# Patient Record
Sex: MALE | Race: WHITE | NOT HISPANIC OR LATINO | Employment: FULL TIME | URBAN - METROPOLITAN AREA
[De-identification: names, ages, dates, MRNs, and addresses within clinical notes are randomized per-mention and may not be internally consistent; named-entity substitution may affect disease eponyms.]

---

## 2017-01-09 ENCOUNTER — PATIENT MESSAGE (OUTPATIENT)
Dept: INTERNAL MEDICINE | Facility: CLINIC | Age: 62
End: 2017-01-09

## 2017-01-17 ENCOUNTER — OFFICE VISIT (OUTPATIENT)
Dept: INTERNAL MEDICINE | Facility: CLINIC | Age: 62
End: 2017-01-17
Payer: COMMERCIAL

## 2017-01-17 VITALS
HEART RATE: 89 BPM | WEIGHT: 230.38 LBS | BODY MASS INDEX: 29.57 KG/M2 | DIASTOLIC BLOOD PRESSURE: 66 MMHG | OXYGEN SATURATION: 98 % | HEIGHT: 74 IN | SYSTOLIC BLOOD PRESSURE: 124 MMHG

## 2017-01-17 DIAGNOSIS — E78.5 HYPERLIPIDEMIA, UNSPECIFIED HYPERLIPIDEMIA TYPE: ICD-10-CM

## 2017-01-17 DIAGNOSIS — Z79.4 TYPE 2 DIABETES MELLITUS WITHOUT COMPLICATION, WITH LONG-TERM CURRENT USE OF INSULIN: ICD-10-CM

## 2017-01-17 DIAGNOSIS — E11.9 TYPE 2 DIABETES MELLITUS WITHOUT COMPLICATION, WITH LONG-TERM CURRENT USE OF INSULIN: ICD-10-CM

## 2017-01-17 DIAGNOSIS — I10 ESSENTIAL HYPERTENSION: Primary | ICD-10-CM

## 2017-01-17 PROCEDURE — 3078F DIAST BP <80 MM HG: CPT | Mod: S$GLB,,, | Performed by: INTERNAL MEDICINE

## 2017-01-17 PROCEDURE — 99214 OFFICE O/P EST MOD 30 MIN: CPT | Mod: S$GLB,,, | Performed by: INTERNAL MEDICINE

## 2017-01-17 PROCEDURE — 4010F ACE/ARB THERAPY RXD/TAKEN: CPT | Mod: S$GLB,,, | Performed by: INTERNAL MEDICINE

## 2017-01-17 PROCEDURE — 3074F SYST BP LT 130 MM HG: CPT | Mod: S$GLB,,, | Performed by: INTERNAL MEDICINE

## 2017-01-17 PROCEDURE — 99999 PR PBB SHADOW E&M-EST. PATIENT-LVL III: CPT | Mod: PBBFAC,,, | Performed by: INTERNAL MEDICINE

## 2017-01-17 PROCEDURE — 3046F HEMOGLOBIN A1C LEVEL >9.0%: CPT | Mod: S$GLB,,, | Performed by: INTERNAL MEDICINE

## 2017-01-17 PROCEDURE — 1159F MED LIST DOCD IN RCRD: CPT | Mod: S$GLB,,, | Performed by: INTERNAL MEDICINE

## 2017-01-17 RX ORDER — PEN NEEDLE, DIABETIC 30 GX3/16"
NEEDLE, DISPOSABLE MISCELLANEOUS
Qty: 500 EACH | Refills: 5 | Status: SHIPPED | OUTPATIENT
Start: 2017-01-17 | End: 2018-07-12

## 2017-01-17 RX ORDER — INSULIN GLARGINE 100 [IU]/ML
INJECTION, SOLUTION SUBCUTANEOUS
Qty: 30 ML | Refills: 3 | Status: SHIPPED | OUTPATIENT
Start: 2017-01-17 | End: 2017-05-22 | Stop reason: SDUPTHER

## 2017-01-17 RX ORDER — PEN NEEDLE, DIABETIC 30 GX3/16"
NEEDLE, DISPOSABLE MISCELLANEOUS
Qty: 500 EACH | Refills: 4 | Status: CANCELLED | OUTPATIENT
Start: 2017-01-17

## 2017-01-17 RX ORDER — PEN NEEDLE, DIABETIC 30 GX3/16"
NEEDLE, DISPOSABLE MISCELLANEOUS
Qty: 100 EACH | Refills: 5 | Status: SHIPPED | OUTPATIENT
Start: 2017-01-17 | End: 2017-01-17 | Stop reason: SDUPTHER

## 2017-01-17 NOTE — MR AVS SNAPSHOT
"    [facility]  [address]  [facility] [address]  Phone: [phone]  Fax: [phone]                  Hammad Douglas III   2017 2:00 PM   Office Visit    Description:  Male : 1955   Provider:  Dae Acevedo Jr., MD   Department:  [facility]           Reason for Visit     Annual Exam           Diagnoses this Visit        Comments    Essential hypertension    -  Primary     Type 2 diabetes mellitus without complication, with long-term current use of insulin                To Do List           Future Appointments        Provider Department Dept Phone    2017 7:00 AM LAB, SAME DAY [facility] [phone]      Goals (5 Years of Data)     None       These Medications        Disp Refills Start End    pen needle, diabetic 31 gauge x 5/16" Ndle 500 each 5 2017     Use as needed    Pharmacy: [facility] - [place] [address] [address]  #: [phone]         [facility]     [facility] Care [facility] -  Assistance  Registered nurses in the [facility] provide clinical advisement, health education, appointment booking, and other advisory services.  Call for this free service at [phone].             Medications           Message regarding Medications     Verify the changes and/or additions to your medication regime listed below are the same as discussed with your clinician today.  If any of these changes or additions are incorrect, please notify your healthcare provider.        START taking these NEW medications        Refills    pen needle, diabetic 31 gauge x 5/16" Ndle 5    Sig: Use as needed    Class: Normal      STOP taking these medications     amoxicillin (AMOXIL) 500 MG capsule Take 500 mg by mouth 3 (three) times daily.    cholecalciferol, vitamin D3, 1,000 unit capsule Take 1 capsule (1,000 Units total) by mouth once daily.    pen needle, diabetic (BD ULTRA-FINE " "BETSY PEN NEEDLES) 32 gauge x 5/32" Ndle Test 5 times daily           Verify that the below list of medications is an accurate representation of the medications you are currently taking.  If none reported, the list may be blank. If incorrect, please contact your healthcare provider. Carry this list with you in case of emergency.           Current Medications     atorvastatin (LIPITOR) 40 MG tablet Take 1 tablet (40 mg total) by mouth once daily.    blood sugar diagnostic (ONETOUCH ULTRA TEST) Strp 1 strip by Other route 5 (five) times daily.    blood-glucose meter (ACCU-CHEK WILLIAMS PLUS METER) Misc 1 each by Misc.(Non-Drug; Combo Route) route as directed.    fluoxetine (PROZAC) 20 MG capsule Take 1 capsule (20 mg total) by mouth once daily.    insulin lispro (HUMALOG KWIKPEN) 100 unit/mL InPn pen 6-10 units subcutaneous prior to meals    LANTUS SOLOSTAR 100 unit/mL (3 mL) InPn pen INJECT INJECT 36 UNITS UNITS DAILY AT 2:00PM    losartan (COZAAR) 100 MG tablet Take 1 tablet (100 mg total) by mouth once daily.    glucagon, human recombinant, (GLUCAGON EMERGENCY) 1 mg Kit Inject 1 mL (1 mg total) into the muscle as needed.    pen needle, diabetic 31 gauge x 5/16" Ndle Use as needed           Clinical Reference Information           Vital Signs - Last Recorded  Most recent update: 1/17/2017  2:17 PM by Beulah Brown MA    BP Pulse Ht Wt SpO2 BMI    124/66 (BP Location: Right arm, Patient Position: Sitting, BP Method: Manual) 89 6' 2" (1.88 m) 104.5 kg (230 lb 6.1 oz) 98% 29.58 kg/m2      Blood Pressure          Most Recent Value    BP  124/66      Allergies as of 1/17/2017     Altace [Ramipril]      Immunizations Administered on Date of Encounter - 1/17/2017     None      Orders Placed During Today's Visit     Future Labs/Procedures Expected by Expires    CBC auto differential  1/17/2017 1/17/2018    Comprehensive metabolic panel  1/17/2017 1/17/2018    Hemoglobin A1c  1/17/2017 1/17/2018    Lipid panel  1/17/2017 " 1/17/2018    PSA, Screening  1/17/2017 3/18/2018      Smoking Cessation     If you would like to quit smoking:   You may be eligible for free services if you are a [place] resident and started smoking cigarettes before September 1, 1988.  Call the Smoking Cessation Trust (SCT) toll free at [phone] or [phone].   Call 9-184-QUIT-NOW if you do not meet the above criteria.

## 2017-01-18 NOTE — PROGRESS NOTES
Subjective:       Patient ID: Hammad Dogulas III is a 61 y.o. male.    Chief Complaint: Annual Exam    HPIthe patient is a 61-year-old male comes in for general checkup.  He is reporting glucoses at home around 130.  He is not noticing any chest pain or shortness of breath.  He is quite active at work and does a fair amount of walking.  Review of Systems   Constitutional: Negative for appetite change, fatigue and unexpected weight change.   HENT: Negative for congestion and sore throat.    Eyes: Negative for visual disturbance.   Respiratory: Negative for cough, chest tightness, shortness of breath and wheezing.    Cardiovascular: Negative for chest pain and palpitations.   Gastrointestinal: Negative for abdominal distention, abdominal pain, blood in stool, constipation, diarrhea and nausea.   Genitourinary: Negative for difficulty urinating, dysuria, frequency and urgency.   Musculoskeletal: Negative for arthralgias, myalgias and neck stiffness.   Skin: Negative for rash.   Neurological: Negative for dizziness, weakness and headaches.   Psychiatric/Behavioral: Negative for behavioral problems, hallucinations and self-injury.       Objective:      Physical Exam   Constitutional: He is oriented to person, place, and time. He appears well-developed. No distress.   HENT:   Head: Normocephalic.   Mouth/Throat: No oropharyngeal exudate.   Eyes: Conjunctivae and EOM are normal. Pupils are equal, round, and reactive to light. Right eye exhibits no discharge. Left eye exhibits no discharge. No scleral icterus.   Fundi benign bilaterally.   Neck: Normal range of motion. No JVD present. No tracheal deviation present. No thyromegaly present.   Cardiovascular: Normal rate, regular rhythm and normal heart sounds.  Exam reveals no gallop and no friction rub.    No murmur heard.  Pulmonary/Chest: Effort normal and breath sounds normal. No respiratory distress. He has no wheezes. He has no rales. He exhibits no tenderness.    Abdominal: Soft. Bowel sounds are normal. He exhibits no distension and no mass. There is no tenderness. There is no rebound and no guarding.   Musculoskeletal: Normal range of motion. He exhibits no edema or tenderness.   Lymphadenopathy:     He has no cervical adenopathy.   Neurological: He is alert and oriented to person, place, and time. He has normal reflexes. He displays normal reflexes. No cranial nerve deficit. He exhibits normal muscle tone. Coordination normal.   Skin: Skin is warm and dry. No rash noted. He is not diaphoretic. No erythema. No pallor.   Psychiatric: He has a normal mood and affect. His behavior is normal. Thought content normal.       Assessment:       1. Essential hypertension    2. Type 2 diabetes mellitus without complication, with long-term current use of insulin    3. Hyperlipidemia, unspecified hyperlipidemia type        Plan:       1.  CBC, CMP, hemoglobin A1c, fasting SA  2.  Increase exercise and decrease weight  3.  Return to clinic in 6 months  4.  Refill medications

## 2017-01-20 ENCOUNTER — LAB VISIT (OUTPATIENT)
Dept: LAB | Facility: HOSPITAL | Age: 62
End: 2017-01-20
Attending: INTERNAL MEDICINE
Payer: COMMERCIAL

## 2017-01-20 DIAGNOSIS — Z79.4 TYPE 2 DIABETES MELLITUS WITHOUT COMPLICATION, WITH LONG-TERM CURRENT USE OF INSULIN: ICD-10-CM

## 2017-01-20 DIAGNOSIS — E11.9 TYPE 2 DIABETES MELLITUS WITHOUT COMPLICATION, WITH LONG-TERM CURRENT USE OF INSULIN: ICD-10-CM

## 2017-01-20 DIAGNOSIS — I10 ESSENTIAL HYPERTENSION: ICD-10-CM

## 2017-01-20 LAB
ALBUMIN SERPL BCP-MCNC: 3.5 G/DL
ALP SERPL-CCNC: 97 U/L
ALT SERPL W/O P-5'-P-CCNC: 25 U/L
ANION GAP SERPL CALC-SCNC: 7 MMOL/L
AST SERPL-CCNC: 22 U/L
BASOPHILS # BLD AUTO: 0.03 K/UL
BASOPHILS NFR BLD: 0.5 %
BILIRUB SERPL-MCNC: 0.5 MG/DL
BUN SERPL-MCNC: 20 MG/DL
CALCIUM SERPL-MCNC: 9.4 MG/DL
CHLORIDE SERPL-SCNC: 105 MMOL/L
CHOLEST/HDLC SERPL: 4 {RATIO}
CO2 SERPL-SCNC: 28 MMOL/L
COMPLEXED PSA SERPL-MCNC: 0.37 NG/ML
CREAT SERPL-MCNC: 1.3 MG/DL
DIFFERENTIAL METHOD: NORMAL
EOSINOPHIL # BLD AUTO: 0.3 K/UL
EOSINOPHIL NFR BLD: 5.3 %
ERYTHROCYTE [DISTWIDTH] IN BLOOD BY AUTOMATED COUNT: 13.9 %
EST. GFR  (AFRICAN AMERICAN): >60 ML/MIN/1.73 M^2
EST. GFR  (NON AFRICAN AMERICAN): 59.7 ML/MIN/1.73 M^2
ESTIMATED AVG GLUCOSE: 223 MG/DL
GLUCOSE SERPL-MCNC: 132 MG/DL
HBA1C MFR BLD HPLC: 9.4 %
HCT VFR BLD AUTO: 43.5 %
HDL/CHOLESTEROL RATIO: 24.7 %
HDLC SERPL-MCNC: 170 MG/DL
HDLC SERPL-MCNC: 42 MG/DL
HGB BLD-MCNC: 14.3 G/DL
LDLC SERPL CALC-MCNC: 110.2 MG/DL
LYMPHOCYTES # BLD AUTO: 1.4 K/UL
LYMPHOCYTES NFR BLD: 23.2 %
MCH RBC QN AUTO: 29.2 PG
MCHC RBC AUTO-ENTMCNC: 32.9 %
MCV RBC AUTO: 89 FL
MONOCYTES # BLD AUTO: 0.6 K/UL
MONOCYTES NFR BLD: 9.7 %
NEUTROPHILS # BLD AUTO: 3.8 K/UL
NEUTROPHILS NFR BLD: 61 %
NONHDLC SERPL-MCNC: 128 MG/DL
PLATELET # BLD AUTO: 335 K/UL
PMV BLD AUTO: 10.7 FL
POTASSIUM SERPL-SCNC: 4.8 MMOL/L
PROT SERPL-MCNC: 6.9 G/DL
RBC # BLD AUTO: 4.9 M/UL
SODIUM SERPL-SCNC: 140 MMOL/L
TRIGL SERPL-MCNC: 89 MG/DL
WBC # BLD AUTO: 6.17 K/UL

## 2017-01-20 PROCEDURE — 85025 COMPLETE CBC W/AUTO DIFF WBC: CPT

## 2017-01-20 PROCEDURE — 83036 HEMOGLOBIN GLYCOSYLATED A1C: CPT

## 2017-01-20 PROCEDURE — 80053 COMPREHEN METABOLIC PANEL: CPT

## 2017-01-20 PROCEDURE — 80061 LIPID PANEL: CPT

## 2017-01-20 PROCEDURE — 84153 ASSAY OF PSA TOTAL: CPT

## 2017-01-20 PROCEDURE — 36415 COLL VENOUS BLD VENIPUNCTURE: CPT

## 2017-01-24 ENCOUNTER — PATIENT MESSAGE (OUTPATIENT)
Dept: INTERNAL MEDICINE | Facility: CLINIC | Age: 62
End: 2017-01-24

## 2017-01-24 DIAGNOSIS — E11.9 TYPE 2 DIABETES MELLITUS WITHOUT COMPLICATION, WITH LONG-TERM CURRENT USE OF INSULIN: Primary | ICD-10-CM

## 2017-01-24 DIAGNOSIS — Z79.4 TYPE 2 DIABETES MELLITUS WITHOUT COMPLICATION, WITH LONG-TERM CURRENT USE OF INSULIN: Primary | ICD-10-CM

## 2017-01-27 NOTE — TELEPHONE ENCOUNTER
Spoke with pt---pt states that he received the [facility] message. Verbalized understanding. Informed pt that dr suazo has placed the consult orders And pt stated that he will call on Monday to set up the appt with endocrinology

## 2017-02-22 ENCOUNTER — PATIENT MESSAGE (OUTPATIENT)
Dept: INTERNAL MEDICINE | Facility: CLINIC | Age: 62
End: 2017-02-22

## 2017-03-09 ENCOUNTER — PATIENT MESSAGE (OUTPATIENT)
Dept: INTERNAL MEDICINE | Facility: CLINIC | Age: 62
End: 2017-03-09

## 2017-05-22 ENCOUNTER — OFFICE VISIT (OUTPATIENT)
Dept: ENDOCRINOLOGY | Facility: CLINIC | Age: 62
End: 2017-05-22
Payer: COMMERCIAL

## 2017-05-22 ENCOUNTER — LAB VISIT (OUTPATIENT)
Dept: LAB | Facility: HOSPITAL | Age: 62
End: 2017-05-22
Attending: INTERNAL MEDICINE
Payer: COMMERCIAL

## 2017-05-22 VITALS
HEIGHT: 74 IN | BODY MASS INDEX: 29.09 KG/M2 | WEIGHT: 226.63 LBS | DIASTOLIC BLOOD PRESSURE: 68 MMHG | HEART RATE: 81 BPM | SYSTOLIC BLOOD PRESSURE: 120 MMHG

## 2017-05-22 DIAGNOSIS — E16.2 HYPOGLYCEMIA: ICD-10-CM

## 2017-05-22 DIAGNOSIS — E10.8 TYPE 1 DIABETES MELLITUS WITH COMPLICATION: ICD-10-CM

## 2017-05-22 DIAGNOSIS — E10.8 TYPE 1 DIABETES MELLITUS WITH COMPLICATION: Primary | ICD-10-CM

## 2017-05-22 DIAGNOSIS — E10.319 DIABETIC RETINOPATHY ASSOCIATED WITH TYPE 1 DIABETES MELLITUS, MACULAR EDEMA PRESENCE UNSPECIFIED, UNSPECIFIED LATERALITY, UNSPECIFIED RETINOPATHY SEVERITY: ICD-10-CM

## 2017-05-22 DIAGNOSIS — E10.42 DIABETIC POLYNEUROPATHY ASSOCIATED WITH TYPE 1 DIABETES MELLITUS: ICD-10-CM

## 2017-05-22 DIAGNOSIS — E78.5 HYPERLIPIDEMIA, UNSPECIFIED HYPERLIPIDEMIA TYPE: ICD-10-CM

## 2017-05-22 DIAGNOSIS — I10 ESSENTIAL HYPERTENSION: ICD-10-CM

## 2017-05-22 LAB
ALBUMIN SERPL BCP-MCNC: 3.5 G/DL
ALP SERPL-CCNC: 87 U/L
ALT SERPL W/O P-5'-P-CCNC: 27 U/L
ANION GAP SERPL CALC-SCNC: 5 MMOL/L
AST SERPL-CCNC: 26 U/L
BILIRUB SERPL-MCNC: 0.6 MG/DL
BUN SERPL-MCNC: 20 MG/DL
CALCIUM SERPL-MCNC: 9.3 MG/DL
CHLORIDE SERPL-SCNC: 103 MMOL/L
CO2 SERPL-SCNC: 29 MMOL/L
CREAT SERPL-MCNC: 1.3 MG/DL
EST. GFR  (AFRICAN AMERICAN): >60 ML/MIN/1.73 M^2
EST. GFR  (NON AFRICAN AMERICAN): 59.7 ML/MIN/1.73 M^2
ESTIMATED AVG GLUCOSE: 212 MG/DL
GLUCOSE SERPL-MCNC: 258 MG/DL
HBA1C MFR BLD HPLC: 9 %
POTASSIUM SERPL-SCNC: 4.6 MMOL/L
PROT SERPL-MCNC: 6.8 G/DL
SODIUM SERPL-SCNC: 137 MMOL/L

## 2017-05-22 PROCEDURE — 3074F SYST BP LT 130 MM HG: CPT | Mod: S$GLB,,, | Performed by: INTERNAL MEDICINE

## 2017-05-22 PROCEDURE — 1160F RVW MEDS BY RX/DR IN RCRD: CPT | Mod: S$GLB,,, | Performed by: INTERNAL MEDICINE

## 2017-05-22 PROCEDURE — 80053 COMPREHEN METABOLIC PANEL: CPT

## 2017-05-22 PROCEDURE — 3060F POS MICROALBUMINURIA REV: CPT | Mod: 8P,S$GLB,, | Performed by: INTERNAL MEDICINE

## 2017-05-22 PROCEDURE — 99214 OFFICE O/P EST MOD 30 MIN: CPT | Mod: S$GLB,,, | Performed by: INTERNAL MEDICINE

## 2017-05-22 PROCEDURE — 3078F DIAST BP <80 MM HG: CPT | Mod: S$GLB,,, | Performed by: INTERNAL MEDICINE

## 2017-05-22 PROCEDURE — 83036 HEMOGLOBIN GLYCOSYLATED A1C: CPT

## 2017-05-22 PROCEDURE — 99999 PR PBB SHADOW E&M-EST. PATIENT-LVL III: CPT | Mod: PBBFAC,,, | Performed by: INTERNAL MEDICINE

## 2017-05-22 PROCEDURE — 3046F HEMOGLOBIN A1C LEVEL >9.0%: CPT | Mod: S$GLB,,, | Performed by: INTERNAL MEDICINE

## 2017-05-22 PROCEDURE — 36415 COLL VENOUS BLD VENIPUNCTURE: CPT | Mod: PO

## 2017-05-22 RX ORDER — INSULIN GLARGINE 100 [IU]/ML
32 INJECTION, SOLUTION SUBCUTANEOUS NIGHTLY
Qty: 45 ML | Refills: 3 | Status: SHIPPED | OUTPATIENT
Start: 2017-05-22 | End: 2017-08-24 | Stop reason: SDUPTHER

## 2017-05-22 RX ORDER — INSULIN GLARGINE 100 [IU]/ML
32 INJECTION, SOLUTION SUBCUTANEOUS NIGHTLY
Qty: 30 ML | Refills: 3 | Status: SHIPPED | OUTPATIENT
Start: 2017-05-22 | End: 2017-05-22 | Stop reason: SDUPTHER

## 2017-05-22 NOTE — LETTER
May 22, 2017      Dae Acevedo Jr., MD  [address]  [facility] [address]           [facility]  [address] [facility] [address] [place] [address]  Phone: [phone]  Fax: [phone]          Patient: Hammad Douglas III   MR Number: [id number]   YOB: 1955   Date of Visit: 5/22/2017       Dear Dr. Dae Acevedo Jr.:    Thank you for referring Hammad Douglas to me for evaluation. Attached you will find relevant portions of my assessment and plan of care.    If you have questions, please do not hesitate to call me. I look forward to following Hammad Douglas along with you.    Sincerely,    Luigi Casey, DO [facility]  CC:  No Recipients    If you would like to receive this communication electronically, please contact [email or web address] or [phone] to request more information on [facility] access.    For providers and/or their staff who would like to refer a patient to [facility], please contact us through our one-stop-shop provider referral line, [facility] , at [phone].    If you feel you have received this communication in error or would no longer like to receive these types of communications, please e-mail [email or web address]

## 2017-05-22 NOTE — PROGRESS NOTES
CHIEF COMPLAINT: DM 1  61 year old being seen as a new patient. Saw [facility] 3 years ago. Diagnosed at the age of 19. Lantus 36 and Humalog 6-10. + DM retinopathy, nephropathy.and Neuropathy. States that adjusting humalog based on experience. States that avg BG of 200. Has questions about insulin pump. Checking BG 5-6 x day. He does get some hypoglycemia at night. He does get symptoms. No paresthesias. Tolerating statin      PAST MEDICAL HISTORY/PAST SURGICAL HISTORY:  Reviewed in [facility]    SOCIAL HISTORY: No T/A. [facility]     FAMILY HISTORY:  No DM. + Hypothyroidism    MEDICATIONS/ALLERGIES: The patient's MedCard has been updated and reviewed.      ROS:   Constitutional: No recent significant weight change  Eyes: No recent visual changes  ENT: No dysphagia  Cardiovascular: Denies current anginal symptoms  Respiratory: Denies current respiratory difficulty  Gastrointestinal: Denies recent bowel disturbances  GenitoUrinary - No dysuria. No polyuria  Skin: No new skin rash  Neurologic: No focal neurologic complaints  Remainder ROS negative        PE:    GENERAL: Well developed, well nourished.  PSYCH:  appropriate mood and affect  EYES:  PERRL, EOM intact.  ENT: Nares patent, oropharynx clear, mucosa pink,   NECK: Supple, trachea midline, No palpable thyroid nodules  CHEST: Resp even and unlabored, CTA bilateral.  CARDIAC: RRR, S1, S2 heard, no murmurs, rubs, S3, or S4  ABDOMEN: Soft, non-tender, non-distended;  No organomegaly  VASCULAR:  DP pulses +2/4 bilaterally, no edema  NEURO: Gait steady, CN II-VII grossly intact  SKIN: No areas of breakdown, no acanthosis nigracans.  FEET: decreased monofilament. Only has sensation on dorsal aspect. No cuts or abrasions. 2 + pedal pulse.     LABS   Results for ASHLEY PADILLA III (MRN [id number]) as of 5/22/2017 07:42   Ref. Range 1/20/2017 07:05   Sodium Latest Ref Range: 136 - 145 mmol/L 140   Potassium Latest Ref Range: 3.5 - 5.1 mmol/L 4.8   Chloride  Latest Ref Range: 95 - 110 mmol/L 105   CO2 Latest Ref Range: 23 - 29 mmol/L 28   Anion Gap Latest Ref Range: 8 - 16 mmol/L 7 (L)   BUN, Bld Latest Ref Range: 6 - 20 mg/dL 20   Creatinine Latest Ref Range: 0.5 - 1.4 mg/dL 1.3   eGFR if non African American Latest Ref Range: >60 mL/min/1.73 m^2 59.7 (A)   eGFR if African American Latest Ref Range: >60 mL/min/1.73 m^2 >60.0   Glucose Latest Ref Range: 70 - 110 mg/dL 132 (H)   Calcium Latest Ref Range: 8.7 - 10.5 mg/dL 9.4   Alkaline Phosphatase Latest Ref Range: 55 - 135 U/L 97   Total Protein Latest Ref Range: 6.0 - 8.4 g/dL 6.9   Albumin Latest Ref Range: 3.5 - 5.2 g/dL 3.5   Total Bilirubin Latest Ref Range: 0.1 - 1.0 mg/dL 0.5   AST Latest Ref Range: 10 - 40 U/L 22   ALT Latest Ref Range: 10 - 44 U/L 25   Triglycerides Latest Ref Range: 30 - 150 mg/dL 89   Cholesterol Latest Ref Range: 120 - 199 mg/dL 170   HDL Latest Ref Range: 40 - 75 mg/dL 42   LDL Cholesterol Latest Ref Range: 63.0 - 159.0 mg/dL 110.2   Total Cholesterol/HDL Ratio Latest Ref Range: 2.0 - 5.0  4.0   Hemoglobin A1C Latest Ref Range: 4.5 - 6.2 % 9.4 (H)   Estimated Avg Glucose Latest Ref Range: 68 - 131 mg/dL 223 (H)       ASSESSMENT/PLAN:  1. DM 1- Uncontrolled with nephropathy, neuropathy, retinopathy. Seeing ophtho on [facility]. On ARB. He had questions about insulin pump. Discussed Medtronic 670 G. Discussed that would need to do carb counting. Discussed pumps and sensors. Will send him to DM education to learn CHO. Decrease Lantus to 32 due to hypoglycemia. Discussed keeping a log and sending in as soon as possible. Check labs today. Discussed foot care.     2. HTN- on ARB. Controlled.     3. Hyperlipidemia- starting statin.       FOLLOWUP  DM Education- CHO  Today- CMP, A1c  F/U 3 months with NP with CMP, A1c, urine micro, FLP

## 2017-05-24 RX ORDER — ATORVASTATIN CALCIUM 40 MG/1
40 TABLET, FILM COATED ORAL DAILY
Qty: 90 TABLET | Refills: 0 | Status: SHIPPED | OUTPATIENT
Start: 2017-05-24 | End: 2017-08-24

## 2017-05-24 RX ORDER — LOSARTAN POTASSIUM 100 MG/1
100 TABLET ORAL DAILY
Qty: 90 TABLET | Refills: 0 | Status: SHIPPED | OUTPATIENT
Start: 2017-05-24 | End: 2017-06-06 | Stop reason: SDUPTHER

## 2017-06-07 RX ORDER — ATORVASTATIN CALCIUM 40 MG/1
TABLET, FILM COATED ORAL
Qty: 90 TABLET | Refills: 3 | Status: SHIPPED | OUTPATIENT
Start: 2017-06-07 | End: 2018-05-11 | Stop reason: SDUPTHER

## 2017-06-08 RX ORDER — LOSARTAN POTASSIUM 100 MG/1
TABLET ORAL
Qty: 90 TABLET | Refills: 0 | Status: SHIPPED | OUTPATIENT
Start: 2017-06-08 | End: 2017-06-29

## 2017-06-29 RX ORDER — LOSARTAN POTASSIUM 100 MG/1
TABLET ORAL
Qty: 90 TABLET | Refills: 0 | Status: SHIPPED | OUTPATIENT
Start: 2017-06-29 | End: 2017-11-21

## 2017-07-05 RX ORDER — FLUOXETINE HYDROCHLORIDE 20 MG/1
CAPSULE ORAL
Qty: 90 CAPSULE | Refills: 3 | Status: SHIPPED | OUTPATIENT
Start: 2017-07-05 | End: 2018-08-16 | Stop reason: SDUPTHER

## 2017-07-05 RX ORDER — INSULIN LISPRO 100 [IU]/ML
INJECTION, SOLUTION INTRAVENOUS; SUBCUTANEOUS
Qty: 30 ML | Refills: 0 | Status: SHIPPED | OUTPATIENT
Start: 2017-07-05 | End: 2017-08-24 | Stop reason: SDUPTHER

## 2017-08-17 ENCOUNTER — LAB VISIT (OUTPATIENT)
Dept: LAB | Facility: HOSPITAL | Age: 62
End: 2017-08-17
Attending: INTERNAL MEDICINE
Payer: COMMERCIAL

## 2017-08-17 DIAGNOSIS — E10.8 TYPE 1 DIABETES MELLITUS WITH COMPLICATION: ICD-10-CM

## 2017-08-17 LAB
ALBUMIN SERPL BCP-MCNC: 3.5 G/DL
ALP SERPL-CCNC: 86 U/L
ALT SERPL W/O P-5'-P-CCNC: 24 U/L
ANION GAP SERPL CALC-SCNC: 8 MMOL/L
AST SERPL-CCNC: 24 U/L
BILIRUB SERPL-MCNC: 0.5 MG/DL
BUN SERPL-MCNC: 28 MG/DL
C PEPTIDE SERPL-MCNC: <0.08 NG/ML
CALCIUM SERPL-MCNC: 9.8 MG/DL
CHLORIDE SERPL-SCNC: 104 MMOL/L
CHOLEST/HDLC SERPL: 3.9 {RATIO}
CO2 SERPL-SCNC: 27 MMOL/L
CREAT SERPL-MCNC: 1.2 MG/DL
EST. GFR  (AFRICAN AMERICAN): >60 ML/MIN/1.73 M^2
EST. GFR  (NON AFRICAN AMERICAN): >60 ML/MIN/1.73 M^2
ESTIMATED AVG GLUCOSE: 189 MG/DL
GLUCOSE SERPL-MCNC: 108 MG/DL
HBA1C MFR BLD HPLC: 8.2 %
HDL/CHOLESTEROL RATIO: 25.6 %
HDLC SERPL-MCNC: 172 MG/DL
HDLC SERPL-MCNC: 44 MG/DL
LDLC SERPL CALC-MCNC: 117 MG/DL
NONHDLC SERPL-MCNC: 128 MG/DL
POTASSIUM SERPL-SCNC: 5.3 MMOL/L
PROT SERPL-MCNC: 7.1 G/DL
SODIUM SERPL-SCNC: 139 MMOL/L
TRIGL SERPL-MCNC: 55 MG/DL

## 2017-08-17 PROCEDURE — 80061 LIPID PANEL: CPT

## 2017-08-17 PROCEDURE — 36415 COLL VENOUS BLD VENIPUNCTURE: CPT

## 2017-08-17 PROCEDURE — 80053 COMPREHEN METABOLIC PANEL: CPT

## 2017-08-17 PROCEDURE — 83036 HEMOGLOBIN GLYCOSYLATED A1C: CPT

## 2017-08-17 PROCEDURE — 84681 ASSAY OF C-PEPTIDE: CPT

## 2017-08-24 ENCOUNTER — OFFICE VISIT (OUTPATIENT)
Dept: ENDOCRINOLOGY | Facility: CLINIC | Age: 62
End: 2017-08-24
Payer: COMMERCIAL

## 2017-08-24 ENCOUNTER — PATIENT MESSAGE (OUTPATIENT)
Dept: ENDOCRINOLOGY | Facility: CLINIC | Age: 62
End: 2017-08-24

## 2017-08-24 ENCOUNTER — LAB VISIT (OUTPATIENT)
Dept: LAB | Facility: HOSPITAL | Age: 62
End: 2017-08-24
Attending: NURSE PRACTITIONER
Payer: COMMERCIAL

## 2017-08-24 VITALS
SYSTOLIC BLOOD PRESSURE: 128 MMHG | HEART RATE: 80 BPM | DIASTOLIC BLOOD PRESSURE: 78 MMHG | RESPIRATION RATE: 18 BRPM | HEIGHT: 73 IN | WEIGHT: 230.38 LBS | BODY MASS INDEX: 30.53 KG/M2

## 2017-08-24 DIAGNOSIS — I10 ESSENTIAL HYPERTENSION: ICD-10-CM

## 2017-08-24 DIAGNOSIS — E78.5 HYPERLIPIDEMIA, UNSPECIFIED HYPERLIPIDEMIA TYPE: ICD-10-CM

## 2017-08-24 DIAGNOSIS — E10.21 TYPE 1 DIABETES MELLITUS WITH DIABETIC NEPHROPATHY: ICD-10-CM

## 2017-08-24 DIAGNOSIS — E10.43 TYPE 1 DIABETES MELLITUS WITH DIABETIC AUTONOMIC NEUROPATHY: Primary | ICD-10-CM

## 2017-08-24 DIAGNOSIS — E10.3599 PROLIFERATIVE DIABETIC RETINOPATHY WITHOUT MACULAR EDEMA ASSOCIATED WITH TYPE 1 DIABETES MELLITUS, UNSPECIFIED LATERALITY: ICD-10-CM

## 2017-08-24 LAB
CREAT UR-MCNC: 97 MG/DL
MICROALBUMIN UR DL<=1MG/L-MCNC: 208 UG/ML
MICROALBUMIN/CREATININE RATIO: 214.4 UG/MG

## 2017-08-24 PROCEDURE — 99215 OFFICE O/P EST HI 40 MIN: CPT | Mod: S$GLB,,, | Performed by: NURSE PRACTITIONER

## 2017-08-24 PROCEDURE — 3008F BODY MASS INDEX DOCD: CPT | Mod: S$GLB,,, | Performed by: NURSE PRACTITIONER

## 2017-08-24 PROCEDURE — 99999 PR PBB SHADOW E&M-EST. PATIENT-LVL III: CPT | Mod: PBBFAC,,, | Performed by: NURSE PRACTITIONER

## 2017-08-24 PROCEDURE — 3045F PR MOST RECENT HEMOGLOBIN A1C LEVEL 7.0-9.0%: CPT | Mod: S$GLB,,, | Performed by: NURSE PRACTITIONER

## 2017-08-24 PROCEDURE — 3078F DIAST BP <80 MM HG: CPT | Mod: S$GLB,,, | Performed by: NURSE PRACTITIONER

## 2017-08-24 PROCEDURE — 3074F SYST BP LT 130 MM HG: CPT | Mod: S$GLB,,, | Performed by: NURSE PRACTITIONER

## 2017-08-24 PROCEDURE — 82570 ASSAY OF URINE CREATININE: CPT

## 2017-08-24 PROCEDURE — 4010F ACE/ARB THERAPY RXD/TAKEN: CPT | Mod: S$GLB,,, | Performed by: NURSE PRACTITIONER

## 2017-08-24 RX ORDER — INSULIN GLARGINE 100 [IU]/ML
30 INJECTION, SOLUTION SUBCUTANEOUS NIGHTLY
Qty: 45 ML | Refills: 3
Start: 2017-08-24 | End: 2017-11-30 | Stop reason: SDUPTHER

## 2017-08-24 RX ORDER — INSULIN LISPRO 100 [IU]/ML
INJECTION, SOLUTION INTRAVENOUS; SUBCUTANEOUS
Qty: 30 ML | Refills: 0
Start: 2017-08-24 | End: 2017-11-30 | Stop reason: SDUPTHER

## 2017-08-24 NOTE — Clinical Note
Negin De Leon. This is my pt from [place], who works in [place]. He's had T1DM since age 19 and is actively counting carbs. I want him to start using I:C ratio (1:8) to dose his Humalog for better coverage, so I'm sending him to you to educate on this. Please see my note for details. He'd be a great Medtronic 670G candidate, too.    Thank you! Gin

## 2017-08-24 NOTE — PROGRESS NOTES
CC: Mr. Hammad Douglas III arrives today for management of Type 1  DM and review of chronic medical conditions, as listed in the visit diagnosis section of this encounter.     HPI: Mr. Hammad Douglas III was diagnosed with Type 1  DM at age 19 after a viral illness - had polyuria, polydipsia at this time.  Initial treatment consisted of insulin. Denies FH of DM. Reports past hospitalizations due to DKA > 30 years ago. Not recently.   Previously seen by KENNEDI Mckeon NP in the [facility] [place].     Last seen by me in 9/2014. Most recently seen by Dr. Casey in 5/2017.    At last visit, he was referred to diabetes education for CHO counting. However, this appt was cancelled in June. Lantus dose was also decreased, due to hypoglycemia.     BG readings are checked 4x/day.            Hypoglycemia: Yes. Usually occurs in AM but less frequently since last insulin dose adjustment. He believes he may have taken his Humalog twice recently and BG dropped to 23.  Hypoglycemic Symptoms: none. Sometimes headache  Hypoglycemia Treatment: Coke or juice. Has glucagon.     Missing Insulin/PO medication doses: No  Timing prandial insulin 5-15 minutes before meals: yes    Exercise: No    Dietary Habits: Eats 3 meals/day. Occasional snacking on low sugar yogurt after dinner. Avoids sugary beverages..     Making an effort to count CHO. Has never used I:C ratio. He reports most meals average ~40 grams.     Last DM education appointment: 2014 ([facility]).    He works at [employer] in [place].     He is hesitant about an insulin pump but also is not very sure about how these work.     CURRENT DIABETIC MEDS: Lantus 32 units QHS; Humalog 6-10 units AC, based on meal size --uses 6 unit dose more often.   Vial or pen: pen  Glucometer type: One Touch Ultra      Last Eye Exam: 7/2016, proliferative DR. He is due for follow up.  Last Podiatry Exam: 2/2016    REVIEW OF SYSTEMS  Constitutional: no c/o fatigue, weakness, or  "weight loss.   Eyes: denies visual disturbances.  Cardiac: no palpitations or chest pain.  Respiratory: no cough or dyspnea.  GI: no c/o abdominal pain or nausea  Skin: no lesions or rashes.  Neuro: no numbness, tingling, or parasthesias.  Endocrine: denies polyphagia, polydipsia, polyuria      Personally reviewed Past Medical, Surgical, Social History.    Vital Signs  /78   Pulse 80   Resp 18   Ht 6' 1" (1.854 m)   Wt 104.5 kg (230 lb 6.1 oz)   BMI 30.40 kg/m²     Personally reviewed the below labs:    Hemoglobin A1C   Date Value Ref Range Status   08/17/2017 8.2 (H) 4.0 - 5.6 % Final     Comment:     According to ADA guidelines, hemoglobin A1c <7.0% represents  optimal control in non-pregnant diabetic patients. Different  metrics may apply to specific patient populations.   Standards of Medical Care in Diabetes-2016.  For the purpose of screening for the presence of diabetes:  <5.7%     Consistent with the absence of diabetes  5.7-6.4%  Consistent with increasing risk for diabetes   (prediabetes)  >or=6.5%  Consistent with diabetes  Currently, no consensus exists for use of hemoglobin A1c  for diagnosis of diabetes for children.  This Hemoglobin A1c assay has significant interference with fetal   hemoglobin   (HbF). The results are invalid for patients with abnormal amounts of   HbF,   including those with known Hereditary Persistence   of Fetal Hemoglobin. Heterozygous hemoglobin variants (HbAS, HbAC,   HbAD, HbAE, HbA2) do not significantly interfere with this assay;   however, presence of multiple variants in a sample may impact the %   interference.     05/22/2017 9.0 (H) 4.5 - 6.2 % Final     Comment:     According to ADA guidelines, hemoglobin A1C <7.0% represents  optimal control in non-pregnant diabetic patients.  Different  metrics may apply to specific populations.   Standards of Medical Care in Diabetes - 2016.  For the purpose of screening for the presence of diabetes:  <5.7%     Consistent " with the absence of diabetes  5.7-6.4%  Consistent with increasing risk for diabetes   (prediabetes)  >or=6.5%  Consistent with diabetes  Currently no consensus exists for use of hemoglobin A1C  for diagnosis of diabetes for children.     01/20/2017 9.4 (H) 4.5 - 6.2 % Final     Comment:     According to ADA guidelines, hemoglobin A1C <7.0% represents  optimal control in non-pregnant diabetic patients.  Different  metrics may apply to specific populations.   Standards of Medical Care in Diabetes - 2016.  For the purpose of screening for the presence of diabetes:  <5.7%     Consistent with the absence of diabetes  5.7-6.4%  Consistent with increasing risk for diabetes   (prediabetes)  >or=6.5%  Consistent with diabetes  Currently no consensus exists for use of hemoglobin A1C  for diagnosis of diabetes for children.         Chemistry        Component Value Date/Time     08/17/2017 0719    K 5.3 (H) 08/17/2017 0719     08/17/2017 0719    CO2 27 08/17/2017 0719    BUN 28 (H) 08/17/2017 0719    CREATININE 1.2 08/17/2017 0719     08/17/2017 0719        Component Value Date/Time    CALCIUM 9.8 08/17/2017 0719    ALKPHOS 86 08/17/2017 0719    AST 24 08/17/2017 0719    ALT 24 08/17/2017 0719    BILITOT 0.5 08/17/2017 0719    ESTGFRAFRICA >60.0 08/17/2017 0719    EGFRNONAA >60.0 08/17/2017 0719          Lab Results   Component Value Date    CHOL 172 08/17/2017    CHOL 170 01/20/2017    CHOL 162 12/15/2015     Lab Results   Component Value Date    HDL 44 08/17/2017    HDL 42 01/20/2017    HDL 42 12/15/2015     Lab Results   Component Value Date    LDLCALC 117.0 08/17/2017    LDLCALC 110.2 01/20/2017    LDLCALC 111.4 12/15/2015     Lab Results   Component Value Date    TRIG 55 08/17/2017    TRIG 89 01/20/2017    TRIG 43 12/15/2015     Lab Results   Component Value Date    CHOLHDL 25.6 08/17/2017    CHOLHDL 24.7 01/20/2017    CHOLHDL 25.9 12/15/2015       Lab Results   Component Value Date    MICALBCREAT 164.9  (H) 09/17/2014     Lab Results   Component Value Date    TSH 1.844 03/14/2014       CrCl cannot be calculated (Patient's most recent sCr result is older than the maximum 7 days allowed.).    Vit D, 25-Hydroxy   Date Value Ref Range Status   03/13/2015 23 (L) 30 - 96 ng/mL Final     Comment:     Vitamin D deficiency.........<10 ng/mL                              Vitamin D insufficiency......10-29 ng/mL       Vitamin D sufficiency........> or equal to 30 ng/mL  Vitamin D toxicity............>100 ng/mL           PHYSICAL EXAMINATION  Constitutional: Appears well, no distress  Neck: Supple, trachea midline; no thyromegaly or nodules.   Respiratory: CTA, even and unlabored.  Cardiovascular: RRR, no murmurs, no carotid bruits. DP pulses  2+ bilaterally; no edema.    Lymph: no cervical or supraclavicular lymphadenopathy  Skin: warm and dry; no lipohypertrophy, or acanthosis nigracans observed.  Neuro: DTR 2+ BUE/2+BLE.  Feet: appropriate footwear.    A1c target < 7.5%, due to hypoglycemia unawareness        Assessment/Plan  1. Type 1 diabetes mellitus with diabetic autonomic neuropathy  -- A1c has decreased but remains above goal. + hypoglycemia unawareness. Need to be very conservative with insulin dose increases  -- decrease Lantus to 30 units, due to occasional fasting hypoglycemia  -- change Humalog 6-8 units AC. Correction scale, target 180, ISF 50. Discussed how to use appropriately.   -- check BG 4x/day  -- schedule eye exam    -- discussed benefits of Dexcom personal CGMS. brochure provided. He will let us know if he would like us to fax his information over.   -- Diabetes education for instruction on use of I:C ratio. Would recommend starting with I:C ratio of 1:8  -- discussed benefits of Medtronic 670G and that he would need to be able to carb count in order to use auto mode. Great candidate.     -- Discussed diagnosis of DM, A1c goals, progression of disease, long term complications and tx options.  Advised  patient to check BG before activities, such as driving or exercise.  -- Reviewed hypoglycemia management: treat with 1/2 glass of juice, 1/2 can regular coke, or 4 glucose tablets. Monitor and repeat treatment every 15 minutes until BG is >70 Then have a snack, which includes a complex carbohydrate and protein.   2. Type 1 diabetes mellitus with diabetic nephropathy  -- on losartan  -- Microalbumin/creatinine urine ratio with RTC   3. Proliferative diabetic retinopathy without macular edema associated with type 1 diabetes mellitus, unspecified laterality  -- keep follow ups   4. Essential hypertension  -- controlled, continue current meds   5. Hyperlipidemia, unspecified hyperlipidemia type  -- continue atorvastatin and continue to monitor  Lab Results   Component Value Date    LDLCALC 117.0 2017      6. Hypoglycemia due to Type 1 DM -- has glucagon  -- recommended medic alert tag  -- advised to be aware of when he takes insulin so he doesn't accidentally double dose.      Total Time and Counselin minutes, >50% time spent counseling as noted above in #1 A/P.       FOLLOW UP  Return in about 3 months (around 2017).   Patient instructed to bring BG logs to each follow up   Patient encouraged to call for any BG/medication issues, concerns, or questions.    Orders Placed This Encounter   Procedures    Hemoglobin A1c    Basic metabolic panel    Vitamin D    Microalbumin/creatinine urine ratio

## 2017-08-24 NOTE — PATIENT INSTRUCTIONS
[email or web address] -- medic alert tag      Hypoglycemia (Low Blood Sugar)     Fast-acting sugar includes a cup of nonfat milk.     Too little sugar (glucose) in your blood is called hypoglycemia or low blood sugar. Low blood sugar usually means anything lower than 70 mg/dL. Talk with your healthcare provider about your target range and what level is too low for you. Diabetes itself doesnt cause low blood sugar. But some of the treatments for diabetes, such as pills or insulin, may raise your risk for it. Low blood sugar may cause you to pass out or have a seizure. So always treat low blood sugar right away, but don't overeat.  Special note: Always carry a source of fast-acting sugar and a snack in case of hypoglycemia.   What you may notice  If you have low blood sugar, you may have one or more of these symptoms:  · Shakiness or dizziness  · Cold, clammy skin or sweating  · Feelings of hunger  · Headache  · Nervousness  · A hard, fast heartbeat  · Weakness  · Confusion or irritability  · Blurred vision  · Having nightmares or waking up confused or sweating  · Numbness or tingling in the lips or tongue  What you should do  Here are tips to follow if you have hypoglycemia:   · First check your blood sugar. If it is too low (out of your target range), eat or drink 15 to 20 grams of fast-acting sugar. This may be 3 to 4 glucose tablets, 4 ounces (half a cup) of fruit juice or regular (nondiet) soda, 8 ounces (1 cup) of fat-free milk, or 1 tablespoon of honey. Dont take more than this, or your blood sugar may go too high.  · Wait 15 minutes. Then recheck your blood sugar if you can.  · If your blood sugar is still too low, repeat the steps above and check your blood sugar again. If your blood sugar still has not returned to your target range, contact your healthcare provider or seek emergency care.  · Once your blood sugar returns to target range, eat a snack or meal.  Preventing low blood sugar  Things you can do include  the following:   · If your condition needs a strict treatment plan, eat your meals and snacks at the same times each day. Dont skip meals!  · If your treatment plan lets you change when you eat and what you eat, learn how to change the time and dose of your rapid-acting insulin to match this.   · Ask your healthcare provider if it is safe for you to drink alcohol. Never drink on an empty stomach.  · Take your medicine at the prescribed times.  · Always carry a source of fast-acting sugar and a snack when youre away from home.  Other things to do  Additional tips include the following:  · Carry a medical ID card, a compact USB drive, or wear a medical alert bracelet or necklace. It should say that you have diabetes. It should also say what to do if you pass out or have a seizure.  · Make sure your family, friends, and coworkers know the signs of low blood sugar. Tell them what to do if your blood sugar falls very low and you cant treat yourself.  · Keep a glucagon emergency kit handy. Be sure your family, friends, and coworkers know how and when to use it. Check it regularly and replace the glucagon before it expires.  · Talk with your health care team about other things you can do to prevent low blood sugar.     If you have unexplained hypoglycemia or hypoglycemia several times, call your healthcare provider.   Date Last Reviewed: 5/1/2016  © 0022-2704 [employer]. [address] [place] [address], [place] [address]. All rights reserved. This information is not intended as a substitute for professional medical care. Always follow your healthcare professional's instructions.

## 2017-09-18 ENCOUNTER — CLINICAL SUPPORT (OUTPATIENT)
Dept: DIABETES | Facility: CLINIC | Age: 62
End: 2017-09-18
Payer: COMMERCIAL

## 2017-09-18 DIAGNOSIS — E10.8 TYPE 1 DIABETES MELLITUS WITH COMPLICATION: ICD-10-CM

## 2017-09-18 PROCEDURE — G0108 DIAB MANAGE TRN  PER INDIV: HCPCS | Mod: S$GLB,,, | Performed by: DIETITIAN, REGISTERED

## 2017-09-21 RX ORDER — INSULIN LISPRO 100 [IU]/ML
INJECTION, SOLUTION INTRAVENOUS; SUBCUTANEOUS
Qty: 30 ML | Refills: 0 | Status: SHIPPED | OUTPATIENT
Start: 2017-09-21 | End: 2017-11-30 | Stop reason: SDUPTHER

## 2017-09-22 NOTE — PROGRESS NOTES
Diabetes Education  Author: Negin Rodas RD  Date: 9/22/2017    Diabetes Education Visit  Diabetes Education Record Assessment/Progress: Initial (IC ratio teaching)    Diabetes Type  Diabetes Type : Type I (since age 19)    Diabetes History  Diabetes Diagnosis: >10 years    Nutrition  Meal Planning:  (eats 3 meals per day (40-50 gm carb); occasional snack before bed)    Monitoring   Self Monitoring :  (SMBG 3-4 times daily)  Blood Glucose Logs: No    Exercise   Exercise Type:  (none)    Current Diabetes Treatment   Current Treatment: Insulin (Lantus 30 units daily; Humalog 6-8 units AC)    Social History  Preferred Learning Method: Face to Face  Primary Support: Self  Educational Level: College Graduate  Occupation:  ( at [employer])    Barriers to Change  Barriers to Change: None  Learning Challenges : None    Readiness to Learn   Readiness to Learn : Acceptance    Cultural Influences  Cultural Influences: No      Diabetes Education Assessment/Progress  Acute Complications (preventing, detecting, and treating acute complications): Discussion, Individual Session, Written Materials Provided, Instructed (Reviewed hypo symptoms and appropriate treatment. Pt is VERY hypo unaware; can be <50 mg/dL and still not feel symptoms. Perfect dexcom candidate.)    Nutrition (Incorporating nutritional management into one's lifestyle): Discussion, Individual Session, Written Materials Provided, Instructed (Pt very knowledgeable of carb vs non-carb foods. Averages about 40-50 gm carbs per meal. Pt not totally confident in his ability to count carbs accurately. Serving sizes of carb food items reviewed, as well as label reading, and resources for determining carb amounts like [employer] book or [employer] sj. Multiple examples practiced at visit and pt able to determine carb amounts appropriately.)    Medications (states correct name, dose, onset, peak, duration, side effects & timing of meds): Discussion, Individual  Session, Instructed, Written Materials Provided (Reviewed timing and MOA of both medications. Reviewed IC ratio of 1:8 and how to use appropriately. Discussed ISF and target BGs. Discussed insulin pumps, though pt does not seem interested at this time. Likely a vanity issue for him.)    Monitoring (monitoring blood glucose/other parameters & using results): Discussion, Individual Session, Written Materials Provided, Instructed (Educated on Dexcom and daily usage. Recommended this would be a very useful tool for him, especially with such severe hypo unawareness. Pt would like to pursue Dexcom. Face sheet and ins info faxed to [employer]. )    Goal Setting and Problem Solving (verbalizes behavior change strategies & sets realistic goals): Discussion        Goals  Healthy Eating: Set (Will count carbs appropriately)  Medications: Set (Will use IC ratio of 1:8)  Other: Set (Investigate dexcom)         Diabetes Care Plan/Intervention  Education Plan/Intervention: Individual Follow-Up DSMT (3 week f/u scheduled)      Diabetes Meal Plan  Restrictions: Restricted Carbohydrate  Carbohydrate Per Meal: 45-60g  Carbohydrate Per Snack :  (0g)      Education Units of Time   Time Spent: 60 min      Health Maintenance Topics with due status: Not Due       Topic Last Completion Date    Colonoscopy 05/23/2008    Foot Exam 05/22/2017    Lipid Panel 08/17/2017    Hemoglobin A1c 08/17/2017     Health Maintenance Due   Topic Date Due    Hepatitis C Screening  1955    TETANUS VACCINE  05/24/1975    Pneumococcal PPSV23 (Medium Risk) (1) 05/24/1975    Zoster Vaccine  05/24/2017    Eye Exam  07/11/2017    Influenza Vaccine  08/01/2017

## 2017-10-02 ENCOUNTER — OFFICE VISIT (OUTPATIENT)
Dept: OPHTHALMOLOGY | Facility: CLINIC | Age: 62
End: 2017-10-02
Payer: COMMERCIAL

## 2017-10-02 DIAGNOSIS — H25.13 NUCLEAR SCLEROSIS OF BOTH EYES: ICD-10-CM

## 2017-10-02 PROBLEM — H25.10 NS (NUCLEAR SCLEROSIS): Status: ACTIVE | Noted: 2017-10-02

## 2017-10-02 PROCEDURE — 92014 COMPRE OPH EXAM EST PT 1/>: CPT | Mod: S$GLB,,, | Performed by: OPHTHALMOLOGY

## 2017-10-02 PROCEDURE — 92225 PR SPECIAL EYE EXAM, INITIAL: CPT | Mod: LT,S$GLB,, | Performed by: OPHTHALMOLOGY

## 2017-10-02 PROCEDURE — 99999 PR PBB SHADOW E&M-EST. PATIENT-LVL III: CPT | Mod: PBBFAC,,, | Performed by: OPHTHALMOLOGY

## 2017-10-02 RX ORDER — LOSARTAN POTASSIUM 100 MG/1
TABLET ORAL
Qty: 90 TABLET | Refills: 0 | Status: SHIPPED | OUTPATIENT
Start: 2017-10-02 | End: 2017-11-21

## 2017-10-02 NOTE — PROGRESS NOTES
HPI     Diabetic Eye Exam    Additional comments: yearly      Works at [employer] -           Comments   DLS: 7/11/16 Dr. Mcclendon  Pt states here for yearly DM exam. Denies any F/F.        OCT - some thinning - No ME OU      A/P    1. PDR OU S/P PRP (12 yrs. Ago in [place]; laser OD 2 yrs ago at    ; h/x laser OD (1982 study at [facility] - [facility])    2. H/X Vit. Hem. OD      3. NS OU    BS/BP/chol control      1 year OCT

## 2017-10-03 ENCOUNTER — CLINICAL SUPPORT (OUTPATIENT)
Dept: DIABETES | Facility: CLINIC | Age: 62
End: 2017-10-03
Payer: COMMERCIAL

## 2017-10-03 DIAGNOSIS — E10.43 TYPE 1 DIABETES MELLITUS WITH DIABETIC AUTONOMIC NEUROPATHY: ICD-10-CM

## 2017-10-03 PROCEDURE — G0108 DIAB MANAGE TRN  PER INDIV: HCPCS | Mod: S$GLB,,, | Performed by: DIETITIAN, REGISTERED

## 2017-10-03 NOTE — Clinical Note
Gin, I saw Mr Misael for f/u after starting him on IC ratio of 1:8.   His BG's are varying <50 mg/dL between meals with no hypos. He is generally running a little on the high side; currently no values <103 mg/dL.   He expressed his own opinion in wanting to increase Lantus to 32 units nightly. He was previously taking 34 with hypos, currently taking 30. I told him I would convey this to you, but that would likely be appropriate.   Otherwise, I think 1:8 is perfect for now! We are waiting for him to receive dexcom supplies, then will schedule for start for that.   Negin

## 2017-10-06 NOTE — PROGRESS NOTES
Diabetes Education  Author: Negin Rodas RD  Date: 10/6/2017    Diabetes Education Visit  Diabetes Education Record Assessment/Progress: Comprehensive/Group (origianlly scheduled for dexcom start, but pt has yet to receive supplies; will reschedule for 3 weeks from today)    Diabetes Type  Diabetes Type : Type I (since age 19)    Monitoring   Self Monitoring :  (SMBG 3-4 times daily; fastin-230 mg/dL; lunch: 106-216; dinner: 106-210; bedtime: 133-230)  Blood Glucose Logs: Yes    Current Diabetes Treatment   Current Treatment: Insulin (lantus 30 units daily; humalog via IC ratio of 1:8 + correction 150-200 +1)    Social History  Preferred Learning Method: Face to Face  Primary Support: Self  Occupation:  ( at [employer])    Barriers to Change  Barriers to Change: None  Learning Challenges : None      Diabetes Education Assessment/Progress  Acute Complications (preventing, detecting, and treating acute complications): Discussion, Individual Session, Written Materials Provided, Instructed (Reviewed hypo symptoms and appropriate treatment. Pt is VERY hypo unaware. Currently in process of ordering Dexcom)    Medications (states correct name, dose, onset, peak, duration, side effects & timing of meds): Discussion, Individual Session, Instructed, Written Materials Provided (Reviewed timing and MOA of both medications. Reviewed IC ratio of 1:8 and how to use. Discussed ISF and target BGs. BG's vary <50 mg/dL from meal to meal. Pt reports he wants to go up to 32 units daily of Lantus (was taking 34 and having hypos, now taking 30 units with some higher numbers). I agree; will refer to MAGDA Medina for confirmation. )    Monitoring (monitoring blood glucose/other parameters & using results): Discussion, Individual Session, Written Materials Provided, Instructed (Will f/u with dexcom start)        Goals  Healthy Eating: In Progress (Will count carbs appropriately)  Medications: In Progress (Will use IC ratio of  1:8)  Other: In Progress (Investigate dexcom)         Diabetes Care Plan/Intervention  Education Plan/Intervention: Individual Follow-Up DSMT (3 week f/u scheduled)      Diabetes Meal Plan  Restrictions: Restricted Carbohydrate  Carbohydrate Per Meal: 45-60g  Carbohydrate Per Snack :  (0g)      Education Units of Time   Time Spent: 60 min      Health Maintenance Topics with due status: Not Due       Topic Last Completion Date    Colonoscopy 05/23/2008    Foot Exam 05/22/2017    Lipid Panel 08/17/2017    Hemoglobin A1c 08/17/2017    Eye Exam 10/02/2017     Health Maintenance Due   Topic Date Due    Hepatitis C Screening  1955    TETANUS VACCINE  05/24/1975    Pneumococcal PPSV23 (Medium Risk) (1) 05/24/1975    Zoster Vaccine  05/24/2017    Influenza Vaccine  08/01/2017

## 2017-10-16 ENCOUNTER — PATIENT MESSAGE (OUTPATIENT)
Dept: ENDOCRINOLOGY | Facility: CLINIC | Age: 62
End: 2017-10-16

## 2017-10-16 NOTE — TELEPHONE ENCOUNTER
Called pt after hearing that he had severe hypoglycemic reaction. The event happened at 8:00 PM and dinner was 2 hours prior, at 6:00. He initially treated with grape juice but it was not increasing glucose as much as it should. Pt's wife administered glucagon but he thinks it was  bc it wasn't effective. EMS was called and treated him with glucose syrup. He can't recall whether he mistakenly double dosed his Novolog at dinner or not. He has done this once before. Aside from this episode, he denies frequent hypoglycemia and carb counting is going well. Continue same doses but I urged him to write amt of units administered on his log so he can keep track of whether or not he has taken his insulin. Always check BG before driving. Glucagon refilled.

## 2017-10-18 DIAGNOSIS — E10.649 TYPE 1 DIABETES MELLITUS WITH HYPOGLYCEMIA AND WITHOUT COMA: Primary | ICD-10-CM

## 2017-10-23 ENCOUNTER — CLINICAL SUPPORT (OUTPATIENT)
Dept: DIABETES | Facility: CLINIC | Age: 62
End: 2017-10-23
Payer: COMMERCIAL

## 2017-10-23 DIAGNOSIS — E10.649 TYPE 1 DIABETES MELLITUS WITH HYPOGLYCEMIA AND WITHOUT COMA: ICD-10-CM

## 2017-10-23 PROCEDURE — G0108 DIAB MANAGE TRN  PER INDIV: HCPCS | Mod: S$GLB,,, | Performed by: DIETITIAN, REGISTERED

## 2017-10-25 NOTE — PROGRESS NOTES
"Diabetes Education  Author: Negin Rodas RD  Date: 10/25/2017    Diabetes Education Visit  Diabetes Education Record Assessment/Progress: Comprehensive/Group (Dexcom start)    Diabetes Type  Diabetes Type : Type I (since age 19)    Diabetes History  Diabetes Diagnosis: >10 years    Current Diabetes Treatment   Current Treatment: Insulin (Humalog using I:C ratio of 1:8 with meals + correction; Lantus - taking 31 units nightly)    Social History  Preferred Learning Method: Face to Face  Primary Support: Self  Educational Level: College Graduate  Occupation:  ( at [employer])    Barriers to Change  Barriers to Change: None  Learning Challenges : None    Readiness to Learn   Readiness to Learn : Acceptance    Cultural Influences  Cultural Influences: No    Diabetes Education Assessment/Progress  Medications (states correct name, dose, onset, peak, duration, side effects & timing of meds): Discussion, Individual Session, Instructed, Comprehends Key Points (Reviewed timing and MOA of lantus and humalog. Had recent severe hypo episode where EMS was called; had taken glucagon at home, but thinks it was /didn't work. Pt thinks he might have taken double his insulin dosage for meal; he often forgets if h)    Monitoring (monitoring blood glucose/other parameters & using results): Discussion, Individual Session, Written Materials Provided, Instructed, Demonstration, Return Demonstration, Demonstrates Understanding/Competency (verbalizes/demonstrates), Comprehends Key Points, Video (Trained on dexcom usage. )    Patient arrived with his  fully charged . Education provided using  "Quick Start Guide" per Dexcom protocol.  ·  Overview:  5min glucose reading updates, trending arrows, BG graph screens, battery life indicator, Blue Tooth Symbol.  ·  Menus: trend Graph, start sensor, enter BG, events, Alerts, Settings, Shutdown, Stop Sensor.   ·  Screens and prompts:    * Double blood drop " (for start up BG 2 hrs after starting sensor)    * Shaded out blood drop (one more start up BG)    * Single Blood drop (calibration update, due every 12 hrs)     * Low battery Notification     · The  transmitter ID programmed in .  ·  Settings:    * Low alert: 90    * High alert: 250    * Rise rate: OFF    * Fall Rate: 2 mg/dL  · Reviewed sensor site selection. Site selected and prepped using aseptic technique Inserted to abdomen. Transmitter placed in pod and secured.  · Practiced sensor pod/transmitter removal from site, and removal of transmitter from sensor pod.  · Patient able to demonstrate without difficulty.  Encouraged to review manual prior to starting another sensor.   · Review   problem solving aspects of sensor transmission/ variables that can disrupt RF transmission.  range  20 feet, but the first 3 hrs keep within 3 feet of transmitter.  · Advised that acetaminophen use will disrupt normal Dexcom G5 sensor results.  · Pt instructed on lag time of interstitial fluid from CBG and was advised to tx hypoglycemia and dose insulin based on SMBG values.  · Dexcom technical support contact number given and examples of when to contact them discussed. Pt will download software at home for Dexcom download.       Acute Complications (preventing, detecting, and treating acute complications): Discussion, Individual Session (Reviewed hypo symptoms and appropriate treatment. Pt is VERY hypo unaware. )              Diabetes Care Plan/Intervention  Education Plan/Intervention: Individual Follow-Up DSMT      Diabetes Meal Plan  Restrictions: Restricted Carbohydrate  Carbohydrate Per Meal: 45-60g  Carbohydrate Per Snack :  (0g)      Education Units of Time   Time Spent: 90 min      Health Maintenance Topics with due status: Not Due       Topic Last Completion Date    Colonoscopy 05/23/2008    Foot Exam 05/22/2017    Lipid Panel 08/17/2017    Hemoglobin A1c 08/17/2017    Eye Exam 10/02/2017      Health Maintenance Due   Topic Date Due    Hepatitis C Screening  1955    TETANUS VACCINE  05/24/1975    Pneumococcal PPSV23 (Medium Risk) (1) 05/24/1975    Zoster Vaccine  05/24/2017    Influenza Vaccine  08/01/2017

## 2017-10-31 DIAGNOSIS — M79.642 LEFT HAND PAIN: Primary | ICD-10-CM

## 2017-11-02 ENCOUNTER — OFFICE VISIT (OUTPATIENT)
Dept: ORTHOPEDICS | Facility: CLINIC | Age: 62
End: 2017-11-02
Payer: COMMERCIAL

## 2017-11-02 ENCOUNTER — HOSPITAL ENCOUNTER (OUTPATIENT)
Dept: RADIOLOGY | Facility: HOSPITAL | Age: 62
Discharge: HOME | End: 2017-11-02
Attending: ORTHOPAEDIC SURGERY
Payer: COMMERCIAL

## 2017-11-02 VITALS — HEIGHT: 73 IN | WEIGHT: 230 LBS | BODY MASS INDEX: 30.48 KG/M2

## 2017-11-02 DIAGNOSIS — M79.642 LEFT HAND PAIN: ICD-10-CM

## 2017-11-02 DIAGNOSIS — M65.332 TRIGGER FINGER, LEFT MIDDLE FINGER: Primary | ICD-10-CM

## 2017-11-02 PROCEDURE — 20550 NJX 1 TENDON SHEATH/LIGAMENT: CPT | Mod: F7,S$GLB,, | Performed by: ORTHOPAEDIC SURGERY

## 2017-11-02 PROCEDURE — 99214 OFFICE O/P EST MOD 30 MIN: CPT | Mod: 25,S$GLB,, | Performed by: ORTHOPAEDIC SURGERY

## 2017-11-02 PROCEDURE — 99999 PR PBB SHADOW E&M-EST. PATIENT-LVL II: CPT | Mod: PBBFAC,,, | Performed by: ORTHOPAEDIC SURGERY

## 2017-11-02 PROCEDURE — 73130 X-RAY EXAM OF HAND: CPT | Mod: 26,50,, | Performed by: RADIOLOGY

## 2017-11-02 PROCEDURE — 73130 X-RAY EXAM OF HAND: CPT | Mod: 50,TC,PO

## 2017-11-02 RX ORDER — TRIAMCINOLONE ACETONIDE 40 MG/ML
40 INJECTION, SUSPENSION INTRA-ARTICULAR; INTRAMUSCULAR
Status: DISCONTINUED | OUTPATIENT
Start: 2017-11-02 | End: 2017-11-02 | Stop reason: HOSPADM

## 2017-11-02 RX ADMIN — TRIAMCINOLONE ACETONIDE 40 MG: 40 INJECTION, SUSPENSION INTRA-ARTICULAR; INTRAMUSCULAR at 02:11

## 2017-11-02 NOTE — PROCEDURES
Tendon Sheath  Date/Time: 11/2/2017 2:29 PM  Performed by: RICKY KRUEGER  Authorized by: RICKY KRUEGER     Consent Done?:  Yes (Verbal)  Timeout: prior to procedure the correct patient, procedure, and site was verified    Indications:  Pain  Site marked: the procedure site was marked    Timeout: prior to procedure the correct patient, procedure, and site was verified    Location:  Long finger  Long finger joint: Flexor tendon sheath over the A1 pulley.  Prep: patient was prepped and draped in usual sterile fashion    Ultrasonic guidance for needle placement?: No    Needle size:  25 G  Approach:  Volar  Medications:  40 mg triamcinolone acetonide 40 mg/mL  Patient tolerance:  Patient tolerated the procedure well with no immediate complications

## 2017-11-02 NOTE — PROGRESS NOTES
DATE: 11/2/2017  PATIENT: Hammad Douglas III  REFERRING MD:   CHIEF COMPLAINT:   Chief Complaint   Patient presents with    Left Hand - Pain    Right Hand - Pain       HISTORY:  Hammad Douglas III is a 62 y.o. right hand dominant male  who presents for initial evaluation of a new problem regarding his left long finger.  He notes catching and locking which occurred over the last month or so.  He's had previous trigger finger of the small finger and underwent trigger finger release by Dr. finger in the past.  He feels that this is similar.  He presents today for evaluation.  Pain is reported at 4/10    PAST MEDICAL/SURGICAL HISTORY:  Past Medical History:   Diagnosis Date    Cataract     Chronic kidney disease     Diabetes mellitus type I     Diagnosed at age 19    Diabetic retinopathy     See's Dr. Ledesma    Hyperlipidemia     Hypertension      Past Surgical History:   Procedure Laterality Date    COLONOSCOPY      2011 wnl       Current Medications:   Current Outpatient Prescriptions:     atorvastatin (LIPITOR) 40 MG tablet, TAKE 1 TABLET BY MOUTH DAILY, Disp: 90 tablet, Rfl: 3    blood sugar diagnostic (ONETOUCH ULTRA TEST) Strp, 1 strip by Other route 5 (five) times daily., Disp: 500 strip, Rfl: 4    fluoxetine (PROZAC) 20 MG capsule, TAKE 1 CAPSULE BY MOUTH DAILY, Disp: 90 capsule, Rfl: 3    glucagon (human recombinant) inj 1mg/mL kit, Inject 1 mL (1 mg total) into the muscle as needed., Disp: 1 kit, Rfl: 6    HUMALOG KWIKPEN 100 unit/mL InPn pen, INJECT 6-10 UNITS SUBCUTANEOUSLY PRIOR TO MEALS, Disp: 30 mL, Rfl: 0    insulin glargine (LANTUS SOLOSTAR) 100 unit/mL (3 mL) InPn pen, Inject 30 Units into the skin every evening. Supply insulin pen needles, Disp: 45 mL, Rfl: 3    insulin lispro (HUMALOG KWIKPEN) 100 unit/mL InPn pen, Inject 6-8 units before meals. Plus correction scale., Disp: 30 mL, Rfl: 0    losartan (COZAAR) 100 MG tablet, TAKE 1 TABLET BY MOUTH DAILY, Disp: 90 tablet, Rfl: 0     "losartan (COZAAR) 100 MG tablet, TAKE 1 TABLET BY MOUTH DAILY, Disp: 90 tablet, Rfl: 0    losartan (COZAAR) 100 MG tablet, TAKE 1 TABLET BY MOUTH DAILY, Disp: 90 tablet, Rfl: 0    pen needle, diabetic 31 gauge x 5/16" Ndle, Use as needed, Disp: 500 each, Rfl: 5    Current Facility-Administered Medications:     glucagon (human recombinant) injection 1 mg, 1 mg, Intramuscular, Once, Tania Mckeon NP    Family History: Noncontributory  Social History:   Social History     Social History    Marital status:      Spouse name: N/A    Number of children: N/A    Years of education: N/A     Occupational History    Not on file.     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    Alcohol use 1.2 oz/week     2 Cans of beer per week    Drug use: No    Sexual activity: Yes     Partners: Female     Other Topics Concern    Not on file     Social History Narrative    No narrative on file       ROS:  Constitution: Negative for chills, fever, and sweats. Negative for unexplained weight loss.  HENT: Negative for headaches and blurry vision.   Cardiovascular: Negative for chest pain, irregular heartbeat, leg swelling and palpitations.   Respiratory: Negative for cough and shortness of breath.   Gastrointestinal: Negative for abdominal pain, heartburn, nausea and vomiting.   Genitourinary: Negative for bladder incontinence and dysuria.   Musculoskeletal: Negative for systemic arthritis, joint swelling, muscle weakness and myalgias.   Neurological: Negative for numbness.   Psychiatric/Behavioral: Negative for depression.   Endocrine: Negative for polyuria.   Hematologic/Lymphatic: Negative for bleeding disorders.  Skin: Negative for poor wound healing.       PHYSICAL EXAM:  Ht 6' 1" (1.854 m)   Wt 104.3 kg (230 lb)   BMI 30.34 kg/m² "   Healthy-appearing male in no acute distress.  Examination left hand reveals no ecchymosis, swelling or effusion.  There is a healed incision about the small finger flexor tendon sheath over the A1 pulley.  There is a palpable mass in tenderness palpation over the long finger A1 pulley.  There is catching and locking with flexion of the long finger.  Skin is intact.  Sensation is intact.      IMAGING:   X-rays of the left hand are performed and reviewed.  No acute fractures, dislocations, bony or soft tissue abnormalities noted.     ASSESSMENT:   Left long finger trigger finger    PLAN:  The nature of the diagnosis, using models and diagrams when appropriate, was explained to the patient in detail.  I have recommended cortisone injection to the flexor tendon sheath over the A1 pulley (performed today, see procedure note).  He'll monitor his symptoms over the next few weeks.  Should he continue to remain symptomatic, return for consideration of trigger finger release.      This note was dictated using voice recognition software. Please excuse any grammatical or typographical errors.  Answers for HPI/ROS submitted by the patient on 11/1/2017   Hand injury  activity change: No  unexpected weight change: No  neck pain: No  hearing loss: No  rhinorrhea: No  trouble swallowing: No  eye discharge: No  visual disturbance: No  chest tightness: No  wheezing: No  chest pain: No  palpitations: No  blood in stool: No  constipation: Yes  vomiting: No  diarrhea: No  polydipsia: No  polyuria: No  difficulty urinating: No  urgency: No  hematuria: No  joint swelling: No  arthralgias: Yes  headaches: No  weakness: No  confusion: No  dysphoric mood: No  appetite change : No  sleep disturbance: No  IMMUNOCOMPROMISED: No  nervous/ anxious: No  rash: No  eye redness: No  eye pain: No  ear pain: No  tinnitus: No  sinus pressure : No  nosebleeds: No  enviro allergies: No  food allergies: No  cough: No  shortness of breath: No  sweating:  No  frequency: No  nausea: No  dizziness: No  numbness: No  seizures: No  myalgia: Yes  back pain: No  Pain Chronicity: recurrent  History of trauma: No  Onset: 1 to 4 weeks ago  Frequency: daily  Progression since onset: unchanged  injury location: at work  pain- numeric: 6/10  pain location: left fingers  pain quality: aching  Radiating Pain: Yes  If your pain is radiating, to what part of the body?: left hand  Aggravating factors: extension  fever: No  inability to bear weight: No  itching: No  joint locking: Yes  limited range of motion: Yes  stiffness: Yes  tingling: Yes  Treatments tried: injection treatment  physical therapy: not tried  Improvement on treatment: no relief

## 2017-11-21 RX ORDER — LOSARTAN POTASSIUM 100 MG/1
TABLET ORAL
Qty: 90 TABLET | Refills: 0 | Status: SHIPPED | OUTPATIENT
Start: 2017-11-21 | End: 2018-03-05

## 2017-11-28 ENCOUNTER — PATIENT MESSAGE (OUTPATIENT)
Dept: DIABETES | Facility: CLINIC | Age: 62
End: 2017-11-28

## 2017-11-29 ENCOUNTER — PATIENT MESSAGE (OUTPATIENT)
Dept: ENDOCRINOLOGY | Facility: CLINIC | Age: 62
End: 2017-11-29

## 2017-11-29 ENCOUNTER — LAB VISIT (OUTPATIENT)
Dept: LAB | Facility: HOSPITAL | Age: 62
End: 2017-11-29
Attending: NURSE PRACTITIONER
Payer: COMMERCIAL

## 2017-11-29 DIAGNOSIS — E10.43 TYPE 1 DIABETES MELLITUS WITH DIABETIC AUTONOMIC NEUROPATHY: ICD-10-CM

## 2017-11-29 LAB
25(OH)D3+25(OH)D2 SERPL-MCNC: 20 NG/ML
ANION GAP SERPL CALC-SCNC: 8 MMOL/L
BUN SERPL-MCNC: 24 MG/DL
CALCIUM SERPL-MCNC: 9.3 MG/DL
CHLORIDE SERPL-SCNC: 106 MMOL/L
CO2 SERPL-SCNC: 27 MMOL/L
CREAT SERPL-MCNC: 1.5 MG/DL
EST. GFR  (AFRICAN AMERICAN): 58 ML/MIN/1.73 M^2
EST. GFR  (NON AFRICAN AMERICAN): 50 ML/MIN/1.73 M^2
ESTIMATED AVG GLUCOSE: 171 MG/DL
GLUCOSE SERPL-MCNC: 184 MG/DL
HBA1C MFR BLD HPLC: 7.6 %
POTASSIUM SERPL-SCNC: 5.7 MMOL/L
SODIUM SERPL-SCNC: 141 MMOL/L

## 2017-11-29 PROCEDURE — 80048 BASIC METABOLIC PNL TOTAL CA: CPT

## 2017-11-29 PROCEDURE — 83036 HEMOGLOBIN GLYCOSYLATED A1C: CPT

## 2017-11-29 PROCEDURE — 82306 VITAMIN D 25 HYDROXY: CPT

## 2017-11-29 PROCEDURE — 36415 COLL VENOUS BLD VENIPUNCTURE: CPT

## 2017-11-30 ENCOUNTER — OFFICE VISIT (OUTPATIENT)
Dept: ENDOCRINOLOGY | Facility: CLINIC | Age: 62
End: 2017-11-30
Payer: COMMERCIAL

## 2017-11-30 VITALS
SYSTOLIC BLOOD PRESSURE: 130 MMHG | DIASTOLIC BLOOD PRESSURE: 70 MMHG | WEIGHT: 223.56 LBS | BODY MASS INDEX: 29.63 KG/M2 | HEART RATE: 92 BPM | HEIGHT: 73 IN

## 2017-11-30 DIAGNOSIS — E10.3599 PROLIFERATIVE DIABETIC RETINOPATHY WITHOUT MACULAR EDEMA ASSOCIATED WITH TYPE 1 DIABETES MELLITUS, UNSPECIFIED LATERALITY: ICD-10-CM

## 2017-11-30 DIAGNOSIS — E55.9 HYPOVITAMINOSIS D: ICD-10-CM

## 2017-11-30 DIAGNOSIS — E10.21 TYPE 1 DIABETES MELLITUS WITH DIABETIC NEPHROPATHY: ICD-10-CM

## 2017-11-30 DIAGNOSIS — E10.43 TYPE 1 DIABETES MELLITUS WITH DIABETIC AUTONOMIC NEUROPATHY: Primary | ICD-10-CM

## 2017-11-30 DIAGNOSIS — E16.2 HYPOGLYCEMIA: ICD-10-CM

## 2017-11-30 DIAGNOSIS — I10 ESSENTIAL HYPERTENSION: ICD-10-CM

## 2017-11-30 DIAGNOSIS — E87.5 HYPERKALEMIA: ICD-10-CM

## 2017-11-30 DIAGNOSIS — E78.5 HYPERLIPIDEMIA, UNSPECIFIED HYPERLIPIDEMIA TYPE: ICD-10-CM

## 2017-11-30 PROCEDURE — 99999 PR PBB SHADOW E&M-EST. PATIENT-LVL III: CPT | Mod: PBBFAC,,, | Performed by: NURSE PRACTITIONER

## 2017-11-30 PROCEDURE — 99214 OFFICE O/P EST MOD 30 MIN: CPT | Mod: S$GLB,,, | Performed by: NURSE PRACTITIONER

## 2017-11-30 RX ORDER — INSULIN GLARGINE 100 [IU]/ML
28 INJECTION, SOLUTION SUBCUTANEOUS NIGHTLY
Qty: 45 ML | Refills: 3
Start: 2017-11-30 | End: 2018-08-16 | Stop reason: SDUPTHER

## 2017-11-30 RX ORDER — INSULIN LISPRO 100 [IU]/ML
INJECTION, SOLUTION INTRAVENOUS; SUBCUTANEOUS
Qty: 30 ML | Refills: 0
Start: 2017-11-30 | End: 2018-02-16 | Stop reason: SDUPTHER

## 2017-11-30 NOTE — PATIENT INSTRUCTIONS
Vitamin D3 supplement - take 2,000 units daily         Hypoglycemia (Low Blood Sugar)     Fast-acting sugar includes a cup of nonfat milk.     Too little sugar (glucose) in your blood is called hypoglycemia or low blood sugar. Low blood sugar usually means anything lower than 70 mg/dL. Talk with your healthcare provider about your target range and what level is too low for you. Diabetes itself doesnt cause low blood sugar. But some of the treatments for diabetes, such as pills or insulin, may raise your risk for it. Low blood sugar may cause you to pass out or have a seizure. So always treat low blood sugar right away, but don't overeat.  Special note: Always carry a source of fast-acting sugar and a snack in case of hypoglycemia.   What you may notice  If you have low blood sugar, you may have one or more of these symptoms:  · Shakiness or dizziness  · Cold, clammy skin or sweating  · Feelings of hunger  · Headache  · Nervousness  · A hard, fast heartbeat  · Weakness  · Confusion or irritability  · Blurred vision  · Having nightmares or waking up confused or sweating  · Numbness or tingling in the lips or tongue  What you should do  Here are tips to follow if you have hypoglycemia:   · First check your blood sugar. If it is too low (out of your target range), eat or drink 15 to 20 grams of fast-acting sugar. This may be 3 to 4 glucose tablets, 4 ounces (half a cup) of fruit juice or regular (nondiet) soda, 8 ounces (1 cup) of fat-free milk, or 1 tablespoon of honey. Dont take more than this, or your blood sugar may go too high.  · Wait 15 minutes. Then recheck your blood sugar if you can.  · If your blood sugar is still too low, repeat the steps above and check your blood sugar again. If your blood sugar still has not returned to your target range, contact your healthcare provider or seek emergency care.  · Once your blood sugar returns to target range, eat a snack or meal.  Preventing low blood sugar  Things  you can do include the following:   · If your condition needs a strict treatment plan, eat your meals and snacks at the same times each day. Dont skip meals!  · If your treatment plan lets you change when you eat and what you eat, learn how to change the time and dose of your rapid-acting insulin to match this.   · Ask your healthcare provider if it is safe for you to drink alcohol. Never drink on an empty stomach.  · Take your medicine at the prescribed times.  · Always carry a source of fast-acting sugar and a snack when youre away from home.  Other things to do  Additional tips include the following:  · Carry a medical ID card, a compact USB drive, or wear a medical alert bracelet or necklace. It should say that you have diabetes. It should also say what to do if you pass out or have a seizure.  · Make sure your family, friends, and coworkers know the signs of low blood sugar. Tell them what to do if your blood sugar falls very low and you cant treat yourself.  · Keep a glucagon emergency kit handy. Be sure your family, friends, and coworkers know how and when to use it. Check it regularly and replace the glucagon before it expires.  · Talk with your health care team about other things you can do to prevent low blood sugar.     If you have unexplained hypoglycemia or hypoglycemia several times, call your healthcare provider.   Date Last Reviewed: 5/1/2016  © 6382-2852 [employer]. [address] [place] [address], [place] [address]. All rights reserved. This information is not intended as a substitute for professional medical care. Always follow your healthcare professional's instructions.

## 2017-11-30 NOTE — PROGRESS NOTES
CC: Mr. Hammad Douglas III arrives today for management of Type 1  DM and review of chronic medical conditions, as listed in the visit diagnosis section of this encounter.     HPI: Mr. Hammad Douglas III was diagnosed with Type 1  DM at age 19 after a viral illness - had polyuria, polydipsia at this time.  Initial treatment consisted of insulin. Denies FH of DM. Reports past hospitalizations due to DKA > 30 years ago. Not recently.   Previously seen by KENNEDI Mckeon NP in the [facility] program on the [facility].     Last seen by me in 8/17. Lantus was decreased, due to hypoglycemia, and he started using a carb ratio of 1:8. Also, started using Dexcom two weeks ago. He is running low on supplies and is inquiring about how to get more.     BG readings are checked 4x/day.     Hypoglycemia: Yes around ~60 early in the morning bt 12- 5 am.    Hypoglycemic Symptoms: headache   Hypoglycemia Treatment: Juice. Also has glucagon.     Missing Insulin/PO medication doses: No  Timing prandial insulin 5-15 minutes before meals: immediately before eating his meal.    Exercise: No formal exercise. He walks his dogs and mows the grass.    Dietary Habits: BF-coffee and biscuit; LH-chilli, baked potato, hamburger, pizza; DN-Roast beef, soup, salad. May snack if his sugar is low. He drinks 3 zero calorie energy drinks daily. Avoids sugary beverages.    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     He is still hesitant about an insulin pump.     CURRENT DIABETIC MEDS:  Lantus 31 units QHS, Humalog CHO ratio 1:8, correction target 180, ISF 50  Vial or pen: pen  Glucometer type: One Touch Ultra    Last Eye Exam: 10/2017, proliferative DR.   Last Podiatry Exam: 2/2016    REVIEW OF SYSTEMS  Constitutional: + wt loss, no c/o fatigue or weakness.   Eyes: denies visual disturbances.  Cardiac: no palpitations or chest pain.  Respiratory: no cough or dyspnea.  GI:  no  "c/o abdominal pain or nausea. + constipation  Skin: no rashes. + lesions that result from picking at his skin   Neuro: no numbness, tingling, or parasthesias.  Endocrine: denies polyphagia, polydipsia, polyuria    Personally reviewed Past Medical, Surgical, Social History.    Vital Signs  /70   Pulse 92   Ht 6' 1" (1.854 m)   Wt 101.4 kg (223 lb 8.7 oz)   BMI 29.49 kg/m²     Personally reviewed the below labs:    Hemoglobin A1C   Date Value Ref Range Status   11/29/2017 7.6 (H) 4.0 - 5.6 % Final     Comment:     According to ADA guidelines, hemoglobin A1c <7.0% represents  optimal control in non-pregnant diabetic patients. Different  metrics may apply to specific patient populations.   Standards of Medical Care in Diabetes-2016.  For the purpose of screening for the presence of diabetes:  <5.7%     Consistent with the absence of diabetes  5.7-6.4%  Consistent with increasing risk for diabetes   (prediabetes)  >or=6.5%  Consistent with diabetes  Currently, no consensus exists for use of hemoglobin A1c  for diagnosis of diabetes for children.  This Hemoglobin A1c assay has significant interference with fetal   hemoglobin   (HbF). The results are invalid for patients with abnormal amounts of   HbF,   including those with known Hereditary Persistence   of Fetal Hemoglobin. Heterozygous hemoglobin variants (HbAS, HbAC,   HbAD, HbAE, HbA2) do not significantly interfere with this assay;   however, presence of multiple variants in a sample may impact the %   interference.     08/17/2017 8.2 (H) 4.0 - 5.6 % Final     Comment:     According to ADA guidelines, hemoglobin A1c <7.0% represents  optimal control in non-pregnant diabetic patients. Different  metrics may apply to specific patient populations.   Standards of Medical Care in Diabetes-2016.  For the purpose of screening for the presence of diabetes:  <5.7%     Consistent with the absence of diabetes  5.7-6.4%  Consistent with increasing risk for diabetes "   (prediabetes)  >or=6.5%  Consistent with diabetes  Currently, no consensus exists for use of hemoglobin A1c  for diagnosis of diabetes for children.  This Hemoglobin A1c assay has significant interference with fetal   hemoglobin   (HbF). The results are invalid for patients with abnormal amounts of   HbF,   including those with known Hereditary Persistence   of Fetal Hemoglobin. Heterozygous hemoglobin variants (HbAS, HbAC,   HbAD, HbAE, HbA2) do not significantly interfere with this assay;   however, presence of multiple variants in a sample may impact the %   interference.     05/22/2017 9.0 (H) 4.5 - 6.2 % Final     Comment:     According to ADA guidelines, hemoglobin A1C <7.0% represents  optimal control in non-pregnant diabetic patients.  Different  metrics may apply to specific populations.   Standards of Medical Care in Diabetes - 2016.  For the purpose of screening for the presence of diabetes:  <5.7%     Consistent with the absence of diabetes  5.7-6.4%  Consistent with increasing risk for diabetes   (prediabetes)  >or=6.5%  Consistent with diabetes  Currently no consensus exists for use of hemoglobin A1C  for diagnosis of diabetes for children.         Chemistry        Component Value Date/Time     11/29/2017 0710    K 5.7 (H) 11/29/2017 0710     11/29/2017 0710    CO2 27 11/29/2017 0710    BUN 24 (H) 11/29/2017 0710    CREATININE 1.5 (H) 11/29/2017 0710     (H) 11/29/2017 0710        Component Value Date/Time    CALCIUM 9.3 11/29/2017 0710    ALKPHOS 86 08/17/2017 0719    AST 24 08/17/2017 0719    ALT 24 08/17/2017 0719    BILITOT 0.5 08/17/2017 0719    ESTGFRAFRICA 58 (A) 11/29/2017 0710    EGFRNONAA 50 (A) 11/29/2017 0710          Lab Results   Component Value Date    CHOL 172 08/17/2017    CHOL 170 01/20/2017    CHOL 162 12/15/2015     Lab Results   Component Value Date    HDL 44 08/17/2017    HDL 42 01/20/2017    HDL 42 12/15/2015     Lab Results   Component Value Date    LDLCALC  117.0 08/17/2017    LDLCALC 110.2 01/20/2017    LDLCALC 111.4 12/15/2015     Lab Results   Component Value Date    TRIG 55 08/17/2017    TRIG 89 01/20/2017    TRIG 43 12/15/2015     Lab Results   Component Value Date    CHOLHDL 25.6 08/17/2017    CHOLHDL 24.7 01/20/2017    CHOLHDL 25.9 12/15/2015       Lab Results   Component Value Date    MICALBCREAT 214.4 (H) 08/24/2017     Lab Results   Component Value Date    TSH 1.844 03/14/2014       CrCl cannot be calculated (Unknown ideal weight.).    Vit D, 25-Hydroxy   Date Value Ref Range Status   11/29/2017 20 (L) 30 - 96 ng/mL Final     Comment:     Vitamin D deficiency.........<10 ng/mL                              Vitamin D insufficiency......10-29 ng/mL       Vitamin D sufficiency........> or equal to 30 ng/mL  Vitamin D toxicity............>100 ng/mL       PHYSICAL EXAMINATION  Constitutional: Appears well, no distress, AAO x 3.   Neck: Supple, trachea midline; no thyromegaly or nodules.   Respiratory: CTAB. Normal effort.   Cardiovascular: RRR, no murmurs, no carotid bruits. DP pulses  2+ bilaterally; no edema.    Lymph: no cervical or supraclavicular lymphadenopathy  Skin: warm and dry; no lipohypertrophy, or acanthosis nigracans observed. + multiple sores on forearms and calves. No swelling, erythema, drainage, or odor.   Neuro: DTR 2+ BUE/2+BLE.  Feet: appropriate footwear.      A1c target < 7.5%, due to hypoglycemia unawareness      Assessment/Plan  1. Type 1 diabetes mellitus with diabetic autonomic neuropathy  -- A1c has decreased but remains slightly above individualized goal. + hypoglycemia unawareness. Suspect hypoglycemia is resulting from basal insulin dose, since it occurs > 7 hours after last Humalog dose.   -- decrease Lantus to 28 units, due to occasional fasting hypoglycemia  -- Continue Humalog AC CHO 1:8. Correction scale, target 180, ISF 50.   -- check BG 4x/day and notify me if hypoglycemia persists after Lantus dose reduction.     -- Discussed  diagnosis of DM, A1c goals, progression of disease, long term complications and tx options.  Advised patient to check BG before activities, such as driving or exercise.  -- Reviewed hypoglycemia management: treat with 1/2 glass of juice, 1/2 can regular coke, or 4 glucose tablets. Monitor and repeat treatment every 15 minutes until BG is >70 Then have a snack, which includes a complex carbohydrate and protein.   2. Type 1 diabetes mellitus with diabetic nephropathy  -- continue losartan   3. Proliferative diabetic retinopathy without macular edema associated with type 1 diabetes mellitus, unspecified laterality  -- UTD on eye exams   4. Essential hypertension  -- stable, continue current meds   5. Hyperlipidemia, unspecified hyperlipidemia type  -- continue atorvastatin and continue to monitor  Lab Results   Component Value Date    LDLCALC 117.0 08/17/2017      6. Hypoglycemia due to Type 1 DM -- has glucagon  -- recommended medic alert tag     7. Hypovitaminosis D -- start OTC Vitamin D 2000 IU daily   8.  Hyperkalemia -- Asymptomatic. Not using NSAIDS. Has been on losartan for some time.   -- will recheck potassium   -- advised to decrease intake of energy drinks and increase water intake     FOLLOW UP   Return in about 3 months (around 2/28/2018).   Patient instructed to bring BG logs to each follow up   Patient encouraged to call for any BG/medication issues, concerns, or questions.    Orders Placed This Encounter   Procedures    Basic metabolic panel    Hemoglobin A1c    TSH    Potassium     Seen in conjunction with FARZANEH Delgado PA-C

## 2017-12-06 ENCOUNTER — PATIENT MESSAGE (OUTPATIENT)
Dept: ENDOCRINOLOGY | Facility: CLINIC | Age: 62
End: 2017-12-06

## 2017-12-08 ENCOUNTER — LAB VISIT (OUTPATIENT)
Dept: LAB | Facility: HOSPITAL | Age: 62
End: 2017-12-08
Attending: INTERNAL MEDICINE
Payer: COMMERCIAL

## 2017-12-08 DIAGNOSIS — E87.5 HYPERKALEMIA: ICD-10-CM

## 2017-12-08 LAB — POTASSIUM SERPL-SCNC: 4.4 MMOL/L

## 2017-12-08 PROCEDURE — 84132 ASSAY OF SERUM POTASSIUM: CPT

## 2017-12-08 PROCEDURE — 36415 COLL VENOUS BLD VENIPUNCTURE: CPT

## 2017-12-11 ENCOUNTER — PATIENT MESSAGE (OUTPATIENT)
Dept: ENDOCRINOLOGY | Facility: CLINIC | Age: 62
End: 2017-12-11

## 2018-02-16 ENCOUNTER — PATIENT MESSAGE (OUTPATIENT)
Dept: PODIATRY | Facility: CLINIC | Age: 63
End: 2018-02-16

## 2018-02-16 ENCOUNTER — PATIENT MESSAGE (OUTPATIENT)
Dept: ENDOCRINOLOGY | Facility: CLINIC | Age: 63
End: 2018-02-16

## 2018-02-16 RX ORDER — INSULIN LISPRO 100 [IU]/ML
INJECTION, SOLUTION INTRAVENOUS; SUBCUTANEOUS
Qty: 3 BOX | Refills: 3 | Status: SHIPPED | OUTPATIENT
Start: 2018-02-16 | End: 2018-05-11 | Stop reason: SDUPTHER

## 2018-02-16 NOTE — TELEPHONE ENCOUNTER
----- Message from Earl Gray sent at 2/16/2018  7:34 AM CST -----  Contact: pt   Pt would like a call back regarding dropped his last insulin pen Pt is not completley out insist on message being sent      Pt can be reached at [phone]

## 2018-02-26 ENCOUNTER — OFFICE VISIT (OUTPATIENT)
Dept: PODIATRY | Facility: CLINIC | Age: 63
End: 2018-02-26
Payer: COMMERCIAL

## 2018-02-26 VITALS
SYSTOLIC BLOOD PRESSURE: 116 MMHG | WEIGHT: 225 LBS | BODY MASS INDEX: 28.88 KG/M2 | DIASTOLIC BLOOD PRESSURE: 75 MMHG | HEIGHT: 74 IN | HEART RATE: 80 BPM | RESPIRATION RATE: 18 BRPM

## 2018-02-26 DIAGNOSIS — L97.512 SKIN ULCER OF RIGHT FOOT WITH FAT LAYER EXPOSED: ICD-10-CM

## 2018-02-26 DIAGNOSIS — E10.43 TYPE 1 DIABETES MELLITUS WITH DIABETIC AUTONOMIC NEUROPATHY: Primary | ICD-10-CM

## 2018-02-26 PROCEDURE — 99213 OFFICE O/P EST LOW 20 MIN: CPT | Mod: 25,S$GLB,, | Performed by: PODIATRIST

## 2018-02-26 PROCEDURE — 3008F BODY MASS INDEX DOCD: CPT | Mod: S$GLB,,, | Performed by: PODIATRIST

## 2018-02-26 PROCEDURE — 11042 DBRDMT SUBQ TIS 1ST 20SQCM/<: CPT | Mod: S$GLB,,, | Performed by: PODIATRIST

## 2018-02-26 PROCEDURE — 99999 PR PBB SHADOW E&M-EST. PATIENT-LVL III: CPT | Mod: PBBFAC,,, | Performed by: PODIATRIST

## 2018-02-26 NOTE — PROGRESS NOTES
Subjective:      Patient ID: Hammad Douglas III is a 62 y.o. male.    Chief Complaint: PCP (DELON Vaughn,KARINE F/U  3/04/18); Diabetic Foot Exam; Foot Problem (callouses with blood blister ); and Nail Care    Hammad is a 62 y.o. male who presents to the clinic upon referral from Dr. Sofia devine. provider found  for evaluation and treatment of diabetic feet. Hammad has a past medical history of Cataract; Chronic kidney disease; Diabetes mellitus type I; Diabetic retinopathy; Hyperlipidemia; and Hypertension. Patient relates no major problem with feet. Only complaints today consist of foot callus r .    PCP: Dae Acevedo Jr, MD    Date Last Seen by PCP:   Chief Complaint   Patient presents with    PCP     DELON Vaughn,KARINE F/U  3/04/18    Diabetic Foot Exam    Foot Problem     callouses with blood blister     Nail Care         Current shoe gear: Casual shoes    Hemoglobin A1C   Date Value Ref Range Status   11/29/2017 7.6 (H) 4.0 - 5.6 % Final     Comment:     According to ADA guidelines, hemoglobin A1c <7.0% represents  optimal control in non-pregnant diabetic patients. Different  metrics may apply to specific patient populations.   Standards of Medical Care in Diabetes-2016.  For the purpose of screening for the presence of diabetes:  <5.7%     Consistent with the absence of diabetes  5.7-6.4%  Consistent with increasing risk for diabetes   (prediabetes)  >or=6.5%  Consistent with diabetes  Currently, no consensus exists for use of hemoglobin A1c  for diagnosis of diabetes for children.  This Hemoglobin A1c assay has significant interference with fetal   hemoglobin   (HbF). The results are invalid for patients with abnormal amounts of   HbF,   including those with known Hereditary Persistence   of Fetal Hemoglobin. Heterozygous hemoglobin variants (HbAS, HbAC,   HbAD, HbAE, HbA2) do not significantly interfere with this assay;   however, presence of multiple variants in a sample may  impact the %   interference.     08/17/2017 8.2 (H) 4.0 - 5.6 % Final     Comment:     According to ADA guidelines, hemoglobin A1c <7.0% represents  optimal control in non-pregnant diabetic patients. Different  metrics may apply to specific patient populations.   Standards of Medical Care in Diabetes-2016.  For the purpose of screening for the presence of diabetes:  <5.7%     Consistent with the absence of diabetes  5.7-6.4%  Consistent with increasing risk for diabetes   (prediabetes)  >or=6.5%  Consistent with diabetes  Currently, no consensus exists for use of hemoglobin A1c  for diagnosis of diabetes for children.  This Hemoglobin A1c assay has significant interference with fetal   hemoglobin   (HbF). The results are invalid for patients with abnormal amounts of   HbF,   including those with known Hereditary Persistence   of Fetal Hemoglobin. Heterozygous hemoglobin variants (HbAS, HbAC,   HbAD, HbAE, HbA2) do not significantly interfere with this assay;   however, presence of multiple variants in a sample may impact the %   interference.     05/22/2017 9.0 (H) 4.5 - 6.2 % Final     Comment:     According to ADA guidelines, hemoglobin A1C <7.0% represents  optimal control in non-pregnant diabetic patients.  Different  metrics may apply to specific populations.   Standards of Medical Care in Diabetes - 2016.  For the purpose of screening for the presence of diabetes:  <5.7%     Consistent with the absence of diabetes  5.7-6.4%  Consistent with increasing risk for diabetes   (prediabetes)  >or=6.5%  Consistent with diabetes  Currently no consensus exists for use of hemoglobin A1C  for diagnosis of diabetes for children.             Review of Systems   Constitution: Negative for chills, decreased appetite and fever.   Cardiovascular: Negative for leg swelling.   Skin: Positive for dry skin.   Musculoskeletal: Negative for arthritis, joint pain, joint swelling and myalgias.   Gastrointestinal: Negative for nausea  and vomiting.   Neurological: Positive for paresthesias. Negative for loss of balance and numbness.           Objective:      Physical Exam   Constitutional: He is oriented to person, place, and time. He appears well-developed and well-nourished.   Cardiovascular: Intact distal pulses.    Pulses:       Dorsalis pedis pulses are 2+ on the right side, and 2+ on the left side.        Posterior tibial pulses are 2+ on the right side, and 2+ on the left side.   dorsalis pedis and posterior tibial pulses are palpable bilaterally. Capillary refill time is within normal limits. + pedal hair growth          Musculoskeletal: Normal range of motion. He exhibits no edema or tenderness.   Adequate joint range of motion without pain, limitation, nor crepitation Bilateral feet and ankle joints. Muscle strength is 5/5 in all groups bilaterally.      Rigid r 1st MTPJ   Feet:   Right Foot:   Protective Sensation: 5 sites tested. 4 sites sensed.   Left Foot:   Protective Sensation: 5 sites tested. 4 sites sensed.   Neurological: He is alert and oriented to person, place, and time. He has normal strength. No sensory deficit.   [place] 5.07 monofilament is intact bilateral feet.      Skin: Skin is warm, dry and intact. No lesion and no rash noted. No erythema.   Ulcer location: plantar R 1st MTPJ   Pre debridement Measurements: 0x0x0 cm   Post debridement Measurements : 0.5x0.4x0.3 cm   Signs of infection: none  Drainage: none  Periwound: hyperkeratotic intact   Base: granular      Psychiatric: He has a normal mood and affect. His behavior is normal.   Vitals reviewed.            Assessment:       Encounter Diagnoses   Name Primary?    Type 1 diabetes mellitus with diabetic autonomic neuropathy Yes    Skin ulcer of right foot with fat layer exposed          Plan:       Hammad was seen today for pcp, diabetic foot exam, foot problem and nail care.    Diagnoses and all orders for this visit:    Type 1 diabetes mellitus with  diabetic autonomic neuropathy    Skin ulcer of right foot with fat layer exposed      I counseled the patient on his conditions, their implications and medical management.    Clinically non infected ulcer r     Debridement:Nonviable tissues were debrided beyond the level of  sub Q utilizing a  sterile No. 15 scalpel and forceps. Minimal bleeding controlled with direct pressure  The patient tolerated this well.     Dressings:sawyer   Offloading:leonor Lizama MA assisted w/ application of football dressing under my direct supervision. Darco shoe applied to offload the area. Advised patient that this should be worn on the affected foot at all times when ambulating     Follow-up:Patient is to return to the clinic in one week for follow-up but should call [facility] immediately if any signs of infection, such as fever, chills, sweats, increased redness or pain.    Short-term goals include maintaining good offloading and minimizing bioburden, promoting granulation and epithelialization to healing.  Long-term goals include keeping the wound healed by good offloading and medical management under the direction of internist.        .

## 2018-02-28 ENCOUNTER — PATIENT MESSAGE (OUTPATIENT)
Dept: PODIATRY | Facility: CLINIC | Age: 63
End: 2018-02-28

## 2018-03-02 ENCOUNTER — PATIENT MESSAGE (OUTPATIENT)
Dept: ENDOCRINOLOGY | Facility: CLINIC | Age: 63
End: 2018-03-02

## 2018-03-02 ENCOUNTER — LAB VISIT (OUTPATIENT)
Dept: LAB | Facility: HOSPITAL | Age: 63
End: 2018-03-02
Attending: INTERNAL MEDICINE
Payer: COMMERCIAL

## 2018-03-02 DIAGNOSIS — E10.43 TYPE 1 DIABETES MELLITUS WITH DIABETIC AUTONOMIC NEUROPATHY: ICD-10-CM

## 2018-03-02 LAB
ANION GAP SERPL CALC-SCNC: 7 MMOL/L
BUN SERPL-MCNC: 21 MG/DL
CALCIUM SERPL-MCNC: 9.5 MG/DL
CHLORIDE SERPL-SCNC: 106 MMOL/L
CO2 SERPL-SCNC: 28 MMOL/L
CREAT SERPL-MCNC: 1.1 MG/DL
EST. GFR  (AFRICAN AMERICAN): >60 ML/MIN/1.73 M^2
EST. GFR  (NON AFRICAN AMERICAN): >60 ML/MIN/1.73 M^2
ESTIMATED AVG GLUCOSE: 177 MG/DL
GLUCOSE SERPL-MCNC: 112 MG/DL
HBA1C MFR BLD HPLC: 7.8 %
POTASSIUM SERPL-SCNC: 4.7 MMOL/L
SODIUM SERPL-SCNC: 141 MMOL/L
TSH SERPL DL<=0.005 MIU/L-ACNC: 2.03 UIU/ML

## 2018-03-02 PROCEDURE — 80048 BASIC METABOLIC PNL TOTAL CA: CPT

## 2018-03-02 PROCEDURE — 84443 ASSAY THYROID STIM HORMONE: CPT

## 2018-03-02 PROCEDURE — 36415 COLL VENOUS BLD VENIPUNCTURE: CPT

## 2018-03-02 PROCEDURE — 83036 HEMOGLOBIN GLYCOSYLATED A1C: CPT

## 2018-03-05 ENCOUNTER — OFFICE VISIT (OUTPATIENT)
Dept: PODIATRY | Facility: CLINIC | Age: 63
End: 2018-03-05
Payer: COMMERCIAL

## 2018-03-05 VITALS
SYSTOLIC BLOOD PRESSURE: 139 MMHG | HEART RATE: 77 BPM | DIASTOLIC BLOOD PRESSURE: 80 MMHG | RESPIRATION RATE: 18 BRPM | WEIGHT: 225 LBS | HEIGHT: 74 IN | BODY MASS INDEX: 28.88 KG/M2

## 2018-03-05 DIAGNOSIS — L97.511 SKIN ULCER OF RIGHT FOOT, LIMITED TO BREAKDOWN OF SKIN: ICD-10-CM

## 2018-03-05 DIAGNOSIS — E10.43 TYPE 1 DIABETES MELLITUS WITH DIABETIC AUTONOMIC NEUROPATHY: Primary | ICD-10-CM

## 2018-03-05 PROCEDURE — 99499 UNLISTED E&M SERVICE: CPT | Mod: S$GLB,,, | Performed by: PODIATRIST

## 2018-03-05 PROCEDURE — 97597 DBRDMT OPN WND 1ST 20 CM/<: CPT | Mod: S$GLB,,, | Performed by: PODIATRIST

## 2018-03-05 PROCEDURE — 99999 PR PBB SHADOW E&M-EST. PATIENT-LVL III: CPT | Mod: PBBFAC,,, | Performed by: PODIATRIST

## 2018-03-05 RX ORDER — LOSARTAN POTASSIUM 100 MG/1
TABLET ORAL
Qty: 90 TABLET | Refills: 0 | Status: SHIPPED | OUTPATIENT
Start: 2018-03-05 | End: 2018-05-11

## 2018-03-05 NOTE — PROGRESS NOTES
Subjective:      Patient ID: Hammad Douglas III is a 62 y.o. male.    Chief Complaint: Follow-up (wound check ); Foot Ulcer (Rt foot ); and Dressing Change (football )    Hammad is a 62 y.o. male who presents to the clinic for evaluation and treatment of high risk feet. Hammad has a past medical history of Cataract; Chronic kidney disease; Diabetes mellitus type I; Diabetic retinopathy; Hyperlipidemia; and Hypertension. The patient's chief complaint is diabetic foot ulcer, R foot . Patient has been in football dressing, ambulating in Darco shoe x 1 week with out issues   . This patient has documented high risk feet requiring routine maintenance secondary to diabetes mellitis and those secondary complications of diabetes, as mentioned..    PCP: Dae Acevedo Jr, MD    Date Last Seen by PCP:   Chief Complaint   Patient presents with    Follow-up     wound check     Foot Ulcer     Rt foot     Dressing Change     football          Current shoe gear:  Affected Foot: Football and Darco shoe on the affected foot     Unaffected Foot: Casual shoes    Hemoglobin A1C   Date Value Ref Range Status   03/02/2018 7.8 (H) 4.0 - 5.6 % Final     Comment:     According to ADA guidelines, hemoglobin A1c <7.0% represents  optimal control in non-pregnant diabetic patients. Different  metrics may apply to specific patient populations.   Standards of Medical Care in Diabetes-2016.  For the purpose of screening for the presence of diabetes:  <5.7%     Consistent with the absence of diabetes  5.7-6.4%  Consistent with increasing risk for diabetes   (prediabetes)  >or=6.5%  Consistent with diabetes  Currently, no consensus exists for use of hemoglobin A1c  for diagnosis of diabetes for children.  This Hemoglobin A1c assay has significant interference with fetal   hemoglobin   (HbF). The results are invalid for patients with abnormal amounts of   HbF,   including those with known Hereditary Persistence   of Fetal Hemoglobin. Heterozygous  hemoglobin variants (HbAS, HbAC,   HbAD, HbAE, HbA2) do not significantly interfere with this assay;   however, presence of multiple variants in a sample may impact the %   interference.     11/29/2017 7.6 (H) 4.0 - 5.6 % Final     Comment:     According to ADA guidelines, hemoglobin A1c <7.0% represents  optimal control in non-pregnant diabetic patients. Different  metrics may apply to specific patient populations.   Standards of Medical Care in Diabetes-2016.  For the purpose of screening for the presence of diabetes:  <5.7%     Consistent with the absence of diabetes  5.7-6.4%  Consistent with increasing risk for diabetes   (prediabetes)  >or=6.5%  Consistent with diabetes  Currently, no consensus exists for use of hemoglobin A1c  for diagnosis of diabetes for children.  This Hemoglobin A1c assay has significant interference with fetal   hemoglobin   (HbF). The results are invalid for patients with abnormal amounts of   HbF,   including those with known Hereditary Persistence   of Fetal Hemoglobin. Heterozygous hemoglobin variants (HbAS, HbAC,   HbAD, HbAE, HbA2) do not significantly interfere with this assay;   however, presence of multiple variants in a sample may impact the %   interference.     08/17/2017 8.2 (H) 4.0 - 5.6 % Final     Comment:     According to ADA guidelines, hemoglobin A1c <7.0% represents  optimal control in non-pregnant diabetic patients. Different  metrics may apply to specific patient populations.   Standards of Medical Care in Diabetes-2016.  For the purpose of screening for the presence of diabetes:  <5.7%     Consistent with the absence of diabetes  5.7-6.4%  Consistent with increasing risk for diabetes   (prediabetes)  >or=6.5%  Consistent with diabetes  Currently, no consensus exists for use of hemoglobin A1c  for diagnosis of diabetes for children.  This Hemoglobin A1c assay has significant interference with fetal   hemoglobin   (HbF). The results are invalid for patients with  abnormal amounts of   HbF,   including those with known Hereditary Persistence   of Fetal Hemoglobin. Heterozygous hemoglobin variants (HbAS, HbAC,   HbAD, HbAE, HbA2) do not significantly interfere with this assay;   however, presence of multiple variants in a sample may impact the %   interference.         Review of Systems   Constitution: Negative for chills, decreased appetite and fever.   Cardiovascular: Negative for claudication and leg swelling.   Skin: Positive for dry skin and nail changes.   Musculoskeletal: Negative for arthritis, joint pain, joint swelling and myalgias.   Gastrointestinal: Negative for nausea and vomiting.   Neurological: Positive for numbness and paresthesias. Negative for loss of balance.           Objective:      Physical Exam   Constitutional: He is oriented to person, place, and time. He appears well-developed and well-nourished.   Cardiovascular:   Pulses:       Dorsalis pedis pulses are 2+ on the right side, and 2+ on the left side.        Posterior tibial pulses are 2+ on the right side, and 2+ on the left side.    Toes are cool to touch. Feet are warm proximally.There is decreased digital hair. Skin is atrophic, slightly hyperpigmented, and mildly edematous       Musculoskeletal: Normal range of motion. He exhibits no edema or tenderness.   Adequate joint range of motion without pain, limitation, nor crepitation Bilateral feet and ankle joints. Muscle strength is 5/5 in all groups bilaterally.         Neurological: He is alert and oriented to person, place, and time.   [place]ayson 5.07 monofilamant testing is diminished David feet. Sharp/dull sensation diminished Bilaterally. Light touch absent Bilaterally.       Skin: Skin is warm and dry. No ecchymosis and no lesion noted. No erythema.   Ulcer location: R plantar  1st MPJ    Post debridement Measurements : 0.1x0.1x0.1 cm   Signs of infection: none  Drainage: none  Periwound: hyperkeratotic   Base: granular      Psychiatric:  He has a normal mood and affect. His behavior is normal.             Assessment:       Encounter Diagnoses   Name Primary?    Type 1 diabetes mellitus with diabetic autonomic neuropathy Yes    Skin ulcer of right foot, limited to breakdown of skin          Plan:       Hammad was seen today for follow-up, foot ulcer and dressing change.    Diagnoses and all orders for this visit:    Type 1 diabetes mellitus with diabetic autonomic neuropathy    Skin ulcer of right foot, limited to breakdown of skin      I counseled the patient on his conditions, their implications and medical management.    Debridement:Nonviable tissues were debrided to the level of  sub Q utilizing a  sterile No. 15 scalpel and forceps. Minimal bleeding controlled with direct pressure  The patient tolerated this well.   Dressings:foam   Offloading:leonor Saenz MA assisted w/ application of football dressing under my direct supervision. Darco shoe applied to offload the area. Advised patient that this should be worn on the affected foot at all times when ambulating       Follow-up:Patient is to return to the clinic in one week for follow-up but should call [facility] immediately if any signs of infection, such as fever, chills, sweats, increased redness or pain.    Short-term goals include maintaining good offloading and minimizing bioburden, promoting granulation and epithelialization to healing.  Long-term goals include keeping the wound healed by good offloading and medical management under the direction of internist.        .

## 2018-03-14 ENCOUNTER — OFFICE VISIT (OUTPATIENT)
Dept: PODIATRY | Facility: CLINIC | Age: 63
End: 2018-03-14
Payer: COMMERCIAL

## 2018-03-14 VITALS
HEART RATE: 82 BPM | BODY MASS INDEX: 29.26 KG/M2 | HEIGHT: 74 IN | SYSTOLIC BLOOD PRESSURE: 130 MMHG | DIASTOLIC BLOOD PRESSURE: 79 MMHG | RESPIRATION RATE: 18 BRPM | WEIGHT: 228 LBS

## 2018-03-14 DIAGNOSIS — Z87.2 HEALED FOOT ULCER: ICD-10-CM

## 2018-03-14 DIAGNOSIS — E10.43 TYPE 1 DIABETES MELLITUS WITH DIABETIC AUTONOMIC NEUROPATHY: Primary | ICD-10-CM

## 2018-03-14 PROCEDURE — 99213 OFFICE O/P EST LOW 20 MIN: CPT | Mod: S$GLB,,, | Performed by: PODIATRIST

## 2018-03-14 PROCEDURE — 99999 PR PBB SHADOW E&M-EST. PATIENT-LVL III: CPT | Mod: PBBFAC,,, | Performed by: PODIATRIST

## 2018-03-15 ENCOUNTER — PATIENT MESSAGE (OUTPATIENT)
Dept: ENDOCRINOLOGY | Facility: CLINIC | Age: 63
End: 2018-03-15

## 2018-03-15 NOTE — PROGRESS NOTES
Subjective:      Patient ID: Hammad Douglas III is a 62 y.o. male.    Chief Complaint: Follow-up (Rt foot healed wound )    Hammad is a 62 y.o. male who presents to the clinic for evaluation and treatment of high risk feet. Hammad has a past medical history of Cataract; Chronic kidney disease; Diabetes mellitus type I; Diabetic retinopathy; Hyperlipidemia; and Hypertension. The patient's chief complaint is diabetic foot ulcer, R foot . Patient has been in football dressing, ambulating in Darco shoe x 1 week with out issues   . This patient has documented high risk feet requiring routine maintenance secondary to diabetes mellitis and those secondary complications of diabetes, as mentioned..    PCP: Dae Acevedo Jr, MD    Date Last Seen by PCP:   Chief Complaint   Patient presents with    Follow-up     Rt foot healed wound          Current shoe gear:  Affected Foot: Football and Darco shoe on the affected foot     Unaffected Foot: Casual shoes    Hemoglobin A1C   Date Value Ref Range Status   03/02/2018 7.8 (H) 4.0 - 5.6 % Final     Comment:     According to ADA guidelines, hemoglobin A1c <7.0% represents  optimal control in non-pregnant diabetic patients. Different  metrics may apply to specific patient populations.   Standards of Medical Care in Diabetes-2016.  For the purpose of screening for the presence of diabetes:  <5.7%     Consistent with the absence of diabetes  5.7-6.4%  Consistent with increasing risk for diabetes   (prediabetes)  >or=6.5%  Consistent with diabetes  Currently, no consensus exists for use of hemoglobin A1c  for diagnosis of diabetes for children.  This Hemoglobin A1c assay has significant interference with fetal   hemoglobin   (HbF). The results are invalid for patients with abnormal amounts of   HbF,   including those with known Hereditary Persistence   of Fetal Hemoglobin. Heterozygous hemoglobin variants (HbAS, HbAC,   HbAD, HbAE, HbA2) do not significantly interfere with this  assay;   however, presence of multiple variants in a sample may impact the %   interference.     11/29/2017 7.6 (H) 4.0 - 5.6 % Final     Comment:     According to ADA guidelines, hemoglobin A1c <7.0% represents  optimal control in non-pregnant diabetic patients. Different  metrics may apply to specific patient populations.   Standards of Medical Care in Diabetes-2016.  For the purpose of screening for the presence of diabetes:  <5.7%     Consistent with the absence of diabetes  5.7-6.4%  Consistent with increasing risk for diabetes   (prediabetes)  >or=6.5%  Consistent with diabetes  Currently, no consensus exists for use of hemoglobin A1c  for diagnosis of diabetes for children.  This Hemoglobin A1c assay has significant interference with fetal   hemoglobin   (HbF). The results are invalid for patients with abnormal amounts of   HbF,   including those with known Hereditary Persistence   of Fetal Hemoglobin. Heterozygous hemoglobin variants (HbAS, HbAC,   HbAD, HbAE, HbA2) do not significantly interfere with this assay;   however, presence of multiple variants in a sample may impact the %   interference.     08/17/2017 8.2 (H) 4.0 - 5.6 % Final     Comment:     According to ADA guidelines, hemoglobin A1c <7.0% represents  optimal control in non-pregnant diabetic patients. Different  metrics may apply to specific patient populations.   Standards of Medical Care in Diabetes-2016.  For the purpose of screening for the presence of diabetes:  <5.7%     Consistent with the absence of diabetes  5.7-6.4%  Consistent with increasing risk for diabetes   (prediabetes)  >or=6.5%  Consistent with diabetes  Currently, no consensus exists for use of hemoglobin A1c  for diagnosis of diabetes for children.  This Hemoglobin A1c assay has significant interference with fetal   hemoglobin   (HbF). The results are invalid for patients with abnormal amounts of   HbF,   including those with known Hereditary Persistence   of Fetal  Hemoglobin. Heterozygous hemoglobin variants (HbAS, HbAC,   HbAD, HbAE, HbA2) do not significantly interfere with this assay;   however, presence of multiple variants in a sample may impact the %   interference.         Review of Systems   Constitution: Negative for chills, decreased appetite and fever.   Cardiovascular: Negative for claudication and leg swelling.   Skin: Positive for dry skin. Negative for flushing and itching.   Musculoskeletal: Negative for arthritis, joint pain, joint swelling and myalgias.   Gastrointestinal: Negative for nausea and vomiting.   Neurological: Positive for numbness and paresthesias. Negative for loss of balance.           Objective:      Physical Exam   Constitutional: He is oriented to person, place, and time. He appears well-developed and well-nourished.   Cardiovascular:   Pulses:       Dorsalis pedis pulses are 2+ on the right side, and 2+ on the left side.        Posterior tibial pulses are 2+ on the right side, and 2+ on the left side.    Toes are cool to touch. Feet are warm proximally.There is decreased digital hair. Skin is atrophic, slightly hyperpigmented, and mildly edematous       Musculoskeletal: Normal range of motion. He exhibits no edema or tenderness.   Adequate joint range of motion without pain, limitation, nor crepitation Bilateral feet and ankle joints. Muscle strength is 5/5 in all groups bilaterally.         Neurological: He is alert and oriented to person, place, and time.   [place]ayson 5.07 monofilamant testing is diminished David feet. Sharp/dull sensation diminished Bilaterally. Light touch absent Bilaterally.       Skin: Skin is warm and dry. No ecchymosis and no lesion noted. No erythema.   Ulcer location: R plantar  1st MPJ    Measurements : 0x0x0 cm   Signs of infection: none  Drainage: none  Periwound:intact  granular      Psychiatric: He has a normal mood and affect. His behavior is normal.   Vitals reviewed.            Assessment:        Encounter Diagnoses   Name Primary?    Type 1 diabetes mellitus with diabetic autonomic neuropathy Yes    Healed foot ulcer          Plan:       Hammad was seen today for follow-up.    Diagnoses and all orders for this visit:    Type 1 diabetes mellitus with diabetic autonomic neuropathy    Healed foot ulcer      I counseled the patient on his conditions, their implications and medical management.    Wound has healed well with no signs of continued skin breakdown noted   Ok to transition into normal shoe gear at this time. I advised patient to check feet daily for signs of drainage or lesion re-opening   Discussed use of daily foot moisturizer to feet, avoiding the webspace's  Limit showers/bathing to roughly 15 minutes during the 1st few weeks after wound has healed to prevent skin breakdown           .

## 2018-04-04 ENCOUNTER — OFFICE VISIT (OUTPATIENT)
Dept: PODIATRY | Facility: CLINIC | Age: 63
End: 2018-04-04
Payer: COMMERCIAL

## 2018-04-04 VITALS
WEIGHT: 227 LBS | SYSTOLIC BLOOD PRESSURE: 111 MMHG | BODY MASS INDEX: 29.13 KG/M2 | DIASTOLIC BLOOD PRESSURE: 65 MMHG | HEIGHT: 74 IN | HEART RATE: 80 BPM

## 2018-04-04 DIAGNOSIS — Z87.2 HEALED FOOT ULCER: ICD-10-CM

## 2018-04-04 DIAGNOSIS — E10.43 TYPE 1 DIABETES MELLITUS WITH DIABETIC AUTONOMIC NEUROPATHY: Primary | ICD-10-CM

## 2018-04-04 PROCEDURE — 99213 OFFICE O/P EST LOW 20 MIN: CPT | Mod: S$GLB,,, | Performed by: PODIATRIST

## 2018-04-04 PROCEDURE — 99999 PR PBB SHADOW E&M-EST. PATIENT-LVL III: CPT | Mod: PBBFAC,,, | Performed by: PODIATRIST

## 2018-04-04 NOTE — PROGRESS NOTES
Subjective:      Patient ID: Hammad Douglas III is a 62 y.o. male.    Chief Complaint: Diabetes Mellitus (dr suazo 01/17/2018); Diabetic Foot Exam; and Follow-up (ulcer)    Hammad is a 62 y.o. male who presents to the clinic for evaluation and treatment of high risk feet. Hammad has a past medical history of Cataract; Chronic kidney disease; Diabetes mellitus type I; Diabetic retinopathy; Hyperlipidemia; and Hypertension. The patient's chief complaint is diabetic foot ulcer, R foot . Patient has been in football dressing, ambulating in Darco shoe x 1 week with out issues   . This patient has documented high risk feet requiring routine maintenance secondary to diabetes mellitis and those secondary complications of diabetes, as mentioned..      4/3/18: pt presents for 3 week f/u healed ulcer. Has been in normal shoes w/o  Issues   PCP: Dae Suazo Jr, MD    Date Last Seen by PCP:   Chief Complaint   Patient presents with    Diabetes Mellitus     dr suazo 01/17/2018    Diabetic Foot Exam    Follow-up     ulcer         Current shoe gear:  Affected Foot: Football and Darco shoe on the affected foot     Unaffected Foot: Casual shoes    Hemoglobin A1C   Date Value Ref Range Status   03/02/2018 7.8 (H) 4.0 - 5.6 % Final     Comment:     According to ADA guidelines, hemoglobin A1c <7.0% represents  optimal control in non-pregnant diabetic patients. Different  metrics may apply to specific patient populations.   Standards of Medical Care in Diabetes-2016.  For the purpose of screening for the presence of diabetes:  <5.7%     Consistent with the absence of diabetes  5.7-6.4%  Consistent with increasing risk for diabetes   (prediabetes)  >or=6.5%  Consistent with diabetes  Currently, no consensus exists for use of hemoglobin A1c  for diagnosis of diabetes for children.  This Hemoglobin A1c assay has significant interference with fetal   hemoglobin   (HbF). The results are invalid for patients with abnormal  amounts of   HbF,   including those with known Hereditary Persistence   of Fetal Hemoglobin. Heterozygous hemoglobin variants (HbAS, HbAC,   HbAD, HbAE, HbA2) do not significantly interfere with this assay;   however, presence of multiple variants in a sample may impact the %   interference.     11/29/2017 7.6 (H) 4.0 - 5.6 % Final     Comment:     According to ADA guidelines, hemoglobin A1c <7.0% represents  optimal control in non-pregnant diabetic patients. Different  metrics may apply to specific patient populations.   Standards of Medical Care in Diabetes-2016.  For the purpose of screening for the presence of diabetes:  <5.7%     Consistent with the absence of diabetes  5.7-6.4%  Consistent with increasing risk for diabetes   (prediabetes)  >or=6.5%  Consistent with diabetes  Currently, no consensus exists for use of hemoglobin A1c  for diagnosis of diabetes for children.  This Hemoglobin A1c assay has significant interference with fetal   hemoglobin   (HbF). The results are invalid for patients with abnormal amounts of   HbF,   including those with known Hereditary Persistence   of Fetal Hemoglobin. Heterozygous hemoglobin variants (HbAS, HbAC,   HbAD, HbAE, HbA2) do not significantly interfere with this assay;   however, presence of multiple variants in a sample may impact the %   interference.     08/17/2017 8.2 (H) 4.0 - 5.6 % Final     Comment:     According to ADA guidelines, hemoglobin A1c <7.0% represents  optimal control in non-pregnant diabetic patients. Different  metrics may apply to specific patient populations.   Standards of Medical Care in Diabetes-2016.  For the purpose of screening for the presence of diabetes:  <5.7%     Consistent with the absence of diabetes  5.7-6.4%  Consistent with increasing risk for diabetes   (prediabetes)  >or=6.5%  Consistent with diabetes  Currently, no consensus exists for use of hemoglobin A1c  for diagnosis of diabetes for children.  This Hemoglobin A1c assay  has significant interference with fetal   hemoglobin   (HbF). The results are invalid for patients with abnormal amounts of   HbF,   including those with known Hereditary Persistence   of Fetal Hemoglobin. Heterozygous hemoglobin variants (HbAS, HbAC,   HbAD, HbAE, HbA2) do not significantly interfere with this assay;   however, presence of multiple variants in a sample may impact the %   interference.         Review of Systems   Constitution: Negative for chills, decreased appetite and fever.   Cardiovascular: Negative for claudication and leg swelling.   Skin: Positive for dry skin. Negative for color change, flushing and itching.   Musculoskeletal: Negative for arthritis, joint pain, joint swelling and myalgias.   Gastrointestinal: Negative for nausea and vomiting.   Neurological: Positive for numbness and paresthesias. Negative for loss of balance.           Objective:      Physical Exam   Constitutional: He is oriented to person, place, and time. He appears well-developed and well-nourished.   Cardiovascular:   Pulses:       Dorsalis pedis pulses are 2+ on the right side, and 2+ on the left side.        Posterior tibial pulses are 2+ on the right side, and 2+ on the left side.    Toes are cool to touch. Feet are warm proximally.There is decreased digital hair. Skin is atrophic, slightly hyperpigmented, and mildly edematous       Musculoskeletal: Normal range of motion. He exhibits no edema or tenderness.   Adequate joint range of motion without pain, limitation, nor crepitation Bilateral feet and ankle joints. Muscle strength is 5/5 in all groups bilaterally.         Neurological: He is alert and oriented to person, place, and time.   [place] 5.07 monofilamant testing is diminished David feet. Sharp/dull sensation diminished Bilaterally. Light touch absent Bilaterally.       Skin: Skin is warm and dry. No ecchymosis and no lesion noted. No erythema.   Hyperkeratotic tissue noted to  R plantar  1st MPJ        Psychiatric: He has a normal mood and affect. His behavior is normal.   Vitals reviewed.            Assessment:       Encounter Diagnoses   Name Primary?    Type 1 diabetes mellitus with diabetic autonomic neuropathy Yes    Healed foot ulcer          Plan:       Hammad was seen today for diabetes mellitus, diabetic foot exam and follow-up.    Diagnoses and all orders for this visit:    Type 1 diabetes mellitus with diabetic autonomic neuropathy    Healed foot ulcer      I counseled the patient on his conditions, their implications and medical management.    Wound remains well healed   No signs of new skin break down   Tensile strength still compromised , but is improving   Continue current shoe gear  Continue moisturizer to feet daily         .

## 2018-05-10 ENCOUNTER — OFFICE VISIT (OUTPATIENT)
Dept: ENDOCRINOLOGY | Facility: CLINIC | Age: 63
End: 2018-05-10
Payer: COMMERCIAL

## 2018-05-10 VITALS
BODY MASS INDEX: 29.55 KG/M2 | HEIGHT: 74 IN | SYSTOLIC BLOOD PRESSURE: 118 MMHG | WEIGHT: 230.25 LBS | RESPIRATION RATE: 20 BRPM | HEART RATE: 83 BPM | DIASTOLIC BLOOD PRESSURE: 62 MMHG

## 2018-05-10 DIAGNOSIS — E10.43 TYPE 1 DIABETES MELLITUS WITH DIABETIC AUTONOMIC NEUROPATHY: Primary | ICD-10-CM

## 2018-05-10 DIAGNOSIS — I10 ESSENTIAL HYPERTENSION: ICD-10-CM

## 2018-05-10 DIAGNOSIS — E78.5 HYPERLIPIDEMIA, UNSPECIFIED HYPERLIPIDEMIA TYPE: ICD-10-CM

## 2018-05-10 DIAGNOSIS — E55.9 VITAMIN D INSUFFICIENCY: ICD-10-CM

## 2018-05-10 DIAGNOSIS — E10.3599 PROLIFERATIVE DIABETIC RETINOPATHY WITHOUT MACULAR EDEMA ASSOCIATED WITH TYPE 1 DIABETES MELLITUS, UNSPECIFIED LATERALITY: ICD-10-CM

## 2018-05-10 DIAGNOSIS — E10.21 TYPE 1 DIABETES MELLITUS WITH DIABETIC NEPHROPATHY: ICD-10-CM

## 2018-05-10 PROCEDURE — 95251 CONT GLUC MNTR ANALYSIS I&R: CPT | Mod: S$GLB,,, | Performed by: NURSE PRACTITIONER

## 2018-05-10 PROCEDURE — 99214 OFFICE O/P EST MOD 30 MIN: CPT | Mod: S$GLB,,, | Performed by: NURSE PRACTITIONER

## 2018-05-10 PROCEDURE — 99999 PR PBB SHADOW E&M-EST. PATIENT-LVL IV: CPT | Mod: PBBFAC,,, | Performed by: NURSE PRACTITIONER

## 2018-05-10 NOTE — PROGRESS NOTES
CC: Mr. Hammad Douglas III arrives today for management of Type 1  DM and review of chronic medical conditions, as listed in the visit diagnosis section of this encounter.     HPI: Mr. Hammad Douglas III was diagnosed with Type 1  DM at age 19 after a viral illness - had polyuria, polydipsia at this time.  Initial treatment consisted of insulin. Denies FH of DM. Reports past hospitalizations due to DKA > 30 years ago. Not recently.   Previously seen by KENNEDI Mckeon NP in the Diabetes [facility]ment program on the [place].   Began using Dexcom in 2017.    Last seen by me in November. Has a tendency of hypoglycemia. + hypo unawareness. Lantus dose was decreased at last visit.    Has noticed when stressed, his glucose increases. Currently is caring for ill father.     BG readings are checked 4x/day.     Hypoglycemia: Rare. Recalls 2 episodes since last visit.     Hypoglycemic Symptoms: headache   Hypoglycemia Treatment: Juice. Has glucagon.     Missing Insulin/PO medication doses: No  Timing prandial insulin 5-15 minutes before meals: sometimes    Exercise: No but would like to start    Dietary Habits: Eats 3 meals/day. Breakfast is smallest meal (during the week). May snack on almonds but has bowl of cereal with milk after dinner.  Avoids sugary beverages.     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       CURRENT DIABETIC MEDS:  Lantus 28 units QHS, Humalog CHO ratio 1:8, correction target 180, ISF 50  Vial or pen: pen  Glucometer type: One Touch Ultra    Last Eye Exam: 10/2017, + proliferative DR.   Last Podiatry Exam: 4/2016    REVIEW OF SYSTEMS  Constitutional: no c/o weakness, weight loss. + weight gain. + fatigue that is not severe.   Eyes: denies visual disturbances.  Cardiac: no palpitations or chest pain.  Respiratory: no cough or dyspnea.  GI:  no c/o abdominal pain or nausea. + constipation  Skin: no rashes, lesions  Neuro: no numbness,  "tingling, or parasthesias.  Endocrine: denies polyphagia, polydipsia, polyuria    Personally reviewed Past Medical, Surgical, Social History.    Vital Signs  /62   Pulse 83   Resp 20   Ht 6' 2" (1.88 m)   Wt 104.5 kg (230 lb 4.3 oz)   BMI 29.56 kg/m²     Personally reviewed the below labs:    Hemoglobin A1C   Date Value Ref Range Status   03/02/2018 7.8 (H) 4.0 - 5.6 % Final     Comment:     According to ADA guidelines, hemoglobin A1c <7.0% represents  optimal control in non-pregnant diabetic patients. Different  metrics may apply to specific patient populations.   Standards of Medical Care in Diabetes-2016.  For the purpose of screening for the presence of diabetes:  <5.7%     Consistent with the absence of diabetes  5.7-6.4%  Consistent with increasing risk for diabetes   (prediabetes)  >or=6.5%  Consistent with diabetes  Currently, no consensus exists for use of hemoglobin A1c  for diagnosis of diabetes for children.  This Hemoglobin A1c assay has significant interference with fetal   hemoglobin   (HbF). The results are invalid for patients with abnormal amounts of   HbF,   including those with known Hereditary Persistence   of Fetal Hemoglobin. Heterozygous hemoglobin variants (HbAS, HbAC,   HbAD, HbAE, HbA2) do not significantly interfere with this assay;   however, presence of multiple variants in a sample may impact the %   interference.     11/29/2017 7.6 (H) 4.0 - 5.6 % Final     Comment:     According to ADA guidelines, hemoglobin A1c <7.0% represents  optimal control in non-pregnant diabetic patients. Different  metrics may apply to specific patient populations.   Standards of Medical Care in Diabetes-2016.  For the purpose of screening for the presence of diabetes:  <5.7%     Consistent with the absence of diabetes  5.7-6.4%  Consistent with increasing risk for diabetes   (prediabetes)  >or=6.5%  Consistent with diabetes  Currently, no consensus exists for use of hemoglobin A1c  for diagnosis " of diabetes for children.  This Hemoglobin A1c assay has significant interference with fetal   hemoglobin   (HbF). The results are invalid for patients with abnormal amounts of   HbF,   including those with known Hereditary Persistence   of Fetal Hemoglobin. Heterozygous hemoglobin variants (HbAS, HbAC,   HbAD, HbAE, HbA2) do not significantly interfere with this assay;   however, presence of multiple variants in a sample may impact the %   interference.     08/17/2017 8.2 (H) 4.0 - 5.6 % Final     Comment:     According to ADA guidelines, hemoglobin A1c <7.0% represents  optimal control in non-pregnant diabetic patients. Different  metrics may apply to specific patient populations.   Standards of Medical Care in Diabetes-2016.  For the purpose of screening for the presence of diabetes:  <5.7%     Consistent with the absence of diabetes  5.7-6.4%  Consistent with increasing risk for diabetes   (prediabetes)  >or=6.5%  Consistent with diabetes  Currently, no consensus exists for use of hemoglobin A1c  for diagnosis of diabetes for children.  This Hemoglobin A1c assay has significant interference with fetal   hemoglobin   (HbF). The results are invalid for patients with abnormal amounts of   HbF,   including those with known Hereditary Persistence   of Fetal Hemoglobin. Heterozygous hemoglobin variants (HbAS, HbAC,   HbAD, HbAE, HbA2) do not significantly interfere with this assay;   however, presence of multiple variants in a sample may impact the %   interference.         Chemistry        Component Value Date/Time     03/02/2018 0818    K 4.7 03/02/2018 0818     03/02/2018 0818    CO2 28 03/02/2018 0818    BUN 21 (H) 03/02/2018 0818    CREATININE 1.1 03/02/2018 0818     (H) 03/02/2018 0818        Component Value Date/Time    CALCIUM 9.5 03/02/2018 0818    ALKPHOS 86 08/17/2017 0719    AST 24 08/17/2017 0719    ALT 24 08/17/2017 0719    BILITOT 0.5 08/17/2017 0719    ESTGFRAFRICA >60 03/02/2018  0818    EGFRNONAA >60 03/02/2018 0818          Lab Results   Component Value Date    CHOL 172 08/17/2017    CHOL 170 01/20/2017    CHOL 162 12/15/2015     Lab Results   Component Value Date    HDL 44 08/17/2017    HDL 42 01/20/2017    HDL 42 12/15/2015     Lab Results   Component Value Date    LDLCALC 117.0 08/17/2017    LDLCALC 110.2 01/20/2017    LDLCALC 111.4 12/15/2015     Lab Results   Component Value Date    TRIG 55 08/17/2017    TRIG 89 01/20/2017    TRIG 43 12/15/2015     Lab Results   Component Value Date    CHOLHDL 25.6 08/17/2017    CHOLHDL 24.7 01/20/2017    CHOLHDL 25.9 12/15/2015       Lab Results   Component Value Date    MICALBCREAT 214.4 (H) 08/24/2017     Lab Results   Component Value Date    TSH 2.034 03/02/2018       CrCl cannot be calculated (Patient's most recent lab result is older than the maximum 7 days allowed.).    Vit D, 25-Hydroxy   Date Value Ref Range Status   11/29/2017 20 (L) 30 - 96 ng/mL Final     Comment:     Vitamin D deficiency.........<10 ng/mL                              Vitamin D insufficiency......10-29 ng/mL       Vitamin D sufficiency........> or equal to 30 ng/mL  Vitamin D toxicity............>100 ng/mL       PHYSICAL EXAMINATION  Constitutional: Appears well, no distress  Neck: Supple, trachea midline; no thyromegaly or nodules.   Respiratory: CTA. Normal effort.   Cardiovascular: RRR, no murmurs, no carotid bruits. DP pulses  2+ bilaterally; no edema.    Lymph: no cervical or supraclavicular lymphadenopathy  Skin: warm and dry; no lipohypertrophy, or acanthosis nigracans observed. + multiple dried sores on forearms and calves. No swelling, erythema, drainage, or odor.   Neuro: DTR 2+ BUE/2+BLE.  Feet: appropriate footwear.      Dexcom download: Glucoses are variable. Most hyperglycemia occurring in evening and may carry overnight. Two mild episodes of hypoglycemia at 1:00 AM and 6:00 AM.      A1c target < 7.5%, due to hypoglycemia unawareness      Assessment/Plan  1.  Type 1 diabetes mellitus with diabetic autonomic neuropathy  -- A1c remains above individualized goal. Glucoses are variable. I suspect hyperglycemia is often related to dietary indiscretion. Rare hypoglycemia.  + hypoglycemia unawareness.   -- advised him to stop snacking after dinner. Keep snacks < 15g CHO  -- continue Lantus 28 units QHS  -- Continue Humalog I:C ratio 1:8. Correction scale, target 180, ISF 50.   -- check BG 4x/day   -- not currently interested in insulin pump.     -- Discussed diagnosis of DM, A1c goals, progression of disease, long term complications and tx options.  Advised patient to check BG before activities, such as driving or exercise.  -- Reviewed hypoglycemia management: treat with 1/2 glass of juice, 1/2 can regular coke, or 4 glucose tablets. Monitor and repeat treatment every 15 minutes until BG is >70 Then have a snack, which includes a complex carbohydrate and protein.   2. Type 1 diabetes mellitus with diabetic nephropathy  -- continue losartan   3. Proliferative diabetic retinopathy without macular edema associated with type 1 diabetes mellitus, unspecified laterality  -- keep follow ups   4. Essential hypertension  -- stable  -- continue current meds   5. Hyperlipidemia, unspecified hyperlipidemia type  -- stable  -- continue atorvastatin and continue to monitor  Lab Results   Component Value Date    LDLCALC 117.0 08/17/2017      6. Vitamin D insufficiency  -- Vitamin D with RTC  -- currently only taking multivitamin     FOLLOW UP   Follow-up in about 3 months (around 8/10/2018).   Patient instructed to bring BG logs to each follow up   Patient encouraged to call for any BG/medication issues, concerns, or questions.    Orders Placed This Encounter   Procedures    Hemoglobin A1c    Vitamin D    Lipid panel    Microalbumin/creatinine urine ratio

## 2018-05-11 RX ORDER — LOSARTAN POTASSIUM 100 MG/1
TABLET ORAL
Qty: 90 TABLET | Refills: 0 | Status: SHIPPED | OUTPATIENT
Start: 2018-05-11 | End: 2018-08-16 | Stop reason: SDUPTHER

## 2018-05-11 RX ORDER — ATORVASTATIN CALCIUM 40 MG/1
40 TABLET, FILM COATED ORAL DAILY
Qty: 90 TABLET | Refills: 0 | Status: SHIPPED | OUTPATIENT
Start: 2018-05-11 | End: 2018-07-11 | Stop reason: SDUPTHER

## 2018-05-11 RX ORDER — INSULIN LISPRO 100 [IU]/ML
INJECTION, SOLUTION INTRAVENOUS; SUBCUTANEOUS
Qty: 3 BOX | Refills: 3 | Status: SHIPPED | OUTPATIENT
Start: 2018-05-11 | End: 2019-03-12 | Stop reason: SDUPTHER

## 2018-05-11 NOTE — TELEPHONE ENCOUNTER
Hi, this is a previous patient of Dr. Dae Acevedo Jr, MD and the patient needs to schedule an appt with another pcp to establish care since Dr Dae Acevedo Jr, MD has retired.  I have sent in the med for refill, but the patient needs an appt for further refills.  Thank you, Lalo Hunt

## 2018-05-11 NOTE — LETTER
May 11, 2018    Hammad Douglas III  [address] [facility] [address]             [facility]  [address]  [facility] [address]  Phone: [phone]  Fax: [phone] Dear Hammad Douglas III,    A request for your refill medication has been received and forwarded to the doctor for approval.  The refill was approved, however any future refills will require an office visit to get establish with a new primary care physician.  Please call our office at [phone] to schedule an appointment.    See attach list of doctors that is accepting new patients or transfer patients.    If you have any questions or concerns, please don't hesitate to call.    Sincerely,        Beulah Brown MA

## 2018-05-15 ENCOUNTER — PATIENT MESSAGE (OUTPATIENT)
Dept: ENDOCRINOLOGY | Facility: CLINIC | Age: 63
End: 2018-05-15

## 2018-06-21 ENCOUNTER — PATIENT MESSAGE (OUTPATIENT)
Dept: ENDOCRINOLOGY | Facility: CLINIC | Age: 63
End: 2018-06-21

## 2018-06-26 ENCOUNTER — PATIENT MESSAGE (OUTPATIENT)
Dept: ENDOCRINOLOGY | Facility: CLINIC | Age: 63
End: 2018-06-26

## 2018-07-09 ENCOUNTER — PATIENT MESSAGE (OUTPATIENT)
Dept: ENDOCRINOLOGY | Facility: CLINIC | Age: 63
End: 2018-07-09

## 2018-07-11 RX ORDER — ATORVASTATIN CALCIUM 40 MG/1
TABLET, FILM COATED ORAL
Qty: 90 TABLET | Refills: 2 | Status: SHIPPED | OUTPATIENT
Start: 2018-07-11 | End: 2018-08-09

## 2018-07-12 ENCOUNTER — PATIENT MESSAGE (OUTPATIENT)
Dept: ENDOCRINOLOGY | Facility: CLINIC | Age: 63
End: 2018-07-12

## 2018-07-12 RX ORDER — PEN NEEDLE, DIABETIC 29 G X1/2"
1 NEEDLE, DISPOSABLE MISCELLANEOUS 4 TIMES DAILY
Qty: 400 EACH | Refills: 3 | Status: SHIPPED | OUTPATIENT
Start: 2018-07-12 | End: 2019-12-03 | Stop reason: SDUPTHER

## 2018-07-16 ENCOUNTER — PATIENT MESSAGE (OUTPATIENT)
Dept: OPHTHALMOLOGY | Facility: CLINIC | Age: 63
End: 2018-07-16

## 2018-08-06 ENCOUNTER — LAB VISIT (OUTPATIENT)
Dept: LAB | Facility: HOSPITAL | Age: 63
End: 2018-08-06
Attending: NURSE PRACTITIONER
Payer: COMMERCIAL

## 2018-08-06 ENCOUNTER — PATIENT MESSAGE (OUTPATIENT)
Dept: ENDOCRINOLOGY | Facility: CLINIC | Age: 63
End: 2018-08-06

## 2018-08-06 DIAGNOSIS — E78.5 HYPERLIPIDEMIA, UNSPECIFIED HYPERLIPIDEMIA TYPE: ICD-10-CM

## 2018-08-06 DIAGNOSIS — E55.9 VITAMIN D INSUFFICIENCY: ICD-10-CM

## 2018-08-06 DIAGNOSIS — E10.43 TYPE 1 DIABETES MELLITUS WITH DIABETIC AUTONOMIC NEUROPATHY: ICD-10-CM

## 2018-08-06 LAB
25(OH)D3+25(OH)D2 SERPL-MCNC: 35 NG/ML
CHOLEST SERPL-MCNC: 172 MG/DL
CHOLEST/HDLC SERPL: 3.6 {RATIO}
ESTIMATED AVG GLUCOSE: 166 MG/DL
HBA1C MFR BLD HPLC: 7.4 %
HDLC SERPL-MCNC: 48 MG/DL
HDLC SERPL: 27.9 %
LDLC SERPL CALC-MCNC: 110.6 MG/DL
NONHDLC SERPL-MCNC: 124 MG/DL
TRIGL SERPL-MCNC: 67 MG/DL

## 2018-08-06 PROCEDURE — 80061 LIPID PANEL: CPT

## 2018-08-06 PROCEDURE — 36415 COLL VENOUS BLD VENIPUNCTURE: CPT | Mod: PO

## 2018-08-06 PROCEDURE — 83036 HEMOGLOBIN GLYCOSYLATED A1C: CPT

## 2018-08-06 PROCEDURE — 82306 VITAMIN D 25 HYDROXY: CPT

## 2018-08-09 RX ORDER — ATORVASTATIN CALCIUM 40 MG/1
TABLET, FILM COATED ORAL
Qty: 90 TABLET | Refills: 0 | Status: SHIPPED | OUTPATIENT
Start: 2018-08-09 | End: 2018-08-16 | Stop reason: SDUPTHER

## 2018-08-16 ENCOUNTER — OFFICE VISIT (OUTPATIENT)
Dept: ENDOCRINOLOGY | Facility: CLINIC | Age: 63
End: 2018-08-16
Payer: COMMERCIAL

## 2018-08-16 VITALS
HEIGHT: 74 IN | BODY MASS INDEX: 28.66 KG/M2 | WEIGHT: 223.31 LBS | SYSTOLIC BLOOD PRESSURE: 116 MMHG | DIASTOLIC BLOOD PRESSURE: 74 MMHG | HEART RATE: 76 BPM | RESPIRATION RATE: 18 BRPM

## 2018-08-16 DIAGNOSIS — I10 ESSENTIAL HYPERTENSION: ICD-10-CM

## 2018-08-16 DIAGNOSIS — E78.5 HYPERLIPIDEMIA, UNSPECIFIED HYPERLIPIDEMIA TYPE: ICD-10-CM

## 2018-08-16 DIAGNOSIS — E10.3599 PROLIFERATIVE DIABETIC RETINOPATHY WITHOUT MACULAR EDEMA ASSOCIATED WITH TYPE 1 DIABETES MELLITUS, UNSPECIFIED LATERALITY: ICD-10-CM

## 2018-08-16 DIAGNOSIS — E10.649 HYPOGLYCEMIA UNAWARENESS IN TYPE 1 DIABETES MELLITUS: ICD-10-CM

## 2018-08-16 DIAGNOSIS — E10.43 TYPE 1 DIABETES MELLITUS WITH DIABETIC AUTONOMIC NEUROPATHY: Primary | ICD-10-CM

## 2018-08-16 DIAGNOSIS — E10.21 TYPE 1 DIABETES MELLITUS WITH DIABETIC NEPHROPATHY: ICD-10-CM

## 2018-08-16 PROCEDURE — 99999 PR PBB SHADOW E&M-EST. PATIENT-LVL IV: CPT | Mod: PBBFAC,,, | Performed by: NURSE PRACTITIONER

## 2018-08-16 PROCEDURE — 99214 OFFICE O/P EST MOD 30 MIN: CPT | Mod: S$GLB,,, | Performed by: NURSE PRACTITIONER

## 2018-08-16 PROCEDURE — 95251 CONT GLUC MNTR ANALYSIS I&R: CPT | Mod: S$GLB,,, | Performed by: NURSE PRACTITIONER

## 2018-08-16 RX ORDER — INSULIN GLARGINE 100 [IU]/ML
25 INJECTION, SOLUTION SUBCUTANEOUS NIGHTLY
Qty: 30 ML | Refills: 3 | Status: SHIPPED | OUTPATIENT
Start: 2018-08-16 | End: 2018-12-20

## 2018-08-16 RX ORDER — ATORVASTATIN CALCIUM 40 MG/1
40 TABLET, FILM COATED ORAL DAILY
Qty: 90 TABLET | Refills: 3 | Status: SHIPPED | OUTPATIENT
Start: 2018-08-16 | End: 2019-08-26 | Stop reason: SDUPTHER

## 2018-08-16 RX ORDER — FLUOXETINE HYDROCHLORIDE 20 MG/1
20 CAPSULE ORAL DAILY
Qty: 90 CAPSULE | Refills: 3 | Status: SHIPPED | OUTPATIENT
Start: 2018-08-16 | End: 2019-08-13 | Stop reason: SDUPTHER

## 2018-08-16 RX ORDER — LOSARTAN POTASSIUM 100 MG/1
100 TABLET ORAL DAILY
Qty: 90 TABLET | Refills: 3 | Status: SHIPPED | OUTPATIENT
Start: 2018-08-16 | End: 2019-09-05 | Stop reason: SDUPTHER

## 2018-08-16 RX ORDER — INSULIN GLARGINE 100 [IU]/ML
28 INJECTION, SOLUTION SUBCUTANEOUS NIGHTLY
Qty: 30 ML | Refills: 3 | Status: SHIPPED | OUTPATIENT
Start: 2018-08-16 | End: 2018-08-16

## 2018-08-16 NOTE — PATIENT INSTRUCTIONS
Decrease Lantus to 25 units.      Hypoglycemia (Low Blood Sugar)     Fast-acting sugar includes a cup of nonfat milk.     Too little sugar (glucose) in your blood is called hypoglycemia or low blood sugar. Low blood sugar usually means anything lower than 70 mg/dL. Talk with your healthcare provider about your target range and what level is too low for you. Diabetes itself doesnt cause low blood sugar. But some of the treatments for diabetes, such as pills or insulin, may raise your risk for it. Low blood sugar may cause you to pass out or have a seizure. So always treat low blood sugar right away, but don't overeat.  Special note: Always carry a source of fast-acting sugar and a snack in case of hypoglycemia.   What you may notice  If you have low blood sugar, you may have one or more of these symptoms:  · Shakiness or dizziness  · Cold, clammy skin or sweating  · Feelings of hunger  · Headache  · Nervousness  · A hard, fast heartbeat  · Weakness  · Confusion or irritability  · Blurred vision  · Having nightmares or waking up confused or sweating  · Numbness or tingling in the lips or tongue  What you should do  Here are tips to follow if you have hypoglycemia:   · First check your blood sugar. If it is too low (out of your target range), eat or drink 15 to 20 grams of fast-acting sugar. This may be 3 to 4 glucose tablets, 4 ounces (half a cup) of fruit juice or regular (nondiet) soda, 8 ounces (1 cup) of fat-free milk, or 1 tablespoon of honey. Dont take more than this, or your blood sugar may go too high.  · Wait 15 minutes. Then recheck your blood sugar if you can.  · If your blood sugar is still too low, repeat the steps above and check your blood sugar again. If your blood sugar still has not returned to your target range, contact your healthcare provider or seek emergency care.  · Once your blood sugar returns to target range, eat a snack or meal.  Preventing low blood sugar  Things you can do include the  following:   · If your condition needs a strict treatment plan, eat your meals and snacks at the same times each day. Dont skip meals!  · If your treatment plan lets you change when you eat and what you eat, learn how to change the time and dose of your rapid-acting insulin to match this.   · Ask your healthcare provider if it is safe for you to drink alcohol. Never drink on an empty stomach.  · Take your medicine at the prescribed times.  · Always carry a source of fast-acting sugar and a snack when youre away from home.  Other things to do  Additional tips include the following:  · Carry a medical ID card, a compact USB drive, or wear a medical alert bracelet or necklace. It should say that you have diabetes. It should also say what to do if you pass out or have a seizure.  · Make sure your family, friends, and coworkers know the signs of low blood sugar. Tell them what to do if your blood sugar falls very low and you cant treat yourself.  · Keep a glucagon emergency kit handy. Be sure your family, friends, and coworkers know how and when to use it. Check it regularly and replace the glucagon before it expires.  · Talk with your health care team about other things you can do to prevent low blood sugar.     If you have unexplained hypoglycemia or hypoglycemia several times, call your healthcare provider.   Date Last Reviewed: 5/1/2016  © 0321-2667 [employer]. [address] [place] [address], [place] [address]. All rights reserved. This information is not intended as a substitute for professional medical care. Always follow your healthcare professional's instructions.

## 2018-08-16 NOTE — PROGRESS NOTES
"CC: Mr. Hammad Douglas III arrives today for management of Type 1  DM and review of chronic medical conditions, as listed in the visit diagnosis section of this encounter.     HPI: Mr. Hammad Douglas III was diagnosed with Type 1  DM at age 19 after a viral illness - had polyuria, polydipsia at this time.  Initial treatment consisted of insulin. Denies FH of DM. Reports past hospitalizations due to DKA > 30 years ago. Not recently.   Previously seen by KENNEDI Mckeon NP in the Diabetes Empowerment program on the [place].   Began using Dexcom in 2017.  Has a tendency of hypoglycemia. + hypo unawareness.     BG readings are checked 4x/day.     Hypoglycemia: Occasionally  Hypoglycemic Symptoms: "feels slow"  Hypoglycemia Treatment: Juice. Has glucagon.     Missing Insulin/PO medication doses: No  Timing prandial insulin 5-15 minutes before meals: sometimes gives 20 minutes after eating.     Exercise: walks 2-3 miles/day at work.     Dietary Habits: Eats 2-3 meals/day. Usually skips breakfast on work days because he wakes up at 4:00 AM.  May snack on PB crackers or veggies with ranch.  Avoids sugary beverages.     Last DM education appointment: October 2017        CURRENT DIABETIC MEDS:  Lantus 28 units QHS, Humalog CHO ratio 1:8, correction target 180, ISF 50 (average dose 8 units)  Vial or pen: pen  Glucometer type: One Touch Ultra    Last Eye Exam: 10/2017, + proliferative DRAndrew Plans to schedule in September.   Last Podiatry Exam: 4/2016    REVIEW OF SYSTEMS  Constitutional: no c/o weakness, fatigue. + intentional weight loss.   Eyes: denies visual disturbances.  Cardiac: no palpitations or chest pain.  Respiratory: no cough or dyspnea.  GI:  no c/o abdominal pain or nausea.   Skin: no rashes, lesions  Neuro: no numbness, tingling, or parasthesias.  Endocrine: denies polyphagia, polydipsia, polyuria    Personally reviewed Past Medical, Surgical, Social History.    Vital Signs  /74   Pulse 76   Resp 18   Ht 6' " "2" (1.88 m)   Wt 101.3 kg (223 lb 4.8 oz)   BMI 28.67 kg/m²     Personally reviewed the below labs:    Hemoglobin A1C   Date Value Ref Range Status   08/06/2018 7.4 (H) 4.0 - 5.6 % Final     Comment:     ADA Screening Guidelines:  5.7-6.4%  Consistent with prediabetes  >or=6.5%  Consistent with diabetes  High levels of fetal hemoglobin interfere with the HbA1C  assay. Heterozygous hemoglobin variants (HbS, HgC, etc)do  not significantly interfere with this assay.   However, presence of multiple variants may affect accuracy.     03/02/2018 7.8 (H) 4.0 - 5.6 % Final     Comment:     According to ADA guidelines, hemoglobin A1c <7.0% represents  optimal control in non-pregnant diabetic patients. Different  metrics may apply to specific patient populations.   Standards of Medical Care in Diabetes-2016.  For the purpose of screening for the presence of diabetes:  <5.7%     Consistent with the absence of diabetes  5.7-6.4%  Consistent with increasing risk for diabetes   (prediabetes)  >or=6.5%  Consistent with diabetes  Currently, no consensus exists for use of hemoglobin A1c  for diagnosis of diabetes for children.  This Hemoglobin A1c assay has significant interference with fetal   hemoglobin   (HbF). The results are invalid for patients with abnormal amounts of   HbF,   including those with known Hereditary Persistence   of Fetal Hemoglobin. Heterozygous hemoglobin variants (HbAS, HbAC,   HbAD, HbAE, HbA2) do not significantly interfere with this assay;   however, presence of multiple variants in a sample may impact the %   interference.     11/29/2017 7.6 (H) 4.0 - 5.6 % Final     Comment:     According to ADA guidelines, hemoglobin A1c <7.0% represents  optimal control in non-pregnant diabetic patients. Different  metrics may apply to specific patient populations.   Standards of Medical Care in Diabetes-2016.  For the purpose of screening for the presence of diabetes:  <5.7%     Consistent with the absence of " diabetes  5.7-6.4%  Consistent with increasing risk for diabetes   (prediabetes)  >or=6.5%  Consistent with diabetes  Currently, no consensus exists for use of hemoglobin A1c  for diagnosis of diabetes for children.  This Hemoglobin A1c assay has significant interference with fetal   hemoglobin   (HbF). The results are invalid for patients with abnormal amounts of   HbF,   including those with known Hereditary Persistence   of Fetal Hemoglobin. Heterozygous hemoglobin variants (HbAS, HbAC,   HbAD, HbAE, HbA2) do not significantly interfere with this assay;   however, presence of multiple variants in a sample may impact the %   interference.         Chemistry        Component Value Date/Time     03/02/2018 0818    K 4.7 03/02/2018 0818     03/02/2018 0818    CO2 28 03/02/2018 0818    BUN 21 (H) 03/02/2018 0818    CREATININE 1.1 03/02/2018 0818     (H) 03/02/2018 0818        Component Value Date/Time    CALCIUM 9.5 03/02/2018 0818    ALKPHOS 86 08/17/2017 0719    AST 24 08/17/2017 0719    ALT 24 08/17/2017 0719    BILITOT 0.5 08/17/2017 0719    ESTGFRAFRICA >60 03/02/2018 0818    EGFRNONAA >60 03/02/2018 0818          Lab Results   Component Value Date    CHOL 172 08/06/2018    CHOL 172 08/17/2017    CHOL 170 01/20/2017     Lab Results   Component Value Date    HDL 48 08/06/2018    HDL 44 08/17/2017    HDL 42 01/20/2017     Lab Results   Component Value Date    LDLCALC 110.6 08/06/2018    LDLCALC 117.0 08/17/2017    LDLCALC 110.2 01/20/2017     Lab Results   Component Value Date    TRIG 67 08/06/2018    TRIG 55 08/17/2017    TRIG 89 01/20/2017     Lab Results   Component Value Date    CHOLHDL 27.9 08/06/2018    CHOLHDL 25.6 08/17/2017    CHOLHDL 24.7 01/20/2017       Lab Results   Component Value Date    MICALBCREAT 126.7 (H) 08/06/2018     Lab Results   Component Value Date    TSH 2.034 03/02/2018       CrCl cannot be calculated (Patient's most recent lab result is older than the maximum 7 days  allowed.).    Vit D, 25-Hydroxy   Date Value Ref Range Status   08/06/2018 35 30 - 96 ng/mL Final     Comment:     Vitamin D deficiency.........<10 ng/mL                              Vitamin D insufficiency......10-29 ng/mL       Vitamin D sufficiency........> or equal to 30 ng/mL  Vitamin D toxicity............>100 ng/mL       PHYSICAL EXAMINATION  Constitutional: Appears well, no distress  Neck: Supple, trachea midline; no thyromegaly or nodules.   Respiratory: CTA, even and unlabored.   Cardiovascular: RRR, no murmurs, no carotid bruits. DP pulses  2+ bilaterally; no edema.    Lymph: no cervical or supraclavicular lymphadenopathy  Skin: warm and dry; no lipohypertrophy, or acanthosis nigracans observed.   Neuro: DTR 2+ BUE/2+BLE.  Feet: appropriate footwear.      Dexcom download: See media file for details. Many glucoses are within range. Having hypoglycemia overnight and in afternoon.  Prandial excursions after certain meals.   Average glucose: 149 mg/dL  Above goal: 35%  Within goal: 55%  Below goal: 10%      A1c target < 7.5%, due to hypoglycemia unawareness      Assessment/Plan  1. Type 1 diabetes mellitus with diabetic autonomic neuropathy  -- Improving but now with hypoglycemia overnight and in afternoon.  + hypoglycemia unawareness.   -- decrease Lantus to 25 units QHS  -- Continue Humalog I:C ratio 1:8. Correction scale, target 180, ISF 50.   -- check BG 4x/day. Explained that he woo check only 2x/day for Dexcom calibrations.   -- not interested in insulin pump.     -- Discussed diagnosis of DM, A1c goals, progression of disease, long term complications and tx options.    -- Reviewed hypoglycemia management: treat with 1/2 glass of juice, 1/2 can regular coke, or 4 glucose tablets. Monitor and repeat treatment every 15 minutes until BG is >70 Then have a snack, which includes a complex carbohydrate and protein.  Advised patient to check BG before activities, such as driving or exercise.   2. Type 1  diabetes mellitus with diabetic nephropathy  -- continue losartan   3. Proliferative diabetic retinopathy without macular edema associated with type 1 diabetes mellitus, unspecified laterality  -- keep follow ups   4. Essential hypertension  -- stable  -- continue current meds   5. Hyperlipidemia, unspecified hyperlipidemia type  -- stable  -- continue atorvastatin and continue to monitor  Lab Results   Component Value Date    LDLCALC 110.6 08/06/2018      6. Hypoglycemia unawareness in Type 1 DM -- continue use of Dexcom  -- has glucagon  -- check BG before driving.      FOLLOW UP   Follow-up in about 4 months (around 12/16/2018).   Patient instructed to bring BG logs to each follow up   Patient encouraged to call for any BG/medication issues, concerns, or questions.    Orders Placed This Encounter   Procedures    Hemoglobin A1c    Comprehensive metabolic panel

## 2018-09-11 ENCOUNTER — PATIENT MESSAGE (OUTPATIENT)
Dept: ENDOCRINOLOGY | Facility: CLINIC | Age: 63
End: 2018-09-11

## 2018-09-17 ENCOUNTER — OFFICE VISIT (OUTPATIENT)
Dept: OPHTHALMOLOGY | Facility: CLINIC | Age: 63
End: 2018-09-17
Payer: COMMERCIAL

## 2018-09-17 DIAGNOSIS — H25.13 NUCLEAR SCLEROSIS OF BOTH EYES: ICD-10-CM

## 2018-09-17 PROCEDURE — 99999 PR PBB SHADOW E&M-EST. PATIENT-LVL III: CPT | Mod: PBBFAC,,, | Performed by: OPHTHALMOLOGY

## 2018-09-17 PROCEDURE — 92226 PR SPECIAL EYE EXAM, SUBSEQUENT: CPT | Mod: LT,S$GLB,, | Performed by: OPHTHALMOLOGY

## 2018-09-17 PROCEDURE — 92134 CPTRZ OPH DX IMG PST SGM RTA: CPT | Mod: S$GLB,,, | Performed by: OPHTHALMOLOGY

## 2018-09-17 PROCEDURE — 92014 COMPRE OPH EXAM EST PT 1/>: CPT | Mod: S$GLB,,, | Performed by: OPHTHALMOLOGY

## 2018-09-17 NOTE — PROGRESS NOTES
HPI     Eye Problem      Additional comments: yearly check              Comments     DLS 10/2/17- No changes x last visit. DM under good control    FAL=179 this am  A1C=7.4 (8/6/18)            OCT - some thinning - No ME OU      A/P    1. PDR OU S/P PRP (12 yrs. Ago in [place]; laser OD 2 yrs ago at    ; h/x laser OD (1982 study at hospitals - [facility])  T1 uncontrolled    2. H/X Vit. Hem. OD      3. NS OU    BS/BP/chol control      1 year OCT

## 2018-09-19 ENCOUNTER — PATIENT MESSAGE (OUTPATIENT)
Dept: ENDOCRINOLOGY | Facility: CLINIC | Age: 63
End: 2018-09-19

## 2018-10-02 ENCOUNTER — TELEPHONE (OUTPATIENT)
Dept: ENDOCRINOLOGY | Facility: CLINIC | Age: 63
End: 2018-10-02

## 2018-10-02 ENCOUNTER — PATIENT MESSAGE (OUTPATIENT)
Dept: ENDOCRINOLOGY | Facility: CLINIC | Age: 63
End: 2018-10-02

## 2018-10-10 ENCOUNTER — PATIENT MESSAGE (OUTPATIENT)
Dept: ENDOCRINOLOGY | Facility: CLINIC | Age: 63
End: 2018-10-10

## 2018-12-13 ENCOUNTER — LAB VISIT (OUTPATIENT)
Dept: LAB | Facility: HOSPITAL | Age: 63
End: 2018-12-13
Attending: NURSE PRACTITIONER
Payer: COMMERCIAL

## 2018-12-13 ENCOUNTER — PATIENT MESSAGE (OUTPATIENT)
Dept: ENDOCRINOLOGY | Facility: CLINIC | Age: 63
End: 2018-12-13

## 2018-12-13 DIAGNOSIS — E10.43 TYPE 1 DIABETES MELLITUS WITH DIABETIC AUTONOMIC NEUROPATHY: ICD-10-CM

## 2018-12-13 LAB
ALBUMIN SERPL BCP-MCNC: 3.5 G/DL
ALP SERPL-CCNC: 84 U/L
ALT SERPL W/O P-5'-P-CCNC: 25 U/L
ANION GAP SERPL CALC-SCNC: 6 MMOL/L
AST SERPL-CCNC: 21 U/L
BILIRUB SERPL-MCNC: 0.5 MG/DL
BUN SERPL-MCNC: 19 MG/DL
CALCIUM SERPL-MCNC: 9.9 MG/DL
CHLORIDE SERPL-SCNC: 105 MMOL/L
CO2 SERPL-SCNC: 29 MMOL/L
CREAT SERPL-MCNC: 1.2 MG/DL
EST. GFR  (AFRICAN AMERICAN): >60 ML/MIN/1.73 M^2
EST. GFR  (NON AFRICAN AMERICAN): >60 ML/MIN/1.73 M^2
ESTIMATED AVG GLUCOSE: 157 MG/DL
GLUCOSE SERPL-MCNC: 133 MG/DL
HBA1C MFR BLD HPLC: 7.1 %
POTASSIUM SERPL-SCNC: 5.2 MMOL/L
PROT SERPL-MCNC: 6.9 G/DL
SODIUM SERPL-SCNC: 140 MMOL/L

## 2018-12-13 PROCEDURE — 36415 COLL VENOUS BLD VENIPUNCTURE: CPT | Mod: PO

## 2018-12-13 PROCEDURE — 83036 HEMOGLOBIN GLYCOSYLATED A1C: CPT

## 2018-12-13 PROCEDURE — 80053 COMPREHEN METABOLIC PANEL: CPT

## 2018-12-18 ENCOUNTER — PATIENT MESSAGE (OUTPATIENT)
Dept: PODIATRY | Facility: CLINIC | Age: 63
End: 2018-12-18

## 2018-12-20 ENCOUNTER — OFFICE VISIT (OUTPATIENT)
Dept: ENDOCRINOLOGY | Facility: CLINIC | Age: 63
End: 2018-12-20
Payer: COMMERCIAL

## 2018-12-20 ENCOUNTER — OFFICE VISIT (OUTPATIENT)
Dept: PODIATRY | Facility: CLINIC | Age: 63
End: 2018-12-20
Payer: COMMERCIAL

## 2018-12-20 VITALS
DIASTOLIC BLOOD PRESSURE: 68 MMHG | SYSTOLIC BLOOD PRESSURE: 118 MMHG | HEART RATE: 90 BPM | WEIGHT: 223.81 LBS | HEIGHT: 74 IN | BODY MASS INDEX: 28.72 KG/M2

## 2018-12-20 VITALS — HEIGHT: 74 IN | RESPIRATION RATE: 20 BRPM | BODY MASS INDEX: 28.62 KG/M2 | WEIGHT: 223 LBS

## 2018-12-20 DIAGNOSIS — E10.3599 PROLIFERATIVE DIABETIC RETINOPATHY WITHOUT MACULAR EDEMA ASSOCIATED WITH TYPE 1 DIABETES MELLITUS, UNSPECIFIED LATERALITY: ICD-10-CM

## 2018-12-20 DIAGNOSIS — L97.511 DIABETIC ULCER OF TOE OF RIGHT FOOT ASSOCIATED WITH TYPE 1 DIABETES MELLITUS, LIMITED TO BREAKDOWN OF SKIN: Primary | ICD-10-CM

## 2018-12-20 DIAGNOSIS — E10.21 TYPE 1 DIABETES MELLITUS WITH DIABETIC NEPHROPATHY: ICD-10-CM

## 2018-12-20 DIAGNOSIS — E10.621 DIABETIC ULCER OF TOE OF RIGHT FOOT ASSOCIATED WITH TYPE 1 DIABETES MELLITUS, LIMITED TO BREAKDOWN OF SKIN: Primary | ICD-10-CM

## 2018-12-20 DIAGNOSIS — E10.43 TYPE 1 DIABETES MELLITUS WITH DIABETIC AUTONOMIC NEUROPATHY: Primary | ICD-10-CM

## 2018-12-20 DIAGNOSIS — E78.5 HYPERLIPIDEMIA, UNSPECIFIED HYPERLIPIDEMIA TYPE: ICD-10-CM

## 2018-12-20 DIAGNOSIS — I10 ESSENTIAL HYPERTENSION: ICD-10-CM

## 2018-12-20 DIAGNOSIS — E10.649 HYPOGLYCEMIA UNAWARENESS IN TYPE 1 DIABETES MELLITUS: ICD-10-CM

## 2018-12-20 PROCEDURE — 99213 OFFICE O/P EST LOW 20 MIN: CPT | Mod: 25,S$GLB,, | Performed by: PODIATRIST

## 2018-12-20 PROCEDURE — 97597 DBRDMT OPN WND 1ST 20 CM/<: CPT | Mod: S$GLB,,, | Performed by: PODIATRIST

## 2018-12-20 PROCEDURE — 99999 PR PBB SHADOW E&M-EST. PATIENT-LVL IV: CPT | Mod: PBBFAC,,, | Performed by: PODIATRIST

## 2018-12-20 PROCEDURE — 99999 PR PBB SHADOW E&M-EST. PATIENT-LVL IV: CPT | Mod: PBBFAC,,, | Performed by: NURSE PRACTITIONER

## 2018-12-20 PROCEDURE — 87070 CULTURE OTHR SPECIMN AEROBIC: CPT

## 2018-12-20 PROCEDURE — 99214 OFFICE O/P EST MOD 30 MIN: CPT | Mod: S$GLB,,, | Performed by: NURSE PRACTITIONER

## 2018-12-20 PROCEDURE — 87075 CULTR BACTERIA EXCEPT BLOOD: CPT

## 2018-12-20 RX ORDER — INSULIN GLARGINE 100 [IU]/ML
23 INJECTION, SOLUTION SUBCUTANEOUS NIGHTLY
Qty: 30 ML | Refills: 3
Start: 2018-12-20 | End: 2019-03-22

## 2018-12-20 NOTE — PROGRESS NOTES
"CC: Mr. Hammad Douglas III arrives today for management of Type 1  DM and review of chronic medical conditions, as listed in the visit diagnosis section of this encounter.     HPI: Mr. Hammad Douglas III was diagnosed with Type 1  DM at age 19 after a viral illness - had polyuria, polydipsia at this time.  Initial treatment consisted of insulin. Denies FH of DM. Reports past hospitalizations due to DKA > 30 years ago. Not recently.   Previously seen by KENNEDI Mckeon NP in the [facility] program on the [place].   Began using Dexcom G5 in 2017.  Has a tendency of hypoglycemia. + hypo unawareness.     BG readings are checked 2x/day for Dexcom calibrations.     Hypoglycemia: may occur upon waking - glucose in 70s. Also occurred recently after walking 7 miles at work.  Hypoglycemic Symptoms: "feels slow"  Hypoglycemia Treatment: Juice or glucose tabs. Has glucagon.     Missing Insulin/PO medication doses: No  Timing prandial insulin 5-15 minutes before meals: yes    Exercise: Walks a few miles daily at work. Works at [employer] in [place].     Dietary Habits: Eats 3 meals/day. May have occasional 8 oz of juice. Rare snacking. Avoids sugary beverages.     Last DM education appointment: October 2017    Has podiatry appt today for new blister on R foot.     He reports that the past couple of weeks has been stressful - sister's breast cancer dx and his wife's breast biopsy, which was negative. He did notice glucoses were running a little higher at this time.         CURRENT DIABETIC MEDS:  Lantus 26 units QHS, Humalog CHO ratio 1:8, correction target 180, ISF 50 (average dose 8 units)  Vial or pen: pen  Glucometer type: One Touch Ultra    Last Eye Exam: 9/2018, + proliferative DR.   Last Podiatry Exam: 4/2018    REVIEW OF SYSTEMS  Constitutional: no c/o weakness, fatigue, weight loss.   Eyes: denies visual disturbances.  Cardiac: no palpitations or chest pain.  Respiratory: no cough or dyspnea.  GI:  no c/o " "abdominal pain or nausea.   Skin: no lesions. + blister under great toe of R foot  Neuro: no numbness, tingling, or parasthesias.  Endocrine: denies polyphagia, polydipsia, polyuria    Personally reviewed Past Medical, Surgical, Social History.    Vital Signs  /68   Pulse 90   Ht 6' 2" (1.88 m)   Wt 101.5 kg (223 lb 12.8 oz)   BMI 28.73 kg/m²     Personally reviewed the below labs:    Hemoglobin A1C   Date Value Ref Range Status   12/13/2018 7.1 (H) 4.0 - 5.6 % Final     Comment:     ADA Screening Guidelines:  5.7-6.4%  Consistent with prediabetes  >or=6.5%  Consistent with diabetes  High levels of fetal hemoglobin interfere with the HbA1C  assay. Heterozygous hemoglobin variants (HbS, HgC, etc)do  not significantly interfere with this assay.   However, presence of multiple variants may affect accuracy.     08/06/2018 7.4 (H) 4.0 - 5.6 % Final     Comment:     ADA Screening Guidelines:  5.7-6.4%  Consistent with prediabetes  >or=6.5%  Consistent with diabetes  High levels of fetal hemoglobin interfere with the HbA1C  assay. Heterozygous hemoglobin variants (HbS, HgC, etc)do  not significantly interfere with this assay.   However, presence of multiple variants may affect accuracy.     03/02/2018 7.8 (H) 4.0 - 5.6 % Final     Comment:     According to ADA guidelines, hemoglobin A1c <7.0% represents  optimal control in non-pregnant diabetic patients. Different  metrics may apply to specific patient populations.   Standards of Medical Care in Diabetes-2016.  For the purpose of screening for the presence of diabetes:  <5.7%     Consistent with the absence of diabetes  5.7-6.4%  Consistent with increasing risk for diabetes   (prediabetes)  >or=6.5%  Consistent with diabetes  Currently, no consensus exists for use of hemoglobin A1c  for diagnosis of diabetes for children.  This Hemoglobin A1c assay has significant interference with fetal   hemoglobin   (HbF). The results are invalid for patients with abnormal " amounts of   HbF,   including those with known Hereditary Persistence   of Fetal Hemoglobin. Heterozygous hemoglobin variants (HbAS, HbAC,   HbAD, HbAE, HbA2) do not significantly interfere with this assay;   however, presence of multiple variants in a sample may impact the %   interference.         Chemistry        Component Value Date/Time     12/13/2018 0837    K 5.2 (H) 12/13/2018 0837     12/13/2018 0837    CO2 29 12/13/2018 0837    BUN 19 12/13/2018 0837    CREATININE 1.2 12/13/2018 0837     (H) 12/13/2018 0837        Component Value Date/Time    CALCIUM 9.9 12/13/2018 0837    ALKPHOS 84 12/13/2018 0837    AST 21 12/13/2018 0837    ALT 25 12/13/2018 0837    BILITOT 0.5 12/13/2018 0837    ESTGFRAFRICA >60.0 12/13/2018 0837    EGFRNONAA >60.0 12/13/2018 0837          Lab Results   Component Value Date    CHOL 172 08/06/2018    CHOL 172 08/17/2017    CHOL 170 01/20/2017     Lab Results   Component Value Date    HDL 48 08/06/2018    HDL 44 08/17/2017    HDL 42 01/20/2017     Lab Results   Component Value Date    LDLCALC 110.6 08/06/2018    LDLCALC 117.0 08/17/2017    LDLCALC 110.2 01/20/2017     Lab Results   Component Value Date    TRIG 67 08/06/2018    TRIG 55 08/17/2017    TRIG 89 01/20/2017     Lab Results   Component Value Date    CHOLHDL 27.9 08/06/2018    CHOLHDL 25.6 08/17/2017    CHOLHDL 24.7 01/20/2017       Lab Results   Component Value Date    MICALBCREAT 126.7 (H) 08/06/2018     Lab Results   Component Value Date    TSH 2.034 03/02/2018       Estimated Creatinine Clearance: 82.2 mL/min (based on SCr of 1.2 mg/dL).    Vit D, 25-Hydroxy   Date Value Ref Range Status   08/06/2018 35 30 - 96 ng/mL Final     Comment:     Vitamin D deficiency.........<10 ng/mL                              Vitamin D insufficiency......10-29 ng/mL       Vitamin D sufficiency........> or equal to 30 ng/mL  Vitamin D toxicity............>100 ng/mL       PHYSICAL EXAMINATION  Constitutional: Appears well, no  distress  Neck: Supple, trachea midline; no thyromegaly or nodules.   Respiratory: CTA, even and unlabored.   Cardiovascular: RRR, no murmurs, no carotid bruits. DP pulses  2+ bilaterally; no edema.    Lymph: no cervical or supraclavicular lymphadenopathy  Skin: warm and dry; no lipohypertrophy, or acanthosis nigracans observed.   Neuro: DTR 2+ BUE/2+BLE.  Feet: appropriate footwear.      Dexcom download: See media file for details. Most recent week contained less highs than the previous week. Sporadic hypoglycemia - overnight and during the day.  Some prandial excursions are prolonged.   Average glucose: 173 mg/dL  Above goal: 51%  Within goal: 42%  Below goal: 7%      A1c target < 7.5%, due to hypoglycemia unawareness      Assessment/Plan  1. Type 1 diabetes mellitus with diabetic autonomic neuropathy  -- A1c stable but continues with occasional hypoglycemia, which appears to be related to basal insulin dose.   + hypoglycemia unawareness.   -- decrease Lantus to 23 units QHS  -- Continue Humalog I:C ratio 1:8. Correction scale, target 180, ISF 50. May give Humalog immediately after meal if eating high fat meal.   -- check BG 2x/day for Dexcom calibrations.   -- discussed option of Dexcom G6. He will consider and check eligibility status with Dexcom.   -- not interested in insulin pump.     -- Discussed diagnosis of DM, A1c goals, progression of disease, long term complications and tx options.    -- Reviewed hypoglycemia management: treat with 1/2 glass of juice, 1/2 can regular coke, or 4 glucose tablets. Monitor and repeat treatment every 15 minutes until BG is >70 Then have a snack, which includes a complex carbohydrate and protein.  Advised patient to check BG before activities, such as driving or exercise.   2. Type 1 diabetes mellitus with diabetic nephropathy  -- continue losartan   3. Proliferative diabetic retinopathy without macular edema associated with type 1 diabetes mellitus, unspecified laterality   -- stable  -- keep follow ups   4. Essential hypertension  -- stable  -- continue current meds   5. Hyperlipidemia, unspecified hyperlipidemia type  -- stable  -- continue atorvastatin  Lab Results   Component Value Date    LDLCALC 110.6 08/06/2018      6. Hypoglycemia unawareness in Type 1 DM -- continue use of Dexcom. Change low alert to 75 mg/dL.   -- has glucagon  -- check BG before driving and during periods of increased physical activity.       FOLLOW UP   Follow-up in about 3 months (around 3/20/2019).   Patient instructed to bring BG logs to each follow up   Patient encouraged to call for any BG/medication issues, concerns, or questions.    Orders Placed This Encounter   Procedures    Hemoglobin A1c    Basic metabolic panel

## 2018-12-21 NOTE — PROCEDURES
"Wound Debridement  Date/Time: 12/20/2018 9:42 PM  Performed by: Caleb Duffy DPM  Authorized by: Caleb Duffy DPM     Time out: Immediately prior to procedure a "time out" was called to verify the correct patient, procedure, equipment, support staff and site/side marked as required.    Consent Done?:  Yes (Written)    Preparation: Patient was prepped and draped in usual sterile fashion    Local anesthesia used?: No      Wound Details:    Location:  Right foot    Location:  Right 1st Toe    Type of Debridement:  Excisional       Length (cm):  0.4       Area (sq cm):  0.32       Width (cm):  0.8       Percent Debrided (%):  100       Depth (cm):  0.1       Total Area Debrided (sq cm):  0.32    Depth of debridement:  Epidermis/Dermis    Tissue debrided:  Dermis    Devitalized tissue debrided:  Fibrin    Instruments:  Curette    Bleeding:  Minimal  Hemostasis Achieved: Yes    Method Used:  Pressure  Patient tolerance:  Patient tolerated the procedure well with no immediate complications      "

## 2018-12-21 NOTE — PROGRESS NOTES
Subjective:      Patient ID: Hammad Douglas III is a 63 y.o. male.    Chief Complaint: Wound Care (right great toe (blister under great toe))  Patient presents to clinic with the chief complaint of previous blister development under the Rt. Great toe.  States the blister ultimately ruptured with an ensuing wound.  He has been applying antibiotic ointment and a bandage to the site daily.  Denies being overtly painful, however, attributes this to neuropathy.  Denies noticing drainage from the toe.  Denies having N/V/F/C/D.  Denies any additional pedal complaints.        Past Medical History:   Diagnosis Date    Cataract     Chronic kidney disease     Diabetes mellitus type I     Diagnosed at age 19    Diabetic retinopathy     See's Dr. Ledesma    Hyperlipidemia     Hypertension        Past Surgical History:   Procedure Laterality Date    COLONOSCOPY      2011 wnl       Family History   Problem Relation Age of Onset    Cataracts Mother        Social History     Socioeconomic History    Marital status:      Spouse name: None    Number of children: None    Years of education: None    Highest education level: None   Social Needs    Financial resource strain: None    Food insecurity - worry: None    Food insecurity - inability: None    Transportation needs - medical: None    Transportation needs - non-medical: None   Occupational History    None   Tobacco Use    Smoking status: Current Some Day Smoker     Types: Cigars    Smokeless tobacco: Never Used    Tobacco comment: 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   Substance and Sexual Activity    Alcohol use: Yes     Alcohol/week: 1.2 oz     Types: 2 Cans of beer per week    Drug use: No    Sexual activity: Yes     Partners: Female   Other Topics Concern    None   Social History Narrative    None       Current Outpatient Medications   Medication Sig Dispense Refill    atorvastatin (LIPITOR) 40 MG  "tablet Take 1 tablet (40 mg total) by mouth once daily. 90 tablet 3    blood sugar diagnostic (ONETOUCH ULTRA TEST) Strp 1 strip by Other route 4 (four) times daily. 400 strip 3    FLUoxetine (PROZAC) 20 MG capsule Take 1 capsule (20 mg total) by mouth once daily. 90 capsule 3    insulin (LANTUS SOLOSTAR U-100 INSULIN) glargine 100 units/mL (3mL) SubQ pen Inject 23 Units into the skin every evening. Supply insulin pen needles 30 mL 3    insulin lispro (HUMALOG KWIKPEN INSULIN) 100 unit/mL InPn pen INJECT USING INSULIN:CARB RATIO OF 1:8 SUBCUTANEOUSLY PRIOR TO MEALS. MAX TDD 40 UNITS 3 Box 3    losartan (COZAAR) 100 MG tablet Take 1 tablet (100 mg total) by mouth once daily. 90 tablet 3    pen needle, diabetic (PEN NEEDLE) 31 gauge x 1/4" Ndle 1 each by Misc.(Non-Drug; Combo Route) route 4 (four) times daily. 400 each 3    glucagon (human recombinant) inj 1mg/mL kit Inject 1 mL (1 mg total) into the muscle as needed. 1 kit 6     Current Facility-Administered Medications   Medication Dose Route Frequency Provider Last Rate Last Dose    glucagon (human recombinant) injection 1 mg  1 mg Intramuscular Once Tania Mckeon NP           Review of patient's allergies indicates:   Allergen Reactions    Altace [ramipril]      Cough         Review of Systems   Constitution: Negative for chills and fever.   Cardiovascular: Negative for claudication and leg swelling.   Skin: Positive for poor wound healing. Negative for color change and dry skin.   Musculoskeletal: Negative for joint pain, joint swelling, muscle cramps and muscle weakness.   Neurological: Positive for numbness.   Psychiatric/Behavioral: Negative for altered mental status.           Objective:      Physical Exam   Constitutional: He appears well-developed and well-nourished. No distress.   Cardiovascular:   Pulses:       Dorsalis pedis pulses are 2+ on the right side, and 2+ on the left side.        Posterior tibial pulses are 2+ on the right " side, and 2+ on the left side.   CFT is < 3 seconds bilateral.  Pedal hair growth is present bilateral.  Varicosities noted bilateral.  No lower extremity edema noted bilateral.  Toes are warm to touch bilateral.     Musculoskeletal: He exhibits no edema or tenderness.   Muscle strength 5/5 in all muscle groups bilateral.  No tenderness nor crepitation with ROM of foot/ankle joints bilateral.  No tenderness with palpation of bilateral foot and ankle.     Neurological: He has normal strength. A sensory deficit is present.   Protective sensation per [place]-Jayson monofilament is decreased bilateral.  Light touch is intact bilateral.   Skin: Skin is warm and dry. Capillary refill takes less than 2 seconds. Lesion noted. No abrasion, no burn, no ecchymosis, no laceration and no petechiae noted. He is not diaphoretic.   Location: Open wound noted to the Rt. Plantar hallux.  Base: Down to dermis and comprised of fibrin.    Drainage: None  Skylar wound: Devoid of erythema, edema, fluctuance, purulence, and malodor.   Pre-debridement measurement: 0.4 x 0.8 x 0.1cm  Post-debridement measurement: 0.6 x 1 x 0.1cm.             Assessment:       Encounter Diagnosis   Name Primary?    Diabetic ulcer of toe of right foot associated with type 1 diabetes mellitus, limited to breakdown of skin Yes         Plan:       Hammad was seen today for wound care.    Diagnoses and all orders for this visit:    Diabetic ulcer of toe of right foot associated with type 1 diabetes mellitus, limited to breakdown of skin  -     Aerobic culture  -     Culture, Anaerobic      I counseled the patient on his conditions, their implications and medical management.    Discussed with patient the associated risks and benefits associated with a selective excisional debridement of the Rt. Great toe.  Consent was read, signed, and witnessed.  See attached procedure note.      Wound cultures were obtained.    The wound base was covered with sawyer.  The site  was then offloaded with cassandra foam, and a football dressing was applied.    Advised to keep the dressing CDI x 1 week.    Advised to rest and elevate the affected extremity.    Instructed to minimize weight bearing to facilitate wound healing.    Advised to ambulate only in the postoperative shoe/boot.    Discussed optimal hydration and protein consumption and how they facilitate wound healing.      RTC in 1 week for a wound check (nurse visit).    Caleb Duffy DPM

## 2018-12-24 LAB — BACTERIA SPEC AEROBE CULT: NORMAL

## 2018-12-26 ENCOUNTER — TELEPHONE (OUTPATIENT)
Dept: PODIATRY | Facility: CLINIC | Age: 63
End: 2018-12-26

## 2018-12-26 LAB — BACTERIA SPEC ANAEROBE CULT: NORMAL

## 2018-12-26 NOTE — TELEPHONE ENCOUNTER
Called and spoke with patient, after speaking with Dr. Duffy. Let patient know his cultures came back normal/negative. He voiced all understanding and stated he would see me tomorrow for his nurse visit. Patient voiced all understanding and had no other questions.

## 2018-12-27 ENCOUNTER — OFFICE VISIT (OUTPATIENT)
Dept: PODIATRY | Facility: CLINIC | Age: 63
End: 2018-12-27
Payer: COMMERCIAL

## 2018-12-27 VITALS — BODY MASS INDEX: 28.62 KG/M2 | RESPIRATION RATE: 20 BRPM | HEIGHT: 74 IN | WEIGHT: 223 LBS

## 2018-12-27 DIAGNOSIS — E10.621 DIABETIC ULCER OF TOE OF RIGHT FOOT ASSOCIATED WITH TYPE 1 DIABETES MELLITUS, LIMITED TO BREAKDOWN OF SKIN: Primary | ICD-10-CM

## 2018-12-27 DIAGNOSIS — L97.511 DIABETIC ULCER OF TOE OF RIGHT FOOT ASSOCIATED WITH TYPE 1 DIABETES MELLITUS, LIMITED TO BREAKDOWN OF SKIN: Primary | ICD-10-CM

## 2018-12-27 PROCEDURE — 99212 PR OFFICE/OUTPT VISIT, EST, LEVL II, 10-19 MIN: ICD-10-PCS | Mod: S$GLB,,, | Performed by: PODIATRIST

## 2018-12-27 PROCEDURE — 99999 PR PBB SHADOW E&M-EST. PATIENT-LVL III: ICD-10-PCS | Mod: PBBFAC,,,

## 2018-12-27 PROCEDURE — 99999 PR PBB SHADOW E&M-EST. PATIENT-LVL III: CPT | Mod: PBBFAC,,,

## 2018-12-27 PROCEDURE — 99212 OFFICE O/P EST SF 10 MIN: CPT | Mod: S$GLB,,, | Performed by: PODIATRIST

## 2018-12-31 ENCOUNTER — PATIENT MESSAGE (OUTPATIENT)
Dept: PODIATRY | Facility: CLINIC | Age: 63
End: 2018-12-31

## 2018-12-31 NOTE — TELEPHONE ENCOUNTER
Spoke with patient let him know that Dr. Duffy was ok with him taking the bandage of Thursday of this week and send us a picture of what it looks like on [facility]. He voiced all understanding and stated he would send us a picture.

## 2019-01-03 ENCOUNTER — PATIENT MESSAGE (OUTPATIENT)
Dept: PODIATRY | Facility: CLINIC | Age: 64
End: 2019-01-03

## 2019-01-07 ENCOUNTER — PATIENT MESSAGE (OUTPATIENT)
Dept: ENDOCRINOLOGY | Facility: CLINIC | Age: 64
End: 2019-01-07

## 2019-02-21 ENCOUNTER — PATIENT MESSAGE (OUTPATIENT)
Dept: PODIATRY | Facility: CLINIC | Age: 64
End: 2019-02-21

## 2019-02-21 ENCOUNTER — PATIENT MESSAGE (OUTPATIENT)
Dept: ENDOCRINOLOGY | Facility: CLINIC | Age: 64
End: 2019-02-21

## 2019-02-25 ENCOUNTER — OFFICE VISIT (OUTPATIENT)
Dept: PODIATRY | Facility: CLINIC | Age: 64
End: 2019-02-25
Payer: COMMERCIAL

## 2019-02-25 VITALS — HEIGHT: 74 IN | RESPIRATION RATE: 20 BRPM | WEIGHT: 223 LBS | BODY MASS INDEX: 28.62 KG/M2

## 2019-02-25 DIAGNOSIS — S91.209A TOENAIL AVULSION, INITIAL ENCOUNTER: Primary | ICD-10-CM

## 2019-02-25 DIAGNOSIS — E10.43 TYPE 1 DIABETES MELLITUS WITH DIABETIC AUTONOMIC NEUROPATHY: ICD-10-CM

## 2019-02-25 DIAGNOSIS — L97.521 DIABETIC ULCER OF TOE OF LEFT FOOT ASSOCIATED WITH TYPE 1 DIABETES MELLITUS, LIMITED TO BREAKDOWN OF SKIN: ICD-10-CM

## 2019-02-25 DIAGNOSIS — E10.621 DIABETIC ULCER OF TOE OF LEFT FOOT ASSOCIATED WITH TYPE 1 DIABETES MELLITUS, LIMITED TO BREAKDOWN OF SKIN: ICD-10-CM

## 2019-02-25 PROCEDURE — 99213 OFFICE O/P EST LOW 20 MIN: CPT | Mod: S$GLB,,, | Performed by: PODIATRIST

## 2019-02-25 PROCEDURE — 99999 PR PBB SHADOW E&M-EST. PATIENT-LVL IV: CPT | Mod: PBBFAC,,, | Performed by: PODIATRIST

## 2019-02-25 PROCEDURE — 87076 CULTURE ANAEROBE IDENT EACH: CPT

## 2019-02-25 PROCEDURE — 87070 CULTURE OTHR SPECIMN AEROBIC: CPT

## 2019-02-25 PROCEDURE — 99213 PR OFFICE/OUTPT VISIT, EST, LEVL III, 20-29 MIN: ICD-10-PCS | Mod: S$GLB,,, | Performed by: PODIATRIST

## 2019-02-25 PROCEDURE — 99999 PR PBB SHADOW E&M-EST. PATIENT-LVL IV: ICD-10-PCS | Mod: PBBFAC,,, | Performed by: PODIATRIST

## 2019-02-25 PROCEDURE — 87075 CULTR BACTERIA EXCEPT BLOOD: CPT

## 2019-02-26 NOTE — PROGRESS NOTES
Subjective:      Patient ID: Hammad Douglas III is a 63 y.o. male.    Chief Complaint: Nail Problem (2nd toe left foot)  Patient presents to clinic with the chief complaint of a new wound of the Lt. Foot.  Relates stubbing the Lt. 2nd toe, late last week, with subsequent avulsion of the nail plate and injury to the affected skin.  Being that he is diabetic, this prompted him to be seen in efforts to prevent infection.  He has been applying antibiotic ointment and a toe specific bandage to the site daily.  Denies experiencing pain from the site, likely due to neuropathy.  Relates noticing a small amount of drainage from the toe.  Denies having N/V/F/C/D.  Denies any additional pedal complaints.        Past Medical History:   Diagnosis Date    Cataract     Chronic kidney disease     Diabetes mellitus type I     Diagnosed at age 19    Diabetic retinopathy     See's Dr. Ledesma    Hyperlipidemia     Hypertension        Past Surgical History:   Procedure Laterality Date    COLONOSCOPY      2011 wnl       Family History   Problem Relation Age of Onset    Cataracts Mother        Social History     Socioeconomic History    Marital status:      Spouse name: None    Number of children: None    Years of education: None    Highest education level: None   Social Needs    Financial resource strain: None    Food insecurity - worry: None    Food insecurity - inability: None    Transportation needs - medical: None    Transportation needs - non-medical: None   Occupational History    None   Tobacco Use    Smoking status: Current Some Day Smoker     Types: Cigars    Smokeless tobacco: Never Used    Tobacco comment: 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   Substance and Sexual Activity    Alcohol use: Yes     Alcohol/week: 1.2 oz     Types: 2 Cans of beer per week    Drug use: No    Sexual activity: Yes     Partners: Female   Other Topics Concern    None  "  Social History Narrative    None       Current Outpatient Medications   Medication Sig Dispense Refill    atorvastatin (LIPITOR) 40 MG tablet Take 1 tablet (40 mg total) by mouth once daily. 90 tablet 3    blood sugar diagnostic (ONETOUCH ULTRA TEST) Strp 1 strip by Other route 4 (four) times daily. 400 strip 3    FLUoxetine (PROZAC) 20 MG capsule Take 1 capsule (20 mg total) by mouth once daily. 90 capsule 3    insulin (LANTUS SOLOSTAR U-100 INSULIN) glargine 100 units/mL (3mL) SubQ pen Inject 23 Units into the skin every evening. Supply insulin pen needles 30 mL 3    insulin lispro (HUMALOG KWIKPEN INSULIN) 100 unit/mL InPn pen INJECT USING INSULIN:CARB RATIO OF 1:8 SUBCUTANEOUSLY PRIOR TO MEALS. MAX TDD 40 UNITS 3 Box 3    losartan (COZAAR) 100 MG tablet Take 1 tablet (100 mg total) by mouth once daily. 90 tablet 3    pen needle, diabetic (PEN NEEDLE) 31 gauge x 1/4" Ndle 1 each by Misc.(Non-Drug; Combo Route) route 4 (four) times daily. 400 each 3    glucagon (human recombinant) inj 1mg/mL kit Inject 1 mL (1 mg total) into the muscle as needed. 1 kit 6     Current Facility-Administered Medications   Medication Dose Route Frequency Provider Last Rate Last Dose    glucagon (human recombinant) injection 1 mg  1 mg Intramuscular Once Tania Mckeon NP           Review of patient's allergies indicates:   Allergen Reactions    Altace [ramipril]      Cough         Review of Systems   Constitution: Negative for chills and fever.   Cardiovascular: Negative for claudication and leg swelling.   Skin: Positive for color change and poor wound healing. Negative for dry skin.   Musculoskeletal: Negative for joint pain, joint swelling, muscle cramps and muscle weakness.   Neurological: Positive for numbness.   Psychiatric/Behavioral: Negative for altered mental status.           Objective:      Physical Exam   Constitutional: He appears well-developed and well-nourished. No distress.   Cardiovascular: "   Pulses:       Dorsalis pedis pulses are 2+ on the right side, and 2+ on the left side.        Posterior tibial pulses are 2+ on the right side, and 2+ on the left side.   CFT is < 3 seconds bilateral.  Pedal hair growth is present bilateral.  Varicosities noted bilateral.  No lower extremity edema noted bilateral.  Toes are warm to touch bilateral.     Musculoskeletal: He exhibits no edema or tenderness.   Muscle strength 5/5 in all muscle groups bilateral.  No tenderness nor crepitation with ROM of foot/ankle joints bilateral.  No tenderness with palpation of bilateral foot and ankle.     Neurological: He has normal strength. A sensory deficit is present.   Protective sensation per [place]-Jayson monofilament is decreased bilateral.  Light touch is intact bilateral.   Skin: Skin is warm and dry. Capillary refill takes less than 2 seconds. Lesion noted. No abrasion, no burn, no ecchymosis, no laceration and no petechiae noted. He is not diaphoretic.   Location: Open wound noted from the distal tip to the dorsal DIP joint of the Lt. 2nd toe   Base: Down to dermis and comprised of mostly granulation with a small area of eschar at the tip.    Drainage: None  Skylar wound: Devoid of erythema, edema, fluctuance, purulence, and malodor.   Measurement: 2.7 x 1.2 x 0.1cm    Traumatic avulsion of the Lt. 2nd toenail.               Assessment:       Encounter Diagnoses   Name Primary?    Type 1 diabetes mellitus with diabetic autonomic neuropathy     Toenail avulsion, initial encounter Yes    Diabetic ulcer of toe of left foot associated with type 1 diabetes mellitus, limited to breakdown of skin          Plan:       Hammad was seen today for nail problem.    Diagnoses and all orders for this visit:    Toenail avulsion, initial encounter  -     Aerobic culture  -     Culture, Anaerobic    Type 1 diabetes mellitus with diabetic autonomic neuropathy    Diabetic ulcer of toe of left foot associated with type 1 diabetes  mellitus, limited to breakdown of skin  -     Aerobic culture  -     Culture, Anaerobic      I counseled the patient on his conditions, their implications and medical management.    No wound debridement performed, as the site is mostly granular.    Wound cultures were obtained.    The wound base was covered with iodosorb ointment and a nonadherent bandage.  The site was then offloaded with a football dressing.    Advised to keep the dressing CDI x 1 week.    Advised to rest and elevate the affected extremity.    Instructed to minimize weight bearing to facilitate wound healing.    Advised to ambulate only in the postoperative shoe/boot.    Discussed optimal hydration and protein consumption and how they facilitate wound healing.      RTC in 1 week for a wound check.    Caleb Duffy DPM

## 2019-03-01 LAB — BACTERIA SPEC AEROBE CULT: NO GROWTH

## 2019-03-04 ENCOUNTER — OFFICE VISIT (OUTPATIENT)
Dept: PODIATRY | Facility: CLINIC | Age: 64
End: 2019-03-04
Payer: COMMERCIAL

## 2019-03-04 VITALS — WEIGHT: 223.13 LBS | BODY MASS INDEX: 28.64 KG/M2 | HEIGHT: 74 IN

## 2019-03-04 DIAGNOSIS — S91.209D TOENAIL AVULSION, SUBSEQUENT ENCOUNTER: Primary | ICD-10-CM

## 2019-03-04 DIAGNOSIS — L97.521 DIABETIC ULCER OF TOE OF LEFT FOOT ASSOCIATED WITH TYPE 1 DIABETES MELLITUS, LIMITED TO BREAKDOWN OF SKIN: ICD-10-CM

## 2019-03-04 DIAGNOSIS — E10.621 DIABETIC ULCER OF TOE OF LEFT FOOT ASSOCIATED WITH TYPE 1 DIABETES MELLITUS, LIMITED TO BREAKDOWN OF SKIN: ICD-10-CM

## 2019-03-04 LAB — BACTERIA SPEC ANAEROBE CULT: NORMAL

## 2019-03-04 PROCEDURE — 99499 UNLISTED E&M SERVICE: CPT | Mod: S$GLB,,, | Performed by: PODIATRIST

## 2019-03-04 PROCEDURE — 99999 PR PBB SHADOW E&M-EST. PATIENT-LVL II: ICD-10-PCS | Mod: PBBFAC,,, | Performed by: PODIATRIST

## 2019-03-04 PROCEDURE — 97597 DBRDMT OPN WND 1ST 20 CM/<: CPT | Mod: S$GLB,,, | Performed by: PODIATRIST

## 2019-03-04 PROCEDURE — 99999 PR PBB SHADOW E&M-EST. PATIENT-LVL II: CPT | Mod: PBBFAC,,, | Performed by: PODIATRIST

## 2019-03-04 PROCEDURE — 99499 NO LOS: ICD-10-PCS | Mod: S$GLB,,, | Performed by: PODIATRIST

## 2019-03-04 PROCEDURE — 97597 WOUND DEBRIDEMENT: ICD-10-PCS | Mod: S$GLB,,, | Performed by: PODIATRIST

## 2019-03-04 NOTE — PROGRESS NOTES
Subjective:      Patient ID: Hammad Douglas III is a 63 y.o. male.    Chief Complaint: Wound Care (left 2nd toe)  Patient presents to clinic for a 1 week wound check of the Lt. Foot.  Denies pain to the affected digit with today's exam.  Has kept the previous football dressing clean, dry, and intact x 1 week.   Relates ambulation to tolerance while wearing the postoperative shoe.   Denies having N/V/F/C/D.  Denies any additional pedal complaints.        Past Medical History:   Diagnosis Date    Cataract     Chronic kidney disease     Diabetes mellitus type I     Diagnosed at age 19    Diabetic retinopathy     See's Dr. Ledesma    Hyperlipidemia     Hypertension        Past Surgical History:   Procedure Laterality Date    COLONOSCOPY      2011 wnl       Family History   Problem Relation Age of Onset    Cataracts Mother        Social History     Socioeconomic History    Marital status:      Spouse name: None    Number of children: None    Years of education: None    Highest education level: None   Social Needs    Financial resource strain: None    Food insecurity - worry: None    Food insecurity - inability: None    Transportation needs - medical: None    Transportation needs - non-medical: None   Occupational History    None   Tobacco Use    Smoking status: Current Some Day Smoker     Types: Cigars    Smokeless tobacco: Never Used    Tobacco comment: 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   Substance and Sexual Activity    Alcohol use: Yes     Alcohol/week: 1.2 oz     Types: 2 Cans of beer per week    Drug use: No    Sexual activity: Yes     Partners: Female   Other Topics Concern    None   Social History Narrative    None       Current Outpatient Medications   Medication Sig Dispense Refill    atorvastatin (LIPITOR) 40 MG tablet Take 1 tablet (40 mg total) by mouth once daily. 90 tablet 3    blood sugar diagnostic (ONETOUCH ULTRA TEST)  "Strp 1 strip by Other route 4 (four) times daily. 400 strip 3    FLUoxetine (PROZAC) 20 MG capsule Take 1 capsule (20 mg total) by mouth once daily. 90 capsule 3    glucagon (human recombinant) inj 1mg/mL kit Inject 1 mL (1 mg total) into the muscle as needed. 1 kit 6    insulin (LANTUS SOLOSTAR U-100 INSULIN) glargine 100 units/mL (3mL) SubQ pen Inject 23 Units into the skin every evening. Supply insulin pen needles 30 mL 3    insulin lispro (HUMALOG KWIKPEN INSULIN) 100 unit/mL InPn pen INJECT USING INSULIN:CARB RATIO OF 1:8 SUBCUTANEOUSLY PRIOR TO MEALS. MAX TDD 40 UNITS 3 Box 3    losartan (COZAAR) 100 MG tablet Take 1 tablet (100 mg total) by mouth once daily. 90 tablet 3    pen needle, diabetic (PEN NEEDLE) 31 gauge x 1/4" Ndle 1 each by Misc.(Non-Drug; Combo Route) route 4 (four) times daily. 400 each 3     Current Facility-Administered Medications   Medication Dose Route Frequency Provider Last Rate Last Dose    glucagon (human recombinant) injection 1 mg  1 mg Intramuscular Once Tania Mckeon NP           Review of patient's allergies indicates:   Allergen Reactions    Altace [ramipril]      Cough         Review of Systems   Constitution: Negative for chills and fever.   Cardiovascular: Negative for claudication and leg swelling.   Skin: Negative for color change, dry skin and poor wound healing.   Musculoskeletal: Negative for joint pain, joint swelling, muscle cramps and muscle weakness.   Neurological: Positive for numbness.   Psychiatric/Behavioral: Negative for altered mental status.           Objective:      Physical Exam   Constitutional: He appears well-developed and well-nourished. No distress.   Cardiovascular:   Pulses:       Dorsalis pedis pulses are 2+ on the right side, and 2+ on the left side.        Posterior tibial pulses are 2+ on the right side, and 2+ on the left side.   CFT is < 3 seconds bilateral.  Pedal hair growth is present bilateral.  Varicosities noted bilateral. "  No lower extremity edema noted bilateral.  Toes are warm to touch bilateral.     Musculoskeletal: He exhibits no edema or tenderness.   Muscle strength 5/5 in all muscle groups bilateral.  No tenderness nor crepitation with ROM of foot/ankle joints bilateral.  No tenderness with palpation of bilateral foot and ankle.     Neurological: He has normal strength. A sensory deficit is present.   Protective sensation per [place] monofilament is decreased bilateral.  Light touch is intact bilateral.   Skin: Skin is warm and dry. Capillary refill takes less than 2 seconds. Lesion noted. No abrasion, no burn, no ecchymosis, no laceration and no petechiae noted. He is not diaphoretic.   Previous wound of the Lt. 2nd toe has been divided by a skin bridge forming two separate wounds.    Location: Open wound noted to the distal tip of the Lt. 2nd toe   Base: Down to dermis and comprised of fibrin.  Skylar wound: Devoid of erythema, edema, fluctuance, purulence, and malodor.   Pre-debridement measurement: 0.5 x 0.7 x 0.1cm  Post-debridement measurement: 0.7 x 0.9 x 0.1cm    Location: Open wound noted slightly proximal to the Lt. 2nd DIP joint.    Base: Down to dermis and comprised of granulation  Drainage: None  Skylar wound: Devoid of erythema, edema, fluctuance, purulence, and malodor.   Measurement: 0.4 x 0.7 x 0.1cm.    Traumatic avulsion of the Lt. 2nd toenail.               Assessment:       Encounter Diagnoses   Name Primary?    Toenail avulsion, subsequent encounter Yes    Diabetic ulcer of toe of left foot associated with type 1 diabetes mellitus, limited to breakdown of skin          Plan:       Hammad was seen today for wound care.    Diagnoses and all orders for this visit:    Toenail avulsion, subsequent encounter    Diabetic ulcer of toe of left foot associated with type 1 diabetes mellitus, limited to breakdown of skin      I counseled the patient on his conditions, their implications and medical  management.    With the patient's verbal consent, a selective excisional debridement of the LT. 2nd toe was performed.  See attached procedure note.    The wound base was covered with sawyer and a nonadherent bandage.  The site was then offloaded with a football dressing.    Advised to keep the dressing CDI x 1 week.  May remove the dressing on his own in 1 week.  Patient to then transition back into casual shoe gear.    Advised to rest and elevate the affected extremity.    Instructed to minimize weight bearing to facilitate wound healing.    Advised to ambulate only in the postoperative shoe/boot.    RTC prn or within one week if the site has failed to fully epithelialize.  Patient to send pictures of his progress, through [employer], in approximately one week.    Caleb Duffy DPM

## 2019-03-04 NOTE — PROCEDURES
"Wound Debridement  Date/Time: 3/4/2019 10:49 AM  Performed by: Caleb Duffy DPM  Authorized by: Caleb Duffy DPM     Time out: Immediately prior to procedure a "time out" was called to verify the correct patient, procedure, equipment, support staff and site/side marked as required.    Consent Done?:  Yes (Verbal)    Preparation: Patient was prepped and draped in usual sterile fashion    Local anesthesia used?: No      Wound Details:    Location:  Left foot    Location:  Left 2nd Toe    Type of Debridement:  Excisional       Length (cm):  0.5       Area (sq cm):  0.35       Width (cm):  0.7       Percent Debrided (%):  100       Depth (cm):  0.1       Total Area Debrided (sq cm):  0.35    Depth of debridement:  Epidermis/Dermis    Tissue debrided:  Dermis    Devitalized tissue debrided:  Fibrin    Instruments:  Blade    Bleeding:  Minimal  Hemostasis Achieved: Yes    Method Used:  Pressure  Patient tolerance:  Patient tolerated the procedure well with no immediate complications      "

## 2019-03-06 ENCOUNTER — PATIENT MESSAGE (OUTPATIENT)
Dept: ENDOCRINOLOGY | Facility: CLINIC | Age: 64
End: 2019-03-06

## 2019-03-11 ENCOUNTER — PATIENT MESSAGE (OUTPATIENT)
Dept: PODIATRY | Facility: CLINIC | Age: 64
End: 2019-03-11

## 2019-03-12 RX ORDER — INSULIN LISPRO 100 [IU]/ML
INJECTION, SOLUTION INTRAVENOUS; SUBCUTANEOUS
Qty: 3 BOX | Refills: 3 | Status: SHIPPED | OUTPATIENT
Start: 2019-03-12 | End: 2019-06-28

## 2019-03-13 ENCOUNTER — PATIENT MESSAGE (OUTPATIENT)
Dept: ENDOCRINOLOGY | Facility: CLINIC | Age: 64
End: 2019-03-13

## 2019-03-14 ENCOUNTER — LAB VISIT (OUTPATIENT)
Dept: LAB | Facility: HOSPITAL | Age: 64
End: 2019-03-14
Payer: COMMERCIAL

## 2019-03-14 DIAGNOSIS — E10.43 TYPE 1 DIABETES MELLITUS WITH DIABETIC AUTONOMIC NEUROPATHY: ICD-10-CM

## 2019-03-14 LAB
ANION GAP SERPL CALC-SCNC: 6 MMOL/L
BUN SERPL-MCNC: 25 MG/DL
CALCIUM SERPL-MCNC: 9.5 MG/DL
CHLORIDE SERPL-SCNC: 106 MMOL/L
CO2 SERPL-SCNC: 28 MMOL/L
CREAT SERPL-MCNC: 1.2 MG/DL
EST. GFR  (AFRICAN AMERICAN): >60 ML/MIN/1.73 M^2
EST. GFR  (NON AFRICAN AMERICAN): >60 ML/MIN/1.73 M^2
ESTIMATED AVG GLUCOSE: 177 MG/DL
GLUCOSE SERPL-MCNC: 136 MG/DL
HBA1C MFR BLD HPLC: 7.8 %
POTASSIUM SERPL-SCNC: 4.4 MMOL/L
SODIUM SERPL-SCNC: 140 MMOL/L

## 2019-03-14 PROCEDURE — 83036 HEMOGLOBIN GLYCOSYLATED A1C: CPT

## 2019-03-14 PROCEDURE — 36415 COLL VENOUS BLD VENIPUNCTURE: CPT | Mod: PO

## 2019-03-14 PROCEDURE — 80048 BASIC METABOLIC PNL TOTAL CA: CPT

## 2019-03-22 ENCOUNTER — OFFICE VISIT (OUTPATIENT)
Dept: ENDOCRINOLOGY | Facility: CLINIC | Age: 64
End: 2019-03-22
Payer: COMMERCIAL

## 2019-03-22 VITALS
SYSTOLIC BLOOD PRESSURE: 118 MMHG | WEIGHT: 223.13 LBS | HEART RATE: 80 BPM | DIASTOLIC BLOOD PRESSURE: 64 MMHG | BODY MASS INDEX: 28.64 KG/M2 | HEIGHT: 74 IN

## 2019-03-22 DIAGNOSIS — E10.649 HYPOGLYCEMIA UNAWARENESS IN TYPE 1 DIABETES MELLITUS: ICD-10-CM

## 2019-03-22 DIAGNOSIS — E78.5 HYPERLIPIDEMIA, UNSPECIFIED HYPERLIPIDEMIA TYPE: ICD-10-CM

## 2019-03-22 DIAGNOSIS — E10.21 TYPE 1 DIABETES MELLITUS WITH DIABETIC NEPHROPATHY: ICD-10-CM

## 2019-03-22 DIAGNOSIS — E10.43 TYPE 1 DIABETES MELLITUS WITH DIABETIC AUTONOMIC NEUROPATHY: Primary | ICD-10-CM

## 2019-03-22 DIAGNOSIS — I10 ESSENTIAL HYPERTENSION: ICD-10-CM

## 2019-03-22 PROCEDURE — 99999 PR PBB SHADOW E&M-EST. PATIENT-LVL IV: CPT | Mod: PBBFAC,,, | Performed by: NURSE PRACTITIONER

## 2019-03-22 PROCEDURE — 99214 PR OFFICE/OUTPT VISIT, EST, LEVL IV, 30-39 MIN: ICD-10-PCS | Mod: S$GLB,,, | Performed by: NURSE PRACTITIONER

## 2019-03-22 PROCEDURE — 99999 PR PBB SHADOW E&M-EST. PATIENT-LVL IV: ICD-10-PCS | Mod: PBBFAC,,, | Performed by: NURSE PRACTITIONER

## 2019-03-22 PROCEDURE — 99214 OFFICE O/P EST MOD 30 MIN: CPT | Mod: S$GLB,,, | Performed by: NURSE PRACTITIONER

## 2019-03-22 RX ORDER — INSULIN GLARGINE 300 [IU]/ML
23 INJECTION, SOLUTION SUBCUTANEOUS NIGHTLY
Qty: 6 ML | Refills: 11 | Status: SHIPPED | OUTPATIENT
Start: 2019-03-22 | End: 2019-06-28

## 2019-03-22 NOTE — PROGRESS NOTES
"CC: Mr. Hammad Douglas III arrives today for management of Type 1  DM and review of chronic medical conditions, as listed in the visit diagnosis section of this encounter.     HPI: Mr. Hammad Douglas III was diagnosed with Type 1  DM at age 19 after a viral illness - had polyuria, polydipsia at this time.  Initial treatment consisted of insulin. Denies FH of DM. Reports past hospitalizations due to DKA > 30 years ago. Not recently.   Previously seen by KENNEDI Mckeon NP in the [facility] program on the [place].   Began using Dexcom G5 in .  Has a tendency of hypoglycemia. + hypo unawareness.     Patient was last seen by me in December. Lantus dose was decreased, due to hypoglycemia.      He reports that his last shipment of Dexcom supplies ran short. He was only sent 1 transmitter when Dexcom had told him they sent 2. He has been off of Dexcom since February. He has been in communication with [employer] and they have shipped him his supplies. He is expecting these to be delivered in the next couple of days.     BG readings are checked 3x/day (currently w fingersticks while off of Dexcom). No logs or meter to clinic. Reports the following:  Fastin-180  Lunch: 150-200  Dinner: 140    Hypoglycemia: Rare   Hypoglycemic Symptoms: "feels slow"  Hypoglycemia Treatment: Juice or glucose tabs. Has glucagon.     Missing Insulin/PO medication doses: No  Timing prandial insulin 5-15 minutes before meals: yes    Exercise: Walks a few miles daily at work. Works at [employer] in [place].     Dietary Habits: Eats 3 meals/day. Rare snacking. Avoids sugary beverages.     Last DM education appointment: 2017    He was ill with the flu for 2 weeks and received 2 steroid shots during this time.     He has also been caring for his father, who was hospitalized for heart disease.       CURRENT DIABETIC MEDS:  Lantus 23 units QHS, Humalog CHO ratio 1:8, correction target 180, ISF 50 (average dose 8-10 units)  Vial or " "pen: pen  Glucometer type: One Touch Ultra    Last Eye Exam: 9/2018, + proliferative DR. + R cataract  Last Podiatry Exam: 3/2019    REVIEW OF SYSTEMS  Constitutional: no c/o weakness, fatigue, weight loss.   Eyes: reports visual disturbances that he attributes to cataract   Cardiac: no palpitations or chest pain.  Respiratory: no cough or dyspnea.   GI:  no c/o abdominal pain or nausea.   Skin: no rashes. Recent toenail avulsion. He stopped wearing boot 2 weeks ago.   Neuro: no numbness, tingling, or parasthesias.  Endocrine: denies polyphagia, polydipsia, polyuria    Personally reviewed Past Medical, Surgical, Social History.    Vital Signs  /64   Pulse 80   Ht 6' 2" (1.88 m)   Wt 101.2 kg (223 lb 1.7 oz)   BMI 28.65 kg/m²     Personally reviewed the below labs:    Hemoglobin A1C   Date Value Ref Range Status   03/14/2019 7.8 (H) 4.0 - 5.6 % Final     Comment:     ADA Screening Guidelines:  5.7-6.4%  Consistent with prediabetes  >or=6.5%  Consistent with diabetes  High levels of fetal hemoglobin interfere with the HbA1C  assay. Heterozygous hemoglobin variants (HbS, HgC, etc)do  not significantly interfere with this assay.   However, presence of multiple variants may affect accuracy.     12/13/2018 7.1 (H) 4.0 - 5.6 % Final     Comment:     ADA Screening Guidelines:  5.7-6.4%  Consistent with prediabetes  >or=6.5%  Consistent with diabetes  High levels of fetal hemoglobin interfere with the HbA1C  assay. Heterozygous hemoglobin variants (HbS, HgC, etc)do  not significantly interfere with this assay.   However, presence of multiple variants may affect accuracy.     08/06/2018 7.4 (H) 4.0 - 5.6 % Final     Comment:     ADA Screening Guidelines:  5.7-6.4%  Consistent with prediabetes  >or=6.5%  Consistent with diabetes  High levels of fetal hemoglobin interfere with the HbA1C  assay. Heterozygous hemoglobin variants (HbS, HgC, etc)do  not significantly interfere with this assay.   However, presence of " multiple variants may affect accuracy.         Chemistry        Component Value Date/Time     03/14/2019 0744    K 4.4 03/14/2019 0744     03/14/2019 0744    CO2 28 03/14/2019 0744    BUN 25 (H) 03/14/2019 0744    CREATININE 1.2 03/14/2019 0744     (H) 03/14/2019 0744        Component Value Date/Time    CALCIUM 9.5 03/14/2019 0744    ALKPHOS 84 12/13/2018 0837    AST 21 12/13/2018 0837    ALT 25 12/13/2018 0837    BILITOT 0.5 12/13/2018 0837    ESTGFRAFRICA >60.0 03/14/2019 0744    EGFRNONAA >60.0 03/14/2019 0744          Lab Results   Component Value Date    CHOL 172 08/06/2018    CHOL 172 08/17/2017    CHOL 170 01/20/2017     Lab Results   Component Value Date    HDL 48 08/06/2018    HDL 44 08/17/2017    HDL 42 01/20/2017     Lab Results   Component Value Date    LDLCALC 110.6 08/06/2018    LDLCALC 117.0 08/17/2017    LDLCALC 110.2 01/20/2017     Lab Results   Component Value Date    TRIG 67 08/06/2018    TRIG 55 08/17/2017    TRIG 89 01/20/2017     Lab Results   Component Value Date    CHOLHDL 27.9 08/06/2018    CHOLHDL 25.6 08/17/2017    CHOLHDL 24.7 01/20/2017       Lab Results   Component Value Date    MICALBCREAT 126.7 (H) 08/06/2018     Lab Results   Component Value Date    TSH 2.034 03/02/2018       CrCl cannot be calculated (Patient's most recent lab result is older than the maximum 7 days allowed.).    Vit D, 25-Hydroxy   Date Value Ref Range Status   08/06/2018 35 30 - 96 ng/mL Final     Comment:     Vitamin D deficiency.........<10 ng/mL                              Vitamin D insufficiency......10-29 ng/mL       Vitamin D sufficiency........> or equal to 30 ng/mL  Vitamin D toxicity............>100 ng/mL       PHYSICAL EXAMINATION  Constitutional: Appears well, no distress  Neck: Supple, trachea midline; no thyromegaly or nodules.   Respiratory: CTA, even and unlabored.   Cardiovascular: RRR, no murmurs, no carotid bruits. DP pulses  2+ bilaterally; no edema.    Lymph: no cervical or  supraclavicular lymphadenopathy  Skin: warm and dry; no lipohypertrophy, or acanthosis nigracans observed.   Neuro: DTR 2+ BUE/2+BLE.  Feet: appropriate footwear.      Dexcom download: Not currently in use.   Average glucose: n/a  Above goal: n/a  Within goal: n/a  Below goal: n/a      A1c target < 7.5%, due to hypoglycemia unawareness      Assessment/Plan  1. Type 1 diabetes mellitus with diabetic autonomic neuropathy  -- Uncontrolled with elevated A1c. Recent fasting glucose 136 mg/dL on labs. + hypoglycemia unawareness.   -- I am unable to make dose adjustments without glucoses to review.   -- change Lantus to Toujeo Max 23 units QHS.  -- Continue Humalog I:C ratio 1:8. Change correction scale to target 150, ISF 50.   -- resume Dexcom and check BG 2x/day for Dexcom calibrations. 4x/day when relying on fingersticks.  -- notify me after 2 weeks of Dexcom use so we can pull report for review.   -- not interested in insulin pump.     -- Discussed diagnosis of DM, A1c goals, progression of disease, long term complications and tx options.    -- Reviewed hypoglycemia management: treat with 1/2 glass of juice, 1/2 can regular coke, or 4 glucose tablets. Monitor and repeat treatment every 15 minutes until BG is >70 Then have a snack, which includes a complex carbohydrate and protein.  Advised patient to check BG before activities, such as driving or exercise.   2. Type 1 diabetes mellitus with diabetic nephropathy  -- continue losartan   3. Proliferative diabetic retinopathy without macular edema associated with type 1 diabetes mellitus, unspecified laterality  -- stable  -- keep follow ups   4. Essential hypertension  -- stable   -- continue current meds   5. Hyperlipidemia, unspecified hyperlipidemia type  -- stable  -- continue atorvastatin  -- [facility]  Lab Results   Component Value Date    LDLCALC 110.6 08/06/2018      6. Hypoglycemia unawareness in Type 1 DM -- resume use of Dexcom.   -- has  glucagon     FOLLOW UP   Follow-up in about 3 months (around 6/22/2019).   Patient instructed to bring BG logs to each follow up   Patient encouraged to call for any BG/medication issues, concerns, or questions.     Orders Placed This Encounter   Procedures    Comprehensive metabolic panel    Hemoglobin A1c    Microalbumin/creatinine urine ratio    TSH    Lipid panel

## 2019-04-22 ENCOUNTER — PATIENT MESSAGE (OUTPATIENT)
Dept: PODIATRY | Facility: CLINIC | Age: 64
End: 2019-04-22

## 2019-04-23 ENCOUNTER — PATIENT MESSAGE (OUTPATIENT)
Dept: PODIATRY | Facility: CLINIC | Age: 64
End: 2019-04-23

## 2019-04-23 DIAGNOSIS — E10.621 DIABETIC ULCER OF TOE OF LEFT FOOT ASSOCIATED WITH TYPE 1 DIABETES MELLITUS, LIMITED TO BREAKDOWN OF SKIN: Primary | ICD-10-CM

## 2019-04-23 DIAGNOSIS — L97.521 DIABETIC ULCER OF TOE OF LEFT FOOT ASSOCIATED WITH TYPE 1 DIABETES MELLITUS, LIMITED TO BREAKDOWN OF SKIN: Primary | ICD-10-CM

## 2019-04-23 RX ORDER — MUPIROCIN 20 MG/G
OINTMENT TOPICAL 3 TIMES DAILY
Qty: 1 TUBE | Refills: 1 | Status: SHIPPED | OUTPATIENT
Start: 2019-04-23 | End: 2019-08-23

## 2019-06-24 ENCOUNTER — PATIENT MESSAGE (OUTPATIENT)
Dept: ENDOCRINOLOGY | Facility: CLINIC | Age: 64
End: 2019-06-24

## 2019-06-26 ENCOUNTER — LAB VISIT (OUTPATIENT)
Dept: LAB | Facility: HOSPITAL | Age: 64
End: 2019-06-26
Attending: NURSE PRACTITIONER
Payer: COMMERCIAL

## 2019-06-26 DIAGNOSIS — E10.43 TYPE 1 DIABETES MELLITUS WITH DIABETIC AUTONOMIC NEUROPATHY: ICD-10-CM

## 2019-06-26 LAB
ALBUMIN SERPL BCP-MCNC: 3.7 G/DL (ref 3.5–5.2)
ALP SERPL-CCNC: 85 U/L (ref 55–135)
ALT SERPL W/O P-5'-P-CCNC: 24 U/L (ref 10–44)
ANION GAP SERPL CALC-SCNC: 8 MMOL/L (ref 8–16)
AST SERPL-CCNC: 22 U/L (ref 10–40)
BILIRUB SERPL-MCNC: 0.5 MG/DL (ref 0.1–1)
BUN SERPL-MCNC: 24 MG/DL (ref 8–23)
CALCIUM SERPL-MCNC: 9.9 MG/DL (ref 8.7–10.5)
CHLORIDE SERPL-SCNC: 103 MMOL/L (ref 95–110)
CHOLEST SERPL-MCNC: 163 MG/DL (ref 120–199)
CHOLEST/HDLC SERPL: 3.2 {RATIO} (ref 2–5)
CO2 SERPL-SCNC: 28 MMOL/L (ref 23–29)
CREAT SERPL-MCNC: 1.4 MG/DL (ref 0.5–1.4)
EST. GFR  (AFRICAN AMERICAN): >60 ML/MIN/1.73 M^2
EST. GFR  (NON AFRICAN AMERICAN): 53.5 ML/MIN/1.73 M^2
ESTIMATED AVG GLUCOSE: 189 MG/DL (ref 68–131)
GLUCOSE SERPL-MCNC: 105 MG/DL (ref 70–110)
HBA1C MFR BLD HPLC: 8.2 % (ref 4–5.6)
HDLC SERPL-MCNC: 51 MG/DL (ref 40–75)
HDLC SERPL: 31.3 % (ref 20–50)
LDLC SERPL CALC-MCNC: 99.6 MG/DL (ref 63–159)
NONHDLC SERPL-MCNC: 112 MG/DL
POTASSIUM SERPL-SCNC: 4.7 MMOL/L (ref 3.5–5.1)
PROT SERPL-MCNC: 7 G/DL (ref 6–8.4)
SODIUM SERPL-SCNC: 139 MMOL/L (ref 136–145)
TRIGL SERPL-MCNC: 62 MG/DL (ref 30–150)
TSH SERPL DL<=0.005 MIU/L-ACNC: 2.07 UIU/ML (ref 0.4–4)

## 2019-06-26 PROCEDURE — 84443 ASSAY THYROID STIM HORMONE: CPT

## 2019-06-26 PROCEDURE — 36415 COLL VENOUS BLD VENIPUNCTURE: CPT | Mod: PN

## 2019-06-26 PROCEDURE — 83036 HEMOGLOBIN GLYCOSYLATED A1C: CPT

## 2019-06-26 PROCEDURE — 80053 COMPREHEN METABOLIC PANEL: CPT

## 2019-06-26 PROCEDURE — 80061 LIPID PANEL: CPT

## 2019-06-27 ENCOUNTER — PATIENT MESSAGE (OUTPATIENT)
Dept: ENDOCRINOLOGY | Facility: CLINIC | Age: 64
End: 2019-06-27

## 2019-06-28 ENCOUNTER — OFFICE VISIT (OUTPATIENT)
Dept: ENDOCRINOLOGY | Facility: CLINIC | Age: 64
End: 2019-06-28
Payer: COMMERCIAL

## 2019-06-28 VITALS
WEIGHT: 225.88 LBS | HEART RATE: 77 BPM | BODY MASS INDEX: 28.99 KG/M2 | HEIGHT: 74 IN | DIASTOLIC BLOOD PRESSURE: 60 MMHG | SYSTOLIC BLOOD PRESSURE: 108 MMHG

## 2019-06-28 DIAGNOSIS — E78.5 HYPERLIPIDEMIA, UNSPECIFIED HYPERLIPIDEMIA TYPE: ICD-10-CM

## 2019-06-28 DIAGNOSIS — E10.649 HYPOGLYCEMIA UNAWARENESS IN TYPE 1 DIABETES MELLITUS: ICD-10-CM

## 2019-06-28 DIAGNOSIS — E10.21 TYPE 1 DIABETES MELLITUS WITH DIABETIC NEPHROPATHY: ICD-10-CM

## 2019-06-28 DIAGNOSIS — E10.43 TYPE 1 DIABETES MELLITUS WITH DIABETIC AUTONOMIC NEUROPATHY: Primary | ICD-10-CM

## 2019-06-28 DIAGNOSIS — I10 ESSENTIAL HYPERTENSION: ICD-10-CM

## 2019-06-28 PROCEDURE — 99999 PR PBB SHADOW E&M-EST. PATIENT-LVL IV: ICD-10-PCS | Mod: PBBFAC,,, | Performed by: NURSE PRACTITIONER

## 2019-06-28 PROCEDURE — 95251 CONT GLUC MNTR ANALYSIS I&R: CPT | Mod: S$GLB,,, | Performed by: NURSE PRACTITIONER

## 2019-06-28 PROCEDURE — 99214 OFFICE O/P EST MOD 30 MIN: CPT | Mod: S$GLB,,, | Performed by: NURSE PRACTITIONER

## 2019-06-28 PROCEDURE — 99214 PR OFFICE/OUTPT VISIT, EST, LEVL IV, 30-39 MIN: ICD-10-PCS | Mod: S$GLB,,, | Performed by: NURSE PRACTITIONER

## 2019-06-28 PROCEDURE — 99999 PR PBB SHADOW E&M-EST. PATIENT-LVL IV: CPT | Mod: PBBFAC,,, | Performed by: NURSE PRACTITIONER

## 2019-06-28 PROCEDURE — 95251 PR GLUCOSE MONITOR, 72 HOUR, PHYS INTERP: ICD-10-PCS | Mod: S$GLB,,, | Performed by: NURSE PRACTITIONER

## 2019-06-28 RX ORDER — INSULIN LISPRO 100 [IU]/ML
INJECTION, SOLUTION INTRAVENOUS; SUBCUTANEOUS
Qty: 3 BOX | Refills: 3
Start: 2019-06-28 | End: 2019-10-25

## 2019-06-28 RX ORDER — INSULIN GLARGINE 300 [IU]/ML
22 INJECTION, SOLUTION SUBCUTANEOUS NIGHTLY
Qty: 6 ML | Refills: 11
Start: 2019-06-28 | End: 2019-10-25

## 2019-06-28 NOTE — PROGRESS NOTES
"CC: Mr. Hammad Douglas III arrives today for management of Type 1  DM and review of chronic medical conditions, as listed in the visit diagnosis section of this encounter.     HPI: Mr. Hammad Douglas III was diagnosed with Type 1  DM at age 19 after a viral illness - had polyuria, polydipsia at this time.  Initial treatment consisted of insulin. Denies FH of DM. Reports past hospitalizations due to DKA > 30 years ago. Not recently.   Has diagnosis of hypoglycemia unawareness with past severe hypoglycemic episodes requiring 3rd party assistance. Began using Dexcom G5 in 2017.      Patient was last seen by me in March. Lantus was changed to Toujeo Max at this time.    He reports that he is more tired since starting Toujeo Max. However, he is preparing to run for [employer] and is staying up later than usual at night. Ironically enough, his daughter is getting  the night before the election.     BG readings are checked 1x/day (has Dexcom).    Hypoglycemia: Occasionally    Hypoglycemic Symptoms: "feels slow"  Hypoglycemia Treatment: Juice or glucose tabs. Has glucagon.     Missing Insulin/PO medication doses: No  Timing prandial insulin 5-15 minutes before meals: yes    Exercise: active as  at [employer] in [place].     Dietary Habits: Eats 2-3 meals/day. Sometimes skips breakfast on weekdays. Rare snacking. Avoids sugary beverages.     Last DM education appointment: October 2017      CURRENT DIABETIC MEDS:  Toujeo Max 24 units QHS, Humalog CHO ratio 1:8, correction target 180, ISF 50 (average dose 8-10 units)  Vial or pen: pen  Glucometer type: One Touch Ultra    Previous insulins:  Lantus    Last Eye Exam: 9/2018, + proliferative DR. + R cataract. Due back in September  Last Podiatry Exam: 3/2019    REVIEW OF SYSTEMS  Constitutional: no c/o weakness, weight loss. + fatigue  Eyes: reports visual disturbances that he attributes to cataract   Cardiac: no palpitations or chest " "pain.  Respiratory: no cough or dyspnea.   GI:  no c/o abdominal pain or nausea.   Skin: no rashes, lesions  Neuro: no numbness, tingling, or parasthesias.  Endocrine: denies polyphagia, polydipsia, polyuria    Personally reviewed Past Medical, Surgical, Social History.    Vital Signs  /60   Pulse 77   Ht 6' 2" (1.88 m)   Wt 102.4 kg (225 lb 13.8 oz)   BMI 29.00 kg/m²     Personally reviewed the below labs:    Hemoglobin A1C   Date Value Ref Range Status   06/26/2019 8.2 (H) 4.0 - 5.6 % Final     Comment:     ADA Screening Guidelines:  5.7-6.4%  Consistent with prediabetes  >or=6.5%  Consistent with diabetes  High levels of fetal hemoglobin interfere with the HbA1C  assay. Heterozygous hemoglobin variants (HbS, HgC, etc)do  not significantly interfere with this assay.   However, presence of multiple variants may affect accuracy.     03/14/2019 7.8 (H) 4.0 - 5.6 % Final     Comment:     ADA Screening Guidelines:  5.7-6.4%  Consistent with prediabetes  >or=6.5%  Consistent with diabetes  High levels of fetal hemoglobin interfere with the HbA1C  assay. Heterozygous hemoglobin variants (HbS, HgC, etc)do  not significantly interfere with this assay.   However, presence of multiple variants may affect accuracy.     12/13/2018 7.1 (H) 4.0 - 5.6 % Final     Comment:     ADA Screening Guidelines:  5.7-6.4%  Consistent with prediabetes  >or=6.5%  Consistent with diabetes  High levels of fetal hemoglobin interfere with the HbA1C  assay. Heterozygous hemoglobin variants (HbS, HgC, etc)do  not significantly interfere with this assay.   However, presence of multiple variants may affect accuracy.         Chemistry        Component Value Date/Time     06/26/2019 0732    K 4.7 06/26/2019 0732     06/26/2019 0732    CO2 28 06/26/2019 0732    BUN 24 (H) 06/26/2019 0732    CREATININE 1.4 06/26/2019 0732     06/26/2019 0732        Component Value Date/Time    CALCIUM 9.9 06/26/2019 0732    ALKPHOS 85 " 06/26/2019 0732    AST 22 06/26/2019 0732    ALT 24 06/26/2019 0732    BILITOT 0.5 06/26/2019 0732    ESTGFRAFRICA >60.0 06/26/2019 0732    EGFRNONAA 53.5 (A) 06/26/2019 0732          Lab Results   Component Value Date    CHOL 163 06/26/2019    CHOL 172 08/06/2018    CHOL 172 08/17/2017     Lab Results   Component Value Date    HDL 51 06/26/2019    HDL 48 08/06/2018    HDL 44 08/17/2017     Lab Results   Component Value Date    LDLCALC 99.6 06/26/2019    LDLCALC 110.6 08/06/2018    LDLCALC 117.0 08/17/2017     Lab Results   Component Value Date    TRIG 62 06/26/2019    TRIG 67 08/06/2018    TRIG 55 08/17/2017     Lab Results   Component Value Date    CHOLHDL 31.3 06/26/2019    CHOLHDL 27.9 08/06/2018    CHOLHDL 25.6 08/17/2017       Lab Results   Component Value Date    MICALBCREAT 170.2 (H) 06/26/2019     Lab Results   Component Value Date    TSH 2.070 06/26/2019       CrCl cannot be calculated (Unknown ideal weight.).    Vit D, 25-Hydroxy   Date Value Ref Range Status   08/06/2018 35 30 - 96 ng/mL Final     Comment:     Vitamin D deficiency.........<10 ng/mL                              Vitamin D insufficiency......10-29 ng/mL       Vitamin D sufficiency........> or equal to 30 ng/mL  Vitamin D toxicity............>100 ng/mL       PHYSICAL EXAMINATION  Constitutional: Appears well, no distress  Neck: Supple, trachea midline; no thyromegaly or nodules.   Respiratory: CTA, even and unlabored.   Cardiovascular: RRR, no murmurs, no carotid bruits. DP pulses  2+ bilaterally; no edema.    Lymph: no cervical or supraclavicular lymphadenopathy  Skin: warm and dry; no lipohypertrophy, or acanthosis nigracans observed.   Neuro: DTR 1+ BUE/2+BLE.  Feet: appropriate footwear.      Dexcom download: See media file for details. Hypoglycemia occurring overnight and occasionally in between meals. Prandial excursions noted. Fasting glucoses during this week are mostly within normal range (however, this does include hypoglycemia),  whereas, these were elevated the week prior.   Average glucose: 172 mg/dL  Above goal: 50%  Within goal: 45%  Below goal: 5%      A1c target < 7.5%, due to hypoglycemia unawareness      Assessment/Plan  1. Type 1 diabetes mellitus with diabetic autonomic neuropathy  -- Uncontrolled with hypoglycemia and prandial excursions.  -- Advised pt to calibrate Dexcom twice daily when glucoses are stable.  -- decrease Toujeo Max to 22 units QHS. If hypo continues, decrease to 20 units.  -- change I:C ratio to 1:7. Continue correction scale target 150, ISF 50.   -- not interested in insulin pump.     -- Discussed diagnosis of DM, A1c goals, progression of disease, long term complications and tx options.    -- Reviewed hypoglycemia management: treat with 1/2 glass of juice, 1/2 can regular coke, or 4 glucose tablets. Monitor and repeat treatment every 15 minutes until BG is >70 Then have a snack, which includes a complex carbohydrate and protein.  Advised patient to check BG before activities, such as driving or exercise.   2. Type 1 diabetes mellitus with diabetic nephropathy  -- uncontrolled. Optimize DM control  -- continue losartan   3. Proliferative diabetic retinopathy without macular edema associated with type 1 diabetes mellitus, unspecified laterality  -- stable  -- keep follow ups   4. Essential hypertension  -- stable   -- continue current meds   5. Hyperlipidemia, unspecified hyperlipidemia type  -- stable  -- controlled  -- continue atorvastatin   6. Hypoglycemia unawareness in Type 1 DM -- continue use of Dexcom.   -- has glucagon     FOLLOW UP   Follow up in about 3 months (around 9/28/2019).   Patient instructed to bring BG logs to each follow up   Patient encouraged to call for any BG/medication issues, concerns, or questions.     Orders Placed This Encounter   Procedures    Hemoglobin A1c    Basic metabolic panel

## 2019-08-13 RX ORDER — FLUOXETINE HYDROCHLORIDE 20 MG/1
CAPSULE ORAL
Qty: 90 CAPSULE | Refills: 3 | Status: SHIPPED | OUTPATIENT
Start: 2019-08-13 | End: 2019-09-05 | Stop reason: SDUPTHER

## 2019-08-22 ENCOUNTER — PATIENT MESSAGE (OUTPATIENT)
Dept: PODIATRY | Facility: CLINIC | Age: 64
End: 2019-08-22

## 2019-08-23 ENCOUNTER — TELEPHONE (OUTPATIENT)
Dept: PODIATRY | Facility: CLINIC | Age: 64
End: 2019-08-23

## 2019-08-23 PROBLEM — T14.8XXA BLISTER: Status: ACTIVE | Noted: 2019-08-23

## 2019-08-23 NOTE — TELEPHONE ENCOUNTER
----- Message from Liz Baldwin RT sent at 8/23/2019  2:19 PM CDT -----  Contact: Pt    Pt  would like a call back as soon as possible to know if he can be worked in for his injured great toe, Rt. Thanks.

## 2019-08-23 NOTE — TELEPHONE ENCOUNTER
----- Message from Kalani Baeza sent at 8/23/2019 12:13 PM CDT -----  Contact: self  Type:  Same Day Appointment Request    Caller is requesting a same day appointment.  Caller declined first available appointment listed below.      Name of Caller:  Hammad Douglas   When is the first available appointment?  09/28  Symptoms:  Skin under big toe is cut /flapping   Best Call Back Number:  [phone]  Additional Information:

## 2019-08-24 ENCOUNTER — PATIENT MESSAGE (OUTPATIENT)
Dept: PODIATRY | Facility: CLINIC | Age: 64
End: 2019-08-24

## 2019-08-26 DIAGNOSIS — E78.5 HYPERLIPIDEMIA, UNSPECIFIED HYPERLIPIDEMIA TYPE: ICD-10-CM

## 2019-08-26 RX ORDER — ATORVASTATIN CALCIUM 40 MG/1
TABLET, FILM COATED ORAL
Qty: 90 TABLET | Refills: 4 | Status: SHIPPED | OUTPATIENT
Start: 2019-08-26 | End: 2020-09-16

## 2019-08-27 ENCOUNTER — OFFICE VISIT (OUTPATIENT)
Dept: PODIATRY | Facility: CLINIC | Age: 64
End: 2019-08-27
Payer: COMMERCIAL

## 2019-08-27 VITALS
HEIGHT: 74 IN | DIASTOLIC BLOOD PRESSURE: 98 MMHG | SYSTOLIC BLOOD PRESSURE: 124 MMHG | WEIGHT: 225.75 LBS | BODY MASS INDEX: 28.97 KG/M2 | HEART RATE: 83 BPM

## 2019-08-27 DIAGNOSIS — E10.621 DIABETIC ULCER OF TOE OF RIGHT FOOT ASSOCIATED WITH TYPE 1 DIABETES MELLITUS, LIMITED TO BREAKDOWN OF SKIN: Primary | ICD-10-CM

## 2019-08-27 DIAGNOSIS — L97.511 DIABETIC ULCER OF TOE OF RIGHT FOOT ASSOCIATED WITH TYPE 1 DIABETES MELLITUS, LIMITED TO BREAKDOWN OF SKIN: Primary | ICD-10-CM

## 2019-08-27 DIAGNOSIS — E10.43 TYPE 1 DIABETES MELLITUS WITH DIABETIC AUTONOMIC NEUROPATHY: ICD-10-CM

## 2019-08-27 PROCEDURE — 97597 WOUND DEBRIDEMENT: ICD-10-PCS | Mod: S$GLB,,, | Performed by: PODIATRIST

## 2019-08-27 PROCEDURE — 99213 PR OFFICE/OUTPT VISIT, EST, LEVL III, 20-29 MIN: ICD-10-PCS | Mod: 25,S$GLB,, | Performed by: PODIATRIST

## 2019-08-27 PROCEDURE — 99213 OFFICE O/P EST LOW 20 MIN: CPT | Mod: 25,S$GLB,, | Performed by: PODIATRIST

## 2019-08-27 PROCEDURE — 87075 CULTR BACTERIA EXCEPT BLOOD: CPT

## 2019-08-27 PROCEDURE — 99999 PR PBB SHADOW E&M-EST. PATIENT-LVL III: CPT | Mod: PBBFAC,,, | Performed by: PODIATRIST

## 2019-08-27 PROCEDURE — 97597 DBRDMT OPN WND 1ST 20 CM/<: CPT | Mod: S$GLB,,, | Performed by: PODIATRIST

## 2019-08-27 PROCEDURE — 99999 PR PBB SHADOW E&M-EST. PATIENT-LVL III: ICD-10-PCS | Mod: PBBFAC,,, | Performed by: PODIATRIST

## 2019-08-27 PROCEDURE — 87070 CULTURE OTHR SPECIMN AEROBIC: CPT

## 2019-08-28 ENCOUNTER — HOSPITAL ENCOUNTER (OUTPATIENT)
Dept: RADIOLOGY | Facility: HOSPITAL | Age: 64
Discharge: HOME | End: 2019-08-28
Attending: PODIATRIST
Payer: COMMERCIAL

## 2019-08-28 DIAGNOSIS — E10.621 DIABETIC ULCER OF TOE OF RIGHT FOOT ASSOCIATED WITH TYPE 1 DIABETES MELLITUS, LIMITED TO BREAKDOWN OF SKIN: ICD-10-CM

## 2019-08-28 DIAGNOSIS — L97.511 DIABETIC ULCER OF TOE OF RIGHT FOOT ASSOCIATED WITH TYPE 1 DIABETES MELLITUS, LIMITED TO BREAKDOWN OF SKIN: ICD-10-CM

## 2019-08-28 PROCEDURE — 93922 UPR/L XTREMITY ART 2 LEVELS: CPT | Mod: TC,PO

## 2019-08-28 PROCEDURE — 93922 UPR/L XTREMITY ART 2 LEVELS: CPT | Mod: 26,,, | Performed by: RADIOLOGY

## 2019-08-28 PROCEDURE — 93922 US ARTERIAL LOWER EXTREMITY BILAT WITH ABI (XPD): ICD-10-PCS | Mod: 26,,, | Performed by: RADIOLOGY

## 2019-08-28 PROCEDURE — 93925 LOWER EXTREMITY STUDY: CPT | Mod: 26,,, | Performed by: RADIOLOGY

## 2019-08-28 PROCEDURE — 93925 US ARTERIAL LOWER EXTREMITY BILAT WITH ABI (XPD): ICD-10-PCS | Mod: 26,,, | Performed by: RADIOLOGY

## 2019-08-28 NOTE — PROCEDURES
"Wound Debridement  Date/Time: 8/27/2019 5:12 AM  Performed by: Caleb Duffy DPM  Authorized by: Caleb Duffy DPM     Time out: Immediately prior to procedure a "time out" was called to verify the correct patient, procedure, equipment, support staff and site/side marked as required.    Consent Done?:  Yes (Written)    Preparation: Patient was prepped and draped in usual sterile fashion    Local anesthesia used?: No      Wound Details:    Location:  Right foot    Location:  Right 1st Toe    Type of Debridement:  Excisional       Length (cm):  3       Area (sq cm):  10.5       Width (cm):  3.5       Percent Debrided (%):  100       Depth (cm):  0.1       Total Area Debrided (sq cm):  10.5    Depth of debridement:  Epidermis/Dermis    Tissue debrided:  Dermis    Devitalized tissue debrided:  Necrotic/Eschar    Instruments:  Blade    Bleeding:  Minimal  Hemostasis Achieved: Yes    Method Used:  Pressure  Patient tolerance:  Patient tolerated the procedure well with no immediate complications      "

## 2019-08-28 NOTE — PROGRESS NOTES
Subjective:      Patient ID: Hammad Douglas III is a 64 y.o. male.    Chief Complaint: Diabetes Mellitus and Wound Care (rt great toe)  Patient presents to clinic out of concern regarding a new wound of the Rt. Great toe.  Relates noticing said wound late last week after stepping out of the shower.  States it appeared as though a blister with rupturing of the skin he suspects while bathing.  Denies experiencing trauma nor feeling pain from the digit, however, he attributes this to his neuropathy.  Denies noticing drainage from the site nor localized sign of infection.  Notes being concerned and presented to the ED on Friday where he was placed on Mupirocin ointment TID.  States he has been faithful in applying the medication and a bandage as instructed.  Notes continued use of casual shoe gear since the wound developed.  Denies having N/V/F/C/D.  Denies any additional pedal complaints.  Past Medical History:   Diagnosis Date    Cataract     Chronic kidney disease     Diabetes mellitus type I     Diagnosed at age 19    Diabetic retinopathy     See's Dr. Ledesma    Hyperlipidemia     Hypertension        Past Surgical History:   Procedure Laterality Date    COLONOSCOPY      2011 wnl       Family History   Problem Relation Age of Onset    Cataracts Mother        Social History     Socioeconomic History    Marital status:      Spouse name: Not on file    Number of children: Not on file    Years of education: Not on file    Highest education level: Not on file   Occupational History    Not on file   Social Needs    Financial resource strain: Not on file    Food insecurity:     Worry: Not on file     Inability: Not on file    Transportation needs:     Medical: Not on file     Non-medical: Not on file   Tobacco Use    Smoking status: Current Some Day Smoker     Types: Cigars    Smokeless tobacco: Never Used    Tobacco comment: 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   Substance and Sexual Activity    Alcohol use: Yes     Alcohol/week: 1.2 oz     Types: 2 Cans of beer per week    Drug use: No    Sexual activity: Yes     Partners: Female   Lifestyle    Physical activity:     Days per week: Not on file     Minutes per session: Not on file    Stress: Not on file   Relationships    Social connections:     Talks on phone: Not on file     Gets together: Not on file     Attends Denominational service: Not on file     Active member of club or organization: Not on file     Attends meetings of clubs or organizations: Not on file     Relationship status: Not on file   Other Topics Concern    Not on file   Social History Narrative    Not on file       Current Outpatient Medications   Medication Sig Dispense Refill    atorvastatin (LIPITOR) 40 MG tablet TAKE 1 TABLET(40 MG) BY MOUTH EVERY DAY 90 tablet 4    blood sugar diagnostic (ONETOUCH ULTRA TEST) Strp 1 strip by Other route 4 (four) times daily. 400 strip 3    FLUoxetine 20 MG capsule TAKE 1 CAPSULE(20 MG) BY MOUTH EVERY DAY 90 capsule 3    glucagon (human recombinant) inj 1mg/mL kit Inject 1 mL (1 mg total) into the muscle as needed. 1 kit 6    insulin glargine U-300 conc (TOUJEO MAX U-300 SOLOSTAR) 300 unit/mL (3 mL) InPn Inject 22 Units into the skin every evening. 6 mL 11    insulin lispro (HUMALOG KWIKPEN INSULIN) 100 unit/mL pen INJECT USING INSULIN:CARB RATIO OF 1:7 SUBCUTANEOUSLY PRIOR TO MEALS. MAX TDD 40 UNITS 3 Box 3    losartan (COZAAR) 100 MG tablet Take 1 tablet (100 mg total) by mouth once daily. 90 tablet 3    mupirocin (BACTROBAN) 2 % ointment Apply topically 3 (three) times daily. Lt great toe 2 g 0    pen needle, diabetic (PEN NEEDLE) 31 gauge x 1/4" Ndle 1 each by Misc.(Non-Drug; Combo Route) route 4 (four) times daily. 400 each 3     Current Facility-Administered Medications   Medication Dose Route Frequency Provider Last Rate Last Dose    glucagon (human recombinant) injection 1 mg  1 mg " Intramuscular Once Tania Mckeon NP           Review of patient's allergies indicates:   Allergen Reactions    Altace [ramipril]      Cough         Review of Systems   Constitution: Negative for chills and fever.   Cardiovascular: Negative for claudication and leg swelling.   Skin: Positive for poor wound healing. Negative for color change and dry skin.   Musculoskeletal: Negative for joint pain, joint swelling, muscle cramps and muscle weakness.   Neurological: Positive for numbness.   Psychiatric/Behavioral: Negative for altered mental status.           Objective:      Physical Exam   Constitutional: He appears well-developed and well-nourished. No distress.   Cardiovascular:   Pulses:       Dorsalis pedis pulses are 2+ on the right side, and 2+ on the left side.        Posterior tibial pulses are 1+ on the right side, and 1+ on the left side.   CFT is < 3 seconds bilateral.  Pedal hair growth is present bilateral.  Varicosities noted bilateral.  No lower extremity edema noted bilateral.  Toes are warm to touch bilateral.     Musculoskeletal: He exhibits no edema or tenderness.   Muscle strength 5/5 in all muscle groups bilateral.  No tenderness nor crepitation with ROM of foot/ankle joints bilateral.  No tenderness with palpation of bilateral foot and ankle.     Neurological: He has normal strength. A sensory deficit is present.   Protective sensation per [place]-Jayson monofilament is decreased bilateral.  Light touch is intact bilateral.   Skin: Skin is warm and dry. Capillary refill takes less than 2 seconds. Lesion noted. No abrasion, no burn, no ecchymosis, no laceration and no petechiae noted. He is not diaphoretic.   Location: Open wound noted to the Rt. Medial hallux.  Base: Down to dermis and comprised of eschar.  Drainage: None  Skylar wound: Devoid of erythema, edema, fluctuance, purulence, and malodor.   Pre-debridement measurement: 3 x 3.5 x 0.1cm  Post-debridement measurement: 3.2 x 3.7 x  0.1cm               Assessment:       Encounter Diagnoses   Name Primary?    Diabetic ulcer of toe of right foot associated with type 1 diabetes mellitus, limited to breakdown of skin Yes    Type 1 diabetes mellitus with diabetic autonomic neuropathy          Plan:       Hammad was seen today for diabetes mellitus and wound care.    Diagnoses and all orders for this visit:    Diabetic ulcer of toe of right foot associated with type 1 diabetes mellitus, limited to breakdown of skin  -     Aerobic culture  -     Culture, Anaerobic  -     Cancel: US Lower Extremity Arteries Bilateral; Future  -     US Lower Extrem Arteries Bilat with RYANN (xpd); Future    Type 1 diabetes mellitus with diabetic autonomic neuropathy      I counseled the patient on his conditions, their implications and medical management.    Discussed with patient the associated risks and benefits associated with a selective excisional debridement of the Rt. Hallux.  Consent was read, signed, and witnessed.  See attached procedure note.      Wound cultures were obtained with today's exam.    Orders written for an arterial ultrasound, as the wound has an ischemic appearance.    The wound base was covered with iodosorb ointment.  The site was then offloaded with cassandra foam toe sulcus roll, and a football dressing was applied.    Advised to keep the dressing CDI x 1 week.    Advised to rest and elevate the affected extremity.    Instructed to minimize weight bearing to facilitate wound healing.    Advised to ambulate only in the postoperative shoe/boot.    Discussed optimal hydration and protein consumption and how they facilitate wound healing.      RTC in 1 week.    Caleb Duffy DPM

## 2019-08-29 LAB — BACTERIA SPEC AEROBE CULT: NORMAL

## 2019-09-03 ENCOUNTER — OFFICE VISIT (OUTPATIENT)
Dept: PODIATRY | Facility: CLINIC | Age: 64
End: 2019-09-03
Payer: COMMERCIAL

## 2019-09-03 VITALS
HEIGHT: 74 IN | HEART RATE: 80 BPM | BODY MASS INDEX: 28.88 KG/M2 | SYSTOLIC BLOOD PRESSURE: 124 MMHG | WEIGHT: 225 LBS | DIASTOLIC BLOOD PRESSURE: 69 MMHG

## 2019-09-03 DIAGNOSIS — I70.235 ATHEROSCLEROSIS OF NATIVE ARTERY OF RIGHT LOWER EXTREMITY WITH ULCERATION OF OTHER PART OF FOOT: ICD-10-CM

## 2019-09-03 DIAGNOSIS — E10.621 DIABETIC ULCER OF TOE OF RIGHT FOOT ASSOCIATED WITH TYPE 1 DIABETES MELLITUS, LIMITED TO BREAKDOWN OF SKIN: Primary | ICD-10-CM

## 2019-09-03 DIAGNOSIS — L97.511 DIABETIC ULCER OF TOE OF RIGHT FOOT ASSOCIATED WITH TYPE 1 DIABETES MELLITUS, LIMITED TO BREAKDOWN OF SKIN: Primary | ICD-10-CM

## 2019-09-03 DIAGNOSIS — E10.43 TYPE 1 DIABETES MELLITUS WITH DIABETIC AUTONOMIC NEUROPATHY: ICD-10-CM

## 2019-09-03 LAB — BACTERIA SPEC ANAEROBE CULT: NORMAL

## 2019-09-03 PROCEDURE — 97597 DBRDMT OPN WND 1ST 20 CM/<: CPT | Mod: S$GLB,,, | Performed by: PODIATRIST

## 2019-09-03 PROCEDURE — 99499 UNLISTED E&M SERVICE: CPT | Mod: S$GLB,,, | Performed by: PODIATRIST

## 2019-09-03 PROCEDURE — 97597 WOUND DEBRIDEMENT: ICD-10-PCS | Mod: S$GLB,,, | Performed by: PODIATRIST

## 2019-09-03 PROCEDURE — 99999 PR PBB SHADOW E&M-EST. PATIENT-LVL III: ICD-10-PCS | Mod: PBBFAC,,, | Performed by: PODIATRIST

## 2019-09-03 PROCEDURE — 99499 NO LOS: ICD-10-PCS | Mod: S$GLB,,, | Performed by: PODIATRIST

## 2019-09-03 PROCEDURE — 99999 PR PBB SHADOW E&M-EST. PATIENT-LVL III: CPT | Mod: PBBFAC,,, | Performed by: PODIATRIST

## 2019-09-03 NOTE — PROGRESS NOTES
Subjective:      Patient ID: Hammad Douglas III is a 64 y.o. male.    Chief Complaint: Wound Check (8.2 6/2019 )  Patient presents to clinic for a 1 week wound check of the Rt. Hallux.  Denies pain from the digit with today's exam.  Has kept the prior football dressing clean, dry, and intact x 1 week.  Relates ambulation to tolerance while in a postoperative shoe.  Denies having N/V/F/C/D.  Denies any additional pedal complaints.    Past Medical History:   Diagnosis Date    Cataract     Chronic kidney disease     Diabetes mellitus type I     Diagnosed at age 19    Diabetic retinopathy     See's Dr. Ledesma    Hyperlipidemia     Hypertension        Past Surgical History:   Procedure Laterality Date    COLONOSCOPY      2011 wnl       Family History   Problem Relation Age of Onset    Cataracts Mother        Social History     Socioeconomic History    Marital status:      Spouse name: Not on file    Number of children: Not on file    Years of education: Not on file    Highest education level: Not on file   Occupational History    Not on file   Social Needs    Financial resource strain: Not on file    Food insecurity:     Worry: Not on file     Inability: Not on file    Transportation needs:     Medical: Not on file     Non-medical: Not on file   Tobacco Use    Smoking status: Current Some Day Smoker     Types: Cigars    Smokeless tobacco: Never Used    Tobacco comment: 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   Substance and Sexual Activity    Alcohol use: Yes     Alcohol/week: 1.2 oz     Types: 2 Cans of beer per week    Drug use: No    Sexual activity: Yes     Partners: Female   Lifestyle    Physical activity:     Days per week: Not on file     Minutes per session: Not on file    Stress: Not on file   Relationships    Social connections:     Talks on phone: Not on file     Gets together: Not on file     Attends Anabaptism service: Not on file  "    Active member of club or organization: Not on file     Attends meetings of clubs or organizations: Not on file     Relationship status: Not on file   Other Topics Concern    Not on file   Social History Narrative    Not on file       Current Outpatient Medications   Medication Sig Dispense Refill    atorvastatin (LIPITOR) 40 MG tablet TAKE 1 TABLET(40 MG) BY MOUTH EVERY DAY 90 tablet 4    blood sugar diagnostic (ONETOUCH ULTRA TEST) Strp 1 strip by Other route 4 (four) times daily. 400 strip 3    FLUoxetine 20 MG capsule TAKE 1 CAPSULE(20 MG) BY MOUTH EVERY DAY 90 capsule 3    insulin glargine U-300 conc (TOUJEO MAX U-300 SOLOSTAR) 300 unit/mL (3 mL) InPn Inject 22 Units into the skin every evening. 6 mL 11    insulin lispro (HUMALOG KWIKPEN INSULIN) 100 unit/mL pen INJECT USING INSULIN:CARB RATIO OF 1:7 SUBCUTANEOUSLY PRIOR TO MEALS. MAX TDD 40 UNITS 3 Box 3    losartan (COZAAR) 100 MG tablet Take 1 tablet (100 mg total) by mouth once daily. 90 tablet 3    mupirocin (BACTROBAN) 2 % ointment Apply topically 3 (three) times daily. Lt great toe 2 g 0    pen needle, diabetic (PEN NEEDLE) 31 gauge x 1/4" Ndle 1 each by Misc.(Non-Drug; Combo Route) route 4 (four) times daily. 400 each 3    glucagon (human recombinant) inj 1mg/mL kit Inject 1 mL (1 mg total) into the muscle as needed. 1 kit 6     Current Facility-Administered Medications   Medication Dose Route Frequency Provider Last Rate Last Dose    glucagon (human recombinant) injection 1 mg  1 mg Intramuscular Once Tania Mckeon NP           Review of patient's allergies indicates:   Allergen Reactions    Altace [ramipril]      Cough         Review of Systems   Constitution: Negative for chills and fever.   Cardiovascular: Negative for claudication and leg swelling.   Skin: Positive for poor wound healing. Negative for color change and dry skin.   Musculoskeletal: Negative for joint pain, joint swelling, muscle cramps and muscle weakness. "   Neurological: Positive for numbness.   Psychiatric/Behavioral: Negative for altered mental status.           Objective:      Physical Exam   Constitutional: He appears well-developed and well-nourished. No distress.   Cardiovascular:   Pulses:       Dorsalis pedis pulses are 2+ on the right side, and 2+ on the left side.        Posterior tibial pulses are 1+ on the right side, and 1+ on the left side.   CFT is < 3 seconds bilateral.  Pedal hair growth is present bilateral.  Varicosities noted bilateral.  No lower extremity edema noted bilateral.  Toes are warm to touch bilateral.     Musculoskeletal: He exhibits no edema or tenderness.   Muscle strength 5/5 in all muscle groups bilateral.  No tenderness nor crepitation with ROM of foot/ankle joints bilateral.  No tenderness with palpation of bilateral foot and ankle.     Neurological: He has normal strength. A sensory deficit is present.   Protective sensation per [place]-Jayson monofilament is decreased bilateral.  Light touch is intact bilateral.   Skin: Skin is warm and dry. Capillary refill takes less than 2 seconds. Lesion noted. No abrasion, no burn, no ecchymosis, no laceration and no petechiae noted. He is not diaphoretic.   Location: Open wound noted to the Rt. Medial hallux.  Base: Down to dermis and comprised of eschar.  Drainage: None  Skylar wound: Devoid of erythema, edema, fluctuance, purulence, and malodor.   Pre-debridement measurement: 2.7 x 2.4 x 0.1cm  Post-debridement measurement: 2.9 x 2.6 x 0.1cm               Assessment:       Encounter Diagnoses   Name Primary?    Diabetic ulcer of toe of right foot associated with type 1 diabetes mellitus, limited to breakdown of skin Yes    Type 1 diabetes mellitus with diabetic autonomic neuropathy     Atherosclerosis of native artery of right lower extremity with ulceration of other part of foot          Plan:       Hammad was seen today for wound check.    Diagnoses and all orders for this  visit:    Diabetic ulcer of toe of right foot associated with type 1 diabetes mellitus, limited to breakdown of skin  -     Wound Debridement    Type 1 diabetes mellitus with diabetic autonomic neuropathy    Atherosclerosis of native artery of right lower extremity with ulceration of other part of foot      I counseled the patient on his conditions, their implications and medical management.    Performed a selective excisional debridement of the Rt. Hallux.  See attached procedure note.      Referral written for Vascular Access for further assessment of atherosclerotic disease of the lower extremities.    The wound base was covered with iodosorb ointment.  The site was then offloaded with cassandra foam toe sulcus roll, and a football dressing was applied.    Advised to keep the dressing CDI x 1 week.    Advised to rest and elevate the affected extremity.    Instructed to minimize weight bearing to facilitate wound healing.    Advised to ambulate only in the postoperative shoe/boot.    RTC in 1 week.    Caleb Duffy DPM

## 2019-09-03 NOTE — PROCEDURES
"Wound Debridement  Date/Time: 9/3/2019 7:26 AM  Performed by: Caleb Duffy DPM  Authorized by: Caleb Duffy DPM     Time out: Immediately prior to procedure a "time out" was called to verify the correct patient, procedure, equipment, support staff and site/side marked as required.    Consent Done?:  Yes (Verbal)    Preparation: Patient was prepped and draped in usual sterile fashion    Local anesthesia used?: No      Wound Details:    Location:  Right foot    Location:  Right 1st Toe    Type of Debridement:  Excisional       Length (cm):  2.7       Area (sq cm):  6.48       Width (cm):  2.4       Percent Debrided (%):  100       Depth (cm):  0.1       Total Area Debrided (sq cm):  6.48    Depth of debridement:  Epidermis/Dermis    Tissue debrided:  Dermis    Devitalized tissue debrided:  Fibrin and Necrotic/Eschar    Instruments:  Blade    Bleeding:  Minimal  Hemostasis Achieved: Yes    Method Used:  Pressure  Patient tolerance:  Patient tolerated the procedure well with no immediate complications      "

## 2019-09-05 DIAGNOSIS — E10.21 TYPE 1 DIABETES MELLITUS WITH DIABETIC NEPHROPATHY: ICD-10-CM

## 2019-09-08 ENCOUNTER — PATIENT MESSAGE (OUTPATIENT)
Dept: OPHTHALMOLOGY | Facility: CLINIC | Age: 64
End: 2019-09-08

## 2019-09-09 RX ORDER — FLUOXETINE HYDROCHLORIDE 20 MG/1
CAPSULE ORAL
Qty: 90 CAPSULE | Refills: 3 | Status: SHIPPED | OUTPATIENT
Start: 2019-09-09 | End: 2020-09-29 | Stop reason: SDUPTHER

## 2019-09-09 RX ORDER — LOSARTAN POTASSIUM 100 MG/1
TABLET ORAL
Qty: 90 TABLET | Refills: 3 | Status: SHIPPED | OUTPATIENT
Start: 2019-09-09 | End: 2020-08-17

## 2019-09-10 ENCOUNTER — OFFICE VISIT (OUTPATIENT)
Dept: PODIATRY | Facility: CLINIC | Age: 64
End: 2019-09-10
Payer: COMMERCIAL

## 2019-09-10 VITALS
BODY MASS INDEX: 28.18 KG/M2 | WEIGHT: 219.56 LBS | SYSTOLIC BLOOD PRESSURE: 116 MMHG | HEART RATE: 79 BPM | HEIGHT: 74 IN | DIASTOLIC BLOOD PRESSURE: 71 MMHG

## 2019-09-10 DIAGNOSIS — L97.511 DIABETIC ULCER OF TOE OF RIGHT FOOT ASSOCIATED WITH TYPE 1 DIABETES MELLITUS, LIMITED TO BREAKDOWN OF SKIN: Primary | ICD-10-CM

## 2019-09-10 DIAGNOSIS — E10.621 DIABETIC ULCER OF TOE OF RIGHT FOOT ASSOCIATED WITH TYPE 1 DIABETES MELLITUS, LIMITED TO BREAKDOWN OF SKIN: Primary | ICD-10-CM

## 2019-09-10 DIAGNOSIS — E10.43 TYPE 1 DIABETES MELLITUS WITH DIABETIC AUTONOMIC NEUROPATHY: ICD-10-CM

## 2019-09-10 PROCEDURE — 99499 UNLISTED E&M SERVICE: CPT | Mod: S$GLB,,, | Performed by: PODIATRIST

## 2019-09-10 PROCEDURE — 97597 DBRDMT OPN WND 1ST 20 CM/<: CPT | Mod: S$GLB,,, | Performed by: PODIATRIST

## 2019-09-10 PROCEDURE — 97597 WOUND DEBRIDEMENT: ICD-10-PCS | Mod: S$GLB,,, | Performed by: PODIATRIST

## 2019-09-10 PROCEDURE — 99999 PR PBB SHADOW E&M-EST. PATIENT-LVL III: CPT | Mod: PBBFAC,,, | Performed by: PODIATRIST

## 2019-09-10 PROCEDURE — 99999 PR PBB SHADOW E&M-EST. PATIENT-LVL III: ICD-10-PCS | Mod: PBBFAC,,, | Performed by: PODIATRIST

## 2019-09-10 PROCEDURE — 99499 NO LOS: ICD-10-PCS | Mod: S$GLB,,, | Performed by: PODIATRIST

## 2019-09-10 NOTE — PROGRESS NOTES
Subjective:      Patient ID: Hammad Douglas III is a 64 y.o. male.    Chief Complaint: No chief complaint on file.  Patient presents to clinic for a 1 week wound check of the Rt. Hallux.  Denies pain from the digit with today's exam.  Has kept the prior football dressing clean, dry, and intact x 1 week.  Relates ambulation to tolerance while in a postoperative shoe.  Denies having N/V/F/C/D.  Denies any additional pedal complaints.    Past Medical History:   Diagnosis Date    Cataract     Chronic kidney disease     Diabetes mellitus type I     Diagnosed at age 19    Diabetic retinopathy     See's Dr. Ledesma    Hyperlipidemia     Hypertension        Past Surgical History:   Procedure Laterality Date    COLONOSCOPY      2011 wnl       Family History   Problem Relation Age of Onset    Cataracts Mother        Social History     Socioeconomic History    Marital status:      Spouse name: Not on file    Number of children: Not on file    Years of education: Not on file    Highest education level: Not on file   Occupational History    Not on file   Social Needs    Financial resource strain: Not on file    Food insecurity:     Worry: Not on file     Inability: Not on file    Transportation needs:     Medical: Not on file     Non-medical: Not on file   Tobacco Use    Smoking status: Current Some Day Smoker     Types: Cigars    Smokeless tobacco: Never Used    Tobacco comment: 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   Substance and Sexual Activity    Alcohol use: Yes     Alcohol/week: 1.2 oz     Types: 2 Cans of beer per week    Drug use: No    Sexual activity: Yes     Partners: Female   Lifestyle    Physical activity:     Days per week: Not on file     Minutes per session: Not on file    Stress: Not on file   Relationships    Social connections:     Talks on phone: Not on file     Gets together: Not on file     Attends [employer] service: Not on  "file     Active member of club or organization: Not on file     Attends meetings of clubs or organizations: Not on file     Relationship status: Not on file   Other Topics Concern    Not on file   Social History Narrative    Not on file       Current Outpatient Medications   Medication Sig Dispense Refill    atorvastatin (LIPITOR) 40 MG tablet TAKE 1 TABLET(40 MG) BY MOUTH EVERY DAY 90 tablet 4    blood sugar diagnostic (ONETOUCH ULTRA TEST) Strp 1 strip by Other route 4 (four) times daily. 400 strip 3    FLUoxetine 20 MG capsule TAKE 1 CAPSULE(20 MG) BY MOUTH EVERY DAY 90 capsule 3    glucagon (human recombinant) inj 1mg/mL kit Inject 1 mL (1 mg total) into the muscle as needed. 1 kit 6    insulin glargine U-300 conc (TOUJEO MAX U-300 SOLOSTAR) 300 unit/mL (3 mL) InPn Inject 22 Units into the skin every evening. 6 mL 11    insulin lispro (HUMALOG KWIKPEN INSULIN) 100 unit/mL pen INJECT USING INSULIN:CARB RATIO OF 1:7 SUBCUTANEOUSLY PRIOR TO MEALS. MAX TDD 40 UNITS 3 Box 3    losartan (COZAAR) 100 MG tablet TAKE 1 TABLET(100 MG) BY MOUTH EVERY DAY 90 tablet 3    mupirocin (BACTROBAN) 2 % ointment Apply topically 3 (three) times daily. Lt great toe 2 g 0    pen needle, diabetic (PEN NEEDLE) 31 gauge x 1/4" Ndle 1 each by Misc.(Non-Drug; Combo Route) route 4 (four) times daily. 400 each 3     Current Facility-Administered Medications   Medication Dose Route Frequency Provider Last Rate Last Dose    glucagon (human recombinant) injection 1 mg  1 mg Intramuscular Once Tania Mckeon NP           Review of patient's allergies indicates:   Allergen Reactions    Altace [ramipril]      Cough         Review of Systems   Constitution: Negative for chills and fever.   Cardiovascular: Negative for claudication and leg swelling.   Skin: Positive for poor wound healing. Negative for color change and dry skin.   Musculoskeletal: Negative for joint pain, joint swelling, muscle cramps and muscle weakness. "   Neurological: Positive for numbness.   Psychiatric/Behavioral: Negative for altered mental status.           Objective:      Physical Exam   Constitutional: He appears well-developed and well-nourished. No distress.   Cardiovascular:   Pulses:       Dorsalis pedis pulses are 2+ on the right side, and 2+ on the left side.        Posterior tibial pulses are 1+ on the right side, and 1+ on the left side.   CFT is < 3 seconds bilateral.  Pedal hair growth is present bilateral.  Varicosities noted bilateral.  No lower extremity edema noted bilateral.  Toes are warm to touch bilateral.     Musculoskeletal: He exhibits no edema or tenderness.   Muscle strength 5/5 in all muscle groups bilateral.  No tenderness nor crepitation with ROM of foot/ankle joints bilateral.  No tenderness with palpation of bilateral foot and ankle.     Neurological: He has normal strength. A sensory deficit is present.   Protective sensation per [place]-Jayson monofilament is decreased bilateral.  Light touch is intact bilateral.   Skin: Skin is warm and dry. Capillary refill takes less than 2 seconds. Lesion noted. No abrasion, no burn, no ecchymosis, no laceration and no petechiae noted. He is not diaphoretic.   Location: Open wound noted to the Rt. Medial hallux.  Base: Down to dermis and comprised of fibrin.  Drainage: None  Skylar wound: Devoid of erythema, edema, fluctuance, purulence, and malodor.   Pre-debridement measurement: 2.1 x 1.8 x 0.1cm  Post-debridement measurement: 2.3 x 2 x 0.1cm               Assessment:       Encounter Diagnoses   Name Primary?    Diabetic ulcer of toe of right foot associated with type 1 diabetes mellitus, limited to breakdown of skin Yes    Type 1 diabetes mellitus with diabetic autonomic neuropathy          Plan:       Diagnoses and all orders for this visit:    Diabetic ulcer of toe of right foot associated with type 1 diabetes mellitus, limited to breakdown of skin    Type 1 diabetes mellitus with  diabetic autonomic neuropathy      I counseled the patient on his conditions, their implications and medical management.    Performed a selective excisional debridement of the Rt. Hallux.  See attached procedure note.      The wound base was covered with silver alginate.  The site was then offloaded with cassandra foam toe sulcus roll, and a football dressing was applied.    Advised to keep the dressing CDI x 1 week.    Advised to rest and elevate the affected extremity.    Instructed to minimize weight bearing to facilitate wound healing.    Advised to ambulate only in the postoperative shoe/boot.    RTC in 1 week.    Caleb Duffy DPM

## 2019-09-11 NOTE — PROCEDURES
"Wound Debridement  Date/Time: 9/10/2019 9:50 PM  Performed by: Caleb Duffy DPM  Authorized by: Caleb Duffy DPM     Time out: Immediately prior to procedure a "time out" was called to verify the correct patient, procedure, equipment, support staff and site/side marked as required.    Consent Done?:  Yes (Verbal)    Preparation: Patient was prepped and draped in usual sterile fashion    Local anesthesia used?: No      Wound Details:    Location:  Right foot    Location:  Right 1st Toe    Type of Debridement:  Excisional       Length (cm):  2.1       Area (sq cm):  3.78       Width (cm):  1.8       Percent Debrided (%):  100       Depth (cm):  1       Total Area Debrided (sq cm):  3.78    Depth of debridement:  Epidermis/Dermis    Tissue debrided:  Dermis    Devitalized tissue debrided:  Fibrin    Instruments:  Blade    Bleeding:  Minimal  Hemostasis Achieved: Yes    Method Used:  Pressure  Patient tolerance:  Patient tolerated the procedure well with no immediate complications      "

## 2019-09-17 ENCOUNTER — OFFICE VISIT (OUTPATIENT)
Dept: PODIATRY | Facility: CLINIC | Age: 64
End: 2019-09-17
Payer: COMMERCIAL

## 2019-09-17 VITALS — BODY MASS INDEX: 28.18 KG/M2 | HEIGHT: 74 IN | WEIGHT: 219.56 LBS

## 2019-09-17 DIAGNOSIS — E10.43 TYPE 1 DIABETES MELLITUS WITH DIABETIC AUTONOMIC NEUROPATHY: ICD-10-CM

## 2019-09-17 DIAGNOSIS — L97.511 DIABETIC ULCER OF TOE OF RIGHT FOOT ASSOCIATED WITH TYPE 1 DIABETES MELLITUS, LIMITED TO BREAKDOWN OF SKIN: Primary | ICD-10-CM

## 2019-09-17 DIAGNOSIS — E10.621 DIABETIC ULCER OF TOE OF RIGHT FOOT ASSOCIATED WITH TYPE 1 DIABETES MELLITUS, LIMITED TO BREAKDOWN OF SKIN: Primary | ICD-10-CM

## 2019-09-17 PROCEDURE — 99499 NO LOS: ICD-10-PCS | Mod: S$GLB,,, | Performed by: PODIATRIST

## 2019-09-17 PROCEDURE — 99999 PR PBB SHADOW E&M-EST. PATIENT-LVL II: CPT | Mod: PBBFAC,,, | Performed by: PODIATRIST

## 2019-09-17 PROCEDURE — 97597 DBRDMT OPN WND 1ST 20 CM/<: CPT | Mod: S$GLB,,, | Performed by: PODIATRIST

## 2019-09-17 PROCEDURE — 99999 PR PBB SHADOW E&M-EST. PATIENT-LVL II: ICD-10-PCS | Mod: PBBFAC,,, | Performed by: PODIATRIST

## 2019-09-17 PROCEDURE — 99499 UNLISTED E&M SERVICE: CPT | Mod: S$GLB,,, | Performed by: PODIATRIST

## 2019-09-17 PROCEDURE — 97597 WOUND DEBRIDEMENT: ICD-10-PCS | Mod: S$GLB,,, | Performed by: PODIATRIST

## 2019-09-17 NOTE — PROCEDURES
"Wound Debridement  Date/Time: 9/17/2019 7:33 AM  Performed by: Caleb Duffy DPM  Authorized by: Caleb Duffy DPM     Time out: Immediately prior to procedure a "time out" was called to verify the correct patient, procedure, equipment, support staff and site/side marked as required.    Consent Done?:  Yes (Verbal)    Preparation: Patient was prepped and draped in usual sterile fashion    Local anesthesia used?: No      Wound Details:    Location:  Right foot    Location:  Right 1st Toe    Type of Debridement:  Excisional       Length (cm):  1.9       Area (sq cm):  2.66       Width (cm):  1.4       Percent Debrided (%):  100       Depth (cm):  0.1       Total Area Debrided (sq cm):  2.66    Depth of debridement:  Epidermis/Dermis    Tissue debrided:  Dermis    Devitalized tissue debrided:  Fibrin    Instruments:  Nippers and Curette    Bleeding:  Minimal  Hemostasis Achieved: Yes    Method Used:  Pressure  Patient tolerance:  Patient tolerated the procedure well with no immediate complications      "

## 2019-09-17 NOTE — PROGRESS NOTES
Subjective:      Patient ID: Hammad Douglas III is a 64 y.o. male.    Chief Complaint: Wound Care  Patient presents to clinic for a 1 week wound check of the Rt. Hallux.  Denies pain from the digit with today's exam.  Has kept the prior football dressing clean, dry, and intact x 1 week.  Relates ambulation to tolerance while in a postoperative shoe.  Denies having N/V/F/C/D.  Denies any additional pedal complaints.    Past Medical History:   Diagnosis Date    Cataract     Chronic kidney disease     Diabetes mellitus type I     Diagnosed at age 19    Diabetic retinopathy     See's Dr. Ledesma    Hyperlipidemia     Hypertension        Past Surgical History:   Procedure Laterality Date    COLONOSCOPY      2011 wnl       Family History   Problem Relation Age of Onset    Cataracts Mother        Social History     Socioeconomic History    Marital status:      Spouse name: Not on file    Number of children: Not on file    Years of education: Not on file    Highest education level: Not on file   Occupational History    Not on file   Social Needs    Financial resource strain: Not on file    Food insecurity:     Worry: Not on file     Inability: Not on file    Transportation needs:     Medical: Not on file     Non-medical: Not on file   Tobacco Use    Smoking status: Current Some Day Smoker     Types: Cigars    Smokeless tobacco: Never Used    Tobacco comment: 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   Substance and Sexual Activity    Alcohol use: Yes     Alcohol/week: 1.2 oz     Types: 2 Cans of beer per week    Drug use: No    Sexual activity: Yes     Partners: Female   Lifestyle    Physical activity:     Days per week: Not on file     Minutes per session: Not on file    Stress: Not on file   Relationships    Social connections:     Talks on phone: Not on file     Gets together: Not on file     Attends Orthodoxy service: Not on file     Active  "member of club or organization: Not on file     Attends meetings of clubs or organizations: Not on file     Relationship status: Not on file   Other Topics Concern    Not on file   Social History Narrative    Not on file       Current Outpatient Medications   Medication Sig Dispense Refill    atorvastatin (LIPITOR) 40 MG tablet TAKE 1 TABLET(40 MG) BY MOUTH EVERY DAY 90 tablet 4    blood sugar diagnostic (ONETOUCH ULTRA TEST) Strp 1 strip by Other route 4 (four) times daily. 400 strip 3    FLUoxetine 20 MG capsule TAKE 1 CAPSULE(20 MG) BY MOUTH EVERY DAY 90 capsule 3    glucagon (human recombinant) inj 1mg/mL kit Inject 1 mL (1 mg total) into the muscle as needed. 1 kit 6    insulin glargine U-300 conc (TOUJEO MAX U-300 SOLOSTAR) 300 unit/mL (3 mL) InPn Inject 22 Units into the skin every evening. 6 mL 11    insulin lispro (HUMALOG KWIKPEN INSULIN) 100 unit/mL pen INJECT USING INSULIN:CARB RATIO OF 1:7 SUBCUTANEOUSLY PRIOR TO MEALS. MAX TDD 40 UNITS 3 Box 3    losartan (COZAAR) 100 MG tablet TAKE 1 TABLET(100 MG) BY MOUTH EVERY DAY 90 tablet 3    mupirocin (BACTROBAN) 2 % ointment Apply topically 3 (three) times daily. Lt great toe 2 g 0    pen needle, diabetic (PEN NEEDLE) 31 gauge x 1/4" Ndle 1 each by Misc.(Non-Drug; Combo Route) route 4 (four) times daily. 400 each 3     Current Facility-Administered Medications   Medication Dose Route Frequency Provider Last Rate Last Dose    glucagon (human recombinant) injection 1 mg  1 mg Intramuscular Once Tania Mckeon NP           Review of patient's allergies indicates:   Allergen Reactions    Altace [ramipril]      Cough         Review of Systems   Constitution: Negative for chills and fever.   Cardiovascular: Negative for claudication and leg swelling.   Skin: Positive for poor wound healing. Negative for color change and dry skin.   Musculoskeletal: Negative for joint pain, joint swelling, muscle cramps and muscle weakness.   Neurological: " Positive for numbness.   Psychiatric/Behavioral: Negative for altered mental status.           Objective:      Physical Exam   Constitutional: He appears well-developed and well-nourished. No distress.   Cardiovascular:   Pulses:       Dorsalis pedis pulses are 2+ on the right side, and 2+ on the left side.        Posterior tibial pulses are 1+ on the right side, and 1+ on the left side.   CFT is < 3 seconds bilateral.  Pedal hair growth is present bilateral.  Varicosities noted bilateral.  No lower extremity edema noted bilateral.  Toes are warm to touch bilateral.     Musculoskeletal: He exhibits no edema or tenderness.   Muscle strength 5/5 in all muscle groups bilateral.  No tenderness nor crepitation with ROM of foot/ankle joints bilateral.  No tenderness with palpation of bilateral foot and ankle.     Neurological: He has normal strength. A sensory deficit is present.   Protective sensation per Ketchum-Jayson monofilament is decreased bilateral.  Light touch is intact bilateral.   Skin: Skin is warm and dry. Capillary refill takes less than 2 seconds. Lesion noted. No abrasion, no burn, no ecchymosis, no laceration and no petechiae noted. He is not diaphoretic.   Location: Open wound noted to the Rt. Medial hallux.  Base: Down to dermis and comprised of fibrin.  Drainage: None  Skylar wound: Devoid of erythema, edema, fluctuance, purulence, and malodor.   Pre-debridement measurement: 1.9 x 1.4 x 0.1cm  Post-debridement measurement: 2.1 x 1.6 x 0.1cm               Assessment:       Encounter Diagnoses   Name Primary?    Diabetic ulcer of toe of right foot associated with type 1 diabetes mellitus, limited to breakdown of skin Yes    Type 1 diabetes mellitus with diabetic autonomic neuropathy          Plan:       Hammad was seen today for wound care.    Diagnoses and all orders for this visit:    Diabetic ulcer of toe of right foot associated with type 1 diabetes mellitus, limited to breakdown of skin    Type 1  diabetes mellitus with diabetic autonomic neuropathy      I counseled the patient on his conditions, their implications and medical management.    Performed a selective excisional debridement of the Rt. Hallux.  See attached procedure note.      The wound base was covered with silver alginate.  The site was then offloaded with cassandra foam toe sulcus roll, and a football dressing was applied.    Advised to keep the dressing CDI x 1 week.    Advised to rest and elevate the affected extremity.    Instructed to minimize weight bearing to facilitate wound healing.    Advised to ambulate only in the postoperative shoe/boot.    RTC in 1 week.    Caleb Duffy DPM

## 2019-09-24 ENCOUNTER — LAB VISIT (OUTPATIENT)
Dept: LAB | Facility: HOSPITAL | Age: 64
End: 2019-09-24
Attending: NURSE PRACTITIONER
Payer: COMMERCIAL

## 2019-09-24 ENCOUNTER — OFFICE VISIT (OUTPATIENT)
Dept: PODIATRY | Facility: CLINIC | Age: 64
End: 2019-09-24
Payer: COMMERCIAL

## 2019-09-24 ENCOUNTER — PATIENT MESSAGE (OUTPATIENT)
Dept: ENDOCRINOLOGY | Facility: CLINIC | Age: 64
End: 2019-09-24

## 2019-09-24 DIAGNOSIS — E10.43 TYPE 1 DIABETES MELLITUS WITH DIABETIC AUTONOMIC NEUROPATHY: ICD-10-CM

## 2019-09-24 DIAGNOSIS — L97.511 DIABETIC ULCER OF TOE OF RIGHT FOOT ASSOCIATED WITH TYPE 1 DIABETES MELLITUS, LIMITED TO BREAKDOWN OF SKIN: Primary | ICD-10-CM

## 2019-09-24 DIAGNOSIS — E10.621 DIABETIC ULCER OF TOE OF RIGHT FOOT ASSOCIATED WITH TYPE 1 DIABETES MELLITUS, LIMITED TO BREAKDOWN OF SKIN: Primary | ICD-10-CM

## 2019-09-24 LAB
ANION GAP SERPL CALC-SCNC: 8 MMOL/L (ref 8–16)
BUN SERPL-MCNC: 22 MG/DL (ref 8–23)
CALCIUM SERPL-MCNC: 9.3 MG/DL (ref 8.7–10.5)
CHLORIDE SERPL-SCNC: 104 MMOL/L (ref 95–110)
CO2 SERPL-SCNC: 28 MMOL/L (ref 23–29)
CREAT SERPL-MCNC: 1.3 MG/DL (ref 0.5–1.4)
EST. GFR  (AFRICAN AMERICAN): >60 ML/MIN/1.73 M^2
EST. GFR  (NON AFRICAN AMERICAN): 58.5 ML/MIN/1.73 M^2
ESTIMATED AVG GLUCOSE: 203 MG/DL (ref 68–131)
GLUCOSE SERPL-MCNC: 134 MG/DL (ref 70–110)
HBA1C MFR BLD HPLC: 8.7 % (ref 4–5.6)
POTASSIUM SERPL-SCNC: 4.5 MMOL/L (ref 3.5–5.1)
SODIUM SERPL-SCNC: 140 MMOL/L (ref 136–145)

## 2019-09-24 PROCEDURE — 99499 UNLISTED E&M SERVICE: CPT | Mod: S$GLB,,, | Performed by: PODIATRIST

## 2019-09-24 PROCEDURE — 80048 BASIC METABOLIC PNL TOTAL CA: CPT

## 2019-09-24 PROCEDURE — 97597 DBRDMT OPN WND 1ST 20 CM/<: CPT | Mod: S$GLB,,, | Performed by: PODIATRIST

## 2019-09-24 PROCEDURE — 97597 WOUND DEBRIDEMENT: ICD-10-PCS | Mod: S$GLB,,, | Performed by: PODIATRIST

## 2019-09-24 PROCEDURE — 83036 HEMOGLOBIN GLYCOSYLATED A1C: CPT

## 2019-09-24 PROCEDURE — 99499 NO LOS: ICD-10-PCS | Mod: S$GLB,,, | Performed by: PODIATRIST

## 2019-09-24 PROCEDURE — 36415 COLL VENOUS BLD VENIPUNCTURE: CPT | Mod: PO

## 2019-09-24 NOTE — PROGRESS NOTES
Subjective:      Patient ID: Hammad Douglas III is a 64 y.o. male.    Chief Complaint: No chief complaint on file.  Patient presents to clinic for a 1 week wound check of the Rt. Hallux.  Denies pain from the digit with today's exam.  Has kept the prior football dressing clean, dry, and intact x 1 week.  Relates ambulation to tolerance while in a postoperative shoe.  Denies having N/V/F/C/D.  Denies any additional pedal complaints.    Past Medical History:   Diagnosis Date    Cataract     Chronic kidney disease     Diabetes mellitus type I     Diagnosed at age 19    Diabetic retinopathy     See's Dr. Ledesma    Hyperlipidemia     Hypertension        Past Surgical History:   Procedure Laterality Date    COLONOSCOPY      2011 wnl       Family History   Problem Relation Age of Onset    Cataracts Mother        Social History     Socioeconomic History    Marital status:      Spouse name: Not on file    Number of children: Not on file    Years of education: Not on file    Highest education level: Not on file   Occupational History    Not on file   Social Needs    Financial resource strain: Not on file    Food insecurity:     Worry: Not on file     Inability: Not on file    Transportation needs:     Medical: Not on file     Non-medical: Not on file   Tobacco Use    Smoking status: Current Some Day Smoker     Types: Cigars    Smokeless tobacco: Never Used    Tobacco comment: 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   Substance and Sexual Activity    Alcohol use: Yes     Alcohol/week: 2.0 standard drinks     Types: 2 Cans of beer per week    Drug use: No    Sexual activity: Yes     Partners: Female   Lifestyle    Physical activity:     Days per week: Not on file     Minutes per session: Not on file    Stress: Not on file   Relationships    Social connections:     Talks on phone: Not on file     Gets together: Not on file     Attends Denominational  "service: Not on file     Active member of club or organization: Not on file     Attends meetings of clubs or organizations: Not on file     Relationship status: Not on file   Other Topics Concern    Not on file   Social History Narrative    Not on file       Current Outpatient Medications   Medication Sig Dispense Refill    atorvastatin (LIPITOR) 40 MG tablet TAKE 1 TABLET(40 MG) BY MOUTH EVERY DAY 90 tablet 4    blood sugar diagnostic (ONETOUCH ULTRA TEST) Strp 1 strip by Other route 4 (four) times daily. 400 strip 3    FLUoxetine 20 MG capsule TAKE 1 CAPSULE(20 MG) BY MOUTH EVERY DAY 90 capsule 3    glucagon (human recombinant) inj 1mg/mL kit Inject 1 mL (1 mg total) into the muscle as needed. 1 kit 6    insulin glargine U-300 conc (TOUJEO MAX U-300 SOLOSTAR) 300 unit/mL (3 mL) InPn Inject 22 Units into the skin every evening. 6 mL 11    insulin lispro (HUMALOG KWIKPEN INSULIN) 100 unit/mL pen INJECT USING INSULIN:CARB RATIO OF 1:7 SUBCUTANEOUSLY PRIOR TO MEALS. MAX TDD 40 UNITS 3 Box 3    losartan (COZAAR) 100 MG tablet TAKE 1 TABLET(100 MG) BY MOUTH EVERY DAY 90 tablet 3    mupirocin (BACTROBAN) 2 % ointment Apply topically 3 (three) times daily. Lt great toe 2 g 0    pen needle, diabetic (PEN NEEDLE) 31 gauge x 1/4" Ndle 1 each by Misc.(Non-Drug; Combo Route) route 4 (four) times daily. 400 each 3     Current Facility-Administered Medications   Medication Dose Route Frequency Provider Last Rate Last Dose    glucagon (human recombinant) injection 1 mg  1 mg Intramuscular Once Tania Mckeon NP           Review of patient's allergies indicates:   Allergen Reactions    Altace [ramipril]      Cough         Review of Systems   Constitution: Negative for chills and fever.   Cardiovascular: Negative for claudication and leg swelling.   Skin: Positive for poor wound healing. Negative for color change and dry skin.   Musculoskeletal: Negative for joint pain, joint swelling, muscle cramps and muscle " weakness.   Neurological: Positive for numbness.   Psychiatric/Behavioral: Negative for altered mental status.           Objective:      Physical Exam   Constitutional: He appears well-developed and well-nourished. No distress.   Cardiovascular:   Pulses:       Dorsalis pedis pulses are 2+ on the right side, and 2+ on the left side.        Posterior tibial pulses are 1+ on the right side, and 1+ on the left side.   CFT is < 3 seconds bilateral.  Pedal hair growth is present bilateral.  Varicosities noted bilateral.  No lower extremity edema noted bilateral.  Toes are warm to touch bilateral.     Musculoskeletal: He exhibits no edema or tenderness.   Muscle strength 5/5 in all muscle groups bilateral.  No tenderness nor crepitation with ROM of foot/ankle joints bilateral.  No tenderness with palpation of bilateral foot and ankle.     Neurological: He has normal strength. A sensory deficit is present.   Protective sensation per Hyrum-Jayson monofilament is decreased bilateral.  Light touch is intact bilateral.   Skin: Skin is warm and dry. Capillary refill takes less than 2 seconds. Lesion noted. No abrasion, no burn, no ecchymosis, no laceration and no petechiae noted. He is not diaphoretic.   Location: Open wound noted to the Rt. Medial hallux.  Base: Down to dermis and comprised of fibrin.  New ingrowing of epithelium irregularly dispersed around the wound edges.  Drainage: None  Skylar wound: Devoid of erythema, edema, fluctuance, purulence, and malodor.   Pre-debridement measurement: 1.7 x 1.2 x 0.1cm  Post-debridement measurement: 1.9 x 1.4 x 0.1cm               Assessment:       Encounter Diagnoses   Name Primary?    Type 1 diabetes mellitus with diabetic autonomic neuropathy     Diabetic ulcer of toe of right foot associated with type 1 diabetes mellitus, limited to breakdown of skin Yes         Plan:       Diagnoses and all orders for this visit:    Diabetic ulcer of toe of right foot associated with type  1 diabetes mellitus, limited to breakdown of skin    Type 1 diabetes mellitus with diabetic autonomic neuropathy      I counseled the patient on his conditions, their implications and medical management.    Significant contracture noted to the distal most portion of the wound.    Performed a selective excisional debridement of the Rt. Hallux.  See attached procedure note.      The wound base was covered with sawyer.  The site was then offloaded with cassandra foam toe sulcus roll, and a football dressing was applied.    Advised to keep the dressing CDI x 1 week.    Advised to rest and elevate the affected extremity.    Instructed to minimize weight bearing to facilitate wound healing.    Advised to ambulate only in the postoperative shoe/boot.    RTC in 1 week.    Caleb Duffy DPM

## 2019-09-24 NOTE — PROCEDURES
"Wound Debridement  Date/Time: 9/24/2019 7:00 AM  Performed by: Caleb Duffy DPM  Authorized by: Caleb Duffy DPM     Time out: Immediately prior to procedure a "time out" was called to verify the correct patient, procedure, equipment, support staff and site/side marked as required.    Consent Done?:  Yes (Verbal)    Preparation: Patient was prepped and draped in usual sterile fashion    Local anesthesia used?: No      Wound Details:    Location:  Right foot    Location:  Right 1st Toe    Type of Debridement:  Excisional       Length (cm):  1.7       Area (sq cm):  2.04       Width (cm):  1.2       Percent Debrided (%):  100       Depth (cm):  0.1       Total Area Debrided (sq cm):  2.04    Depth of debridement:  Epidermis/Dermis    Tissue debrided:  Dermis    Devitalized tissue debrided:  Fibrin    Instruments:  Forceps and Scissors    Bleeding:  Minimal  Hemostasis Achieved: Yes    Method Used:  Pressure  Patient tolerance:  Patient tolerated the procedure well with no immediate complications      "

## 2019-09-25 ENCOUNTER — TELEPHONE (OUTPATIENT)
Dept: ENDOCRINOLOGY | Facility: CLINIC | Age: 64
End: 2019-09-25

## 2019-09-25 NOTE — TELEPHONE ENCOUNTER
----- Message from Hazel Rivera sent at 9/25/2019 10:22 AM CDT -----  Contact: Kandice with [employer]  Type: Needs Medical Advice    Who Called:  Kandice with [employer]  Symptoms (please be specific):    How long has patient had these symptoms:    Pharmacy name and phone #:    Best Call Back Number: [phone]  Additional Information: Kandice is following up on the fax sent for patient's G5 glucose monitoring supplies. Please call Kandice to confirm fax was received. Thanks!

## 2019-09-25 NOTE — TELEPHONE ENCOUNTER
Spoke with [employer] and they are resending paperwork to [place] location as none was received prior

## 2019-09-27 ENCOUNTER — TELEPHONE (OUTPATIENT)
Dept: ENDOCRINOLOGY | Facility: CLINIC | Age: 64
End: 2019-09-27

## 2019-09-27 NOTE — TELEPHONE ENCOUNTER
Faxed [employer] paperwork to [employer] for pt's supplies  Pt pushed back appt until 10/25/19 in order to received supplies for visit download

## 2019-09-30 ENCOUNTER — OFFICE VISIT (OUTPATIENT)
Dept: OPHTHALMOLOGY | Facility: CLINIC | Age: 64
End: 2019-09-30
Payer: COMMERCIAL

## 2019-09-30 PROCEDURE — 92014 PR EYE EXAM, EST PATIENT,COMPREHESV: ICD-10-PCS | Mod: S$GLB,,, | Performed by: OPHTHALMOLOGY

## 2019-09-30 PROCEDURE — 99999 PR PBB SHADOW E&M-EST. PATIENT-LVL III: CPT | Mod: PBBFAC,,, | Performed by: OPHTHALMOLOGY

## 2019-09-30 PROCEDURE — 92226 PR SPECIAL EYE EXAM, SUBSEQUENT: CPT | Mod: LT,S$GLB,, | Performed by: OPHTHALMOLOGY

## 2019-09-30 PROCEDURE — 99999 PR PBB SHADOW E&M-EST. PATIENT-LVL III: ICD-10-PCS | Mod: PBBFAC,,, | Performed by: OPHTHALMOLOGY

## 2019-09-30 PROCEDURE — 92134 POSTERIOR SEGMENT OCT RETINA (OCULAR COHERENCE TOMOGRAPHY)-BOTH EYES: ICD-10-PCS | Mod: S$GLB,,, | Performed by: OPHTHALMOLOGY

## 2019-09-30 PROCEDURE — 92134 CPTRZ OPH DX IMG PST SGM RTA: CPT | Mod: S$GLB,,, | Performed by: OPHTHALMOLOGY

## 2019-09-30 PROCEDURE — 92226 PR SPECIAL EYE EXAM, SUBSEQUENT: ICD-10-PCS | Mod: RT,S$GLB,, | Performed by: OPHTHALMOLOGY

## 2019-09-30 PROCEDURE — 92014 COMPRE OPH EXAM EST PT 1/>: CPT | Mod: S$GLB,,, | Performed by: OPHTHALMOLOGY

## 2019-09-30 NOTE — PROGRESS NOTES
HPI     DLS 9/17/18- No changes x last visit. DM under good control. Thinks cataract may be getting worse in OD     AWF=047 this am  A1C=7.4 (8/6/18)        OCT - some thinning - No ME OU      A/P    1. PDR OU S/P PRP (12 yrs. Ago in [place]; laser OD 2 yrs ago at    ; h/x laser OD (1982 study at [facility])  T1 uncontrolled    2. H/X Vit. Hem. OD      3. NS OU    BS/BP/chol control      1 year OCT

## 2019-10-01 ENCOUNTER — OFFICE VISIT (OUTPATIENT)
Dept: PODIATRY | Facility: CLINIC | Age: 64
End: 2019-10-01
Payer: COMMERCIAL

## 2019-10-01 VITALS — WEIGHT: 219.56 LBS | HEIGHT: 74 IN | BODY MASS INDEX: 28.18 KG/M2

## 2019-10-01 DIAGNOSIS — L97.511 DIABETIC ULCER OF TOE OF RIGHT FOOT ASSOCIATED WITH TYPE 1 DIABETES MELLITUS, LIMITED TO BREAKDOWN OF SKIN: Primary | ICD-10-CM

## 2019-10-01 DIAGNOSIS — E10.43 TYPE 1 DIABETES MELLITUS WITH DIABETIC AUTONOMIC NEUROPATHY: ICD-10-CM

## 2019-10-01 DIAGNOSIS — E10.621 DIABETIC ULCER OF TOE OF RIGHT FOOT ASSOCIATED WITH TYPE 1 DIABETES MELLITUS, LIMITED TO BREAKDOWN OF SKIN: Primary | ICD-10-CM

## 2019-10-01 PROCEDURE — 97597 DBRDMT OPN WND 1ST 20 CM/<: CPT | Mod: S$GLB,,, | Performed by: PODIATRIST

## 2019-10-01 PROCEDURE — 99999 PR PBB SHADOW E&M-EST. PATIENT-LVL II: ICD-10-PCS | Mod: PBBFAC,,, | Performed by: PODIATRIST

## 2019-10-01 PROCEDURE — 97597 WOUND DEBRIDEMENT: ICD-10-PCS | Mod: S$GLB,,, | Performed by: PODIATRIST

## 2019-10-01 PROCEDURE — 99499 UNLISTED E&M SERVICE: CPT | Mod: S$GLB,,, | Performed by: PODIATRIST

## 2019-10-01 PROCEDURE — 99499 NO LOS: ICD-10-PCS | Mod: S$GLB,,, | Performed by: PODIATRIST

## 2019-10-01 PROCEDURE — 99999 PR PBB SHADOW E&M-EST. PATIENT-LVL II: CPT | Mod: PBBFAC,,, | Performed by: PODIATRIST

## 2019-10-01 NOTE — PROGRESS NOTES
Subjective:      Patient ID: Hammad Douglas III is a 64 y.o. male.    Chief Complaint: Wound Care  Patient presents to clinic for a 1 week wound check of the Rt. Hallux.  Denies pain from the digit with today's exam.  Has kept the prior football dressing clean, dry, and intact x 1 week.  Relates ambulation to tolerance while in a postoperative shoe.  Denies having N/V/F/C/D.  Denies any additional pedal complaints.    Past Medical History:   Diagnosis Date    Cataract     Chronic kidney disease     Diabetes mellitus type I     Diagnosed at age 19    Diabetic retinopathy     See's Dr. Leedsma    Hyperlipidemia     Hypertension        Past Surgical History:   Procedure Laterality Date    COLONOSCOPY      2011 wnl       Family History   Problem Relation Age of Onset    Cataracts Mother        Social History     Socioeconomic History    Marital status:      Spouse name: Not on file    Number of children: Not on file    Years of education: Not on file    Highest education level: Not on file   Occupational History    Not on file   Social Needs    Financial resource strain: Not on file    Food insecurity:     Worry: Not on file     Inability: Not on file    Transportation needs:     Medical: Not on file     Non-medical: Not on file   Tobacco Use    Smoking status: Current Some Day Smoker     Types: Cigars    Smokeless tobacco: Never Used    Tobacco comment: 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   Substance and Sexual Activity    Alcohol use: Yes     Alcohol/week: 2.0 standard drinks     Types: 2 Cans of beer per week    Drug use: No    Sexual activity: Yes     Partners: Female   Lifestyle    Physical activity:     Days per week: Not on file     Minutes per session: Not on file    Stress: Not on file   Relationships    Social connections:     Talks on phone: Not on file     Gets together: Not on file     Attends Religion service: Not on file     " Active member of club or organization: Not on file     Attends meetings of clubs or organizations: Not on file     Relationship status: Not on file   Other Topics Concern    Not on file   Social History Narrative    Not on file       Current Outpatient Medications   Medication Sig Dispense Refill    atorvastatin (LIPITOR) 40 MG tablet TAKE 1 TABLET(40 MG) BY MOUTH EVERY DAY 90 tablet 4    blood sugar diagnostic (ONETOUCH ULTRA TEST) Strp 1 strip by Other route 4 (four) times daily. 400 strip 3    FLUoxetine 20 MG capsule TAKE 1 CAPSULE(20 MG) BY MOUTH EVERY DAY 90 capsule 3    glucagon (human recombinant) inj 1mg/mL kit Inject 1 mL (1 mg total) into the muscle as needed. 1 kit 6    insulin glargine U-300 conc (TOUJEO MAX U-300 SOLOSTAR) 300 unit/mL (3 mL) InPn Inject 22 Units into the skin every evening. 6 mL 11    insulin lispro (HUMALOG KWIKPEN INSULIN) 100 unit/mL pen INJECT USING INSULIN:CARB RATIO OF 1:7 SUBCUTANEOUSLY PRIOR TO MEALS. MAX TDD 40 UNITS 3 Box 3    losartan (COZAAR) 100 MG tablet TAKE 1 TABLET(100 MG) BY MOUTH EVERY DAY 90 tablet 3    mupirocin (BACTROBAN) 2 % ointment Apply topically 3 (three) times daily. Lt great toe 2 g 0    pen needle, diabetic (PEN NEEDLE) 31 gauge x 1/4" Ndle 1 each by Misc.(Non-Drug; Combo Route) route 4 (four) times daily. 400 each 3     Current Facility-Administered Medications   Medication Dose Route Frequency Provider Last Rate Last Dose    glucagon (human recombinant) injection 1 mg  1 mg Intramuscular Once Tania Mckeon NP           Review of patient's allergies indicates:   Allergen Reactions    Altace [ramipril]      Cough         Review of Systems   Constitution: Negative for chills and fever.   Cardiovascular: Negative for claudication and leg swelling.   Skin: Negative for color change, dry skin and poor wound healing.   Musculoskeletal: Negative for joint pain, joint swelling, muscle cramps and muscle weakness.   Neurological: Positive " for numbness.   Psychiatric/Behavioral: Negative for altered mental status.           Objective:      Physical Exam   Constitutional: He appears well-developed and well-nourished. No distress.   Cardiovascular:   Pulses:       Dorsalis pedis pulses are 2+ on the right side, and 2+ on the left side.        Posterior tibial pulses are 1+ on the right side, and 1+ on the left side.   CFT is < 3 seconds bilateral.  Pedal hair growth is present bilateral.  Varicosities noted bilateral.  No lower extremity edema noted bilateral.  Toes are warm to touch bilateral.     Musculoskeletal: He exhibits no edema or tenderness.   Muscle strength 5/5 in all muscle groups bilateral.  No tenderness nor crepitation with ROM of foot/ankle joints bilateral.  No tenderness with palpation of bilateral foot and ankle.     Neurological: He has normal strength. A sensory deficit is present.   Protective sensation per [place]-Jayson monofilament is decreased bilateral.  Light touch is intact bilateral.   Skin: Skin is warm and dry. Capillary refill takes less than 2 seconds. Lesion noted. No abrasion, no burn, no ecchymosis, no laceration and no petechiae noted. He is not diaphoretic.   Location: Open wound noted to the Rt. Medial hallux.  Base: Down to dermis and comprised of fibrin.   Drainage: None  Skylar wound: Devoid of erythema, edema, fluctuance, purulence, and malodor.   Pre-debridement measurement: 1.5 x 1.1 x 0.1cm  Post-debridement measurement: 1.7 x 1.3 x 0.1cm               Assessment:       Encounter Diagnoses   Name Primary?    Diabetic ulcer of toe of right foot associated with type 1 diabetes mellitus, limited to breakdown of skin Yes    Type 1 diabetes mellitus with diabetic autonomic neuropathy          Plan:       Hammad was seen today for wound care.    Diagnoses and all orders for this visit:    Diabetic ulcer of toe of right foot associated with type 1 diabetes mellitus, limited to breakdown of skin    Type 1 diabetes  mellitus with diabetic autonomic neuropathy      I counseled the patient on his conditions, their implications and medical management.    Performed a selective excisional debridement of the Rt. Hallux.  See attached procedure note.      The wound base was covered with sawyer.  The site was then offloaded with cassandra foam toe sulcus roll, and a football dressing was applied.    Advised to keep the dressing CDI x 1 week.    Advised to rest and elevate the affected extremity.    Instructed to minimize weight bearing to facilitate wound healing.    Advised to ambulate only in the postoperative shoe/boot.    RTC in 1 week.    Caleb Duffy DPM

## 2019-10-01 NOTE — PROCEDURES
"Wound Debridement  Date/Time: 10/1/2019 7:20 AM  Performed by: Caleb Duffy DPM  Authorized by: Caleb Duffy DPM     Time out: Immediately prior to procedure a "time out" was called to verify the correct patient, procedure, equipment, support staff and site/side marked as required.    Consent Done?:  Yes (Verbal)    Preparation: Patient was prepped and draped in usual sterile fashion    Local anesthesia used?: No      Wound Details:    Location:  Right foot    Location:  Right 1st Toe    Type of Debridement:  Excisional       Length (cm):  1.5       Area (sq cm):  1.65       Width (cm):  1.1       Percent Debrided (%):  100       Depth (cm):  0.1       Total Area Debrided (sq cm):  1.65    Depth of debridement:  Epidermis/Dermis    Tissue debrided:  Dermis and Hypergranulation    Devitalized tissue debrided:  Fibrin    Instruments:  Nippers    Bleeding:  Minimal  Hemostasis Achieved: Yes    Method Used:  Pressure and Silver Nitrate  Patient tolerance:  Patient tolerated the procedure well with no immediate complications      "

## 2019-10-08 ENCOUNTER — OFFICE VISIT (OUTPATIENT)
Dept: PODIATRY | Facility: CLINIC | Age: 64
End: 2019-10-08
Payer: COMMERCIAL

## 2019-10-08 VITALS — BODY MASS INDEX: 28.18 KG/M2 | WEIGHT: 219.56 LBS | HEIGHT: 74 IN

## 2019-10-08 DIAGNOSIS — E10.43 TYPE 1 DIABETES MELLITUS WITH DIABETIC AUTONOMIC NEUROPATHY: ICD-10-CM

## 2019-10-08 DIAGNOSIS — L97.511 DIABETIC ULCER OF TOE OF RIGHT FOOT ASSOCIATED WITH TYPE 1 DIABETES MELLITUS, LIMITED TO BREAKDOWN OF SKIN: Primary | ICD-10-CM

## 2019-10-08 DIAGNOSIS — E10.621 DIABETIC ULCER OF TOE OF RIGHT FOOT ASSOCIATED WITH TYPE 1 DIABETES MELLITUS, LIMITED TO BREAKDOWN OF SKIN: Primary | ICD-10-CM

## 2019-10-08 PROCEDURE — 97597 WOUND DEBRIDEMENT: ICD-10-PCS | Mod: S$GLB,,, | Performed by: PODIATRIST

## 2019-10-08 PROCEDURE — 99499 UNLISTED E&M SERVICE: CPT | Mod: S$GLB,,, | Performed by: PODIATRIST

## 2019-10-08 PROCEDURE — 99499 NO LOS: ICD-10-PCS | Mod: S$GLB,,, | Performed by: PODIATRIST

## 2019-10-08 PROCEDURE — 99999 PR PBB SHADOW E&M-EST. PATIENT-LVL II: ICD-10-PCS | Mod: PBBFAC,,, | Performed by: PODIATRIST

## 2019-10-08 PROCEDURE — 97597 DBRDMT OPN WND 1ST 20 CM/<: CPT | Mod: S$GLB,,, | Performed by: PODIATRIST

## 2019-10-08 PROCEDURE — 99999 PR PBB SHADOW E&M-EST. PATIENT-LVL II: CPT | Mod: PBBFAC,,, | Performed by: PODIATRIST

## 2019-10-08 NOTE — PROGRESS NOTES
Subjective:      Patient ID: Hammad Douglas III is a 64 y.o. male.    Chief Complaint: Wound Care  Patient presents to clinic for a 1 week wound check of the Rt. Hallux.  Denies pain from the digit with today's exam.  Has kept the prior football dressing clean, dry, and intact x 1 week.  Relates ambulation to tolerance while in a postoperative shoe.  Denies having N/V/F/C/D.  Denies any additional pedal complaints.    Past Medical History:   Diagnosis Date    Cataract     Chronic kidney disease     Diabetes mellitus type I     Diagnosed at age 19    Diabetic retinopathy     See's Dr. Ledesma    Hyperlipidemia     Hypertension        Past Surgical History:   Procedure Laterality Date    COLONOSCOPY      2011 wnl       Family History   Problem Relation Age of Onset    Cataracts Mother        Social History     Socioeconomic History    Marital status:      Spouse name: Not on file    Number of children: Not on file    Years of education: Not on file    Highest education level: Not on file   Occupational History    Not on file   Social Needs    Financial resource strain: Not on file    Food insecurity:     Worry: Not on file     Inability: Not on file    Transportation needs:     Medical: Not on file     Non-medical: Not on file   Tobacco Use    Smoking status: Current Some Day Smoker     Types: Cigars    Smokeless tobacco: Never Used    Tobacco comment: 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   Substance and Sexual Activity    Alcohol use: Yes     Alcohol/week: 2.0 standard drinks     Types: 2 Cans of beer per week    Drug use: No    Sexual activity: Yes     Partners: Female   Lifestyle    Physical activity:     Days per week: Not on file     Minutes per session: Not on file    Stress: Not on file   Relationships    Social connections:     Talks on phone: Not on file     Gets together: Not on file     Attends Mos[employer] service: Not on file     " Active member of club or organization: Not on file     Attends meetings of clubs or organizations: Not on file     Relationship status: Not on file   Other Topics Concern    Not on file   Social History Narrative    Not on file       Current Outpatient Medications   Medication Sig Dispense Refill    atorvastatin (LIPITOR) 40 MG tablet TAKE 1 TABLET(40 MG) BY MOUTH EVERY DAY 90 tablet 4    blood sugar diagnostic (ONETOUCH ULTRA TEST) Strp 1 strip by Other route 4 (four) times daily. 400 strip 3    FLUoxetine 20 MG capsule TAKE 1 CAPSULE(20 MG) BY MOUTH EVERY DAY 90 capsule 3    glucagon (human recombinant) inj 1mg/mL kit Inject 1 mL (1 mg total) into the muscle as needed. 1 kit 6    insulin glargine U-300 conc (TOUJEO MAX U-300 SOLOSTAR) 300 unit/mL (3 mL) InPn Inject 22 Units into the skin every evening. 6 mL 11    insulin lispro (HUMALOG KWIKPEN INSULIN) 100 unit/mL pen INJECT USING INSULIN:CARB RATIO OF 1:7 SUBCUTANEOUSLY PRIOR TO MEALS. MAX TDD 40 UNITS 3 Box 3    losartan (COZAAR) 100 MG tablet TAKE 1 TABLET(100 MG) BY MOUTH EVERY DAY 90 tablet 3    mupirocin (BACTROBAN) 2 % ointment Apply topically 3 (three) times daily. Lt great toe 2 g 0    pen needle, diabetic (PEN NEEDLE) 31 gauge x 1/4" Ndle 1 each by Misc.(Non-Drug; Combo Route) route 4 (four) times daily. 400 each 3     Current Facility-Administered Medications   Medication Dose Route Frequency Provider Last Rate Last Dose    glucagon (human recombinant) injection 1 mg  1 mg Intramuscular Once Tania Mckeon NP           Review of patient's allergies indicates:   Allergen Reactions    Altace [ramipril]      Cough         Review of Systems   Constitution: Negative for chills and fever.   Cardiovascular: Negative for claudication and leg swelling.   Skin: Negative for color change, dry skin and poor wound healing.   Musculoskeletal: Negative for joint pain, joint swelling, muscle cramps and muscle weakness.   Neurological: Positive " for numbness.   Psychiatric/Behavioral: Negative for altered mental status.           Objective:      Physical Exam   Constitutional: He appears well-developed and well-nourished. No distress.   Cardiovascular:   Pulses:       Dorsalis pedis pulses are 2+ on the right side, and 2+ on the left side.        Posterior tibial pulses are 1+ on the right side, and 1+ on the left side.   CFT is < 3 seconds bilateral.  Pedal hair growth is present bilateral.  Varicosities noted bilateral.  No lower extremity edema noted bilateral.  Toes are warm to touch bilateral.     Musculoskeletal: He exhibits no edema or tenderness.   Muscle strength 5/5 in all muscle groups bilateral.  No tenderness nor crepitation with ROM of foot/ankle joints bilateral.  No tenderness with palpation of bilateral foot and ankle.     Neurological: He has normal strength. A sensory deficit is present.   Protective sensation per Reynolds-Jayson monofilament is decreased bilateral.  Light touch is intact bilateral.   Skin: Skin is warm and dry. Capillary refill takes less than 2 seconds. Lesion noted. No abrasion, no burn, no ecchymosis, no laceration and no petechiae noted. He is not diaphoretic.   Location: Open wound noted to the Rt. Medial hallux.  Base: Down to dermis and comprised of fibrin.   Drainage: None  Skylar wound: Devoid of erythema, edema, fluctuance, purulence, and malodor.   Pre-debridement measurement: 1.1 x 0.9 x 0.1cm  Post-debridement measurement: 1.3 x 1.1 x 0.1cm               Assessment:       Encounter Diagnoses   Name Primary?    Diabetic ulcer of toe of right foot associated with type 1 diabetes mellitus, limited to breakdown of skin Yes    Type 1 diabetes mellitus with diabetic autonomic neuropathy          Plan:       Hammad was seen today for wound care.    Diagnoses and all orders for this visit:    Diabetic ulcer of toe of right foot associated with type 1 diabetes mellitus, limited to breakdown of skin    Type 1 diabetes  mellitus with diabetic autonomic neuropathy      I counseled the patient on his conditions, their implications and medical management.    Performed a selective excisional debridement of the Rt. Hallux.  See attached procedure note.      The wound base was covered with sawyer.  The site was then offloaded with cassandra foam toe sulcus roll, and a football dressing was applied.    Advised to keep the dressing CDI until Friday.  Patient is to remove his dressing Friday morning and to cover with a foam aperture pad, neosporin to the wound bed, and cover with a fabric or waterproof bandage, as he will be attending a wedding and unable to don a football wrap.    Instructed to minimize weight bearing to facilitate wound healing.    Advised to ambulate only in the postoperative shoe/boot, however, may utilize a casual shoe for Friday.    RTC in 1 week.    Caleb Duffy DPM

## 2019-10-08 NOTE — PROCEDURES
"Wound Debridement  Date/Time: 10/8/2019 7:00 AM  Performed by: Caleb Duffy DPM  Authorized by: Caleb Duffy DPM     Time out: Immediately prior to procedure a "time out" was called to verify the correct patient, procedure, equipment, support staff and site/side marked as required.    Consent Done?:  Yes (Verbal)    Preparation: Patient was prepped and draped in usual sterile fashion    Local anesthesia used?: No      Wound Details:    Location:  Right foot    Location:  Right 1st Toe    Type of Debridement:  Excisional       Length (cm):  1.1       Area (sq cm):  0.99       Width (cm):  0.9       Percent Debrided (%):  100       Depth (cm):  0.1       Total Area Debrided (sq cm):  0.99    Depth of debridement:  Epidermis/Dermis    Tissue debrided:  Dermis    Devitalized tissue debrided:  Fibrin    Instruments:  Curette    Bleeding:  Minimal  Hemostasis Achieved: Yes    Method Used:  Pressure  Patient tolerance:  Patient tolerated the procedure well with no immediate complications      "

## 2019-10-15 ENCOUNTER — OFFICE VISIT (OUTPATIENT)
Dept: PODIATRY | Facility: CLINIC | Age: 64
End: 2019-10-15
Payer: COMMERCIAL

## 2019-10-15 VITALS
DIASTOLIC BLOOD PRESSURE: 68 MMHG | SYSTOLIC BLOOD PRESSURE: 126 MMHG | BODY MASS INDEX: 28.18 KG/M2 | HEART RATE: 82 BPM | WEIGHT: 219.56 LBS | HEIGHT: 74 IN

## 2019-10-15 DIAGNOSIS — E10.621 DIABETIC ULCER OF TOE OF RIGHT FOOT ASSOCIATED WITH TYPE 1 DIABETES MELLITUS, LIMITED TO BREAKDOWN OF SKIN: Primary | ICD-10-CM

## 2019-10-15 DIAGNOSIS — L97.511 DIABETIC ULCER OF TOE OF RIGHT FOOT ASSOCIATED WITH TYPE 1 DIABETES MELLITUS, LIMITED TO BREAKDOWN OF SKIN: Primary | ICD-10-CM

## 2019-10-15 DIAGNOSIS — E10.43 TYPE 1 DIABETES MELLITUS WITH DIABETIC AUTONOMIC NEUROPATHY: ICD-10-CM

## 2019-10-15 PROCEDURE — 99999 PR PBB SHADOW E&M-EST. PATIENT-LVL III: CPT | Mod: PBBFAC,,, | Performed by: PODIATRIST

## 2019-10-15 PROCEDURE — 99499 NO LOS: ICD-10-PCS | Mod: S$GLB,,, | Performed by: PODIATRIST

## 2019-10-15 PROCEDURE — 97597 DBRDMT OPN WND 1ST 20 CM/<: CPT | Mod: S$GLB,,, | Performed by: PODIATRIST

## 2019-10-15 PROCEDURE — 99999 PR PBB SHADOW E&M-EST. PATIENT-LVL III: ICD-10-PCS | Mod: PBBFAC,,, | Performed by: PODIATRIST

## 2019-10-15 PROCEDURE — 99499 UNLISTED E&M SERVICE: CPT | Mod: S$GLB,,, | Performed by: PODIATRIST

## 2019-10-15 PROCEDURE — 97597 WOUND DEBRIDEMENT: ICD-10-PCS | Mod: S$GLB,,, | Performed by: PODIATRIST

## 2019-10-15 NOTE — PROCEDURES
"Wound Debridement  Date/Time: 10/15/2019 7:00 AM  Performed by: Caleb Duffy DPM  Authorized by: Caleb Duffy DPM     Time out: Immediately prior to procedure a "time out" was called to verify the correct patient, procedure, equipment, support staff and site/side marked as required.    Consent Done?:  Yes (Verbal)    Preparation: Patient was prepped and draped in usual sterile fashion    Local anesthesia used?: No      Wound Details:    Location:  Right foot    Location:  Right 1st Toe    Type of Debridement:  Excisional       Length (cm):  1       Area (sq cm):  0.5       Width (cm):  0.5       Percent Debrided (%):  100       Depth (cm):  0.1       Total Area Debrided (sq cm):  0.5    Depth of debridement:  Epidermis/Dermis    Tissue debrided:  Dermis    Devitalized tissue debrided:  Fibrin    Instruments:  Curette    Bleeding:  Minimal  Hemostasis Achieved: Yes    Method Used:  Pressure  Patient tolerance:  Patient tolerated the procedure well with no immediate complications      "

## 2019-10-15 NOTE — PROGRESS NOTES
Subjective:      Patient ID: Hammad Douglas III is a 64 y.o. male.    Chief Complaint: No chief complaint on file.  Patient presents to clinic for a 1 week wound check of the Rt. Hallux.  Denies pain from the digit with today's exam.  Has been offloading the site of ulceration as instructed.   Relates ambulation to tolerance while in casual shoe gear without incident.  Denies having N/V/F/C/D.  Denies any additional pedal complaints.    Past Medical History:   Diagnosis Date    Cataract     Chronic kidney disease     Diabetes mellitus type I     Diagnosed at age 19    Diabetic retinopathy     See's Dr. Ledesma    Hyperlipidemia     Hypertension        Past Surgical History:   Procedure Laterality Date    COLONOSCOPY      2011 wnl       Family History   Problem Relation Age of Onset    Cataracts Mother        Social History     Socioeconomic History    Marital status:      Spouse name: Not on file    Number of children: Not on file    Years of education: Not on file    Highest education level: Not on file   Occupational History    Not on file   Social Needs    Financial resource strain: Not on file    Food insecurity:     Worry: Not on file     Inability: Not on file    Transportation needs:     Medical: Not on file     Non-medical: Not on file   Tobacco Use    Smoking status: Current Some Day Smoker     Types: Cigars    Smokeless tobacco: Never Used    Tobacco comment: 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   Substance and Sexual Activity    Alcohol use: Yes     Alcohol/week: 2.0 standard drinks     Types: 2 Cans of beer per week    Drug use: No    Sexual activity: Yes     Partners: Female   Lifestyle    Physical activity:     Days per week: Not on file     Minutes per session: Not on file    Stress: Not on file   Relationships    Social connections:     Talks on phone: Not on file     Gets together: Not on file     Attends [employer]  "service: Not on file     Active member of club or organization: Not on file     Attends meetings of clubs or organizations: Not on file     Relationship status: Not on file   Other Topics Concern    Not on file   Social History Narrative    Not on file       Current Outpatient Medications   Medication Sig Dispense Refill    atorvastatin (LIPITOR) 40 MG tablet TAKE 1 TABLET(40 MG) BY MOUTH EVERY DAY 90 tablet 4    blood sugar diagnostic (ONETOUCH ULTRA TEST) Strp 1 strip by Other route 4 (four) times daily. 400 strip 3    FLUoxetine 20 MG capsule TAKE 1 CAPSULE(20 MG) BY MOUTH EVERY DAY 90 capsule 3    glucagon (human recombinant) inj 1mg/mL kit Inject 1 mL (1 mg total) into the muscle as needed. 1 kit 6    insulin glargine U-300 conc (TOUJEO MAX U-300 SOLOSTAR) 300 unit/mL (3 mL) InPn Inject 22 Units into the skin every evening. 6 mL 11    insulin lispro (HUMALOG KWIKPEN INSULIN) 100 unit/mL pen INJECT USING INSULIN:CARB RATIO OF 1:7 SUBCUTANEOUSLY PRIOR TO MEALS. MAX TDD 40 UNITS 3 Box 3    losartan (COZAAR) 100 MG tablet TAKE 1 TABLET(100 MG) BY MOUTH EVERY DAY 90 tablet 3    mupirocin (BACTROBAN) 2 % ointment Apply topically 3 (three) times daily. Lt great toe 2 g 0    pen needle, diabetic (PEN NEEDLE) 31 gauge x 1/4" Ndle 1 each by Misc.(Non-Drug; Combo Route) route 4 (four) times daily. 400 each 3     Current Facility-Administered Medications   Medication Dose Route Frequency Provider Last Rate Last Dose    glucagon (human recombinant) injection 1 mg  1 mg Intramuscular Once Tania Mckeon NP           Review of patient's allergies indicates:   Allergen Reactions    Altace [ramipril]      Cough         Review of Systems   Constitution: Negative for chills and fever.   Cardiovascular: Negative for claudication and leg swelling.   Skin: Negative for color change, dry skin and poor wound healing.   Musculoskeletal: Negative for joint pain, joint swelling, muscle cramps and muscle weakness. "   Neurological: Positive for numbness.   Psychiatric/Behavioral: Negative for altered mental status.           Objective:      Physical Exam   Constitutional: He appears well-developed and well-nourished. No distress.   Cardiovascular:   Pulses:       Dorsalis pedis pulses are 2+ on the right side, and 2+ on the left side.        Posterior tibial pulses are 1+ on the right side, and 1+ on the left side.   CFT is < 3 seconds bilateral.  Pedal hair growth is present bilateral.  Varicosities noted bilateral.  No lower extremity edema noted bilateral.  Toes are warm to touch bilateral.     Musculoskeletal: He exhibits no edema or tenderness.   Muscle strength 5/5 in all muscle groups bilateral.  No tenderness nor crepitation with ROM of foot/ankle joints bilateral.  No tenderness with palpation of bilateral foot and ankle.     Neurological: He has normal strength. A sensory deficit is present.   Protective sensation per [place]-Jayson monofilament is decreased bilateral.  Light touch is intact bilateral.   Skin: Skin is warm and dry. Capillary refill takes less than 2 seconds. Lesion noted. No abrasion, no burn, no ecchymosis, no laceration and no petechiae noted. He is not diaphoretic.   Location: Open wound noted to the Rt. Medial hallux.  Base: Down to dermis and comprised of fibrin.   Drainage: None  Skylar wound: Devoid of erythema, edema, fluctuance, purulence, and malodor.   Pre-debridement measurement: 1 x 0.5 x 0.1cm  Post-debridement measurement: 1.2 x 0.7 x 0.1cm               Assessment:       Encounter Diagnoses   Name Primary?    Diabetic ulcer of toe of right foot associated with type 1 diabetes mellitus, limited to breakdown of skin Yes    Type 1 diabetes mellitus with diabetic autonomic neuropathy          Plan:       Diagnoses and all orders for this visit:    Diabetic ulcer of toe of right foot associated with type 1 diabetes mellitus, limited to breakdown of skin    Type 1 diabetes mellitus with  diabetic autonomic neuropathy      I counseled the patient on his conditions, their implications and medical management.    Performed a selective excisional debridement of the Rt. Hallux.  See attached procedure note.      The wound base was covered with sawyer.  The site was then offloaded with a foam aperture pad, covered with a bandage, and secured with coban.    Patient to change this dressing every three days, as instructed.  Given residual sawyer for said dressing changes.     Instructed to minimize weight bearing to facilitate wound healing.     May ambulate in his casual shoe to tolerance, as the site appears to be well offloaded with the foam pad.     RTC in 2 weeks for follow up.    Caleb Duffy DPM

## 2019-10-24 ENCOUNTER — PATIENT MESSAGE (OUTPATIENT)
Dept: ENDOCRINOLOGY | Facility: CLINIC | Age: 64
End: 2019-10-24

## 2019-10-25 ENCOUNTER — OFFICE VISIT (OUTPATIENT)
Dept: ENDOCRINOLOGY | Facility: CLINIC | Age: 64
End: 2019-10-25
Payer: COMMERCIAL

## 2019-10-25 VITALS
WEIGHT: 220.69 LBS | HEART RATE: 80 BPM | SYSTOLIC BLOOD PRESSURE: 132 MMHG | DIASTOLIC BLOOD PRESSURE: 76 MMHG | HEIGHT: 74 IN | RESPIRATION RATE: 18 BRPM | BODY MASS INDEX: 28.32 KG/M2

## 2019-10-25 DIAGNOSIS — E10.43 TYPE 1 DIABETES MELLITUS WITH DIABETIC AUTONOMIC NEUROPATHY: Primary | ICD-10-CM

## 2019-10-25 DIAGNOSIS — E78.5 HYPERLIPIDEMIA, UNSPECIFIED HYPERLIPIDEMIA TYPE: ICD-10-CM

## 2019-10-25 DIAGNOSIS — E10.21 TYPE 1 DIABETES MELLITUS WITH DIABETIC NEPHROPATHY: ICD-10-CM

## 2019-10-25 DIAGNOSIS — I10 ESSENTIAL HYPERTENSION: ICD-10-CM

## 2019-10-25 DIAGNOSIS — E10.649 HYPOGLYCEMIA UNAWARENESS IN TYPE 1 DIABETES MELLITUS: ICD-10-CM

## 2019-10-25 PROCEDURE — 95251 PR GLUCOSE MONITOR, 72 HOUR, PHYS INTERP: ICD-10-PCS | Mod: S$GLB,,, | Performed by: NURSE PRACTITIONER

## 2019-10-25 PROCEDURE — 99214 PR OFFICE/OUTPT VISIT, EST, LEVL IV, 30-39 MIN: ICD-10-PCS | Mod: S$GLB,,, | Performed by: NURSE PRACTITIONER

## 2019-10-25 PROCEDURE — 99999 PR PBB SHADOW E&M-EST. PATIENT-LVL IV: CPT | Mod: PBBFAC,,, | Performed by: NURSE PRACTITIONER

## 2019-10-25 PROCEDURE — 95251 CONT GLUC MNTR ANALYSIS I&R: CPT | Mod: S$GLB,,, | Performed by: NURSE PRACTITIONER

## 2019-10-25 PROCEDURE — 99999 PR PBB SHADOW E&M-EST. PATIENT-LVL IV: ICD-10-PCS | Mod: PBBFAC,,, | Performed by: NURSE PRACTITIONER

## 2019-10-25 PROCEDURE — 99214 OFFICE O/P EST MOD 30 MIN: CPT | Mod: S$GLB,,, | Performed by: NURSE PRACTITIONER

## 2019-10-25 RX ORDER — INSULIN LISPRO 100 [IU]/ML
INJECTION, SOLUTION INTRAVENOUS; SUBCUTANEOUS
Qty: 3 BOX | Refills: 3
Start: 2019-10-25 | End: 2020-07-08 | Stop reason: SDUPTHER

## 2019-10-25 RX ORDER — INSULIN GLARGINE 300 [IU]/ML
24 INJECTION, SOLUTION SUBCUTANEOUS NIGHTLY
Start: 2019-10-25 | End: 2020-04-06

## 2019-10-25 NOTE — PATIENT INSTRUCTIONS
Carb counting smartphone apps:  My Fitness Pal  Calorie Viral     - Give 1 unit of Humalog for every 6 grams of carbs. Add correction scale to this dose.   - For snacks, only cover the carb content. Don't add correction to this dose!

## 2019-10-29 ENCOUNTER — OFFICE VISIT (OUTPATIENT)
Dept: PODIATRY | Facility: CLINIC | Age: 64
End: 2019-10-29
Payer: COMMERCIAL

## 2019-10-29 VITALS
DIASTOLIC BLOOD PRESSURE: 70 MMHG | WEIGHT: 220.69 LBS | HEIGHT: 74 IN | SYSTOLIC BLOOD PRESSURE: 125 MMHG | BODY MASS INDEX: 28.32 KG/M2 | HEART RATE: 78 BPM

## 2019-10-29 DIAGNOSIS — E10.43 TYPE 1 DIABETES MELLITUS WITH DIABETIC AUTONOMIC NEUROPATHY: ICD-10-CM

## 2019-10-29 DIAGNOSIS — R23.8: ICD-10-CM

## 2019-10-29 DIAGNOSIS — L97.511 DIABETIC ULCER OF TOE OF RIGHT FOOT ASSOCIATED WITH TYPE 1 DIABETES MELLITUS, LIMITED TO BREAKDOWN OF SKIN: Primary | ICD-10-CM

## 2019-10-29 DIAGNOSIS — E10.621 DIABETIC ULCER OF TOE OF RIGHT FOOT ASSOCIATED WITH TYPE 1 DIABETES MELLITUS, LIMITED TO BREAKDOWN OF SKIN: Primary | ICD-10-CM

## 2019-10-29 PROCEDURE — 99499 UNLISTED E&M SERVICE: CPT | Mod: S$GLB,,, | Performed by: PODIATRIST

## 2019-10-29 PROCEDURE — 97597 WOUND DEBRIDEMENT: ICD-10-PCS | Mod: S$GLB,,, | Performed by: PODIATRIST

## 2019-10-29 PROCEDURE — 99999 PR PBB SHADOW E&M-EST. PATIENT-LVL III: CPT | Mod: PBBFAC,,, | Performed by: PODIATRIST

## 2019-10-29 PROCEDURE — 99999 PR PBB SHADOW E&M-EST. PATIENT-LVL III: ICD-10-PCS | Mod: PBBFAC,,, | Performed by: PODIATRIST

## 2019-10-29 PROCEDURE — 99499 NO LOS: ICD-10-PCS | Mod: S$GLB,,, | Performed by: PODIATRIST

## 2019-10-29 PROCEDURE — 97597 DBRDMT OPN WND 1ST 20 CM/<: CPT | Mod: S$GLB,,, | Performed by: PODIATRIST

## 2019-10-29 NOTE — PROGRESS NOTES
Subjective:      Patient ID: Hammad Douglas III is a 64 y.o. male.    Chief Complaint: Wound Care  Patient presents to clinic for a 2 week wound check of the Rt. Hallux.  Denies pain from the digit with today's exam.  Has been applying sawyer, a foam aperture, and securing with coban, daily, as instructed for the past two weeks.  Relates ambulation to tolerance while in a casual shoe.  Relates concern, as he has noticed changes in coloration to the dorian wound skin.  Denies noticing pus from the site.  Denies having N/V/F/C/D.  Denies any additional pedal complaints.    Past Medical History:   Diagnosis Date    Cataract     Chronic kidney disease     Diabetes mellitus type I     Diagnosed at age 19    Diabetic retinopathy     See's Dr. Ledesma    Hyperlipidemia     Hypertension        Past Surgical History:   Procedure Laterality Date    COLONOSCOPY      2011 wnl       Family History   Problem Relation Age of Onset    Cataracts Mother        Social History     Socioeconomic History    Marital status:      Spouse name: Not on file    Number of children: Not on file    Years of education: Not on file    Highest education level: Not on file   Occupational History    Not on file   Social Needs    Financial resource strain: Not on file    Food insecurity:     Worry: Not on file     Inability: Not on file    Transportation needs:     Medical: Not on file     Non-medical: Not on file   Tobacco Use    Smoking status: Current Some Day Smoker     Types: Cigars    Smokeless tobacco: Never Used    Tobacco comment: 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   Substance and Sexual Activity    Alcohol use: Yes     Alcohol/week: 2.0 standard drinks     Types: 2 Cans of beer per week    Drug use: No    Sexual activity: Yes     Partners: Female   Lifestyle    Physical activity:     Days per week: Not on file     Minutes per session: Not on file    Stress: Not on  "file   Relationships    Social connections:     Talks on phone: Not on file     Gets together: Not on file     Attends Taoism service: Not on file     Active member of club or organization: Not on file     Attends meetings of clubs or organizations: Not on file     Relationship status: Not on file   Other Topics Concern    Not on file   Social History Narrative    Not on file       Current Outpatient Medications   Medication Sig Dispense Refill    atorvastatin (LIPITOR) 40 MG tablet TAKE 1 TABLET(40 MG) BY MOUTH EVERY DAY 90 tablet 4    blood sugar diagnostic (ONETOUCH ULTRA TEST) Strp 1 strip by Other route 4 (four) times daily. 400 strip 3    FLUoxetine 20 MG capsule TAKE 1 CAPSULE(20 MG) BY MOUTH EVERY DAY 90 capsule 3    glucagon (human recombinant) inj 1mg/mL kit Inject 1 mL (1 mg total) into the muscle as needed. 1 kit 6    insulin glargine U-300 conc (TOUJEO MAX U-300 SOLOSTAR) 300 unit/mL (3 mL) InPn Inject 24 Units into the skin every evening.      insulin lispro (HUMALOG KWIKPEN INSULIN) 100 unit/mL pen INJECT USING INSULIN:CARB RATIO OF 1:6 SUBCUTANEOUSLY PRIOR TO MEALS. MAX TDD 40 UNITS 3 Box 3    losartan (COZAAR) 100 MG tablet TAKE 1 TABLET(100 MG) BY MOUTH EVERY DAY 90 tablet 3    mupirocin (BACTROBAN) 2 % ointment Apply topically 3 (three) times daily. Lt great toe 2 g 0    pen needle, diabetic (PEN NEEDLE) 31 gauge x 1/4" Ndle 1 each by Misc.(Non-Drug; Combo Route) route 4 (four) times daily. 400 each 3     Current Facility-Administered Medications   Medication Dose Route Frequency Provider Last Rate Last Dose    glucagon (human recombinant) injection 1 mg  1 mg Intramuscular Once Tania Mckeon NP           Review of patient's allergies indicates:   Allergen Reactions    Altace [ramipril]      Cough         Review of Systems   Constitution: Negative for chills and fever.   Cardiovascular: Negative for claudication and leg swelling.   Skin: Positive for poor wound healing. " Negative for color change and dry skin.   Musculoskeletal: Negative for joint pain, joint swelling, muscle cramps and muscle weakness.   Neurological: Positive for numbness.   Psychiatric/Behavioral: Negative for altered mental status.           Objective:      Physical Exam   Constitutional: He appears well-developed and well-nourished. No distress.   Cardiovascular:   Pulses:       Dorsalis pedis pulses are 2+ on the right side, and 2+ on the left side.        Posterior tibial pulses are 1+ on the right side, and 1+ on the left side.   CFT is < 3 seconds bilateral.  Pedal hair growth is present bilateral.  Varicosities noted bilateral.  No lower extremity edema noted bilateral.  Toes are warm to touch bilateral.     Musculoskeletal: He exhibits no edema or tenderness.   Muscle strength 5/5 in all muscle groups bilateral.  No tenderness nor crepitation with ROM of foot/ankle joints bilateral.  No tenderness with palpation of bilateral foot and ankle.     Neurological: He has normal strength. A sensory deficit is present.   Protective sensation per [place]ayson monofilament is decreased bilateral.  Light touch is intact bilateral.   Skin: Skin is warm and dry. Capillary refill takes less than 2 seconds. Lesion noted. No abrasion, no burn, no ecchymosis, no laceration and no petechiae noted. He is not diaphoretic.   Location: Open wound noted to the Rt. Medial hallux.  Base: Down to dermis and comprised of fibrin.   Drainage: None  Dorian wound: Devoid of erythema, edema, fluctuance, purulence, and malodor.  Blistering noted to dorian wound skin.   Pre-debridement measurement: 0.7 x 0.6 x 0.1cm  Post-debridement measurement: 1.5 x 1.5 x 0.1cm               Assessment:       Encounter Diagnoses   Name Primary?    Diabetic ulcer of toe of right foot associated with type 1 diabetes mellitus, limited to breakdown of skin Yes    Type 1 diabetes mellitus with diabetic autonomic neuropathy     Blister of periwound skin           Plan:       Hammad was seen today for wound care.    Diagnoses and all orders for this visit:    Diabetic ulcer of toe of right foot associated with type 1 diabetes mellitus, limited to breakdown of skin    Type 1 diabetes mellitus with diabetic autonomic neuropathy    Blister of periwound skin      I counseled the patient on his conditions, their implications and medical management.    Suspect injury to the dorian wound skin is secondary to shearing from either the shoe or coban he was using to secure the dressing.  Otherwise, no localized sign of infection noted.    Performed a selective excisional debridement of the Rt. Hallux.  See attached procedure note.      The wound base was covered with sawyer.  The site was then offloaded with cassandra foam toe sulcus roll, and a football dressing was applied.    Advised to keep the dressing CDI x 1 week.    Instructed to minimize weight bearing to facilitate wound healing.    Advised to ambulate only in the postoperative shoe/boot x 1 week.    RTC in 1 week for follow up.    Caleb Duffy DPM

## 2019-10-29 NOTE — PROCEDURES
"Wound Debridement  Date/Time: 10/29/2019 7:20 AM  Performed by: Caleb Duffy DPM  Authorized by: Caleb Duffy DPM     Time out: Immediately prior to procedure a "time out" was called to verify the correct patient, procedure, equipment, support staff and site/side marked as required.    Consent Done?:  Yes (Verbal)    Preparation: Patient was prepped and draped in usual sterile fashion    Local anesthesia used?: No      Wound Details:    Location:  Right foot    Location:  Right 1st Toe    Type of Debridement:  Excisional       Length (cm):  0.7       Area (sq cm):  0.42       Width (cm):  0.6       Percent Debrided (%):  100       Depth (cm):  0.1       Total Area Debrided (sq cm):  0.42    Depth of debridement:  Epidermis/Dermis    Tissue debrided:  Dermis and Epidermis    Devitalized tissue debrided:  Fibrin    Instruments:  Blade    Bleeding:  Minimal  Hemostasis Achieved: Yes    Method Used:  Pressure  Patient tolerance:  Patient tolerated the procedure well with no immediate complications      "

## 2019-11-05 ENCOUNTER — OFFICE VISIT (OUTPATIENT)
Dept: PODIATRY | Facility: CLINIC | Age: 64
End: 2019-11-05
Payer: COMMERCIAL

## 2019-11-05 VITALS — WEIGHT: 220.69 LBS | HEIGHT: 74 IN | BODY MASS INDEX: 28.32 KG/M2

## 2019-11-05 DIAGNOSIS — E10.621 DIABETIC ULCER OF TOE OF RIGHT FOOT ASSOCIATED WITH TYPE 1 DIABETES MELLITUS, LIMITED TO BREAKDOWN OF SKIN: Primary | ICD-10-CM

## 2019-11-05 DIAGNOSIS — L97.511 DIABETIC ULCER OF TOE OF RIGHT FOOT ASSOCIATED WITH TYPE 1 DIABETES MELLITUS, LIMITED TO BREAKDOWN OF SKIN: Primary | ICD-10-CM

## 2019-11-05 DIAGNOSIS — E10.43 TYPE 1 DIABETES MELLITUS WITH DIABETIC AUTONOMIC NEUROPATHY: ICD-10-CM

## 2019-11-05 PROCEDURE — 99999 PR PBB SHADOW E&M-EST. PATIENT-LVL II: CPT | Mod: PBBFAC,,, | Performed by: PODIATRIST

## 2019-11-05 PROCEDURE — 97597 DBRDMT OPN WND 1ST 20 CM/<: CPT | Mod: S$GLB,,, | Performed by: PODIATRIST

## 2019-11-05 PROCEDURE — 97597 WOUND DEBRIDEMENT: ICD-10-PCS | Mod: S$GLB,,, | Performed by: PODIATRIST

## 2019-11-05 PROCEDURE — 99499 NO LOS: ICD-10-PCS | Mod: S$GLB,,, | Performed by: PODIATRIST

## 2019-11-05 PROCEDURE — 99999 PR PBB SHADOW E&M-EST. PATIENT-LVL II: ICD-10-PCS | Mod: PBBFAC,,, | Performed by: PODIATRIST

## 2019-11-05 PROCEDURE — 99499 UNLISTED E&M SERVICE: CPT | Mod: S$GLB,,, | Performed by: PODIATRIST

## 2019-11-05 NOTE — PROGRESS NOTES
Subjective:      Patient ID: Hammad Douglas III is a 64 y.o. male.    Chief Complaint: Wound Care  Patient presents to clinic for a 1 week wound check of the Rt. Hallux.  Denies pain from the digit with today's exam.  Has kept the prior football dressing clean, dry, and intact x 1 week.   Relates ambulation to tolerance in the postoperative shoe.   Denies having N/V/F/C/D.  Denies any additional pedal complaints.    Past Medical History:   Diagnosis Date    Cataract     Chronic kidney disease     Diabetes mellitus type I     Diagnosed at age 19    Diabetic retinopathy     See's Dr. Ledesma    Hyperlipidemia     Hypertension        Past Surgical History:   Procedure Laterality Date    COLONOSCOPY      2011 wnl       Family History   Problem Relation Age of Onset    Cataracts Mother        Social History     Socioeconomic History    Marital status:      Spouse name: Not on file    Number of children: Not on file    Years of education: Not on file    Highest education level: Not on file   Occupational History    Not on file   Social Needs    Financial resource strain: Not on file    Food insecurity:     Worry: Not on file     Inability: Not on file    Transportation needs:     Medical: Not on file     Non-medical: Not on file   Tobacco Use    Smoking status: Current Some Day Smoker     Types: Cigars    Smokeless tobacco: Never Used    Tobacco comment: 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   Substance and Sexual Activity    Alcohol use: Yes     Alcohol/week: 2.0 standard drinks     Types: 2 Cans of beer per week    Drug use: No    Sexual activity: Yes     Partners: Female   Lifestyle    Physical activity:     Days per week: Not on file     Minutes per session: Not on file    Stress: Not on file   Relationships    Social connections:     Talks on phone: Not on file     Gets together: Not on file     Attends Pentecostal service: Not on file      "Active member of club or organization: Not on file     Attends meetings of clubs or organizations: Not on file     Relationship status: Not on file   Other Topics Concern    Not on file   Social History Narrative    Not on file       Current Outpatient Medications   Medication Sig Dispense Refill    atorvastatin (LIPITOR) 40 MG tablet TAKE 1 TABLET(40 MG) BY MOUTH EVERY DAY 90 tablet 4    blood sugar diagnostic (ONETOUCH ULTRA TEST) Strp 1 strip by Other route 4 (four) times daily. 400 strip 3    FLUoxetine 20 MG capsule TAKE 1 CAPSULE(20 MG) BY MOUTH EVERY DAY 90 capsule 3    glucagon (human recombinant) inj 1mg/mL kit Inject 1 mL (1 mg total) into the muscle as needed. 1 kit 6    insulin glargine U-300 conc (TOUJEO MAX U-300 SOLOSTAR) 300 unit/mL (3 mL) InPn Inject 24 Units into the skin every evening.      insulin lispro (HUMALOG KWIKPEN INSULIN) 100 unit/mL pen INJECT USING INSULIN:CARB RATIO OF 1:6 SUBCUTANEOUSLY PRIOR TO MEALS. MAX TDD 40 UNITS 3 Box 3    losartan (COZAAR) 100 MG tablet TAKE 1 TABLET(100 MG) BY MOUTH EVERY DAY 90 tablet 3    mupirocin (BACTROBAN) 2 % ointment Apply topically 3 (three) times daily. Lt great toe 2 g 0    pen needle, diabetic (PEN NEEDLE) 31 gauge x 1/4" Ndle 1 each by Misc.(Non-Drug; Combo Route) route 4 (four) times daily. 400 each 3     Current Facility-Administered Medications   Medication Dose Route Frequency Provider Last Rate Last Dose    glucagon (human recombinant) injection 1 mg  1 mg Intramuscular Once Tania Mckeon NP           Review of patient's allergies indicates:   Allergen Reactions    Altace [ramipril]      Cough         Review of Systems   Constitution: Negative for chills and fever.   Cardiovascular: Negative for claudication and leg swelling.   Skin: Positive for poor wound healing. Negative for color change and dry skin.   Musculoskeletal: Negative for joint pain, joint swelling, muscle cramps and muscle weakness.   Neurological: " Positive for numbness.   Psychiatric/Behavioral: Negative for altered mental status.           Objective:      Physical Exam   Constitutional: He appears well-developed and well-nourished. No distress.   Cardiovascular:   Pulses:       Dorsalis pedis pulses are 2+ on the right side, and 2+ on the left side.        Posterior tibial pulses are 1+ on the right side, and 1+ on the left side.   CFT is < 3 seconds bilateral.  Pedal hair growth is present bilateral.  Varicosities noted bilateral.  No lower extremity edema noted bilateral.  Toes are warm to touch bilateral.     Musculoskeletal: He exhibits no edema or tenderness.   Muscle strength 5/5 in all muscle groups bilateral.  No tenderness nor crepitation with ROM of foot/ankle joints bilateral.  No tenderness with palpation of bilateral foot and ankle.     Neurological: He has normal strength. A sensory deficit is present.   Protective sensation per [place]-Jayson monofilament is decreased bilateral.  Light touch is intact bilateral.   Skin: Skin is warm and dry. Capillary refill takes less than 2 seconds. Lesion noted. No abrasion, no burn, no ecchymosis, no laceration and no petechiae noted. He is not diaphoretic.   Location: Open wound noted to the Rt. Medial hallux.  Base: Down to dermis and comprised of fibrin.   Drainage: None  Skylar wound: Devoid of erythema, edema, fluctuance, purulence, and malodor.    Pre-debridement measurement: 0.7 x 0.7 x 0.1cm  Post-debridement measurement: 0.9 x 0.9 x 0.1cm               Assessment:       Encounter Diagnoses   Name Primary?    Diabetic ulcer of toe of right foot associated with type 1 diabetes mellitus, limited to breakdown of skin Yes    Type 1 diabetes mellitus with diabetic autonomic neuropathy          Plan:       Hammad was seen today for wound care.    Diagnoses and all orders for this visit:    Diabetic ulcer of toe of right foot associated with type 1 diabetes mellitus, limited to breakdown of skin  -      Wound Debridement    Type 1 diabetes mellitus with diabetic autonomic neuropathy      I counseled the patient on his conditions, their implications and medical management.    Performed a selective excisional debridement of the Rt. Hallux.  See attached procedure note.      The wound base was covered with sawyer.  The site was then offloaded with cassandra foam toe sulcus roll, and a football dressing was applied.    Advised to keep the dressing CDI x 1 week.    Instructed to minimize weight bearing to facilitate wound healing.    Advised to ambulate only in the postoperative shoe/boot x 1 week.    RTC in 1 week for follow up.    Caleb Duffy DPM

## 2019-11-05 NOTE — PROCEDURES
"Wound Debridement  Date/Time: 11/5/2019 7:00 AM  Performed by: Caleb Duffy DPM  Authorized by: Caleb Duffy DPM     Time out: Immediately prior to procedure a "time out" was called to verify the correct patient, procedure, equipment, support staff and site/side marked as required.    Consent Done?:  Yes (Verbal)    Preparation: Patient was prepped and draped in usual sterile fashion    Local anesthesia used?: No      Wound Details:    Location:  Right foot    Location:  Right 1st Toe    Type of Debridement:  Excisional       Length (cm):  0.7       Area (sq cm):  0.49       Width (cm):  0.7       Percent Debrided (%):  100       Depth (cm):  0.1       Total Area Debrided (sq cm):  0.49    Depth of debridement:  Epidermis/Dermis    Tissue debrided:  Dermis    Devitalized tissue debrided:  Fibrin    Instruments:  Curette    Bleeding:  Minimal  Hemostasis Achieved: Yes    Method Used:  Pressure  Patient tolerance:  Patient tolerated the procedure well with no immediate complications      "

## 2019-11-12 ENCOUNTER — OFFICE VISIT (OUTPATIENT)
Dept: PODIATRY | Facility: CLINIC | Age: 64
End: 2019-11-12
Payer: COMMERCIAL

## 2019-11-12 VITALS — WEIGHT: 220.69 LBS | BODY MASS INDEX: 28.32 KG/M2 | HEIGHT: 74 IN

## 2019-11-12 DIAGNOSIS — E10.621 DIABETIC ULCER OF TOE OF RIGHT FOOT ASSOCIATED WITH TYPE 1 DIABETES MELLITUS, LIMITED TO BREAKDOWN OF SKIN: Primary | ICD-10-CM

## 2019-11-12 DIAGNOSIS — E10.43 TYPE 1 DIABETES MELLITUS WITH DIABETIC AUTONOMIC NEUROPATHY: ICD-10-CM

## 2019-11-12 DIAGNOSIS — L97.511 DIABETIC ULCER OF TOE OF RIGHT FOOT ASSOCIATED WITH TYPE 1 DIABETES MELLITUS, LIMITED TO BREAKDOWN OF SKIN: Primary | ICD-10-CM

## 2019-11-12 PROCEDURE — 99999 PR PBB SHADOW E&M-EST. PATIENT-LVL II: CPT | Mod: PBBFAC,,, | Performed by: PODIATRIST

## 2019-11-12 PROCEDURE — 99499 UNLISTED E&M SERVICE: CPT | Mod: S$GLB,,, | Performed by: PODIATRIST

## 2019-11-12 PROCEDURE — 97597 WOUND DEBRIDEMENT: ICD-10-PCS | Mod: S$GLB,,, | Performed by: PODIATRIST

## 2019-11-12 PROCEDURE — 99499 NO LOS: ICD-10-PCS | Mod: S$GLB,,, | Performed by: PODIATRIST

## 2019-11-12 PROCEDURE — 99999 PR PBB SHADOW E&M-EST. PATIENT-LVL II: ICD-10-PCS | Mod: PBBFAC,,, | Performed by: PODIATRIST

## 2019-11-12 PROCEDURE — 97597 DBRDMT OPN WND 1ST 20 CM/<: CPT | Mod: S$GLB,,, | Performed by: PODIATRIST

## 2019-11-12 NOTE — PROCEDURES
"Wound Debridement  Date/Time: 11/12/2019 7:00 AM  Performed by: Caleb Duffy DPM  Authorized by: Caleb Duffy DPM     Time out: Immediately prior to procedure a "time out" was called to verify the correct patient, procedure, equipment, support staff and site/side marked as required.    Consent Done?:  Yes (Verbal)    Preparation: Patient was prepped and draped in usual sterile fashion    Local anesthesia used?: No      Wound Details:    Location:  Right foot    Location:  Right 1st Toe    Type of Debridement:  Excisional       Length (cm):  0.5       Area (sq cm):  0.2       Width (cm):  0.4       Percent Debrided (%):  100       Depth (cm):  0.1       Total Area Debrided (sq cm):  0.2    Depth of debridement:  Epidermis/Dermis    Tissue debrided:  Dermis    Devitalized tissue debrided:  Fibrin and Callus    Instruments:  Blade    Bleeding:  Minimal  Hemostasis Achieved: Yes    Method Used:  Pressure  Patient tolerance:  Patient tolerated the procedure well with no immediate complications      "

## 2019-11-12 NOTE — PROGRESS NOTES
Subjective:      Patient ID: Hammad Douglas III is a 64 y.o. male.    Chief Complaint: Wound Care  Patient presents to clinic for a 1 week wound check of the Rt. Hallux.  Denies pain from the digit with today's exam.  Has kept the prior football dressing clean, dry, and intact x 1 week.   Relates ambulation to tolerance in the postoperative shoe.   Denies having N/V/F/C/D.  Denies any additional pedal complaints.    Past Medical History:   Diagnosis Date    Cataract     Chronic kidney disease     Diabetes mellitus type I     Diagnosed at age 19    Diabetic retinopathy     See's Dr. Ledesma    Hyperlipidemia     Hypertension        Past Surgical History:   Procedure Laterality Date    COLONOSCOPY      2011 wnl       Family History   Problem Relation Age of Onset    Cataracts Mother        Social History     Socioeconomic History    Marital status:      Spouse name: Not on file    Number of children: Not on file    Years of education: Not on file    Highest education level: Not on file   Occupational History    Not on file   Social Needs    Financial resource strain: Not on file    Food insecurity:     Worry: Not on file     Inability: Not on file    Transportation needs:     Medical: Not on file     Non-medical: Not on file   Tobacco Use    Smoking status: Current Some Day Smoker     Types: Cigars    Smokeless tobacco: Never Used    Tobacco comment: 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   Substance and Sexual Activity    Alcohol use: Yes     Alcohol/week: 2.0 standard drinks     Types: 2 Cans of beer per week    Drug use: No    Sexual activity: Yes     Partners: Female   Lifestyle    Physical activity:     Days per week: Not on file     Minutes per session: Not on file    Stress: Not on file   Relationships    Social connections:     Talks on phone: Not on file     Gets together: Not on file     Attends Orthodoxy service: Not on file      "Active member of club or organization: Not on file     Attends meetings of clubs or organizations: Not on file     Relationship status: Not on file   Other Topics Concern    Not on file   Social History Narrative    Not on file       Current Outpatient Medications   Medication Sig Dispense Refill    atorvastatin (LIPITOR) 40 MG tablet TAKE 1 TABLET(40 MG) BY MOUTH EVERY DAY 90 tablet 4    blood sugar diagnostic (ONETOUCH ULTRA TEST) Strp 1 strip by Other route 4 (four) times daily. 400 strip 3    FLUoxetine 20 MG capsule TAKE 1 CAPSULE(20 MG) BY MOUTH EVERY DAY 90 capsule 3    glucagon (human recombinant) inj 1mg/mL kit Inject 1 mL (1 mg total) into the muscle as needed. 1 kit 6    insulin glargine U-300 conc (TOUJEO MAX U-300 SOLOSTAR) 300 unit/mL (3 mL) InPn Inject 24 Units into the skin every evening.      insulin lispro (HUMALOG KWIKPEN INSULIN) 100 unit/mL pen INJECT USING INSULIN:CARB RATIO OF 1:6 SUBCUTANEOUSLY PRIOR TO MEALS. MAX TDD 40 UNITS 3 Box 3    losartan (COZAAR) 100 MG tablet TAKE 1 TABLET(100 MG) BY MOUTH EVERY DAY 90 tablet 3    mupirocin (BACTROBAN) 2 % ointment Apply topically 3 (three) times daily. Lt great toe 2 g 0    pen needle, diabetic (PEN NEEDLE) 31 gauge x 1/4" Ndle 1 each by Misc.(Non-Drug; Combo Route) route 4 (four) times daily. 400 each 3     Current Facility-Administered Medications   Medication Dose Route Frequency Provider Last Rate Last Dose    glucagon (human recombinant) injection 1 mg  1 mg Intramuscular Once Tania Mckeon NP           Review of patient's allergies indicates:   Allergen Reactions    Altace [ramipril]      Cough         Review of Systems   Constitution: Negative for chills and fever.   Cardiovascular: Negative for claudication and leg swelling.   Skin: Negative for color change, dry skin and poor wound healing.   Musculoskeletal: Negative for joint pain, joint swelling, muscle cramps and muscle weakness.   Neurological: Positive for " numbness.   Psychiatric/Behavioral: Negative for altered mental status.           Objective:      Physical Exam   Constitutional: He appears well-developed and well-nourished. No distress.   Cardiovascular:   Pulses:       Dorsalis pedis pulses are 2+ on the right side, and 2+ on the left side.        Posterior tibial pulses are 1+ on the right side, and 1+ on the left side.   CFT is < 3 seconds bilateral.  Pedal hair growth is present bilateral.  Varicosities noted bilateral.  No lower extremity edema noted bilateral.  Toes are warm to touch bilateral.     Musculoskeletal: He exhibits no edema or tenderness.   Muscle strength 5/5 in all muscle groups bilateral.  No tenderness nor crepitation with ROM of foot/ankle joints bilateral.  No tenderness with palpation of bilateral foot and ankle.     Neurological: He has normal strength. A sensory deficit is present.   Protective sensation per [place]-Jayson monofilament is decreased bilateral.  Light touch is intact bilateral.   Skin: Skin is warm and dry. Capillary refill takes less than 2 seconds. Lesion noted. No abrasion, no burn, no ecchymosis, no laceration and no petechiae noted. He is not diaphoretic.   Location: Open wound noted to the Rt. Medial hallux.  Base: Down to dermis and comprised of fibrin.   Drainage: None  Skylar wound: Devoid of erythema, edema, fluctuance, purulence, and malodor.    Pre-debridement measurement: 0.5 x 0.4 x 0.1cm  Post-debridement measurement: 0.7 x 0.6 x 0.1cm               Assessment:       Encounter Diagnoses   Name Primary?    Diabetic ulcer of toe of right foot associated with type 1 diabetes mellitus, limited to breakdown of skin Yes    Type 1 diabetes mellitus with diabetic autonomic neuropathy          Plan:       Hammad was seen today for wound care.    Diagnoses and all orders for this visit:    Diabetic ulcer of toe of right foot associated with type 1 diabetes mellitus, limited to breakdown of skin    Type 1 diabetes  mellitus with diabetic autonomic neuropathy      I counseled the patient on his conditions, their implications and medical management.    Performed a selective excisional debridement of the Rt. Hallux.  See attached procedure note.      The wound base was covered with sawyer.  The site was then offloaded with cassandra foam toe sulcus roll, and a football dressing was applied.    Advised to keep the dressing CDI x 1 week.    Instructed to minimize weight bearing to facilitate wound healing.    Advised to ambulate only in the postoperative shoe/boot x 1 week.    RTC in 1 week for follow up.    Caleb Duffy DPM

## 2019-11-19 ENCOUNTER — OFFICE VISIT (OUTPATIENT)
Dept: PODIATRY | Facility: CLINIC | Age: 64
End: 2019-11-19
Payer: COMMERCIAL

## 2019-11-19 VITALS — WEIGHT: 220 LBS | BODY MASS INDEX: 28.23 KG/M2 | HEIGHT: 74 IN

## 2019-11-19 DIAGNOSIS — L97.511 DIABETIC ULCER OF TOE OF RIGHT FOOT ASSOCIATED WITH TYPE 1 DIABETES MELLITUS, LIMITED TO BREAKDOWN OF SKIN: Primary | ICD-10-CM

## 2019-11-19 DIAGNOSIS — E10.621 DIABETIC ULCER OF TOE OF RIGHT FOOT ASSOCIATED WITH TYPE 1 DIABETES MELLITUS, LIMITED TO BREAKDOWN OF SKIN: Primary | ICD-10-CM

## 2019-11-19 DIAGNOSIS — E10.43 TYPE 1 DIABETES MELLITUS WITH DIABETIC AUTONOMIC NEUROPATHY: ICD-10-CM

## 2019-11-19 PROCEDURE — 99999 PR PBB SHADOW E&M-EST. PATIENT-LVL III: CPT | Mod: PBBFAC,,, | Performed by: PODIATRIST

## 2019-11-19 PROCEDURE — 97597 DBRDMT OPN WND 1ST 20 CM/<: CPT | Mod: S$GLB,,, | Performed by: PODIATRIST

## 2019-11-19 PROCEDURE — 99999 PR PBB SHADOW E&M-EST. PATIENT-LVL III: ICD-10-PCS | Mod: PBBFAC,,, | Performed by: PODIATRIST

## 2019-11-19 PROCEDURE — 99499 NO LOS: ICD-10-PCS | Mod: S$GLB,,, | Performed by: PODIATRIST

## 2019-11-19 PROCEDURE — 97597 WOUND DEBRIDEMENT: ICD-10-PCS | Mod: S$GLB,,, | Performed by: PODIATRIST

## 2019-11-19 PROCEDURE — 99499 UNLISTED E&M SERVICE: CPT | Mod: S$GLB,,, | Performed by: PODIATRIST

## 2019-11-19 NOTE — PROCEDURES
"Wound Debridement  Date/Time: 11/19/2019 7:00 AM  Performed by: Caleb Duffy DPM  Authorized by: Caleb Duffy DPM     Time out: Immediately prior to procedure a "time out" was called to verify the correct patient, procedure, equipment, support staff and site/side marked as required.    Consent Done?:  Yes (Verbal)    Preparation: Patient was prepped and draped in usual sterile fashion    Local anesthesia used?: No      Wound Details:    Location:  Right foot    Location:  Right 1st Toe    Type of Debridement:  Excisional       Length (cm):  0.3       Area (sq cm):  0.06       Width (cm):  0.2       Percent Debrided (%):  100       Depth (cm):  0.1       Total Area Debrided (sq cm):  0.06    Depth of debridement:  Epidermis/Dermis    Tissue debrided:  Dermis    Devitalized tissue debrided:  Fibrin    Instruments:  Blade    Bleeding:  Minimal  Hemostasis Achieved: Yes    Method Used:  Pressure  Patient tolerance:  Patient tolerated the procedure well with no immediate complications      "

## 2019-11-19 NOTE — PROGRESS NOTES
Subjective:      Patient ID: Hammad Douglas III is a 64 y.o. male.    Chief Complaint: Wound Check (R big toe)  Patient presents to clinic for a 1 week wound check of the Rt. Hallux.  Denies pain from the digit with today's exam.  Has kept the prior football dressing clean, dry, and intact x 1 week.   Relates ambulation to tolerance in the postoperative shoe.   Denies having N/V/F/C/D.  Denies any additional pedal complaints.    Past Medical History:   Diagnosis Date    Cataract     Chronic kidney disease     Diabetes mellitus type I     Diagnosed at age 19    Diabetic retinopathy     See's Dr. Ledesma    Hyperlipidemia     Hypertension        Past Surgical History:   Procedure Laterality Date    COLONOSCOPY      2011 wnl       Family History   Problem Relation Age of Onset    Cataracts Mother        Social History     Socioeconomic History    Marital status:      Spouse name: Not on file    Number of children: Not on file    Years of education: Not on file    Highest education level: Not on file   Occupational History    Not on file   Social Needs    Financial resource strain: Not on file    Food insecurity:     Worry: Not on file     Inability: Not on file    Transportation needs:     Medical: Not on file     Non-medical: Not on file   Tobacco Use    Smoking status: Current Some Day Smoker     Types: Cigars    Smokeless tobacco: Never Used    Tobacco comment: 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   Substance and Sexual Activity    Alcohol use: Yes     Alcohol/week: 2.0 standard drinks     Types: 2 Cans of beer per week    Drug use: No    Sexual activity: Yes     Partners: Female   Lifestyle    Physical activity:     Days per week: Not on file     Minutes per session: Not on file    Stress: Not on file   Relationships    Social connections:     Talks on phone: Not on file     Gets together: Not on file     Attends Taoist service: Not  "on file     Active member of club or organization: Not on file     Attends meetings of clubs or organizations: Not on file     Relationship status: Not on file   Other Topics Concern    Not on file   Social History Narrative    Not on file       Current Outpatient Medications   Medication Sig Dispense Refill    atorvastatin (LIPITOR) 40 MG tablet TAKE 1 TABLET(40 MG) BY MOUTH EVERY DAY 90 tablet 4    blood sugar diagnostic (ONETOUCH ULTRA TEST) Strp 1 strip by Other route 4 (four) times daily. 400 strip 3    FLUoxetine 20 MG capsule TAKE 1 CAPSULE(20 MG) BY MOUTH EVERY DAY 90 capsule 3    insulin glargine U-300 conc (TOUJEO MAX U-300 SOLOSTAR) 300 unit/mL (3 mL) InPn Inject 24 Units into the skin every evening.      insulin lispro (HUMALOG KWIKPEN INSULIN) 100 unit/mL pen INJECT USING INSULIN:CARB RATIO OF 1:6 SUBCUTANEOUSLY PRIOR TO MEALS. MAX TDD 40 UNITS 3 Box 3    losartan (COZAAR) 100 MG tablet TAKE 1 TABLET(100 MG) BY MOUTH EVERY DAY 90 tablet 3    mupirocin (BACTROBAN) 2 % ointment Apply topically 3 (three) times daily. Lt great toe 2 g 0    pen needle, diabetic (PEN NEEDLE) 31 gauge x 1/4" Ndle 1 each by Misc.(Non-Drug; Combo Route) route 4 (four) times daily. 400 each 3    glucagon (human recombinant) inj 1mg/mL kit Inject 1 mL (1 mg total) into the muscle as needed. 1 kit 6     Current Facility-Administered Medications   Medication Dose Route Frequency Provider Last Rate Last Dose    glucagon (human recombinant) injection 1 mg  1 mg Intramuscular Once Tania Mckeon NP           Review of patient's allergies indicates:   Allergen Reactions    Altace [ramipril]      Cough         Review of Systems   Constitution: Negative for chills and fever.   Cardiovascular: Negative for claudication and leg swelling.   Skin: Negative for color change, dry skin and poor wound healing.   Musculoskeletal: Negative for joint pain, joint swelling, muscle cramps and muscle weakness.   Neurological: " Positive for numbness.   Psychiatric/Behavioral: Negative for altered mental status.           Objective:      Physical Exam   Constitutional: He appears well-developed and well-nourished. No distress.   Cardiovascular:   Pulses:       Dorsalis pedis pulses are 2+ on the right side, and 2+ on the left side.        Posterior tibial pulses are 1+ on the right side, and 1+ on the left side.   CFT is < 3 seconds bilateral.  Pedal hair growth is present bilateral.  Varicosities noted bilateral.  No lower extremity edema noted bilateral.  Toes are warm to touch bilateral.     Musculoskeletal: He exhibits no edema or tenderness.   Muscle strength 5/5 in all muscle groups bilateral.  No tenderness nor crepitation with ROM of foot/ankle joints bilateral.  No tenderness with palpation of bilateral foot and ankle.     Neurological: He has normal strength. A sensory deficit is present.   Protective sensation per [place]-Jayson monofilament is decreased bilateral.  Light touch is intact bilateral.   Skin: Skin is warm and dry. Capillary refill takes less than 2 seconds. Lesion noted. No abrasion, no burn, no ecchymosis, no laceration and no petechiae noted. He is not diaphoretic.   Location: Open wound noted to the Rt. Medial hallux.  Base: Down to dermis and comprised of fibrin.   Drainage: None  Skylar wound: Devoid of erythema, edema, fluctuance, purulence, and malodor.    Pre-debridement measurement: 0.3 x 0.2 x 0.1cm  Post-debridement measurement: 0.5 x 0.4 x 0.1cm               Assessment:       Encounter Diagnoses   Name Primary?    Diabetic ulcer of toe of right foot associated with type 1 diabetes mellitus, limited to breakdown of skin Yes    Type 1 diabetes mellitus with diabetic autonomic neuropathy          Plan:       Hammad was seen today for wound check.    Diagnoses and all orders for this visit:    Diabetic ulcer of toe of right foot associated with type 1 diabetes mellitus, limited to breakdown of skin    Type  1 diabetes mellitus with diabetic autonomic neuropathy      I counseled the patient on his conditions, their implications and medical management.    Performed a selective excisional debridement of the Rt. Hallux.  See attached procedure note.      The wound base was covered with sawyer.  The site was then offloaded with cassandra foam toe sulcus roll, and a football dressing was applied.    Advised to keep the dressing CDI x 1 week.    Instructed to minimize weight bearing to facilitate wound healing.    Advised to ambulate only in the postoperative shoe/boot x 1 week.    Anticipate wound closure in the next 1-2 weeks.    RTC in 1 week for follow up.    Caleb Duffy DPM

## 2019-11-26 ENCOUNTER — OFFICE VISIT (OUTPATIENT)
Dept: PODIATRY | Facility: CLINIC | Age: 64
End: 2019-11-26
Payer: COMMERCIAL

## 2019-11-26 VITALS — HEIGHT: 74 IN | BODY MASS INDEX: 28.23 KG/M2 | WEIGHT: 220 LBS

## 2019-11-26 DIAGNOSIS — L97.511 DIABETIC ULCER OF TOE OF RIGHT FOOT ASSOCIATED WITH TYPE 1 DIABETES MELLITUS, LIMITED TO BREAKDOWN OF SKIN: Primary | ICD-10-CM

## 2019-11-26 DIAGNOSIS — E10.621 DIABETIC ULCER OF TOE OF RIGHT FOOT ASSOCIATED WITH TYPE 1 DIABETES MELLITUS, LIMITED TO BREAKDOWN OF SKIN: Primary | ICD-10-CM

## 2019-11-26 DIAGNOSIS — E10.43 TYPE 1 DIABETES MELLITUS WITH DIABETIC AUTONOMIC NEUROPATHY: ICD-10-CM

## 2019-11-26 PROCEDURE — 99212 OFFICE O/P EST SF 10 MIN: CPT | Mod: S$GLB,,, | Performed by: PODIATRIST

## 2019-11-26 PROCEDURE — 99999 PR PBB SHADOW E&M-EST. PATIENT-LVL II: CPT | Mod: PBBFAC,,, | Performed by: PODIATRIST

## 2019-11-26 PROCEDURE — 99212 PR OFFICE/OUTPT VISIT, EST, LEVL II, 10-19 MIN: ICD-10-PCS | Mod: S$GLB,,, | Performed by: PODIATRIST

## 2019-11-26 PROCEDURE — 99999 PR PBB SHADOW E&M-EST. PATIENT-LVL II: ICD-10-PCS | Mod: PBBFAC,,, | Performed by: PODIATRIST

## 2019-11-26 NOTE — PROGRESS NOTES
Subjective:      Patient ID: Hammad Douglas III is a 64 y.o. male.    Chief Complaint: Wound Care  Patient presents to clinic for a 1 week wound check of the Rt. Hallux.  Denies pain from the digit with today's exam.  Has kept the prior football dressing clean, dry, and intact x 1 week.   Relates ambulation to tolerance in the postoperative shoe.   Denies having N/V/F/C/D.  Denies any additional pedal complaints.    Past Medical History:   Diagnosis Date    Cataract     Chronic kidney disease     Diabetes mellitus type I     Diagnosed at age 19    Diabetic retinopathy     See's Dr. Ledesma    Hyperlipidemia     Hypertension        Past Surgical History:   Procedure Laterality Date    COLONOSCOPY      2011 wnl       Family History   Problem Relation Age of Onset    Cataracts Mother        Social History     Socioeconomic History    Marital status:      Spouse name: Not on file    Number of children: Not on file    Years of education: Not on file    Highest education level: Not on file   Occupational History    Not on file   Social Needs    Financial resource strain: Not on file    Food insecurity:     Worry: Not on file     Inability: Not on file    Transportation needs:     Medical: Not on file     Non-medical: Not on file   Tobacco Use    Smoking status: Current Some Day Smoker     Types: Cigars    Smokeless tobacco: Never Used    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   Substance and Sexual Activity    Alcohol use: Yes     Alcohol/week: 2.0 standard drinks     Types: 2 Cans of beer per week    Drug use: No    Sexual activity: Yes     Partners: Female   Lifestyle    Physical activity:     Days per week: Not on file     Minutes per session: Not on file    Stress: Not on file   Relationships    Social connections:     Talks on phone: Not on file     Gets together: Not on file     Attends [employer] service: Not on file      "Active member of club or organization: Not on file     Attends meetings of clubs or organizations: Not on file     Relationship status: Not on file   Other Topics Concern    Not on file   Social History Narrative    Not on file       Current Outpatient Medications   Medication Sig Dispense Refill    atorvastatin (LIPITOR) 40 MG tablet TAKE 1 TABLET(40 MG) BY MOUTH EVERY DAY 90 tablet 4    blood sugar diagnostic (ONETOUCH ULTRA TEST) Strp 1 strip by Other route 4 (four) times daily. 400 strip 3    FLUoxetine 20 MG capsule TAKE 1 CAPSULE(20 MG) BY MOUTH EVERY DAY 90 capsule 3    glucagon (human recombinant) inj 1mg/mL kit Inject 1 mL (1 mg total) into the muscle as needed. 1 kit 6    insulin glargine U-300 conc (TOUJEO MAX U-300 SOLOSTAR) 300 unit/mL (3 mL) InPn Inject 24 Units into the skin every evening.      insulin lispro (HUMALOG KWIKPEN INSULIN) 100 unit/mL pen INJECT USING INSULIN:CARB RATIO OF 1:6 SUBCUTANEOUSLY PRIOR TO MEALS. MAX TDD 40 UNITS 3 Box 3    losartan (COZAAR) 100 MG tablet TAKE 1 TABLET(100 MG) BY MOUTH EVERY DAY 90 tablet 3    mupirocin (BACTROBAN) 2 % ointment Apply topically 3 (three) times daily. Lt great toe 2 g 0    pen needle, diabetic (PEN NEEDLE) 31 gauge x 1/4" Ndle 1 each by Misc.(Non-Drug; Combo Route) route 4 (four) times daily. 400 each 3     Current Facility-Administered Medications   Medication Dose Route Frequency Provider Last Rate Last Dose    glucagon (human recombinant) injection 1 mg  1 mg Intramuscular Once Tania Mckeon NP           Review of patient's allergies indicates:   Allergen Reactions    Altace [ramipril]      Cough         Review of Systems   Constitution: Negative for chills and fever.   Cardiovascular: Negative for claudication and leg swelling.   Skin: Negative for color change, dry skin and poor wound healing.   Musculoskeletal: Negative for joint pain, joint swelling, muscle cramps and muscle weakness.   Neurological: Positive for " numbness.   Psychiatric/Behavioral: Negative for altered mental status.           Objective:      Physical Exam   Constitutional: He appears well-developed and well-nourished. No distress.   Cardiovascular:   Pulses:       Dorsalis pedis pulses are 2+ on the right side, and 2+ on the left side.        Posterior tibial pulses are 1+ on the right side, and 1+ on the left side.   CFT is < 3 seconds bilateral.  Pedal hair growth is present bilateral.  Varicosities noted bilateral.  No lower extremity edema noted bilateral.  Toes are warm to touch bilateral.     Musculoskeletal: He exhibits no edema or tenderness.   Muscle strength 5/5 in all muscle groups bilateral.  No tenderness nor crepitation with ROM of foot/ankle joints bilateral.  No tenderness with palpation of bilateral foot and ankle.     Neurological: He has normal strength. A sensory deficit is present.   Protective sensation per [place]-Jayson monofilament is decreased bilateral.  Light touch is intact bilateral.   Skin: Skin is warm and dry. Capillary refill takes less than 2 seconds. Lesion noted. No abrasion, no burn, no ecchymosis, no laceration and no petechiae noted. He is not diaphoretic.   Location: Open wound noted to the Rt. Medial hallux.  Base: Down to dermis and comprised of granulation.  Drainage: None  Skylar wound: Devoid of erythema, edema, fluctuance, purulence, and malodor.    Measurement: 0.1 x 0.2 x 0.1cm                 Assessment:       Encounter Diagnoses   Name Primary?    Diabetic ulcer of toe of right foot associated with type 1 diabetes mellitus, limited to breakdown of skin Yes    Type 1 diabetes mellitus with diabetic autonomic neuropathy          Plan:       Hammad was seen today for wound care.    Diagnoses and all orders for this visit:    Diabetic ulcer of toe of right foot associated with type 1 diabetes mellitus, limited to breakdown of skin    Type 1 diabetes mellitus with diabetic autonomic neuropathy      I counseled  the patient on his conditions, their implications and medical management.    No wound debridement performed, as the site is fully granular with today's exam.    The wound base was covered with sawyer.  The site was then offloaded with cassandra foam toe sulcus roll, and a football dressing was applied.    Advised to keep the dressing CDI x 1 week.    Instructed to minimize weight bearing to facilitate wound healing.    Advised to ambulate only in the postoperative shoe/boot x 1 week.    Anticipate wound closure by the next visit.    RTC in 1 week for follow up.    Caleb Duffy DPM

## 2019-12-01 ENCOUNTER — PATIENT MESSAGE (OUTPATIENT)
Dept: PODIATRY | Facility: CLINIC | Age: 64
End: 2019-12-01

## 2019-12-02 ENCOUNTER — PATIENT MESSAGE (OUTPATIENT)
Dept: PODIATRY | Facility: CLINIC | Age: 64
End: 2019-12-02

## 2019-12-02 ENCOUNTER — PATIENT MESSAGE (OUTPATIENT)
Dept: ENDOCRINOLOGY | Facility: CLINIC | Age: 64
End: 2019-12-02

## 2019-12-03 RX ORDER — PEN NEEDLE, DIABETIC 29 G X1/2"
1 NEEDLE, DISPOSABLE MISCELLANEOUS 4 TIMES DAILY
Qty: 400 EACH | Refills: 3 | Status: SHIPPED | OUTPATIENT
Start: 2019-12-03 | End: 2020-11-19 | Stop reason: SDUPTHER

## 2019-12-10 ENCOUNTER — OFFICE VISIT (OUTPATIENT)
Dept: PODIATRY | Facility: CLINIC | Age: 64
End: 2019-12-10
Payer: COMMERCIAL

## 2019-12-10 VITALS — BODY MASS INDEX: 28.23 KG/M2 | WEIGHT: 220 LBS | HEIGHT: 74 IN

## 2019-12-10 DIAGNOSIS — E10.621 DIABETIC ULCER OF TOE OF RIGHT FOOT ASSOCIATED WITH TYPE 1 DIABETES MELLITUS, LIMITED TO BREAKDOWN OF SKIN: Primary | ICD-10-CM

## 2019-12-10 DIAGNOSIS — E10.43 TYPE 1 DIABETES MELLITUS WITH DIABETIC AUTONOMIC NEUROPATHY: ICD-10-CM

## 2019-12-10 DIAGNOSIS — L97.511 DIABETIC ULCER OF TOE OF RIGHT FOOT ASSOCIATED WITH TYPE 1 DIABETES MELLITUS, LIMITED TO BREAKDOWN OF SKIN: Primary | ICD-10-CM

## 2019-12-10 PROCEDURE — 99999 PR PBB SHADOW E&M-EST. PATIENT-LVL II: CPT | Mod: PBBFAC,,, | Performed by: PODIATRIST

## 2019-12-10 PROCEDURE — 99212 OFFICE O/P EST SF 10 MIN: CPT | Mod: S$GLB,,, | Performed by: PODIATRIST

## 2019-12-10 PROCEDURE — 99999 PR PBB SHADOW E&M-EST. PATIENT-LVL II: ICD-10-PCS | Mod: PBBFAC,,, | Performed by: PODIATRIST

## 2019-12-10 PROCEDURE — 99212 PR OFFICE/OUTPT VISIT, EST, LEVL II, 10-19 MIN: ICD-10-PCS | Mod: S$GLB,,, | Performed by: PODIATRIST

## 2019-12-10 NOTE — PROGRESS NOTES
Subjective:      Patient ID: Hammad Douglas III is a 64 y.o. male.    Chief Complaint: Wound Care  Patient presents to clinic for a 2 week wound check of the Rt. Hallux.  Denies pain from the digit with today's exam.  Has kept the prior football dressing clean, dry, and intact since our last exam.  Relates ambulation to tolerance in the postoperative shoe.   Denies having N/V/F/C/D.  Denies any additional pedal complaints.    Past Medical History:   Diagnosis Date    Cataract     Chronic kidney disease     Diabetes mellitus type I     Diagnosed at age 19    Diabetic retinopathy     See's Dr. Ledesma    Hyperlipidemia     Hypertension        Past Surgical History:   Procedure Laterality Date    COLONOSCOPY      2011 wnl       Family History   Problem Relation Age of Onset    Cataracts Mother        Social History     Socioeconomic History    Marital status:      Spouse name: Not on file    Number of children: Not on file    Years of education: Not on file    Highest education level: Not on file   Occupational History    Not on file   Social Needs    Financial resource strain: Not on file    Food insecurity:     Worry: Not on file     Inability: Not on file    Transportation needs:     Medical: Not on file     Non-medical: Not on file   Tobacco Use    Smoking status: Current Some Day Smoker     Types: Cigars    Smokeless tobacco: Never Used    Tobacco comment: 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   Substance and Sexual Activity    Alcohol use: Yes     Alcohol/week: 2.0 standard drinks     Types: 2 Cans of beer per week    Drug use: No    Sexual activity: Yes     Partners: Female   Lifestyle    Physical activity:     Days per week: Not on file     Minutes per session: Not on file    Stress: Not on file   Relationships    Social connections:     Talks on phone: Not on file     Gets together: Not on file     Attends Gnosticist service: Not on  "file     Active member of club or organization: Not on file     Attends meetings of clubs or organizations: Not on file     Relationship status: Not on file   Other Topics Concern    Not on file   Social History Narrative    Not on file       Current Outpatient Medications   Medication Sig Dispense Refill    atorvastatin (LIPITOR) 40 MG tablet TAKE 1 TABLET(40 MG) BY MOUTH EVERY DAY 90 tablet 4    blood sugar diagnostic (ONETOUCH ULTRA TEST) Strp 1 strip by Other route 4 (four) times daily. 400 strip 3    FLUoxetine 20 MG capsule TAKE 1 CAPSULE(20 MG) BY MOUTH EVERY DAY 90 capsule 3    insulin glargine U-300 conc (TOUJEO MAX U-300 SOLOSTAR) 300 unit/mL (3 mL) InPn Inject 24 Units into the skin every evening.      insulin lispro (HUMALOG KWIKPEN INSULIN) 100 unit/mL pen INJECT USING INSULIN:CARB RATIO OF 1:6 SUBCUTANEOUSLY PRIOR TO MEALS. MAX TDD 40 UNITS 3 Box 3    losartan (COZAAR) 100 MG tablet TAKE 1 TABLET(100 MG) BY MOUTH EVERY DAY 90 tablet 3    mupirocin (BACTROBAN) 2 % ointment Apply topically 3 (three) times daily. Lt great toe 2 g 0    pen needle, diabetic (PEN NEEDLE) 31 gauge x 1/4" Ndle 1 each by Misc.(Non-Drug; Combo Route) route 4 (four) times daily. 400 each 3    glucagon (human recombinant) inj 1mg/mL kit Inject 1 mL (1 mg total) into the muscle as needed. 1 kit 6     Current Facility-Administered Medications   Medication Dose Route Frequency Provider Last Rate Last Dose    glucagon (human recombinant) injection 1 mg  1 mg Intramuscular Once Tania Mckeon NP           Review of patient's allergies indicates:   Allergen Reactions    Altace [ramipril]      Cough         Review of Systems   Constitution: Negative for chills and fever.   Cardiovascular: Negative for claudication and leg swelling.   Skin: Negative for color change, dry skin and poor wound healing.   Musculoskeletal: Negative for joint pain, joint swelling, muscle cramps and muscle weakness.   Neurological: Positive " for numbness.   Psychiatric/Behavioral: Negative for altered mental status.           Objective:      Physical Exam   Constitutional: He appears well-developed and well-nourished. No distress.   Cardiovascular:   Pulses:       Dorsalis pedis pulses are 2+ on the right side, and 2+ on the left side.        Posterior tibial pulses are 1+ on the right side, and 1+ on the left side.   CFT is < 3 seconds bilateral.  Pedal hair growth is present bilateral.  Varicosities noted bilateral.  No lower extremity edema noted bilateral.  Toes are warm to touch bilateral.     Musculoskeletal: He exhibits no edema or tenderness.   Muscle strength 5/5 in all muscle groups bilateral.  No tenderness nor crepitation with ROM of foot/ankle joints bilateral.  No tenderness with palpation of bilateral foot and ankle.     Neurological: He has normal strength. A sensory deficit is present.   Protective sensation per [place]-Jayson monofilament is decreased bilateral.  Light touch is intact bilateral.   Skin: Skin is warm and dry. Capillary refill takes less than 2 seconds. Lesion noted. No abrasion, no burn, no ecchymosis, no laceration and no petechiae noted. He is not diaphoretic.   Location: Open wound noted to the Rt. Medial hallux.  Base: Down to dermis and comprised of granulation.  Drainage: None  Skylar wound: Devoid of erythema, edema, fluctuance, purulence, and malodor.    Measurement: 0.1 x 0.1 x 0.1cm                 Assessment:       Encounter Diagnoses   Name Primary?    Diabetic ulcer of toe of right foot associated with type 1 diabetes mellitus, limited to breakdown of skin Yes    Type 1 diabetes mellitus with diabetic autonomic neuropathy          Plan:       Hammad was seen today for wound care.    Diagnoses and all orders for this visit:    Diabetic ulcer of toe of right foot associated with type 1 diabetes mellitus, limited to breakdown of skin    Type 1 diabetes mellitus with diabetic autonomic neuropathy      I  counseled the patient on his conditions, their implications and medical management.    No wound debridement performed, as the site is fully granular with today's exam.    The wound base was covered with sawyer.  The site was covered with a mepilex border.    Advised to keep the dressing CDI x 1 week.    Instructed to minimize weight bearing to facilitate wound healing.    Advised to ambulate only in the postoperative shoe/boot x 1 week.    Wound closure will likely occur in 1 week.  May discontinue wound care instructions at that time.  May also transition into casual shoe gear as well.    RTC in 3 months for a routine diabetic foot exam.    Caleb Duffy DPM

## 2019-12-16 ENCOUNTER — PATIENT MESSAGE (OUTPATIENT)
Dept: PODIATRY | Facility: CLINIC | Age: 64
End: 2019-12-16

## 2020-01-17 ENCOUNTER — OFFICE VISIT (OUTPATIENT)
Dept: FAMILY MEDICINE | Facility: CLINIC | Age: 65
End: 2020-01-17
Payer: COMMERCIAL

## 2020-01-17 ENCOUNTER — PATIENT MESSAGE (OUTPATIENT)
Dept: ENDOCRINOLOGY | Facility: CLINIC | Age: 65
End: 2020-01-17

## 2020-01-17 VITALS
DIASTOLIC BLOOD PRESSURE: 64 MMHG | RESPIRATION RATE: 20 BRPM | HEART RATE: 76 BPM | OXYGEN SATURATION: 97 % | WEIGHT: 219 LBS | HEIGHT: 74 IN | TEMPERATURE: 98 F | SYSTOLIC BLOOD PRESSURE: 108 MMHG | BODY MASS INDEX: 28.11 KG/M2

## 2020-01-17 DIAGNOSIS — R05.9 COUGH: ICD-10-CM

## 2020-01-17 DIAGNOSIS — R10.13 ACUTE EPIGASTRIC PAIN: ICD-10-CM

## 2020-01-17 DIAGNOSIS — R09.89 CHEST CONGESTION: ICD-10-CM

## 2020-01-17 DIAGNOSIS — R68.89 FLU-LIKE SYMPTOMS: Primary | ICD-10-CM

## 2020-01-17 DIAGNOSIS — H92.03 EAR PAIN, BILATERAL: ICD-10-CM

## 2020-01-17 DIAGNOSIS — J02.9 SORE THROAT: ICD-10-CM

## 2020-01-17 PROCEDURE — 99213 PR OFFICE/OUTPT VISIT, EST, LEVL III, 20-29 MIN: ICD-10-PCS | Mod: S$GLB,,, | Performed by: NURSE PRACTITIONER

## 2020-01-17 PROCEDURE — 99213 OFFICE O/P EST LOW 20 MIN: CPT | Mod: S$GLB,,, | Performed by: NURSE PRACTITIONER

## 2020-01-17 RX ORDER — PROMETHAZINE HYDROCHLORIDE 6.25 MG/5ML
25 SYRUP ORAL NIGHTLY PRN
Qty: 240 ML | Refills: 0 | Status: SHIPPED | OUTPATIENT
Start: 2020-01-17 | End: 2020-06-18

## 2020-01-17 RX ORDER — AMOXICILLIN AND CLAVULANATE POTASSIUM 875; 125 MG/1; MG/1
1 TABLET, FILM COATED ORAL 2 TIMES DAILY
Qty: 14 TABLET | Refills: 0 | Status: SHIPPED | OUTPATIENT
Start: 2020-01-17 | End: 2020-01-24

## 2020-01-17 NOTE — PATIENT INSTRUCTIONS
Educated on supportive care and return precautions to the clinic.  Follow up for further evaluation if S/S worsen or fail to improve.  Start antibiotic treatment today after a light meal  Stay hydrated  Treat symptoms for comfort, fever reducer for fever episodes  Rest  Keep away from other people  Good hand hygiene is important to stop the spread of infection

## 2020-01-17 NOTE — PROGRESS NOTES
Subjective:       Patient ID: Hammad Douglas III is a 64 y.o. male.    Chief Complaint: Headache (x 3 days); Fever (100.2 ); and Cough    Mr. Douglas is new patient to me.  He is here today for sore throat, high fever, cough, fatigue, decreased appetite, weakness and nausea with diarrhea, this began on Tuesday of this week.  No one else is sick at home.  He is accompanied by his wife during the visit whom is providing some of the health history.  He has not a meal in a few days, he has stay hydrated. He has only taken tylenol cough and cold.     Vitals:    01/17/20 1540   BP: 108/64   Pulse: 76   Resp: 20   Temp: 98.3 °F (36.8 °C)     Review of Systems   Constitutional: Positive for activity change, appetite change, chills, fatigue and fever.   HENT: Positive for congestion, ear pain, postnasal drip, rhinorrhea, sinus pressure, sinus pain, sneezing and sore throat. Negative for trouble swallowing.    Eyes: Positive for pain and redness. Negative for visual disturbance.   Respiratory: Positive for cough and chest tightness. Negative for choking, shortness of breath and wheezing.    Cardiovascular: Negative for chest pain, palpitations and leg swelling.   Gastrointestinal: Positive for diarrhea and nausea. Negative for abdominal pain, blood in stool, constipation and vomiting.   Endocrine: Negative.    Genitourinary: Negative for decreased urine volume, difficulty urinating, dysuria, flank pain, hematuria and urgency.   Musculoskeletal: Negative for back pain, gait problem, myalgias and neck pain.   Skin: Negative for color change, pallor and rash.   Allergic/Immunologic: Negative.    Neurological: Positive for light-headedness and headaches. Negative for dizziness, speech difficulty, weakness and numbness.   Hematological: Does not bruise/bleed easily.   Psychiatric/Behavioral: Negative for self-injury and suicidal ideas. The patient is not nervous/anxious.        Past Medical History:   Diagnosis Date    Cataract      Chronic kidney disease     Diabetes mellitus type I     Diagnosed at age 19    Diabetic retinopathy     See's Dr. Ledesma    Hyperlipidemia     Hypertension        Objective:      Physical Exam   Constitutional: He is oriented to person, place, and time. Vital signs are normal. He appears well-developed and well-nourished. He appears ill.   HENT:   Head: Normocephalic and atraumatic.   Right Ear: There is tenderness. Tympanic membrane is erythematous.   Left Ear: There is tenderness. Tympanic membrane is erythematous.   Nose: Mucosal edema present.   Mouth/Throat: Mucous membranes are pale. Uvula swelling present. Posterior oropharyngeal edema and posterior oropharyngeal erythema present.   Eyes: Pupils are equal, round, and reactive to light. Conjunctivae and EOM are normal.   Neck: Normal range of motion.   Cardiovascular: Normal rate, regular rhythm, S1 normal, S2 normal, normal heart sounds, intact distal pulses and normal pulses.   Pulmonary/Chest: Effort normal and breath sounds normal. No tachypnea and no bradypnea. No respiratory distress. He has no decreased breath sounds. He has no wheezes.   Abdominal: Soft. Normal appearance and bowel sounds are normal. There is tenderness in the epigastric area.       Musculoskeletal: Normal range of motion.   Neurological: He is alert and oriented to person, place, and time.   Skin: Skin is warm and intact. Capillary refill takes less than 2 seconds. No rash noted. There is pallor.   Psychiatric: He has a normal mood and affect. His speech is normal and behavior is normal. Judgment and thought content normal. Cognition and memory are normal.   Nursing note and vitals reviewed.      Assessment:       1. Flu-like symptoms    2. Sore throat    3. Cough    4. Chest congestion    5. Ear pain, bilateral    6. Acute epigastric pain        Plan:       Flu-like symptoms  -     promethazine (PHENERGAN) 6.25 mg/5 mL syrup; Take 20 mLs (25 mg total) by mouth nightly as needed  for Nausea (cough at night).  Dispense: 240 mL; Refill: 0  -     amoxicillin-clavulanate 875-125mg (AUGMENTIN) 875-125 mg per tablet; Take 1 tablet by mouth 2 (two) times daily. for 7 days  Dispense: 14 tablet; Refill: 0      -           Follow up in about 3 days (around 1/20/2020), or if symptoms worsen or fail to improve.

## 2020-03-10 ENCOUNTER — OFFICE VISIT (OUTPATIENT)
Dept: PODIATRY | Facility: CLINIC | Age: 65
End: 2020-03-10
Payer: MEDICARE

## 2020-03-10 VITALS — WEIGHT: 218.94 LBS | BODY MASS INDEX: 28.1 KG/M2 | HEIGHT: 74 IN

## 2020-03-10 DIAGNOSIS — Z87.898 HISTORY OF ULCERATION: ICD-10-CM

## 2020-03-10 DIAGNOSIS — M20.42 HAMMER TOES OF BOTH FEET: ICD-10-CM

## 2020-03-10 DIAGNOSIS — E10.42 TYPE 1 DIABETES MELLITUS WITH POLYNEUROPATHY: Primary | ICD-10-CM

## 2020-03-10 DIAGNOSIS — M20.41 HAMMER TOES OF BOTH FEET: ICD-10-CM

## 2020-03-10 PROCEDURE — 99999 PR PBB SHADOW E&M-EST. PATIENT-LVL II: ICD-10-PCS | Mod: PBBFAC,,, | Performed by: PODIATRIST

## 2020-03-10 PROCEDURE — 3008F BODY MASS INDEX DOCD: CPT | Mod: CPTII,S$GLB,, | Performed by: PODIATRIST

## 2020-03-10 PROCEDURE — 3008F PR BODY MASS INDEX (BMI) DOCUMENTED: ICD-10-PCS | Mod: CPTII,S$GLB,, | Performed by: PODIATRIST

## 2020-03-10 PROCEDURE — 99213 PR OFFICE/OUTPT VISIT, EST, LEVL III, 20-29 MIN: ICD-10-PCS | Mod: S$GLB,,, | Performed by: PODIATRIST

## 2020-03-10 PROCEDURE — 3052F PR MOST RECENT HEMOGLOBIN A1C LEVEL 8.0 - < 9.0%: ICD-10-PCS | Mod: CPTII,S$GLB,, | Performed by: PODIATRIST

## 2020-03-10 PROCEDURE — 3052F HG A1C>EQUAL 8.0%<EQUAL 9.0%: CPT | Mod: CPTII,S$GLB,, | Performed by: PODIATRIST

## 2020-03-10 PROCEDURE — 99999 PR PBB SHADOW E&M-EST. PATIENT-LVL II: CPT | Mod: PBBFAC,,, | Performed by: PODIATRIST

## 2020-03-10 PROCEDURE — 99213 OFFICE O/P EST LOW 20 MIN: CPT | Mod: S$GLB,,, | Performed by: PODIATRIST

## 2020-03-10 NOTE — PROGRESS NOTES
Subjective:      Patient ID: Hammad Douglas III is a 64 y.o. male.    Chief Complaint: Diabetes Mellitus (8.7 9/19) and Diabetic Foot Exam    Hammad is a 64 y.o. male who presents to the clinic for evaluation and treatment of diabetic feet. Hammad has a past medical history of Cataract, Chronic kidney disease, Diabetes mellitus type I, Diabetic retinopathy, Hyperlipidemia, and Hypertension.  Patient has noted no further ulceration of the lower extremities since our last exam.  Denies overt neuropathy pain with today's exam.  Has been making an effort to apply moisturizer to the site of prior ulceration daily.  Has been wearing casual shoe gear without irritation to the toes.  Denies any additional pedal complaints.      PCP: Trupti Pérez NP  Date Last Seen by PCP: 1/20    Hemoglobin A1C   Date Value Ref Range Status   09/24/2019 8.7 (H) 4.0 - 5.6 % Final     Comment:     ADA Screening Guidelines:  5.7-6.4%  Consistent with prediabetes  >or=6.5%  Consistent with diabetes  High levels of fetal hemoglobin interfere with the HbA1C  assay. Heterozygous hemoglobin variants (HbS, HgC, etc)do  not significantly interfere with this assay.   However, presence of multiple variants may affect accuracy.     06/26/2019 8.2 (H) 4.0 - 5.6 % Final     Comment:     ADA Screening Guidelines:  5.7-6.4%  Consistent with prediabetes  >or=6.5%  Consistent with diabetes  High levels of fetal hemoglobin interfere with the HbA1C  assay. Heterozygous hemoglobin variants (HbS, HgC, etc)do  not significantly interfere with this assay.   However, presence of multiple variants may affect accuracy.     03/14/2019 7.8 (H) 4.0 - 5.6 % Final     Comment:     ADA Screening Guidelines:  5.7-6.4%  Consistent with prediabetes  >or=6.5%  Consistent with diabetes  High levels of fetal hemoglobin interfere with the HbA1C  assay. Heterozygous hemoglobin variants (HbS, HgC, etc)do  not significantly interfere with this assay.   However, presence of multiple  variants may affect accuracy.             Past Medical History:   Diagnosis Date    Cataract     Chronic kidney disease     Diabetes mellitus type I     Diagnosed at age 19    Diabetic retinopathy     See's Dr. Ledesma    Hyperlipidemia     Hypertension        Past Surgical History:   Procedure Laterality Date    COLONOSCOPY      2011 wnl       Family History   Problem Relation Age of Onset    Cataracts Mother        Social History     Socioeconomic History    Marital status:      Spouse name: Not on file    Number of children: Not on file    Years of education: Not on file    Highest education level: Not on file   Occupational History    Not on file   Social Needs    Financial resource strain: Not on file    Food insecurity:     Worry: Not on file     Inability: Not on file    Transportation needs:     Medical: Not on file     Non-medical: Not on file   Tobacco Use    Smoking status: Current Some Day Smoker     Types: Cigars    Smokeless tobacco: Never Used    Tobacco comment: 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   Substance and Sexual Activity    Alcohol use: Yes     Alcohol/week: 2.0 standard drinks     Types: 2 Cans of beer per week    Drug use: No    Sexual activity: Yes     Partners: Female   Lifestyle    Physical activity:     Days per week: Not on file     Minutes per session: Not on file    Stress: Not on file   Relationships    Social connections:     Talks on phone: Not on file     Gets together: Not on file     Attends Druze service: Not on file     Active member of club or organization: Not on file     Attends meetings of clubs or organizations: Not on file     Relationship status: Not on file   Other Topics Concern    Not on file   Social History Narrative    Not on file       Current Outpatient Medications   Medication Sig Dispense Refill    atorvastatin (LIPITOR) 40 MG tablet TAKE 1 TABLET(40 MG) BY MOUTH EVERY  "DAY 90 tablet 4    blood sugar diagnostic (ONETOUCH ULTRA TEST) Strp 1 strip by Other route 4 (four) times daily. 400 strip 3    FLUoxetine 20 MG capsule TAKE 1 CAPSULE(20 MG) BY MOUTH EVERY DAY 90 capsule 3    insulin glargine U-300 conc (TOUJEO MAX U-300 SOLOSTAR) 300 unit/mL (3 mL) InPn Inject 24 Units into the skin every evening.      insulin lispro (HUMALOG KWIKPEN INSULIN) 100 unit/mL pen INJECT USING INSULIN:CARB RATIO OF 1:6 SUBCUTANEOUSLY PRIOR TO MEALS. MAX TDD 40 UNITS 3 Box 3    losartan (COZAAR) 100 MG tablet TAKE 1 TABLET(100 MG) BY MOUTH EVERY DAY 90 tablet 3    mupirocin (BACTROBAN) 2 % ointment Apply topically 3 (three) times daily. Lt great toe 2 g 0    pen needle, diabetic (PEN NEEDLE) 31 gauge x 1/4" Ndle 1 each by Misc.(Non-Drug; Combo Route) route 4 (four) times daily. 400 each 3    promethazine (PHENERGAN) 6.25 mg/5 mL syrup Take 20 mLs (25 mg total) by mouth nightly as needed for Nausea (cough at night). 240 mL 0    glucagon (human recombinant) inj 1mg/mL kit Inject 1 mL (1 mg total) into the muscle as needed. 1 kit 6     Current Facility-Administered Medications   Medication Dose Route Frequency Provider Last Rate Last Dose    glucagon (human recombinant) injection 1 mg  1 mg Intramuscular Once Tania Mckeon NP           Review of patient's allergies indicates:   Allergen Reactions    Altace [ramipril]      Cough         Review of Systems   Constitution: Negative for chills and fever.   Cardiovascular: Negative for claudication and leg swelling.   Skin: Negative for color change, dry skin and poor wound healing.   Musculoskeletal: Negative for joint pain, joint swelling, muscle cramps and muscle weakness.   Neurological: Positive for numbness.   Psychiatric/Behavioral: Negative for altered mental status.           Objective:      Physical Exam   Constitutional: He appears well-developed and well-nourished. No distress.   Cardiovascular:   Pulses:       Dorsalis pedis " pulses are 2+ on the right side, and 2+ on the left side.        Posterior tibial pulses are 1+ on the right side, and 1+ on the left side.   CFT is < 3 seconds bilateral.  Pedal hair growth is present bilateral.  Varicosities noted bilateral.  No lower extremity edema noted bilateral.  Toes are warm to touch bilateral.     Musculoskeletal: He exhibits no edema or tenderness.   Muscle strength 5/5 in all muscle groups bilateral.  No tenderness nor crepitation with ROM of foot/ankle joints bilateral.  No tenderness with palpation of bilateral foot and ankle.  Semi-reducible contracture of toes 2-5 bilateral.    Neurological: He has normal strength. A sensory deficit is present.   Protective sensation per [place]-Jayson monofilament is decreased bilateral.  Light touch is intact bilateral.   Skin: Skin is warm, dry and intact. Capillary refill takes less than 2 seconds. No abrasion, no burn, no ecchymosis, no laceration, no lesion and no petechiae noted. He is not diaphoretic.   Pedal skin has normal turgor, temperature, and texture bilateral.  Toenails x 10 appear normotrophic.  Mature epithelium noted to the medial aspect of the Rt. Great toe. Examination of the skin reveals no evidence of significant maceration, rashes, open lesions, suspicious appearing nevi or other concerning lesions.                  Assessment:       Encounter Diagnoses   Name Primary?    Type 1 diabetes mellitus with polyneuropathy Yes    History of ulceration     Hammer toes of both feet          Plan:       Hammad was seen today for diabetes mellitus and diabetic foot exam.    Diagnoses and all orders for this visit:    Type 1 diabetes mellitus with polyneuropathy    History of ulceration    Hammer toes of both feet      I counseled the patient on his conditions, their implications and medical management.    Advised to continue wearing shoe gear that accommodates digital deformities.    Shoe inspection. Diabetic Foot Education. Patient  reminded of the importance of good nutrition and blood sugar control to help prevent podiatric complications of diabetes. Patient instructed on proper foot hygeine. We discussed wearing proper shoe gear, daily foot inspections, never walking without protective shoe gear, never putting sharp instruments to feet    Patient instructed to inspect his feet, wear protective shoe gear when ambulatory, moisturizer to maintain skin integrity and follow in this office in approximately 2-3 months, sooner p.r.n.    Follow up in about 3 months (around 6/10/2020).    Caleb Duffy DPM

## 2020-03-19 ENCOUNTER — PATIENT MESSAGE (OUTPATIENT)
Dept: ENDOCRINOLOGY | Facility: CLINIC | Age: 65
End: 2020-03-19

## 2020-03-31 ENCOUNTER — PATIENT MESSAGE (OUTPATIENT)
Dept: PODIATRY | Facility: CLINIC | Age: 65
End: 2020-03-31

## 2020-04-05 ENCOUNTER — PATIENT MESSAGE (OUTPATIENT)
Dept: ENDOCRINOLOGY | Facility: CLINIC | Age: 65
End: 2020-04-05

## 2020-04-05 DIAGNOSIS — E10.43 TYPE 1 DIABETES MELLITUS WITH DIABETIC AUTONOMIC NEUROPATHY: ICD-10-CM

## 2020-04-06 RX ORDER — INSULIN GLARGINE 300 [IU]/ML
24 INJECTION, SOLUTION SUBCUTANEOUS NIGHTLY
Qty: 4.5 ML | Refills: 6 | Status: SHIPPED | OUTPATIENT
Start: 2020-04-06 | End: 2020-10-12

## 2020-04-06 NOTE — TELEPHONE ENCOUNTER
----- Message from Robyn Ace sent at 4/6/2020  8:19 AM CDT -----  Type:  RX Refill Request    Who Called: self  Refill or New Rx:  Refill   RX Name and Strength:  insulin glargine U-300 conc (TOUJEO MAX U-300 SOLOSTAR) 300 unit/mL (3 mL) InPn  How is the patient currently taking it? (ex. 1XDay):    Is this a 30 day or 90 day RX:  30 day supply  Preferred Pharmacy with phone number:  [facility] [phone]  Local or Mail Order:  Local   Ordering Provider:  Gin Angelo Call Back Number:  [phone]  Additional Information:

## 2020-04-10 ENCOUNTER — PATIENT MESSAGE (OUTPATIENT)
Dept: PODIATRY | Facility: CLINIC | Age: 65
End: 2020-04-10

## 2020-04-23 ENCOUNTER — PATIENT MESSAGE (OUTPATIENT)
Dept: PODIATRY | Facility: CLINIC | Age: 65
End: 2020-04-23

## 2020-04-24 ENCOUNTER — PATIENT MESSAGE (OUTPATIENT)
Dept: PODIATRY | Facility: CLINIC | Age: 65
End: 2020-04-24

## 2020-04-28 ENCOUNTER — PATIENT MESSAGE (OUTPATIENT)
Dept: PODIATRY | Facility: CLINIC | Age: 65
End: 2020-04-28

## 2020-05-03 ENCOUNTER — PATIENT MESSAGE (OUTPATIENT)
Dept: PODIATRY | Facility: CLINIC | Age: 65
End: 2020-05-03

## 2020-05-07 ENCOUNTER — PATIENT MESSAGE (OUTPATIENT)
Dept: ENDOCRINOLOGY | Facility: CLINIC | Age: 65
End: 2020-05-07

## 2020-05-08 ENCOUNTER — PATIENT MESSAGE (OUTPATIENT)
Dept: PODIATRY | Facility: CLINIC | Age: 65
End: 2020-05-08

## 2020-05-11 ENCOUNTER — PATIENT MESSAGE (OUTPATIENT)
Dept: ENDOCRINOLOGY | Facility: CLINIC | Age: 65
End: 2020-05-11

## 2020-05-12 ENCOUNTER — PATIENT MESSAGE (OUTPATIENT)
Dept: ENDOCRINOLOGY | Facility: CLINIC | Age: 65
End: 2020-05-12

## 2020-05-12 DIAGNOSIS — Z86.19 HISTORY OF VIRAL INFECTION: Primary | ICD-10-CM

## 2020-05-12 NOTE — TELEPHONE ENCOUNTER
Since he doesn't have PCP, I have entered the antibody test as a courtesy. Please call pt to schedule.

## 2020-05-12 NOTE — TELEPHONE ENCOUNTER
Pt wanting antibody and covid screening  Advised to call Covid hotline (he did; see note)  Advised to contact urgent care if desired  Advised to establish care with a PCP    Any further advice?

## 2020-05-13 ENCOUNTER — PATIENT MESSAGE (OUTPATIENT)
Dept: ENDOCRINOLOGY | Facility: CLINIC | Age: 65
End: 2020-05-13

## 2020-05-14 ENCOUNTER — PATIENT MESSAGE (OUTPATIENT)
Dept: ENDOCRINOLOGY | Facility: CLINIC | Age: 65
End: 2020-05-14

## 2020-05-15 ENCOUNTER — PATIENT MESSAGE (OUTPATIENT)
Dept: PODIATRY | Facility: CLINIC | Age: 65
End: 2020-05-15

## 2020-05-15 ENCOUNTER — PATIENT MESSAGE (OUTPATIENT)
Dept: ENDOCRINOLOGY | Facility: CLINIC | Age: 65
End: 2020-05-15

## 2020-05-15 ENCOUNTER — TELEPHONE (OUTPATIENT)
Dept: PODIATRY | Facility: CLINIC | Age: 65
End: 2020-05-15

## 2020-05-15 NOTE — TELEPHONE ENCOUNTER
----- Message from Hazel Rivera sent at 5/15/2020 10:59 AM CDT -----  Contact: self  Type:  Patient Returning Call    Who Called:  self  Who Left Message for Patient:  Cecilia  Does the patient know what this is regarding?:  yes  Best Call Back Number:  [phone] (home)   Additional Information:  Patient states he missed the call. Please call patient. Thanks!

## 2020-05-15 NOTE — TELEPHONE ENCOUNTER
Pt wife stated she had taken the pic and wanted to know what the provider said. Msg sent through pt portal. Pt verbalized understanding.

## 2020-05-20 ENCOUNTER — LAB VISIT (OUTPATIENT)
Dept: LAB | Facility: HOSPITAL | Age: 65
End: 2020-05-20
Attending: NURSE PRACTITIONER
Payer: COMMERCIAL

## 2020-05-20 DIAGNOSIS — E10.43 TYPE 1 DIABETES MELLITUS WITH DIABETIC AUTONOMIC NEUROPATHY: ICD-10-CM

## 2020-05-20 LAB
ALBUMIN SERPL BCP-MCNC: 3.8 G/DL (ref 3.5–5.2)
ALP SERPL-CCNC: 95 U/L (ref 55–135)
ALT SERPL W/O P-5'-P-CCNC: 20 U/L (ref 10–44)
ANION GAP SERPL CALC-SCNC: 8 MMOL/L (ref 8–16)
AST SERPL-CCNC: 22 U/L (ref 10–40)
BILIRUB SERPL-MCNC: 0.5 MG/DL (ref 0.1–1)
BUN SERPL-MCNC: 31 MG/DL (ref 8–23)
CALCIUM SERPL-MCNC: 9.4 MG/DL (ref 8.7–10.5)
CHLORIDE SERPL-SCNC: 101 MMOL/L (ref 95–110)
CO2 SERPL-SCNC: 27 MMOL/L (ref 23–29)
CREAT SERPL-MCNC: 1.4 MG/DL (ref 0.5–1.4)
EST. GFR  (AFRICAN AMERICAN): >60 ML/MIN/1.73 M^2
EST. GFR  (NON AFRICAN AMERICAN): 53.5 ML/MIN/1.73 M^2
GLUCOSE SERPL-MCNC: 169 MG/DL (ref 70–110)
POTASSIUM SERPL-SCNC: 4.7 MMOL/L (ref 3.5–5.1)
PROT SERPL-MCNC: 7.3 G/DL (ref 6–8.4)
SODIUM SERPL-SCNC: 136 MMOL/L (ref 136–145)

## 2020-05-20 PROCEDURE — 80053 COMPREHEN METABOLIC PANEL: CPT

## 2020-05-20 PROCEDURE — 83036 HEMOGLOBIN GLYCOSYLATED A1C: CPT

## 2020-05-20 PROCEDURE — 36415 COLL VENOUS BLD VENIPUNCTURE: CPT | Mod: PN

## 2020-05-21 ENCOUNTER — PATIENT MESSAGE (OUTPATIENT)
Dept: ENDOCRINOLOGY | Facility: CLINIC | Age: 65
End: 2020-05-21

## 2020-05-21 LAB
ESTIMATED AVG GLUCOSE: 171 MG/DL (ref 68–131)
HBA1C MFR BLD HPLC: 7.6 % (ref 4–5.6)

## 2020-05-22 ENCOUNTER — OFFICE VISIT (OUTPATIENT)
Dept: PODIATRY | Facility: CLINIC | Age: 65
End: 2020-05-22
Payer: COMMERCIAL

## 2020-05-22 VITALS — WEIGHT: 218.94 LBS | HEIGHT: 74 IN | BODY MASS INDEX: 28.1 KG/M2

## 2020-05-22 DIAGNOSIS — M20.41 HAMMER TOES OF BOTH FEET: ICD-10-CM

## 2020-05-22 DIAGNOSIS — E10.42 TYPE 1 DIABETES MELLITUS WITH POLYNEUROPATHY: ICD-10-CM

## 2020-05-22 DIAGNOSIS — M20.42 HAMMER TOES OF BOTH FEET: ICD-10-CM

## 2020-05-22 DIAGNOSIS — L97.509 TYPE 1 DIABETES MELLITUS WITH DIABETIC TOE ULCER: Primary | ICD-10-CM

## 2020-05-22 DIAGNOSIS — E10.621 TYPE 1 DIABETES MELLITUS WITH DIABETIC TOE ULCER: Primary | ICD-10-CM

## 2020-05-22 PROCEDURE — 99213 PR OFFICE/OUTPT VISIT, EST, LEVL III, 20-29 MIN: ICD-10-PCS | Mod: 25,S$GLB,, | Performed by: PODIATRIST

## 2020-05-22 PROCEDURE — 99999 PR PBB SHADOW E&M-EST. PATIENT-LVL II: ICD-10-PCS | Mod: PBBFAC,,, | Performed by: PODIATRIST

## 2020-05-22 PROCEDURE — 87070 CULTURE OTHR SPECIMN AEROBIC: CPT

## 2020-05-22 PROCEDURE — 3051F HG A1C>EQUAL 7.0%<8.0%: CPT | Mod: CPTII,S$GLB,, | Performed by: PODIATRIST

## 2020-05-22 PROCEDURE — 99999 PR PBB SHADOW E&M-EST. PATIENT-LVL II: CPT | Mod: PBBFAC,,, | Performed by: PODIATRIST

## 2020-05-22 PROCEDURE — 97597 DBRDMT OPN WND 1ST 20 CM/<: CPT | Mod: S$GLB,,, | Performed by: PODIATRIST

## 2020-05-22 PROCEDURE — 87075 CULTR BACTERIA EXCEPT BLOOD: CPT

## 2020-05-22 PROCEDURE — 87186 SC STD MICRODIL/AGAR DIL: CPT

## 2020-05-22 PROCEDURE — 3008F PR BODY MASS INDEX (BMI) DOCUMENTED: ICD-10-PCS | Mod: CPTII,S$GLB,, | Performed by: PODIATRIST

## 2020-05-22 PROCEDURE — 97597 WOUND DEBRIDEMENT: ICD-10-PCS | Mod: S$GLB,,, | Performed by: PODIATRIST

## 2020-05-22 PROCEDURE — 3008F BODY MASS INDEX DOCD: CPT | Mod: CPTII,S$GLB,, | Performed by: PODIATRIST

## 2020-05-22 PROCEDURE — 3051F PR MOST RECENT HEMOGLOBIN A1C LEVEL 7.0 - < 8.0%: ICD-10-PCS | Mod: CPTII,S$GLB,, | Performed by: PODIATRIST

## 2020-05-22 PROCEDURE — 99213 OFFICE O/P EST LOW 20 MIN: CPT | Mod: 25,S$GLB,, | Performed by: PODIATRIST

## 2020-05-22 PROCEDURE — 87077 CULTURE AEROBIC IDENTIFY: CPT | Mod: 59

## 2020-05-22 NOTE — PROGRESS NOTES
Subjective:      Patient ID: Hammad Douglas III is a 64 y.o. male.    Chief Complaint: Diabetes Mellitus (7.6 5/20/20); Diabetic Foot Exam; and Wound Care (2nd rt toe)  Patient presents to clinic with the chief complaint of a new diabetic ulcer involving the distal tip of the Rt. 3rd toe.  States the wound has been present for the past several weeks.  Notes it developed initially as a blister.  He denies pain from the site, however, attributes this to his neuropathy.  Reports treating as we discussed by applying sawyer and a bandage to the site.  He has been offloading the area with a crest pad as well.  Denies noticing pus-like discharge from the wound.  Denies having N/V/F/C/D.  Denies any additional pedal complaints.    Past Medical History:   Diagnosis Date    Cataract     Chronic kidney disease     Diabetes mellitus type I     Diagnosed at age 19    Diabetic retinopathy     See's Dr. Ledesma    Hyperlipidemia     Hypertension        Past Surgical History:   Procedure Laterality Date    COLONOSCOPY      2011 wnl       Family History   Problem Relation Age of Onset    Cataracts Mother        Social History     Socioeconomic History    Marital status:      Spouse name: Not on file    Number of children: Not on file    Years of education: Not on file    Highest education level: Not on file   Occupational History    Not on file   Social Needs    Financial resource strain: Not on file    Food insecurity:     Worry: Not on file     Inability: Not on file    Transportation needs:     Medical: Not on file     Non-medical: Not on file   Tobacco Use    Smoking status: Current Some Day Smoker     Types: Cigars    Smokeless tobacco: Never Used    Tobacco comment: 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   Substance and Sexual Activity    Alcohol use: Yes     Alcohol/week: 2.0 standard drinks     Types: 2 Cans of beer per week    Drug use: No    Sexual  "activity: Yes     Partners: Female   Lifestyle    Physical activity:     Days per week: Not on file     Minutes per session: Not on file    Stress: Not on file   Relationships    Social connections:     Talks on phone: Not on file     Gets together: Not on file     Attends Anglican service: Not on file     Active member of club or organization: Not on file     Attends meetings of clubs or organizations: Not on file     Relationship status: Not on file   Other Topics Concern    Not on file   Social History Narrative    Not on file       Current Outpatient Medications   Medication Sig Dispense Refill    atorvastatin (LIPITOR) 40 MG tablet TAKE 1 TABLET(40 MG) BY MOUTH EVERY DAY 90 tablet 4    blood-glucose sensor (DEXCOM G5-G4 SENSOR) Jenni Use continuously for blood glucose monitoring. Change every 7 days. 12 each 2    blood-glucose transmitter (DEXCOM G5 TRANSMITTER) Jenni Use continuously for blood glucose monitoring. Change every 90 days. 1 Device 2    FLUoxetine 20 MG capsule TAKE 1 CAPSULE(20 MG) BY MOUTH EVERY DAY 90 capsule 3    insulin glargine U-300 conc (TOUJEO MAX U-300 SOLOSTAR) 300 unit/mL (3 mL) InPn Inject 24 Units into the skin every evening. 4.5 mL 6    insulin lispro (HUMALOG KWIKPEN INSULIN) 100 unit/mL pen INJECT USING INSULIN:CARB RATIO OF 1:6 SUBCUTANEOUSLY PRIOR TO MEALS. MAX TDD 40 UNITS 3 Box 3    losartan (COZAAR) 100 MG tablet TAKE 1 TABLET(100 MG) BY MOUTH EVERY DAY 90 tablet 3    pen needle, diabetic (PEN NEEDLE) 31 gauge x 1/4" Ndle 1 each by Misc.(Non-Drug; Combo Route) route 4 (four) times daily. 400 each 3    blood sugar diagnostic (ONETOUCH ULTRA TEST) Strp 1 strip by Other route 4 (four) times daily. (Patient not taking: Reported on 5/22/2020) 400 strip 3    glucagon (human recombinant) inj 1mg/mL kit Inject 1 mL (1 mg total) into the muscle as needed. 1 kit 6    mupirocin (BACTROBAN) 2 % ointment Apply topically 3 (three) times daily. Lt great toe (Patient not " taking: Reported on 5/22/2020) 2 g 0    promethazine (PHENERGAN) 6.25 mg/5 mL syrup Take 20 mLs (25 mg total) by mouth nightly as needed for Nausea (cough at night). (Patient not taking: Reported on 5/22/2020) 240 mL 0     Current Facility-Administered Medications   Medication Dose Route Frequency Provider Last Rate Last Dose    glucagon (human recombinant) injection 1 mg  1 mg Intramuscular Once Tania Mckeon NP           Review of patient's allergies indicates:   Allergen Reactions    Altace [ramipril]      Cough         Review of Systems   Constitution: Negative for chills and fever.   Cardiovascular: Negative for claudication and leg swelling.   Skin: Negative for color change, dry skin and poor wound healing.   Musculoskeletal: Negative for joint pain, joint swelling, muscle cramps and muscle weakness.   Neurological: Positive for numbness.   Psychiatric/Behavioral: Negative for altered mental status.           Objective:      Physical Exam   Constitutional: He appears well-developed and well-nourished. No distress.   Cardiovascular:   Pulses:       Dorsalis pedis pulses are 2+ on the right side, and 2+ on the left side.        Posterior tibial pulses are 1+ on the right side, and 1+ on the left side.   CFT is < 3 seconds bilateral.  Pedal hair growth is present bilateral.  Varicosities noted bilateral.  No lower extremity edema noted bilateral.  Toes are warm to touch bilateral.     Musculoskeletal: He exhibits no edema or tenderness.   Muscle strength 5/5 in all muscle groups bilateral.  No tenderness nor crepitation with ROM of foot/ankle joints bilateral.  No tenderness with palpation of bilateral foot and ankle.  Semi-reducible contracture of the lesser digits bilateral.     Neurological: He has normal strength. A sensory deficit is present.   Protective sensation per [place]-Jayson monofilament is decreased bilateral.  Light touch is intact bilateral.   Skin: Skin is warm and dry. Capillary  refill takes less than 2 seconds. Lesion noted. No abrasion, no burn, no ecchymosis, no laceration and no petechiae noted. He is not diaphoretic.   Location: Open wound noted to the distal tip of the Rt. 3rd toe.  Base: Down to dermis and comprised of fibrin.  Drainage: None  Skylar wound: Devoid of erythema, edema, fluctuance, purulence, and malodor.    Pre-debridement measurement: 1 x 0.7 x 0.1cm  Post-debridement measurement: 1.2 x 0.9 x 0.1cm               Assessment:       Encounter Diagnoses   Name Primary?    Type 1 diabetes mellitus with polyneuropathy     Type 1 diabetes mellitus with diabetic toe ulcer Yes    Hammer toes of both feet          Plan:       Hammad was seen today for diabetes mellitus, diabetic foot exam and wound care.    Diagnoses and all orders for this visit:    Type 1 diabetes mellitus with diabetic toe ulcer  -     Aerobic culture  -     Culture, Anaerobic    Type 1 diabetes mellitus with polyneuropathy    Hammer toes of both feet      I counseled the patient on his conditions, their implications and medical management.    Discussed with patient the associated risks and benefits associated with a selective excisional debridement of the Rt. foot/ankle.  Consent was read, signed, and witnessed.  See attached procedure note.      Wound cultures were obtained.    The wound base was covered with iodosorb ointment and a toe football wrap.  The site was then offloaded with a large crest pad.    Advised to keep the dressing CDI for 24 hours.  Patient to then resume application of sawyer and a bandage to the site every three days.    Patient may ambulate to tolerance in a stiff sole shoe as long as he wears the crest pad to offload the digit.    Anticipate closure of the wound in 2-3 weeks.  If it fails to heal in this timeframe or appears infected, he will return to clinic for continued wound care.    Patient to continue sending weekly pictures of the wound through his MyChart.    [facility]  catalina.    Caleb Duffy DPM

## 2020-05-24 NOTE — PROCEDURES
Wound Debridement  Date/Time: 5/22/2020 8:00 AM  Performed by: Caleb Duffy DPM  Authorized by: Caleb Duffy DPM     Consent Done?:  Yes (Written)    Preparation: Patient was prepped and draped in usual sterile fashion    Local anesthesia used?: No      Wound Details:    Location:  Right foot    Location:  Right 3rd Toe    Type of Debridement:  Excisional       Length (cm):  1       Area (sq cm):  0.7       Width (cm):  0.7       Percent Debrided (%):  100       Depth (cm):  0.1       Total Area Debrided (sq cm):  0.7    Depth of debridement:  Epidermis/Dermis    Tissue debrided:  Dermis    Devitalized tissue debrided:  Fibrin    Instruments:  Curette    Bleeding:  Minimal  Hemostasis Achieved: Yes    Method Used:  Pressure  Patient tolerance:  Patient tolerated the procedure well with no immediate complications

## 2020-05-25 DIAGNOSIS — E11.628 DIABETIC FOOT INFECTION: Primary | ICD-10-CM

## 2020-05-25 DIAGNOSIS — L08.9 DIABETIC FOOT INFECTION: Primary | ICD-10-CM

## 2020-05-25 RX ORDER — CIPROFLOXACIN 250 MG/1
250 TABLET, FILM COATED ORAL 2 TIMES DAILY
Qty: 14 TABLET | Refills: 0 | Status: ON HOLD | OUTPATIENT
Start: 2020-05-25 | End: 2020-07-02 | Stop reason: CLARIF

## 2020-05-26 ENCOUNTER — PATIENT MESSAGE (OUTPATIENT)
Dept: ENDOCRINOLOGY | Facility: CLINIC | Age: 65
End: 2020-05-26

## 2020-05-26 ENCOUNTER — PATIENT MESSAGE (OUTPATIENT)
Dept: PODIATRY | Facility: CLINIC | Age: 65
End: 2020-05-26

## 2020-05-26 ENCOUNTER — TELEPHONE (OUTPATIENT)
Dept: ENDOCRINOLOGY | Facility: CLINIC | Age: 65
End: 2020-05-26

## 2020-05-26 LAB
BACTERIA SPEC AEROBE CULT: ABNORMAL
BACTERIA SPEC AEROBE CULT: ABNORMAL
BACTERIA SPEC ANAEROBE CULT: NORMAL

## 2020-05-27 ENCOUNTER — OFFICE VISIT (OUTPATIENT)
Dept: ENDOCRINOLOGY | Facility: CLINIC | Age: 65
End: 2020-05-27
Payer: COMMERCIAL

## 2020-05-27 DIAGNOSIS — E10.21 TYPE 1 DIABETES MELLITUS WITH DIABETIC NEPHROPATHY: ICD-10-CM

## 2020-05-27 DIAGNOSIS — E10.649 HYPOGLYCEMIA UNAWARENESS IN TYPE 1 DIABETES MELLITUS: ICD-10-CM

## 2020-05-27 DIAGNOSIS — E10.43 TYPE 1 DIABETES MELLITUS WITH DIABETIC AUTONOMIC NEUROPATHY: Primary | ICD-10-CM

## 2020-05-27 DIAGNOSIS — I10 ESSENTIAL HYPERTENSION: ICD-10-CM

## 2020-05-27 DIAGNOSIS — R53.83 FATIGUE, UNSPECIFIED TYPE: ICD-10-CM

## 2020-05-27 DIAGNOSIS — E78.5 HYPERLIPIDEMIA, UNSPECIFIED HYPERLIPIDEMIA TYPE: ICD-10-CM

## 2020-05-27 PROCEDURE — 99213 OFFICE O/P EST LOW 20 MIN: CPT | Mod: 95,,, | Performed by: NURSE PRACTITIONER

## 2020-05-27 PROCEDURE — 99213 PR OFFICE/OUTPT VISIT, EST, LEVL III, 20-29 MIN: ICD-10-PCS | Mod: 95,,, | Performed by: NURSE PRACTITIONER

## 2020-05-27 PROCEDURE — 3051F HG A1C>EQUAL 7.0%<8.0%: CPT | Mod: CPTII,,, | Performed by: NURSE PRACTITIONER

## 2020-05-27 PROCEDURE — 3051F PR MOST RECENT HEMOGLOBIN A1C LEVEL 7.0 - < 8.0%: ICD-10-PCS | Mod: CPTII,,, | Performed by: NURSE PRACTITIONER

## 2020-05-27 NOTE — PROGRESS NOTES
"CC: Mr. Hammad Douglas III arrives today for management of Type 1  DM and review of chronic medical conditions, as listed in the visit diagnosis section of this encounter.     HPI: Mr. Hammad Douglas III was diagnosed with Type 1  DM at age 19 after a viral illness - had polyuria, polydipsia at this time.  Initial treatment consisted of insulin. Denies FH of DM. Reports past hospitalizations due to DKA > 30 years ago. Not recently.   Has diagnosis of hypoglycemia unawareness with past severe hypoglycemic episodes requiring 3rd party assistance. Began using Dexcom G5 in 2017.      Patient presents for video visit, due to appointment restrictions related to COVID-19.     The patient location is:  Patient Home   The chief complaint leading to consultation is: Type 1 DM  Visit type: Virtual visit with synchronous audio and video  Total time spent with patient: 15 min  Each patient to whom he or she provides medical services by telemedicine is:  (1) informed of the relationship between the physician and patient and the respective role of any other health care provider with respect to management of the patient; and (2) notified that he or she may decline to receive medical services by telemedicine and may withdraw from such care at any time.        Patient was last seen by me in October. I:C ratio was adjusted but he didn't make this change. He states that he forgot.    BG readings are checked 4x/day (has been off of Dexcom G5 since March). His Dexcom G5 reorder was never filled by his new insurance - [facility] mail order pharmacy.     Patient is having difficulty reading his readings to me off of his meter. He reports that most of his glucoses have been "good" lately.      Hypoglycemia: Rare - may occur if he overestimates his carbs. He denies recent nocturnal/early AM hypoglycemia.   Hypoglycemic Symptoms: "feels slow", often doesn't have symptoms.   Hypoglycemia Treatment: Juice or glucose tabs. Has glucagon.     Missing " Insulin/PO medication doses: No  Timing prandial insulin 5-15 minutes before meals: yes    Exercise: active around the house/yard    Dietary Habits: Eats 2-3 meals/day. Sometimes misses breakfast or it may be small (protein shake). Avoids sugary beverages.     Last DM education appointment: October 2017    He is seeing podiatry for toe ulcer.       CURRENT DIABETIC MEDS:  Toujeo Max 28 units QHS, Humalog CHO ratio 1:7, correction target 150, ISF 50 (average dose 10 units)  Vial or pen: pen  Glucometer type: Prodigy?    Previous insulins:  Lantus    Last Eye Exam: 9/30/2019, + proliferative DR. + R cataract.   Last Podiatry Exam: 5/22/2020    REVIEW OF SYSTEMS  Constitutional: no c/o weakness, weight loss. + fatigue.  Eyes: reports visual disturbances that he attributes to cataract   Cardiac: no palpitations or chest pain.  Respiratory: no cough or dyspnea.   GI:  no c/o abdominal pain or nausea.   Skin: no rashes. + ulcer to R third toe  Neuro: no numbness, tingling, or parasthesias.  Endocrine: denies polyphagia, polydipsia, polyuria    Personally reviewed Past Medical, Surgical, Social History.    Vital Signs  There were no vitals taken for this visit. - video visit    Personally reviewed the below labs:    Hemoglobin A1C   Date Value Ref Range Status   05/20/2020 7.6 (H) 4.0 - 5.6 % Final     Comment:     ADA Screening Guidelines:  5.7-6.4%  Consistent with prediabetes  >or=6.5%  Consistent with diabetes  High levels of fetal hemoglobin interfere with the HbA1C  assay. Heterozygous hemoglobin variants (HbS, HgC, etc)do  not significantly interfere with this assay.   However, presence of multiple variants may affect accuracy.     09/24/2019 8.7 (H) 4.0 - 5.6 % Final     Comment:     ADA Screening Guidelines:  5.7-6.4%  Consistent with prediabetes  >or=6.5%  Consistent with diabetes  High levels of fetal hemoglobin interfere with the HbA1C  assay. Heterozygous hemoglobin variants (HbS, HgC, etc)do  not  significantly interfere with this assay.   However, presence of multiple variants may affect accuracy.     06/26/2019 8.2 (H) 4.0 - 5.6 % Final     Comment:     ADA Screening Guidelines:  5.7-6.4%  Consistent with prediabetes  >or=6.5%  Consistent with diabetes  High levels of fetal hemoglobin interfere with the HbA1C  assay. Heterozygous hemoglobin variants (HbS, HgC, etc)do  not significantly interfere with this assay.   However, presence of multiple variants may affect accuracy.         Chemistry        Component Value Date/Time     05/20/2020 1408    K 4.7 05/20/2020 1408     05/20/2020 1408    CO2 27 05/20/2020 1408    BUN 31 (H) 05/20/2020 1408    CREATININE 1.4 05/20/2020 1408     (H) 05/20/2020 1408        Component Value Date/Time    CALCIUM 9.4 05/20/2020 1408    ALKPHOS 95 05/20/2020 1408    AST 22 05/20/2020 1408    ALT 20 05/20/2020 1408    BILITOT 0.5 05/20/2020 1408    ESTGFRAFRICA >60.0 05/20/2020 1408    EGFRNONAA 53.5 (A) 05/20/2020 1408          Lab Results   Component Value Date    CHOL 163 06/26/2019    CHOL 172 08/06/2018    CHOL 172 08/17/2017     Lab Results   Component Value Date    HDL 51 06/26/2019    HDL 48 08/06/2018    HDL 44 08/17/2017     Lab Results   Component Value Date    LDLCALC 99.6 06/26/2019    LDLCALC 110.6 08/06/2018    LDLCALC 117.0 08/17/2017     Lab Results   Component Value Date    TRIG 62 06/26/2019    TRIG 67 08/06/2018    TRIG 55 08/17/2017     Lab Results   Component Value Date    CHOLHDL 31.3 06/26/2019    CHOLHDL 27.9 08/06/2018    CHOLHDL 25.6 08/17/2017       Lab Results   Component Value Date    MICALBCREAT 170.2 (H) 06/26/2019     Lab Results   Component Value Date    TSH 2.070 06/26/2019       CrCl cannot be calculated (Unknown ideal weight.).    Vit D, 25-Hydroxy   Date Value Ref Range Status   08/06/2018 35 30 - 96 ng/mL Final     Comment:     Vitamin D deficiency.........<10 ng/mL                              Vitamin D  insufficiency......10-29 ng/mL       Vitamin D sufficiency........> or equal to 30 ng/mL  Vitamin D toxicity............>100 ng/mL         PHYSICAL EXAMINATION  Deferred - video visit      Dexcom download: Will not apply this download to today's visit since data is ~60 days old.    Average glucose:   Above goal:   Within goal:   Below goal:      A1c target < 7.5%, due to hypoglycemia unawareness      Assessment/Plan  1. Type 1 diabetes mellitus with diabetic autonomic neuropathy  -- Uncontrolled but improving. Glucose patterns are unclear. Needs to resume Dexcom.   -- since Dexcom G5 was prescribed several weeks ago and he still hasn't received, will discontinue this order and proceed with change to Dexcom G6.   -- continue current insulin doses for now.   -- not interested in insulin pump.   -- until he can resume Dexcom, continue BG monitoring 4x/day.     -- Discussed diagnosis of DM, A1c goals, progression of disease, long term complications and tx options.    -- Reviewed hypoglycemia management: treat with 1/2 glass of juice, 1/2 can regular coke, or 4 glucose tablets. Monitor and repeat treatment every 15 minutes until BG is >70 Then have a snack, which includes a complex carbohydrate and protein.  Advised patient to check BG before activities, such as driving or exercise.   2. Type 1 diabetes mellitus with diabetic nephropathy  -- uncontrolled.   -- Optimize DM control  -- continue losartan  -- order level with RTC   3. Proliferative diabetic retinopathy without macular edema associated with type 1 diabetes mellitus, unspecified laterality  -- stable  -- keep follow ups   4. Essential hypertension  -- continue current meds   5. Hyperlipidemia, unspecified hyperlipidemia type  -- stable  -- continue atorvastatin  -- lipid panel with RTC   6. Hypoglycemia unawareness in Type 1 DM -- resume use of Dexcom.   -- has glucagon   7. Fatigue  -- order Vitamin B12 with RTC         FOLLOW UP   Follow up in about 3 months  (around 8/27/2020).   Patient instructed to bring BG logs to each follow up   Patient encouraged to call for any BG/medication issues, concerns, or questions.     Orders Placed This Encounter   Procedures    Hemoglobin A1C    Lipid Panel    Comprehensive metabolic panel    Microalbumin/creatinine urine ratio

## 2020-05-28 ENCOUNTER — PATIENT MESSAGE (OUTPATIENT)
Dept: PODIATRY | Facility: CLINIC | Age: 65
End: 2020-05-28

## 2020-06-04 ENCOUNTER — PATIENT MESSAGE (OUTPATIENT)
Dept: ENDOCRINOLOGY | Facility: CLINIC | Age: 65
End: 2020-06-04

## 2020-06-04 ENCOUNTER — TELEPHONE (OUTPATIENT)
Dept: ENDOCRINOLOGY | Facility: CLINIC | Age: 65
End: 2020-06-04

## 2020-06-04 RX ORDER — GLUCAGON HYDROCHLORIDE 1 MG
1 KIT INJECTION
Qty: 1 EACH | Refills: 3 | Status: SHIPPED | OUTPATIENT
Start: 2020-06-04 | End: 2020-06-24 | Stop reason: SDUPTHER

## 2020-06-10 ENCOUNTER — OFFICE VISIT (OUTPATIENT)
Dept: PODIATRY | Facility: CLINIC | Age: 65
End: 2020-06-10
Payer: COMMERCIAL

## 2020-06-10 ENCOUNTER — PATIENT MESSAGE (OUTPATIENT)
Dept: ENDOCRINOLOGY | Facility: CLINIC | Age: 65
End: 2020-06-10

## 2020-06-10 VITALS — HEIGHT: 74 IN | WEIGHT: 218 LBS | BODY MASS INDEX: 27.98 KG/M2

## 2020-06-10 DIAGNOSIS — E10.621 DIABETIC ULCER OF TOE OF RIGHT FOOT ASSOCIATED WITH TYPE 1 DIABETES MELLITUS, LIMITED TO BREAKDOWN OF SKIN: Primary | ICD-10-CM

## 2020-06-10 DIAGNOSIS — M20.42 HAMMER TOES OF BOTH FEET: ICD-10-CM

## 2020-06-10 DIAGNOSIS — L97.511 DIABETIC ULCER OF TOE OF RIGHT FOOT ASSOCIATED WITH TYPE 1 DIABETES MELLITUS, LIMITED TO BREAKDOWN OF SKIN: Primary | ICD-10-CM

## 2020-06-10 DIAGNOSIS — M20.41 HAMMER TOES OF BOTH FEET: ICD-10-CM

## 2020-06-10 DIAGNOSIS — E10.42 TYPE 1 DIABETES MELLITUS WITH POLYNEUROPATHY: ICD-10-CM

## 2020-06-10 PROCEDURE — 97597 WOUND DEBRIDEMENT: ICD-10-PCS | Mod: S$GLB,,, | Performed by: PODIATRIST

## 2020-06-10 PROCEDURE — 99999 PR PBB SHADOW E&M-EST. PATIENT-LVL III: CPT | Mod: PBBFAC,,, | Performed by: PODIATRIST

## 2020-06-10 PROCEDURE — 99999 PR PBB SHADOW E&M-EST. PATIENT-LVL III: ICD-10-PCS | Mod: PBBFAC,,, | Performed by: PODIATRIST

## 2020-06-10 PROCEDURE — 97597 DBRDMT OPN WND 1ST 20 CM/<: CPT | Mod: S$GLB,,, | Performed by: PODIATRIST

## 2020-06-10 PROCEDURE — 99499 NO LOS: ICD-10-PCS | Mod: S$GLB,,, | Performed by: PODIATRIST

## 2020-06-10 PROCEDURE — 99499 UNLISTED E&M SERVICE: CPT | Mod: S$GLB,,, | Performed by: PODIATRIST

## 2020-06-10 NOTE — PROCEDURES
Wound Debridement  Date/Time: 6/10/2020 7:20 AM  Performed by: Caleb Duffy DPM  Authorized by: Caleb Duffy DPM     Consent Done?:  Yes (Verbal)    Preparation: Patient was prepped and draped in usual sterile fashion    Local anesthesia used?: No      Wound Details:    Location:  Right foot    Location:  Right 3rd Toe    Type of Debridement:  Excisional       Length (cm):  0.9       Area (sq cm):  0.45       Width (cm):  0.5       Percent Debrided (%):  100       Depth (cm):  0.1       Total Area Debrided (sq cm):  0.45    Depth of debridement:  Epidermis/Dermis    Tissue debrided:  Dermis    Devitalized tissue debrided:  Fibrin    Instruments:  Blade    Bleeding:  Minimal  Hemostasis Achieved: Yes    Method Used:  Pressure  Patient tolerance:  Patient tolerated the procedure well with no immediate complications

## 2020-06-10 NOTE — PROGRESS NOTES
Subjective:      Patient ID: Hammad Douglas III is a 65 y.o. male.    Chief Complaint: Wound Check  Patient presents to clinic for a follow up for a wound of the Rt. Distal 3rd toe.  He continues to deny pain from the wound with today's exam.  Relates keeping the site bandaged and has been applying sawyer every two to three days to the site.  Notes continued use of a crest pad in a supportive shoe.  Denies noticing localized signs of infection from the digit.   Denies having N/V/F/C/D.  Denies any additional pedal complaints.    Past Medical History:   Diagnosis Date    Cataract     Chronic kidney disease     Diabetes mellitus type I     Diagnosed at age 19    Diabetic retinopathy     See's Dr. Ledesma    Hyperlipidemia     Hypertension        Past Surgical History:   Procedure Laterality Date    COLONOSCOPY      2011 wnl       Family History   Problem Relation Age of Onset    Cataracts Mother        Social History     Socioeconomic History    Marital status:      Spouse name: Not on file    Number of children: Not on file    Years of education: Not on file    Highest education level: Not on file   Occupational History    Not on file   Social Needs    Financial resource strain: Not on file    Food insecurity:     Worry: Not on file     Inability: Not on file    Transportation needs:     Medical: Not on file     Non-medical: Not on file   Tobacco Use    Smoking status: Current Some Day Smoker     Types: Cigars    Smokeless tobacco: Never Used    Tobacco comment: 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   Substance and Sexual Activity    Alcohol use: Yes     Alcohol/week: 2.0 standard drinks     Types: 2 Cans of beer per week    Drug use: No    Sexual activity: Yes     Partners: Female   Lifestyle    Physical activity:     Days per week: Not on file     Minutes per session: Not on file    Stress: Not on file   Relationships    Social  "connections:     Talks on phone: Not on file     Gets together: Not on file     Attends Jewish service: Not on file     Active member of club or organization: Not on file     Attends meetings of clubs or organizations: Not on file     Relationship status: Not on file   Other Topics Concern    Not on file   Social History Narrative    Not on file       Current Outpatient Medications   Medication Sig Dispense Refill    atorvastatin (LIPITOR) 40 MG tablet TAKE 1 TABLET(40 MG) BY MOUTH EVERY DAY 90 tablet 4    blood sugar diagnostic (ONETOUCH ULTRA TEST) Strp 1 strip by Other route 4 (four) times daily. 400 strip 3    blood-glucose sensor (DEXCOM G6 SENSOR) Jenni Change sensor every 10 days. 9 Device 3    blood-glucose transmitter (DEXCOM G6 TRANSMITTER) Jenni For continuous blood glucose monitoring. Change device every 90 days. 1 Device 3    ciprofloxacin HCl (CIPRO) 250 MG tablet Take 1 tablet (250 mg total) by mouth 2 (two) times daily. 14 tablet 0    FLUoxetine 20 MG capsule TAKE 1 CAPSULE(20 MG) BY MOUTH EVERY DAY 90 capsule 3    GLUCAGEN HYPOKIT 1 mg SolR Inject 1 mg into the muscle as needed (for severe hypoglycemia). 1 each 3    insulin glargine U-300 conc (TOUJEO MAX U-300 SOLOSTAR) 300 unit/mL (3 mL) InPn Inject 24 Units into the skin every evening. 4.5 mL 6    insulin lispro (HUMALOG KWIKPEN INSULIN) 100 unit/mL pen INJECT USING INSULIN:CARB RATIO OF 1:6 SUBCUTANEOUSLY PRIOR TO MEALS. MAX TDD 40 UNITS 3 Box 3    losartan (COZAAR) 100 MG tablet TAKE 1 TABLET(100 MG) BY MOUTH EVERY DAY 90 tablet 3    mupirocin (BACTROBAN) 2 % ointment Apply topically 3 (three) times daily. Lt great toe 2 g 0    pen needle, diabetic (PEN NEEDLE) 31 gauge x 1/4" Ndle 1 each by Misc.(Non-Drug; Combo Route) route 4 (four) times daily. 400 each 3    promethazine (PHENERGAN) 6.25 mg/5 mL syrup Take 20 mLs (25 mg total) by mouth nightly as needed for Nausea (cough at night). 240 mL 0     Current Facility-Administered " Medications   Medication Dose Route Frequency Provider Last Rate Last Dose    glucagon (human recombinant) injection 1 mg  1 mg Intramuscular Once Tania Mckeon NP           Review of patient's allergies indicates:   Allergen Reactions    Altace [ramipril]      Cough         Review of Systems   Constitution: Negative for chills and fever.   Cardiovascular: Negative for claudication and leg swelling.   Skin: Negative for color change, dry skin and poor wound healing.   Musculoskeletal: Negative for joint pain, joint swelling, muscle cramps and muscle weakness.   Neurological: Positive for numbness.   Psychiatric/Behavioral: Negative for altered mental status.           Objective:      Physical Exam   Constitutional: He appears well-developed and well-nourished. No distress.   Cardiovascular:   Pulses:       Dorsalis pedis pulses are 2+ on the right side, and 2+ on the left side.        Posterior tibial pulses are 1+ on the right side, and 1+ on the left side.   CFT is < 3 seconds bilateral.  Pedal hair growth is present bilateral.  Varicosities noted bilateral.  No lower extremity edema noted bilateral.  Toes are warm to touch bilateral.     Musculoskeletal: He exhibits no edema or tenderness.   Muscle strength 5/5 in all muscle groups bilateral.  No tenderness nor crepitation with ROM of foot/ankle joints bilateral.  No tenderness with palpation of bilateral foot and ankle.  Semi-reducible contracture of the lesser digits bilateral.     Neurological: He has normal strength. A sensory deficit is present.   Protective sensation per [place] monofilament is decreased bilateral.  Light touch is intact bilateral.   Skin: Skin is warm and dry. Capillary refill takes less than 2 seconds. Lesion noted. No abrasion, no burn, no ecchymosis, no laceration and no petechiae noted. He is not diaphoretic.   Location: Open wound noted to the distal tip of the Rt. 3rd toe.  Base: Down to dermis and comprised of  fibrin.  Drainage: None  Skylar wound: Devoid of erythema, edema, fluctuance, purulence, and malodor.    Pre-debridement measurement: 0.9 x 0.5 x 0.1cm  Post-debridement measurement: 1.1 x 0.7 x 0.1cm               Assessment:       Encounter Diagnoses   Name Primary?    Diabetic ulcer of toe of right foot associated with type 1 diabetes mellitus, limited to breakdown of skin Yes    Type 1 diabetes mellitus with polyneuropathy     Hammer toes of both feet          Plan:       Hammad was seen today for wound check.    Diagnoses and all orders for this visit:    Diabetic ulcer of toe of right foot associated with type 1 diabetes mellitus, limited to breakdown of skin    Type 1 diabetes mellitus with polyneuropathy    Hammer toes of both feet      I counseled the patient on his conditions, their implications and medical management.    Wound continues to remain stable with today's exam.    With the patient's verbal consent, I performed a selective excisional debridement of the Rt. foot/ankle. See attached procedure note.      The wound base was covered with sawyer and a toe football wrap.  The site was then offloaded with a large crest pad.    Patient to then resume application of sawyer and a bandage to the site every three days.    Patient may ambulate to tolerance in a stiff sole shoe as long as he wears the crest pad to offload the digit.    Anticipate closure of the wound in 2-3 weeks.  If it fails to heal in this timeframe or appears infected, he will return to clinic for continued wound care.    Patient to continue sending weekly pictures of the wound through his MyChart.    RTC prn.    Caleb Duffy DPM

## 2020-06-15 ENCOUNTER — HOSPITAL ENCOUNTER (OUTPATIENT)
Dept: RADIOLOGY | Facility: HOSPITAL | Age: 65
Discharge: HOME | End: 2020-06-15
Attending: PODIATRIST
Payer: COMMERCIAL

## 2020-06-15 ENCOUNTER — PATIENT MESSAGE (OUTPATIENT)
Dept: PODIATRY | Facility: CLINIC | Age: 65
End: 2020-06-15

## 2020-06-15 ENCOUNTER — OFFICE VISIT (OUTPATIENT)
Dept: PODIATRY | Facility: CLINIC | Age: 65
End: 2020-06-15
Payer: COMMERCIAL

## 2020-06-15 VITALS — WEIGHT: 218.06 LBS | HEIGHT: 74 IN | BODY MASS INDEX: 27.98 KG/M2

## 2020-06-15 DIAGNOSIS — E10.42 TYPE 1 DIABETES MELLITUS WITH POLYNEUROPATHY: ICD-10-CM

## 2020-06-15 DIAGNOSIS — I96 GANGRENE OF TOE OF RIGHT FOOT: ICD-10-CM

## 2020-06-15 DIAGNOSIS — I96 GANGRENE OF TOE OF RIGHT FOOT: Primary | ICD-10-CM

## 2020-06-15 PROCEDURE — 99999 PR PBB SHADOW E&M-EST. PATIENT-LVL III: CPT | Mod: PBBFAC,,, | Performed by: PODIATRIST

## 2020-06-15 PROCEDURE — 99213 OFFICE O/P EST LOW 20 MIN: CPT | Mod: S$GLB,,, | Performed by: PODIATRIST

## 2020-06-15 PROCEDURE — 93925 US ARTERIAL LOWER EXTREMITY BILAT WITH ABI (XPD): ICD-10-PCS | Mod: 26,,, | Performed by: RADIOLOGY

## 2020-06-15 PROCEDURE — 93925 LOWER EXTREMITY STUDY: CPT | Mod: 26,,, | Performed by: RADIOLOGY

## 2020-06-15 PROCEDURE — 3051F HG A1C>EQUAL 7.0%<8.0%: CPT | Mod: CPTII,S$GLB,, | Performed by: PODIATRIST

## 2020-06-15 PROCEDURE — 3008F PR BODY MASS INDEX (BMI) DOCUMENTED: ICD-10-PCS | Mod: CPTII,S$GLB,, | Performed by: PODIATRIST

## 2020-06-15 PROCEDURE — 93922 UPR/L XTREMITY ART 2 LEVELS: CPT | Mod: 26,,, | Performed by: RADIOLOGY

## 2020-06-15 PROCEDURE — 93922 UPR/L XTREMITY ART 2 LEVELS: CPT | Mod: TC,PO

## 2020-06-15 PROCEDURE — 93922 US ARTERIAL LOWER EXTREMITY BILAT WITH ABI (XPD): ICD-10-PCS | Mod: 26,,, | Performed by: RADIOLOGY

## 2020-06-15 PROCEDURE — 99999 PR PBB SHADOW E&M-EST. PATIENT-LVL III: ICD-10-PCS | Mod: PBBFAC,,, | Performed by: PODIATRIST

## 2020-06-15 PROCEDURE — 3051F PR MOST RECENT HEMOGLOBIN A1C LEVEL 7.0 - < 8.0%: ICD-10-PCS | Mod: CPTII,S$GLB,, | Performed by: PODIATRIST

## 2020-06-15 PROCEDURE — 99213 PR OFFICE/OUTPT VISIT, EST, LEVL III, 20-29 MIN: ICD-10-PCS | Mod: S$GLB,,, | Performed by: PODIATRIST

## 2020-06-15 PROCEDURE — 3008F BODY MASS INDEX DOCD: CPT | Mod: CPTII,S$GLB,, | Performed by: PODIATRIST

## 2020-06-15 NOTE — PROGRESS NOTES
"Subjective:      Patient ID: Hammad Douglas III is a 65 y.o. male.    Chief Complaint: Wound Care  Patient presents to clinic for an emergent visit following injury to the Rt. 3rd toe.  He relates that while power washing on Saturday, his bandage loosened prompting application of a new wrap.  States that when he changed the bandage the following day, the previously stable wound site appeared darkly discolored in conjunction with changes in color to the proximal skin of the toe.  He also noticed the development of a new "sore" along the medial aspect of the Rt. Great toe.  States he has been keeping both sites covered with a bandage, albeit not as tightly applied.  He denies experiencing pain from the digits, however, attributes this to his neuropathy.  He has failed to notice signs of infection to the toes.   Denies having N/V/F/C/D.  Denies any additional pedal complaints.    Past Medical History:   Diagnosis Date    Cataract     Chronic kidney disease     Diabetes mellitus type I     Diagnosed at age 19    Diabetic retinopathy     See's Dr. Ledesma    Hyperlipidemia     Hypertension        Past Surgical History:   Procedure Laterality Date    COLONOSCOPY      2011 wnl       Family History   Problem Relation Age of Onset    Cataracts Mother        Social History     Socioeconomic History    Marital status:      Spouse name: Not on file    Number of children: Not on file    Years of education: Not on file    Highest education level: Not on file   Occupational History    Not on file   Social Needs    Financial resource strain: Not on file    Food insecurity     Worry: Not on file     Inability: Not on file    Transportation needs     Medical: Not on file     Non-medical: Not on file   Tobacco Use    Smoking status: Current Some Day Smoker     Types: Cigars    Smokeless tobacco: Never Used    Tobacco comment: 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 He is not getting much " "exercise.   Substance and Sexual Activity    Alcohol use: Yes     Alcohol/week: 2.0 standard drinks     Types: 2 Cans of beer per week    Drug use: No    Sexual activity: Yes     Partners: Female   Lifestyle    Physical activity     Days per week: Not on file     Minutes per session: Not on file    Stress: Not on file   Relationships    Social connections     Talks on phone: Not on file     Gets together: Not on file     Attends Mormonism service: Not on file     Active member of club or organization: Not on file     Attends meetings of clubs or organizations: Not on file     Relationship status: Not on file   Other Topics Concern    Not on file   Social History Narrative    Not on file       Current Outpatient Medications   Medication Sig Dispense Refill    atorvastatin (LIPITOR) 40 MG tablet TAKE 1 TABLET(40 MG) BY MOUTH EVERY DAY 90 tablet 4    blood-glucose sensor (DEXCOM G6 SENSOR) Jenni Change sensor every 10 days. 9 Device 3    blood-glucose transmitter (DEXCOM G6 TRANSMITTER) Jenni For continuous blood glucose monitoring. Change device every 90 days. 1 Device 3    ciprofloxacin HCl (CIPRO) 250 MG tablet Take 1 tablet (250 mg total) by mouth 2 (two) times daily. 14 tablet 0    FLUoxetine 20 MG capsule TAKE 1 CAPSULE(20 MG) BY MOUTH EVERY DAY 90 capsule 3    GLUCAGEN HYPOKIT 1 mg SolR Inject 1 mg into the muscle as needed (for severe hypoglycemia). 1 each 3    insulin glargine U-300 conc (TOUJEO MAX U-300 SOLOSTAR) 300 unit/mL (3 mL) InPn Inject 24 Units into the skin every evening. 4.5 mL 6    insulin lispro (HUMALOG KWIKPEN INSULIN) 100 unit/mL pen INJECT USING INSULIN:CARB RATIO OF 1:6 SUBCUTANEOUSLY PRIOR TO MEALS. MAX TDD 40 UNITS 3 Box 3    losartan (COZAAR) 100 MG tablet TAKE 1 TABLET(100 MG) BY MOUTH EVERY DAY 90 tablet 3    pen needle, diabetic (PEN NEEDLE) 31 gauge x 1/4" Ndle 1 each by Misc.(Non-Drug; Combo Route) route 4 (four) times daily. 400 each 3    blood sugar diagnostic " (ONETOUCH ULTRA TEST) Strp 1 strip by Other route 4 (four) times daily. (Patient not taking: Reported on 6/15/2020) 400 strip 3    mupirocin (BACTROBAN) 2 % ointment Apply topically 3 (three) times daily. Lt great toe (Patient not taking: Reported on 6/15/2020) 2 g 0    promethazine (PHENERGAN) 6.25 mg/5 mL syrup Take 20 mLs (25 mg total) by mouth nightly as needed for Nausea (cough at night). (Patient not taking: Reported on 6/15/2020) 240 mL 0     Current Facility-Administered Medications   Medication Dose Route Frequency Provider Last Rate Last Dose    glucagon (human recombinant) injection 1 mg  1 mg Intramuscular Once Tania Mckeon NP           Review of patient's allergies indicates:   Allergen Reactions    Altace [ramipril]      Cough         Review of Systems   Constitution: Negative for chills and fever.   Cardiovascular: Negative for claudication and leg swelling.   Skin: Negative for color change, dry skin and poor wound healing.   Musculoskeletal: Negative for joint pain, joint swelling, muscle cramps and muscle weakness.   Neurological: Positive for numbness.   Psychiatric/Behavioral: Negative for altered mental status.           Objective:      Physical Exam   Constitutional: He appears well-developed and well-nourished. No distress.   Cardiovascular:   Pulses:       Dorsalis pedis pulses are 2+ on the right side, and 2+ on the left side.        Posterior tibial pulses are 1+ on the right side, and 1+ on the left side.   CFT is < 3 seconds bilateral.  Pedal hair growth is present bilateral.  Varicosities noted bilateral.  No lower extremity edema noted bilateral.  Toes are warm to touch bilateral.     Musculoskeletal: He exhibits no edema or tenderness.   Muscle strength 5/5 in all muscle groups bilateral.  No tenderness nor crepitation with ROM of foot/ankle joints bilateral.  No tenderness with palpation of bilateral foot and ankle.  Semi-reducible contracture of the lesser digits  bilateral.     Neurological: He has normal strength. A sensory deficit is present.   Protective sensation per Mena-Jayson monofilament is decreased bilateral.  Light touch is intact bilateral.   Skin: Skin is warm and dry. Capillary refill takes less than 2 seconds. Lesion noted. No abrasion, no burn, no ecchymosis, no laceration and no petechiae noted. He is not diaphoretic.   Dry, stable gangrene noted distal to the Rt. 3rd DPI joint and encompassing the entire tip of the digit.  Mottled discoloration noted to the proximal skin of the digit secondary to pressure injury.  Area of gangrene measures: 3 x 3cm.  Blistered skin noted to the Rt. Medial hallux.  Wound base is down to dermis and comprised of granulation.  Skylar wound is devoid of erythema, edema, fluctuance, purulence, and malodor.  Measures 0.4 x 0.4 x 0.1cm.               Assessment:       Encounter Diagnoses   Name Primary?    Gangrene of toe of right foot Yes    Type 1 diabetes mellitus with polyneuropathy          Plan:       Hammad was seen today for wound care.    Diagnoses and all orders for this visit:    Gangrene of toe of right foot  -     Cancel: US Lower Extremity Arteries Bilateral; Future  -     US Lower Extrem Arteries Bilat with RYANN (xpd); Future    Type 1 diabetes mellitus with polyneuropathy      I counseled the patient on his conditions, their implications and medical management.    Unfortunately, the patient's bandaging attempt resulted in a tourniquet effect to the Rt. 3rd digit with subsequent ischemia followed by gangrene.    Orders written for an arterial ultrasound of bilateral LE.  No evidence of PVD noted.    Advised to paint the Rt. 3rd toe with betadine twice daily and to cover with 4x4 gauze and gently secure with paper tape.  Patient to continue applying sawyer and a mepilex border to the wound of the Rt. 3rd toe every 2-3 days.    Fitted and dispensed a postoperative shoe to minimize pressure to the affected  digit.    Advised to keep the foot free of moisture to ensure gangrenous changes remain dry and stable.    Advised to minimize weight bearing activity to prevent further soft tissue loss.    Instructed to monitor the toe daily for localized signs of infection or odor.    RTC in 1 week for a follow up.    Caleb Duffy DPM

## 2020-06-17 ENCOUNTER — PATIENT MESSAGE (OUTPATIENT)
Dept: PODIATRY | Facility: CLINIC | Age: 65
End: 2020-06-17

## 2020-06-17 ENCOUNTER — PATIENT OUTREACH (OUTPATIENT)
Dept: ADMINISTRATIVE | Facility: HOSPITAL | Age: 65
End: 2020-06-17

## 2020-06-17 NOTE — TELEPHONE ENCOUNTER
Called pt and gave instructions. Pt verbalized understanding        Preferred Name:   Hammad Douglas III  Male, 65 y.o., 1955  Phone:   [phone] (M)  PCP:   Primary Doctor No  Language:   English  Need Interp:   No  Allergies Last Reviewed:   06/15/20  Allergies:   Altace [Ramipril]  Health Maintenance:   Due  Primary Ins.:   [employer]  MRN:   [id number]  Pt Comm Pref:   Patient Portal, Mail  Last MyChart Login:   6/17/2020 8:37 AM, Mobile  Next Appt:   With Podiatry (Caleb Duffy DPM)  06/23/2020 at 7:40 AM  My Sticky Note:     Specialty Comments:     (very important)  You completed this message on 6/17/2020. The information that appears might not be up to date.   (important suggestion)  You completed this message for [facility] Staff.   [facility]   Hammad Douglas III   Sent: Wed June 17, 2020  8:40 AM   To: [facility] Staff   [facility]    You routed conversation to Caleb Duffy DPM 2 minutes ago (8:51 AM)      You  Hammad Douglas III 2 minutes ago (8:51 AM)        I will forward your pictures to Dr. Duffy. Thank you for submitting them        This Patient Portal message has not been read.      Hammad Douglas III, Joshua, DPM 13 minutes ago (8:40 AM)        Pics of toe. June 17  Dressing and beta dine twice a day  No smell              Attachments    Image 1   Image 2   Image 3   Responsible Party    Caleb Duffy DPM

## 2020-06-17 NOTE — PROGRESS NOTES
Chart review completed 06/17/2020.  Care Everywhere updates requested and reviewed.  Immunizations reconciled. Media reviewed. Lab rakan and FIZZA reviewed, portal message sent.        Health Maintenance Due   Topic Date Due    Hepatitis C Screening  1955    HIV Screening  05/24/1972    TETANUS VACCINE  05/24/1975    Aspirin/Antiplatelet Therapy  05/24/1975    Pneumococcal Vaccine (Medium Risk) (1 of 1 - PPSV23) 05/24/1976    Shingles Vaccine (1 of 2) 05/24/2007    Colorectal Cancer Screening  05/23/2018    Foot Exam  02/25/2020

## 2020-06-18 ENCOUNTER — OFFICE VISIT (OUTPATIENT)
Dept: FAMILY MEDICINE | Facility: CLINIC | Age: 65
End: 2020-06-18
Payer: COMMERCIAL

## 2020-06-18 VITALS
DIASTOLIC BLOOD PRESSURE: 70 MMHG | HEIGHT: 74 IN | SYSTOLIC BLOOD PRESSURE: 110 MMHG | WEIGHT: 213 LBS | BODY MASS INDEX: 27.34 KG/M2

## 2020-06-18 DIAGNOSIS — I10 ESSENTIAL HYPERTENSION: ICD-10-CM

## 2020-06-18 DIAGNOSIS — E10.21 TYPE 1 DIABETES MELLITUS WITH DIABETIC NEPHROPATHY: Primary | ICD-10-CM

## 2020-06-18 DIAGNOSIS — E78.5 HYPERLIPIDEMIA, UNSPECIFIED HYPERLIPIDEMIA TYPE: ICD-10-CM

## 2020-06-18 DIAGNOSIS — Z00.00 PREVENTATIVE HEALTH CARE: ICD-10-CM

## 2020-06-18 PROCEDURE — 3074F SYST BP LT 130 MM HG: CPT | Mod: CPTII,,, | Performed by: INTERNAL MEDICINE

## 2020-06-18 PROCEDURE — 99214 PR OFFICE/OUTPT VISIT, EST, LEVL IV, 30-39 MIN: ICD-10-PCS | Mod: 95,,, | Performed by: INTERNAL MEDICINE

## 2020-06-18 PROCEDURE — 99214 OFFICE O/P EST MOD 30 MIN: CPT | Mod: 95,,, | Performed by: INTERNAL MEDICINE

## 2020-06-18 PROCEDURE — 3051F HG A1C>EQUAL 7.0%<8.0%: CPT | Mod: CPTII,,, | Performed by: INTERNAL MEDICINE

## 2020-06-18 PROCEDURE — 3078F DIAST BP <80 MM HG: CPT | Mod: CPTII,,, | Performed by: INTERNAL MEDICINE

## 2020-06-18 PROCEDURE — 3008F BODY MASS INDEX DOCD: CPT | Mod: CPTII,,, | Performed by: INTERNAL MEDICINE

## 2020-06-18 PROCEDURE — 3078F PR MOST RECENT DIASTOLIC BLOOD PRESSURE < 80 MM HG: ICD-10-PCS | Mod: CPTII,,, | Performed by: INTERNAL MEDICINE

## 2020-06-18 PROCEDURE — 3008F PR BODY MASS INDEX (BMI) DOCUMENTED: ICD-10-PCS | Mod: CPTII,,, | Performed by: INTERNAL MEDICINE

## 2020-06-18 PROCEDURE — 3051F PR MOST RECENT HEMOGLOBIN A1C LEVEL 7.0 - < 8.0%: ICD-10-PCS | Mod: CPTII,,, | Performed by: INTERNAL MEDICINE

## 2020-06-18 PROCEDURE — 3074F PR MOST RECENT SYSTOLIC BLOOD PRESSURE < 130 MM HG: ICD-10-PCS | Mod: CPTII,,, | Performed by: INTERNAL MEDICINE

## 2020-06-18 NOTE — Clinical Note
Schedule labs with currently scheduled lab in July. Nurse visit for vaccines same day as visit with Gin.

## 2020-06-18 NOTE — PROGRESS NOTES
Assessment and Plan:    1. Type 1 diabetes mellitus with diabetic nephropathy  2. 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 Management per endocrinology.   Last A1c: 7.6  Foot exam: 6/15/20 with Dr. Duffy  Eye exam: 9/30/19 with Dr. Ledesma  Nephropathy screening: Has known CKD 3  Lipids: On statin  Medications: Insulin  We discussed the role of diet and exercise in managing diabetes at this visit.     3. Essential hypertension  Controlled on losartan 100    4. Hyperlipidemia, unspecified hyperlipidemia type  Taking atorvastatin 40.     5. Preventative health care  - HIV 1/2 Ag/Ab (4th Gen); Future  - Hepatitis C Antibody; Future  - Tdap Vaccine  - Pneumococcal Polysaccharide Vaccine (23 Valent) (SQ/IM)  - Cologuard Screening (Multitarget Stool DNA); Future  - Cologuard Screening (Multitarget Stool DNA)    ______________________________________________________________________  Subjective:    Chief Complaint:  Establish care    HPI:  Hammad is a 65 y.o. year old man here to establish care.     DM1- Diagnosed age 19. Sees Endocrinology (Adam) regularly. Last A1c improved to 7.6. States that recent blood glucose has been well controlled.    Has known complications including CKD3 and retinopathy. Sees Dr. Ledesma for retinopathy.    Sees Dr. Duffy related to a wound on his foot. May need surgery.     Has not been getting dedicated exercise but has been trying to stay active.        Past Medical History:  Past Medical History:   Diagnosis Date    Cataract     Chronic kidney disease     Diabetes mellitus type I     Diagnosed at age 19    Diabetic retinopathy     See's Dr. Ledesma    Hyperlipidemia     Hypertension        Past Surgical History:  Past Surgical History:   Procedure Laterality Date    COLONOSCOPY      2011 wnl       Family History:  Family History   Problem Relation Age of Onset    Cataracts Mother      Breast cancer Sister        Social History:  Social History     Socioeconomic History    Marital status:      Spouse name: Not on file    Number of children: Not on file    Years of education: Not on file    Highest education level: Not on file   Occupational History    Not on file   Social Needs    Financial resource strain: Not on file    Food insecurity     Worry: Not on file     Inability: Not on file    Transportation needs     Medical: Not on file     Non-medical: Not on file   Tobacco Use    Smoking status: Former Smoker     Types: Cigars    Smokeless tobacco: Never Used    Tobacco comment: Former cigar use   Substance and Sexual Activity    Alcohol use: Yes     Alcohol/week: 2.0 standard drinks     Types: 2 Cans of beer per week    Drug use: No    Sexual activity: Yes     Partners: Female   Lifestyle    Physical activity     Days per week: Not on file     Minutes per session: Not on file    Stress: Not on file   Relationships    Social connections     Talks on phone: Not on file     Gets together: Not on file     Attends Yarsani service: Not on file     Active member of club or organization: Not on file     Attends meetings of clubs or organizations: Not on file     Relationship status: Not on file   Other Topics Concern    Not on file   Social History Narrative    Retired former radiation  at [employer]       Medications:  Current Outpatient Medications on File Prior to Visit   Medication Sig Dispense Refill    atorvastatin (LIPITOR) 40 MG tablet TAKE 1 TABLET(40 MG) BY MOUTH EVERY DAY 90 tablet 4    blood sugar diagnostic (ONETOUCH ULTRA TEST) Strp 1 strip by Other route 4 (four) times daily. (Patient not taking: Reported on 6/15/2020) 400 strip 3    blood-glucose sensor (DEXCOM G6 SENSOR) Jenni Change sensor every 10 days. 9 Device 3    blood-glucose transmitter (DEXCOM G6 TRANSMITTER) Jenni For continuous blood glucose monitoring. Change device every  "90 days. 1 Device 3    ciprofloxacin HCl (CIPRO) 250 MG tablet Take 1 tablet (250 mg total) by mouth 2 (two) times daily. 14 tablet 0    FLUoxetine 20 MG capsule TAKE 1 CAPSULE(20 MG) BY MOUTH EVERY DAY 90 capsule 3    GLUCAGEN HYPOKIT 1 mg SolR Inject 1 mg into the muscle as needed (for severe hypoglycemia). 1 each 3    insulin glargine U-300 conc (TOUJEO MAX U-300 SOLOSTAR) 300 unit/mL (3 mL) InPn Inject 24 Units into the skin every evening. 4.5 mL 6    insulin lispro (HUMALOG KWIKPEN INSULIN) 100 unit/mL pen INJECT USING INSULIN:CARB RATIO OF 1:6 SUBCUTANEOUSLY PRIOR TO MEALS. MAX TDD 40 UNITS 3 Box 3    losartan (COZAAR) 100 MG tablet TAKE 1 TABLET(100 MG) BY MOUTH EVERY DAY 90 tablet 3    mupirocin (BACTROBAN) 2 % ointment Apply topically 3 (three) times daily. Lt great toe (Patient not taking: Reported on 6/15/2020) 2 g 0    pen needle, diabetic (PEN NEEDLE) 31 gauge x 1/4" Ndle 1 each by Misc.(Non-Drug; Combo Route) route 4 (four) times daily. 400 each 3    promethazine (PHENERGAN) 6.25 mg/5 mL syrup Take 20 mLs (25 mg total) by mouth nightly as needed for Nausea (cough at night). (Patient not taking: Reported on 6/15/2020) 240 mL 0     Current Facility-Administered Medications on File Prior to Visit   Medication Dose Route Frequency Provider Last Rate Last Dose    glucagon (human recombinant) injection 1 mg  1 mg Intramuscular Once Tania Mckeon NP           Allergies:  Altace [ramipril]    Immunizations:  Immunization History   Administered Date(s) Administered    Influenza 10/02/2018    Influenza - Quadrivalent 10/25/2019    Influenza - Quadrivalent - PF (6 months and older) 11/08/2007, 09/19/2011, 01/11/2013    Influenza A (H1N1) 2009 Monovalent - IM 11/09/2009    Influenza Split 01/11/2013       Review of Systems:  Review of Systems   Constitutional: Negative for activity change and unexpected weight change.   HENT: Negative for hearing loss, rhinorrhea and trouble swallowing.  " "  Eyes: Negative for discharge and visual disturbance.   Respiratory: Negative for chest tightness and wheezing.    Cardiovascular: Negative for chest pain and palpitations.   Gastrointestinal: Negative for blood in stool, constipation, diarrhea and vomiting.   Endocrine: Negative for polydipsia and polyuria.   Genitourinary: Negative for difficulty urinating, hematuria and urgency.   Musculoskeletal: Positive for joint swelling. Negative for arthralgias and neck pain.   Neurological: Negative for weakness and headaches.   Psychiatric/Behavioral: Negative for confusion and dysphoric mood.     Entered by patient and reviewed and updated during visit      Objective:    Vitals:  Vitals:    06/18/20 0943   BP: 110/70   Weight: 96.6 kg (213 lb)   Height: 6' 2" (1.88 m)       Physical Exam  Constitutional:       General: He is not in acute distress.     Appearance: He is well-developed. He is not diaphoretic.   HENT:      Head: Normocephalic and atraumatic.   Pulmonary:      Effort: Pulmonary effort is normal. No respiratory distress.   Neurological:      Mental Status: He is alert and oriented to person, place, and time.   Psychiatric:         Behavior: Behavior normal.         Thought Content: Thought content normal.         Judgment: Judgment normal.         Body mass index is 27.35 kg/m².      Data:  Previous labs reviewed and pertinent for A1c 7.6.        Adelaide Bran MD  Internal Medicine      "

## 2020-06-18 NOTE — PATIENT INSTRUCTIONS
For constipation treatment with over the counter medications:    Step 1:  Increase fiber and water intake.   Increase physical activity.    Avoid starchy foods that can worsen constipation.     Step 2: (If stools are still hard after step 1)  Consider adding docusate 100 mg twice a day. This is the generic for colace. This is a stool softener, not a laxative.  Consider adding a probiotic supplement.     Step 3: (If you are doing everything listed above and still dealing with constipation)  Start taking senna 8.6 mg nightly.   If no bowel movement after 3 days, take Miralax.     If you are doing all of this and it is not getting better, please call us.

## 2020-06-19 ENCOUNTER — PATIENT OUTREACH (OUTPATIENT)
Dept: ADMINISTRATIVE | Facility: OTHER | Age: 65
End: 2020-06-19

## 2020-06-23 ENCOUNTER — OFFICE VISIT (OUTPATIENT)
Dept: PODIATRY | Facility: CLINIC | Age: 65
End: 2020-06-23
Payer: COMMERCIAL

## 2020-06-23 VITALS — BODY MASS INDEX: 27.33 KG/M2 | HEIGHT: 74 IN | WEIGHT: 212.94 LBS

## 2020-06-23 DIAGNOSIS — I96 GANGRENE OF TOE OF RIGHT FOOT: Primary | ICD-10-CM

## 2020-06-23 DIAGNOSIS — Z01.818 PRE-OP TESTING: ICD-10-CM

## 2020-06-23 DIAGNOSIS — E10.42 TYPE 1 DIABETES MELLITUS WITH POLYNEUROPATHY: ICD-10-CM

## 2020-06-23 DIAGNOSIS — S90.426A BLISTER OF TOE, UNSPECIFIED LATERALITY, INITIAL ENCOUNTER: ICD-10-CM

## 2020-06-23 PROCEDURE — 99999 PR PBB SHADOW E&M-EST. PATIENT-LVL III: ICD-10-PCS | Mod: PBBFAC,,, | Performed by: PODIATRIST

## 2020-06-23 PROCEDURE — 3051F PR MOST RECENT HEMOGLOBIN A1C LEVEL 7.0 - < 8.0%: ICD-10-PCS | Mod: CPTII,S$GLB,, | Performed by: PODIATRIST

## 2020-06-23 PROCEDURE — 99212 OFFICE O/P EST SF 10 MIN: CPT | Mod: S$GLB,,, | Performed by: PODIATRIST

## 2020-06-23 PROCEDURE — 3051F HG A1C>EQUAL 7.0%<8.0%: CPT | Mod: CPTII,S$GLB,, | Performed by: PODIATRIST

## 2020-06-23 PROCEDURE — 99999 PR PBB SHADOW E&M-EST. PATIENT-LVL III: CPT | Mod: PBBFAC,,, | Performed by: PODIATRIST

## 2020-06-23 PROCEDURE — 99212 PR OFFICE/OUTPT VISIT, EST, LEVL II, 10-19 MIN: ICD-10-PCS | Mod: S$GLB,,, | Performed by: PODIATRIST

## 2020-06-23 PROCEDURE — 3008F BODY MASS INDEX DOCD: CPT | Mod: CPTII,S$GLB,, | Performed by: PODIATRIST

## 2020-06-23 PROCEDURE — 3008F PR BODY MASS INDEX (BMI) DOCUMENTED: ICD-10-PCS | Mod: CPTII,S$GLB,, | Performed by: PODIATRIST

## 2020-06-23 NOTE — PROGRESS NOTES
Subjective:      Patient ID: Hammad Douglas III is a 65 y.o. male.    Chief Complaint: Wound Care  Patient presents to clinic for a 1 week follow up for gangrene of the Rt. 3rd toe.  Denies pain from the affected digit with today's exam.  Notes continued discoloration of the digit along the plantar surface.  Inquires as to whether gangrenous changes will extend up the extremity.  Also, notes a new blister developing to the medial portion of the Rt. 5th toe and medial Lt. Great toe.  Inquires as to how those areas should be treated along with current treatment of the Rt. Medial hallux wound and 3rd toe gangrene.  Notes continued use of the postoperative shoe with all weight bearing.  Has kept all areas dry and denies noticing localized signs of infection.  Denies having N/V/F/C/D.  Denies any additional pedal complaints.    Past Medical History:   Diagnosis Date    Cataract     Chronic kidney disease     Diabetes mellitus type I     Diagnosed at age 19    Diabetic retinopathy     See's Dr. Ledesma    Hyperlipidemia     Hypertension        Past Surgical History:   Procedure Laterality Date    COLONOSCOPY      2011 wnl       Family History   Problem Relation Age of Onset    Cataracts Mother     Breast cancer Sister        Social History     Socioeconomic History    Marital status:      Spouse name: Not on file    Number of children: Not on file    Years of education: Not on file    Highest education level: Not on file   Occupational History    Not on file   Social Needs    Financial resource strain: Not on file    Food insecurity     Worry: Not on file     Inability: Not on file    Transportation needs     Medical: Not on file     Non-medical: Not on file   Tobacco Use    Smoking status: Former Smoker     Types: Cigars    Smokeless tobacco: Never Used    Tobacco comment: Former cigar use   Substance and Sexual Activity    Alcohol use: Yes     Alcohol/week: 2.0 standard drinks     Types: 2 Cans  "of beer per week    Drug use: No    Sexual activity: Yes     Partners: Female   Lifestyle    Physical activity     Days per week: Not on file     Minutes per session: Not on file    Stress: Not on file   Relationships    Social connections     Talks on phone: Not on file     Gets together: Not on file     Attends Restorationist service: Not on file     Active member of club or organization: Not on file     Attends meetings of clubs or organizations: Not on file     Relationship status: Not on file   Other Topics Concern    Not on file   Social History Narrative    Retired former radiation  at [employer]       Current Outpatient Medications   Medication Sig Dispense Refill    atorvastatin (LIPITOR) 40 MG tablet TAKE 1 TABLET(40 MG) BY MOUTH EVERY DAY 90 tablet 4    blood-glucose sensor (DEXCOM G6 SENSOR) Jenni Change sensor every 10 days. 9 Device 3    blood-glucose transmitter (DEXCOM G6 TRANSMITTER) Jenni For continuous blood glucose monitoring. Change device every 90 days. 1 Device 3    ciprofloxacin HCl (CIPRO) 250 MG tablet Take 1 tablet (250 mg total) by mouth 2 (two) times daily. 14 tablet 0    FLUoxetine 20 MG capsule TAKE 1 CAPSULE(20 MG) BY MOUTH EVERY DAY 90 capsule 3    GLUCAGEN HYPOKIT 1 mg SolR Inject 1 mg into the muscle as needed (for severe hypoglycemia). 1 each 3    insulin glargine U-300 conc (TOUJEO MAX U-300 SOLOSTAR) 300 unit/mL (3 mL) InPn Inject 24 Units into the skin every evening. 4.5 mL 6    insulin lispro (HUMALOG KWIKPEN INSULIN) 100 unit/mL pen INJECT USING INSULIN:CARB RATIO OF 1:6 SUBCUTANEOUSLY PRIOR TO MEALS. MAX TDD 40 UNITS 3 Box 3    losartan (COZAAR) 100 MG tablet TAKE 1 TABLET(100 MG) BY MOUTH EVERY DAY 90 tablet 3    pen needle, diabetic (PEN NEEDLE) 31 gauge x 1/4" Ndle 1 each by Misc.(Non-Drug; Combo Route) route 4 (four) times daily. 400 each 3    blood sugar diagnostic (ONETOUCH ULTRA TEST) Strp 1 strip by Other route 4 (four) times daily. " (Patient not taking: Reported on 6/15/2020) 400 strip 3     Current Facility-Administered Medications   Medication Dose Route Frequency Provider Last Rate Last Dose    glucagon (human recombinant) injection 1 mg  1 mg Intramuscular Once Tania Mckeon NP           Review of patient's allergies indicates:   Allergen Reactions    Altace [ramipril]      Cough         Review of Systems   Constitution: Negative for chills and fever.   Cardiovascular: Negative for claudication and leg swelling.   Skin: Negative for color change, dry skin and poor wound healing.   Musculoskeletal: Negative for joint pain, joint swelling, muscle cramps and muscle weakness.   Neurological: Positive for numbness.   Psychiatric/Behavioral: Negative for altered mental status.           Objective:      Physical Exam   Constitutional: He appears well-developed and well-nourished. No distress.   Cardiovascular:   Pulses:       Dorsalis pedis pulses are 2+ on the right side, and 2+ on the left side.        Posterior tibial pulses are 1+ on the right side, and 1+ on the left side.   CFT is < 3 seconds bilateral.  Pedal hair growth is present bilateral.  Varicosities noted bilateral.  No lower extremity edema noted bilateral.  Toes are warm to touch bilateral.     Musculoskeletal: He exhibits no edema or tenderness.   Muscle strength 5/5 in all muscle groups bilateral.  No tenderness nor crepitation with ROM of foot/ankle joints bilateral.  No tenderness with palpation of bilateral foot and ankle.  Semi-reducible contracture of the lesser digits bilateral.     Neurological: He has normal strength. A sensory deficit is present.   Protective sensation per [place] monofilament is decreased bilateral.  Light touch is intact bilateral.   Skin: Skin is warm and dry. Capillary refill takes less than 2 seconds. Lesion noted. No abrasion, no burn, no ecchymosis, no laceration and no petechiae noted. He is not diaphoretic.   Dry, stable  gangrene noted to the tip of the Rt. 3rd toe with extension slightly distal to the PIP joint.  No localized sign of infection noted to the digit.    Worse in comparison to last week's exam.  Open wound noted to the Rt. Medial hallux.  Wound base is down to dermis and comprised of granulation.  Skylar wound is devoid of erythema, edema, fluctuance, purulence, and malodor.  Measures 0.3 x 0.3 x 0.1cm.  Small intact blister noted to the Rt. Medial 5th toe and a larger blister noted to the Lt. Medial hallux.  Both sites appear stable on exam and devoid of signs of infection.               Assessment:       Encounter Diagnoses   Name Primary?    Gangrene of toe of right foot Yes    Type 1 diabetes mellitus with polyneuropathy     Blister of toe, unspecified laterality, initial encounter          Plan:       Hammad was seen today for wound care.    Diagnoses and all orders for this visit:    Gangrene of toe of right foot    Type 1 diabetes mellitus with polyneuropathy    Blister of toe, unspecified laterality, initial encounter      I counseled the patient on his conditions, their implications and medical management.    Advised to paint the Rt. 3rd toe as well as the Lt. Hallux and Rt. 5th toe blisters with betadine twice daily.  To continue covering the Rt. 3rd toe with 4x4 gauze and gently secure with paper tape.  Patient to continue applying sawyer and a mepilex border to the wound of the Rt. Great toe every 2-3 days.      Patient to continue wearing the postoperative shoe to minimize pressure to the affected digits.    Advised to keep the foot free of moisture to ensure gangrenous changes remain dry and stable.    Advised to minimize weight bearing activity to prevent further soft tissue loss.    Instructed to monitor the toe daily for localized signs of infection or odor.    Patient advised to obtain surgical clearance from his PCP for amputation of the Rt. 3rd toe.    Will place a case request for July 2,  2020.    Patient to be NPO at midnight on the day of surgery.    Written informed consent to be obtained the day of surgery.    RTC in 1 week for a follow up.    Caleb Duffy DPM

## 2020-06-24 ENCOUNTER — OFFICE VISIT (OUTPATIENT)
Dept: FAMILY MEDICINE | Facility: CLINIC | Age: 65
End: 2020-06-24
Payer: COMMERCIAL

## 2020-06-24 ENCOUNTER — HOSPITAL ENCOUNTER (OUTPATIENT)
Dept: RADIOLOGY | Facility: HOSPITAL | Age: 65
Discharge: HOME | End: 2020-06-24
Attending: FAMILY MEDICINE
Payer: COMMERCIAL

## 2020-06-24 ENCOUNTER — LAB VISIT (OUTPATIENT)
Dept: LAB | Facility: HOSPITAL | Age: 65
End: 2020-06-24
Attending: PODIATRIST
Payer: COMMERCIAL

## 2020-06-24 VITALS
BODY MASS INDEX: 28.07 KG/M2 | TEMPERATURE: 98 F | HEIGHT: 74 IN | RESPIRATION RATE: 14 BRPM | HEART RATE: 88 BPM | WEIGHT: 218.69 LBS | DIASTOLIC BLOOD PRESSURE: 66 MMHG | SYSTOLIC BLOOD PRESSURE: 126 MMHG

## 2020-06-24 DIAGNOSIS — E10.21 TYPE 1 DIABETES MELLITUS WITH DIABETIC NEPHROPATHY: Primary | ICD-10-CM

## 2020-06-24 DIAGNOSIS — I96 GANGRENE OF TOE OF RIGHT FOOT: ICD-10-CM

## 2020-06-24 DIAGNOSIS — Z01.818 PRE-OP TESTING: ICD-10-CM

## 2020-06-24 DIAGNOSIS — Z01.818 PREOPERATIVE EXAMINATION: ICD-10-CM

## 2020-06-24 LAB
BASOPHILS # BLD AUTO: 0.05 K/UL (ref 0–0.2)
BASOPHILS NFR BLD: 0.6 % (ref 0–1.9)
DIFFERENTIAL METHOD: ABNORMAL
EOSINOPHIL # BLD AUTO: 0.4 K/UL (ref 0–0.5)
EOSINOPHIL NFR BLD: 4.7 % (ref 0–8)
ERYTHROCYTE [DISTWIDTH] IN BLOOD BY AUTOMATED COUNT: 13.3 % (ref 11.5–14.5)
HCT VFR BLD AUTO: 44.4 % (ref 40–54)
HGB BLD-MCNC: 13.8 G/DL (ref 14–18)
IMM GRANULOCYTES # BLD AUTO: 0.02 K/UL (ref 0–0.04)
IMM GRANULOCYTES NFR BLD AUTO: 0.3 % (ref 0–0.5)
LYMPHOCYTES # BLD AUTO: 2.1 K/UL (ref 1–4.8)
LYMPHOCYTES NFR BLD: 26.7 % (ref 18–48)
MCH RBC QN AUTO: 29.3 PG (ref 27–31)
MCHC RBC AUTO-ENTMCNC: 31.1 G/DL (ref 32–36)
MCV RBC AUTO: 94 FL (ref 82–98)
MONOCYTES # BLD AUTO: 0.8 K/UL (ref 0.3–1)
MONOCYTES NFR BLD: 10.5 % (ref 4–15)
NEUTROPHILS # BLD AUTO: 4.5 K/UL (ref 1.8–7.7)
NEUTROPHILS NFR BLD: 57.2 % (ref 38–73)
NRBC BLD-RTO: 0 /100 WBC
PLATELET # BLD AUTO: 347 K/UL (ref 150–350)
PMV BLD AUTO: 10.8 FL (ref 9.2–12.9)
RBC # BLD AUTO: 4.71 M/UL (ref 4.6–6.2)
WBC # BLD AUTO: 7.79 K/UL (ref 3.9–12.7)

## 2020-06-24 PROCEDURE — 85610 PROTHROMBIN TIME: CPT

## 2020-06-24 PROCEDURE — 3074F SYST BP LT 130 MM HG: CPT | Mod: CPTII,S$GLB,, | Performed by: FAMILY MEDICINE

## 2020-06-24 PROCEDURE — 3051F PR MOST RECENT HEMOGLOBIN A1C LEVEL 7.0 - < 8.0%: ICD-10-PCS | Mod: CPTII,S$GLB,, | Performed by: FAMILY MEDICINE

## 2020-06-24 PROCEDURE — 93005 ELECTROCARDIOGRAM TRACING: CPT | Mod: S$GLB,,, | Performed by: FAMILY MEDICINE

## 2020-06-24 PROCEDURE — 36415 COLL VENOUS BLD VENIPUNCTURE: CPT | Mod: PN

## 2020-06-24 PROCEDURE — 99999 PR PBB SHADOW E&M-EST. PATIENT-LVL IV: CPT | Mod: PBBFAC,,, | Performed by: FAMILY MEDICINE

## 2020-06-24 PROCEDURE — 3008F PR BODY MASS INDEX (BMI) DOCUMENTED: ICD-10-PCS | Mod: CPTII,S$GLB,, | Performed by: FAMILY MEDICINE

## 2020-06-24 PROCEDURE — 3078F DIAST BP <80 MM HG: CPT | Mod: CPTII,S$GLB,, | Performed by: FAMILY MEDICINE

## 2020-06-24 PROCEDURE — 71046 XR CHEST PA AND LATERAL: ICD-10-PCS | Mod: 26,,, | Performed by: RADIOLOGY

## 2020-06-24 PROCEDURE — 85730 THROMBOPLASTIN TIME PARTIAL: CPT

## 2020-06-24 PROCEDURE — 93010 ELECTROCARDIOGRAM REPORT: CPT | Mod: S$GLB,,, | Performed by: INTERNAL MEDICINE

## 2020-06-24 PROCEDURE — 99214 OFFICE O/P EST MOD 30 MIN: CPT | Mod: S$GLB,,, | Performed by: FAMILY MEDICINE

## 2020-06-24 PROCEDURE — 3078F PR MOST RECENT DIASTOLIC BLOOD PRESSURE < 80 MM HG: ICD-10-PCS | Mod: CPTII,S$GLB,, | Performed by: FAMILY MEDICINE

## 2020-06-24 PROCEDURE — 85025 COMPLETE CBC W/AUTO DIFF WBC: CPT

## 2020-06-24 PROCEDURE — 93005 EKG 12-LEAD: ICD-10-PCS | Mod: S$GLB,,, | Performed by: FAMILY MEDICINE

## 2020-06-24 PROCEDURE — 99214 PR OFFICE/OUTPT VISIT, EST, LEVL IV, 30-39 MIN: ICD-10-PCS | Mod: S$GLB,,, | Performed by: FAMILY MEDICINE

## 2020-06-24 PROCEDURE — 93010 EKG 12-LEAD: ICD-10-PCS | Mod: S$GLB,,, | Performed by: INTERNAL MEDICINE

## 2020-06-24 PROCEDURE — 71046 X-RAY EXAM CHEST 2 VIEWS: CPT | Mod: TC,PN

## 2020-06-24 PROCEDURE — 71046 X-RAY EXAM CHEST 2 VIEWS: CPT | Mod: 26,,, | Performed by: RADIOLOGY

## 2020-06-24 PROCEDURE — 80053 COMPREHEN METABOLIC PANEL: CPT

## 2020-06-24 PROCEDURE — 99999 PR PBB SHADOW E&M-EST. PATIENT-LVL IV: ICD-10-PCS | Mod: PBBFAC,,, | Performed by: FAMILY MEDICINE

## 2020-06-24 PROCEDURE — 3074F PR MOST RECENT SYSTOLIC BLOOD PRESSURE < 130 MM HG: ICD-10-PCS | Mod: CPTII,S$GLB,, | Performed by: FAMILY MEDICINE

## 2020-06-24 PROCEDURE — 3008F BODY MASS INDEX DOCD: CPT | Mod: CPTII,S$GLB,, | Performed by: FAMILY MEDICINE

## 2020-06-24 PROCEDURE — 3051F HG A1C>EQUAL 7.0%<8.0%: CPT | Mod: CPTII,S$GLB,, | Performed by: FAMILY MEDICINE

## 2020-06-24 RX ORDER — GLUCAGON HYDROCHLORIDE 1 MG
1 KIT INJECTION
Qty: 1 EACH | Refills: 0 | Status: SHIPPED | OUTPATIENT
Start: 2020-06-24 | End: 2022-04-22

## 2020-06-24 RX ORDER — MULTIVITAMIN
1 TABLET ORAL DAILY
COMMUNITY
End: 2021-02-17

## 2020-06-24 NOTE — PROGRESS NOTES
THIS DOCUMENT WAS MADE IN PART WITH VOICE RECOGNITION SOFTWARE.  OCCASIONALLY THIS SOFTWARE WILL MISINTERPRET WORDS OR PHRASES.    Assessment and Plan:    1. Type 1 diabetes mellitus with diabetic nephropathy  - GLUCAGEN HYPOKIT 1 mg SolR; Inject 1 mg into the muscle as needed (for severe hypoglycemia).  Dispense: 1 each; Refill: 0    2. Preoperative examination  EKG quite unremarkable, did show evidence of incomplete right bundle branch block, I did recommended patient he see cardiologist sometime in the next 6 months, will not inhibit his ability to have surgery though.  Patient is a low risk for cardiovascular complication for this moderate risk surgery.  - CBC auto differential; Future  - Comprehensive metabolic panel; Future  - Protime-INR; Future  - APTT; Future  - X-Ray Chest PA And Lateral; Future  - EKG 12-lead    3. Gangrene of toe of right foot  - CBC auto differential; Future  - Comprehensive metabolic panel; Future  - Protime-INR; Future  - APTT; Future  - X-Ray Chest PA And Lateral; Future  - EKG 12-lead    ______________________________________________________________________  Subjective:    Chief Complaint:  Chief Complaint   Patient presents with    clearance     Having surgery with podiatrist Dr. Duffy next thursday. Pt needing surgery clearance.        HPI:  Hammad is a 65 y.o. year old     Surgical Clearance   Surgeon Dr. Caleb Duffy  Surgery amputation right 3rd toe  Indication gangrene, diabetic foot wound  Patient has tolerated general anesthesia in the past without complication  Has history of diabetes mellitus, hyperlipidemia, hypertension, history of tobacco use, not currently smoking  Denies any exertional chest discomfort or shortness of breath.  Negative stress tests 08/26/2013  Most recent A1c 7.6% done on 05/20/2020      The 10-year ASCVD risk score ([place] HERNANDEZ Jr., et al., 2013) is: 19.2%    Values used to calculate the score:      Age: 65 years      Sex: Male      Is Non-  : No      Diabetic: Yes      Tobacco smoker: No      Systolic Blood Pressure: 126 mmHg      Is BP treated: Yes      HDL Cholesterol: 51 mg/dL      Total Cholesterol: 163 mg/dL      Past Medical History:  Past Medical History:   Diagnosis Date    Cataract     Chronic kidney disease     Diabetes mellitus type I     Diagnosed at age 19    Diabetic retinopathy     See's Dr. Ledesma    Hyperlipidemia     Hypertension        Past Surgical History:  Past Surgical History:   Procedure Laterality Date    COLONOSCOPY      2011 wnl       Family History:  Family History   Problem Relation Age of Onset    Cataracts Mother     Breast cancer Sister        Social History:  Social History     Socioeconomic History    Marital status:      Spouse name: Not on file    Number of children: Not on file    Years of education: Not on file    Highest education level: Not on file   Occupational History    Not on file   Social Needs    Financial resource strain: Not on file    Food insecurity     Worry: Not on file     Inability: Not on file    Transportation needs     Medical: Not on file     Non-medical: Not on file   Tobacco Use    Smoking status: Former Smoker     Types: Cigars    Smokeless tobacco: Never Used    Tobacco comment: Former cigar use   Substance and Sexual Activity    Alcohol use: Yes     Alcohol/week: 2.0 standard drinks     Types: 2 Cans of beer per week    Drug use: No    Sexual activity: Yes     Partners: Female   Lifestyle    Physical activity     Days per week: Not on file     Minutes per session: Not on file    Stress: Not on file   Relationships    Social connections     Talks on phone: Not on file     Gets together: Not on file     Attends Hinduism service: Not on file     Active member of club or organization: Not on file     Attends meetings of clubs or organizations: Not on file     Relationship status: Not on file   Other Topics Concern    Not on file   Social  "History Narrative    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 9 Device 3    blood-glucose transmitter (DEXCOM G6 TRANSMITTER) Jenni For continuous blood glucose monitoring. Change device every 90 days. 1 Device 3    FLUoxetine 20 MG capsule TAKE 1 CAPSULE(20 MG) BY MOUTH EVERY DAY 90 capsule 3    GLUCAGEN HYPOKIT 1 mg SolR Inject 1 mg into the muscle as needed (for severe hypoglycemia). 1 each 3    insulin glargine U-300 conc (TOUJEO MAX U-300 SOLOSTAR) 300 unit/mL (3 mL) InPn Inject 24 Units into the skin every evening. (Patient taking differently: Inject 28 Units into the skin every evening. ) 4.5 mL 6    insulin lispro (HUMALOG KWIKPEN INSULIN) 100 unit/mL pen INJECT USING INSULIN:CARB RATIO OF 1:6 SUBCUTANEOUSLY PRIOR TO MEALS. MAX TDD 40 UNITS 3 Box 3    losartan (COZAAR) 100 MG tablet TAKE 1 TABLET(100 MG) BY MOUTH EVERY DAY 90 tablet 3    multivitamin (ONE DAILY MULTIVITAMIN) per tablet Take 1 tablet by mouth once daily.      pen needle, diabetic (PEN NEEDLE) 31 gauge x 1/4" Ndle 1 each by Misc.(Non-Drug; Combo Route) route 4 (four) times daily. 400 each 3    blood sugar diagnostic (ONETOUCH ULTRA TEST) Strp 1 strip by Other route 4 (four) times daily. (Patient not taking: Reported on 6/15/2020) 400 strip 3    ciprofloxacin HCl (CIPRO) 250 MG tablet Take 1 tablet (250 mg total) by mouth 2 (two) times daily. 14 tablet 0     Current Facility-Administered Medications on File Prior to Visit   Medication Dose Route Frequency Provider Last Rate Last Dose    glucagon (human recombinant) injection 1 mg  1 mg Intramuscular Once Tania Mckeon NP           Allergies:  Altace " "[ramipril]    Immunizations:  Immunization History   Administered Date(s) Administered    Influenza 10/02/2018    Influenza - Quadrivalent 10/25/2019    Influenza - Quadrivalent - PF (6 months and older) 11/08/2007, 09/19/2011, 01/11/2013    Influenza A (H1N1) 2009 Monovalent - IM 11/09/2009    Influenza Split 01/11/2013       Review of Systems:  Review of Systems   Skin:        Ft wound   All other systems reviewed and are negative.      Objective:    Vitals:  Vitals:    06/24/20 1316   BP: 126/66   Pulse: 88   Resp: 14   Temp: 97.5 °F (36.4 °C)   TempSrc: Other (see comments)   Weight: 99.2 kg (218 lb 11.1 oz)   Height: 6' 2" (1.88 m)   PainSc: 0-No pain       Physical Exam  Constitutional:       General: He is not in acute distress.  HENT:      Head: Normocephalic and atraumatic.   Eyes:      Pupils: Pupils are equal, round, and reactive to light.   Neck:      Musculoskeletal: Neck supple.   Cardiovascular:      Rate and Rhythm: Normal rate and regular rhythm.      Heart sounds: No murmur. No friction rub.   Pulmonary:      Effort: Pulmonary effort is normal.      Breath sounds: Normal breath sounds.   Abdominal:      General: Bowel sounds are normal. There is no distension.      Palpations: Abdomen is soft.      Tenderness: There is no abdominal tenderness.   Skin:     General: Skin is warm and dry.      Findings: No rash.   Psychiatric:         Behavior: Behavior normal.         Data:  No previous labs, imaging, or notes available.        Chet Bojorquez MD  Family Medicine      "

## 2020-06-25 LAB
ALBUMIN SERPL BCP-MCNC: 3.6 G/DL (ref 3.5–5.2)
ALP SERPL-CCNC: 87 U/L (ref 55–135)
ALT SERPL W/O P-5'-P-CCNC: 21 U/L (ref 10–44)
ANION GAP SERPL CALC-SCNC: 11 MMOL/L (ref 8–16)
APTT BLDCRRT: 24.2 SEC (ref 21–32)
AST SERPL-CCNC: 22 U/L (ref 10–40)
BILIRUB SERPL-MCNC: 0.4 MG/DL (ref 0.1–1)
BUN SERPL-MCNC: 28 MG/DL (ref 8–23)
CALCIUM SERPL-MCNC: 9.4 MG/DL (ref 8.7–10.5)
CHLORIDE SERPL-SCNC: 102 MMOL/L (ref 95–110)
CO2 SERPL-SCNC: 22 MMOL/L (ref 23–29)
CREAT SERPL-MCNC: 1.3 MG/DL (ref 0.5–1.4)
EST. GFR  (AFRICAN AMERICAN): >60 ML/MIN/1.73 M^2
EST. GFR  (NON AFRICAN AMERICAN): 58.1 ML/MIN/1.73 M^2
GLUCOSE SERPL-MCNC: 255 MG/DL (ref 70–110)
INR PPP: 1 (ref 0.8–1.2)
POTASSIUM SERPL-SCNC: 4.5 MMOL/L (ref 3.5–5.1)
PROT SERPL-MCNC: 7.1 G/DL (ref 6–8.4)
PROTHROMBIN TIME: 10.3 SEC (ref 9–12.5)
SODIUM SERPL-SCNC: 135 MMOL/L (ref 136–145)

## 2020-06-29 ENCOUNTER — LAB VISIT (OUTPATIENT)
Dept: FAMILY MEDICINE | Facility: CLINIC | Age: 65
End: 2020-06-29
Payer: COMMERCIAL

## 2020-06-29 DIAGNOSIS — Z03.818 ENCNTR FOR OBS FOR SUSP EXPSR TO OTH BIOLG AGENTS RULED OUT: Primary | ICD-10-CM

## 2020-06-29 PROCEDURE — U0003 INFECTIOUS AGENT DETECTION BY NUCLEIC ACID (DNA OR RNA); SEVERE ACUTE RESPIRATORY SYNDROME CORONAVIRUS 2 (SARS-COV-2) (CORONAVIRUS DISEASE [COVID-19]), AMPLIFIED PROBE TECHNIQUE, MAKING USE OF HIGH THROUGHPUT TECHNOLOGIES AS DESCRIBED BY CMS-2020-01-R: HCPCS

## 2020-06-30 ENCOUNTER — PATIENT OUTREACH (OUTPATIENT)
Dept: ADMINISTRATIVE | Facility: OTHER | Age: 65
End: 2020-06-30

## 2020-06-30 ENCOUNTER — TELEPHONE (OUTPATIENT)
Dept: FAMILY MEDICINE | Facility: CLINIC | Age: 65
End: 2020-06-30

## 2020-06-30 ENCOUNTER — OFFICE VISIT (OUTPATIENT)
Dept: PODIATRY | Facility: CLINIC | Age: 65
End: 2020-06-30
Payer: COMMERCIAL

## 2020-06-30 VITALS — WEIGHT: 218 LBS | HEIGHT: 74 IN | BODY MASS INDEX: 27.98 KG/M2

## 2020-06-30 DIAGNOSIS — L97.511 DIABETIC ULCER OF TOE OF RIGHT FOOT ASSOCIATED WITH TYPE 1 DIABETES MELLITUS, LIMITED TO BREAKDOWN OF SKIN: ICD-10-CM

## 2020-06-30 DIAGNOSIS — I96 GANGRENE OF TOE OF RIGHT FOOT: Primary | ICD-10-CM

## 2020-06-30 DIAGNOSIS — E10.42 TYPE 1 DIABETES MELLITUS WITH POLYNEUROPATHY: ICD-10-CM

## 2020-06-30 DIAGNOSIS — E10.621 DIABETIC ULCER OF TOE OF RIGHT FOOT ASSOCIATED WITH TYPE 1 DIABETES MELLITUS, LIMITED TO BREAKDOWN OF SKIN: ICD-10-CM

## 2020-06-30 DIAGNOSIS — S90.425D BLISTER OF TOE OF LEFT FOOT, SUBSEQUENT ENCOUNTER: ICD-10-CM

## 2020-06-30 LAB — SARS-COV-2 RNA RESP QL NAA+PROBE: NOT DETECTED

## 2020-06-30 PROCEDURE — 99999 PR PBB SHADOW E&M-EST. PATIENT-LVL III: ICD-10-PCS | Mod: PBBFAC,,, | Performed by: PODIATRIST

## 2020-06-30 PROCEDURE — 3051F PR MOST RECENT HEMOGLOBIN A1C LEVEL 7.0 - < 8.0%: ICD-10-PCS | Mod: CPTII,S$GLB,, | Performed by: PODIATRIST

## 2020-06-30 PROCEDURE — 3008F PR BODY MASS INDEX (BMI) DOCUMENTED: ICD-10-PCS | Mod: CPTII,S$GLB,, | Performed by: PODIATRIST

## 2020-06-30 PROCEDURE — 3008F BODY MASS INDEX DOCD: CPT | Mod: CPTII,S$GLB,, | Performed by: PODIATRIST

## 2020-06-30 PROCEDURE — 3051F HG A1C>EQUAL 7.0%<8.0%: CPT | Mod: CPTII,S$GLB,, | Performed by: PODIATRIST

## 2020-06-30 PROCEDURE — 99999 PR PBB SHADOW E&M-EST. PATIENT-LVL III: CPT | Mod: PBBFAC,,, | Performed by: PODIATRIST

## 2020-06-30 PROCEDURE — 99212 OFFICE O/P EST SF 10 MIN: CPT | Mod: S$GLB,,, | Performed by: PODIATRIST

## 2020-06-30 PROCEDURE — 99212 PR OFFICE/OUTPT VISIT, EST, LEVL II, 10-19 MIN: ICD-10-PCS | Mod: S$GLB,,, | Performed by: PODIATRIST

## 2020-06-30 NOTE — PROGRESS NOTES
Subjective:      Patient ID: Hammad Douglas III is a 65 y.o. male.    Chief Complaint: Toe Pain  Patient presents to clinic for a 1 week follow up for gangrene of the Rt. 3rd toe.  Denies pain from the affected digit with today's exam.  Notes continued application of betadine to the blister of the Lt. Great toe and to the Rt. Gangrenous 3rd toe.  Has been applying sawyer to the Rt. Great toe wound as discussed.  Continues use of the postoperative shoe with all weight bearing.  Has kept all areas dry and denies noticing localized signs of infection.  Denies having N/V/F/C/D.  Denies any additional pedal complaints.    Past Medical History:   Diagnosis Date    Cataract     Chronic kidney disease     Diabetes mellitus type I     Diagnosed at age 19    Diabetic retinopathy     See's Dr. Ledesma    Hyperlipidemia     Hypertension        Past Surgical History:   Procedure Laterality Date    COLONOSCOPY      2011 wnl       Family History   Problem Relation Age of Onset    Cataracts Mother     Breast cancer Sister        Social History     Socioeconomic History    Marital status:      Spouse name: Not on file    Number of children: Not on file    Years of education: Not on file    Highest education level: Not on file   Occupational History    Not on file   Social Needs    Financial resource strain: Not on file    Food insecurity     Worry: Not on file     Inability: Not on file    Transportation needs     Medical: Not on file     Non-medical: Not on file   Tobacco Use    Smoking status: Former Smoker     Types: Cigars    Smokeless tobacco: Never Used    Tobacco comment: Former cigar use   Substance and Sexual Activity    Alcohol use: Yes     Alcohol/week: 2.0 standard drinks     Types: 2 Cans of beer per week    Drug use: No    Sexual activity: Yes     Partners: Female   Lifestyle    Physical activity     Days per week: Not on file     Minutes per session: Not on file    Stress: Not on file  "  Relationships    Social connections     Talks on phone: Not on file     Gets together: Not on file     Attends Voodoo service: Not on file     Active member of club or organization: Not on file     Attends meetings of clubs or organizations: Not on file     Relationship status: Not on file   Other Topics Concern    Not on file   Social History Narrative    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 9 Device 3    blood-glucose transmitter (DEXCOM G6 TRANSMITTER) Jenni For continuous blood glucose monitoring. Change device every 90 days. 1 Device 3    ciprofloxacin HCl (CIPRO) 250 MG tablet Take 1 tablet (250 mg total) by mouth 2 (two) times daily. 14 tablet 0    FLUoxetine 20 MG capsule TAKE 1 CAPSULE(20 MG) BY MOUTH EVERY DAY 90 capsule 3    GLUCAGEN HYPOKIT 1 mg SolR Inject 1 mg into the muscle as needed (for severe hypoglycemia). 1 each 0    insulin glargine U-300 conc (TOUJEO MAX U-300 SOLOSTAR) 300 unit/mL (3 mL) InPn Inject 24 Units into the skin every evening. (Patient taking differently: Inject 28 Units into the skin every evening. ) 4.5 mL 6    insulin lispro (HUMALOG KWIKPEN INSULIN) 100 unit/mL pen INJECT USING INSULIN:CARB RATIO OF 1:6 SUBCUTANEOUSLY PRIOR TO MEALS. MAX TDD 40 UNITS 3 Box 3    losartan (COZAAR) 100 MG tablet TAKE 1 TABLET(100 MG) BY MOUTH EVERY DAY 90 tablet 3    multivitamin (ONE DAILY MULTIVITAMIN) per tablet Take 1 tablet by mouth once daily.      pen needle, diabetic (PEN NEEDLE) 31 gauge x 1/4" Ndle 1 each by Misc.(Non-Drug; Combo Route) route 4 (four) times daily. 400 each 3    blood sugar diagnostic (ONETOUCH ULTRA TEST) Strp 1 strip by Other route 4 (four) times daily. (Patient not taking: Reported on 6/15/2020) " 400 strip 3     Current Facility-Administered Medications   Medication Dose Route Frequency Provider Last Rate Last Dose    glucagon (human recombinant) injection 1 mg  1 mg Intramuscular Once Tania Mckeon NP           Review of patient's allergies indicates:   Allergen Reactions    Altace [ramipril]      Cough         Review of Systems   Constitution: Negative for chills and fever.   Cardiovascular: Negative for claudication and leg swelling.   Skin: Negative for color change, dry skin and poor wound healing.   Musculoskeletal: Negative for joint pain, joint swelling, muscle cramps and muscle weakness.   Neurological: Positive for numbness.   Psychiatric/Behavioral: Negative for altered mental status.           Objective:      Physical Exam   Constitutional: He appears well-developed and well-nourished. No distress.   Cardiovascular:   Pulses:       Dorsalis pedis pulses are 2+ on the right side, and 2+ on the left side.        Posterior tibial pulses are 1+ on the right side, and 1+ on the left side.   CFT is < 3 seconds bilateral.  Pedal hair growth is present bilateral.  Varicosities noted bilateral.  No lower extremity edema noted bilateral.  Toes are warm to touch bilateral.     Musculoskeletal: He exhibits no edema or tenderness.   Muscle strength 5/5 in all muscle groups bilateral.  No tenderness nor crepitation with ROM of foot/ankle joints bilateral.  No tenderness with palpation of bilateral foot and ankle.  Semi-reducible contracture of the lesser digits bilateral.     Neurological: He has normal strength. A sensory deficit is present.   Protective sensation per [place] monofilament is decreased bilateral.  Light touch is intact bilateral.   Skin: Skin is warm and dry. Capillary refill takes less than 2 seconds. Lesion noted. No abrasion, no burn, no ecchymosis, no laceration and no petechiae noted. He is not diaphoretic.   Dry, stable gangrene noted to the tip of the Rt. 3rd toe  with extension slightly distal to the PIP joint.  No localized sign of infection noted to the digit.   Unchanged in comparison to last week.  Open wound noted to the Rt. Medial hallux.  Wound base is down to dermis and comprised of granulation.  Skylar wound is devoid of erythema, edema, fluctuance, purulence, and malodor.  Measures 0.1 x 0.1 x 0.1cm.   Dry, stable blister noted to the Lt. Medial hallux.  Site appears stable on exam and devoid of signs of infection.               Assessment:       Encounter Diagnoses   Name Primary?    Gangrene of toe of right foot Yes    Type 1 diabetes mellitus with polyneuropathy          Plan:       Hammad was seen today for toe pain.    Diagnoses and all orders for this visit:    Gangrene of toe of right foot    Type 1 diabetes mellitus with polyneuropathy      I counseled the patient on his conditions, their implications and medical management.    All areas of bilateral foot appear stable with today's exam.    Patient to continue applying betadine to the blister of the Lt. Great toe and gangrenous Rt. 3rd toe.  Jammie was applied to the Rt. Medial hallux wound.    Patient to continue wearing the postoperative shoe to minimize pressure to the affected digits.    Advised to keep the foot free of moisture to ensure gangrenous changes remain dry and stable.    Advised to minimize weight bearing activity to prevent further soft tissue loss.    Instructed to monitor the toe daily for localized signs of infection or odor.    Surgical clearance has been obtained.    Case request placed for July 2, 2020.    Patient to be NPO at midnight on the day of surgery.    Written informed consent to be obtained the day of surgery.    RTC 1 week postop.    Caleb Duffy DPM

## 2020-06-30 NOTE — PROGRESS NOTES
Requested updates within Care Everywhere.  Patient's chart was reviewed for overdue ADRIANA topics.  Immunizations reconciled.

## 2020-06-30 NOTE — TELEPHONE ENCOUNTER
Received a fax from [employer] that they received a empty collection kit for the patients Cologuard specimen. They stated that the patient will be contacted to initiate a new specimen collection.

## 2020-06-30 NOTE — H&P (VIEW-ONLY)
Subjective:      Patient ID: Hammad Douglas III is a 65 y.o. male.    Chief Complaint: Toe Pain  Patient presents to clinic for a 1 week follow up for gangrene of the Rt. 3rd toe.  Denies pain from the affected digit with today's exam.  Notes continued application of betadine to the blister of the Lt. Great toe and to the Rt. Gangrenous 3rd toe.  Has been applying sawyer to the Rt. Great toe wound as discussed.  Continues use of the postoperative shoe with all weight bearing.  Has kept all areas dry and denies noticing localized signs of infection.  Denies having N/V/F/C/D.  Denies any additional pedal complaints.    Past Medical History:   Diagnosis Date    Cataract     Chronic kidney disease     Diabetes mellitus type I     Diagnosed at age 19    Diabetic retinopathy     See's Dr. Ledesma    Hyperlipidemia     Hypertension        Past Surgical History:   Procedure Laterality Date    COLONOSCOPY      2011 wnl       Family History   Problem Relation Age of Onset    Cataracts Mother     Breast cancer Sister        Social History     Socioeconomic History    Marital status:      Spouse name: Not on file    Number of children: Not on file    Years of education: Not on file    Highest education level: Not on file   Occupational History    Not on file   Social Needs    Financial resource strain: Not on file    Food insecurity     Worry: Not on file     Inability: Not on file    Transportation needs     Medical: Not on file     Non-medical: Not on file   Tobacco Use    Smoking status: Former Smoker     Types: Cigars    Smokeless tobacco: Never Used    Tobacco comment: Former cigar use   Substance and Sexual Activity    Alcohol use: Yes     Alcohol/week: 2.0 standard drinks     Types: 2 Cans of beer per week    Drug use: No    Sexual activity: Yes     Partners: Female   Lifestyle    Physical activity     Days per week: Not on file     Minutes per session: Not on file    Stress: Not on file  "  Relationships    Social connections     Talks on phone: Not on file     Gets together: Not on file     Attends Jainism service: Not on file     Active member of club or organization: Not on file     Attends meetings of clubs or organizations: Not on file     Relationship status: Not on file   Other Topics Concern    Not on file   Social History Narrative    Retired former radiation  at [employer]       Current Outpatient Medications   Medication Sig Dispense Refill    atorvastatin (LIPITOR) 40 MG tablet TAKE 1 TABLET(40 MG) BY MOUTH EVERY DAY 90 tablet 4    blood-glucose sensor (DEXCOM G6 SENSOR) Jenni Change sensor every 10 days. 9 Device 3    blood-glucose transmitter (DEXCOM G6 TRANSMITTER) Jenni For continuous blood glucose monitoring. Change device every 90 days. 1 Device 3    ciprofloxacin HCl (CIPRO) 250 MG tablet Take 1 tablet (250 mg total) by mouth 2 (two) times daily. 14 tablet 0    FLUoxetine 20 MG capsule TAKE 1 CAPSULE(20 MG) BY MOUTH EVERY DAY 90 capsule 3    GLUCAGEN HYPOKIT 1 mg SolR Inject 1 mg into the muscle as needed (for severe hypoglycemia). 1 each 0    insulin glargine U-300 conc (TOUJEO MAX U-300 SOLOSTAR) 300 unit/mL (3 mL) InPn Inject 24 Units into the skin every evening. (Patient taking differently: Inject 28 Units into the skin every evening. ) 4.5 mL 6    insulin lispro (HUMALOG KWIKPEN INSULIN) 100 unit/mL pen INJECT USING INSULIN:CARB RATIO OF 1:6 SUBCUTANEOUSLY PRIOR TO MEALS. MAX TDD 40 UNITS 3 Box 3    losartan (COZAAR) 100 MG tablet TAKE 1 TABLET(100 MG) BY MOUTH EVERY DAY 90 tablet 3    multivitamin (ONE DAILY MULTIVITAMIN) per tablet Take 1 tablet by mouth once daily.      pen needle, diabetic (PEN NEEDLE) 31 gauge x 1/4" Ndle 1 each by Misc.(Non-Drug; Combo Route) route 4 (four) times daily. 400 each 3    blood sugar diagnostic (ONETOUCH ULTRA TEST) Strp 1 strip by Other route 4 (four) times daily. (Patient not taking: Reported on 6/15/2020) " 400 strip 3     Current Facility-Administered Medications   Medication Dose Route Frequency Provider Last Rate Last Dose    glucagon (human recombinant) injection 1 mg  1 mg Intramuscular Once Tania Mckeon NP           Review of patient's allergies indicates:   Allergen Reactions    Altace [ramipril]      Cough         Review of Systems   Constitution: Negative for chills and fever.   Cardiovascular: Negative for claudication and leg swelling.   Skin: Negative for color change, dry skin and poor wound healing.   Musculoskeletal: Negative for joint pain, joint swelling, muscle cramps and muscle weakness.   Neurological: Positive for numbness.   Psychiatric/Behavioral: Negative for altered mental status.           Objective:      Physical Exam   Constitutional: He appears well-developed and well-nourished. No distress.   Cardiovascular:   Pulses:       Dorsalis pedis pulses are 2+ on the right side, and 2+ on the left side.        Posterior tibial pulses are 1+ on the right side, and 1+ on the left side.   CFT is < 3 seconds bilateral.  Pedal hair growth is present bilateral.  Varicosities noted bilateral.  No lower extremity edema noted bilateral.  Toes are warm to touch bilateral.     Musculoskeletal: He exhibits no edema or tenderness.   Muscle strength 5/5 in all muscle groups bilateral.  No tenderness nor crepitation with ROM of foot/ankle joints bilateral.  No tenderness with palpation of bilateral foot and ankle.  Semi-reducible contracture of the lesser digits bilateral.     Neurological: He has normal strength. A sensory deficit is present.   Protective sensation per [place] monofilament is decreased bilateral.  Light touch is intact bilateral.   Skin: Skin is warm and dry. Capillary refill takes less than 2 seconds. Lesion noted. No abrasion, no burn, no ecchymosis, no laceration and no petechiae noted. He is not diaphoretic.   Dry, stable gangrene noted to the tip of the Rt. 3rd toe  with extension slightly distal to the PIP joint.  No localized sign of infection noted to the digit.   Unchanged in comparison to last week.  Open wound noted to the Rt. Medial hallux.  Wound base is down to dermis and comprised of granulation.  Skylar wound is devoid of erythema, edema, fluctuance, purulence, and malodor.  Measures 0.1 x 0.1 x 0.1cm.   Dry, stable blister noted to the Lt. Medial hallux.  Site appears stable on exam and devoid of signs of infection.               Assessment:       Encounter Diagnoses   Name Primary?    Gangrene of toe of right foot Yes    Type 1 diabetes mellitus with polyneuropathy          Plan:       Hammad was seen today for toe pain.    Diagnoses and all orders for this visit:    Gangrene of toe of right foot    Type 1 diabetes mellitus with polyneuropathy      I counseled the patient on his conditions, their implications and medical management.    All areas of bilateral foot appear stable with today's exam.    Patient to continue applying betadine to the blister of the Lt. Great toe and gangrenous Rt. 3rd toe.  Jammie was applied to the Rt. Medial hallux wound.    Patient to continue wearing the postoperative shoe to minimize pressure to the affected digits.    Advised to keep the foot free of moisture to ensure gangrenous changes remain dry and stable.    Advised to minimize weight bearing activity to prevent further soft tissue loss.    Instructed to monitor the toe daily for localized signs of infection or odor.    Surgical clearance has been obtained.    Case request placed for July 2, 2020.    Patient to be NPO at midnight on the day of surgery.    Written informed consent to be obtained the day of surgery.    RTC 1 week postop.    Caleb Duffy DPM

## 2020-07-01 ENCOUNTER — ANESTHESIA EVENT (OUTPATIENT)
Dept: SURGERY | Facility: HOSPITAL | Age: 65
End: 2020-07-01
Payer: COMMERCIAL

## 2020-07-02 ENCOUNTER — HOSPITAL ENCOUNTER (OUTPATIENT)
Facility: HOSPITAL | Age: 65
Discharge: HOME | End: 2020-07-02
Attending: PODIATRIST | Admitting: PODIATRIST
Payer: COMMERCIAL

## 2020-07-02 ENCOUNTER — ANESTHESIA (OUTPATIENT)
Dept: SURGERY | Facility: HOSPITAL | Age: 65
End: 2020-07-02
Payer: COMMERCIAL

## 2020-07-02 ENCOUNTER — HOSPITAL ENCOUNTER (OUTPATIENT)
Dept: RADIOLOGY | Facility: HOSPITAL | Age: 65
Discharge: HOME | End: 2020-07-02
Attending: PODIATRIST | Admitting: PODIATRIST
Payer: COMMERCIAL

## 2020-07-02 VITALS
HEIGHT: 74 IN | DIASTOLIC BLOOD PRESSURE: 72 MMHG | HEART RATE: 62 BPM | RESPIRATION RATE: 16 BRPM | BODY MASS INDEX: 27.72 KG/M2 | OXYGEN SATURATION: 96 % | SYSTOLIC BLOOD PRESSURE: 130 MMHG | WEIGHT: 216 LBS | TEMPERATURE: 98 F

## 2020-07-02 DIAGNOSIS — I96 GANGRENE OF TOE OF RIGHT FOOT: Primary | ICD-10-CM

## 2020-07-02 LAB — GLUCOSE SERPL-MCNC: 210 MG/DL (ref 70–110)

## 2020-07-02 PROCEDURE — 37000008 HC ANESTHESIA 1ST 15 MINUTES: Mod: PO | Performed by: PODIATRIST

## 2020-07-02 PROCEDURE — 63600175 PHARM REV CODE 636 W HCPCS: Mod: PO | Performed by: ANESTHESIOLOGY

## 2020-07-02 PROCEDURE — 71000015 HC POSTOP RECOV 1ST HR: Mod: PO | Performed by: PODIATRIST

## 2020-07-02 PROCEDURE — 36000707: Mod: PO | Performed by: PODIATRIST

## 2020-07-02 PROCEDURE — 73630 X-RAY EXAM OF FOOT: CPT | Mod: TC,FY,PO,RT

## 2020-07-02 PROCEDURE — 73630 X-RAY EXAM OF FOOT: CPT | Mod: 26,RT,, | Performed by: RADIOLOGY

## 2020-07-02 PROCEDURE — 63600175 PHARM REV CODE 636 W HCPCS: Mod: PO | Performed by: NURSE ANESTHETIST, CERTIFIED REGISTERED

## 2020-07-02 PROCEDURE — D9220A PRA ANESTHESIA: ICD-10-PCS | Mod: CRNA,,, | Performed by: NURSE ANESTHETIST, CERTIFIED REGISTERED

## 2020-07-02 PROCEDURE — 88311 DECALCIFY TISSUE: CPT | Performed by: PATHOLOGY

## 2020-07-02 PROCEDURE — D9220A PRA ANESTHESIA: Mod: ANES,,, | Performed by: ANESTHESIOLOGY

## 2020-07-02 PROCEDURE — 88305 TISSUE EXAM BY PATHOLOGIST: CPT | Mod: 26,,, | Performed by: PATHOLOGY

## 2020-07-02 PROCEDURE — S0020 INJECTION, BUPIVICAINE HYDRO: HCPCS | Mod: PO | Performed by: PODIATRIST

## 2020-07-02 PROCEDURE — 82962 GLUCOSE BLOOD TEST: CPT | Mod: PO | Performed by: PODIATRIST

## 2020-07-02 PROCEDURE — 36000706: Mod: PO | Performed by: PODIATRIST

## 2020-07-02 PROCEDURE — 37000009 HC ANESTHESIA EA ADD 15 MINS: Mod: PO | Performed by: PODIATRIST

## 2020-07-02 PROCEDURE — 73630 XR FOOT COMPLETE 3 VIEW RIGHT: ICD-10-PCS | Mod: 26,RT,, | Performed by: RADIOLOGY

## 2020-07-02 PROCEDURE — 28820 PR AMPUTATION TOE,MT-P JT: ICD-10-PCS | Mod: T7,,, | Performed by: PODIATRIST

## 2020-07-02 PROCEDURE — 88311 PR  DECALCIFY TISSUE: ICD-10-PCS | Mod: 26,,, | Performed by: PATHOLOGY

## 2020-07-02 PROCEDURE — 88311 DECALCIFY TISSUE: CPT | Mod: 26,,, | Performed by: PATHOLOGY

## 2020-07-02 PROCEDURE — 63600175 PHARM REV CODE 636 W HCPCS: Mod: PO | Performed by: PODIATRIST

## 2020-07-02 PROCEDURE — 25000003 PHARM REV CODE 250: Mod: PO | Performed by: PODIATRIST

## 2020-07-02 PROCEDURE — 88305 TISSUE EXAM BY PATHOLOGIST: CPT | Performed by: PATHOLOGY

## 2020-07-02 PROCEDURE — D9220A PRA ANESTHESIA: ICD-10-PCS | Mod: ANES,,, | Performed by: ANESTHESIOLOGY

## 2020-07-02 PROCEDURE — 88305 TISSUE EXAM BY PATHOLOGIST: ICD-10-PCS | Mod: 26,,, | Performed by: PATHOLOGY

## 2020-07-02 PROCEDURE — D9220A PRA ANESTHESIA: Mod: CRNA,,, | Performed by: NURSE ANESTHETIST, CERTIFIED REGISTERED

## 2020-07-02 PROCEDURE — 28820 AMPUTATION OF TOE: CPT | Mod: T7,,, | Performed by: PODIATRIST

## 2020-07-02 RX ORDER — MIDAZOLAM HYDROCHLORIDE 1 MG/ML
INJECTION, SOLUTION INTRAMUSCULAR; INTRAVENOUS
Status: DISCONTINUED | OUTPATIENT
Start: 2020-07-02 | End: 2020-07-02

## 2020-07-02 RX ORDER — HYDROCODONE BITARTRATE AND ACETAMINOPHEN 5; 325 MG/1; MG/1
1 TABLET ORAL EVERY 4 HOURS PRN
Status: DISCONTINUED | OUTPATIENT
Start: 2020-07-02 | End: 2020-07-02 | Stop reason: HOSPADM

## 2020-07-02 RX ORDER — LIDOCAINE HYDROCHLORIDE 10 MG/ML
INJECTION, SOLUTION EPIDURAL; INFILTRATION; INTRACAUDAL; PERINEURAL
Status: DISCONTINUED | OUTPATIENT
Start: 2020-07-02 | End: 2020-07-02 | Stop reason: HOSPADM

## 2020-07-02 RX ORDER — ONDANSETRON 2 MG/ML
INJECTION INTRAMUSCULAR; INTRAVENOUS
Status: DISCONTINUED | OUTPATIENT
Start: 2020-07-02 | End: 2020-07-02

## 2020-07-02 RX ORDER — FENTANYL CITRATE 50 UG/ML
INJECTION, SOLUTION INTRAMUSCULAR; INTRAVENOUS
Status: DISCONTINUED | OUTPATIENT
Start: 2020-07-02 | End: 2020-07-02

## 2020-07-02 RX ORDER — LIDOCAINE HYDROCHLORIDE 10 MG/ML
1 INJECTION, SOLUTION EPIDURAL; INFILTRATION; INTRACAUDAL; PERINEURAL ONCE
Status: DISCONTINUED | OUTPATIENT
Start: 2020-07-02 | End: 2022-04-22

## 2020-07-02 RX ORDER — PROPOFOL 10 MG/ML
VIAL (ML) INTRAVENOUS CONTINUOUS PRN
Status: DISCONTINUED | OUTPATIENT
Start: 2020-07-02 | End: 2020-07-02

## 2020-07-02 RX ORDER — BUPIVACAINE HYDROCHLORIDE 5 MG/ML
INJECTION, SOLUTION EPIDURAL; INTRACAUDAL
Status: DISCONTINUED | OUTPATIENT
Start: 2020-07-02 | End: 2020-07-02 | Stop reason: HOSPADM

## 2020-07-02 RX ORDER — KETOROLAC TROMETHAMINE 30 MG/ML
INJECTION, SOLUTION INTRAMUSCULAR; INTRAVENOUS
Status: DISCONTINUED | OUTPATIENT
Start: 2020-07-02 | End: 2020-07-02

## 2020-07-02 RX ORDER — SODIUM CHLORIDE, SODIUM LACTATE, POTASSIUM CHLORIDE, CALCIUM CHLORIDE 600; 310; 30; 20 MG/100ML; MG/100ML; MG/100ML; MG/100ML
INJECTION, SOLUTION INTRAVENOUS CONTINUOUS
Status: DISCONTINUED | OUTPATIENT
Start: 2020-07-02 | End: 2022-04-22

## 2020-07-02 RX ORDER — LIDOCAINE HYDROCHLORIDE 20 MG/ML
INJECTION INTRAVENOUS
Status: DISCONTINUED | OUTPATIENT
Start: 2020-07-02 | End: 2020-07-02

## 2020-07-02 RX ORDER — HYDROCODONE BITARTRATE AND ACETAMINOPHEN 5; 325 MG/1; MG/1
1 TABLET ORAL EVERY 6 HOURS PRN
Qty: 28 TABLET | Refills: 0 | Status: SHIPPED | OUTPATIENT
Start: 2020-07-02 | End: 2021-02-17

## 2020-07-02 RX ORDER — CEFAZOLIN SODIUM 2 G/50ML
2 SOLUTION INTRAVENOUS ONCE
Status: COMPLETED | OUTPATIENT
Start: 2020-07-02 | End: 2020-07-02

## 2020-07-02 RX ORDER — SODIUM CHLORIDE 0.9 G/100ML
IRRIGANT IRRIGATION
Status: DISCONTINUED | OUTPATIENT
Start: 2020-07-02 | End: 2020-07-02 | Stop reason: HOSPADM

## 2020-07-02 RX ORDER — PROPOFOL 10 MG/ML
VIAL (ML) INTRAVENOUS
Status: DISCONTINUED | OUTPATIENT
Start: 2020-07-02 | End: 2020-07-02

## 2020-07-02 RX ADMIN — CEFAZOLIN SODIUM 2 G: 2 SOLUTION INTRAVENOUS at 01:07

## 2020-07-02 RX ADMIN — LIDOCAINE HYDROCHLORIDE 75 MG: 20 INJECTION, SOLUTION INTRAVENOUS at 01:07

## 2020-07-02 RX ADMIN — MIDAZOLAM HYDROCHLORIDE 2 MG: 1 INJECTION, SOLUTION INTRAMUSCULAR; INTRAVENOUS at 01:07

## 2020-07-02 RX ADMIN — PROPOFOL 75 MCG/KG/MIN: 10 INJECTION, EMULSION INTRAVENOUS at 01:07

## 2020-07-02 RX ADMIN — ONDANSETRON 4 MG: 2 INJECTION, SOLUTION INTRAMUSCULAR; INTRAVENOUS at 01:07

## 2020-07-02 RX ADMIN — FENTANYL CITRATE 50 MCG: 50 INJECTION, SOLUTION INTRAMUSCULAR; INTRAVENOUS at 01:07

## 2020-07-02 RX ADMIN — SODIUM CHLORIDE, SODIUM LACTATE, POTASSIUM CHLORIDE, AND CALCIUM CHLORIDE: .6; .31; .03; .02 INJECTION, SOLUTION INTRAVENOUS at 12:07

## 2020-07-02 RX ADMIN — PROPOFOL 90 MG: 10 INJECTION, EMULSION INTRAVENOUS at 01:07

## 2020-07-02 RX ADMIN — KETOROLAC TROMETHAMINE 30 MG: 30 INJECTION, SOLUTION INTRAMUSCULAR; INTRAVENOUS at 01:07

## 2020-07-02 NOTE — ANESTHESIA POSTPROCEDURE EVALUATION
Anesthesia Post Evaluation    Patient: Hammad Douglas III    Procedure(s) Performed: Procedure(s) (LRB):  AMPUTATION, TOE; 3rd (Right)    Final Anesthesia Type: general    Patient location during evaluation: PACU  Patient participation: Yes- Able to Participate  Level of consciousness: sedated and awake  Post-procedure vital signs: reviewed and stable  Pain management: adequate  Airway patency: patent    PONV status at discharge: No PONV  Anesthetic complications: no      Cardiovascular status: blood pressure returned to baseline  Respiratory status: spontaneous ventilation  Hydration status: euvolemic  Follow-up not needed.          Vitals Value Taken Time   BP  07/02/20 1417   Temp  07/02/20 1417   Pulse  07/02/20 1417   Resp  07/02/20 1417   SpO2  07/02/20 1417         Event Time   Out of Recovery 14:12:00         Pain/Alex Score: Alex Score: 9 (7/2/2020  2:11 PM)

## 2020-07-02 NOTE — TRANSFER OF CARE
"Anesthesia Transfer of Care Note    Patient: Hammad Douglas III    Procedure(s) Performed: Procedure(s) (LRB):  AMPUTATION, TOE; 3rd (Right)    Patient location: PACU    Anesthesia Type: MAC    Transport from OR: Transported from OR on room air with adequate spontaneous ventilation    Post pain: adequate analgesia    Post assessment: no apparent anesthetic complications    Post vital signs: stable    Level of consciousness: awake    Nausea/Vomiting: no nausea/vomiting    Complications: none    Transfer of care protocol was followed      Last vitals:   Visit Vitals  BP (!) 150/79 (BP Location: Right arm, Patient Position: Lying)   Pulse 76   Temp 36.7 °C (98.1 °F) (Skin)   Resp 18   Ht 6' 2" (1.88 m)   Wt 98 kg (216 lb)   SpO2 97%   BMI 27.73 kg/m²     "

## 2020-07-02 NOTE — ANESTHESIA PREPROCEDURE EVALUATION
07/02/2020  Hammad Douglas III is a 65 y.o., male.    Anesthesia Evaluation    I have reviewed the Patient Summary Reports.    I have reviewed the Nursing Notes. I have reviewed the NPO Status.   I have reviewed the Medications.     Review of Systems  Cardiovascular:   Hypertension PVD    Pulmonary:  Pulmonary Normal    Renal/:   Chronic Renal Disease, CRI    Hepatic/GI:  Hepatic/GI Normal    Neurological:   Peripheral Neuropathy    Endocrine:   Diabetes        Physical Exam  General:  Well nourished    Airway/Jaw/Neck:  Airway Findings: Mouth Opening: Normal Tongue: Normal  General Airway Assessment: Adult  Mallampati: III  Improves to II with phonation.      Dental:  Dental Findings: In tact   Chest/Lungs:  Chest/Lungs Findings: Clear to auscultation, Normal Respiratory Rate     Heart/Vascular:  Heart Findings: Rate: Normal  Rhythm: Regular Rhythm  Sounds: Normal        Mental Status:  Mental Status Findings:  Cooperative, Alert and Oriented         Anesthesia Plan  Type of Anesthesia, risks & benefits discussed:  Anesthesia Type:  MAC  Patient's Preference:   Intra-op Monitoring Plan: standard ASA monitors  Intra-op Monitoring Plan Comments:   Post Op Pain Control Plan: multimodal analgesia and IV/PO Opioids PRN  Post Op Pain Control Plan Comments:   Induction:   IV  Beta Blocker:  Patient is not currently on a Beta-Blocker (No further documentation required).       Informed Consent: Patient understands risks and agrees with Anesthesia plan.  Questions answered. Anesthesia consent signed with patient.  ASA Score: 3     Day of Surgery Review of History & Physical:            Ready For Surgery From Anesthesia Perspective.

## 2020-07-02 NOTE — DISCHARGE INSTRUCTIONS
FOOT SURGERY  After surgery:    DOS:   Keep leg elevated for first 48-72 hours.   Keep ice pack on the foot for 48-72 hours   Ice on for 25 minutes of each hour while awake   Keep dressing clean and dry   Advanced diet as tolerated.   May put minimal weight on operative foot, walk on heel if you have to.    Use crutches to keep weight off foot.    Wear surgical shoe. May remove shoe to sleep.   Resume home medications.    DONT:   Do not remove your dressing   Do not get dressing wet.   No driving for 48 hours or while taking narcotic pain medications   DO NOT TAKE ADDITIONAL TYLENOL/ACETAMINOPHEN WHILE TAKING NARCOTIC PAIN MEDICATION THAT CONTAINS TYLENOL/ACETAMINOPHEN.    CALL PHYSICIAN FOR:   Pain, burning, or numbness of the toes not relieved by elevation of the leg.   Pale or cold toes; bluish nail beds.   Redness, swelling, or bleeding.   Fever> 101   Drainage (pus) from the puncture sites   Pain unrelieved by pain medication      Discharge Instructions: After Your Surgery  Youve just had surgery. During surgery, you were given medicine called anesthesia to keep you relaxed and free of pain. After surgery, you may have some pain or nausea. This is common. Here are some tips for feeling better and getting well after surgery.     Stay on schedule with your medicine.   Going home  Your healthcare provider will show you how to take care of yourself when you go home. He or she will also answer your questions. Have an adult family member or friend drive you home. For the first 24 hours after your surgery:  · Do not drive or use heavy equipment.  · Do not make important decisions or sign legal papers.  · Do not drink alcohol.  · Have someone stay with you, if needed. He or she can watch for problems and help keep you safe.  Be sure to go to all follow-up visits with your healthcare provider. And rest after your surgery for as long as your healthcare provider tells you to.  Coping with pain  If  you have pain after surgery, pain medicine will help you feel better. Take it as told, before pain becomes severe. Also, ask your healthcare provider or pharmacist about other ways to control pain. This might be with heat, ice, or relaxation. And follow any other instructions your surgeon or nurse gives you.  Tips for taking pain medicine  To get the best relief possible, remember these points:  · Pain medicines can upset your stomach. Taking them with a little food may help.  · Most pain relievers taken by mouth need at least 20 to 30 minutes to start to work.  · Taking medicine on a schedule can help you remember to take it. Try to time your medicine so that you can take it before starting an activity. This might be before you get dressed, go for a walk, or sit down for dinner.  · Constipation is a common side effect of pain medicines. Call your healthcare provider before taking any medicines such as laxatives or stool softeners to help ease constipation. Also ask if you should skip any foods. Drinking lots of fluids and eating foods such as fruits and vegetables that are high in fiber can also help. Remember, do not take laxatives unless your surgeon has prescribed them.  · Drinking alcohol and taking pain medicine can cause dizziness and slow your breathing. It can even be deadly. Do not drink alcohol while taking pain medicine.  · Pain medicine can make you react more slowly to things. Do not drive or run machinery while taking pain medicine.  Your healthcare provider may tell you to take acetaminophen to help ease your pain. Ask him or her how much you are supposed to take each day. Acetaminophen or other pain relievers may interact with your prescription medicines or other over-the-counter (OTC) medicines. Some prescription medicines have acetaminophen and other ingredients. Using both prescription and OTC acetaminophen for pain can cause you to overdose. Read the labels on your OTC medicines with care. This  will help you to clearly know the list of ingredients, how much to take, and any warnings. It may also help you not take too much acetaminophen. If you have questions or do not understand the information, ask your pharmacist or healthcare provider to explain it to you before you take the OTC medicine.  Managing nausea  Some people have an upset stomach after surgery. This is often because of anesthesia, pain, or pain medicine, or the stress of surgery. These tips will help you handle nausea and eat healthy foods as you get better. If you were on a special food plan before surgery, ask your healthcare provider if you should follow it while you get better. These tips may help:  · Do not push yourself to eat. Your body will tell you when to eat and how much.  · Start off with clear liquids and soup. They are easier to digest.  · Next try semi-solid foods, such as mashed potatoes, applesauce, and gelatin, as you feel ready.  · Slowly move to solid foods. Dont eat fatty, rich, or spicy foods at first.  · Do not force yourself to have 3 large meals a day. Instead eat smaller amounts more often.  · Take pain medicines with a small amount of solid food, such as crackers or toast, to avoid nausea.     Call your surgeon if  · You still have pain an hour after taking medicine. The medicine may not be strong enough.  · You feel too sleepy, dizzy, or groggy. The medicine may be too strong.  · You have side effects like nausea, vomiting, or skin changes, such as rash, itching, or hives.       If you have obstructive sleep apnea  You were given anesthesia medicine during surgery to keep you comfortable and free of pain. After surgery, you may have more apnea spells because of this medicine and other medicines you were given. The spells may last longer than usual.   At home:  · Keep using the continuous positive airway pressure (CPAP) device when you sleep. Unless your healthcare provider tells you not to, use it when you sleep,  day or night. CPAP is a common device used to treat obstructive sleep apnea.  · Talk with your provider before taking any pain medicine, muscle relaxants, or sedatives. Your provider will tell you about the possible dangers of taking these medicines.  Date Last Reviewed: 12/1/2016  © 6722-5677 [employer]. [address] [place] [address], [place] [address]. All rights reserved. This information is not intended as a substitute for professional medical care. Always follow your healthcare professional's instructions.

## 2020-07-02 NOTE — PLAN OF CARE
Patient tolerating oral liquids, discharge instructions reviewed with patient and spouse, both verbalizes understanding. Pt surgical incision unable to visualize, gauze, ace bandage and post op shoe to right foot. All clean, dry, and intact.

## 2020-07-03 NOTE — BRIEF OP NOTE
"[facility]  Brief Operative Note    Surgery Date: 7/2/2020     Surgeon(s) and Role:     * Caleb Duffy DPM - Primary    Assisting Surgeon: None    Pre-op Diagnosis:  Gangrene of toe of right foot [I96]    Post-op Diagnosis:  Post-Op Diagnosis Codes:     * Gangrene of toe of right foot [I96]    Procedure(s) (LRB):  AMPUTATION, TOE; 3rd (Right)    Anesthesia: Local MAC    Description of the findings of the procedure(s): Gangrene of Rt. 3rd toe.    Estimated Blood Loss: < 0.5 mL         Specimens:   Specimen (12h ago, onward)    None            Discharge Note    OUTCOME: Patient tolerated treatment/procedure well without complication and is now ready for discharge.    DISPOSITION: Home or Self Care    FINAL DIAGNOSIS:  Gangrene, Rt. 3rd toe    FOLLOWUP: In clinic    DISCHARGE INSTRUCTIONS:    Discharge Procedure Orders   CRUTCHES FOR HOME USE     Order Specific Question Answer Comments   Type: Axillary    Height: 6' 2" (1.88 m)    Weight: 98 kg (216 lb)    Length of need (1-99 months): 1      Diet general     Diet general     Keep surgical extremity elevated     Ice to affected area     Call MD for:  temperature >100.4     Call MD for:  persistent nausea and vomiting     Call MD for:  severe uncontrolled pain     Call MD for:  difficulty breathing, headache or visual disturbances     Call MD for:  redness, tenderness, or signs of infection (pain, swelling, redness, odor or green/yellow discharge around incision site)     Call MD for:  hives     Call MD for:  persistent dizziness or light-headedness     Call MD for:  extreme fatigue     Call MD for:     Leave dressing on - Keep it clean, dry, and intact until clinic visit     Keep surgical extremity elevated     Ice to affected area     Call MD for:  temperature >100.4     Call MD for:  persistent nausea and vomiting     Call MD for:  severe uncontrolled pain     Call MD for:  difficulty breathing, headache or visual disturbances     Call MD for:  " redness, tenderness, or signs of infection (pain, swelling, redness, odor or green/yellow discharge around incision site)     Call MD for:  hives     Call MD for:  persistent dizziness or light-headedness     Call MD for:  extreme fatigue     Call MD for:     Leave dressing on - Keep it clean, dry, and intact until clinic visit     Weight bearing restrictions (specify)   Order Comments: Minimal weight bearing for transfers to and from restroom with use of crutches.        Clinical Reference Documents Added to Patient Instructions       Document    AFTER YOUR SURGERY: DISCHARGE INSTRUCTIONS (ENGLISH)

## 2020-07-03 NOTE — OP NOTE
Op Note:    Patient name: Hammad Douglas III  MRN: [id number]  Date of surgery: 7/2/20    Surgeon: Dr. Tati DPM    Preoperative diagnosis:  Gangrene of Rt. 3rd toe  Postoperative diagnosis: Same  Procedure: Amputation of 3rd toe, Rt.  Anesthesia: Local MAC  Hemostasis: Pneumatic tourniquet  Estimated blood loss: < 0.5mL  Specimen: 3rd toe, Rt.  Culture: None  Complications: None  Condition upon discharge: Stable    Procedure in detail: Under mild sedation, patient was brought into the OR and placed on the OR table in the supine position.  A pneumatic tourniquet was placed about the Rt. Ankle.  A timeout was then performed taking care to identify the correct patient, extremity, and procedure to which all present were in agreement.  Following IV sedation, a total of 13 mL of a 1:1 mixture of 1% lidocaine plain and 0.5% bupivicaine plain was infiltrated in a 3rd ray block.  The foot was then scrubbed, draped, and prepped in the usual aseptic manner.  An esmarch bandage was then used to exsanguinate the affected extremity.  The tourniquet was then inflated to 250 mmHg.       Attention was directed to the distal aspect of the Rt. 3rd toe where gangrene was noted from the tip of the digit with extension slightly proximal to the PIP joint.  A racquet shaped incision was then made, down to bone, slightly distal to the surgical neck of the 3rd metatarsal and extended distally. A towel clamp was then used to grasp the toe during the disarticulation from the metatarsophalangeal joint.  During disarticulation, care was taken to identify and retract all major neurovascular structures. All bleeders were cauterized as needed.  Upon disarticulating the toe, the specimen was sent to pathology.  The wound was then re-inspected for bleeders with subsequent hemostasis provided with bovi electrocautery.  Attention was then directed to the exposed metatarsal head which was noted to be normal in both coloration and consistency.  Subcutaneous  and deep tissues were then re-approximated with 3-0 and 4-0 vicryl. The skin was reapproximated and coapted with 3-0 prolene utilizing a combination of simple interrupted and horizontal mattress suture techniques. The tourniquet was then deflated with a prompt hyperemic response to all remaining digits.       Following the procedure, the incision site was covered with xeroform.  A football dressing was then applied.  Upon completion, the patient was transported to recovery where vitals and vascular status were noted to be intact.  He was also noted to have tolerated the procedure and anesthesia quite well.  After a period of postoperative monitoring, the patient was discharged home with the following verbal and written instructions:     1. Keep dressings, clean, dry, and intact to surgical extremity.  2. Rest, ice, and elevate the surgical extremity.  3. Weight bearing to heel of surgical extremity in postoperative shoe for transfers only.  Crutches provided to aid in ambulation.  4. Take all medication as discussed at discharge.  5. Contact the clinic with any postoperative concerns or complications.  6. Follow up in one week for 1st post op.    Caleb Duffy DPM

## 2020-07-05 LAB — HEMOCCULT STL QL IA: NEGATIVE

## 2020-07-07 ENCOUNTER — PATIENT MESSAGE (OUTPATIENT)
Dept: PODIATRY | Facility: CLINIC | Age: 65
End: 2020-07-07

## 2020-07-07 NOTE — TELEPHONE ENCOUNTER
Called pt and gave instruction per Dr. Duffy. Wear surgical shoe and ambulate to kitchen and bathroom only. Stay off foot as much as possible       Hammad Douglas III, Joshua, DPM 20 minutes ago (10:20 AM)        Been in bed since Thursday foot elevated   After 5 days, tomorrow do I put shoe on and get out of bed, do I put any wait on shoe or use crutches.      Looking forward to Thursday to see how it looks  Keaton

## 2020-07-08 DIAGNOSIS — E10.43 TYPE 1 DIABETES MELLITUS WITH DIABETIC AUTONOMIC NEUROPATHY: ICD-10-CM

## 2020-07-08 RX ORDER — INSULIN ASPART 100 [IU]/ML
INJECTION, SOLUTION INTRAVENOUS; SUBCUTANEOUS
Qty: 3 BOX | Refills: 3 | Status: SHIPPED | OUTPATIENT
Start: 2020-07-08 | End: 2021-02-17

## 2020-07-08 RX ORDER — INSULIN LISPRO 100 [IU]/ML
INJECTION, SOLUTION INTRAVENOUS; SUBCUTANEOUS
Qty: 3 BOX | Refills: 3 | Status: SHIPPED | OUTPATIENT
Start: 2020-07-08 | End: 2021-02-17

## 2020-07-09 ENCOUNTER — OFFICE VISIT (OUTPATIENT)
Dept: PODIATRY | Facility: CLINIC | Age: 65
End: 2020-07-09
Payer: COMMERCIAL

## 2020-07-09 VITALS — WEIGHT: 216.06 LBS | BODY MASS INDEX: 27.73 KG/M2 | HEIGHT: 74 IN

## 2020-07-09 DIAGNOSIS — Z98.890 POSTOPERATIVE STATE: Primary | ICD-10-CM

## 2020-07-09 DIAGNOSIS — E10.42 TYPE 1 DIABETES MELLITUS WITH POLYNEUROPATHY: ICD-10-CM

## 2020-07-09 PROCEDURE — 99024 POSTOP FOLLOW-UP VISIT: CPT | Mod: S$GLB,,, | Performed by: PODIATRIST

## 2020-07-09 PROCEDURE — 99999 PR PBB SHADOW E&M-EST. PATIENT-LVL II: ICD-10-PCS | Mod: PBBFAC,,, | Performed by: PODIATRIST

## 2020-07-09 PROCEDURE — 99999 PR PBB SHADOW E&M-EST. PATIENT-LVL II: CPT | Mod: PBBFAC,,, | Performed by: PODIATRIST

## 2020-07-09 PROCEDURE — 99024 PR POST-OP FOLLOW-UP VISIT: ICD-10-PCS | Mod: S$GLB,,, | Performed by: PODIATRIST

## 2020-07-10 LAB
FINAL PATHOLOGIC DIAGNOSIS: NORMAL
GROSS: NORMAL

## 2020-07-15 ENCOUNTER — NURSE TRIAGE (OUTPATIENT)
Dept: ADMINISTRATIVE | Facility: CLINIC | Age: 65
End: 2020-07-15

## 2020-07-15 NOTE — TELEPHONE ENCOUNTER
Spoke with patient on behalf of post procedural symptom tracker. Pt denies difficulty breathing, cough or fever since procedure. Instructed to notify PCP or OOC if symptoms develop.    Reason for Disposition   Health Information question, no triage required and triager able to answer question    Additional Information   Negative: [1] Caller is not with the adult (patient) AND [2] reporting urgent symptoms   Negative: Lab result questions   Negative: Medication questions   Negative: Caller can't be reached by phone   Negative: Caller has already spoken to PCP or another triager   Negative: RN needs further essential information from caller in order to complete triage   Negative: Requesting regular office appointment   Negative: [1] Caller requesting NON-URGENT health information AND [2] PCP's office is the best resource    Protocols used: INFORMATION ONLY CALL - NO TRIAGE-A-

## 2020-07-16 ENCOUNTER — OFFICE VISIT (OUTPATIENT)
Dept: PODIATRY | Facility: CLINIC | Age: 65
End: 2020-07-16
Payer: COMMERCIAL

## 2020-07-16 VITALS — WEIGHT: 216.06 LBS | HEIGHT: 74 IN | BODY MASS INDEX: 27.73 KG/M2

## 2020-07-16 DIAGNOSIS — L97.511 DIABETIC ULCER OF TOE OF RIGHT FOOT ASSOCIATED WITH TYPE 1 DIABETES MELLITUS, LIMITED TO BREAKDOWN OF SKIN: ICD-10-CM

## 2020-07-16 DIAGNOSIS — E10.42 TYPE 1 DIABETES MELLITUS WITH POLYNEUROPATHY: ICD-10-CM

## 2020-07-16 DIAGNOSIS — Z98.890 POSTOPERATIVE STATE: Primary | ICD-10-CM

## 2020-07-16 DIAGNOSIS — E10.621 DIABETIC ULCER OF TOE OF RIGHT FOOT ASSOCIATED WITH TYPE 1 DIABETES MELLITUS, LIMITED TO BREAKDOWN OF SKIN: ICD-10-CM

## 2020-07-16 PROCEDURE — 99024 POSTOP FOLLOW-UP VISIT: CPT | Mod: S$GLB,,, | Performed by: PODIATRIST

## 2020-07-16 PROCEDURE — 99024 PR POST-OP FOLLOW-UP VISIT: ICD-10-PCS | Mod: S$GLB,,, | Performed by: PODIATRIST

## 2020-07-16 PROCEDURE — 99999 PR PBB SHADOW E&M-EST. PATIENT-LVL II: ICD-10-PCS | Mod: PBBFAC,,, | Performed by: PODIATRIST

## 2020-07-16 PROCEDURE — 99999 PR PBB SHADOW E&M-EST. PATIENT-LVL II: CPT | Mod: PBBFAC,,, | Performed by: PODIATRIST

## 2020-07-16 NOTE — PROGRESS NOTES
Subjective:      Patient ID: Hammad Douglas III is a 65 y.o. male.    Chief Complaint: Post-op Evaluation  Patient presents to clinic 2 weeks S/P amputation of the Rt. 3rd toe.  Denies pain from the amputation site with today's exam.  Has kept the surgical bandage clean, dry, and intact x 1 week.  Relates consistent elevation and rest of the surgical extremity.  Notes utilizing crutches for transfers along with light pressure applied to the heel.  Denies having N/V/F/C/D.  Denies any additional pedal complaints.    Past Medical History:   Diagnosis Date    Cataract     Chronic kidney disease     Diabetes mellitus type I     Diagnosed at age 19    Diabetic retinopathy     See's Dr. Ledesma    Hyperlipidemia     Hypertension        Past Surgical History:   Procedure Laterality Date    COLONOSCOPY      2011 wnl    TOE AMPUTATION Right 7/2/2020    Procedure: AMPUTATION, TOE; 3rd;  Surgeon: Caleb Duffy DPM;  Location: [facility];  Service: Podiatry;  Laterality: Right;    TOE SURGERY Right     right big toe       Family History   Problem Relation Age of Onset    Cataracts Mother     Breast cancer Sister        Social History     Socioeconomic History    Marital status:      Spouse name: Not on file    Number of children: Not on file    Years of education: Not on file    Highest education level: Not on file   Occupational History    Not on file   Social Needs    Financial resource strain: Not on file    Food insecurity     Worry: Not on file     Inability: Not on file    Transportation needs     Medical: Not on file     Non-medical: Not on file   Tobacco Use    Smoking status: Former Smoker     Types: Cigars    Smokeless tobacco: Never Used    Tobacco comment: Former cigar use   Substance and Sexual Activity    Alcohol use: Yes     Alcohol/week: 2.0 standard drinks     Types: 2 Cans of beer per week    Drug use: No    Sexual activity: Yes     Partners: Female   Lifestyle    Physical  activity     Days per week: Not on file     Minutes per session: Not on file    Stress: Not on file   Relationships    Social connections     Talks on phone: Not on file     Gets together: Not on file     Attends Gnosticist service: Not on file     Active member of club or organization: Not on file     Attends meetings of clubs or organizations: Not on file     Relationship status: Not on file   Other Topics Concern    Not on file   Social History Narrative    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 9 Device 3    blood-glucose transmitter (DEXCOM G6 TRANSMITTER) Jenni For continuous blood glucose monitoring. Change device every 90 days. 1 Device 3    FLUoxetine 20 MG capsule TAKE 1 CAPSULE(20 MG) BY MOUTH EVERY DAY 90 capsule 3    GLUCAGEN HYPOKIT 1 mg SolR Inject 1 mg into the muscle as needed (for severe hypoglycemia). 1 each 0    HYDROcodone-acetaminophen (NORCO) 5-325 mg per tablet Take 1 tablet by mouth every 6 (six) hours as needed for Pain. 28 tablet 0    insulin aspart U-100 (NOVOLOG FLEXPEN U-100 INSULIN) 100 unit/mL (3 mL) InPn pen INJECT USING INSULIN:CARB RATIO OF 1:6 SUBCUTANEOUSLY PRIOR TO MEALS. MAX TDD 40 UNITS 3 Box 3    insulin glargine U-300 conc (TOUJEO MAX U-300 SOLOSTAR) 300 unit/mL (3 mL) InPn Inject 24 Units into the skin every evening. (Patient taking differently: Inject 28 Units into the skin every evening. ) 4.5 mL 6    insulin lispro (HUMALOG KWIKPEN INSULIN) 100 unit/mL pen INJECT USING INSULIN:CARB RATIO OF 1:6 SUBCUTANEOUSLY PRIOR TO MEALS. MAX TDD 40 UNITS 3 Box 3    losartan (COZAAR) 100 MG tablet TAKE 1 TABLET(100 MG) BY MOUTH EVERY DAY 90 tablet 3    multivitamin (ONE DAILY MULTIVITAMIN) per tablet Take 1 tablet by  "mouth once daily.      pen needle, diabetic (PEN NEEDLE) 31 gauge x 1/4" Ndle 1 each by Misc.(Non-Drug; Combo Route) route 4 (four) times daily. 400 each 3    blood sugar diagnostic (ONETOUCH ULTRA TEST) Strp 1 strip by Other route 4 (four) times daily. (Patient not taking: Reported on 6/15/2020) 400 strip 3     Current Facility-Administered Medications   Medication Dose Route Frequency Provider Last Rate Last Dose    glucagon (human recombinant) injection 1 mg  1 mg Intramuscular Once Tania Mckeon NP         Facility-Administered Medications Ordered in Other Visits   Medication Dose Route Frequency Provider Last Rate Last Dose    electrolyte-S (ISOLYTE)   Intravenous Continuous Jostin Resendiz MD        lactated ringers infusion   Intravenous Continuous Jostin Resendiz MD 10 mL/hr at 07/02/20 1232      lidocaine (PF) 10 mg/ml (1%) injection 10 mg  1 mL Intradermal Once Jostin Resendiz MD           Review of patient's allergies indicates:   Allergen Reactions    Altace [ramipril]      Cough         Review of Systems   Constitution: Negative for chills and fever.   Cardiovascular: Negative for claudication and leg swelling.   Skin: Negative for color change, dry skin and poor wound healing.   Musculoskeletal: Negative for joint pain, joint swelling, muscle cramps and muscle weakness.   Neurological: Positive for numbness.   Psychiatric/Behavioral: Negative for altered mental status.           Objective:      Physical Exam   Constitutional: He appears well-developed and well-nourished. No distress.   Cardiovascular:   Pulses:       Dorsalis pedis pulses are 2+ on the right side, and 2+ on the left side.        Posterior tibial pulses are 1+ on the right side, and 1+ on the left side.   CFT is < 3 seconds bilateral.  Pedal hair growth is present bilateral.  Varicosities noted bilateral.  No lower extremity edema noted bilateral.  Toes are warm to touch bilateral.     Musculoskeletal: He exhibits no " edema or tenderness.   Muscle strength 5/5 in all muscle groups bilateral.  No tenderness nor crepitation with ROM of foot/ankle joints bilateral.  No tenderness with palpation of bilateral foot and ankle.  Semi-reducible contracture of the lesser digits bilateral.     Neurological: He has normal strength. A sensory deficit is present.   Protective sensation per [place] monofilament is decreased bilateral.  Light touch is intact bilateral.   Skin: Skin is warm and dry. Capillary refill takes less than 2 seconds. Lesion noted. No abrasion, no burn, no ecchymosis, no laceration and no petechiae noted. He is not diaphoretic.   Incision site from the Rt. 3rd toe amputation is well approximated with all suture intact.  There is no evidence of ischemia, dehiscence, necrosis, or infection the length of the incision.    Open wound noted to the medial aspect of the Rt. Great toe.  Wound base is down to dermis and comprised of granulation.  Skylar wound is devoid of erythema, edema, fluctuance, purulence, and malodor.  Measures 0.1 x 0.1 x 0.1cm                 Assessment:       Encounter Diagnoses   Name Primary?    Postoperative state Yes    Type 1 diabetes mellitus with polyneuropathy     Diabetic ulcer of toe of right foot associated with type 1 diabetes mellitus, limited to breakdown of skin          Plan:       Hammad was seen today for post-op evaluation.    Diagnoses and all orders for this visit:    Postoperative state    Type 1 diabetes mellitus with polyneuropathy    Diabetic ulcer of toe of right foot associated with type 1 diabetes mellitus, limited to breakdown of skin      I counseled the patient on his conditions, their implications and medical management.    Overall, satisfactory postoperative results noted.  Incision site is well maintained and devoid of postoperative infection.     With the patient's verbal consent, a sterile suture removal kit was used to remove all sutures from the amputation  site without incident.  Patient tolerated this quite well.    The incision site was painted with betadine, and the Rt. Great toe ulcer was packed with sawyer.  A football dressing was then applied.    May ambulate to tolerance in the postoperative shoe.    To keep today's dressing clean, dry, and intact x 1 week.    RTC in 1 week for 3rd postop check.    Caleb Duffy DPM

## 2020-07-17 ENCOUNTER — PATIENT MESSAGE (OUTPATIENT)
Dept: ENDOCRINOLOGY | Facility: CLINIC | Age: 65
End: 2020-07-17

## 2020-07-21 ENCOUNTER — TELEPHONE (OUTPATIENT)
Dept: ENDOCRINOLOGY | Facility: CLINIC | Age: 65
End: 2020-07-21

## 2020-07-21 ENCOUNTER — PATIENT MESSAGE (OUTPATIENT)
Dept: ENDOCRINOLOGY | Facility: CLINIC | Age: 65
End: 2020-07-21

## 2020-07-21 NOTE — TELEPHONE ENCOUNTER
S/w Hanna regarding DME that pts insurance will cover for Dexcom. She will send me an e-mail w/info

## 2020-07-23 ENCOUNTER — OFFICE VISIT (OUTPATIENT)
Dept: PODIATRY | Facility: CLINIC | Age: 65
End: 2020-07-23
Payer: COMMERCIAL

## 2020-07-23 VITALS — BODY MASS INDEX: 27.73 KG/M2 | HEIGHT: 74 IN | WEIGHT: 216.06 LBS

## 2020-07-23 DIAGNOSIS — E10.621 DIABETIC ULCER OF TOE OF RIGHT FOOT ASSOCIATED WITH TYPE 1 DIABETES MELLITUS, LIMITED TO BREAKDOWN OF SKIN: ICD-10-CM

## 2020-07-23 DIAGNOSIS — E10.42 TYPE 1 DIABETES MELLITUS WITH POLYNEUROPATHY: ICD-10-CM

## 2020-07-23 DIAGNOSIS — L97.511 DIABETIC ULCER OF TOE OF RIGHT FOOT ASSOCIATED WITH TYPE 1 DIABETES MELLITUS, LIMITED TO BREAKDOWN OF SKIN: ICD-10-CM

## 2020-07-23 DIAGNOSIS — Z98.890 POSTOPERATIVE STATE: Primary | ICD-10-CM

## 2020-07-23 PROCEDURE — 99024 PR POST-OP FOLLOW-UP VISIT: ICD-10-PCS | Mod: S$GLB,,, | Performed by: PODIATRIST

## 2020-07-23 PROCEDURE — 99024 POSTOP FOLLOW-UP VISIT: CPT | Mod: S$GLB,,, | Performed by: PODIATRIST

## 2020-07-23 PROCEDURE — 99999 PR PBB SHADOW E&M-EST. PATIENT-LVL II: ICD-10-PCS | Mod: PBBFAC,,, | Performed by: PODIATRIST

## 2020-07-23 PROCEDURE — 99999 PR PBB SHADOW E&M-EST. PATIENT-LVL II: CPT | Mod: PBBFAC,,, | Performed by: PODIATRIST

## 2020-07-23 NOTE — PROGRESS NOTES
Subjective:      Patient ID: Hammad Douglas III is a 65 y.o. male.    Chief Complaint: Post-op Evaluation  Patient presents to clinic 3 weeks S/P amputation of the Rt. 3rd toe.  Denies pain from the amputation site with today's exam.  Has kept the surgical bandage clean, dry, and intact x 1 week.  Relates consistent elevation and rest of the surgical extremity.  Notes weight bearing to tolerance in the postoperative shoe.   Denies having N/V/F/C/D.  Denies any additional pedal complaints.    Past Medical History:   Diagnosis Date    Cataract     Chronic kidney disease     Diabetes mellitus type I     Diagnosed at age 19    Diabetic retinopathy     See's Dr. Ledesma    Hyperlipidemia     Hypertension        Past Surgical History:   Procedure Laterality Date    COLONOSCOPY      2011 wnl    TOE AMPUTATION Right 7/2/2020    Procedure: AMPUTATION, TOE; 3rd;  Surgeon: Caleb Duffy DPM;  Location: [facility];  Service: Podiatry;  Laterality: Right;    TOE SURGERY Right     right big toe       Family History   Problem Relation Age of Onset    Cataracts Mother     Breast cancer Sister        Social History     Socioeconomic History    Marital status:      Spouse name: Not on file    Number of children: Not on file    Years of education: Not on file    Highest education level: Not on file   Occupational History    Not on file   Social Needs    Financial resource strain: Not on file    Food insecurity     Worry: Not on file     Inability: Not on file    Transportation needs     Medical: Not on file     Non-medical: Not on file   Tobacco Use    Smoking status: Former Smoker     Types: Cigars    Smokeless tobacco: Never Used    Tobacco comment: Former cigar use   Substance and Sexual Activity    Alcohol use: Yes     Alcohol/week: 2.0 standard drinks     Types: 2 Cans of beer per week    Drug use: No    Sexual activity: Yes     Partners: Female   Lifestyle    Physical activity     Days per week: Not  on file     Minutes per session: Not on file    Stress: Not on file   Relationships    Social connections     Talks on phone: Not on file     Gets together: Not on file     Attends Yarsanism service: Not on file     Active member of club or organization: Not on file     Attends meetings of clubs or organizations: Not on file     Relationship status: Not on file   Other Topics Concern    Not on file   Social History Narrative    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 9 Device 3    blood-glucose transmitter (DEXCOM G6 TRANSMITTER) Jenni For continuous blood glucose monitoring. Change device every 90 days. 1 Device 3    FLUoxetine 20 MG capsule TAKE 1 CAPSULE(20 MG) BY MOUTH EVERY DAY 90 capsule 3    GLUCAGEN HYPOKIT 1 mg SolR Inject 1 mg into the muscle as needed (for severe hypoglycemia). 1 each 0    HYDROcodone-acetaminophen (NORCO) 5-325 mg per tablet Take 1 tablet by mouth every 6 (six) hours as needed for Pain. 28 tablet 0    insulin aspart U-100 (NOVOLOG FLEXPEN U-100 INSULIN) 100 unit/mL (3 mL) InPn pen INJECT USING INSULIN:CARB RATIO OF 1:6 SUBCUTANEOUSLY PRIOR TO MEALS. MAX TDD 40 UNITS 3 Box 3    insulin glargine U-300 conc (TOUJEO MAX U-300 SOLOSTAR) 300 unit/mL (3 mL) InPn Inject 24 Units into the skin every evening. (Patient taking differently: Inject 28 Units into the skin every evening. ) 4.5 mL 6    insulin lispro (HUMALOG KWIKPEN INSULIN) 100 unit/mL pen INJECT USING INSULIN:CARB RATIO OF 1:6 SUBCUTANEOUSLY PRIOR TO MEALS. MAX TDD 40 UNITS 3 Box 3    losartan (COZAAR) 100 MG tablet TAKE 1 TABLET(100 MG) BY MOUTH EVERY DAY 90 tablet 3    multivitamin (ONE DAILY MULTIVITAMIN) per tablet Take 1 tablet by mouth once daily.      pen  "needle, diabetic (PEN NEEDLE) 31 gauge x 1/4" Ndle 1 each by Misc.(Non-Drug; Combo Route) route 4 (four) times daily. 400 each 3    blood sugar diagnostic (ONETOUCH ULTRA TEST) Strp 1 strip by Other route 4 (four) times daily. (Patient not taking: Reported on 6/15/2020) 400 strip 3     Current Facility-Administered Medications   Medication Dose Route Frequency Provider Last Rate Last Dose    glucagon (human recombinant) injection 1 mg  1 mg Intramuscular Once Tania Mckeon NP         Facility-Administered Medications Ordered in Other Visits   Medication Dose Route Frequency Provider Last Rate Last Dose    electrolyte-S (ISOLYTE)   Intravenous Continuous Jostin Resendiz MD        lactated ringers infusion   Intravenous Continuous Jostin Resendiz MD 10 mL/hr at 07/02/20 1232      lidocaine (PF) 10 mg/ml (1%) injection 10 mg  1 mL Intradermal Once Jostin Resendiz MD           Review of patient's allergies indicates:   Allergen Reactions    Altace [ramipril]      Cough         Review of Systems   Constitution: Negative for chills and fever.   Cardiovascular: Negative for claudication and leg swelling.   Skin: Negative for color change, dry skin and poor wound healing.   Musculoskeletal: Negative for joint pain, joint swelling, muscle cramps and muscle weakness.   Neurological: Positive for numbness.   Psychiatric/Behavioral: Negative for altered mental status.           Objective:      Physical Exam   Constitutional: He appears well-developed and well-nourished. No distress.   Cardiovascular:   Pulses:       Dorsalis pedis pulses are 2+ on the right side, and 2+ on the left side.        Posterior tibial pulses are 1+ on the right side, and 1+ on the left side.   CFT is < 3 seconds bilateral.  Pedal hair growth is present bilateral.  Varicosities noted bilateral.  No lower extremity edema noted bilateral.  Toes are warm to touch bilateral.     Musculoskeletal: He exhibits no edema or tenderness.   Muscle " strength 5/5 in all muscle groups bilateral.  No tenderness nor crepitation with ROM of foot/ankle joints bilateral.  No tenderness with palpation of bilateral foot and ankle.  Semi-reducible contracture of the lesser digits bilateral.  Rt. 3rd toe amputation.     Neurological: He has normal strength. A sensory deficit is present.   Protective sensation per [place] monofilament is decreased bilateral.  Light touch is intact bilateral.   Skin: Skin is warm and dry. Capillary refill takes less than 2 seconds. Lesion noted. No abrasion, no burn, no ecchymosis, no laceration and no petechiae noted. He is not diaphoretic.   Incision site from the Rt. 3rd toe amputation is well healed.  There is no evidence of ischemia, dehiscence, necrosis, or infection the length of the incision.    Open wound noted to the medial aspect of the Rt. Great toe.  Wound base is down to dermis and comprised of granulation.  Skylar wound is devoid of erythema, edema, fluctuance, purulence, and malodor.  Measures 0.1 x 0.1 x 0.1cm                 Assessment:       Encounter Diagnoses   Name Primary?    Postoperative state Yes    Diabetic ulcer of toe of right foot associated with type 1 diabetes mellitus, limited to breakdown of skin     Type 1 diabetes mellitus with polyneuropathy          Plan:       Hammad was seen today for post-op evaluation.    Diagnoses and all orders for this visit:    Postoperative state    Diabetic ulcer of toe of right foot associated with type 1 diabetes mellitus, limited to breakdown of skin    Type 1 diabetes mellitus with polyneuropathy      I counseled the patient on his conditions, their implications and medical management.    Overall, satisfactory postoperative results noted.  Incision site is well maintained and devoid of postoperative infection.     The Rt. Medial hallux wound was covered with neosporin and a bandage.  Site is almost fully epithelialized with today's exam.    To continue applying a  thin layer of neosporin and a bandage to the Rt. Great toe wound daily for one final week.    Discouraged from soaking or scrubbing the affected extremity x 2 weeks, however, may resume his normal showering routine.    May resume normal physical activity in two weeks.      May transition back into casual shoe gear.    RTC in 3 months for a routine exam.    Caleb Duffy DPM

## 2020-07-27 ENCOUNTER — LAB VISIT (OUTPATIENT)
Dept: LAB | Facility: HOSPITAL | Age: 65
End: 2020-07-27
Attending: NURSE PRACTITIONER
Payer: COMMERCIAL

## 2020-07-27 DIAGNOSIS — R53.83 FATIGUE, UNSPECIFIED TYPE: ICD-10-CM

## 2020-07-27 DIAGNOSIS — Z00.00 PREVENTATIVE HEALTH CARE: ICD-10-CM

## 2020-07-27 DIAGNOSIS — E10.43 TYPE 1 DIABETES MELLITUS WITH DIABETIC AUTONOMIC NEUROPATHY: ICD-10-CM

## 2020-07-27 PROCEDURE — 83036 HEMOGLOBIN GLYCOSYLATED A1C: CPT

## 2020-07-27 PROCEDURE — 86703 HIV-1/HIV-2 1 RESULT ANTBDY: CPT

## 2020-07-27 PROCEDURE — 82607 VITAMIN B-12: CPT

## 2020-07-27 PROCEDURE — 36415 COLL VENOUS BLD VENIPUNCTURE: CPT | Mod: PN

## 2020-07-27 PROCEDURE — 86803 HEPATITIS C AB TEST: CPT

## 2020-07-27 PROCEDURE — 80061 LIPID PANEL: CPT

## 2020-07-27 PROCEDURE — 80053 COMPREHEN METABOLIC PANEL: CPT

## 2020-07-28 LAB
ALBUMIN SERPL BCP-MCNC: 3.7 G/DL (ref 3.5–5.2)
ALP SERPL-CCNC: 87 U/L (ref 55–135)
ALT SERPL W/O P-5'-P-CCNC: 19 U/L (ref 10–44)
ANION GAP SERPL CALC-SCNC: 11 MMOL/L (ref 8–16)
AST SERPL-CCNC: 21 U/L (ref 10–40)
BILIRUB SERPL-MCNC: 0.4 MG/DL (ref 0.1–1)
BUN SERPL-MCNC: 24 MG/DL (ref 8–23)
CALCIUM SERPL-MCNC: 9.9 MG/DL (ref 8.7–10.5)
CHLORIDE SERPL-SCNC: 104 MMOL/L (ref 95–110)
CHOLEST SERPL-MCNC: 154 MG/DL (ref 120–199)
CHOLEST/HDLC SERPL: 3.3 {RATIO} (ref 2–5)
CO2 SERPL-SCNC: 25 MMOL/L (ref 23–29)
CREAT SERPL-MCNC: 1.4 MG/DL (ref 0.5–1.4)
EST. GFR  (AFRICAN AMERICAN): >60 ML/MIN/1.73 M^2
EST. GFR  (NON AFRICAN AMERICAN): 53.1 ML/MIN/1.73 M^2
ESTIMATED AVG GLUCOSE: 169 MG/DL (ref 68–131)
GLUCOSE SERPL-MCNC: 75 MG/DL (ref 70–110)
HBA1C MFR BLD HPLC: 7.5 % (ref 4–5.6)
HDLC SERPL-MCNC: 46 MG/DL (ref 40–75)
HDLC SERPL: 29.9 % (ref 20–50)
HIV 1+2 AB+HIV1 P24 AG SERPL QL IA: NEGATIVE
LDLC SERPL CALC-MCNC: 95.8 MG/DL (ref 63–159)
NONHDLC SERPL-MCNC: 108 MG/DL
POTASSIUM SERPL-SCNC: 4.4 MMOL/L (ref 3.5–5.1)
PROT SERPL-MCNC: 7.1 G/DL (ref 6–8.4)
SODIUM SERPL-SCNC: 140 MMOL/L (ref 136–145)
TRIGL SERPL-MCNC: 61 MG/DL (ref 30–150)
VIT B12 SERPL-MCNC: 1382 PG/ML (ref 210–950)

## 2020-07-29 LAB — HCV AB SERPL QL IA: NEGATIVE

## 2020-07-30 ENCOUNTER — PATIENT OUTREACH (OUTPATIENT)
Dept: ADMINISTRATIVE | Facility: OTHER | Age: 65
End: 2020-07-30

## 2020-08-03 ENCOUNTER — OFFICE VISIT (OUTPATIENT)
Dept: ENDOCRINOLOGY | Facility: CLINIC | Age: 65
End: 2020-08-03
Payer: COMMERCIAL

## 2020-08-03 VITALS
HEIGHT: 74 IN | BODY MASS INDEX: 27.47 KG/M2 | DIASTOLIC BLOOD PRESSURE: 60 MMHG | HEART RATE: 90 BPM | WEIGHT: 214.06 LBS | SYSTOLIC BLOOD PRESSURE: 110 MMHG

## 2020-08-03 DIAGNOSIS — E10.21 TYPE 1 DIABETES MELLITUS WITH DIABETIC NEPHROPATHY: ICD-10-CM

## 2020-08-03 DIAGNOSIS — E10.649 HYPOGLYCEMIA UNAWARENESS IN TYPE 1 DIABETES MELLITUS: ICD-10-CM

## 2020-08-03 DIAGNOSIS — E10.43 TYPE 1 DIABETES MELLITUS WITH DIABETIC AUTONOMIC NEUROPATHY: Primary | ICD-10-CM

## 2020-08-03 DIAGNOSIS — I10 ESSENTIAL HYPERTENSION: ICD-10-CM

## 2020-08-03 DIAGNOSIS — E78.5 HYPERLIPIDEMIA, UNSPECIFIED HYPERLIPIDEMIA TYPE: ICD-10-CM

## 2020-08-03 PROCEDURE — 99214 PR OFFICE/OUTPT VISIT, EST, LEVL IV, 30-39 MIN: ICD-10-PCS | Mod: S$GLB,,, | Performed by: NURSE PRACTITIONER

## 2020-08-03 PROCEDURE — 3051F HG A1C>EQUAL 7.0%<8.0%: CPT | Mod: CPTII,S$GLB,, | Performed by: NURSE PRACTITIONER

## 2020-08-03 PROCEDURE — 99999 PR PBB SHADOW E&M-EST. PATIENT-LVL IV: ICD-10-PCS | Mod: PBBFAC,,, | Performed by: NURSE PRACTITIONER

## 2020-08-03 PROCEDURE — 3008F PR BODY MASS INDEX (BMI) DOCUMENTED: ICD-10-PCS | Mod: CPTII,S$GLB,, | Performed by: NURSE PRACTITIONER

## 2020-08-03 PROCEDURE — 3078F DIAST BP <80 MM HG: CPT | Mod: CPTII,S$GLB,, | Performed by: NURSE PRACTITIONER

## 2020-08-03 PROCEDURE — 3074F PR MOST RECENT SYSTOLIC BLOOD PRESSURE < 130 MM HG: ICD-10-PCS | Mod: CPTII,S$GLB,, | Performed by: NURSE PRACTITIONER

## 2020-08-03 PROCEDURE — 3051F PR MOST RECENT HEMOGLOBIN A1C LEVEL 7.0 - < 8.0%: ICD-10-PCS | Mod: CPTII,S$GLB,, | Performed by: NURSE PRACTITIONER

## 2020-08-03 PROCEDURE — 99999 PR PBB SHADOW E&M-EST. PATIENT-LVL IV: CPT | Mod: PBBFAC,,, | Performed by: NURSE PRACTITIONER

## 2020-08-03 PROCEDURE — 99214 OFFICE O/P EST MOD 30 MIN: CPT | Mod: S$GLB,,, | Performed by: NURSE PRACTITIONER

## 2020-08-03 PROCEDURE — 3074F SYST BP LT 130 MM HG: CPT | Mod: CPTII,S$GLB,, | Performed by: NURSE PRACTITIONER

## 2020-08-03 PROCEDURE — 3078F PR MOST RECENT DIASTOLIC BLOOD PRESSURE < 80 MM HG: ICD-10-PCS | Mod: CPTII,S$GLB,, | Performed by: NURSE PRACTITIONER

## 2020-08-03 PROCEDURE — 3008F BODY MASS INDEX DOCD: CPT | Mod: CPTII,S$GLB,, | Performed by: NURSE PRACTITIONER

## 2020-08-03 RX ORDER — BLOOD-GLUCOSE SENSOR
EACH MISCELLANEOUS
Qty: 9 DEVICE | Refills: 3 | Status: SHIPPED | OUTPATIENT
Start: 2020-08-03 | End: 2020-08-03 | Stop reason: ALTCHOICE

## 2020-08-03 RX ORDER — BLOOD-GLUCOSE TRANSMITTER
EACH MISCELLANEOUS
Qty: 1 DEVICE | Refills: 3 | Status: SHIPPED | OUTPATIENT
Start: 2020-08-03 | End: 2020-08-03 | Stop reason: ALTCHOICE

## 2020-08-03 NOTE — PROGRESS NOTES
CC: Mr. Hammad Douglas III arrives today for management of Type 1  DM and review of chronic medical conditions, as listed in the visit diagnosis section of this encounter.     HPI: Mr. Hammad Douglas III was diagnosed with Type 1  DM at age 19 after a viral illness - had polyuria, polydipsia at this time.  Initial treatment consisted of insulin. Denies FH of DM. Reports past hospitalizations due to DKA > 30 years ago. Not recently.   Has diagnosis of hypoglycemia unawareness with past severe hypoglycemic episodes requiring 3rd party assistance. Began using Dexcom G5 in .      Patient was last seen by me in May over video visit.     He is taking care of his father, who recently fell.     He has not been able to get his Dexcom G6 supplies recently.     He states that he has been taking 10 units with lunch, regardless of what he eats. This is a decent sized meal.    BG monitoring 4x/day.   Fastin-130  Lunch: 140  Dinner: 90s  Bedtime: 130    Hypoglycemia: Rare  Hypoglycemic Symptoms: often doesn't have symptoms.   Hypoglycemia Treatment: Juice or glucose tabs. Has glucagon.     Missing Insulin/PO medication doses: No  Timing prandial insulin 5-15 minutes before meals: yes    Exercise: active around the house/yard    Dietary Habits: Eats 3 meals/day. Avoids snacking. Avoids sugary beverages.     Last DM education appointment: 2017    He recently had R third toe amputated. He was recently evaluated by Podiatry and is healing well.       CURRENT DIABETIC MEDS:  Toujeo Max 28 units QHS, Humalog CHO ratio 1:7, correction target 150, ISF 50 (average dose 10 units)  Vial or pen: pen  Glucometer type: Prodigy?    Previous insulins:  Lantus    Last Eye Exam: 2019, + proliferative DR. + R cataract.   Last Podiatry Exam: 2020. S/p amputation of R third toe    REVIEW OF SYSTEMS  Constitutional: no c/o weakness, fatigue. + 6 # weight loss.   Eyes: reports visual disturbances that he attributes to cataract  "  Cardiac: no palpitations or chest pain.  Respiratory: no cough or dyspnea.   GI:  no c/o abdominal pain or nausea.   Skin: no rashes, lesions  Neuro: no numbness, tingling, or parasthesias.  Endocrine: denies polyphagia, polydipsia, polyuria    Personally reviewed Past Medical, Surgical, Social History.    Vital Signs  /60   Pulse 90   Ht 6' 2" (1.88 m)   Wt 97.1 kg (214 lb 1.1 oz)   BMI 27.48 kg/m²      Personally reviewed the below labs:    Hemoglobin A1C   Date Value Ref Range Status   07/27/2020 7.5 (H) 4.0 - 5.6 % Final     Comment:     ADA Screening Guidelines:  5.7-6.4%  Consistent with prediabetes  >or=6.5%  Consistent with diabetes  High levels of fetal hemoglobin interfere with the HbA1C  assay. Heterozygous hemoglobin variants (HbS, HgC, etc)do  not significantly interfere with this assay.   However, presence of multiple variants may affect accuracy.     05/20/2020 7.6 (H) 4.0 - 5.6 % Final     Comment:     ADA Screening Guidelines:  5.7-6.4%  Consistent with prediabetes  >or=6.5%  Consistent with diabetes  High levels of fetal hemoglobin interfere with the HbA1C  assay. Heterozygous hemoglobin variants (HbS, HgC, etc)do  not significantly interfere with this assay.   However, presence of multiple variants may affect accuracy.     09/24/2019 8.7 (H) 4.0 - 5.6 % Final     Comment:     ADA Screening Guidelines:  5.7-6.4%  Consistent with prediabetes  >or=6.5%  Consistent with diabetes  High levels of fetal hemoglobin interfere with the HbA1C  assay. Heterozygous hemoglobin variants (HbS, HgC, etc)do  not significantly interfere with this assay.   However, presence of multiple variants may affect accuracy.         Chemistry        Component Value Date/Time     07/27/2020 0710    K 4.4 07/27/2020 0710     07/27/2020 0710    CO2 25 07/27/2020 0710    BUN 24 (H) 07/27/2020 0710    CREATININE 1.4 07/27/2020 0710    GLU 75 07/27/2020 0710        Component Value Date/Time    CALCIUM 9.9 " 07/27/2020 0710    ALKPHOS 87 07/27/2020 0710    AST 21 07/27/2020 0710    ALT 19 07/27/2020 0710    BILITOT 0.4 07/27/2020 0710    ESTGFRAFRICA >60.0 07/27/2020 0710    EGFRNONAA 53.1 (A) 07/27/2020 0710          Lab Results   Component Value Date    CHOL 154 07/27/2020    CHOL 163 06/26/2019    CHOL 172 08/06/2018     Lab Results   Component Value Date    HDL 46 07/27/2020    HDL 51 06/26/2019    HDL 48 08/06/2018     Lab Results   Component Value Date    LDLCALC 95.8 07/27/2020    LDLCALC 99.6 06/26/2019    LDLCALC 110.6 08/06/2018     Lab Results   Component Value Date    TRIG 61 07/27/2020    TRIG 62 06/26/2019    TRIG 67 08/06/2018     Lab Results   Component Value Date    CHOLHDL 29.9 07/27/2020    CHOLHDL 31.3 06/26/2019    CHOLHDL 27.9 08/06/2018       Lab Results   Component Value Date    MICALBCREAT 121.7 (H) 07/27/2020     Lab Results   Component Value Date    TSH 2.070 06/26/2019       CrCl cannot be calculated (Patient's most recent lab result is older than the maximum 7 days allowed.).    Vit D, 25-Hydroxy   Date Value Ref Range Status   08/06/2018 35 30 - 96 ng/mL Final     Comment:     Vitamin D deficiency.........<10 ng/mL                              Vitamin D insufficiency......10-29 ng/mL       Vitamin D sufficiency........> or equal to 30 ng/mL  Vitamin D toxicity............>100 ng/mL         PHYSICAL EXAMINATION  Constitutional: Appears well, no distress  Neck: Supple, trachea midline; no thyromegaly or nodules.   Respiratory: CTA, even and unlabored.   Cardiovascular: RRR, no murmurs, no carotid bruits. DP pulses  2+ bilaterally; no edema.    GI: active bowel sounds, no hernia  Skin: warm and dry; no lipohypertrophy, or acanthosis nigracans observed.   Neuro: DTR 1+ BUE/2+BLE.  Feet: appropriate footwear.      Dexcom download: Not in use  Average glucose:   Above goal:   Within goal:   Below goal:      A1c target < 7.5%, due to hypoglycemia unawareness      Assessment/Plan  1. Type 1  diabetes mellitus with diabetic autonomic neuropathy  -- stable.  Needs to resume Dexcom ASAP, due to hypoglycemia unawareness.  -- continue current insulin doses.  -- not interested in insulin pump.   -- until he can resume Dexcom, continue BG monitoring 4x/day.   -- will fax all paperwork to [employer]  -- schedule eye exam    -- Discussed diagnosis of DM, A1c goals, progression of disease, long term complications and tx options.    -- Reviewed hypoglycemia management: treat with 1/2 glass of juice, 1/2 can regular coke, or 4 glucose tablets. Monitor and repeat treatment every 15 minutes until BG is >70 Then have a snack, which includes a complex carbohydrate and protein.  Advised patient to check BG before activities, such as driving or exercise.   2. Type 1 diabetes mellitus with diabetic nephropathy  -- uncontrolled.   -- continue losartan   3. Proliferative diabetic retinopathy without macular edema associated with type 1 diabetes mellitus, unspecified laterality  -- stable  -- keep follow ups   4. Essential hypertension  -- controlled  -- continue current meds   5. Hyperlipidemia, unspecified hyperlipidemia type  -- controlled  -- continue atorvastatin   6. Hypoglycemia unawareness in Type 1 DM -- resume use of Dexcom.   -- has glucagon         FOLLOW UP   Follow up in about 3 months (around 11/3/2020).   Patient instructed to bring BG logs to each follow up   Patient encouraged to call for any BG/medication issues, concerns, or questions.     Orders Placed This Encounter   Procedures    Hemoglobin A1C    Basic metabolic panel

## 2020-08-10 ENCOUNTER — PATIENT OUTREACH (OUTPATIENT)
Dept: ADMINISTRATIVE | Facility: HOSPITAL | Age: 65
End: 2020-08-10

## 2020-08-19 ENCOUNTER — PATIENT MESSAGE (OUTPATIENT)
Dept: ENDOCRINOLOGY | Facility: CLINIC | Age: 65
End: 2020-08-19

## 2020-09-15 ENCOUNTER — PATIENT OUTREACH (OUTPATIENT)
Dept: ADMINISTRATIVE | Facility: OTHER | Age: 65
End: 2020-09-15

## 2020-09-15 NOTE — PROGRESS NOTES
Health Maintenance Due   Topic Date Due    TETANUS VACCINE  05/24/1975    Pneumococcal Vaccine (Medium Risk) (1 of 1 - PPSV23) 05/24/1976    Shingles Vaccine (1 of 2) 05/24/2007    Influenza Vaccine (1) 08/01/2020    Eye Exam  09/30/2020     Updates were requested from care everywhere.  Chart was reviewed for overdue Proactive [facility] Encounters ([facility]) topics (CRS, Breast Cancer Screening, Eye exam)  Health Maintenance has been updated.  [facility] immunization registry triggered.  Immunizations were reconciled.

## 2020-09-16 ENCOUNTER — OFFICE VISIT (OUTPATIENT)
Dept: OTOLARYNGOLOGY | Facility: CLINIC | Age: 65
End: 2020-09-16
Payer: COMMERCIAL

## 2020-09-16 ENCOUNTER — CLINICAL SUPPORT (OUTPATIENT)
Dept: AUDIOLOGY | Facility: CLINIC | Age: 65
End: 2020-09-16
Payer: COMMERCIAL

## 2020-09-16 VITALS — HEIGHT: 74 IN | WEIGHT: 214.06 LBS | BODY MASS INDEX: 27.47 KG/M2

## 2020-09-16 DIAGNOSIS — H93.12 LEFT-SIDED TINNITUS: ICD-10-CM

## 2020-09-16 DIAGNOSIS — H93.13 TINNITUS, BILATERAL: Primary | ICD-10-CM

## 2020-09-16 DIAGNOSIS — H91.22 SUDDEN IDIOPATHIC HEARING LOSS OF LEFT EAR WITH RESTRICTED HEARING OF RIGHT EAR: Primary | ICD-10-CM

## 2020-09-16 PROCEDURE — 92557 COMPREHENSIVE HEARING TEST: CPT | Mod: S$GLB,,, | Performed by: AUDIOLOGIST-HEARING AID FITTER

## 2020-09-16 PROCEDURE — 99999 PR PBB SHADOW E&M-EST. PATIENT-LVL IV: CPT | Mod: PBBFAC,,, | Performed by: NURSE PRACTITIONER

## 2020-09-16 PROCEDURE — 99214 OFFICE O/P EST MOD 30 MIN: CPT | Mod: S$GLB,,, | Performed by: NURSE PRACTITIONER

## 2020-09-16 PROCEDURE — 99999 PR PBB SHADOW E&M-EST. PATIENT-LVL IV: ICD-10-PCS | Mod: PBBFAC,,, | Performed by: NURSE PRACTITIONER

## 2020-09-16 PROCEDURE — 3008F BODY MASS INDEX DOCD: CPT | Mod: CPTII,S$GLB,, | Performed by: NURSE PRACTITIONER

## 2020-09-16 PROCEDURE — 92567 PR TYMPA2METRY: ICD-10-PCS | Mod: S$GLB,,, | Performed by: AUDIOLOGIST-HEARING AID FITTER

## 2020-09-16 PROCEDURE — 92557 PR COMPREHENSIVE HEARING TEST: ICD-10-PCS | Mod: S$GLB,,, | Performed by: AUDIOLOGIST-HEARING AID FITTER

## 2020-09-16 PROCEDURE — 3008F PR BODY MASS INDEX (BMI) DOCUMENTED: ICD-10-PCS | Mod: CPTII,S$GLB,, | Performed by: NURSE PRACTITIONER

## 2020-09-16 PROCEDURE — 92567 TYMPANOMETRY: CPT | Mod: S$GLB,,, | Performed by: AUDIOLOGIST-HEARING AID FITTER

## 2020-09-16 PROCEDURE — 99999 PR PBB SHADOW E&M-EST. PATIENT-LVL I: CPT | Mod: PBBFAC,,,

## 2020-09-16 PROCEDURE — 99214 PR OFFICE/OUTPT VISIT, EST, LEVL IV, 30-39 MIN: ICD-10-PCS | Mod: S$GLB,,, | Performed by: NURSE PRACTITIONER

## 2020-09-16 PROCEDURE — 99999 PR PBB SHADOW E&M-EST. PATIENT-LVL I: ICD-10-PCS | Mod: PBBFAC,,,

## 2020-09-16 RX ORDER — PREDNISONE 20 MG/1
TABLET ORAL
Qty: 26 TABLET | Refills: 0 | Status: SHIPPED | OUTPATIENT
Start: 2020-09-16 | End: 2020-10-01

## 2020-09-16 NOTE — PROGRESS NOTES
Hammad Douglas III was seen 09/16/2020 for an audiological evaluation. Patient complains of hearing loss AU and tinnitus AS x 1 week. Pt reports a Hx of loud noise exposure but denies family Hx of hearing loss. His wife complains that he can't hear her.     Results reveal a normal sloping to severe sensorineural hearing loss for the right ear, and  mild-to-profound sensorineural hearing loss with no response at 8K Hz for the left ear.    Speech Reception Thresholds were  20 dBHL for the right ear and 50 dBHL for the left ear.    Word recognition scores were excellent for the right ear and good for the left ear.   Tympanograms were Type Ad for the right ear and Type Ad for the left ear.    Audiogram results were reviewed in detail with patient and all questions were answered. Results will be reviewed by ENT at the completion of this note. Recommend further medical eval for the asymmetry, binaural amplification pending medical clearance, hearing test in one year due to noise epxosure and hearing protection in loud noise.

## 2020-09-16 NOTE — PATIENT INSTRUCTIONS
The treatment for a sudden change in hearing loss and tinnitus is high dose prednisone.   The side effects of corticosteroid use can includes increased appetite with weight change, increased blood pressure, decreased sleep, upset stomach (possible ulcers), pancreatitis, glucose/metabolism changes, mood changes/irritability, immunosuppression, acute glaucoma, osteoporosis, and rarely osteonecrosis.   We need to repeat your hearing test in 3-4 weeks.           Tinnitus (Ringing in the Ears)     Treatment may include maskers and hearing aids.     Tinnitus is the term for a noise in your ear not caused by an outside sound. The noise might be a ringing, clicking, hiss, or roar. It can vary in pitch and may be soft or quite loud. For some people, tinnitus is a minor nuisance. But for others, the noise can make it hard to hear, work, and even sleep. When tinnitus can't be cured, a number of treatments may offer relief.  What causes tinnitus?  Loud noises, hearing loss, and ear wax can cause tinnitus. So can certain medicines. Large amounts of aspirin or caffeine are sometimes to blame. In many cases, the exact cause of tinnitus is unknown.  How is tinnitus treated?  Identifying and removing the cause is the best way to treat tinnitus. For that reason, your healthcare provider may refer you to an otolaryngologist (ear, nose, and throat doctor). Your hearing may also be checked by an audiologist (hearing specialist). If you have hearing loss, wearing a hearing aid may help your tinnitus. When the cause can't be found, the tinnitus itself may be treated. Some of the treatments are listed below, and your healthcare provider can tell you more about them:  · Maskers are small devices that look like hearing aids. They emit a pleasant sound that helps cover up the ringing in your ears. Hearing aids and maskers are sometimes used together.  · Medicines that treat anxiety and depression may ease tinnitus in some people.  · Hypnosis  or relaxation therapy may help head noise seem less severe.  · Tinnitus retraining therapy combines counseling and maskers. Both can help take your mind off the tinnitus.  Tinnitus measures:  1.  Avoid exposure to loud sounds, which will exacerbate tinnitus.  Wear ear protection around loud noises.  2.  Get your blood pressure check regularly.  If it is high, see your doctor.  3.  Check with your PCP whether labs can be done to check for anemia and hyperthyroid, as these can worsen tinnitus.   4.  Decrease salt intake.  5.  Avoid stimulants such as caffeine and tobacco.  6.  Exercise daily to improve circulation. Poor circulation worsens tinnitus.   7.  Avoid sleep deprivation. Sleep deprivation and insomnia can worsen tinnitus. Get adequate rest.  8.  Reduce aspirin use, if possible. Aspirin as well as NSAIDs and narcotic pain relievers can exacerbate tinnitus.   9.  Reduce quinine consumption (taken for leg cramps and in certain tonic water preparations).  10.  Stop worrying about the noise.  Stress worsens tinnitus. Recognize the noise as an annoyance and learn to ignore it as much as possible. Patients with higher rates of anxiety, depression, concentration, or problems sleeping may need to discuss taking something for anxiety or depression with their primary care provider.   11.  Consider a trial of lipoflavanoids, slow-release niacin, or Arches' Tinnitus Formula (over-the-counter).  12.  Purchase a white noise machine to mask tinnitus.  13. Cognitive Behavioral Therapy, otherwise known as Tinnitus Retraining Therapy, can be done by an audiologist certified in TRT or a cognitive behavioral therapist who is certified in TRT.   14.  When no underlying pathology can be identified, an audiogram is needed to screen for hearing loss. If hearing loss is noted, hearing aids with masking technology are recommended to help relieve tinnitus.     Elimination of exacerbating factors such as elevated blood pressure, high  sodium intake, caffeine/stimulants, sleep deprivation/insomnia, certain medications, exposure to loud sounds, etc.   · White noise, hearing aids w/masking technology, and tinnitus retraining therapy (TRT).  ·   For more information  · American Speech-Hearing-Language Association [phone] www.esteban.org  · American Tinnitus Association [phone] www.davy.org  · National [place] on Deafness and other Communication Disorders [phone] www.nidcd.nih.gov

## 2020-09-16 NOTE — PROGRESS NOTES
"Subjective:       Patient ID: Hammad Douglas III is a 65 y.o. male.    Chief Complaint: Tinnitus and Ear Fullness (left side)    HPI   Patient reports sudden-onset unilateral/left-sided tinnitus and "dampened" hearing X 1.5 weeks ago. Some sense of imbalance upon getting out of bed in the beginning, described as slight spinning. Some left aural pressure/fullness like in a plane.     Review of Systems   Constitutional: Negative.    HENT: Positive for hearing loss (left X 1.5 weeks) and tinnitus (left X 1.5 weeks).         Left ear pressure   Eyes: Negative.    Respiratory: Negative.    Cardiovascular: Negative.    Gastrointestinal: Negative.    Musculoskeletal: Negative.    Integumentary:  Negative.   Neurological: Negative.    Hematological: Negative.    Psychiatric/Behavioral: Negative.          Objective:      Physical Exam  Vitals signs and nursing note reviewed.   Constitutional:       General: He is not in acute distress.     Appearance: He is well-developed. He is not ill-appearing or diaphoretic.   HENT:      Head: Normocephalic and atraumatic.      Right Ear: Hearing, tympanic membrane, ear canal and external ear normal. No middle ear effusion. Tympanic membrane is not erythematous.      Left Ear: Hearing, tympanic membrane, ear canal and external ear normal.  No middle ear effusion. Tympanic membrane is not erythematous.      Nose: Nose normal.      Mouth/Throat:      Pharynx: Uvula midline.   Eyes:      General: Lids are normal. No scleral icterus.        Right eye: No discharge.         Left eye: No discharge.   Neck:      Musculoskeletal: Normal range of motion and neck supple.      Trachea: Trachea normal. No tracheal deviation.   Cardiovascular:      Rate and Rhythm: Normal rate.   Pulmonary:      Effort: Pulmonary effort is normal. No respiratory distress.      Breath sounds: No stridor. No wheezing.   Musculoskeletal: Normal range of motion.   Skin:     General: Skin is warm and dry.      Coloration: " Skin is not pale.   Neurological:      Mental Status: He is alert and oriented to person, place, and time.      Coordination: Coordination normal.      Gait: Gait normal.   Psychiatric:         Speech: Speech normal.         Behavior: Behavior normal. Behavior is cooperative.         Thought Content: Thought content normal.         Judgment: Judgment normal.      Negative [place]-Hallpike AU     Assessment:       1. Sudden idiopathic hearing loss of left ear with restricted hearing of right ear    2. Left-sided tinnitus        Plan:     Prednisone taper. He checks his blood glucose frequently and takes insulin for type 1 diabetes since the age of 19. He has taken prednisone before and is fully aware of staying on top of his blood sugars. He feels comfortable handling any elevation with his sliding scale insulin.     Repeat audiogram in 4 weeks. May need to discuss MRI-IAC next visit depending on audiogram.

## 2020-09-18 ENCOUNTER — PATIENT MESSAGE (OUTPATIENT)
Dept: ENDOCRINOLOGY | Facility: CLINIC | Age: 65
End: 2020-09-18

## 2020-09-18 ENCOUNTER — PATIENT MESSAGE (OUTPATIENT)
Dept: OTOLARYNGOLOGY | Facility: CLINIC | Age: 65
End: 2020-09-18

## 2020-09-29 ENCOUNTER — PATIENT MESSAGE (OUTPATIENT)
Dept: ENDOCRINOLOGY | Facility: CLINIC | Age: 65
End: 2020-09-29

## 2020-09-29 RX ORDER — FLUOXETINE HYDROCHLORIDE 20 MG/1
20 CAPSULE ORAL DAILY
Qty: 90 CAPSULE | Refills: 3 | Status: SHIPPED | OUTPATIENT
Start: 2020-09-29 | End: 2021-07-14

## 2020-10-05 ENCOUNTER — LAB VISIT (OUTPATIENT)
Dept: LAB | Facility: HOSPITAL | Age: 65
End: 2020-10-05
Attending: NURSE PRACTITIONER
Payer: COMMERCIAL

## 2020-10-05 DIAGNOSIS — E10.43 TYPE 1 DIABETES MELLITUS WITH DIABETIC AUTONOMIC NEUROPATHY: ICD-10-CM

## 2020-10-05 PROCEDURE — 36415 COLL VENOUS BLD VENIPUNCTURE: CPT | Mod: PN

## 2020-10-05 PROCEDURE — 83036 HEMOGLOBIN GLYCOSYLATED A1C: CPT

## 2020-10-05 PROCEDURE — 80048 BASIC METABOLIC PNL TOTAL CA: CPT

## 2020-10-06 ENCOUNTER — PATIENT MESSAGE (OUTPATIENT)
Dept: ENDOCRINOLOGY | Facility: CLINIC | Age: 65
End: 2020-10-06

## 2020-10-06 LAB
ANION GAP SERPL CALC-SCNC: 8 MMOL/L (ref 8–16)
BUN SERPL-MCNC: 20 MG/DL (ref 8–23)
CALCIUM SERPL-MCNC: 9.2 MG/DL (ref 8.7–10.5)
CHLORIDE SERPL-SCNC: 103 MMOL/L (ref 95–110)
CO2 SERPL-SCNC: 27 MMOL/L (ref 23–29)
CREAT SERPL-MCNC: 1.3 MG/DL (ref 0.5–1.4)
EST. GFR  (AFRICAN AMERICAN): >60 ML/MIN/1.73 M^2
EST. GFR  (NON AFRICAN AMERICAN): 58.1 ML/MIN/1.73 M^2
ESTIMATED AVG GLUCOSE: 192 MG/DL (ref 68–131)
GLUCOSE SERPL-MCNC: 277 MG/DL (ref 70–110)
HBA1C MFR BLD HPLC: 8.3 % (ref 4–5.6)
POTASSIUM SERPL-SCNC: 5.4 MMOL/L (ref 3.5–5.1)
SODIUM SERPL-SCNC: 138 MMOL/L (ref 136–145)

## 2020-10-12 ENCOUNTER — OFFICE VISIT (OUTPATIENT)
Dept: ENDOCRINOLOGY | Facility: CLINIC | Age: 65
End: 2020-10-12
Payer: COMMERCIAL

## 2020-10-12 VITALS
HEART RATE: 90 BPM | SYSTOLIC BLOOD PRESSURE: 100 MMHG | WEIGHT: 221.31 LBS | HEIGHT: 74 IN | BODY MASS INDEX: 28.4 KG/M2 | DIASTOLIC BLOOD PRESSURE: 60 MMHG

## 2020-10-12 DIAGNOSIS — E78.5 HYPERLIPIDEMIA, UNSPECIFIED HYPERLIPIDEMIA TYPE: ICD-10-CM

## 2020-10-12 DIAGNOSIS — I10 ESSENTIAL HYPERTENSION: ICD-10-CM

## 2020-10-12 DIAGNOSIS — E10.43 TYPE 1 DIABETES MELLITUS WITH DIABETIC AUTONOMIC NEUROPATHY: Primary | ICD-10-CM

## 2020-10-12 DIAGNOSIS — E10.21 TYPE 1 DIABETES MELLITUS WITH DIABETIC NEPHROPATHY: ICD-10-CM

## 2020-10-12 DIAGNOSIS — E10.649 HYPOGLYCEMIA UNAWARENESS IN TYPE 1 DIABETES MELLITUS: ICD-10-CM

## 2020-10-12 PROCEDURE — 99999 PR PBB SHADOW E&M-EST. PATIENT-LVL IV: ICD-10-PCS | Mod: PBBFAC,,, | Performed by: NURSE PRACTITIONER

## 2020-10-12 PROCEDURE — 99999 PR PBB SHADOW E&M-EST. PATIENT-LVL IV: CPT | Mod: PBBFAC,,, | Performed by: NURSE PRACTITIONER

## 2020-10-12 PROCEDURE — 3052F PR MOST RECENT HEMOGLOBIN A1C LEVEL 8.0 - < 9.0%: ICD-10-PCS | Mod: CPTII,S$GLB,, | Performed by: NURSE PRACTITIONER

## 2020-10-12 PROCEDURE — 99214 PR OFFICE/OUTPT VISIT, EST, LEVL IV, 30-39 MIN: ICD-10-PCS | Mod: S$GLB,,, | Performed by: NURSE PRACTITIONER

## 2020-10-12 PROCEDURE — 3008F PR BODY MASS INDEX (BMI) DOCUMENTED: ICD-10-PCS | Mod: CPTII,S$GLB,, | Performed by: NURSE PRACTITIONER

## 2020-10-12 PROCEDURE — 95251 PR GLUCOSE MONITOR, 72 HOUR, PHYS INTERP: ICD-10-PCS | Mod: S$GLB,,, | Performed by: NURSE PRACTITIONER

## 2020-10-12 PROCEDURE — 3074F SYST BP LT 130 MM HG: CPT | Mod: CPTII,S$GLB,, | Performed by: NURSE PRACTITIONER

## 2020-10-12 PROCEDURE — 3078F DIAST BP <80 MM HG: CPT | Mod: CPTII,S$GLB,, | Performed by: NURSE PRACTITIONER

## 2020-10-12 PROCEDURE — 3074F PR MOST RECENT SYSTOLIC BLOOD PRESSURE < 130 MM HG: ICD-10-PCS | Mod: CPTII,S$GLB,, | Performed by: NURSE PRACTITIONER

## 2020-10-12 PROCEDURE — 95251 CONT GLUC MNTR ANALYSIS I&R: CPT | Mod: S$GLB,,, | Performed by: NURSE PRACTITIONER

## 2020-10-12 PROCEDURE — 3052F HG A1C>EQUAL 8.0%<EQUAL 9.0%: CPT | Mod: CPTII,S$GLB,, | Performed by: NURSE PRACTITIONER

## 2020-10-12 PROCEDURE — 99214 OFFICE O/P EST MOD 30 MIN: CPT | Mod: S$GLB,,, | Performed by: NURSE PRACTITIONER

## 2020-10-12 PROCEDURE — 3008F BODY MASS INDEX DOCD: CPT | Mod: CPTII,S$GLB,, | Performed by: NURSE PRACTITIONER

## 2020-10-12 PROCEDURE — 3078F PR MOST RECENT DIASTOLIC BLOOD PRESSURE < 80 MM HG: ICD-10-PCS | Mod: CPTII,S$GLB,, | Performed by: NURSE PRACTITIONER

## 2020-10-12 RX ORDER — INSULIN GLARGINE 300 U/ML
28 INJECTION, SOLUTION SUBCUTANEOUS NIGHTLY
Start: 2020-10-12 | End: 2020-11-12 | Stop reason: SDUPTHER

## 2020-10-12 NOTE — PROGRESS NOTES
CC: Mr. Hammad Douglas III arrives today for management of Type 1  DM and review of chronic medical conditions, as listed in the visit diagnosis section of this encounter.     HPI: Mr. Hammad Douglas III was diagnosed with Type 1  DM at age 19 after a viral illness - had polyuria, polydipsia at this time.  Initial treatment consisted of insulin. Denies FH of DM. Reports past hospitalizations due to DKA > 30 years ago. Not recently.   Has diagnosis of hypoglycemia unawareness with past severe hypoglycemic episodes requiring 3rd party assistance. Began using Dexcom G5 in 2017 and later up graded to G6.    Patient was last seen by me in August.    He was placed on prednisone taper last month by ENT for hearing loss/tinnitus and noticed much higher glucoses during this time. He states that he was using 3x as much insulin as usual. His last prednisone dose was 3 days ago. He is now starting to notice his glucoses decreasing.     BG monitoring per Dexcom G6. He plans to change to  since he is having issues with the sj since recent [employer] update on his iPhone. He is currently off of device but will be resuming in ~ 2 weeks when his supplies come in.     Hypoglycemia: Rare - recalls one episode overnight. He took higher than usual Toujeo that night because of the highs he was having due to steroids.   Hypoglycemic Symptoms: often doesn't have symptoms.   Hypoglycemia Treatment: Juice or glucose tabs. Has glucagon.     Missing Insulin/PO medication doses: No  Timing prandial insulin 5-15 minutes before meals: yes    Exercise: active around the house.    Dietary Habits: Eats 3 meals/day. Avoids snacking. Avoids sugary beverages.     Last DM education appointment: October 2017        CURRENT DIABETIC MEDS:  Toujeo Max 28 units QHS, Humalog CHO ratio 1:7, correction target 150, ISF 50 (average dose 10 units)  Vial or pen: pen  Glucometer type: Prodigy?    Previous insulins:  Lantus    Last Eye Exam: 9/30/2019, +  "proliferative DR. + R cataract. Has appt in November.   Last Podiatry Exam: 7/23/2020. S/p amputation of R third toe.    REVIEW OF SYSTEMS  Constitutional: no c/o weakness, fatigue, weight loss. + 5# weight gain.  Eyes: reports chronic visual disturbances that he attributes to cataract   Cardiac: no palpitations or chest pain.  Respiratory: no cough or dyspnea.   GI:  no c/o abdominal pain or nausea.   Skin: no rashes, lesions  Neuro: no numbness, tingling, or parasthesias.  Endocrine: denies polyphagia, polydipsia, polyuria    Personally reviewed Past Medical, Surgical, Social History.    Vital Signs  /60   Pulse 90   Ht 6' 2" (1.88 m)   Wt 100.4 kg (221 lb 5.5 oz)   BMI 28.42 kg/m²      Personally reviewed the below labs:    Hemoglobin A1C   Date Value Ref Range Status   10/05/2020 8.3 (H) 4.0 - 5.6 % Final     Comment:     ADA Screening Guidelines:  5.7-6.4%  Consistent with prediabetes  >or=6.5%  Consistent with diabetes  High levels of fetal hemoglobin interfere with the HbA1C  assay. Heterozygous hemoglobin variants (HbS, HgC, etc)do  not significantly interfere with this assay.   However, presence of multiple variants may affect accuracy.     07/27/2020 7.5 (H) 4.0 - 5.6 % Final     Comment:     ADA Screening Guidelines:  5.7-6.4%  Consistent with prediabetes  >or=6.5%  Consistent with diabetes  High levels of fetal hemoglobin interfere with the HbA1C  assay. Heterozygous hemoglobin variants (HbS, HgC, etc)do  not significantly interfere with this assay.   However, presence of multiple variants may affect accuracy.     05/20/2020 7.6 (H) 4.0 - 5.6 % Final     Comment:     ADA Screening Guidelines:  5.7-6.4%  Consistent with prediabetes  >or=6.5%  Consistent with diabetes  High levels of fetal hemoglobin interfere with the HbA1C  assay. Heterozygous hemoglobin variants (HbS, HgC, etc)do  not significantly interfere with this assay.   However, presence of multiple variants may affect accuracy.         " Chemistry        Component Value Date/Time     10/05/2020 0848    K 5.4 (H) 10/05/2020 0848     10/05/2020 0848    CO2 27 10/05/2020 0848    BUN 20 10/05/2020 0848    CREATININE 1.3 10/05/2020 0848     (H) 10/05/2020 0848        Component Value Date/Time    CALCIUM 9.2 10/05/2020 0848    ALKPHOS 87 07/27/2020 0710    AST 21 07/27/2020 0710    ALT 19 07/27/2020 0710    BILITOT 0.4 07/27/2020 0710    ESTGFRAFRICA >60.0 10/05/2020 0848    EGFRNONAA 58.1 (A) 10/05/2020 0848          Lab Results   Component Value Date    CHOL 154 07/27/2020    CHOL 163 06/26/2019    CHOL 172 08/06/2018     Lab Results   Component Value Date    HDL 46 07/27/2020    HDL 51 06/26/2019    HDL 48 08/06/2018     Lab Results   Component Value Date    LDLCALC 95.8 07/27/2020    LDLCALC 99.6 06/26/2019    LDLCALC 110.6 08/06/2018     Lab Results   Component Value Date    TRIG 61 07/27/2020    TRIG 62 06/26/2019    TRIG 67 08/06/2018     Lab Results   Component Value Date    CHOLHDL 29.9 07/27/2020    CHOLHDL 31.3 06/26/2019    CHOLHDL 27.9 08/06/2018       Lab Results   Component Value Date    MICALBCREAT 121.7 (H) 07/27/2020     Lab Results   Component Value Date    TSH 2.070 06/26/2019       CrCl cannot be calculated (Patient's most recent lab result is older than the maximum 7 days allowed.).    Vit D, 25-Hydroxy   Date Value Ref Range Status   08/06/2018 35 30 - 96 ng/mL Final     Comment:     Vitamin D deficiency.........<10 ng/mL                              Vitamin D insufficiency......10-29 ng/mL       Vitamin D sufficiency........> or equal to 30 ng/mL  Vitamin D toxicity............>100 ng/mL         PHYSICAL EXAMINATION  Constitutional: Appears well, no distress  Neck: Supple, trachea midline; no thyromegaly or nodules.   Respiratory: CTA, even and unlabored.   Cardiovascular: RRR, no murmurs, no carotid bruits. DP pulses  2+ bilaterally; no edema.    GI: active bowel sounds, no hernia  Skin: warm and dry; no  lipohypertrophy, or acanthosis nigracans observed.   Neuro: DTR 1+ BUE/1+BLE.  Feet: appropriate footwear.      Dexcom download: See media file for details. PATIENT WAS ON STEROIDS THE MAJORITY OF THIS DOWNLOAD. Fasting glucoses are usually elevated. Large prandial excursions noted. Hypoglycemia detected overnight twice and occasionally in between meals, during the day.   Average glucose: 193 mg/dL  Above 250 mg/dL: 19 %  181-250 mg/dL: 42 %   mg/dL: 36 %  54-69 mg/dL: 2 %  Below 54 mg/dL: 1 %        A1c target < 7.5%, due to hypoglycemia unawareness      Assessment/Plan  1. Type 1 diabetes mellitus with diabetic autonomic neuropathy  -- Recently worsening, due to steroid use for the past month. Resume Dexcom ASAP, due to hypoglycemia unawareness.  -- change I:C ratio to 1:6  -- continue current Toujeo Max dose.   -- not interested in insulin pump.   -- until he can resume Dexcom, continue BG monitoring 4x/day.   -- advised pt to send log to me in 1 week, reflecting the above changes.     -- Discussed diagnosis of DM, A1c goals, progression of disease, long term complications and tx options.    -- Reviewed hypoglycemia management: treat with 1/2 glass of juice, 1/2 can regular coke, or 4 glucose tablets. Monitor and repeat treatment every 15 minutes until BG is >70 Then have a snack, which includes a complex carbohydrate and protein.  Advised patient to check BG before activities, such as driving or exercise.   2. Type 1 diabetes mellitus with diabetic nephropathy  -- uncontrolled.   -- continue losartan   3. Proliferative diabetic retinopathy without macular edema associated with type 1 diabetes mellitus, unspecified laterality  -- stable  -- keep follow ups   4. Essential hypertension  -- controlled  -- continue current meds   5. Hyperlipidemia, unspecified hyperlipidemia type  -- controlled  -- continue atorvastatin   6. Hypoglycemia unawareness in Type 1 DM -- resume use of Dexcom.   -- has glucagon          FOLLOW UP   Follow up in about 4 months (around 2/12/2021).   Patient instructed to bring BG logs to each follow up   Patient encouraged to call for any BG/medication issues, concerns, or questions.     Orders Placed This Encounter   Procedures    Hemoglobin A1C    Comprehensive Metabolic Panel

## 2020-10-14 ENCOUNTER — CLINICAL SUPPORT (OUTPATIENT)
Dept: AUDIOLOGY | Facility: CLINIC | Age: 65
End: 2020-10-14
Payer: COMMERCIAL

## 2020-10-14 ENCOUNTER — OFFICE VISIT (OUTPATIENT)
Dept: OTOLARYNGOLOGY | Facility: CLINIC | Age: 65
End: 2020-10-14
Payer: COMMERCIAL

## 2020-10-14 VITALS — BODY MASS INDEX: 28.4 KG/M2 | HEIGHT: 74 IN | WEIGHT: 221.31 LBS

## 2020-10-14 DIAGNOSIS — H93.12 TINNITUS, LEFT: ICD-10-CM

## 2020-10-14 DIAGNOSIS — H93.12 LEFT-SIDED TINNITUS: ICD-10-CM

## 2020-10-14 DIAGNOSIS — H93.8X2 SENSATION OF FULLNESS IN EAR, LEFT: ICD-10-CM

## 2020-10-14 DIAGNOSIS — H90.3 ASYMMETRICAL SENSORINEURAL HEARING LOSS: Primary | ICD-10-CM

## 2020-10-14 PROCEDURE — 92557 PR COMPREHENSIVE HEARING TEST: ICD-10-PCS | Mod: S$GLB,,, | Performed by: AUDIOLOGIST-HEARING AID FITTER

## 2020-10-14 PROCEDURE — 99999 PR PBB SHADOW E&M-EST. PATIENT-LVL I: CPT | Mod: PBBFAC,,,

## 2020-10-14 PROCEDURE — 99999 PR PBB SHADOW E&M-EST. PATIENT-LVL III: ICD-10-PCS | Mod: PBBFAC,,, | Performed by: NURSE PRACTITIONER

## 2020-10-14 PROCEDURE — 3008F PR BODY MASS INDEX (BMI) DOCUMENTED: ICD-10-PCS | Mod: CPTII,S$GLB,, | Performed by: NURSE PRACTITIONER

## 2020-10-14 PROCEDURE — 99214 PR OFFICE/OUTPT VISIT, EST, LEVL IV, 30-39 MIN: ICD-10-PCS | Mod: S$GLB,,, | Performed by: NURSE PRACTITIONER

## 2020-10-14 PROCEDURE — 92557 COMPREHENSIVE HEARING TEST: CPT | Mod: S$GLB,,, | Performed by: AUDIOLOGIST-HEARING AID FITTER

## 2020-10-14 PROCEDURE — 99214 OFFICE O/P EST MOD 30 MIN: CPT | Mod: S$GLB,,, | Performed by: NURSE PRACTITIONER

## 2020-10-14 PROCEDURE — 3008F BODY MASS INDEX DOCD: CPT | Mod: CPTII,S$GLB,, | Performed by: NURSE PRACTITIONER

## 2020-10-14 PROCEDURE — 99999 PR PBB SHADOW E&M-EST. PATIENT-LVL III: CPT | Mod: PBBFAC,,, | Performed by: NURSE PRACTITIONER

## 2020-10-14 PROCEDURE — 99999 PR PBB SHADOW E&M-EST. PATIENT-LVL I: ICD-10-PCS | Mod: PBBFAC,,,

## 2020-10-14 NOTE — PROGRESS NOTES
Subjective:       Patient ID: Hammad Douglas III is a 65 y.o. male.    Chief Complaint: No chief complaint on file.    HPI   Patient was seen last month with a sudden-onset unilateral/left-sided tinnitus and hearing loss. This was treated with an oral steroid and patient returns today for recheck. Patient had to take up to three times his normal amount of insulin due to prednisone. He reports tinnitus and aural fullness AS not resolved.     Review of Systems   Constitutional: Negative.    HENT: Positive for hearing loss and tinnitus.         Left ear pressure   Eyes: Negative.    Respiratory: Negative.    Cardiovascular: Negative.    Gastrointestinal: Negative.    Musculoskeletal: Negative.    Integumentary:  Negative.   Neurological: Negative.    Hematological: Negative.    Psychiatric/Behavioral: Negative.          Objective:      Physical Exam  Vitals signs and nursing note reviewed.   Constitutional:       General: He is not in acute distress.     Appearance: He is well-developed. He is not ill-appearing or diaphoretic.   HENT:      Head: Normocephalic and atraumatic.      Right Ear: Hearing, tympanic membrane, ear canal and external ear normal. No middle ear effusion. Tympanic membrane is not erythematous.      Left Ear: Hearing, tympanic membrane, ear canal and external ear normal.  No middle ear effusion. Tympanic membrane is not erythematous.      Nose: Nose normal.      Mouth/Throat:      Pharynx: Uvula midline.   Eyes:      General: Lids are normal. No scleral icterus.        Right eye: No discharge.         Left eye: No discharge.   Neck:      Musculoskeletal: Normal range of motion and neck supple.      Trachea: Trachea normal. No tracheal deviation.   Cardiovascular:      Rate and Rhythm: Normal rate.   Pulmonary:      Effort: Pulmonary effort is normal. No respiratory distress.      Breath sounds: No stridor. No wheezing.   Musculoskeletal: Normal range of motion.   Skin:     General: Skin is warm and  dry.      Coloration: Skin is not pale.   Neurological:      Mental Status: He is alert and oriented to person, place, and time.      Coordination: Coordination normal.      Gait: Gait normal.   Psychiatric:         Speech: Speech normal.         Behavior: Behavior normal. Behavior is cooperative.         Thought Content: Thought content normal.         Judgment: Judgment normal.        Assessment:     Left>Right asymmetrically sloping SNHL      Plan:     Discussed MRI brain with gadolinium per IAC protocol for asymmetrical sensorineural hearing loss to rule out retrocochlear pathology: acoustic schwannoma or cerebellar pontine angle mass. Patient states he wants to consider and agrees to call me back if he desires to proceed with imaging.     PATIENT IS MEDICALLY CLEARED FOR HEARING AIDS. The patient's hearing loss is not due to temporarily, correctable physical condition. There are no contraindications to hearing aid candidacy. Patient's audiogram reveals the patient is a candidate for amplification. Audiogram is reviewed in detail with the patient. The audiologist's recommendation that the patient have amplification/hearing aids is discussed and questions answered. Patient has been given information by the audiologist on how to schedule a hearing aid consultation. Patient is encouraged to wear ear protection in loud noise and return annually for hearing test. Return to clinic as needed for further ENT concerns.

## 2020-10-14 NOTE — PROGRESS NOTES
Hammad Douglas III was seen 10/14/2020 for an audiological evaluation. Patient complains of hearing loss AU and tinnitus AS only. Pt reports a Hx of loud noise exposure but denies family Hx of hearing loss. His wife complains that he can't hear her. He cant determine if his hearing has improved following steroidal Tx for asymmetrical hearing loss.     Results reveal a mild-to-severe sensorineural hearing loss for the right ear, and  mild-to-profound sensorineural hearing loss with no response above 4K Hz for the left ear.    Speech Reception Thresholds were  25 dBHL for the right ear and 40 dBHL for the left ear.    Word recognition scores were excellent for the right ear and good for the left ear.  Some changes were noted when compared to previous hearing testing from 9/16/2020.      Audiogram results were reviewed in detail with patient and all questions were answered. Results will be reviewed by ENT at the completion of this note. Recommend further medical evaluation for the change in thresholds and continued asymmetry, binaural amplification pending medical clearance, repeat hearing test in one year due to loud noise exposure and hearing protection in loud noise.

## 2020-10-22 ENCOUNTER — PATIENT OUTREACH (OUTPATIENT)
Dept: ADMINISTRATIVE | Facility: OTHER | Age: 65
End: 2020-10-22

## 2020-10-22 ENCOUNTER — PATIENT MESSAGE (OUTPATIENT)
Dept: PODIATRY | Facility: CLINIC | Age: 65
End: 2020-10-22

## 2020-10-26 ENCOUNTER — OFFICE VISIT (OUTPATIENT)
Dept: PODIATRY | Facility: CLINIC | Age: 65
End: 2020-10-26
Payer: COMMERCIAL

## 2020-10-26 ENCOUNTER — HOSPITAL ENCOUNTER (OUTPATIENT)
Dept: RADIOLOGY | Facility: HOSPITAL | Age: 65
Discharge: HOME | End: 2020-10-26
Attending: PODIATRIST
Payer: COMMERCIAL

## 2020-10-26 VITALS — HEIGHT: 74 IN | WEIGHT: 221.31 LBS | BODY MASS INDEX: 28.4 KG/M2

## 2020-10-26 DIAGNOSIS — E10.42 TYPE 1 DIABETES MELLITUS WITH POLYNEUROPATHY: ICD-10-CM

## 2020-10-26 DIAGNOSIS — R22.42 LOCALIZED SWELLING OF LEFT FOOT: ICD-10-CM

## 2020-10-26 DIAGNOSIS — M79.672 FOOT PAIN, LEFT: ICD-10-CM

## 2020-10-26 DIAGNOSIS — M79.672 FOOT PAIN, LEFT: Primary | ICD-10-CM

## 2020-10-26 PROCEDURE — 3052F PR MOST RECENT HEMOGLOBIN A1C LEVEL 8.0 - < 9.0%: ICD-10-PCS | Mod: CPTII,S$GLB,, | Performed by: PODIATRIST

## 2020-10-26 PROCEDURE — 99213 OFFICE O/P EST LOW 20 MIN: CPT | Mod: S$GLB,,, | Performed by: PODIATRIST

## 2020-10-26 PROCEDURE — 3008F PR BODY MASS INDEX (BMI) DOCUMENTED: ICD-10-PCS | Mod: CPTII,S$GLB,, | Performed by: PODIATRIST

## 2020-10-26 PROCEDURE — 3008F BODY MASS INDEX DOCD: CPT | Mod: CPTII,S$GLB,, | Performed by: PODIATRIST

## 2020-10-26 PROCEDURE — 73630 XR FOOT COMPLETE 3 VIEW LEFT: ICD-10-PCS | Mod: 26,LT,, | Performed by: RADIOLOGY

## 2020-10-26 PROCEDURE — 99999 PR PBB SHADOW E&M-EST. PATIENT-LVL III: CPT | Mod: PBBFAC,,, | Performed by: PODIATRIST

## 2020-10-26 PROCEDURE — 73630 X-RAY EXAM OF FOOT: CPT | Mod: 26,LT,, | Performed by: RADIOLOGY

## 2020-10-26 PROCEDURE — 99213 PR OFFICE/OUTPT VISIT, EST, LEVL III, 20-29 MIN: ICD-10-PCS | Mod: S$GLB,,, | Performed by: PODIATRIST

## 2020-10-26 PROCEDURE — 73630 X-RAY EXAM OF FOOT: CPT | Mod: TC,FY,PO,LT

## 2020-10-26 PROCEDURE — 3052F HG A1C>EQUAL 8.0%<EQUAL 9.0%: CPT | Mod: CPTII,S$GLB,, | Performed by: PODIATRIST

## 2020-10-26 PROCEDURE — 99999 PR PBB SHADOW E&M-EST. PATIENT-LVL III: ICD-10-PCS | Mod: PBBFAC,,, | Performed by: PODIATRIST

## 2020-10-26 NOTE — PROGRESS NOTES
"  Subjective:      Patient ID: Hammad Douglas III is a 65 y.o. male.    Chief Complaint: Foot Pain (left foot swollen and the top of foot hurts)  Patient presents to sooner than his routine exam on account of sudden, pronounced swelling and pain of the Lt. Forefoot.  Notes sharp pain in the foot and what felt like a "snap" late last week.  Denies an inciting event or specific trauma to the site.  Describes aching pain from the site and currently rates pain as a 6/10.  Notes that he is still able to ambulate on the extremity, likely due to his neuropathy.  Has not attempted to self treat.  Denies a past history of gout.  Denies any additional pedal complaints.    Past Medical History:   Diagnosis Date    Cataract     Chronic kidney disease     Diabetes mellitus type I     Diagnosed at age 19    Diabetic retinopathy     See's Dr. Ledesma    Hyperlipidemia     Hypertension        Past Surgical History:   Procedure Laterality Date    COLONOSCOPY      2011 wnl    TOE AMPUTATION Right 7/2/2020    Procedure: AMPUTATION, TOE; 3rd;  Surgeon: Caleb Duffy DPM;  [facility];  Service: Podiatry;  Laterality: Right;    TOE SURGERY Right     right big toe       Family History   Problem Relation Age of Onset    Cataracts Mother     Breast cancer Sister        Social History     Socioeconomic History    Marital status:      Spouse name: Not on file    Number of children: Not on file    Years of education: Not on file    Highest education level: Not on file   Occupational History    Not on file   Social Needs    Financial resource strain: Not on file    Food insecurity     Worry: Not on file     Inability: Not on file    Transportation needs     Medical: Not on file     Non-medical: Not on file   Tobacco Use    Smoking status: Former Smoker     Types: Cigars    Smokeless tobacco: Never Used    Tobacco comment: Former cigar use   Substance and Sexual Activity    Alcohol use: Yes     Alcohol/week: " "2.0 standard drinks     Types: 2 Cans of beer per week    Drug use: No    Sexual activity: Yes     Partners: Female   Lifestyle    Physical activity     Days per week: Not on file     Minutes per session: Not on file    Stress: Not on file   Relationships    Social connections     Talks on phone: Not on file     Gets together: Not on file     Attends Yazidi service: Not on file     Active member of club or organization: Not on file     Attends meetings of clubs or organizations: Not on file     Relationship status: Not on file   Other Topics Concern    Not on file   Social History Narrative    Retired former radiation  at [employer]       Current Outpatient Medications   Medication Sig Dispense Refill    atorvastatin (LIPITOR) 40 MG tablet TAKE 1 TABLET(40 MG) BY MOUTH EVERY DAY 90 tablet 3    FLUoxetine 20 MG capsule Take 1 capsule (20 mg total) by mouth once daily. 90 capsule 3    GLUCAGEN HYPOKIT 1 mg SolR Inject 1 mg into the muscle as needed (for severe hypoglycemia). 1 each 0    HYDROcodone-acetaminophen (NORCO) 5-325 mg per tablet Take 1 tablet by mouth every 6 (six) hours as needed for Pain. 28 tablet 0    insulin aspart U-100 (NOVOLOG FLEXPEN U-100 INSULIN) 100 unit/mL (3 mL) InPn pen INJECT USING INSULIN:CARB RATIO OF 1:6 SUBCUTANEOUSLY PRIOR TO MEALS. MAX TDD 40 UNITS 3 Box 3    insulin glargine U-300 conc (TOUJEO MAX U-300 SOLOSTAR) 300 unit/mL (3 mL) InPn Inject 28 Units into the skin every evening.      insulin lispro (HUMALOG KWIKPEN INSULIN) 100 unit/mL pen INJECT USING INSULIN:CARB RATIO OF 1:6 SUBCUTANEOUSLY PRIOR TO MEALS. MAX TDD 40 UNITS 3 Box 3    losartan (COZAAR) 100 MG tablet TAKE 1 TABLET(100 MG) BY MOUTH EVERY DAY 90 tablet 3    multivitamin (ONE DAILY MULTIVITAMIN) per tablet Take 1 tablet by mouth once daily.      pen needle, diabetic (PEN NEEDLE) 31 gauge x 1/4" Ndle 1 each by Misc.(Non-Drug; Combo Route) route 4 (four) times daily. 400 each 3    " blood sugar diagnostic (ONETOUCH ULTRA TEST) Strp 1 strip by Other route 4 (four) times daily. (Patient not taking: Reported on 6/15/2020) 400 strip 3     Current Facility-Administered Medications   Medication Dose Route Frequency Provider Last Rate Last Dose    glucagon (human recombinant) injection 1 mg  1 mg Intramuscular Once Tania Mckeon NP         Facility-Administered Medications Ordered in Other Visits   Medication Dose Route Frequency Provider Last Rate Last Dose    electrolyte-S (ISOLYTE)   Intravenous Continuous Jostin Resendiz MD        lactated ringers infusion   Intravenous Continuous Jostin Resendiz MD 10 mL/hr at 07/02/20 1232      lidocaine (PF) 10 mg/ml (1%) injection 10 mg  1 mL Intradermal Once Jostin Resendiz MD           Review of patient's allergies indicates:   Allergen Reactions    Altace [ramipril]      Cough         Review of Systems   Constitution: Negative for chills and fever.   Cardiovascular: Negative for claudication.  Positive for localized edema.  Skin: Negative for color change, dry skin and poor wound healing.   Musculoskeletal: Negative for joint pain, joint swelling, muscle cramps and muscle weakness.   Neurological: Positive for numbness.   Psychiatric/Behavioral: Negative for altered mental status.           Objective:      Physical Exam   Constitutional: He appears well-developed and well-nourished. No distress.   Cardiovascular:   Pulses:       Dorsalis pedis pulses are 2+ on the right side, and 2+ on the left side.        Posterior tibial pulses are 1+ on the right side, and 1+ on the left side.   CFT is < 3 seconds bilateral.  Pedal hair growth is present bilateral.  Varicosities noted bilateral.  Moderate nonpitting edema noted to the Lt. Forefoot, specifically along the 2nd-4th rays. Toes are warm to touch bilateral.     Musculoskeletal: He exhibits no edema or tenderness.   Muscle strength 5/5 in all muscle groups bilateral.  No tenderness nor  crepitation with ROM of foot/ankle joints bilateral.  Diffuse pain with palpation along the Lt. 2nd-4th rays.  Semi-reducible contracture of the lesser digits bilateral.  Rt. 3rd toe amputation.     Neurological: He has normal strength. A sensory deficit is present.   Protective sensation per [place] monofilament is decreased bilateral.  Light touch is intact bilateral.   Skin: Skin is warm and dry. Capillary refill takes less than 2 seconds. Lesion noted. No abrasion, no burn, no ecchymosis, no laceration and no petechiae noted. He is not diaphoretic.   Increased pedal turgor and temperature noted to the Lt. Dorsal forefoot.  No break noted In adjacent skin integrity no localized sign of infection noted.                   Assessment:       Encounter Diagnoses   Name Primary?    Foot pain, left Yes    Localized swelling of left foot     Type 1 diabetes mellitus with polyneuropathy          Plan:       Hammad was seen today for foot pain.    Diagnoses and all orders for this visit:    Foot pain, left  -     C-Reactive Protein; Future  -     Sedimentation rate; Future  -     Uric Acid; Future  -     CBC auto differential; Future  -     X-Ray Foot Complete Left; Future  -     NM Bone Scan 3 Phase Foot; Future    Localized swelling of left foot  -     C-Reactive Protein; Future  -     Sedimentation rate; Future  -     Uric Acid; Future  -     CBC auto differential; Future  -     X-Ray Foot Complete Left; Future  -     NM Bone Scan 3 Phase Foot; Future    Type 1 diabetes mellitus with polyneuropathy      I counseled the patient on his conditions, their implications and medical management.    Based on the patient's description of symptoms and physical exam, this appears to be an acute gout attack.  However, lab work was not consistent with said diagnosis.  Rather, the patient was noted to have a diaphyseal fracture of the Lt. 2nd metatarsal.  Being that there was no traumatic event nor obvious sign of  osteoporosis on x-ray, I'm now suspicious of Charcot osteoarthropathy.  Unfortunately, the metatarsal will have to remain displaced until Charcot can be ruled out.    Will place orders for a bone scan to assess the adjacent joints for signs of breakdown.    Recommend use of a postoperative shoe to stabilize the fracture site.    Patient to ice and elevate the affected area.      Recommend applying voltaren cream 2-4 times daily to the affected area.    RTC pending test/bone scan results.    Caleb Duffy DPM

## 2020-10-27 ENCOUNTER — PATIENT MESSAGE (OUTPATIENT)
Dept: PODIATRY | Facility: CLINIC | Age: 65
End: 2020-10-27

## 2020-10-27 ENCOUNTER — TELEPHONE (OUTPATIENT)
Dept: PODIATRY | Facility: CLINIC | Age: 65
End: 2020-10-27

## 2020-10-27 NOTE — TELEPHONE ENCOUNTER
----- Message from Gena Grace sent at 10/27/2020 12:50 PM CDT -----  Contact: self   Pt states he received a phone call about 10 am this morning. Pt is asking about a bone density test. Please call and advise

## 2020-10-28 ENCOUNTER — TELEPHONE (OUTPATIENT)
Dept: PODIATRY | Facility: CLINIC | Age: 65
End: 2020-10-28

## 2020-10-28 NOTE — TELEPHONE ENCOUNTER
----- Message from Mirta Blow sent at 10/28/2020  8:45 AM CDT -----  Regarding: Bone Density  Contact: pt  Type: Needs Medical Advice  Who Called:  pt  Symptoms (please be specific):    How long has patient had these symptoms:    Pharmacy name and phone #:    Best Call Back Number: [phone] (home) [phone] (cell)  Additional Information: Pt states MD wants Bone Density, needs to order and schedule, Please call pt to advise. Thanks!!

## 2020-10-30 ENCOUNTER — HOSPITAL ENCOUNTER (OUTPATIENT)
Dept: RADIOLOGY | Facility: HOSPITAL | Age: 65
Discharge: HOME | End: 2020-10-30
Attending: PODIATRIST
Payer: COMMERCIAL

## 2020-10-30 DIAGNOSIS — R22.42 LOCALIZED SWELLING OF LEFT FOOT: ICD-10-CM

## 2020-10-30 DIAGNOSIS — M79.672 FOOT PAIN, LEFT: ICD-10-CM

## 2020-10-30 PROCEDURE — A9503 TC99M MEDRONATE: HCPCS | Mod: PO

## 2020-10-30 PROCEDURE — 78315 NM BONE SCAN 3 PHASE FOOT: ICD-10-PCS | Mod: 26,,, | Performed by: RADIOLOGY

## 2020-10-30 PROCEDURE — 78315 BONE IMAGING 3 PHASE: CPT | Mod: 26,,, | Performed by: RADIOLOGY

## 2020-11-02 ENCOUNTER — TELEPHONE (OUTPATIENT)
Dept: PODIATRY | Facility: CLINIC | Age: 65
End: 2020-11-02

## 2020-11-02 ENCOUNTER — OFFICE VISIT (OUTPATIENT)
Dept: OPHTHALMOLOGY | Facility: CLINIC | Age: 65
End: 2020-11-02
Payer: COMMERCIAL

## 2020-11-02 DIAGNOSIS — H25.13 NS (NUCLEAR SCLEROSIS), BILATERAL: ICD-10-CM

## 2020-11-02 PROCEDURE — 92014 COMPRE OPH EXAM EST PT 1/>: CPT | Mod: S$GLB,,, | Performed by: OPHTHALMOLOGY

## 2020-11-02 PROCEDURE — 92134 CPTRZ OPH DX IMG PST SGM RTA: CPT | Mod: S$GLB,,, | Performed by: OPHTHALMOLOGY

## 2020-11-02 PROCEDURE — 92014 PR EYE EXAM, EST PATIENT,COMPREHESV: ICD-10-PCS | Mod: S$GLB,,, | Performed by: OPHTHALMOLOGY

## 2020-11-02 PROCEDURE — 99999 PR PBB SHADOW E&M-EST. PATIENT-LVL III: ICD-10-PCS | Mod: PBBFAC,,, | Performed by: OPHTHALMOLOGY

## 2020-11-02 PROCEDURE — 99999 PR PBB SHADOW E&M-EST. PATIENT-LVL III: CPT | Mod: PBBFAC,,, | Performed by: OPHTHALMOLOGY

## 2020-11-02 PROCEDURE — 92134 POSTERIOR SEGMENT OCT RETINA (OCULAR COHERENCE TOMOGRAPHY)-BOTH EYES: ICD-10-PCS | Mod: S$GLB,,, | Performed by: OPHTHALMOLOGY

## 2020-11-02 RX ORDER — INFLUENZA A VIRUS A/NEBRASKA/14/2019 (H1N1) ANTIGEN (MDCK CELL DERIVED, PROPIOLACTONE INACTIVATED), INFLUENZA A VIRUS A/DELAWARE/39/2019 (H3N2) ANTIGEN (MDCK CELL DERIVED, PROPIOLACTONE INACTIVATED), INFLUENZA B VIRUS B/SINGAPORE/INFTT-16-0610/2016 ANTIGEN (MDCK CELL DERIVED, PROPIOLACTONE INACTIVATED), INFLUENZA B VIRUS B/DARWIN/7/2019 ANTIGEN (MDCK CELL DERIVED, PROPIOLACTONE INACTIVATED) 15; 15; 15; 15 UG/.5ML; UG/.5ML; UG/.5ML; UG/.5ML
INJECTION, SUSPENSION INTRAMUSCULAR
COMMUNITY
Start: 2020-10-17 | End: 2021-11-23

## 2020-11-02 NOTE — PROGRESS NOTES
HPI     DLS 9/30/2020 DR PIERRE  OCT    +OD vision a little cloudy since last yr  +watery OD  -no flashes or floaters          OCT - some thinning - No ME OU      A/P    1. PDR OU S/P PRP (12 yrs. Ago in [place]; laser OD 2 yrs ago at    ; h/x laser OD (1982 study at [facility])  T1 uncontrolled    2. H/X Vit. Hem. OD      3. NS OU    BS/BP/chol control      1 year OCT

## 2020-11-02 NOTE — TELEPHONE ENCOUNTER
----- Message from Caleb Duffy DPM sent at 10/30/2020 10:35 PM CDT -----  Please let the patient know that the bone scan is suspicious for Charcot.  Set him up with an appointment next week with a 40 minute time block, as I'll discuss applying a total contact cast to stabilize his foot.  Thanks!

## 2020-11-02 NOTE — TELEPHONE ENCOUNTER
Contacted pt regarding bone scan results and scheduled 40 min appt on 11/3/20 to discuss recommendations. The patient verbalized understanding and had no further questions or concerns.  Misha Azul LPN Care Coordinator

## 2020-11-03 ENCOUNTER — OFFICE VISIT (OUTPATIENT)
Dept: PODIATRY | Facility: CLINIC | Age: 65
End: 2020-11-03
Payer: COMMERCIAL

## 2020-11-03 ENCOUNTER — PATIENT MESSAGE (OUTPATIENT)
Dept: PODIATRY | Facility: CLINIC | Age: 65
End: 2020-11-03

## 2020-11-03 VITALS — HEIGHT: 74 IN | WEIGHT: 221.31 LBS | BODY MASS INDEX: 28.4 KG/M2

## 2020-11-03 DIAGNOSIS — S92.302A CLOSED DISPLACED FRACTURE OF METATARSAL BONE OF LEFT FOOT, UNSPECIFIED METATARSAL, INITIAL ENCOUNTER: Primary | ICD-10-CM

## 2020-11-03 DIAGNOSIS — R22.42 LOCALIZED SWELLING OF LEFT FOOT: ICD-10-CM

## 2020-11-03 DIAGNOSIS — E10.42 TYPE 1 DIABETES MELLITUS WITH POLYNEUROPATHY: ICD-10-CM

## 2020-11-03 PROCEDURE — 3008F PR BODY MASS INDEX (BMI) DOCUMENTED: ICD-10-PCS | Mod: CPTII,S$GLB,, | Performed by: PODIATRIST

## 2020-11-03 PROCEDURE — 99212 PR OFFICE/OUTPT VISIT, EST, LEVL II, 10-19 MIN: ICD-10-PCS | Mod: S$GLB,,, | Performed by: PODIATRIST

## 2020-11-03 PROCEDURE — 99999 PR PBB SHADOW E&M-EST. PATIENT-LVL III: ICD-10-PCS | Mod: PBBFAC,,, | Performed by: PODIATRIST

## 2020-11-03 PROCEDURE — 99999 PR PBB SHADOW E&M-EST. PATIENT-LVL III: CPT | Mod: PBBFAC,,, | Performed by: PODIATRIST

## 2020-11-03 PROCEDURE — 3008F BODY MASS INDEX DOCD: CPT | Mod: CPTII,S$GLB,, | Performed by: PODIATRIST

## 2020-11-03 PROCEDURE — 3052F PR MOST RECENT HEMOGLOBIN A1C LEVEL 8.0 - < 9.0%: ICD-10-PCS | Mod: CPTII,S$GLB,, | Performed by: PODIATRIST

## 2020-11-03 PROCEDURE — 3052F HG A1C>EQUAL 8.0%<EQUAL 9.0%: CPT | Mod: CPTII,S$GLB,, | Performed by: PODIATRIST

## 2020-11-03 PROCEDURE — 99212 OFFICE O/P EST SF 10 MIN: CPT | Mod: S$GLB,,, | Performed by: PODIATRIST

## 2020-11-03 NOTE — PROGRESS NOTES
Subjective:      Patient ID: Hammad Douglas III is a 65 y.o. male.    Chief Complaint: Foot Pain  Patient presents to clinic for a follow up regarding a displaced fracture of the Lt. 2nd metatarsal.  He currently describes aching pain in the Lt. Foot that he rates as a 2/10.  Symptoms have improved since our last visit.  He has also noted that prior discoloration to the site has resolved and edema appears to be diminishing.  He has reverted back to using the postoperative shoe.  Notes attempting to minimize his weight bearing activity.  Inquires as to the results of the most recent bone scan.  Denies any additional pedal complaints.    Past Medical History:   Diagnosis Date    Cataract     Chronic kidney disease     Diabetes mellitus type I     Diagnosed at age 19    Diabetic retinopathy     See's Dr. Ledesma    Hyperlipidemia     Hypertension        Past Surgical History:   Procedure Laterality Date    COLONOSCOPY      2011 wnl    TOE AMPUTATION Right 7/2/2020    Procedure: AMPUTATION, TOE; 3rd;  Surgeon: Caleb Duffy DPM;  Location: [facility];  Service: Podiatry;  Laterality: Right;    TOE SURGERY Right     right big toe       Family History   Problem Relation Age of Onset    Cataracts Mother     Breast cancer Sister        Social History     Socioeconomic History    Marital status:      Spouse name: Not on file    Number of children: Not on file    Years of education: Not on file    Highest education level: Not on file   Occupational History    Not on file   Social Needs    Financial resource strain: Not on file    Food insecurity     Worry: Not on file     Inability: Not on file    Transportation needs     Medical: Not on file     Non-medical: Not on file   Tobacco Use    Smoking status: Former Smoker     Types: Cigars    Smokeless tobacco: Never Used    Tobacco comment: Former cigar use   Substance and Sexual Activity    Alcohol use: Yes     Alcohol/week: 2.0 standard drinks      "Types: 2 Cans of beer per week    Drug use: No    Sexual activity: Yes     Partners: Female   Lifestyle    Physical activity     Days per week: Not on file     Minutes per session: Not on file    Stress: Not on file   Relationships    Social connections     Talks on phone: Not on file     Gets together: Not on file     Attends [employer] service: Not on file     Active member of club or organization: Not on file     Attends meetings of clubs or organizations: Not on file     Relationship status: Not on file   Other Topics Concern    Not on file   Social History Narrative    Retired former radiation  at [employer]       Current Outpatient Medications   Medication Sig Dispense Refill    atorvastatin (LIPITOR) 40 MG tablet TAKE 1 TABLET(40 MG) BY MOUTH EVERY DAY 90 tablet 3    FLUCELVAX QUAD 5890-9515, PF, 60 mcg (15 mcg x 4)/0.5 mL Syrg       FLUoxetine 20 MG capsule Take 1 capsule (20 mg total) by mouth once daily. 90 capsule 3    GLUCAGEN HYPOKIT 1 mg SolR Inject 1 mg into the muscle as needed (for severe hypoglycemia). 1 each 0    HYDROcodone-acetaminophen (NORCO) 5-325 mg per tablet Take 1 tablet by mouth every 6 (six) hours as needed for Pain. 28 tablet 0    insulin aspart U-100 (NOVOLOG FLEXPEN U-100 INSULIN) 100 unit/mL (3 mL) InPn pen INJECT USING INSULIN:CARB RATIO OF 1:6 SUBCUTANEOUSLY PRIOR TO MEALS. MAX TDD 40 UNITS 3 Box 3    insulin glargine U-300 conc (TOUJEO MAX U-300 SOLOSTAR) 300 unit/mL (3 mL) InPn Inject 28 Units into the skin every evening.      insulin lispro (HUMALOG KWIKPEN INSULIN) 100 unit/mL pen INJECT USING INSULIN:CARB RATIO OF 1:6 SUBCUTANEOUSLY PRIOR TO MEALS. MAX TDD 40 UNITS 3 Box 3    losartan (COZAAR) 100 MG tablet TAKE 1 TABLET(100 MG) BY MOUTH EVERY DAY 90 tablet 3    multivitamin (ONE DAILY MULTIVITAMIN) per tablet Take 1 tablet by mouth once daily.      pen needle, diabetic (PEN NEEDLE) 31 gauge x 1/4" Ndle 1 each by Misc.(Non-Drug; Combo Route) " route 4 (four) times daily. 400 each 3    blood sugar diagnostic (ONETOUCH ULTRA TEST) Strp 1 strip by Other route 4 (four) times daily. (Patient not taking: Reported on 6/15/2020) 400 strip 3     Current Facility-Administered Medications   Medication Dose Route Frequency Provider Last Rate Last Dose    glucagon (human recombinant) injection 1 mg  1 mg Intramuscular Once Tania Mckeon NP         Facility-Administered Medications Ordered in Other Visits   Medication Dose Route Frequency Provider Last Rate Last Dose    electrolyte-S (ISOLYTE)   Intravenous Continuous Jostin Resendiz MD        lactated ringers infusion   Intravenous Continuous Jostin Resendiz MD 10 mL/hr at 07/02/20 1232      lidocaine (PF) 10 mg/ml (1%) injection 10 mg  1 mL Intradermal Once Jostin Resendiz MD           Review of patient's allergies indicates:   Allergen Reactions    Altace [ramipril]      Cough         Review of Systems   Constitution: Negative for chills and fever.   Cardiovascular: Negative for claudication.  Positive for localized edema.  Skin: Negative for color change, dry skin and poor wound healing.   Musculoskeletal: Negative for joint pain, joint swelling, muscle cramps and muscle weakness.   Neurological: Positive for numbness.   Psychiatric/Behavioral: Negative for altered mental status.           Objective:      Physical Exam   Constitutional: He appears well-developed and well-nourished. No distress.   Cardiovascular:   Pulses:       Dorsalis pedis pulses are 2+ on the right side, and 2+ on the left side.        Posterior tibial pulses are 1+ on the right side, and 1+ on the left side.   CFT is < 3 seconds bilateral.  Pedal hair growth is present bilateral.  Varicosities noted bilateral.  Moderate nonpitting edema noted to the Lt. Forefoot, specifically along the 2nd-4th rays. Toes are warm to touch bilateral.     Musculoskeletal: He exhibits no edema or tenderness.   Muscle strength 5/5 in all muscle  groups bilateral.  No tenderness nor crepitation with ROM of foot/ankle joints bilateral.  Mild pain with palpation along the Lt. 2nd ray, specifically overlying the shaft of the 2nd metatarsal.  Semi-reducible contracture of the lesser digits bilateral.  Rt. 3rd toe amputation.     Neurological: He has normal strength. A sensory deficit is present.   Protective sensation per [place]-Jayson monofilament is decreased bilateral.  Light touch is intact bilateral.   Skin: Skin is warm and dry. Capillary refill takes less than 2 seconds. Lesion noted. No abrasion, no burn, no ecchymosis, no laceration and no petechiae noted. He is not diaphoretic.   Decreasing pedal turgor and temperature noted to the Lt. Dorsal forefoot.  No break noted In adjacent skin integrity no localized sign of infection noted.                   Assessment:       Encounter Diagnoses   Name Primary?    Closed displaced fracture of metatarsal bone of left foot, unspecified metatarsal, initial encounter Yes    Localized swelling of left foot     Type 1 diabetes mellitus with polyneuropathy          Plan:       Hammad was seen today for foot pain.    Diagnoses and all orders for this visit:    Closed displaced fracture of metatarsal bone of left foot, unspecified metatarsal, initial encounter    Localized swelling of left foot    Type 1 diabetes mellitus with polyneuropathy      I counseled the patient on his conditions, their implications and medical management.    Reviewed the results of the bone scan and x-ray.    Being that swelling and increased temperature of the Lt. Foot have improved drastically, pathology is more consistent with a 2nd metatarsal fracture.      Fitted and dispensed a postoperative boot to offload the site.      Advised to begin utilizing crutches, taking care to minimize weight bearing to the affected extremity.  May apply weight to the heel for transfers.    Fitted and dispensed a tubigrip stocking to manage the remaining  edema.      Patient to manage pain symptoms with Tylenol.    Will repeat x-rays in 2 weeks, along with a follow up visit.    Caleb Duffy DPM

## 2020-11-04 ENCOUNTER — PATIENT MESSAGE (OUTPATIENT)
Dept: PODIATRY | Facility: CLINIC | Age: 65
End: 2020-11-04

## 2020-11-07 ENCOUNTER — PATIENT MESSAGE (OUTPATIENT)
Dept: OTOLARYNGOLOGY | Facility: CLINIC | Age: 65
End: 2020-11-07

## 2020-11-10 ENCOUNTER — LAB VISIT (OUTPATIENT)
Dept: PRIMARY CARE CLINIC | Facility: OTHER | Age: 65
End: 2020-11-10
Attending: INTERNAL MEDICINE
Payer: COMMERCIAL

## 2020-11-10 ENCOUNTER — PATIENT MESSAGE (OUTPATIENT)
Dept: FAMILY MEDICINE | Facility: CLINIC | Age: 65
End: 2020-11-10

## 2020-11-10 DIAGNOSIS — Z03.818 ENCOUNTER FOR OBSERVATION FOR SUSPECTED EXPOSURE TO OTHER BIOLOGICAL AGENTS RULED OUT: ICD-10-CM

## 2020-11-10 PROCEDURE — U0003 INFECTIOUS AGENT DETECTION BY NUCLEIC ACID (DNA OR RNA); SEVERE ACUTE RESPIRATORY SYNDROME CORONAVIRUS 2 (SARS-COV-2) (CORONAVIRUS DISEASE [COVID-19]), AMPLIFIED PROBE TECHNIQUE, MAKING USE OF HIGH THROUGHPUT TECHNOLOGIES AS DESCRIBED BY CMS-2020-01-R: HCPCS

## 2020-11-11 LAB — SARS-COV-2 RNA RESP QL NAA+PROBE: NOT DETECTED

## 2020-11-12 ENCOUNTER — PATIENT MESSAGE (OUTPATIENT)
Dept: ENDOCRINOLOGY | Facility: CLINIC | Age: 65
End: 2020-11-12

## 2020-11-12 DIAGNOSIS — E10.43 TYPE 1 DIABETES MELLITUS WITH DIABETIC AUTONOMIC NEUROPATHY: ICD-10-CM

## 2020-11-13 RX ORDER — INSULIN GLARGINE 300 U/ML
INJECTION, SOLUTION SUBCUTANEOUS
Qty: 2.8 ML | Refills: 3 | Status: SHIPPED | OUTPATIENT
Start: 2020-11-13 | End: 2021-03-11 | Stop reason: SDUPTHER

## 2020-11-17 ENCOUNTER — HOSPITAL ENCOUNTER (OUTPATIENT)
Dept: RADIOLOGY | Facility: HOSPITAL | Age: 65
Discharge: HOME | End: 2020-11-17
Attending: PODIATRIST
Payer: COMMERCIAL

## 2020-11-17 ENCOUNTER — OFFICE VISIT (OUTPATIENT)
Dept: PODIATRY | Facility: CLINIC | Age: 65
End: 2020-11-17
Payer: COMMERCIAL

## 2020-11-17 VITALS — WEIGHT: 221.31 LBS | HEIGHT: 74 IN | BODY MASS INDEX: 28.4 KG/M2

## 2020-11-17 DIAGNOSIS — E10.42 TYPE 1 DIABETES MELLITUS WITH POLYNEUROPATHY: ICD-10-CM

## 2020-11-17 DIAGNOSIS — S92.302D CLOSED DISPLACED FRACTURE OF METATARSAL BONE OF LEFT FOOT WITH ROUTINE HEALING, UNSPECIFIED METATARSAL, SUBSEQUENT ENCOUNTER: Primary | ICD-10-CM

## 2020-11-17 DIAGNOSIS — S92.302A CLOSED DISPLACED FRACTURE OF METATARSAL BONE OF LEFT FOOT, UNSPECIFIED METATARSAL, INITIAL ENCOUNTER: ICD-10-CM

## 2020-11-17 PROCEDURE — 99212 PR OFFICE/OUTPT VISIT, EST, LEVL II, 10-19 MIN: ICD-10-PCS | Mod: S$GLB,,, | Performed by: PODIATRIST

## 2020-11-17 PROCEDURE — 3008F PR BODY MASS INDEX (BMI) DOCUMENTED: ICD-10-PCS | Mod: CPTII,S$GLB,, | Performed by: PODIATRIST

## 2020-11-17 PROCEDURE — 73630 X-RAY EXAM OF FOOT: CPT | Mod: 26,LT,, | Performed by: RADIOLOGY

## 2020-11-17 PROCEDURE — 73630 XR FOOT COMPLETE 3 VIEW LEFT: ICD-10-PCS | Mod: 26,LT,, | Performed by: RADIOLOGY

## 2020-11-17 PROCEDURE — 99212 OFFICE O/P EST SF 10 MIN: CPT | Mod: S$GLB,,, | Performed by: PODIATRIST

## 2020-11-17 PROCEDURE — 99999 PR PBB SHADOW E&M-EST. PATIENT-LVL III: ICD-10-PCS | Mod: PBBFAC,,, | Performed by: PODIATRIST

## 2020-11-17 PROCEDURE — 73630 X-RAY EXAM OF FOOT: CPT | Mod: TC,FY,PO,LT

## 2020-11-17 PROCEDURE — 1126F PR PAIN SEVERITY QUANTIFIED, NO PAIN PRESENT: ICD-10-PCS | Mod: S$GLB,,, | Performed by: PODIATRIST

## 2020-11-17 PROCEDURE — 3008F BODY MASS INDEX DOCD: CPT | Mod: CPTII,S$GLB,, | Performed by: PODIATRIST

## 2020-11-17 PROCEDURE — 99999 PR PBB SHADOW E&M-EST. PATIENT-LVL III: CPT | Mod: PBBFAC,,, | Performed by: PODIATRIST

## 2020-11-17 PROCEDURE — 3052F HG A1C>EQUAL 8.0%<EQUAL 9.0%: CPT | Mod: CPTII,S$GLB,, | Performed by: PODIATRIST

## 2020-11-17 PROCEDURE — 3052F PR MOST RECENT HEMOGLOBIN A1C LEVEL 8.0 - < 9.0%: ICD-10-PCS | Mod: CPTII,S$GLB,, | Performed by: PODIATRIST

## 2020-11-17 PROCEDURE — 1126F AMNT PAIN NOTED NONE PRSNT: CPT | Mod: S$GLB,,, | Performed by: PODIATRIST

## 2020-11-18 NOTE — PROGRESS NOTES
Subjective:      Patient ID: Hammad Douglas III is a 65 y.o. male.    Chief Complaint: Foot Pain (f/u charcot  x rays)  Patient presents to clinic for a 2 week follow up regarding a displaced fracture of the Lt. 2nd metatarsal.  He currently denies experiencing pain from the fracture site with today's exam.  Notes ambulation with use of crutches and the postoperative boot.  Notes the compression sleeve helped to mitigate prior pain symptoms as well as swelling to the extremity.  Symptoms have improved since our last visit. Denies any additional pedal complaints.    Past Medical History:   Diagnosis Date    Cataract     Chronic kidney disease     Diabetes mellitus type I     Diagnosed at age 19    Diabetic retinopathy     See's Dr. Ledesma    Hyperlipidemia     Hypertension        Past Surgical History:   Procedure Laterality Date    COLONOSCOPY      2011 wnl    TOE AMPUTATION Right 7/2/2020    Procedure: AMPUTATION, TOE; 3rd;  Surgeon: Caleb Duffy DPM;  Location: [facility] OR;  Service: Podiatry;  Laterality: Right;    TOE SURGERY Right     right big toe       Family History   Problem Relation Age of Onset    Cataracts Mother     Breast cancer Sister        Social History     Socioeconomic History    Marital status:      Spouse name: Not on file    Number of children: Not on file    Years of education: Not on file    Highest education level: Not on file   Occupational History    Not on file   Social Needs    Financial resource strain: Not on file    Food insecurity     Worry: Not on file     Inability: Not on file    Transportation needs     Medical: Not on file     Non-medical: Not on file   Tobacco Use    Smoking status: Former Smoker     Types: Cigars    Smokeless tobacco: Never Used    Tobacco comment: Former cigar use   Substance and Sexual Activity    Alcohol use: Yes     Alcohol/week: 2.0 standard drinks     Types: 2 Cans of beer per week    Drug use: No    Sexual activity: Yes     " Partners: Female   Lifestyle    Physical activity     Days per week: Not on file     Minutes per session: Not on file    Stress: Not on file   Relationships    Social connections     Talks on phone: Not on file     Gets together: Not on file     Attends [employer] service: Not on file     Active member of club or organization: Not on file     Attends meetings of clubs or organizations: Not on file     Relationship status: Not on file   Other Topics Concern    Not on file   Social History Narrative    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 90 capsule 3    GLUCAGEN HYPOKIT 1 mg SolR Inject 1 mg into the muscle as needed (for severe hypoglycemia). 1 each 0    HYDROcodone-acetaminophen (NORCO) 5-325 mg per tablet Take 1 tablet by mouth every 6 (six) hours as needed for Pain. 28 tablet 0    insulin aspart U-100 (NOVOLOG FLEXPEN U-100 INSULIN) 100 unit/mL (3 mL) InPn pen INJECT USING INSULIN:CARB RATIO OF 1:6 SUBCUTANEOUSLY PRIOR TO MEALS. MAX TDD 40 UNITS 3 Box 3    insulin glargine U-300 conc (TOUJEO MAX U-300 SOLOSTAR) 300 unit/mL (3 mL) InPn Inject 28 units into the skin every evening. 2.8 mL 3    insulin lispro (HUMALOG KWIKPEN INSULIN) 100 unit/mL pen INJECT USING INSULIN:CARB RATIO OF 1:6 SUBCUTANEOUSLY PRIOR TO MEALS. MAX TDD 40 UNITS 3 Box 3    losartan (COZAAR) 100 MG tablet TAKE 1 TABLET(100 MG) BY MOUTH EVERY DAY 90 tablet 3    multivitamin (ONE DAILY MULTIVITAMIN) per tablet Take 1 tablet by mouth once daily.      pen needle, diabetic (PEN NEEDLE) 31 gauge x 1/4" Ndle 1 each by Misc.(Non-Drug; Combo Route) route 4 (four) times daily. 400 each 3    blood sugar diagnostic " (ONETOUCH ULTRA TEST) Strp 1 strip by Other route 4 (four) times daily. (Patient not taking: Reported on 6/15/2020) 400 strip 3     Current Facility-Administered Medications   Medication Dose Route Frequency Provider Last Rate Last Dose    glucagon (human recombinant) injection 1 mg  1 mg Intramuscular Once Tania Mckeon NP         Facility-Administered Medications Ordered in Other Visits   Medication Dose Route Frequency Provider Last Rate Last Dose    electrolyte-S (ISOLYTE)   Intravenous Continuous Jostin Resendiz MD        lactated ringers infusion   Intravenous Continuous Jostin Resendiz MD 10 mL/hr at 07/02/20 1232      lidocaine (PF) 10 mg/ml (1%) injection 10 mg  1 mL Intradermal Once Jostin Resendiz MD           Review of patient's allergies indicates:   Allergen Reactions    Altace [ramipril]      Cough         Review of Systems   Constitution: Negative for chills and fever.   Cardiovascular: Negative for claudication.  Positive for localized edema.  Skin: Negative for color change, dry skin and poor wound healing.   Musculoskeletal: Negative for joint pain, joint swelling, muscle cramps and muscle weakness.   Neurological: Positive for numbness.   Psychiatric/Behavioral: Negative for altered mental status.           Objective:      Physical Exam   Constitutional: He appears well-developed and well-nourished. No distress.   Cardiovascular:   Pulses:       Dorsalis pedis pulses are 2+ on the right side, and 2+ on the left side.        Posterior tibial pulses are 1+ on the right side, and 1+ on the left side.   CFT is < 3 seconds bilateral.  Pedal hair growth is present bilateral.  Varicosities noted bilateral.  Mild nonpitting edema noted to the Lt. Forefoot, specifically along the 2nd-4th rays. Toes are warm to touch bilateral.     Musculoskeletal: He exhibits no edema or tenderness.   Muscle strength 5/5 in all muscle groups bilateral.  No tenderness nor crepitation with ROM of foot/ankle  joints bilateral.  (-) for pain with palpation of bilateral foot and ankle.  Semi-reducible contracture of the lesser digits bilateral.  Rt. 3rd toe amputation.     Neurological: He has normal strength. A sensory deficit is present.   Protective sensation per [place]-Jayson monofilament is decreased bilateral.  Light touch is intact bilateral.   Skin: Skin is warm and dry. Capillary refill takes less than 2 seconds. Lesion noted. No abrasion, no burn, no ecchymosis, no laceration and no petechiae noted. He is not diaphoretic.   Normal pedal turgor and temperature noted to the Lt. Dorsal forefoot.  No break noted In adjacent skin integrity no localized sign of infection noted.                   Assessment:       Encounter Diagnoses   Name Primary?    Closed displaced fracture of metatarsal bone of left foot with routine healing, unspecified metatarsal, subsequent encounter Yes    Type 1 diabetes mellitus with polyneuropathy          Plan:       Hammad was seen today for foot pain.    Diagnoses and all orders for this visit:    Closed displaced fracture of metatarsal bone of left foot with routine healing, unspecified metatarsal, subsequent encounter  -     X-Ray Foot Complete Left; Future    Type 1 diabetes mellitus with polyneuropathy      I counseled the patient on his conditions, their implications and medical management.    Reviewed the results of the x-ray.  Interval healing noted at the fracture site.      To continue utilizing crutches with ambulation over greater distances and light weight bearing while in the boot around his home.    To continue wearing compression stockings to minimize peripheral edema.    Will repeat x-rays in 2 weeks.    Will call with imaging results.    Caleb Duffy DPM

## 2020-11-19 ENCOUNTER — PATIENT MESSAGE (OUTPATIENT)
Dept: ENDOCRINOLOGY | Facility: CLINIC | Age: 65
End: 2020-11-19

## 2020-11-19 RX ORDER — PEN NEEDLE, DIABETIC 29 G X1/2"
1 NEEDLE, DISPOSABLE MISCELLANEOUS 4 TIMES DAILY
Qty: 400 EACH | Refills: 1 | Status: SHIPPED | OUTPATIENT
Start: 2020-11-19 | End: 2021-01-04

## 2020-12-01 ENCOUNTER — PATIENT MESSAGE (OUTPATIENT)
Dept: PODIATRY | Facility: CLINIC | Age: 65
End: 2020-12-01

## 2020-12-01 ENCOUNTER — HOSPITAL ENCOUNTER (OUTPATIENT)
Dept: RADIOLOGY | Facility: HOSPITAL | Age: 65
Discharge: HOME | End: 2020-12-01
Attending: PODIATRIST
Payer: COMMERCIAL

## 2020-12-01 DIAGNOSIS — S92.302D CLOSED DISPLACED FRACTURE OF METATARSAL BONE OF LEFT FOOT WITH ROUTINE HEALING, UNSPECIFIED METATARSAL, SUBSEQUENT ENCOUNTER: ICD-10-CM

## 2020-12-01 PROCEDURE — 73630 X-RAY EXAM OF FOOT: CPT | Mod: 26,LT,, | Performed by: RADIOLOGY

## 2020-12-01 PROCEDURE — 73630 X-RAY EXAM OF FOOT: CPT | Mod: TC,FY,PO,LT

## 2020-12-01 PROCEDURE — 73630 XR FOOT COMPLETE 3 VIEW LEFT: ICD-10-PCS | Mod: 26,LT,, | Performed by: RADIOLOGY

## 2020-12-02 ENCOUNTER — PATIENT MESSAGE (OUTPATIENT)
Dept: PODIATRY | Facility: CLINIC | Age: 65
End: 2020-12-02

## 2020-12-14 ENCOUNTER — PATIENT MESSAGE (OUTPATIENT)
Dept: PODIATRY | Facility: CLINIC | Age: 65
End: 2020-12-14

## 2020-12-20 ENCOUNTER — PATIENT OUTREACH (OUTPATIENT)
Dept: ADMINISTRATIVE | Facility: OTHER | Age: 65
End: 2020-12-20

## 2020-12-20 NOTE — PROGRESS NOTES
Health Maintenance Due   Topic Date Due    TETANUS VACCINE  05/24/1975    Aspirin/Antiplatelet Therapy  05/24/1975    Pneumococcal Vaccine (Medium Risk) (1 of 1 - PPSV23) 05/24/1976    Shingles Vaccine (1 of 2) 05/24/2007     Updates were requested from care everywhere.  Chart was reviewed for overdue Proactive [facility] Encounters ([facility]RI[facility]) topics (CRS, Breast Cancer Screening, Eye exam)  Health Maintenance has been updated.  LINKS immunization registry triggered.  Immunizations were reconciled.

## 2020-12-21 ENCOUNTER — PATIENT MESSAGE (OUTPATIENT)
Dept: OTOLARYNGOLOGY | Facility: CLINIC | Age: 65
End: 2020-12-21

## 2020-12-25 ENCOUNTER — PATIENT MESSAGE (OUTPATIENT)
Dept: FAMILY MEDICINE | Facility: CLINIC | Age: 65
End: 2020-12-25

## 2020-12-26 ENCOUNTER — PATIENT MESSAGE (OUTPATIENT)
Dept: FAMILY MEDICINE | Facility: CLINIC | Age: 65
End: 2020-12-26

## 2020-12-27 ENCOUNTER — PATIENT MESSAGE (OUTPATIENT)
Dept: ENDOCRINOLOGY | Facility: CLINIC | Age: 65
End: 2020-12-27

## 2020-12-27 ENCOUNTER — PATIENT MESSAGE (OUTPATIENT)
Dept: OTOLARYNGOLOGY | Facility: CLINIC | Age: 65
End: 2020-12-27

## 2020-12-31 ENCOUNTER — PATIENT MESSAGE (OUTPATIENT)
Dept: ENDOCRINOLOGY | Facility: CLINIC | Age: 65
End: 2020-12-31

## 2021-01-04 ENCOUNTER — PATIENT MESSAGE (OUTPATIENT)
Dept: ENDOCRINOLOGY | Facility: CLINIC | Age: 66
End: 2021-01-04

## 2021-01-04 RX ORDER — BLOOD SUGAR DIAGNOSTIC
STRIP MISCELLANEOUS
Qty: 150 EACH | Refills: 11 | Status: SHIPPED | OUTPATIENT
Start: 2021-01-04 | End: 2021-01-15

## 2021-01-05 ENCOUNTER — CLINICAL SUPPORT (OUTPATIENT)
Dept: AUDIOLOGY | Facility: CLINIC | Age: 66
End: 2021-01-05
Payer: COMMERCIAL

## 2021-01-05 DIAGNOSIS — Z71.89 HEARING AID CONSULTATION: Primary | ICD-10-CM

## 2021-01-13 ENCOUNTER — CLINICAL SUPPORT (OUTPATIENT)
Dept: AUDIOLOGY | Facility: CLINIC | Age: 66
End: 2021-01-13
Payer: COMMERCIAL

## 2021-01-13 DIAGNOSIS — Z46.1 ENCOUNTER FOR HEARING AID FITTING OF BOTH EARS: Primary | ICD-10-CM

## 2021-01-13 PROCEDURE — V5140 PR BEHIND EAR BINAUR HEARING AI: ICD-10-PCS | Mod: CSM,,, | Performed by: AUDIOLOGIST-HEARING AID FITTER

## 2021-01-13 PROCEDURE — COVTAX COVINGTON PRESCRIBED 4.25%: ICD-10-PCS | Mod: CSM,,, | Performed by: AUDIOLOGIST-HEARING AID FITTER

## 2021-01-13 PROCEDURE — COVTAX COVINGTON PRESCRIBED 4.25%: Mod: CSM,,, | Performed by: AUDIOLOGIST-HEARING AID FITTER

## 2021-01-13 PROCEDURE — V5140 BEHIND EAR BINAUR HEARING AI: HCPCS | Mod: CSM,,, | Performed by: AUDIOLOGIST-HEARING AID FITTER

## 2021-01-15 ENCOUNTER — PATIENT MESSAGE (OUTPATIENT)
Dept: ENDOCRINOLOGY | Facility: CLINIC | Age: 66
End: 2021-01-15

## 2021-01-15 RX ORDER — PEN NEEDLE, DIABETIC 30 GX3/16"
NEEDLE, DISPOSABLE MISCELLANEOUS
Qty: 150 EACH | Refills: 11 | Status: SHIPPED | OUTPATIENT
Start: 2021-01-15 | End: 2022-01-28

## 2021-01-17 ENCOUNTER — PATIENT MESSAGE (OUTPATIENT)
Dept: PODIATRY | Facility: CLINIC | Age: 66
End: 2021-01-17

## 2021-01-19 ENCOUNTER — PATIENT MESSAGE (OUTPATIENT)
Dept: PODIATRY | Facility: CLINIC | Age: 66
End: 2021-01-19

## 2021-01-19 ENCOUNTER — CLINICAL SUPPORT (OUTPATIENT)
Dept: AUDIOLOGY | Facility: CLINIC | Age: 66
End: 2021-01-19
Payer: COMMERCIAL

## 2021-01-19 DIAGNOSIS — Z97.4 WEARS HEARING AID IN BOTH EARS: Primary | ICD-10-CM

## 2021-01-24 ENCOUNTER — PATIENT MESSAGE (OUTPATIENT)
Dept: PODIATRY | Facility: CLINIC | Age: 66
End: 2021-01-24

## 2021-01-25 ENCOUNTER — PATIENT OUTREACH (OUTPATIENT)
Dept: ADMINISTRATIVE | Facility: OTHER | Age: 66
End: 2021-01-25

## 2021-01-26 ENCOUNTER — OFFICE VISIT (OUTPATIENT)
Dept: PODIATRY | Facility: CLINIC | Age: 66
End: 2021-01-26
Payer: COMMERCIAL

## 2021-01-26 ENCOUNTER — HOSPITAL ENCOUNTER (OUTPATIENT)
Dept: RADIOLOGY | Facility: HOSPITAL | Age: 66
Discharge: HOME | End: 2021-01-26
Attending: PODIATRIST
Payer: COMMERCIAL

## 2021-01-26 VITALS — HEIGHT: 74 IN | WEIGHT: 221.31 LBS | BODY MASS INDEX: 28.4 KG/M2

## 2021-01-26 DIAGNOSIS — E10.621 TYPE 1 DIABETES MELLITUS WITH DIABETIC TOE ULCER: ICD-10-CM

## 2021-01-26 DIAGNOSIS — L97.509 TYPE 1 DIABETES MELLITUS WITH DIABETIC TOE ULCER: Primary | ICD-10-CM

## 2021-01-26 DIAGNOSIS — L97.509 TYPE 1 DIABETES MELLITUS WITH DIABETIC TOE ULCER: ICD-10-CM

## 2021-01-26 DIAGNOSIS — E10.621 TYPE 1 DIABETES MELLITUS WITH DIABETIC TOE ULCER: Primary | ICD-10-CM

## 2021-01-26 DIAGNOSIS — E10.42 TYPE 1 DIABETES MELLITUS WITH POLYNEUROPATHY: ICD-10-CM

## 2021-01-26 PROCEDURE — 99213 OFFICE O/P EST LOW 20 MIN: CPT | Mod: 25,S$GLB,, | Performed by: PODIATRIST

## 2021-01-26 PROCEDURE — 73630 X-RAY EXAM OF FOOT: CPT | Mod: 26,LT,, | Performed by: RADIOLOGY

## 2021-01-26 PROCEDURE — 1126F PR PAIN SEVERITY QUANTIFIED, NO PAIN PRESENT: ICD-10-PCS | Mod: S$GLB,,, | Performed by: PODIATRIST

## 2021-01-26 PROCEDURE — 73630 XR FOOT COMPLETE 3 VIEW LEFT: ICD-10-PCS | Mod: 26,LT,, | Performed by: RADIOLOGY

## 2021-01-26 PROCEDURE — 1126F AMNT PAIN NOTED NONE PRSNT: CPT | Mod: S$GLB,,, | Performed by: PODIATRIST

## 2021-01-26 PROCEDURE — 99999 PR PBB SHADOW E&M-EST. PATIENT-LVL III: CPT | Mod: PBBFAC,,, | Performed by: PODIATRIST

## 2021-01-26 PROCEDURE — 99999 PR PBB SHADOW E&M-EST. PATIENT-LVL III: ICD-10-PCS | Mod: PBBFAC,,, | Performed by: PODIATRIST

## 2021-01-26 PROCEDURE — 3008F BODY MASS INDEX DOCD: CPT | Mod: CPTII,S$GLB,, | Performed by: PODIATRIST

## 2021-01-26 PROCEDURE — 87186 SC STD MICRODIL/AGAR DIL: CPT

## 2021-01-26 PROCEDURE — 87070 CULTURE OTHR SPECIMN AEROBIC: CPT

## 2021-01-26 PROCEDURE — 99213 PR OFFICE/OUTPT VISIT, EST, LEVL III, 20-29 MIN: ICD-10-PCS | Mod: 25,S$GLB,, | Performed by: PODIATRIST

## 2021-01-26 PROCEDURE — 11042 DBRDMT SUBQ TIS 1ST 20SQCM/<: CPT | Mod: S$GLB,,, | Performed by: PODIATRIST

## 2021-01-26 PROCEDURE — 87077 CULTURE AEROBIC IDENTIFY: CPT

## 2021-01-26 PROCEDURE — 3052F HG A1C>EQUAL 8.0%<EQUAL 9.0%: CPT | Mod: CPTII,S$GLB,, | Performed by: PODIATRIST

## 2021-01-26 PROCEDURE — 73630 X-RAY EXAM OF FOOT: CPT | Mod: TC,PO,LT

## 2021-01-26 PROCEDURE — 3008F PR BODY MASS INDEX (BMI) DOCUMENTED: ICD-10-PCS | Mod: CPTII,S$GLB,, | Performed by: PODIATRIST

## 2021-01-26 PROCEDURE — 11042 WOUND DEBRIDEMENT: ICD-10-PCS | Mod: S$GLB,,, | Performed by: PODIATRIST

## 2021-01-26 PROCEDURE — 3052F PR MOST RECENT HEMOGLOBIN A1C LEVEL 8.0 - < 9.0%: ICD-10-PCS | Mod: CPTII,S$GLB,, | Performed by: PODIATRIST

## 2021-01-26 PROCEDURE — 87075 CULTR BACTERIA EXCEPT BLOOD: CPT

## 2021-01-30 DIAGNOSIS — L08.9 DIABETIC FOOT INFECTION: Primary | ICD-10-CM

## 2021-01-30 DIAGNOSIS — E11.628 DIABETIC FOOT INFECTION: Primary | ICD-10-CM

## 2021-01-30 LAB — BACTERIA SPEC AEROBE CULT: ABNORMAL

## 2021-01-30 RX ORDER — CIPROFLOXACIN 250 MG/1
250 TABLET, FILM COATED ORAL 2 TIMES DAILY
Qty: 14 TABLET | Refills: 0 | Status: SHIPPED | OUTPATIENT
Start: 2021-01-30 | End: 2021-02-17

## 2021-02-01 LAB — BACTERIA SPEC ANAEROBE CULT: NORMAL

## 2021-02-02 ENCOUNTER — OFFICE VISIT (OUTPATIENT)
Dept: PODIATRY | Facility: CLINIC | Age: 66
End: 2021-02-02
Payer: COMMERCIAL

## 2021-02-02 VITALS — BODY MASS INDEX: 28.4 KG/M2 | WEIGHT: 221.31 LBS | HEIGHT: 74 IN

## 2021-02-02 DIAGNOSIS — E10.42 TYPE 1 DIABETES MELLITUS WITH POLYNEUROPATHY: ICD-10-CM

## 2021-02-02 DIAGNOSIS — L97.509 TYPE 1 DIABETES MELLITUS WITH DIABETIC TOE ULCER: Primary | ICD-10-CM

## 2021-02-02 DIAGNOSIS — E10.621 TYPE 1 DIABETES MELLITUS WITH DIABETIC TOE ULCER: Primary | ICD-10-CM

## 2021-02-02 PROCEDURE — 3008F PR BODY MASS INDEX (BMI) DOCUMENTED: ICD-10-PCS | Mod: CPTII,S$GLB,, | Performed by: PODIATRIST

## 2021-02-02 PROCEDURE — 99999 PR PBB SHADOW E&M-EST. PATIENT-LVL II: ICD-10-PCS | Mod: PBBFAC,,, | Performed by: PODIATRIST

## 2021-02-02 PROCEDURE — 1126F AMNT PAIN NOTED NONE PRSNT: CPT | Mod: S$GLB,,, | Performed by: PODIATRIST

## 2021-02-02 PROCEDURE — 11042 WOUND DEBRIDEMENT: ICD-10-PCS | Mod: S$GLB,,, | Performed by: PODIATRIST

## 2021-02-02 PROCEDURE — 1126F PR PAIN SEVERITY QUANTIFIED, NO PAIN PRESENT: ICD-10-PCS | Mod: S$GLB,,, | Performed by: PODIATRIST

## 2021-02-02 PROCEDURE — 99499 UNLISTED E&M SERVICE: CPT | Mod: S$GLB,,, | Performed by: PODIATRIST

## 2021-02-02 PROCEDURE — 99999 PR PBB SHADOW E&M-EST. PATIENT-LVL II: CPT | Mod: PBBFAC,,, | Performed by: PODIATRIST

## 2021-02-02 PROCEDURE — 99499 NO LOS: ICD-10-PCS | Mod: S$GLB,,, | Performed by: PODIATRIST

## 2021-02-02 PROCEDURE — 11042 DBRDMT SUBQ TIS 1ST 20SQCM/<: CPT | Mod: S$GLB,,, | Performed by: PODIATRIST

## 2021-02-02 PROCEDURE — 3008F BODY MASS INDEX DOCD: CPT | Mod: CPTII,S$GLB,, | Performed by: PODIATRIST

## 2021-02-05 ENCOUNTER — PATIENT MESSAGE (OUTPATIENT)
Dept: PODIATRY | Facility: CLINIC | Age: 66
End: 2021-02-05

## 2021-02-07 ENCOUNTER — PATIENT MESSAGE (OUTPATIENT)
Dept: FAMILY MEDICINE | Facility: CLINIC | Age: 66
End: 2021-02-07

## 2021-02-09 ENCOUNTER — OFFICE VISIT (OUTPATIENT)
Dept: PODIATRY | Facility: CLINIC | Age: 66
End: 2021-02-09
Payer: COMMERCIAL

## 2021-02-09 VITALS — HEIGHT: 74 IN | BODY MASS INDEX: 28.4 KG/M2 | WEIGHT: 221.31 LBS

## 2021-02-09 DIAGNOSIS — E10.621 TYPE 1 DIABETES MELLITUS WITH DIABETIC TOE ULCER: Primary | ICD-10-CM

## 2021-02-09 DIAGNOSIS — L97.509 TYPE 1 DIABETES MELLITUS WITH DIABETIC TOE ULCER: Primary | ICD-10-CM

## 2021-02-09 DIAGNOSIS — L89.892 PRESSURE INJURY OF TOE OF RIGHT FOOT, STAGE 2: ICD-10-CM

## 2021-02-09 DIAGNOSIS — E10.42 TYPE 1 DIABETES MELLITUS WITH POLYNEUROPATHY: ICD-10-CM

## 2021-02-09 PROCEDURE — 99999 PR PBB SHADOW E&M-EST. PATIENT-LVL II: ICD-10-PCS | Mod: PBBFAC,,, | Performed by: PODIATRIST

## 2021-02-09 PROCEDURE — 99999 PR PBB SHADOW E&M-EST. PATIENT-LVL II: CPT | Mod: PBBFAC,,, | Performed by: PODIATRIST

## 2021-02-09 PROCEDURE — 99499 NO LOS: ICD-10-PCS | Mod: S$GLB,,, | Performed by: PODIATRIST

## 2021-02-09 PROCEDURE — 11042 DBRDMT SUBQ TIS 1ST 20SQCM/<: CPT | Mod: S$GLB,,, | Performed by: PODIATRIST

## 2021-02-09 PROCEDURE — 87075 CULTR BACTERIA EXCEPT BLOOD: CPT

## 2021-02-09 PROCEDURE — 97597 DBRDMT OPN WND 1ST 20 CM/<: CPT | Mod: 59,S$GLB,, | Performed by: PODIATRIST

## 2021-02-09 PROCEDURE — 1126F AMNT PAIN NOTED NONE PRSNT: CPT | Mod: S$GLB,,, | Performed by: PODIATRIST

## 2021-02-09 PROCEDURE — 3008F PR BODY MASS INDEX (BMI) DOCUMENTED: ICD-10-PCS | Mod: CPTII,S$GLB,, | Performed by: PODIATRIST

## 2021-02-09 PROCEDURE — 99499 UNLISTED E&M SERVICE: CPT | Mod: S$GLB,,, | Performed by: PODIATRIST

## 2021-02-09 PROCEDURE — 3008F BODY MASS INDEX DOCD: CPT | Mod: CPTII,S$GLB,, | Performed by: PODIATRIST

## 2021-02-09 PROCEDURE — 97597 WOUND DEBRIDEMENT: ICD-10-PCS | Mod: 59,S$GLB,, | Performed by: PODIATRIST

## 2021-02-09 PROCEDURE — 87070 CULTURE OTHR SPECIMN AEROBIC: CPT

## 2021-02-09 PROCEDURE — 1126F PR PAIN SEVERITY QUANTIFIED, NO PAIN PRESENT: ICD-10-PCS | Mod: S$GLB,,, | Performed by: PODIATRIST

## 2021-02-09 PROCEDURE — 11042 WOUND DEBRIDEMENT: ICD-10-PCS | Mod: S$GLB,,, | Performed by: PODIATRIST

## 2021-02-10 ENCOUNTER — LAB VISIT (OUTPATIENT)
Dept: LAB | Facility: HOSPITAL | Age: 66
End: 2021-02-10
Attending: NURSE PRACTITIONER
Payer: COMMERCIAL

## 2021-02-10 DIAGNOSIS — E10.43 TYPE 1 DIABETES MELLITUS WITH DIABETIC AUTONOMIC NEUROPATHY: ICD-10-CM

## 2021-02-10 LAB
ALBUMIN SERPL BCP-MCNC: 3.6 G/DL (ref 3.5–5.2)
ALP SERPL-CCNC: 103 U/L (ref 55–135)
ALT SERPL W/O P-5'-P-CCNC: 22 U/L (ref 10–44)
ANION GAP SERPL CALC-SCNC: 10 MMOL/L (ref 8–16)
AST SERPL-CCNC: 21 U/L (ref 10–40)
BILIRUB SERPL-MCNC: 0.5 MG/DL (ref 0.1–1)
BUN SERPL-MCNC: 22 MG/DL (ref 8–23)
CALCIUM SERPL-MCNC: 9 MG/DL (ref 8.7–10.5)
CHLORIDE SERPL-SCNC: 103 MMOL/L (ref 95–110)
CO2 SERPL-SCNC: 25 MMOL/L (ref 23–29)
CREAT SERPL-MCNC: 1.3 MG/DL (ref 0.5–1.4)
EST. GFR  (AFRICAN AMERICAN): >60 ML/MIN/1.73 M^2
EST. GFR  (NON AFRICAN AMERICAN): 58.1 ML/MIN/1.73 M^2
ESTIMATED AVG GLUCOSE: 169 MG/DL (ref 68–131)
GLUCOSE SERPL-MCNC: 221 MG/DL (ref 70–110)
HBA1C MFR BLD: 7.5 % (ref 4–5.6)
POTASSIUM SERPL-SCNC: 4.5 MMOL/L (ref 3.5–5.1)
PROT SERPL-MCNC: 7.1 G/DL (ref 6–8.4)
SODIUM SERPL-SCNC: 138 MMOL/L (ref 136–145)

## 2021-02-10 PROCEDURE — 36415 COLL VENOUS BLD VENIPUNCTURE: CPT | Mod: PN

## 2021-02-10 PROCEDURE — 83036 HEMOGLOBIN GLYCOSYLATED A1C: CPT

## 2021-02-10 PROCEDURE — 80053 COMPREHEN METABOLIC PANEL: CPT

## 2021-02-13 LAB — BACTERIA SPEC AEROBE CULT: NO GROWTH

## 2021-02-15 ENCOUNTER — OFFICE VISIT (OUTPATIENT)
Dept: PODIATRY | Facility: CLINIC | Age: 66
End: 2021-02-15
Payer: COMMERCIAL

## 2021-02-15 VITALS — WEIGHT: 221.31 LBS | HEIGHT: 74 IN | BODY MASS INDEX: 28.4 KG/M2

## 2021-02-15 DIAGNOSIS — E10.621 TYPE 1 DIABETES MELLITUS WITH DIABETIC TOE ULCER: Primary | ICD-10-CM

## 2021-02-15 DIAGNOSIS — L97.509 TYPE 1 DIABETES MELLITUS WITH DIABETIC TOE ULCER: Primary | ICD-10-CM

## 2021-02-15 DIAGNOSIS — L89.892 PRESSURE INJURY OF TOE OF RIGHT FOOT, STAGE 2: ICD-10-CM

## 2021-02-15 DIAGNOSIS — E10.42 TYPE 1 DIABETES MELLITUS WITH POLYNEUROPATHY: ICD-10-CM

## 2021-02-15 LAB — BACTERIA SPEC ANAEROBE CULT: NORMAL

## 2021-02-15 PROCEDURE — 99499 UNLISTED E&M SERVICE: CPT | Mod: S$GLB,,, | Performed by: PODIATRIST

## 2021-02-15 PROCEDURE — 1126F AMNT PAIN NOTED NONE PRSNT: CPT | Mod: S$GLB,,, | Performed by: PODIATRIST

## 2021-02-15 PROCEDURE — 99999 PR PBB SHADOW E&M-EST. PATIENT-LVL II: ICD-10-PCS | Mod: PBBFAC,,, | Performed by: PODIATRIST

## 2021-02-15 PROCEDURE — 1126F PR PAIN SEVERITY QUANTIFIED, NO PAIN PRESENT: ICD-10-PCS | Mod: S$GLB,,, | Performed by: PODIATRIST

## 2021-02-15 PROCEDURE — 99999 PR PBB SHADOW E&M-EST. PATIENT-LVL II: CPT | Mod: PBBFAC,,, | Performed by: PODIATRIST

## 2021-02-15 PROCEDURE — 11044 WOUND DEBRIDEMENT: ICD-10-PCS | Mod: S$GLB,,, | Performed by: PODIATRIST

## 2021-02-15 PROCEDURE — 99499 NO LOS: ICD-10-PCS | Mod: S$GLB,,, | Performed by: PODIATRIST

## 2021-02-15 PROCEDURE — 3008F PR BODY MASS INDEX (BMI) DOCUMENTED: ICD-10-PCS | Mod: CPTII,S$GLB,, | Performed by: PODIATRIST

## 2021-02-15 PROCEDURE — 3008F BODY MASS INDEX DOCD: CPT | Mod: CPTII,S$GLB,, | Performed by: PODIATRIST

## 2021-02-15 PROCEDURE — 11044 DBRDMT BONE 1ST 20 SQ CM/<: CPT | Mod: S$GLB,,, | Performed by: PODIATRIST

## 2021-02-17 ENCOUNTER — OFFICE VISIT (OUTPATIENT)
Dept: ENDOCRINOLOGY | Facility: CLINIC | Age: 66
End: 2021-02-17
Payer: COMMERCIAL

## 2021-02-17 VITALS
SYSTOLIC BLOOD PRESSURE: 120 MMHG | BODY MASS INDEX: 28.43 KG/M2 | DIASTOLIC BLOOD PRESSURE: 70 MMHG | HEART RATE: 85 BPM | HEIGHT: 74 IN | WEIGHT: 221.56 LBS

## 2021-02-17 DIAGNOSIS — E10.43 TYPE 1 DIABETES MELLITUS WITH DIABETIC AUTONOMIC NEUROPATHY: Primary | ICD-10-CM

## 2021-02-17 DIAGNOSIS — E10.649 HYPOGLYCEMIA UNAWARENESS IN TYPE 1 DIABETES MELLITUS: ICD-10-CM

## 2021-02-17 DIAGNOSIS — E78.5 HYPERLIPIDEMIA, UNSPECIFIED HYPERLIPIDEMIA TYPE: ICD-10-CM

## 2021-02-17 DIAGNOSIS — E10.21 TYPE 1 DIABETES MELLITUS WITH DIABETIC NEPHROPATHY: ICD-10-CM

## 2021-02-17 DIAGNOSIS — I10 ESSENTIAL HYPERTENSION: ICD-10-CM

## 2021-02-17 PROCEDURE — 95251 PR GLUCOSE MONITOR, 72 HOUR, PHYS INTERP: ICD-10-PCS | Mod: S$GLB,,, | Performed by: NURSE PRACTITIONER

## 2021-02-17 PROCEDURE — 99214 OFFICE O/P EST MOD 30 MIN: CPT | Mod: S$GLB,,, | Performed by: NURSE PRACTITIONER

## 2021-02-17 PROCEDURE — 3051F HG A1C>EQUAL 7.0%<8.0%: CPT | Mod: CPTII,S$GLB,, | Performed by: NURSE PRACTITIONER

## 2021-02-17 PROCEDURE — 3074F PR MOST RECENT SYSTOLIC BLOOD PRESSURE < 130 MM HG: ICD-10-PCS | Mod: CPTII,S$GLB,, | Performed by: NURSE PRACTITIONER

## 2021-02-17 PROCEDURE — 3008F BODY MASS INDEX DOCD: CPT | Mod: CPTII,S$GLB,, | Performed by: NURSE PRACTITIONER

## 2021-02-17 PROCEDURE — 3078F DIAST BP <80 MM HG: CPT | Mod: CPTII,S$GLB,, | Performed by: NURSE PRACTITIONER

## 2021-02-17 PROCEDURE — 99999 PR PBB SHADOW E&M-EST. PATIENT-LVL IV: ICD-10-PCS | Mod: PBBFAC,,, | Performed by: NURSE PRACTITIONER

## 2021-02-17 PROCEDURE — 3008F PR BODY MASS INDEX (BMI) DOCUMENTED: ICD-10-PCS | Mod: CPTII,S$GLB,, | Performed by: NURSE PRACTITIONER

## 2021-02-17 PROCEDURE — 1126F PR PAIN SEVERITY QUANTIFIED, NO PAIN PRESENT: ICD-10-PCS | Mod: S$GLB,,, | Performed by: NURSE PRACTITIONER

## 2021-02-17 PROCEDURE — 95251 CONT GLUC MNTR ANALYSIS I&R: CPT | Mod: S$GLB,,, | Performed by: NURSE PRACTITIONER

## 2021-02-17 PROCEDURE — 3074F SYST BP LT 130 MM HG: CPT | Mod: CPTII,S$GLB,, | Performed by: NURSE PRACTITIONER

## 2021-02-17 PROCEDURE — 3051F PR MOST RECENT HEMOGLOBIN A1C LEVEL 7.0 - < 8.0%: ICD-10-PCS | Mod: CPTII,S$GLB,, | Performed by: NURSE PRACTITIONER

## 2021-02-17 PROCEDURE — 3078F PR MOST RECENT DIASTOLIC BLOOD PRESSURE < 80 MM HG: ICD-10-PCS | Mod: CPTII,S$GLB,, | Performed by: NURSE PRACTITIONER

## 2021-02-17 PROCEDURE — 99214 PR OFFICE/OUTPT VISIT, EST, LEVL IV, 30-39 MIN: ICD-10-PCS | Mod: S$GLB,,, | Performed by: NURSE PRACTITIONER

## 2021-02-17 PROCEDURE — 1126F AMNT PAIN NOTED NONE PRSNT: CPT | Mod: S$GLB,,, | Performed by: NURSE PRACTITIONER

## 2021-02-17 PROCEDURE — 99999 PR PBB SHADOW E&M-EST. PATIENT-LVL IV: CPT | Mod: PBBFAC,,, | Performed by: NURSE PRACTITIONER

## 2021-02-17 RX ORDER — PEN NEEDLE, DIABETIC 32GX 5/32"
NEEDLE, DISPOSABLE MISCELLANEOUS
COMMUNITY
Start: 2021-01-04 | End: 2022-09-22

## 2021-02-17 RX ORDER — INSULIN ASPART 100 [IU]/ML
INJECTION, SOLUTION INTRAVENOUS; SUBCUTANEOUS
Qty: 3 BOX | Refills: 3
Start: 2021-02-17 | End: 2021-08-09

## 2021-02-18 ENCOUNTER — PATIENT MESSAGE (OUTPATIENT)
Dept: ENDOCRINOLOGY | Facility: CLINIC | Age: 66
End: 2021-02-18

## 2021-02-22 ENCOUNTER — PATIENT MESSAGE (OUTPATIENT)
Dept: ENDOCRINOLOGY | Facility: CLINIC | Age: 66
End: 2021-02-22

## 2021-02-22 ENCOUNTER — PATIENT MESSAGE (OUTPATIENT)
Dept: FAMILY MEDICINE | Facility: CLINIC | Age: 66
End: 2021-02-22

## 2021-02-23 ENCOUNTER — OFFICE VISIT (OUTPATIENT)
Dept: PODIATRY | Facility: CLINIC | Age: 66
End: 2021-02-23
Payer: COMMERCIAL

## 2021-02-23 VITALS — HEIGHT: 74 IN | BODY MASS INDEX: 28.43 KG/M2 | WEIGHT: 221.56 LBS

## 2021-02-23 DIAGNOSIS — L92.9 HYPERGRANULATION: ICD-10-CM

## 2021-02-23 DIAGNOSIS — L89.892 PRESSURE INJURY OF TOE OF RIGHT FOOT, STAGE 2: ICD-10-CM

## 2021-02-23 DIAGNOSIS — E10.42 TYPE 1 DIABETES MELLITUS WITH POLYNEUROPATHY: ICD-10-CM

## 2021-02-23 DIAGNOSIS — L97.509 TYPE 1 DIABETES MELLITUS WITH DIABETIC TOE ULCER: Primary | ICD-10-CM

## 2021-02-23 DIAGNOSIS — E10.621 TYPE 1 DIABETES MELLITUS WITH DIABETIC TOE ULCER: Primary | ICD-10-CM

## 2021-02-23 PROCEDURE — 97597 DBRDMT OPN WND 1ST 20 CM/<: CPT | Mod: S$GLB,,, | Performed by: PODIATRIST

## 2021-02-23 PROCEDURE — 3008F BODY MASS INDEX DOCD: CPT | Mod: CPTII,S$GLB,, | Performed by: PODIATRIST

## 2021-02-23 PROCEDURE — 97597 WOUND DEBRIDEMENT: ICD-10-PCS | Mod: S$GLB,,, | Performed by: PODIATRIST

## 2021-02-23 PROCEDURE — 1126F PR PAIN SEVERITY QUANTIFIED, NO PAIN PRESENT: ICD-10-PCS | Mod: S$GLB,,, | Performed by: PODIATRIST

## 2021-02-23 PROCEDURE — 3008F PR BODY MASS INDEX (BMI) DOCUMENTED: ICD-10-PCS | Mod: CPTII,S$GLB,, | Performed by: PODIATRIST

## 2021-02-23 PROCEDURE — 99499 UNLISTED E&M SERVICE: CPT | Mod: S$GLB,,, | Performed by: PODIATRIST

## 2021-02-23 PROCEDURE — 99999 PR PBB SHADOW E&M-EST. PATIENT-LVL III: ICD-10-PCS | Mod: PBBFAC,,, | Performed by: PODIATRIST

## 2021-02-23 PROCEDURE — 99499 NO LOS: ICD-10-PCS | Mod: S$GLB,,, | Performed by: PODIATRIST

## 2021-02-23 PROCEDURE — 1126F AMNT PAIN NOTED NONE PRSNT: CPT | Mod: S$GLB,,, | Performed by: PODIATRIST

## 2021-02-23 PROCEDURE — 99999 PR PBB SHADOW E&M-EST. PATIENT-LVL III: CPT | Mod: PBBFAC,,, | Performed by: PODIATRIST

## 2021-02-27 ENCOUNTER — IMMUNIZATION (OUTPATIENT)
Dept: INTERNAL MEDICINE | Facility: CLINIC | Age: 66
End: 2021-02-27
Payer: COMMERCIAL

## 2021-02-27 DIAGNOSIS — Z23 NEED FOR VACCINATION: Primary | ICD-10-CM

## 2021-02-27 PROCEDURE — 91300 COVID-19, MRNA, LNP-S, PF, 30 MCG/0.3 ML DOSE VACCINE: CPT | Mod: PBBFAC | Performed by: FAMILY MEDICINE

## 2021-02-28 ENCOUNTER — PATIENT OUTREACH (OUTPATIENT)
Dept: ADMINISTRATIVE | Facility: OTHER | Age: 66
End: 2021-02-28

## 2021-03-02 ENCOUNTER — OFFICE VISIT (OUTPATIENT)
Dept: PODIATRY | Facility: CLINIC | Age: 66
End: 2021-03-02
Payer: COMMERCIAL

## 2021-03-02 VITALS — BODY MASS INDEX: 28.43 KG/M2 | HEIGHT: 74 IN | WEIGHT: 221.56 LBS

## 2021-03-02 DIAGNOSIS — E10.42 TYPE 1 DIABETES MELLITUS WITH POLYNEUROPATHY: ICD-10-CM

## 2021-03-02 DIAGNOSIS — L89.892 PRESSURE INJURY OF TOE OF RIGHT FOOT, STAGE 2: ICD-10-CM

## 2021-03-02 DIAGNOSIS — L97.509 TYPE 1 DIABETES MELLITUS WITH DIABETIC TOE ULCER: Primary | ICD-10-CM

## 2021-03-02 DIAGNOSIS — E10.621 TYPE 1 DIABETES MELLITUS WITH DIABETIC TOE ULCER: Primary | ICD-10-CM

## 2021-03-02 PROCEDURE — 99999 PR PBB SHADOW E&M-EST. PATIENT-LVL II: ICD-10-PCS | Mod: PBBFAC,,, | Performed by: PODIATRIST

## 2021-03-02 PROCEDURE — 99499 UNLISTED E&M SERVICE: CPT | Mod: S$GLB,,, | Performed by: PODIATRIST

## 2021-03-02 PROCEDURE — 1126F PR PAIN SEVERITY QUANTIFIED, NO PAIN PRESENT: ICD-10-PCS | Mod: S$GLB,,, | Performed by: PODIATRIST

## 2021-03-02 PROCEDURE — 1126F AMNT PAIN NOTED NONE PRSNT: CPT | Mod: S$GLB,,, | Performed by: PODIATRIST

## 2021-03-02 PROCEDURE — 97597 WOUND DEBRIDEMENT: ICD-10-PCS | Mod: S$GLB,,, | Performed by: PODIATRIST

## 2021-03-02 PROCEDURE — 99499 NO LOS: ICD-10-PCS | Mod: S$GLB,,, | Performed by: PODIATRIST

## 2021-03-02 PROCEDURE — 3008F BODY MASS INDEX DOCD: CPT | Mod: CPTII,S$GLB,, | Performed by: PODIATRIST

## 2021-03-02 PROCEDURE — 3008F PR BODY MASS INDEX (BMI) DOCUMENTED: ICD-10-PCS | Mod: CPTII,S$GLB,, | Performed by: PODIATRIST

## 2021-03-02 PROCEDURE — 99999 PR PBB SHADOW E&M-EST. PATIENT-LVL II: CPT | Mod: PBBFAC,,, | Performed by: PODIATRIST

## 2021-03-02 PROCEDURE — 97597 DBRDMT OPN WND 1ST 20 CM/<: CPT | Mod: S$GLB,,, | Performed by: PODIATRIST

## 2021-03-09 ENCOUNTER — OFFICE VISIT (OUTPATIENT)
Dept: PODIATRY | Facility: CLINIC | Age: 66
End: 2021-03-09
Payer: COMMERCIAL

## 2021-03-09 DIAGNOSIS — L97.509 TYPE 1 DIABETES MELLITUS WITH DIABETIC TOE ULCER: Primary | ICD-10-CM

## 2021-03-09 DIAGNOSIS — E10.42 TYPE 1 DIABETES MELLITUS WITH POLYNEUROPATHY: ICD-10-CM

## 2021-03-09 DIAGNOSIS — E10.621 DIABETIC ULCER OF TOE OF RIGHT FOOT ASSOCIATED WITH TYPE 1 DIABETES MELLITUS, LIMITED TO BREAKDOWN OF SKIN: ICD-10-CM

## 2021-03-09 DIAGNOSIS — E10.621 TYPE 1 DIABETES MELLITUS WITH DIABETIC TOE ULCER: Primary | ICD-10-CM

## 2021-03-09 DIAGNOSIS — L97.511 DIABETIC ULCER OF TOE OF RIGHT FOOT ASSOCIATED WITH TYPE 1 DIABETES MELLITUS, LIMITED TO BREAKDOWN OF SKIN: ICD-10-CM

## 2021-03-09 PROCEDURE — 99499 UNLISTED E&M SERVICE: CPT | Mod: S$GLB,,, | Performed by: PODIATRIST

## 2021-03-09 PROCEDURE — 97597 WOUND DEBRIDEMENT: ICD-10-PCS | Mod: S$GLB,,, | Performed by: PODIATRIST

## 2021-03-09 PROCEDURE — 97597 DBRDMT OPN WND 1ST 20 CM/<: CPT | Mod: S$GLB,,, | Performed by: PODIATRIST

## 2021-03-09 PROCEDURE — 99499 NO LOS: ICD-10-PCS | Mod: S$GLB,,, | Performed by: PODIATRIST

## 2021-03-11 ENCOUNTER — PATIENT MESSAGE (OUTPATIENT)
Dept: ENDOCRINOLOGY | Facility: CLINIC | Age: 66
End: 2021-03-11

## 2021-03-11 DIAGNOSIS — E10.43 TYPE 1 DIABETES MELLITUS WITH DIABETIC AUTONOMIC NEUROPATHY: ICD-10-CM

## 2021-03-11 RX ORDER — INSULIN GLARGINE 300 U/ML
INJECTION, SOLUTION SUBCUTANEOUS
Qty: 6 ML | Refills: 11 | Status: SHIPPED | OUTPATIENT
Start: 2021-03-11 | End: 2021-08-19

## 2021-03-12 RX ORDER — INSULIN GLARGINE 300 U/ML
INJECTION, SOLUTION SUBCUTANEOUS
Qty: 6 ML | Refills: 6 | Status: SHIPPED | OUTPATIENT
Start: 2021-03-12 | End: 2021-08-19

## 2021-03-15 ENCOUNTER — OFFICE VISIT (OUTPATIENT)
Dept: PODIATRY | Facility: CLINIC | Age: 66
End: 2021-03-15
Payer: COMMERCIAL

## 2021-03-15 VITALS — HEIGHT: 74 IN | BODY MASS INDEX: 28.43 KG/M2 | WEIGHT: 221.56 LBS

## 2021-03-15 DIAGNOSIS — L97.509 TYPE 1 DIABETES MELLITUS WITH DIABETIC TOE ULCER: Primary | ICD-10-CM

## 2021-03-15 DIAGNOSIS — E10.42 TYPE 1 DIABETES MELLITUS WITH POLYNEUROPATHY: ICD-10-CM

## 2021-03-15 DIAGNOSIS — L97.511 DIABETIC ULCER OF TOE OF RIGHT FOOT ASSOCIATED WITH TYPE 1 DIABETES MELLITUS, LIMITED TO BREAKDOWN OF SKIN: ICD-10-CM

## 2021-03-15 DIAGNOSIS — E10.621 DIABETIC ULCER OF TOE OF RIGHT FOOT ASSOCIATED WITH TYPE 1 DIABETES MELLITUS, LIMITED TO BREAKDOWN OF SKIN: ICD-10-CM

## 2021-03-15 DIAGNOSIS — E10.621 TYPE 1 DIABETES MELLITUS WITH DIABETIC TOE ULCER: Primary | ICD-10-CM

## 2021-03-15 PROCEDURE — 3008F PR BODY MASS INDEX (BMI) DOCUMENTED: ICD-10-PCS | Mod: CPTII,S$GLB,, | Performed by: PODIATRIST

## 2021-03-15 PROCEDURE — 97597 WOUND DEBRIDEMENT: ICD-10-PCS | Mod: 59,S$GLB,, | Performed by: PODIATRIST

## 2021-03-15 PROCEDURE — 99499 UNLISTED E&M SERVICE: CPT | Mod: S$GLB,,, | Performed by: PODIATRIST

## 2021-03-15 PROCEDURE — 11042 DBRDMT SUBQ TIS 1ST 20SQCM/<: CPT | Mod: S$GLB,,, | Performed by: PODIATRIST

## 2021-03-15 PROCEDURE — 99999 PR PBB SHADOW E&M-EST. PATIENT-LVL II: CPT | Mod: PBBFAC,,, | Performed by: PODIATRIST

## 2021-03-15 PROCEDURE — 11042 WOUND DEBRIDEMENT: ICD-10-PCS | Mod: S$GLB,,, | Performed by: PODIATRIST

## 2021-03-15 PROCEDURE — 97597 DBRDMT OPN WND 1ST 20 CM/<: CPT | Mod: 59,S$GLB,, | Performed by: PODIATRIST

## 2021-03-15 PROCEDURE — 3008F BODY MASS INDEX DOCD: CPT | Mod: CPTII,S$GLB,, | Performed by: PODIATRIST

## 2021-03-15 PROCEDURE — 99999 PR PBB SHADOW E&M-EST. PATIENT-LVL II: ICD-10-PCS | Mod: PBBFAC,,, | Performed by: PODIATRIST

## 2021-03-15 PROCEDURE — 1126F AMNT PAIN NOTED NONE PRSNT: CPT | Mod: S$GLB,,, | Performed by: PODIATRIST

## 2021-03-15 PROCEDURE — 99499 NO LOS: ICD-10-PCS | Mod: S$GLB,,, | Performed by: PODIATRIST

## 2021-03-15 PROCEDURE — 1126F PR PAIN SEVERITY QUANTIFIED, NO PAIN PRESENT: ICD-10-PCS | Mod: S$GLB,,, | Performed by: PODIATRIST

## 2021-03-20 ENCOUNTER — IMMUNIZATION (OUTPATIENT)
Dept: INTERNAL MEDICINE | Facility: CLINIC | Age: 66
End: 2021-03-20
Payer: COMMERCIAL

## 2021-03-20 DIAGNOSIS — Z23 NEED FOR VACCINATION: Primary | ICD-10-CM

## 2021-03-20 PROCEDURE — 0002A COVID-19, MRNA, LNP-S, PF, 30 MCG/0.3 ML DOSE VACCINE: CPT | Mod: PBBFAC | Performed by: FAMILY MEDICINE

## 2021-03-20 PROCEDURE — 91300 COVID-19, MRNA, LNP-S, PF, 30 MCG/0.3 ML DOSE VACCINE: CPT | Mod: PBBFAC | Performed by: FAMILY MEDICINE

## 2021-03-23 ENCOUNTER — OFFICE VISIT (OUTPATIENT)
Dept: PODIATRY | Facility: CLINIC | Age: 66
End: 2021-03-23
Payer: COMMERCIAL

## 2021-03-23 VITALS — WEIGHT: 221.56 LBS | BODY MASS INDEX: 28.43 KG/M2 | HEIGHT: 74 IN

## 2021-03-23 DIAGNOSIS — E10.621 TYPE 1 DIABETES MELLITUS WITH DIABETIC TOE ULCER: Primary | ICD-10-CM

## 2021-03-23 DIAGNOSIS — E10.621 DIABETIC ULCER OF TOE OF RIGHT FOOT ASSOCIATED WITH TYPE 1 DIABETES MELLITUS, LIMITED TO BREAKDOWN OF SKIN: ICD-10-CM

## 2021-03-23 DIAGNOSIS — L97.509 TYPE 1 DIABETES MELLITUS WITH DIABETIC TOE ULCER: Primary | ICD-10-CM

## 2021-03-23 DIAGNOSIS — L97.511 DIABETIC ULCER OF TOE OF RIGHT FOOT ASSOCIATED WITH TYPE 1 DIABETES MELLITUS, LIMITED TO BREAKDOWN OF SKIN: ICD-10-CM

## 2021-03-23 DIAGNOSIS — E10.42 TYPE 1 DIABETES MELLITUS WITH POLYNEUROPATHY: ICD-10-CM

## 2021-03-23 PROCEDURE — 11042 WOUND DEBRIDEMENT: ICD-10-PCS | Mod: S$GLB,,, | Performed by: PODIATRIST

## 2021-03-23 PROCEDURE — 3008F BODY MASS INDEX DOCD: CPT | Mod: CPTII,S$GLB,, | Performed by: PODIATRIST

## 2021-03-23 PROCEDURE — 99499 NO LOS: ICD-10-PCS | Mod: S$GLB,,, | Performed by: PODIATRIST

## 2021-03-23 PROCEDURE — 87075 CULTR BACTERIA EXCEPT BLOOD: CPT | Performed by: PODIATRIST

## 2021-03-23 PROCEDURE — 3008F PR BODY MASS INDEX (BMI) DOCUMENTED: ICD-10-PCS | Mod: CPTII,S$GLB,, | Performed by: PODIATRIST

## 2021-03-23 PROCEDURE — 87070 CULTURE OTHR SPECIMN AEROBIC: CPT | Performed by: PODIATRIST

## 2021-03-23 PROCEDURE — 11042 DBRDMT SUBQ TIS 1ST 20SQCM/<: CPT | Mod: S$GLB,,, | Performed by: PODIATRIST

## 2021-03-23 PROCEDURE — 1125F PR PAIN SEVERITY QUANTIFIED, PAIN PRESENT: ICD-10-PCS | Mod: S$GLB,,, | Performed by: PODIATRIST

## 2021-03-23 PROCEDURE — 99999 PR PBB SHADOW E&M-EST. PATIENT-LVL III: CPT | Mod: PBBFAC,,, | Performed by: PODIATRIST

## 2021-03-23 PROCEDURE — 99999 PR PBB SHADOW E&M-EST. PATIENT-LVL III: ICD-10-PCS | Mod: PBBFAC,,, | Performed by: PODIATRIST

## 2021-03-23 PROCEDURE — 99499 UNLISTED E&M SERVICE: CPT | Mod: S$GLB,,, | Performed by: PODIATRIST

## 2021-03-23 PROCEDURE — 1125F AMNT PAIN NOTED PAIN PRSNT: CPT | Mod: S$GLB,,, | Performed by: PODIATRIST

## 2021-03-25 ENCOUNTER — PATIENT MESSAGE (OUTPATIENT)
Dept: PODIATRY | Facility: CLINIC | Age: 66
End: 2021-03-25

## 2021-03-26 ENCOUNTER — PATIENT MESSAGE (OUTPATIENT)
Dept: PODIATRY | Facility: CLINIC | Age: 66
End: 2021-03-26

## 2021-03-26 LAB — BACTERIA SPEC AEROBE CULT: NORMAL

## 2021-03-29 ENCOUNTER — TELEPHONE (OUTPATIENT)
Dept: ORTHOPEDICS | Facility: CLINIC | Age: 66
End: 2021-03-29

## 2021-03-29 LAB — BACTERIA SPEC ANAEROBE CULT: NORMAL

## 2021-03-30 ENCOUNTER — OFFICE VISIT (OUTPATIENT)
Dept: PODIATRY | Facility: CLINIC | Age: 66
End: 2021-03-30
Payer: COMMERCIAL

## 2021-03-30 DIAGNOSIS — E10.42 TYPE 1 DIABETES MELLITUS WITH POLYNEUROPATHY: ICD-10-CM

## 2021-03-30 DIAGNOSIS — L97.509 TYPE 1 DIABETES MELLITUS WITH DIABETIC TOE ULCER: Primary | ICD-10-CM

## 2021-03-30 DIAGNOSIS — E10.621 TYPE 1 DIABETES MELLITUS WITH DIABETIC TOE ULCER: Primary | ICD-10-CM

## 2021-03-30 DIAGNOSIS — Z86.31 HEALED DIABETIC ULCER OF FOOT: ICD-10-CM

## 2021-03-30 PROCEDURE — 11042 DBRDMT SUBQ TIS 1ST 20SQCM/<: CPT | Mod: S$GLB,,, | Performed by: PODIATRIST

## 2021-03-30 PROCEDURE — 1126F PR PAIN SEVERITY QUANTIFIED, NO PAIN PRESENT: ICD-10-PCS | Mod: S$GLB,,, | Performed by: PODIATRIST

## 2021-03-30 PROCEDURE — 99999 PR PBB SHADOW E&M-EST. PATIENT-LVL III: CPT | Mod: PBBFAC,,, | Performed by: PODIATRIST

## 2021-03-30 PROCEDURE — 99499 NO LOS: ICD-10-PCS | Mod: S$GLB,,, | Performed by: PODIATRIST

## 2021-03-30 PROCEDURE — 99999 PR PBB SHADOW E&M-EST. PATIENT-LVL III: ICD-10-PCS | Mod: PBBFAC,,, | Performed by: PODIATRIST

## 2021-03-30 PROCEDURE — 99499 UNLISTED E&M SERVICE: CPT | Mod: S$GLB,,, | Performed by: PODIATRIST

## 2021-03-30 PROCEDURE — 11042 WOUND DEBRIDEMENT: ICD-10-PCS | Mod: S$GLB,,, | Performed by: PODIATRIST

## 2021-03-30 PROCEDURE — 1126F AMNT PAIN NOTED NONE PRSNT: CPT | Mod: S$GLB,,, | Performed by: PODIATRIST

## 2021-04-05 ENCOUNTER — PATIENT OUTREACH (OUTPATIENT)
Dept: ADMINISTRATIVE | Facility: OTHER | Age: 66
End: 2021-04-05

## 2021-04-06 ENCOUNTER — OFFICE VISIT (OUTPATIENT)
Dept: PODIATRY | Facility: CLINIC | Age: 66
End: 2021-04-06
Payer: COMMERCIAL

## 2021-04-06 VITALS — HEIGHT: 74 IN | WEIGHT: 221.56 LBS | BODY MASS INDEX: 28.43 KG/M2

## 2021-04-06 DIAGNOSIS — E10.621 TYPE 1 DIABETES MELLITUS WITH DIABETIC TOE ULCER: Primary | ICD-10-CM

## 2021-04-06 DIAGNOSIS — Z86.31 HEALED DIABETIC ULCER OF FOOT: ICD-10-CM

## 2021-04-06 DIAGNOSIS — E10.42 TYPE 1 DIABETES MELLITUS WITH POLYNEUROPATHY: ICD-10-CM

## 2021-04-06 DIAGNOSIS — L97.509 TYPE 1 DIABETES MELLITUS WITH DIABETIC TOE ULCER: Primary | ICD-10-CM

## 2021-04-06 PROCEDURE — 99999 PR PBB SHADOW E&M-EST. PATIENT-LVL III: CPT | Mod: PBBFAC,,, | Performed by: PODIATRIST

## 2021-04-06 PROCEDURE — 1126F PR PAIN SEVERITY QUANTIFIED, NO PAIN PRESENT: ICD-10-PCS | Mod: S$GLB,,, | Performed by: PODIATRIST

## 2021-04-06 PROCEDURE — 99499 NO LOS: ICD-10-PCS | Mod: S$GLB,,, | Performed by: PODIATRIST

## 2021-04-06 PROCEDURE — 1126F AMNT PAIN NOTED NONE PRSNT: CPT | Mod: S$GLB,,, | Performed by: PODIATRIST

## 2021-04-06 PROCEDURE — 99999 PR PBB SHADOW E&M-EST. PATIENT-LVL III: ICD-10-PCS | Mod: PBBFAC,,, | Performed by: PODIATRIST

## 2021-04-06 PROCEDURE — 99499 UNLISTED E&M SERVICE: CPT | Mod: S$GLB,,, | Performed by: PODIATRIST

## 2021-04-06 PROCEDURE — 11042 WOUND DEBRIDEMENT: ICD-10-PCS | Mod: S$GLB,,, | Performed by: PODIATRIST

## 2021-04-06 PROCEDURE — 3008F BODY MASS INDEX DOCD: CPT | Mod: CPTII,S$GLB,, | Performed by: PODIATRIST

## 2021-04-06 PROCEDURE — 3008F PR BODY MASS INDEX (BMI) DOCUMENTED: ICD-10-PCS | Mod: CPTII,S$GLB,, | Performed by: PODIATRIST

## 2021-04-06 PROCEDURE — 11042 DBRDMT SUBQ TIS 1ST 20SQCM/<: CPT | Mod: S$GLB,,, | Performed by: PODIATRIST

## 2021-04-13 ENCOUNTER — OFFICE VISIT (OUTPATIENT)
Dept: PODIATRY | Facility: CLINIC | Age: 66
End: 2021-04-13
Payer: COMMERCIAL

## 2021-04-13 VITALS — WEIGHT: 221.56 LBS | HEIGHT: 74 IN | BODY MASS INDEX: 28.43 KG/M2

## 2021-04-13 DIAGNOSIS — E10.42 TYPE 1 DIABETES MELLITUS WITH POLYNEUROPATHY: ICD-10-CM

## 2021-04-13 DIAGNOSIS — E10.621 TYPE 1 DIABETES MELLITUS WITH DIABETIC TOE ULCER: Primary | ICD-10-CM

## 2021-04-13 DIAGNOSIS — L97.509 TYPE 1 DIABETES MELLITUS WITH DIABETIC TOE ULCER: Primary | ICD-10-CM

## 2021-04-13 PROCEDURE — 3051F HG A1C>EQUAL 7.0%<8.0%: CPT | Mod: CPTII,S$GLB,, | Performed by: PODIATRIST

## 2021-04-13 PROCEDURE — 99999 PR PBB SHADOW E&M-EST. PATIENT-LVL II: CPT | Mod: PBBFAC,,, | Performed by: PODIATRIST

## 2021-04-13 PROCEDURE — 3008F PR BODY MASS INDEX (BMI) DOCUMENTED: ICD-10-PCS | Mod: CPTII,S$GLB,, | Performed by: PODIATRIST

## 2021-04-13 PROCEDURE — 3051F PR MOST RECENT HEMOGLOBIN A1C LEVEL 7.0 - < 8.0%: ICD-10-PCS | Mod: CPTII,S$GLB,, | Performed by: PODIATRIST

## 2021-04-13 PROCEDURE — 3008F BODY MASS INDEX DOCD: CPT | Mod: CPTII,S$GLB,, | Performed by: PODIATRIST

## 2021-04-13 PROCEDURE — 1126F AMNT PAIN NOTED NONE PRSNT: CPT | Mod: S$GLB,,, | Performed by: PODIATRIST

## 2021-04-13 PROCEDURE — 99499 NO LOS: ICD-10-PCS | Mod: S$GLB,,, | Performed by: PODIATRIST

## 2021-04-13 PROCEDURE — 99999 PR PBB SHADOW E&M-EST. PATIENT-LVL II: ICD-10-PCS | Mod: PBBFAC,,, | Performed by: PODIATRIST

## 2021-04-13 PROCEDURE — 11042 DBRDMT SUBQ TIS 1ST 20SQCM/<: CPT | Mod: S$GLB,,, | Performed by: PODIATRIST

## 2021-04-13 PROCEDURE — 99499 UNLISTED E&M SERVICE: CPT | Mod: S$GLB,,, | Performed by: PODIATRIST

## 2021-04-13 PROCEDURE — 1126F PR PAIN SEVERITY QUANTIFIED, NO PAIN PRESENT: ICD-10-PCS | Mod: S$GLB,,, | Performed by: PODIATRIST

## 2021-04-13 PROCEDURE — 11042 WOUND DEBRIDEMENT: ICD-10-PCS | Mod: S$GLB,,, | Performed by: PODIATRIST

## 2021-04-21 ENCOUNTER — OFFICE VISIT (OUTPATIENT)
Dept: PODIATRY | Facility: CLINIC | Age: 66
End: 2021-04-21
Payer: COMMERCIAL

## 2021-04-21 VITALS — BODY MASS INDEX: 28.43 KG/M2 | HEIGHT: 74 IN | WEIGHT: 221.56 LBS

## 2021-04-21 DIAGNOSIS — L97.509 TYPE 1 DIABETES MELLITUS WITH DIABETIC TOE ULCER: Primary | ICD-10-CM

## 2021-04-21 DIAGNOSIS — T14.8XXD DELAYED WOUND HEALING: ICD-10-CM

## 2021-04-21 DIAGNOSIS — E10.42 TYPE 1 DIABETES MELLITUS WITH POLYNEUROPATHY: ICD-10-CM

## 2021-04-21 DIAGNOSIS — E10.621 TYPE 1 DIABETES MELLITUS WITH DIABETIC TOE ULCER: Primary | ICD-10-CM

## 2021-04-21 PROCEDURE — 99499 NO LOS: ICD-10-PCS | Mod: S$GLB,,, | Performed by: PODIATRIST

## 2021-04-21 PROCEDURE — 99999 PR PBB SHADOW E&M-EST. PATIENT-LVL III: CPT | Mod: PBBFAC,,, | Performed by: PODIATRIST

## 2021-04-21 PROCEDURE — 3008F PR BODY MASS INDEX (BMI) DOCUMENTED: ICD-10-PCS | Mod: CPTII,S$GLB,, | Performed by: PODIATRIST

## 2021-04-21 PROCEDURE — 11042 DBRDMT SUBQ TIS 1ST 20SQCM/<: CPT | Mod: S$GLB,,, | Performed by: PODIATRIST

## 2021-04-21 PROCEDURE — 3051F HG A1C>EQUAL 7.0%<8.0%: CPT | Mod: CPTII,S$GLB,, | Performed by: PODIATRIST

## 2021-04-21 PROCEDURE — 3051F PR MOST RECENT HEMOGLOBIN A1C LEVEL 7.0 - < 8.0%: ICD-10-PCS | Mod: CPTII,S$GLB,, | Performed by: PODIATRIST

## 2021-04-21 PROCEDURE — 3008F BODY MASS INDEX DOCD: CPT | Mod: CPTII,S$GLB,, | Performed by: PODIATRIST

## 2021-04-21 PROCEDURE — 11042 WOUND DEBRIDEMENT: ICD-10-PCS | Mod: S$GLB,,, | Performed by: PODIATRIST

## 2021-04-21 PROCEDURE — 99499 UNLISTED E&M SERVICE: CPT | Mod: S$GLB,,, | Performed by: PODIATRIST

## 2021-04-21 PROCEDURE — 99999 PR PBB SHADOW E&M-EST. PATIENT-LVL III: ICD-10-PCS | Mod: PBBFAC,,, | Performed by: PODIATRIST

## 2021-04-27 ENCOUNTER — PATIENT MESSAGE (OUTPATIENT)
Dept: PODIATRY | Facility: CLINIC | Age: 66
End: 2021-04-27

## 2021-04-28 ENCOUNTER — OFFICE VISIT (OUTPATIENT)
Dept: PODIATRY | Facility: CLINIC | Age: 66
End: 2021-04-28
Payer: COMMERCIAL

## 2021-04-28 VITALS — HEIGHT: 74 IN | BODY MASS INDEX: 28.43 KG/M2 | WEIGHT: 221.56 LBS

## 2021-04-28 DIAGNOSIS — T14.8XXD DELAYED WOUND HEALING: ICD-10-CM

## 2021-04-28 DIAGNOSIS — L97.509 TYPE 1 DIABETES MELLITUS WITH DIABETIC TOE ULCER: Primary | ICD-10-CM

## 2021-04-28 DIAGNOSIS — E10.621 TYPE 1 DIABETES MELLITUS WITH DIABETIC TOE ULCER: Primary | ICD-10-CM

## 2021-04-28 DIAGNOSIS — S90.424A BLISTER OF TOE OF RIGHT FOOT, INITIAL ENCOUNTER: ICD-10-CM

## 2021-04-28 DIAGNOSIS — E10.42 TYPE 1 DIABETES MELLITUS WITH POLYNEUROPATHY: ICD-10-CM

## 2021-04-28 PROCEDURE — 1126F AMNT PAIN NOTED NONE PRSNT: CPT | Mod: S$GLB,,, | Performed by: PODIATRIST

## 2021-04-28 PROCEDURE — 99499 UNLISTED E&M SERVICE: CPT | Mod: S$GLB,,, | Performed by: PODIATRIST

## 2021-04-28 PROCEDURE — 1126F PR PAIN SEVERITY QUANTIFIED, NO PAIN PRESENT: ICD-10-PCS | Mod: S$GLB,,, | Performed by: PODIATRIST

## 2021-04-28 PROCEDURE — 3008F BODY MASS INDEX DOCD: CPT | Mod: CPTII,S$GLB,, | Performed by: PODIATRIST

## 2021-04-28 PROCEDURE — 99499 NO LOS: ICD-10-PCS | Mod: S$GLB,,, | Performed by: PODIATRIST

## 2021-04-28 PROCEDURE — 3008F PR BODY MASS INDEX (BMI) DOCUMENTED: ICD-10-PCS | Mod: CPTII,S$GLB,, | Performed by: PODIATRIST

## 2021-04-28 PROCEDURE — 99999 PR PBB SHADOW E&M-EST. PATIENT-LVL II: CPT | Mod: PBBFAC,,, | Performed by: PODIATRIST

## 2021-04-28 PROCEDURE — 11042 DBRDMT SUBQ TIS 1ST 20SQCM/<: CPT | Mod: S$GLB,,, | Performed by: PODIATRIST

## 2021-04-28 PROCEDURE — 11042 WOUND DEBRIDEMENT: ICD-10-PCS | Mod: S$GLB,,, | Performed by: PODIATRIST

## 2021-04-28 PROCEDURE — 99999 PR PBB SHADOW E&M-EST. PATIENT-LVL II: ICD-10-PCS | Mod: PBBFAC,,, | Performed by: PODIATRIST

## 2021-04-28 PROCEDURE — 3051F PR MOST RECENT HEMOGLOBIN A1C LEVEL 7.0 - < 8.0%: ICD-10-PCS | Mod: CPTII,S$GLB,, | Performed by: PODIATRIST

## 2021-04-28 PROCEDURE — 3051F HG A1C>EQUAL 7.0%<8.0%: CPT | Mod: CPTII,S$GLB,, | Performed by: PODIATRIST

## 2021-05-01 ENCOUNTER — PATIENT MESSAGE (OUTPATIENT)
Dept: OPHTHALMOLOGY | Facility: CLINIC | Age: 66
End: 2021-05-01

## 2021-05-05 ENCOUNTER — PATIENT MESSAGE (OUTPATIENT)
Dept: PODIATRY | Facility: CLINIC | Age: 66
End: 2021-05-05

## 2021-05-05 ENCOUNTER — OFFICE VISIT (OUTPATIENT)
Dept: PODIATRY | Facility: CLINIC | Age: 66
End: 2021-05-05
Payer: COMMERCIAL

## 2021-05-05 VITALS — WEIGHT: 221.56 LBS | HEIGHT: 74 IN | BODY MASS INDEX: 28.43 KG/M2

## 2021-05-05 DIAGNOSIS — E10.42 TYPE 1 DIABETES MELLITUS WITH POLYNEUROPATHY: ICD-10-CM

## 2021-05-05 DIAGNOSIS — T14.8XXD DELAYED WOUND HEALING: ICD-10-CM

## 2021-05-05 DIAGNOSIS — E10.621 TYPE 1 DIABETES MELLITUS WITH DIABETIC TOE ULCER: Primary | ICD-10-CM

## 2021-05-05 DIAGNOSIS — L97.509 TYPE 1 DIABETES MELLITUS WITH DIABETIC TOE ULCER: Primary | ICD-10-CM

## 2021-05-05 PROCEDURE — 99999 PR PBB SHADOW E&M-EST. PATIENT-LVL III: ICD-10-PCS | Mod: PBBFAC,,, | Performed by: PODIATRIST

## 2021-05-05 PROCEDURE — 3051F HG A1C>EQUAL 7.0%<8.0%: CPT | Mod: CPTII,S$GLB,, | Performed by: PODIATRIST

## 2021-05-05 PROCEDURE — 99499 NO LOS: ICD-10-PCS | Mod: S$GLB,,, | Performed by: PODIATRIST

## 2021-05-05 PROCEDURE — 1126F AMNT PAIN NOTED NONE PRSNT: CPT | Mod: S$GLB,,, | Performed by: PODIATRIST

## 2021-05-05 PROCEDURE — 1126F PR PAIN SEVERITY QUANTIFIED, NO PAIN PRESENT: ICD-10-PCS | Mod: S$GLB,,, | Performed by: PODIATRIST

## 2021-05-05 PROCEDURE — 11042 DBRDMT SUBQ TIS 1ST 20SQCM/<: CPT | Mod: S$GLB,,, | Performed by: PODIATRIST

## 2021-05-05 PROCEDURE — 3008F PR BODY MASS INDEX (BMI) DOCUMENTED: ICD-10-PCS | Mod: CPTII,S$GLB,, | Performed by: PODIATRIST

## 2021-05-05 PROCEDURE — 99999 PR PBB SHADOW E&M-EST. PATIENT-LVL III: CPT | Mod: PBBFAC,,, | Performed by: PODIATRIST

## 2021-05-05 PROCEDURE — 11042 WOUND DEBRIDEMENT: ICD-10-PCS | Mod: S$GLB,,, | Performed by: PODIATRIST

## 2021-05-05 PROCEDURE — 99499 UNLISTED E&M SERVICE: CPT | Mod: S$GLB,,, | Performed by: PODIATRIST

## 2021-05-05 PROCEDURE — 3008F BODY MASS INDEX DOCD: CPT | Mod: CPTII,S$GLB,, | Performed by: PODIATRIST

## 2021-05-05 PROCEDURE — 3051F PR MOST RECENT HEMOGLOBIN A1C LEVEL 7.0 - < 8.0%: ICD-10-PCS | Mod: CPTII,S$GLB,, | Performed by: PODIATRIST

## 2021-05-06 ENCOUNTER — PATIENT MESSAGE (OUTPATIENT)
Dept: PODIATRY | Facility: CLINIC | Age: 66
End: 2021-05-06

## 2021-05-11 ENCOUNTER — LAB VISIT (OUTPATIENT)
Dept: LAB | Facility: HOSPITAL | Age: 66
End: 2021-05-11
Attending: NURSE PRACTITIONER
Payer: COMMERCIAL

## 2021-05-11 DIAGNOSIS — E10.43 TYPE 1 DIABETES MELLITUS WITH DIABETIC AUTONOMIC NEUROPATHY: ICD-10-CM

## 2021-05-11 PROCEDURE — 36415 COLL VENOUS BLD VENIPUNCTURE: CPT | Mod: PN | Performed by: NURSE PRACTITIONER

## 2021-05-11 PROCEDURE — 83036 HEMOGLOBIN GLYCOSYLATED A1C: CPT | Performed by: NURSE PRACTITIONER

## 2021-05-11 PROCEDURE — 80048 BASIC METABOLIC PNL TOTAL CA: CPT | Performed by: NURSE PRACTITIONER

## 2021-05-12 ENCOUNTER — PATIENT OUTREACH (OUTPATIENT)
Dept: ADMINISTRATIVE | Facility: OTHER | Age: 66
End: 2021-05-12

## 2021-05-12 LAB
ANION GAP SERPL CALC-SCNC: 7 MMOL/L (ref 8–16)
BUN SERPL-MCNC: 23 MG/DL (ref 8–23)
CALCIUM SERPL-MCNC: 9.6 MG/DL (ref 8.7–10.5)
CHLORIDE SERPL-SCNC: 106 MMOL/L (ref 95–110)
CO2 SERPL-SCNC: 27 MMOL/L (ref 23–29)
CREAT SERPL-MCNC: 1.3 MG/DL (ref 0.5–1.4)
EST. GFR  (AFRICAN AMERICAN): >60 ML/MIN/1.73 M^2
EST. GFR  (NON AFRICAN AMERICAN): 58.1 ML/MIN/1.73 M^2
ESTIMATED AVG GLUCOSE: 169 MG/DL (ref 68–131)
GLUCOSE SERPL-MCNC: 141 MG/DL (ref 70–110)
HBA1C MFR BLD: 7.5 % (ref 4–5.6)
POTASSIUM SERPL-SCNC: 4.6 MMOL/L (ref 3.5–5.1)
SODIUM SERPL-SCNC: 140 MMOL/L (ref 136–145)

## 2021-05-13 ENCOUNTER — PATIENT MESSAGE (OUTPATIENT)
Dept: ENDOCRINOLOGY | Facility: CLINIC | Age: 66
End: 2021-05-13

## 2021-05-13 ENCOUNTER — OFFICE VISIT (OUTPATIENT)
Dept: PODIATRY | Facility: CLINIC | Age: 66
End: 2021-05-13
Payer: COMMERCIAL

## 2021-05-13 VITALS — HEART RATE: 88 BPM | SYSTOLIC BLOOD PRESSURE: 119 MMHG | DIASTOLIC BLOOD PRESSURE: 68 MMHG

## 2021-05-13 DIAGNOSIS — L97.509 TYPE 1 DIABETES MELLITUS WITH DIABETIC TOE ULCER: Primary | ICD-10-CM

## 2021-05-13 DIAGNOSIS — T14.8XXD DELAYED WOUND HEALING: ICD-10-CM

## 2021-05-13 DIAGNOSIS — L97.522 SKIN ULCER OF THIRD TOE OF LEFT FOOT WITH FAT LAYER EXPOSED: ICD-10-CM

## 2021-05-13 DIAGNOSIS — E10.42 TYPE 1 DIABETES MELLITUS WITH POLYNEUROPATHY: ICD-10-CM

## 2021-05-13 DIAGNOSIS — E10.621 TYPE 1 DIABETES MELLITUS WITH DIABETIC TOE ULCER: Primary | ICD-10-CM

## 2021-05-13 PROCEDURE — 15275 SKIN SUB GRAFT FACE/NK/HF/G: CPT | Mod: S$GLB,,, | Performed by: PODIATRIST

## 2021-05-13 PROCEDURE — 1126F AMNT PAIN NOTED NONE PRSNT: CPT | Mod: S$GLB,,, | Performed by: PODIATRIST

## 2021-05-13 PROCEDURE — 15275 PR SKIN SUB GRAFT FACE/NK/HF/G UP TO 100 SQCM: ICD-10-PCS | Mod: S$GLB,,, | Performed by: PODIATRIST

## 2021-05-13 PROCEDURE — 99999 PR PBB SHADOW E&M-EST. PATIENT-LVL III: ICD-10-PCS | Mod: PBBFAC,,, | Performed by: PODIATRIST

## 2021-05-13 PROCEDURE — 99499 NO LOS: ICD-10-PCS | Mod: S$GLB,,, | Performed by: PODIATRIST

## 2021-05-13 PROCEDURE — 1126F PR PAIN SEVERITY QUANTIFIED, NO PAIN PRESENT: ICD-10-PCS | Mod: S$GLB,,, | Performed by: PODIATRIST

## 2021-05-13 PROCEDURE — 3051F PR MOST RECENT HEMOGLOBIN A1C LEVEL 7.0 - < 8.0%: ICD-10-PCS | Mod: CPTII,S$GLB,, | Performed by: PODIATRIST

## 2021-05-13 PROCEDURE — 3051F HG A1C>EQUAL 7.0%<8.0%: CPT | Mod: CPTII,S$GLB,, | Performed by: PODIATRIST

## 2021-05-13 PROCEDURE — 99999 PR PBB SHADOW E&M-EST. PATIENT-LVL III: CPT | Mod: PBBFAC,,, | Performed by: PODIATRIST

## 2021-05-13 PROCEDURE — 99499 UNLISTED E&M SERVICE: CPT | Mod: S$GLB,,, | Performed by: PODIATRIST

## 2021-05-17 ENCOUNTER — TELEPHONE (OUTPATIENT)
Dept: OPHTHALMOLOGY | Facility: CLINIC | Age: 66
End: 2021-05-17

## 2021-05-18 ENCOUNTER — OFFICE VISIT (OUTPATIENT)
Dept: ENDOCRINOLOGY | Facility: CLINIC | Age: 66
End: 2021-05-18
Payer: COMMERCIAL

## 2021-05-18 VITALS
HEIGHT: 74 IN | BODY MASS INDEX: 28.92 KG/M2 | SYSTOLIC BLOOD PRESSURE: 122 MMHG | DIASTOLIC BLOOD PRESSURE: 60 MMHG | HEART RATE: 76 BPM | WEIGHT: 225.31 LBS

## 2021-05-18 DIAGNOSIS — E10.43 TYPE 1 DIABETES MELLITUS WITH DIABETIC AUTONOMIC NEUROPATHY: Primary | ICD-10-CM

## 2021-05-18 DIAGNOSIS — E10.649 HYPOGLYCEMIA UNAWARENESS IN TYPE 1 DIABETES MELLITUS: ICD-10-CM

## 2021-05-18 DIAGNOSIS — E78.5 HYPERLIPIDEMIA, UNSPECIFIED HYPERLIPIDEMIA TYPE: ICD-10-CM

## 2021-05-18 DIAGNOSIS — E10.21 TYPE 1 DIABETES MELLITUS WITH DIABETIC NEPHROPATHY: ICD-10-CM

## 2021-05-18 DIAGNOSIS — I10 ESSENTIAL HYPERTENSION: ICD-10-CM

## 2021-05-18 PROCEDURE — 99214 OFFICE O/P EST MOD 30 MIN: CPT | Mod: S$GLB,,, | Performed by: NURSE PRACTITIONER

## 2021-05-18 PROCEDURE — 3051F HG A1C>EQUAL 7.0%<8.0%: CPT | Mod: CPTII,S$GLB,, | Performed by: NURSE PRACTITIONER

## 2021-05-18 PROCEDURE — 1126F PR PAIN SEVERITY QUANTIFIED, NO PAIN PRESENT: ICD-10-PCS | Mod: S$GLB,,, | Performed by: NURSE PRACTITIONER

## 2021-05-18 PROCEDURE — 3008F PR BODY MASS INDEX (BMI) DOCUMENTED: ICD-10-PCS | Mod: CPTII,S$GLB,, | Performed by: NURSE PRACTITIONER

## 2021-05-18 PROCEDURE — 3008F BODY MASS INDEX DOCD: CPT | Mod: CPTII,S$GLB,, | Performed by: NURSE PRACTITIONER

## 2021-05-18 PROCEDURE — 1126F AMNT PAIN NOTED NONE PRSNT: CPT | Mod: S$GLB,,, | Performed by: NURSE PRACTITIONER

## 2021-05-18 PROCEDURE — 3051F PR MOST RECENT HEMOGLOBIN A1C LEVEL 7.0 - < 8.0%: ICD-10-PCS | Mod: CPTII,S$GLB,, | Performed by: NURSE PRACTITIONER

## 2021-05-18 PROCEDURE — 99999 PR PBB SHADOW E&M-EST. PATIENT-LVL IV: ICD-10-PCS | Mod: PBBFAC,,, | Performed by: NURSE PRACTITIONER

## 2021-05-18 PROCEDURE — 99214 PR OFFICE/OUTPT VISIT, EST, LEVL IV, 30-39 MIN: ICD-10-PCS | Mod: S$GLB,,, | Performed by: NURSE PRACTITIONER

## 2021-05-18 PROCEDURE — 95251 CONT GLUC MNTR ANALYSIS I&R: CPT | Mod: S$GLB,,, | Performed by: NURSE PRACTITIONER

## 2021-05-18 PROCEDURE — 99999 PR PBB SHADOW E&M-EST. PATIENT-LVL IV: CPT | Mod: PBBFAC,,, | Performed by: NURSE PRACTITIONER

## 2021-05-18 PROCEDURE — 95251 PR GLUCOSE MONITOR, 72 HOUR, PHYS INTERP: ICD-10-PCS | Mod: S$GLB,,, | Performed by: NURSE PRACTITIONER

## 2021-05-19 ENCOUNTER — PATIENT MESSAGE (OUTPATIENT)
Dept: PODIATRY | Facility: CLINIC | Age: 66
End: 2021-05-19

## 2021-05-19 ENCOUNTER — OFFICE VISIT (OUTPATIENT)
Dept: PODIATRY | Facility: CLINIC | Age: 66
End: 2021-05-19
Payer: COMMERCIAL

## 2021-05-19 VITALS — HEIGHT: 74 IN | WEIGHT: 225.31 LBS | BODY MASS INDEX: 28.92 KG/M2

## 2021-05-19 DIAGNOSIS — T14.8XXD DELAYED WOUND HEALING: ICD-10-CM

## 2021-05-19 DIAGNOSIS — E10.42 TYPE 1 DIABETES MELLITUS WITH POLYNEUROPATHY: ICD-10-CM

## 2021-05-19 DIAGNOSIS — E10.621 TYPE 1 DIABETES MELLITUS WITH DIABETIC TOE ULCER: Primary | ICD-10-CM

## 2021-05-19 DIAGNOSIS — L97.509 TYPE 1 DIABETES MELLITUS WITH DIABETIC TOE ULCER: Primary | ICD-10-CM

## 2021-05-19 PROCEDURE — 3051F HG A1C>EQUAL 7.0%<8.0%: CPT | Mod: CPTII,S$GLB,, | Performed by: PODIATRIST

## 2021-05-19 PROCEDURE — 99499 UNLISTED E&M SERVICE: CPT | Mod: S$GLB,,, | Performed by: PODIATRIST

## 2021-05-19 PROCEDURE — 1126F PR PAIN SEVERITY QUANTIFIED, NO PAIN PRESENT: ICD-10-PCS | Mod: S$GLB,,, | Performed by: PODIATRIST

## 2021-05-19 PROCEDURE — 3051F PR MOST RECENT HEMOGLOBIN A1C LEVEL 7.0 - < 8.0%: ICD-10-PCS | Mod: CPTII,S$GLB,, | Performed by: PODIATRIST

## 2021-05-19 PROCEDURE — 97597 WOUND DEBRIDEMENT: ICD-10-PCS | Mod: S$GLB,,, | Performed by: PODIATRIST

## 2021-05-19 PROCEDURE — 1126F AMNT PAIN NOTED NONE PRSNT: CPT | Mod: S$GLB,,, | Performed by: PODIATRIST

## 2021-05-19 PROCEDURE — 99999 PR PBB SHADOW E&M-EST. PATIENT-LVL II: ICD-10-PCS | Mod: PBBFAC,,, | Performed by: PODIATRIST

## 2021-05-19 PROCEDURE — 99499 NO LOS: ICD-10-PCS | Mod: S$GLB,,, | Performed by: PODIATRIST

## 2021-05-19 PROCEDURE — 3008F BODY MASS INDEX DOCD: CPT | Mod: CPTII,S$GLB,, | Performed by: PODIATRIST

## 2021-05-19 PROCEDURE — 99999 PR PBB SHADOW E&M-EST. PATIENT-LVL II: CPT | Mod: PBBFAC,,, | Performed by: PODIATRIST

## 2021-05-19 PROCEDURE — 97597 DBRDMT OPN WND 1ST 20 CM/<: CPT | Mod: S$GLB,,, | Performed by: PODIATRIST

## 2021-05-19 PROCEDURE — 3008F PR BODY MASS INDEX (BMI) DOCUMENTED: ICD-10-PCS | Mod: CPTII,S$GLB,, | Performed by: PODIATRIST

## 2021-05-26 ENCOUNTER — OFFICE VISIT (OUTPATIENT)
Dept: PODIATRY | Facility: CLINIC | Age: 66
End: 2021-05-26
Payer: COMMERCIAL

## 2021-05-26 VITALS — BODY MASS INDEX: 28.92 KG/M2 | HEIGHT: 74 IN | WEIGHT: 225.31 LBS

## 2021-05-26 DIAGNOSIS — E10.621 TYPE 1 DIABETES MELLITUS WITH DIABETIC TOE ULCER: Primary | ICD-10-CM

## 2021-05-26 DIAGNOSIS — L97.509 TYPE 1 DIABETES MELLITUS WITH DIABETIC TOE ULCER: Primary | ICD-10-CM

## 2021-05-26 DIAGNOSIS — T14.8XXD DELAYED WOUND HEALING: ICD-10-CM

## 2021-05-26 DIAGNOSIS — E10.42 TYPE 1 DIABETES MELLITUS WITH POLYNEUROPATHY: ICD-10-CM

## 2021-05-26 PROCEDURE — 11042 DBRDMT SUBQ TIS 1ST 20SQCM/<: CPT | Mod: S$GLB,,, | Performed by: PODIATRIST

## 2021-05-26 PROCEDURE — 87075 CULTR BACTERIA EXCEPT BLOOD: CPT | Performed by: PODIATRIST

## 2021-05-26 PROCEDURE — 3051F HG A1C>EQUAL 7.0%<8.0%: CPT | Mod: CPTII,S$GLB,, | Performed by: PODIATRIST

## 2021-05-26 PROCEDURE — 3008F BODY MASS INDEX DOCD: CPT | Mod: CPTII,S$GLB,, | Performed by: PODIATRIST

## 2021-05-26 PROCEDURE — 1126F PR PAIN SEVERITY QUANTIFIED, NO PAIN PRESENT: ICD-10-PCS | Mod: S$GLB,,, | Performed by: PODIATRIST

## 2021-05-26 PROCEDURE — 99499 NO LOS: ICD-10-PCS | Mod: S$GLB,,, | Performed by: PODIATRIST

## 2021-05-26 PROCEDURE — 3051F PR MOST RECENT HEMOGLOBIN A1C LEVEL 7.0 - < 8.0%: ICD-10-PCS | Mod: CPTII,S$GLB,, | Performed by: PODIATRIST

## 2021-05-26 PROCEDURE — 99999 PR PBB SHADOW E&M-EST. PATIENT-LVL II: CPT | Mod: PBBFAC,,, | Performed by: PODIATRIST

## 2021-05-26 PROCEDURE — 99499 UNLISTED E&M SERVICE: CPT | Mod: S$GLB,,, | Performed by: PODIATRIST

## 2021-05-26 PROCEDURE — 1126F AMNT PAIN NOTED NONE PRSNT: CPT | Mod: S$GLB,,, | Performed by: PODIATRIST

## 2021-05-26 PROCEDURE — 87070 CULTURE OTHR SPECIMN AEROBIC: CPT | Performed by: PODIATRIST

## 2021-05-26 PROCEDURE — 11042 WOUND DEBRIDEMENT: ICD-10-PCS | Mod: S$GLB,,, | Performed by: PODIATRIST

## 2021-05-26 PROCEDURE — 3008F PR BODY MASS INDEX (BMI) DOCUMENTED: ICD-10-PCS | Mod: CPTII,S$GLB,, | Performed by: PODIATRIST

## 2021-05-26 PROCEDURE — 99999 PR PBB SHADOW E&M-EST. PATIENT-LVL II: ICD-10-PCS | Mod: PBBFAC,,, | Performed by: PODIATRIST

## 2021-05-28 LAB — BACTERIA SPEC AEROBE CULT: NORMAL

## 2021-05-31 LAB — BACTERIA SPEC ANAEROBE CULT: NORMAL

## 2021-06-01 ENCOUNTER — TELEPHONE (OUTPATIENT)
Dept: ENDOCRINOLOGY | Facility: CLINIC | Age: 66
End: 2021-06-01

## 2021-06-02 ENCOUNTER — OFFICE VISIT (OUTPATIENT)
Dept: PODIATRY | Facility: CLINIC | Age: 66
End: 2021-06-02
Payer: COMMERCIAL

## 2021-06-02 VITALS — WEIGHT: 225.31 LBS | BODY MASS INDEX: 28.92 KG/M2 | HEIGHT: 74 IN

## 2021-06-02 DIAGNOSIS — E10.621 TYPE 1 DIABETES MELLITUS WITH DIABETIC TOE ULCER: Primary | ICD-10-CM

## 2021-06-02 DIAGNOSIS — E10.42 TYPE 1 DIABETES MELLITUS WITH POLYNEUROPATHY: ICD-10-CM

## 2021-06-02 DIAGNOSIS — L97.509 TYPE 1 DIABETES MELLITUS WITH DIABETIC TOE ULCER: Primary | ICD-10-CM

## 2021-06-02 PROCEDURE — 3008F BODY MASS INDEX DOCD: CPT | Mod: CPTII,S$GLB,, | Performed by: PODIATRIST

## 2021-06-02 PROCEDURE — 1126F AMNT PAIN NOTED NONE PRSNT: CPT | Mod: S$GLB,,, | Performed by: PODIATRIST

## 2021-06-02 PROCEDURE — 3008F PR BODY MASS INDEX (BMI) DOCUMENTED: ICD-10-PCS | Mod: CPTII,S$GLB,, | Performed by: PODIATRIST

## 2021-06-02 PROCEDURE — 1126F PR PAIN SEVERITY QUANTIFIED, NO PAIN PRESENT: ICD-10-PCS | Mod: S$GLB,,, | Performed by: PODIATRIST

## 2021-06-02 PROCEDURE — 99499 NO LOS: ICD-10-PCS | Mod: S$GLB,,, | Performed by: PODIATRIST

## 2021-06-02 PROCEDURE — 99999 PR PBB SHADOW E&M-EST. PATIENT-LVL II: CPT | Mod: PBBFAC,,, | Performed by: PODIATRIST

## 2021-06-02 PROCEDURE — 99499 UNLISTED E&M SERVICE: CPT | Mod: S$GLB,,, | Performed by: PODIATRIST

## 2021-06-02 PROCEDURE — 3051F PR MOST RECENT HEMOGLOBIN A1C LEVEL 7.0 - < 8.0%: ICD-10-PCS | Mod: CPTII,S$GLB,, | Performed by: PODIATRIST

## 2021-06-02 PROCEDURE — 99999 PR PBB SHADOW E&M-EST. PATIENT-LVL II: ICD-10-PCS | Mod: PBBFAC,,, | Performed by: PODIATRIST

## 2021-06-02 PROCEDURE — 3051F HG A1C>EQUAL 7.0%<8.0%: CPT | Mod: CPTII,S$GLB,, | Performed by: PODIATRIST

## 2021-06-09 ENCOUNTER — OFFICE VISIT (OUTPATIENT)
Dept: PODIATRY | Facility: CLINIC | Age: 66
End: 2021-06-09
Payer: COMMERCIAL

## 2021-06-09 VITALS — BODY MASS INDEX: 28.92 KG/M2 | WEIGHT: 225.31 LBS | HEIGHT: 74 IN

## 2021-06-09 DIAGNOSIS — L97.509 TYPE 1 DIABETES MELLITUS WITH DIABETIC TOE ULCER: Primary | ICD-10-CM

## 2021-06-09 DIAGNOSIS — E10.42 TYPE 1 DIABETES MELLITUS WITH POLYNEUROPATHY: ICD-10-CM

## 2021-06-09 DIAGNOSIS — E10.621 TYPE 1 DIABETES MELLITUS WITH DIABETIC TOE ULCER: Primary | ICD-10-CM

## 2021-06-09 PROCEDURE — 3008F BODY MASS INDEX DOCD: CPT | Mod: CPTII,S$GLB,, | Performed by: PODIATRIST

## 2021-06-09 PROCEDURE — 3051F HG A1C>EQUAL 7.0%<8.0%: CPT | Mod: CPTII,S$GLB,, | Performed by: PODIATRIST

## 2021-06-09 PROCEDURE — 99499 NO LOS: ICD-10-PCS | Mod: S$GLB,,, | Performed by: PODIATRIST

## 2021-06-09 PROCEDURE — 99999 PR PBB SHADOW E&M-EST. PATIENT-LVL II: ICD-10-PCS | Mod: PBBFAC,,, | Performed by: PODIATRIST

## 2021-06-09 PROCEDURE — 3008F PR BODY MASS INDEX (BMI) DOCUMENTED: ICD-10-PCS | Mod: CPTII,S$GLB,, | Performed by: PODIATRIST

## 2021-06-09 PROCEDURE — 99999 PR PBB SHADOW E&M-EST. PATIENT-LVL II: CPT | Mod: PBBFAC,,, | Performed by: PODIATRIST

## 2021-06-09 PROCEDURE — 15275 SKIN SUB GRAFT FACE/NK/HF/G: CPT | Mod: S$GLB,,, | Performed by: PODIATRIST

## 2021-06-09 PROCEDURE — 3051F PR MOST RECENT HEMOGLOBIN A1C LEVEL 7.0 - < 8.0%: ICD-10-PCS | Mod: CPTII,S$GLB,, | Performed by: PODIATRIST

## 2021-06-09 PROCEDURE — 15275 PR SKIN SUB GRAFT FACE/NK/HF/G UP TO 100 SQCM: ICD-10-PCS | Mod: S$GLB,,, | Performed by: PODIATRIST

## 2021-06-09 PROCEDURE — 1126F AMNT PAIN NOTED NONE PRSNT: CPT | Mod: S$GLB,,, | Performed by: PODIATRIST

## 2021-06-09 PROCEDURE — 1126F PR PAIN SEVERITY QUANTIFIED, NO PAIN PRESENT: ICD-10-PCS | Mod: S$GLB,,, | Performed by: PODIATRIST

## 2021-06-09 PROCEDURE — 99499 UNLISTED E&M SERVICE: CPT | Mod: S$GLB,,, | Performed by: PODIATRIST

## 2021-06-15 ENCOUNTER — PATIENT OUTREACH (OUTPATIENT)
Dept: ADMINISTRATIVE | Facility: OTHER | Age: 66
End: 2021-06-15

## 2021-06-16 ENCOUNTER — OFFICE VISIT (OUTPATIENT)
Dept: PODIATRY | Facility: CLINIC | Age: 66
End: 2021-06-16
Payer: COMMERCIAL

## 2021-06-16 VITALS — WEIGHT: 225.31 LBS | BODY MASS INDEX: 28.92 KG/M2 | HEIGHT: 74 IN

## 2021-06-16 DIAGNOSIS — Z87.2 HEALED ULCER OF LEFT FOOT ON EXAMINATION: Primary | ICD-10-CM

## 2021-06-16 DIAGNOSIS — E10.42 TYPE 1 DIABETES MELLITUS WITH POLYNEUROPATHY: ICD-10-CM

## 2021-06-16 PROCEDURE — 99999 PR PBB SHADOW E&M-EST. PATIENT-LVL III: ICD-10-PCS | Mod: PBBFAC,,, | Performed by: PODIATRIST

## 2021-06-16 PROCEDURE — 99999 PR PBB SHADOW E&M-EST. PATIENT-LVL III: CPT | Mod: PBBFAC,,, | Performed by: PODIATRIST

## 2021-06-16 PROCEDURE — 99212 PR OFFICE/OUTPT VISIT, EST, LEVL II, 10-19 MIN: ICD-10-PCS | Mod: S$GLB,,, | Performed by: PODIATRIST

## 2021-06-16 PROCEDURE — 3051F HG A1C>EQUAL 7.0%<8.0%: CPT | Mod: CPTII,S$GLB,, | Performed by: PODIATRIST

## 2021-06-16 PROCEDURE — 3008F BODY MASS INDEX DOCD: CPT | Mod: CPTII,S$GLB,, | Performed by: PODIATRIST

## 2021-06-16 PROCEDURE — 3008F PR BODY MASS INDEX (BMI) DOCUMENTED: ICD-10-PCS | Mod: CPTII,S$GLB,, | Performed by: PODIATRIST

## 2021-06-16 PROCEDURE — 3051F PR MOST RECENT HEMOGLOBIN A1C LEVEL 7.0 - < 8.0%: ICD-10-PCS | Mod: CPTII,S$GLB,, | Performed by: PODIATRIST

## 2021-06-16 PROCEDURE — 99212 OFFICE O/P EST SF 10 MIN: CPT | Mod: S$GLB,,, | Performed by: PODIATRIST

## 2021-06-23 ENCOUNTER — OFFICE VISIT (OUTPATIENT)
Dept: PODIATRY | Facility: CLINIC | Age: 66
End: 2021-06-23
Payer: COMMERCIAL

## 2021-06-23 DIAGNOSIS — E10.42 TYPE 1 DIABETES MELLITUS WITH POLYNEUROPATHY: ICD-10-CM

## 2021-06-23 DIAGNOSIS — Z87.2 HEALED ULCER OF LEFT FOOT ON EXAMINATION: Primary | ICD-10-CM

## 2021-06-23 PROCEDURE — 3051F PR MOST RECENT HEMOGLOBIN A1C LEVEL 7.0 - < 8.0%: ICD-10-PCS | Mod: CPTII,S$GLB,, | Performed by: PODIATRIST

## 2021-06-23 PROCEDURE — 99212 PR OFFICE/OUTPT VISIT, EST, LEVL II, 10-19 MIN: ICD-10-PCS | Mod: S$GLB,,, | Performed by: PODIATRIST

## 2021-06-23 PROCEDURE — 99212 OFFICE O/P EST SF 10 MIN: CPT | Mod: S$GLB,,, | Performed by: PODIATRIST

## 2021-06-23 PROCEDURE — 3051F HG A1C>EQUAL 7.0%<8.0%: CPT | Mod: CPTII,S$GLB,, | Performed by: PODIATRIST

## 2021-06-29 ENCOUNTER — OFFICE VISIT (OUTPATIENT)
Dept: OPHTHALMOLOGY | Facility: CLINIC | Age: 66
End: 2021-06-29
Payer: COMMERCIAL

## 2021-06-29 DIAGNOSIS — H25.13 NS (NUCLEAR SCLEROSIS), BILATERAL: ICD-10-CM

## 2021-06-29 DIAGNOSIS — H25.11 AGE-RELATED NUCLEAR CATARACT OF RIGHT EYE: ICD-10-CM

## 2021-06-29 PROCEDURE — 3051F HG A1C>EQUAL 7.0%<8.0%: CPT | Mod: CPTII,S$GLB,, | Performed by: OPHTHALMOLOGY

## 2021-06-29 PROCEDURE — 3051F PR MOST RECENT HEMOGLOBIN A1C LEVEL 7.0 - < 8.0%: ICD-10-PCS | Mod: CPTII,S$GLB,, | Performed by: OPHTHALMOLOGY

## 2021-06-29 PROCEDURE — 99999 PR PBB SHADOW E&M-EST. PATIENT-LVL III: CPT | Mod: PBBFAC,,, | Performed by: OPHTHALMOLOGY

## 2021-06-29 PROCEDURE — 1126F PR PAIN SEVERITY QUANTIFIED, NO PAIN PRESENT: ICD-10-PCS | Mod: S$GLB,,, | Performed by: OPHTHALMOLOGY

## 2021-06-29 PROCEDURE — 99999 PR PBB SHADOW E&M-EST. PATIENT-LVL III: ICD-10-PCS | Mod: PBBFAC,,, | Performed by: OPHTHALMOLOGY

## 2021-06-29 PROCEDURE — 99214 OFFICE O/P EST MOD 30 MIN: CPT | Mod: S$GLB,,, | Performed by: OPHTHALMOLOGY

## 2021-06-29 PROCEDURE — 92015 PR REFRACTION: ICD-10-PCS | Mod: S$GLB,,, | Performed by: OPHTHALMOLOGY

## 2021-06-29 PROCEDURE — 99214 PR OFFICE/OUTPT VISIT, EST, LEVL IV, 30-39 MIN: ICD-10-PCS | Mod: S$GLB,,, | Performed by: OPHTHALMOLOGY

## 2021-06-29 PROCEDURE — 92015 DETERMINE REFRACTIVE STATE: CPT | Mod: S$GLB,,, | Performed by: OPHTHALMOLOGY

## 2021-06-29 PROCEDURE — 1126F AMNT PAIN NOTED NONE PRSNT: CPT | Mod: S$GLB,,, | Performed by: OPHTHALMOLOGY

## 2021-06-30 ENCOUNTER — TELEPHONE (OUTPATIENT)
Dept: OPHTHALMOLOGY | Facility: CLINIC | Age: 66
End: 2021-06-30

## 2021-07-01 ENCOUNTER — TELEPHONE (OUTPATIENT)
Dept: OPHTHALMOLOGY | Facility: CLINIC | Age: 66
End: 2021-07-01

## 2021-07-06 ENCOUNTER — PATIENT MESSAGE (OUTPATIENT)
Dept: PODIATRY | Facility: CLINIC | Age: 66
End: 2021-07-06

## 2021-07-07 ENCOUNTER — PATIENT MESSAGE (OUTPATIENT)
Dept: PODIATRY | Facility: CLINIC | Age: 66
End: 2021-07-07

## 2021-07-07 ENCOUNTER — PATIENT MESSAGE (OUTPATIENT)
Dept: ADMINISTRATIVE | Facility: HOSPITAL | Age: 66
End: 2021-07-07

## 2021-07-08 ENCOUNTER — TELEPHONE (OUTPATIENT)
Dept: OPHTHALMOLOGY | Facility: CLINIC | Age: 66
End: 2021-07-08

## 2021-07-19 ENCOUNTER — PATIENT OUTREACH (OUTPATIENT)
Dept: ADMINISTRATIVE | Facility: OTHER | Age: 66
End: 2021-07-19

## 2021-07-21 ENCOUNTER — OFFICE VISIT (OUTPATIENT)
Dept: PODIATRY | Facility: CLINIC | Age: 66
End: 2021-07-21
Payer: COMMERCIAL

## 2021-07-21 VITALS — BODY MASS INDEX: 28.92 KG/M2 | HEIGHT: 74 IN | WEIGHT: 225.31 LBS

## 2021-07-21 DIAGNOSIS — E10.42 TYPE 1 DIABETES MELLITUS WITH POLYNEUROPATHY: ICD-10-CM

## 2021-07-21 DIAGNOSIS — L97.509 TYPE 1 DIABETES MELLITUS WITH DIABETIC TOE ULCER: Primary | ICD-10-CM

## 2021-07-21 DIAGNOSIS — E10.621 TYPE 1 DIABETES MELLITUS WITH DIABETIC TOE ULCER: Primary | ICD-10-CM

## 2021-07-21 PROCEDURE — 87075 CULTR BACTERIA EXCEPT BLOOD: CPT | Performed by: PODIATRIST

## 2021-07-21 PROCEDURE — 99999 PR PBB SHADOW E&M-EST. PATIENT-LVL III: ICD-10-PCS | Mod: PBBFAC,,, | Performed by: PODIATRIST

## 2021-07-21 PROCEDURE — 87070 CULTURE OTHR SPECIMN AEROBIC: CPT | Performed by: PODIATRIST

## 2021-07-21 PROCEDURE — 87186 SC STD MICRODIL/AGAR DIL: CPT | Performed by: PODIATRIST

## 2021-07-21 PROCEDURE — 3051F HG A1C>EQUAL 7.0%<8.0%: CPT | Mod: CPTII,S$GLB,, | Performed by: PODIATRIST

## 2021-07-21 PROCEDURE — 99999 PR PBB SHADOW E&M-EST. PATIENT-LVL III: CPT | Mod: PBBFAC,,, | Performed by: PODIATRIST

## 2021-07-21 PROCEDURE — 3008F PR BODY MASS INDEX (BMI) DOCUMENTED: ICD-10-PCS | Mod: CPTII,S$GLB,, | Performed by: PODIATRIST

## 2021-07-21 PROCEDURE — 99213 OFFICE O/P EST LOW 20 MIN: CPT | Mod: S$GLB,,, | Performed by: PODIATRIST

## 2021-07-21 PROCEDURE — 3008F BODY MASS INDEX DOCD: CPT | Mod: CPTII,S$GLB,, | Performed by: PODIATRIST

## 2021-07-21 PROCEDURE — 87077 CULTURE AEROBIC IDENTIFY: CPT | Performed by: PODIATRIST

## 2021-07-21 PROCEDURE — 3051F PR MOST RECENT HEMOGLOBIN A1C LEVEL 7.0 - < 8.0%: ICD-10-PCS | Mod: CPTII,S$GLB,, | Performed by: PODIATRIST

## 2021-07-21 PROCEDURE — 99213 PR OFFICE/OUTPT VISIT, EST, LEVL III, 20-29 MIN: ICD-10-PCS | Mod: S$GLB,,, | Performed by: PODIATRIST

## 2021-07-24 LAB — BACTERIA SPEC AEROBE CULT: ABNORMAL

## 2021-07-26 LAB — BACTERIA SPEC ANAEROBE CULT: NORMAL

## 2021-07-28 ENCOUNTER — TELEPHONE (OUTPATIENT)
Dept: OPTOMETRY | Facility: CLINIC | Age: 66
End: 2021-07-28

## 2021-07-28 ENCOUNTER — TELEPHONE (OUTPATIENT)
Dept: OPHTHALMOLOGY | Facility: CLINIC | Age: 66
End: 2021-07-28

## 2021-07-30 ENCOUNTER — OFFICE VISIT (OUTPATIENT)
Dept: OPHTHALMOLOGY | Facility: CLINIC | Age: 66
End: 2021-07-30
Payer: COMMERCIAL

## 2021-07-30 DIAGNOSIS — E11.36 DIABETIC CATARACT OF RIGHT EYE: Primary | ICD-10-CM

## 2021-07-30 PROCEDURE — 3051F HG A1C>EQUAL 7.0%<8.0%: CPT | Mod: CPTII,S$GLB,, | Performed by: OPHTHALMOLOGY

## 2021-07-30 PROCEDURE — 99499 NO LOS: ICD-10-PCS | Mod: S$GLB,,, | Performed by: OPHTHALMOLOGY

## 2021-07-30 PROCEDURE — 1160F PR REVIEW ALL MEDS BY PRESCRIBER/CLIN PHARMACIST DOCUMENTED: ICD-10-PCS | Mod: CPTII,S$GLB,, | Performed by: OPHTHALMOLOGY

## 2021-07-30 PROCEDURE — 1126F AMNT PAIN NOTED NONE PRSNT: CPT | Mod: CPTII,S$GLB,, | Performed by: OPHTHALMOLOGY

## 2021-07-30 PROCEDURE — 1160F RVW MEDS BY RX/DR IN RCRD: CPT | Mod: CPTII,S$GLB,, | Performed by: OPHTHALMOLOGY

## 2021-07-30 PROCEDURE — 99499 UNLISTED E&M SERVICE: CPT | Mod: S$GLB,,, | Performed by: OPHTHALMOLOGY

## 2021-07-30 PROCEDURE — 1159F MED LIST DOCD IN RCRD: CPT | Mod: CPTII,S$GLB,, | Performed by: OPHTHALMOLOGY

## 2021-07-30 PROCEDURE — 1126F PR PAIN SEVERITY QUANTIFIED, NO PAIN PRESENT: ICD-10-PCS | Mod: CPTII,S$GLB,, | Performed by: OPHTHALMOLOGY

## 2021-07-30 PROCEDURE — 3051F PR MOST RECENT HEMOGLOBIN A1C LEVEL 7.0 - < 8.0%: ICD-10-PCS | Mod: CPTII,S$GLB,, | Performed by: OPHTHALMOLOGY

## 2021-07-30 PROCEDURE — 99999 PR PBB SHADOW E&M-EST. PATIENT-LVL IV: CPT | Mod: PBBFAC,,, | Performed by: OPHTHALMOLOGY

## 2021-07-30 PROCEDURE — 99999 PR PBB SHADOW E&M-EST. PATIENT-LVL IV: ICD-10-PCS | Mod: PBBFAC,,, | Performed by: OPHTHALMOLOGY

## 2021-07-30 PROCEDURE — 1159F PR MEDICATION LIST DOCUMENTED IN MEDICAL RECORD: ICD-10-PCS | Mod: CPTII,S$GLB,, | Performed by: OPHTHALMOLOGY

## 2021-07-30 RX ORDER — DICLOFENAC SODIUM 1 MG/ML
1 SOLUTION/ DROPS OPHTHALMIC 4 TIMES DAILY
Qty: 5 ML | Refills: 3 | Status: CANCELLED | OUTPATIENT
Start: 2021-07-30 | End: 2021-08-29

## 2021-07-30 RX ORDER — PREDNISOLONE ACETATE 10 MG/ML
1 SUSPENSION/ DROPS OPHTHALMIC 4 TIMES DAILY
Qty: 1 BOTTLE | Refills: 3 | Status: CANCELLED | OUTPATIENT
Start: 2021-07-30 | End: 2021-08-29

## 2021-07-30 RX ORDER — MOXIFLOXACIN 5 MG/ML
1 SOLUTION/ DROPS OPHTHALMIC 4 TIMES DAILY
Qty: 3 ML | Refills: 1 | Status: CANCELLED | OUTPATIENT
Start: 2021-07-30 | End: 2021-08-06

## 2021-08-02 ENCOUNTER — TELEPHONE (OUTPATIENT)
Dept: OPHTHALMOLOGY | Facility: CLINIC | Age: 66
End: 2021-08-02

## 2021-08-04 ENCOUNTER — PATIENT MESSAGE (OUTPATIENT)
Dept: PODIATRY | Facility: CLINIC | Age: 66
End: 2021-08-04

## 2021-08-09 ENCOUNTER — PATIENT MESSAGE (OUTPATIENT)
Dept: PODIATRY | Facility: CLINIC | Age: 66
End: 2021-08-09

## 2021-08-10 ENCOUNTER — OFFICE VISIT (OUTPATIENT)
Dept: PODIATRY | Facility: CLINIC | Age: 66
End: 2021-08-10
Payer: COMMERCIAL

## 2021-08-10 ENCOUNTER — PATIENT MESSAGE (OUTPATIENT)
Dept: PODIATRY | Facility: CLINIC | Age: 66
End: 2021-08-10

## 2021-08-10 DIAGNOSIS — E10.621 TYPE 1 DIABETES MELLITUS WITH DIABETIC TOE ULCER: Primary | ICD-10-CM

## 2021-08-10 DIAGNOSIS — L97.509 TYPE 1 DIABETES MELLITUS WITH DIABETIC TOE ULCER: Primary | ICD-10-CM

## 2021-08-10 DIAGNOSIS — E10.42 TYPE 1 DIABETES MELLITUS WITH POLYNEUROPATHY: ICD-10-CM

## 2021-08-10 PROCEDURE — 3051F HG A1C>EQUAL 7.0%<8.0%: CPT | Mod: CPTII,S$GLB,, | Performed by: PODIATRIST

## 2021-08-10 PROCEDURE — 3051F PR MOST RECENT HEMOGLOBIN A1C LEVEL 7.0 - < 8.0%: ICD-10-PCS | Mod: CPTII,S$GLB,, | Performed by: PODIATRIST

## 2021-08-10 PROCEDURE — 97597 DBRDMT OPN WND 1ST 20 CM/<: CPT | Mod: S$GLB,,, | Performed by: PODIATRIST

## 2021-08-10 PROCEDURE — 99213 PR OFFICE/OUTPT VISIT, EST, LEVL III, 20-29 MIN: ICD-10-PCS | Mod: 25,S$GLB,, | Performed by: PODIATRIST

## 2021-08-10 PROCEDURE — 99213 OFFICE O/P EST LOW 20 MIN: CPT | Mod: 25,S$GLB,, | Performed by: PODIATRIST

## 2021-08-10 PROCEDURE — 97597 WOUND DEBRIDEMENT: ICD-10-PCS | Mod: S$GLB,,, | Performed by: PODIATRIST

## 2021-08-10 PROCEDURE — 87075 CULTR BACTERIA EXCEPT BLOOD: CPT | Performed by: PODIATRIST

## 2021-08-10 PROCEDURE — 87070 CULTURE OTHR SPECIMN AEROBIC: CPT | Performed by: PODIATRIST

## 2021-08-12 ENCOUNTER — LAB VISIT (OUTPATIENT)
Dept: LAB | Facility: HOSPITAL | Age: 66
End: 2021-08-12
Payer: COMMERCIAL

## 2021-08-12 DIAGNOSIS — E10.43 TYPE 1 DIABETES MELLITUS WITH DIABETIC AUTONOMIC NEUROPATHY: ICD-10-CM

## 2021-08-12 LAB
ALBUMIN SERPL BCP-MCNC: 3.7 G/DL (ref 3.5–5.2)
ALP SERPL-CCNC: 86 U/L (ref 55–135)
ALT SERPL W/O P-5'-P-CCNC: 29 U/L (ref 10–44)
ANION GAP SERPL CALC-SCNC: 11 MMOL/L (ref 8–16)
AST SERPL-CCNC: 29 U/L (ref 10–40)
BILIRUB SERPL-MCNC: 0.6 MG/DL (ref 0.1–1)
BUN SERPL-MCNC: 24 MG/DL (ref 8–23)
CALCIUM SERPL-MCNC: 9.9 MG/DL (ref 8.7–10.5)
CHLORIDE SERPL-SCNC: 103 MMOL/L (ref 95–110)
CHOLEST SERPL-MCNC: 150 MG/DL (ref 120–199)
CHOLEST/HDLC SERPL: 3.3 {RATIO} (ref 2–5)
CO2 SERPL-SCNC: 24 MMOL/L (ref 23–29)
CREAT SERPL-MCNC: 1.3 MG/DL (ref 0.5–1.4)
EST. GFR  (AFRICAN AMERICAN): >60 ML/MIN/1.73 M^2
EST. GFR  (NON AFRICAN AMERICAN): 57.7 ML/MIN/1.73 M^2
ESTIMATED AVG GLUCOSE: 192 MG/DL (ref 68–131)
GLUCOSE SERPL-MCNC: 107 MG/DL (ref 70–110)
HBA1C MFR BLD: 8.3 % (ref 4–5.6)
HDLC SERPL-MCNC: 46 MG/DL (ref 40–75)
HDLC SERPL: 30.7 % (ref 20–50)
LDLC SERPL CALC-MCNC: 91.4 MG/DL (ref 63–159)
NONHDLC SERPL-MCNC: 104 MG/DL
POTASSIUM SERPL-SCNC: 4.3 MMOL/L (ref 3.5–5.1)
PROT SERPL-MCNC: 6.8 G/DL (ref 6–8.4)
SODIUM SERPL-SCNC: 138 MMOL/L (ref 136–145)
TRIGL SERPL-MCNC: 63 MG/DL (ref 30–150)

## 2021-08-12 PROCEDURE — 36415 COLL VENOUS BLD VENIPUNCTURE: CPT | Mod: PN | Performed by: NURSE PRACTITIONER

## 2021-08-12 PROCEDURE — 80053 COMPREHEN METABOLIC PANEL: CPT | Performed by: NURSE PRACTITIONER

## 2021-08-12 PROCEDURE — 80061 LIPID PANEL: CPT | Performed by: NURSE PRACTITIONER

## 2021-08-12 PROCEDURE — 83036 HEMOGLOBIN GLYCOSYLATED A1C: CPT | Performed by: NURSE PRACTITIONER

## 2021-08-13 LAB — BACTERIA SPEC AEROBE CULT: NORMAL

## 2021-08-16 LAB — BACTERIA SPEC ANAEROBE CULT: NORMAL

## 2021-08-17 ENCOUNTER — OFFICE VISIT (OUTPATIENT)
Dept: PODIATRY | Facility: CLINIC | Age: 66
End: 2021-08-17
Payer: COMMERCIAL

## 2021-08-17 VITALS — BODY MASS INDEX: 28.92 KG/M2 | WEIGHT: 225.31 LBS | HEIGHT: 74 IN

## 2021-08-17 DIAGNOSIS — E10.621 TYPE 1 DIABETES MELLITUS WITH DIABETIC TOE ULCER: Primary | ICD-10-CM

## 2021-08-17 DIAGNOSIS — E10.42 TYPE 1 DIABETES MELLITUS WITH POLYNEUROPATHY: ICD-10-CM

## 2021-08-17 DIAGNOSIS — L97.509 TYPE 1 DIABETES MELLITUS WITH DIABETIC TOE ULCER: Primary | ICD-10-CM

## 2021-08-17 PROCEDURE — 97597 WOUND DEBRIDEMENT: ICD-10-PCS | Mod: S$GLB,,, | Performed by: PODIATRIST

## 2021-08-17 PROCEDURE — 99499 NO LOS: ICD-10-PCS | Mod: S$GLB,,, | Performed by: PODIATRIST

## 2021-08-17 PROCEDURE — 3008F PR BODY MASS INDEX (BMI) DOCUMENTED: ICD-10-PCS | Mod: CPTII,S$GLB,, | Performed by: PODIATRIST

## 2021-08-17 PROCEDURE — 99499 UNLISTED E&M SERVICE: CPT | Mod: S$GLB,,, | Performed by: PODIATRIST

## 2021-08-17 PROCEDURE — 1159F PR MEDICATION LIST DOCUMENTED IN MEDICAL RECORD: ICD-10-PCS | Mod: CPTII,S$GLB,, | Performed by: PODIATRIST

## 2021-08-17 PROCEDURE — 99999 PR PBB SHADOW E&M-EST. PATIENT-LVL III: CPT | Mod: PBBFAC,,, | Performed by: PODIATRIST

## 2021-08-17 PROCEDURE — 1126F AMNT PAIN NOTED NONE PRSNT: CPT | Mod: CPTII,S$GLB,, | Performed by: PODIATRIST

## 2021-08-17 PROCEDURE — 1159F MED LIST DOCD IN RCRD: CPT | Mod: CPTII,S$GLB,, | Performed by: PODIATRIST

## 2021-08-17 PROCEDURE — 3052F PR MOST RECENT HEMOGLOBIN A1C LEVEL 8.0 - < 9.0%: ICD-10-PCS | Mod: CPTII,S$GLB,, | Performed by: PODIATRIST

## 2021-08-17 PROCEDURE — 97597 DBRDMT OPN WND 1ST 20 CM/<: CPT | Mod: S$GLB,,, | Performed by: PODIATRIST

## 2021-08-17 PROCEDURE — 1126F PR PAIN SEVERITY QUANTIFIED, NO PAIN PRESENT: ICD-10-PCS | Mod: CPTII,S$GLB,, | Performed by: PODIATRIST

## 2021-08-17 PROCEDURE — 3008F BODY MASS INDEX DOCD: CPT | Mod: CPTII,S$GLB,, | Performed by: PODIATRIST

## 2021-08-17 PROCEDURE — 99999 PR PBB SHADOW E&M-EST. PATIENT-LVL III: ICD-10-PCS | Mod: PBBFAC,,, | Performed by: PODIATRIST

## 2021-08-17 PROCEDURE — 3052F HG A1C>EQUAL 8.0%<EQUAL 9.0%: CPT | Mod: CPTII,S$GLB,, | Performed by: PODIATRIST

## 2021-08-19 ENCOUNTER — OFFICE VISIT (OUTPATIENT)
Dept: ENDOCRINOLOGY | Facility: CLINIC | Age: 66
End: 2021-08-19
Payer: COMMERCIAL

## 2021-08-19 ENCOUNTER — PATIENT MESSAGE (OUTPATIENT)
Dept: ENDOCRINOLOGY | Facility: CLINIC | Age: 66
End: 2021-08-19

## 2021-08-19 VITALS
RESPIRATION RATE: 18 BRPM | SYSTOLIC BLOOD PRESSURE: 96 MMHG | WEIGHT: 222 LBS | DIASTOLIC BLOOD PRESSURE: 70 MMHG | BODY MASS INDEX: 28.49 KG/M2 | HEIGHT: 74 IN | HEART RATE: 88 BPM

## 2021-08-19 DIAGNOSIS — E10.21 TYPE 1 DIABETES MELLITUS WITH DIABETIC NEPHROPATHY: ICD-10-CM

## 2021-08-19 DIAGNOSIS — E10.43 TYPE 1 DIABETES MELLITUS WITH DIABETIC AUTONOMIC NEUROPATHY: Primary | ICD-10-CM

## 2021-08-19 DIAGNOSIS — I10 ESSENTIAL HYPERTENSION: ICD-10-CM

## 2021-08-19 DIAGNOSIS — E78.5 HYPERLIPIDEMIA, UNSPECIFIED HYPERLIPIDEMIA TYPE: ICD-10-CM

## 2021-08-19 DIAGNOSIS — E10.649 HYPOGLYCEMIA UNAWARENESS IN TYPE 1 DIABETES MELLITUS: ICD-10-CM

## 2021-08-19 DIAGNOSIS — F43.29 STRESS AND ADJUSTMENT REACTION: ICD-10-CM

## 2021-08-19 PROCEDURE — 95251 PR GLUCOSE MONITOR, 72 HOUR, PHYS INTERP: ICD-10-PCS | Mod: S$GLB,,, | Performed by: NURSE PRACTITIONER

## 2021-08-19 PROCEDURE — 99999 PR PBB SHADOW E&M-EST. PATIENT-LVL IV: CPT | Mod: PBBFAC,,, | Performed by: NURSE PRACTITIONER

## 2021-08-19 PROCEDURE — 3052F PR MOST RECENT HEMOGLOBIN A1C LEVEL 8.0 - < 9.0%: ICD-10-PCS | Mod: CPTII,S$GLB,, | Performed by: NURSE PRACTITIONER

## 2021-08-19 PROCEDURE — 1126F PR PAIN SEVERITY QUANTIFIED, NO PAIN PRESENT: ICD-10-PCS | Mod: CPTII,S$GLB,, | Performed by: NURSE PRACTITIONER

## 2021-08-19 PROCEDURE — 3078F DIAST BP <80 MM HG: CPT | Mod: CPTII,S$GLB,, | Performed by: NURSE PRACTITIONER

## 2021-08-19 PROCEDURE — 1159F MED LIST DOCD IN RCRD: CPT | Mod: CPTII,S$GLB,, | Performed by: NURSE PRACTITIONER

## 2021-08-19 PROCEDURE — 1160F PR REVIEW ALL MEDS BY PRESCRIBER/CLIN PHARMACIST DOCUMENTED: ICD-10-PCS | Mod: CPTII,S$GLB,, | Performed by: NURSE PRACTITIONER

## 2021-08-19 PROCEDURE — 3078F PR MOST RECENT DIASTOLIC BLOOD PRESSURE < 80 MM HG: ICD-10-PCS | Mod: CPTII,S$GLB,, | Performed by: NURSE PRACTITIONER

## 2021-08-19 PROCEDURE — 3074F PR MOST RECENT SYSTOLIC BLOOD PRESSURE < 130 MM HG: ICD-10-PCS | Mod: CPTII,S$GLB,, | Performed by: NURSE PRACTITIONER

## 2021-08-19 PROCEDURE — 3052F HG A1C>EQUAL 8.0%<EQUAL 9.0%: CPT | Mod: CPTII,S$GLB,, | Performed by: NURSE PRACTITIONER

## 2021-08-19 PROCEDURE — 99214 PR OFFICE/OUTPT VISIT, EST, LEVL IV, 30-39 MIN: ICD-10-PCS | Mod: S$GLB,,, | Performed by: NURSE PRACTITIONER

## 2021-08-19 PROCEDURE — 1159F PR MEDICATION LIST DOCUMENTED IN MEDICAL RECORD: ICD-10-PCS | Mod: CPTII,S$GLB,, | Performed by: NURSE PRACTITIONER

## 2021-08-19 PROCEDURE — 3008F BODY MASS INDEX DOCD: CPT | Mod: CPTII,S$GLB,, | Performed by: NURSE PRACTITIONER

## 2021-08-19 PROCEDURE — 1160F RVW MEDS BY RX/DR IN RCRD: CPT | Mod: CPTII,S$GLB,, | Performed by: NURSE PRACTITIONER

## 2021-08-19 PROCEDURE — 3074F SYST BP LT 130 MM HG: CPT | Mod: CPTII,S$GLB,, | Performed by: NURSE PRACTITIONER

## 2021-08-19 PROCEDURE — 3008F PR BODY MASS INDEX (BMI) DOCUMENTED: ICD-10-PCS | Mod: CPTII,S$GLB,, | Performed by: NURSE PRACTITIONER

## 2021-08-19 PROCEDURE — 99999 PR PBB SHADOW E&M-EST. PATIENT-LVL IV: ICD-10-PCS | Mod: PBBFAC,,, | Performed by: NURSE PRACTITIONER

## 2021-08-19 PROCEDURE — 99214 OFFICE O/P EST MOD 30 MIN: CPT | Mod: S$GLB,,, | Performed by: NURSE PRACTITIONER

## 2021-08-19 PROCEDURE — 95251 CONT GLUC MNTR ANALYSIS I&R: CPT | Mod: S$GLB,,, | Performed by: NURSE PRACTITIONER

## 2021-08-19 PROCEDURE — 1126F AMNT PAIN NOTED NONE PRSNT: CPT | Mod: CPTII,S$GLB,, | Performed by: NURSE PRACTITIONER

## 2021-08-19 RX ORDER — INSULIN GLARGINE 300 U/ML
INJECTION, SOLUTION SUBCUTANEOUS
Qty: 4 PEN | Refills: 3 | Status: SHIPPED | OUTPATIENT
Start: 2021-08-19 | End: 2021-08-19

## 2021-08-19 RX ORDER — INSULIN GLARGINE 300 U/ML
INJECTION, SOLUTION SUBCUTANEOUS
Qty: 4 PEN | Refills: 3
Start: 2021-08-19 | End: 2022-03-15

## 2021-08-23 ENCOUNTER — PATIENT MESSAGE (OUTPATIENT)
Dept: ENDOCRINOLOGY | Facility: CLINIC | Age: 66
End: 2021-08-23

## 2021-08-24 ENCOUNTER — OFFICE VISIT (OUTPATIENT)
Dept: PODIATRY | Facility: CLINIC | Age: 66
End: 2021-08-24
Payer: COMMERCIAL

## 2021-08-24 ENCOUNTER — PATIENT MESSAGE (OUTPATIENT)
Dept: AUDIOLOGY | Facility: CLINIC | Age: 66
End: 2021-08-24

## 2021-08-24 ENCOUNTER — PATIENT MESSAGE (OUTPATIENT)
Dept: FAMILY MEDICINE | Facility: CLINIC | Age: 66
End: 2021-08-24

## 2021-08-24 VITALS — BODY MASS INDEX: 28.49 KG/M2 | HEIGHT: 74 IN | WEIGHT: 222 LBS

## 2021-08-24 DIAGNOSIS — E10.42 TYPE 1 DIABETES MELLITUS WITH POLYNEUROPATHY: ICD-10-CM

## 2021-08-24 DIAGNOSIS — L97.509 TYPE 1 DIABETES MELLITUS WITH DIABETIC TOE ULCER: Primary | ICD-10-CM

## 2021-08-24 DIAGNOSIS — E10.621 TYPE 1 DIABETES MELLITUS WITH DIABETIC TOE ULCER: Primary | ICD-10-CM

## 2021-08-24 PROCEDURE — 3008F PR BODY MASS INDEX (BMI) DOCUMENTED: ICD-10-PCS | Mod: CPTII,S$GLB,, | Performed by: PODIATRIST

## 2021-08-24 PROCEDURE — 99499 UNLISTED E&M SERVICE: CPT | Mod: S$GLB,,, | Performed by: PODIATRIST

## 2021-08-24 PROCEDURE — 1159F MED LIST DOCD IN RCRD: CPT | Mod: CPTII,S$GLB,, | Performed by: PODIATRIST

## 2021-08-24 PROCEDURE — 99499 NO LOS: ICD-10-PCS | Mod: S$GLB,,, | Performed by: PODIATRIST

## 2021-08-24 PROCEDURE — 99999 PR PBB SHADOW E&M-EST. PATIENT-LVL III: ICD-10-PCS | Mod: PBBFAC,,, | Performed by: PODIATRIST

## 2021-08-24 PROCEDURE — 1159F PR MEDICATION LIST DOCUMENTED IN MEDICAL RECORD: ICD-10-PCS | Mod: CPTII,S$GLB,, | Performed by: PODIATRIST

## 2021-08-24 PROCEDURE — 1126F AMNT PAIN NOTED NONE PRSNT: CPT | Mod: CPTII,S$GLB,, | Performed by: PODIATRIST

## 2021-08-24 PROCEDURE — 97597 DBRDMT OPN WND 1ST 20 CM/<: CPT | Mod: S$GLB,,, | Performed by: PODIATRIST

## 2021-08-24 PROCEDURE — 3052F HG A1C>EQUAL 8.0%<EQUAL 9.0%: CPT | Mod: CPTII,S$GLB,, | Performed by: PODIATRIST

## 2021-08-24 PROCEDURE — 3008F BODY MASS INDEX DOCD: CPT | Mod: CPTII,S$GLB,, | Performed by: PODIATRIST

## 2021-08-24 PROCEDURE — 99999 PR PBB SHADOW E&M-EST. PATIENT-LVL III: CPT | Mod: PBBFAC,,, | Performed by: PODIATRIST

## 2021-08-24 PROCEDURE — 97597 WOUND DEBRIDEMENT: ICD-10-PCS | Mod: S$GLB,,, | Performed by: PODIATRIST

## 2021-08-24 PROCEDURE — 3052F PR MOST RECENT HEMOGLOBIN A1C LEVEL 8.0 - < 9.0%: ICD-10-PCS | Mod: CPTII,S$GLB,, | Performed by: PODIATRIST

## 2021-08-24 PROCEDURE — 1126F PR PAIN SEVERITY QUANTIFIED, NO PAIN PRESENT: ICD-10-PCS | Mod: CPTII,S$GLB,, | Performed by: PODIATRIST

## 2021-08-30 ENCOUNTER — PATIENT OUTREACH (OUTPATIENT)
Dept: ADMINISTRATIVE | Facility: OTHER | Age: 66
End: 2021-08-30

## 2021-09-01 ENCOUNTER — OFFICE VISIT (OUTPATIENT)
Dept: PODIATRY | Facility: CLINIC | Age: 66
End: 2021-09-01
Payer: COMMERCIAL

## 2021-09-01 DIAGNOSIS — E10.621 TYPE 1 DIABETES MELLITUS WITH DIABETIC TOE ULCER: Primary | ICD-10-CM

## 2021-09-01 DIAGNOSIS — E10.42 TYPE 1 DIABETES MELLITUS WITH POLYNEUROPATHY: ICD-10-CM

## 2021-09-01 DIAGNOSIS — L97.509 TYPE 1 DIABETES MELLITUS WITH DIABETIC TOE ULCER: Primary | ICD-10-CM

## 2021-09-01 PROCEDURE — 99499 NO LOS: ICD-10-PCS | Mod: S$GLB,,, | Performed by: PODIATRIST

## 2021-09-01 PROCEDURE — 97597 DBRDMT OPN WND 1ST 20 CM/<: CPT | Mod: S$GLB,,, | Performed by: PODIATRIST

## 2021-09-01 PROCEDURE — 99999 PR PBB SHADOW E&M-EST. PATIENT-LVL I: CPT | Mod: PBBFAC,,, | Performed by: PODIATRIST

## 2021-09-01 PROCEDURE — 99499 UNLISTED E&M SERVICE: CPT | Mod: S$GLB,,, | Performed by: PODIATRIST

## 2021-09-01 PROCEDURE — 97597 WOUND DEBRIDEMENT: ICD-10-PCS | Mod: S$GLB,,, | Performed by: PODIATRIST

## 2021-09-01 PROCEDURE — 99999 PR PBB SHADOW E&M-EST. PATIENT-LVL I: ICD-10-PCS | Mod: PBBFAC,,, | Performed by: PODIATRIST

## 2021-09-01 PROCEDURE — 3052F PR MOST RECENT HEMOGLOBIN A1C LEVEL 8.0 - < 9.0%: ICD-10-PCS | Mod: CPTII,S$GLB,, | Performed by: PODIATRIST

## 2021-09-01 PROCEDURE — 3052F HG A1C>EQUAL 8.0%<EQUAL 9.0%: CPT | Mod: CPTII,S$GLB,, | Performed by: PODIATRIST

## 2021-09-08 ENCOUNTER — OFFICE VISIT (OUTPATIENT)
Dept: PODIATRY | Facility: CLINIC | Age: 66
End: 2021-09-08
Payer: COMMERCIAL

## 2021-09-08 VITALS — HEIGHT: 74 IN | BODY MASS INDEX: 28.49 KG/M2 | WEIGHT: 222 LBS

## 2021-09-08 DIAGNOSIS — Z87.2 HEALED ULCER OF LEFT FOOT ON EXAMINATION: Primary | ICD-10-CM

## 2021-09-08 DIAGNOSIS — E10.42 TYPE 1 DIABETES MELLITUS WITH POLYNEUROPATHY: ICD-10-CM

## 2021-09-08 DIAGNOSIS — T14.8XXA BLOOD BLISTER: ICD-10-CM

## 2021-09-08 PROCEDURE — 3008F BODY MASS INDEX DOCD: CPT | Mod: CPTII,S$GLB,, | Performed by: PODIATRIST

## 2021-09-08 PROCEDURE — 3052F HG A1C>EQUAL 8.0%<EQUAL 9.0%: CPT | Mod: CPTII,S$GLB,, | Performed by: PODIATRIST

## 2021-09-08 PROCEDURE — 1159F PR MEDICATION LIST DOCUMENTED IN MEDICAL RECORD: ICD-10-PCS | Mod: CPTII,S$GLB,, | Performed by: PODIATRIST

## 2021-09-08 PROCEDURE — 4010F ACE/ARB THERAPY RXD/TAKEN: CPT | Mod: CPTII,S$GLB,, | Performed by: PODIATRIST

## 2021-09-08 PROCEDURE — 3008F PR BODY MASS INDEX (BMI) DOCUMENTED: ICD-10-PCS | Mod: CPTII,S$GLB,, | Performed by: PODIATRIST

## 2021-09-08 PROCEDURE — 3066F NEPHROPATHY DOC TX: CPT | Mod: CPTII,S$GLB,, | Performed by: PODIATRIST

## 2021-09-08 PROCEDURE — 99212 PR OFFICE/OUTPT VISIT, EST, LEVL II, 10-19 MIN: ICD-10-PCS | Mod: S$GLB,,, | Performed by: PODIATRIST

## 2021-09-08 PROCEDURE — 3052F PR MOST RECENT HEMOGLOBIN A1C LEVEL 8.0 - < 9.0%: ICD-10-PCS | Mod: CPTII,S$GLB,, | Performed by: PODIATRIST

## 2021-09-08 PROCEDURE — 3060F PR POS MICROALBUMINURIA RESULT DOCUMENTED/REVIEW: ICD-10-PCS | Mod: CPTII,S$GLB,, | Performed by: PODIATRIST

## 2021-09-08 PROCEDURE — 99212 OFFICE O/P EST SF 10 MIN: CPT | Mod: S$GLB,,, | Performed by: PODIATRIST

## 2021-09-08 PROCEDURE — 99999 PR PBB SHADOW E&M-EST. PATIENT-LVL II: ICD-10-PCS | Mod: PBBFAC,,, | Performed by: PODIATRIST

## 2021-09-08 PROCEDURE — 4010F PR ACE/ARB THEARPY RXD/TAKEN: ICD-10-PCS | Mod: CPTII,S$GLB,, | Performed by: PODIATRIST

## 2021-09-08 PROCEDURE — 3066F PR DOCUMENTATION OF TREATMENT FOR NEPHROPATHY: ICD-10-PCS | Mod: CPTII,S$GLB,, | Performed by: PODIATRIST

## 2021-09-08 PROCEDURE — 99999 PR PBB SHADOW E&M-EST. PATIENT-LVL II: CPT | Mod: PBBFAC,,, | Performed by: PODIATRIST

## 2021-09-08 PROCEDURE — 1159F MED LIST DOCD IN RCRD: CPT | Mod: CPTII,S$GLB,, | Performed by: PODIATRIST

## 2021-09-08 PROCEDURE — 3060F POS MICROALBUMINURIA REV: CPT | Mod: CPTII,S$GLB,, | Performed by: PODIATRIST

## 2021-09-10 ENCOUNTER — OFFICE VISIT (OUTPATIENT)
Dept: OPHTHALMOLOGY | Facility: CLINIC | Age: 66
End: 2021-09-10
Payer: COMMERCIAL

## 2021-09-10 VITALS — HEART RATE: 66 BPM | SYSTOLIC BLOOD PRESSURE: 110 MMHG | DIASTOLIC BLOOD PRESSURE: 77 MMHG

## 2021-09-10 DIAGNOSIS — E11.36 DIABETIC CATARACT OF RIGHT EYE: Primary | ICD-10-CM

## 2021-09-10 PROCEDURE — 99999 PR PBB SHADOW E&M-EST. PATIENT-LVL IV: CPT | Mod: PBBFAC,,, | Performed by: OPHTHALMOLOGY

## 2021-09-10 PROCEDURE — 4010F ACE/ARB THERAPY RXD/TAKEN: CPT | Mod: CPTII,S$GLB,, | Performed by: OPHTHALMOLOGY

## 2021-09-10 PROCEDURE — 1160F PR REVIEW ALL MEDS BY PRESCRIBER/CLIN PHARMACIST DOCUMENTED: ICD-10-PCS | Mod: CPTII,S$GLB,, | Performed by: OPHTHALMOLOGY

## 2021-09-10 PROCEDURE — 1160F RVW MEDS BY RX/DR IN RCRD: CPT | Mod: CPTII,S$GLB,, | Performed by: OPHTHALMOLOGY

## 2021-09-10 PROCEDURE — 4010F PR ACE/ARB THEARPY RXD/TAKEN: ICD-10-PCS | Mod: CPTII,S$GLB,, | Performed by: OPHTHALMOLOGY

## 2021-09-10 PROCEDURE — 3078F DIAST BP <80 MM HG: CPT | Mod: CPTII,S$GLB,, | Performed by: OPHTHALMOLOGY

## 2021-09-10 PROCEDURE — 3074F PR MOST RECENT SYSTOLIC BLOOD PRESSURE < 130 MM HG: ICD-10-PCS | Mod: CPTII,S$GLB,, | Performed by: OPHTHALMOLOGY

## 2021-09-10 PROCEDURE — 3060F PR POS MICROALBUMINURIA RESULT DOCUMENTED/REVIEW: ICD-10-PCS | Mod: CPTII,S$GLB,, | Performed by: OPHTHALMOLOGY

## 2021-09-10 PROCEDURE — 1159F MED LIST DOCD IN RCRD: CPT | Mod: CPTII,S$GLB,, | Performed by: OPHTHALMOLOGY

## 2021-09-10 PROCEDURE — 99999 PR PBB SHADOW E&M-EST. PATIENT-LVL IV: ICD-10-PCS | Mod: PBBFAC,,, | Performed by: OPHTHALMOLOGY

## 2021-09-10 PROCEDURE — 92136 OPHTHALMIC BIOMETRY: CPT | Mod: RT,S$GLB,, | Performed by: OPHTHALMOLOGY

## 2021-09-10 PROCEDURE — 3078F PR MOST RECENT DIASTOLIC BLOOD PRESSURE < 80 MM HG: ICD-10-PCS | Mod: CPTII,S$GLB,, | Performed by: OPHTHALMOLOGY

## 2021-09-10 PROCEDURE — 3060F POS MICROALBUMINURIA REV: CPT | Mod: CPTII,S$GLB,, | Performed by: OPHTHALMOLOGY

## 2021-09-10 PROCEDURE — 3074F SYST BP LT 130 MM HG: CPT | Mod: CPTII,S$GLB,, | Performed by: OPHTHALMOLOGY

## 2021-09-10 PROCEDURE — 3052F HG A1C>EQUAL 8.0%<EQUAL 9.0%: CPT | Mod: CPTII,S$GLB,, | Performed by: OPHTHALMOLOGY

## 2021-09-10 PROCEDURE — 99213 OFFICE O/P EST LOW 20 MIN: CPT | Mod: S$GLB,,, | Performed by: OPHTHALMOLOGY

## 2021-09-10 PROCEDURE — 92136 IOL MASTER - OD - RIGHT EYE: ICD-10-PCS | Mod: RT,S$GLB,, | Performed by: OPHTHALMOLOGY

## 2021-09-10 PROCEDURE — 3052F PR MOST RECENT HEMOGLOBIN A1C LEVEL 8.0 - < 9.0%: ICD-10-PCS | Mod: CPTII,S$GLB,, | Performed by: OPHTHALMOLOGY

## 2021-09-10 PROCEDURE — 3066F NEPHROPATHY DOC TX: CPT | Mod: CPTII,S$GLB,, | Performed by: OPHTHALMOLOGY

## 2021-09-10 PROCEDURE — 3066F PR DOCUMENTATION OF TREATMENT FOR NEPHROPATHY: ICD-10-PCS | Mod: CPTII,S$GLB,, | Performed by: OPHTHALMOLOGY

## 2021-09-10 PROCEDURE — 99213 PR OFFICE/OUTPT VISIT, EST, LEVL III, 20-29 MIN: ICD-10-PCS | Mod: S$GLB,,, | Performed by: OPHTHALMOLOGY

## 2021-09-10 PROCEDURE — 1159F PR MEDICATION LIST DOCUMENTED IN MEDICAL RECORD: ICD-10-PCS | Mod: CPTII,S$GLB,, | Performed by: OPHTHALMOLOGY

## 2021-09-10 RX ORDER — DICLOFENAC SODIUM 1 MG/ML
1 SOLUTION/ DROPS OPHTHALMIC 4 TIMES DAILY
Qty: 5 ML | Refills: 3 | Status: SHIPPED | OUTPATIENT
Start: 2021-09-10 | End: 2021-10-10

## 2021-09-10 RX ORDER — PREDNISOLONE ACETATE 10 MG/ML
1 SUSPENSION/ DROPS OPHTHALMIC 4 TIMES DAILY
Qty: 5 ML | Refills: 3 | Status: SHIPPED | OUTPATIENT
Start: 2021-09-10 | End: 2021-10-10

## 2021-09-10 RX ORDER — MOXIFLOXACIN 5 MG/ML
1 SOLUTION/ DROPS OPHTHALMIC 4 TIMES DAILY
Qty: 3 ML | Refills: 1 | Status: SHIPPED | OUTPATIENT
Start: 2021-09-10 | End: 2021-09-25

## 2021-09-10 RX ORDER — PHENYLEPHRINE HYDROCHLORIDE 25 MG/ML
1 SOLUTION/ DROPS OPHTHALMIC
Status: CANCELLED | OUTPATIENT
Start: 2021-09-10 | End: 2021-09-10

## 2021-09-10 RX ORDER — PHENYLEPHRINE HYDROCHLORIDE 100 MG/ML
1 SOLUTION/ DROPS OPHTHALMIC
Status: CANCELLED | OUTPATIENT
Start: 2021-09-10 | End: 2021-09-10

## 2021-09-10 RX ORDER — PROPARACAINE HYDROCHLORIDE 5 MG/ML
1 SOLUTION/ DROPS OPHTHALMIC
Status: CANCELLED | OUTPATIENT
Start: 2021-09-10 | End: 2021-09-10

## 2021-09-10 RX ORDER — TROPICAMIDE 10 MG/ML
1 SOLUTION/ DROPS OPHTHALMIC
Status: CANCELLED | OUTPATIENT
Start: 2021-09-10 | End: 2021-09-10

## 2021-09-12 ENCOUNTER — PATIENT MESSAGE (OUTPATIENT)
Dept: PODIATRY | Facility: CLINIC | Age: 66
End: 2021-09-12

## 2021-09-12 ENCOUNTER — PATIENT MESSAGE (OUTPATIENT)
Dept: AUDIOLOGY | Facility: CLINIC | Age: 66
End: 2021-09-12

## 2021-09-16 ENCOUNTER — PATIENT MESSAGE (OUTPATIENT)
Dept: PODIATRY | Facility: CLINIC | Age: 66
End: 2021-09-16

## 2021-09-18 ENCOUNTER — PATIENT MESSAGE (OUTPATIENT)
Dept: PODIATRY | Facility: CLINIC | Age: 66
End: 2021-09-18

## 2021-09-20 ENCOUNTER — TELEPHONE (OUTPATIENT)
Dept: PODIATRY | Facility: CLINIC | Age: 66
End: 2021-09-20

## 2021-09-20 ENCOUNTER — OFFICE VISIT (OUTPATIENT)
Dept: PODIATRY | Facility: CLINIC | Age: 66
End: 2021-09-20
Payer: COMMERCIAL

## 2021-09-20 VITALS — WEIGHT: 222 LBS | BODY MASS INDEX: 28.49 KG/M2 | HEIGHT: 74 IN

## 2021-09-20 DIAGNOSIS — L97.509 TYPE 1 DIABETES MELLITUS WITH DIABETIC TOE ULCER: Primary | ICD-10-CM

## 2021-09-20 DIAGNOSIS — M77.52 BONE SPUR OF LEFT FOOT: ICD-10-CM

## 2021-09-20 DIAGNOSIS — E10.42 TYPE 1 DIABETES MELLITUS WITH POLYNEUROPATHY: ICD-10-CM

## 2021-09-20 DIAGNOSIS — E10.621 TYPE 1 DIABETES MELLITUS WITH DIABETIC TOE ULCER: Primary | ICD-10-CM

## 2021-09-20 PROCEDURE — 3066F PR DOCUMENTATION OF TREATMENT FOR NEPHROPATHY: ICD-10-PCS | Mod: CPTII,S$GLB,, | Performed by: PODIATRIST

## 2021-09-20 PROCEDURE — 97597 WOUND DEBRIDEMENT: ICD-10-PCS | Mod: S$GLB,,, | Performed by: PODIATRIST

## 2021-09-20 PROCEDURE — 3060F PR POS MICROALBUMINURIA RESULT DOCUMENTED/REVIEW: ICD-10-PCS | Mod: CPTII,S$GLB,, | Performed by: PODIATRIST

## 2021-09-20 PROCEDURE — 3052F PR MOST RECENT HEMOGLOBIN A1C LEVEL 8.0 - < 9.0%: ICD-10-PCS | Mod: CPTII,S$GLB,, | Performed by: PODIATRIST

## 2021-09-20 PROCEDURE — 99999 PR PBB SHADOW E&M-EST. PATIENT-LVL III: CPT | Mod: PBBFAC,,, | Performed by: PODIATRIST

## 2021-09-20 PROCEDURE — 87070 CULTURE OTHR SPECIMN AEROBIC: CPT | Performed by: PODIATRIST

## 2021-09-20 PROCEDURE — 1159F PR MEDICATION LIST DOCUMENTED IN MEDICAL RECORD: ICD-10-PCS | Mod: CPTII,S$GLB,, | Performed by: PODIATRIST

## 2021-09-20 PROCEDURE — 4010F ACE/ARB THERAPY RXD/TAKEN: CPT | Mod: CPTII,S$GLB,, | Performed by: PODIATRIST

## 2021-09-20 PROCEDURE — 3008F PR BODY MASS INDEX (BMI) DOCUMENTED: ICD-10-PCS | Mod: CPTII,S$GLB,, | Performed by: PODIATRIST

## 2021-09-20 PROCEDURE — 87076 CULTURE ANAEROBE IDENT EACH: CPT | Performed by: PODIATRIST

## 2021-09-20 PROCEDURE — 1159F MED LIST DOCD IN RCRD: CPT | Mod: CPTII,S$GLB,, | Performed by: PODIATRIST

## 2021-09-20 PROCEDURE — 87075 CULTR BACTERIA EXCEPT BLOOD: CPT | Performed by: PODIATRIST

## 2021-09-20 PROCEDURE — 99213 OFFICE O/P EST LOW 20 MIN: CPT | Mod: 25,S$GLB,, | Performed by: PODIATRIST

## 2021-09-20 PROCEDURE — 3060F POS MICROALBUMINURIA REV: CPT | Mod: CPTII,S$GLB,, | Performed by: PODIATRIST

## 2021-09-20 PROCEDURE — 99213 PR OFFICE/OUTPT VISIT, EST, LEVL III, 20-29 MIN: ICD-10-PCS | Mod: 25,S$GLB,, | Performed by: PODIATRIST

## 2021-09-20 PROCEDURE — 4010F PR ACE/ARB THEARPY RXD/TAKEN: ICD-10-PCS | Mod: CPTII,S$GLB,, | Performed by: PODIATRIST

## 2021-09-20 PROCEDURE — 3008F BODY MASS INDEX DOCD: CPT | Mod: CPTII,S$GLB,, | Performed by: PODIATRIST

## 2021-09-20 PROCEDURE — 3066F NEPHROPATHY DOC TX: CPT | Mod: CPTII,S$GLB,, | Performed by: PODIATRIST

## 2021-09-20 PROCEDURE — 97597 DBRDMT OPN WND 1ST 20 CM/<: CPT | Mod: S$GLB,,, | Performed by: PODIATRIST

## 2021-09-20 PROCEDURE — 3052F HG A1C>EQUAL 8.0%<EQUAL 9.0%: CPT | Mod: CPTII,S$GLB,, | Performed by: PODIATRIST

## 2021-09-20 PROCEDURE — 99999 PR PBB SHADOW E&M-EST. PATIENT-LVL III: ICD-10-PCS | Mod: PBBFAC,,, | Performed by: PODIATRIST

## 2021-09-21 ENCOUNTER — PATIENT MESSAGE (OUTPATIENT)
Dept: FAMILY MEDICINE | Facility: CLINIC | Age: 66
End: 2021-09-21

## 2021-09-22 ENCOUNTER — PATIENT MESSAGE (OUTPATIENT)
Dept: PODIATRY | Facility: CLINIC | Age: 66
End: 2021-09-22

## 2021-09-23 LAB — BACTERIA SPEC AEROBE CULT: NORMAL

## 2021-09-24 ENCOUNTER — LAB VISIT (OUTPATIENT)
Dept: LAB | Facility: HOSPITAL | Age: 66
End: 2021-09-24
Payer: COMMERCIAL

## 2021-09-24 ENCOUNTER — HOSPITAL ENCOUNTER (OUTPATIENT)
Dept: RADIOLOGY | Facility: HOSPITAL | Age: 66
Discharge: HOME | End: 2021-09-24
Attending: PHYSICIAN ASSISTANT
Payer: COMMERCIAL

## 2021-09-24 ENCOUNTER — TELEPHONE (OUTPATIENT)
Dept: OPTOMETRY | Facility: CLINIC | Age: 66
End: 2021-09-24

## 2021-09-24 ENCOUNTER — OFFICE VISIT (OUTPATIENT)
Dept: FAMILY MEDICINE | Facility: CLINIC | Age: 66
End: 2021-09-24
Payer: COMMERCIAL

## 2021-09-24 ENCOUNTER — PATIENT MESSAGE (OUTPATIENT)
Dept: SURGERY | Facility: HOSPITAL | Age: 66
End: 2021-09-24

## 2021-09-24 VITALS
HEART RATE: 77 BPM | OXYGEN SATURATION: 98 % | BODY MASS INDEX: 28.36 KG/M2 | WEIGHT: 220.88 LBS | DIASTOLIC BLOOD PRESSURE: 76 MMHG | SYSTOLIC BLOOD PRESSURE: 118 MMHG

## 2021-09-24 DIAGNOSIS — Z01.811 PRE-OP CHEST EXAM: ICD-10-CM

## 2021-09-24 DIAGNOSIS — Z01.811 PRE-OP CHEST EXAM: Primary | ICD-10-CM

## 2021-09-24 LAB
ALBUMIN SERPL BCP-MCNC: 3.7 G/DL (ref 3.5–5.2)
ALP SERPL-CCNC: 90 U/L (ref 55–135)
ALT SERPL W/O P-5'-P-CCNC: 25 U/L (ref 10–44)
ANION GAP SERPL CALC-SCNC: 13 MMOL/L (ref 8–16)
AST SERPL-CCNC: 20 U/L (ref 10–40)
BASOPHILS # BLD AUTO: 0.04 K/UL (ref 0–0.2)
BASOPHILS NFR BLD: 0.6 % (ref 0–1.9)
BILIRUB SERPL-MCNC: 0.6 MG/DL (ref 0.1–1)
BUN SERPL-MCNC: 25 MG/DL (ref 8–23)
CALCIUM SERPL-MCNC: 9.6 MG/DL (ref 8.7–10.5)
CHLORIDE SERPL-SCNC: 102 MMOL/L (ref 95–110)
CO2 SERPL-SCNC: 22 MMOL/L (ref 23–29)
CREAT SERPL-MCNC: 1.3 MG/DL (ref 0.5–1.4)
DIFFERENTIAL METHOD: ABNORMAL
EOSINOPHIL # BLD AUTO: 0.4 K/UL (ref 0–0.5)
EOSINOPHIL NFR BLD: 5.1 % (ref 0–8)
ERYTHROCYTE [DISTWIDTH] IN BLOOD BY AUTOMATED COUNT: 14.1 % (ref 11.5–14.5)
EST. GFR  (AFRICAN AMERICAN): >60 ML/MIN/1.73 M^2
EST. GFR  (NON AFRICAN AMERICAN): 57.7 ML/MIN/1.73 M^2
GLUCOSE SERPL-MCNC: 229 MG/DL (ref 70–110)
HCT VFR BLD AUTO: 43 % (ref 40–54)
HGB BLD-MCNC: 13.7 G/DL (ref 14–18)
IMM GRANULOCYTES # BLD AUTO: 0.02 K/UL (ref 0–0.04)
IMM GRANULOCYTES NFR BLD AUTO: 0.3 % (ref 0–0.5)
LYMPHOCYTES # BLD AUTO: 1.4 K/UL (ref 1–4.8)
LYMPHOCYTES NFR BLD: 19.2 % (ref 18–48)
MCH RBC QN AUTO: 29.2 PG (ref 27–31)
MCHC RBC AUTO-ENTMCNC: 31.9 G/DL (ref 32–36)
MCV RBC AUTO: 92 FL (ref 82–98)
MONOCYTES # BLD AUTO: 0.6 K/UL (ref 0.3–1)
MONOCYTES NFR BLD: 8.2 % (ref 4–15)
NEUTROPHILS # BLD AUTO: 4.7 K/UL (ref 1.8–7.7)
NEUTROPHILS NFR BLD: 66.6 % (ref 38–73)
NRBC BLD-RTO: 0 /100 WBC
PLATELET # BLD AUTO: 356 K/UL (ref 150–450)
PMV BLD AUTO: 11.1 FL (ref 9.2–12.9)
POTASSIUM SERPL-SCNC: 4.4 MMOL/L (ref 3.5–5.1)
PROT SERPL-MCNC: 7 G/DL (ref 6–8.4)
RBC # BLD AUTO: 4.69 M/UL (ref 4.6–6.2)
SODIUM SERPL-SCNC: 137 MMOL/L (ref 136–145)
WBC # BLD AUTO: 7.08 K/UL (ref 3.9–12.7)

## 2021-09-24 PROCEDURE — 4010F PR ACE/ARB THEARPY RXD/TAKEN: ICD-10-PCS | Mod: CPTII,S$GLB,, | Performed by: PHYSICIAN ASSISTANT

## 2021-09-24 PROCEDURE — 99214 PR OFFICE/OUTPT VISIT, EST, LEVL IV, 30-39 MIN: ICD-10-PCS | Mod: S$GLB,,, | Performed by: PHYSICIAN ASSISTANT

## 2021-09-24 PROCEDURE — 93005 EKG 12-LEAD: ICD-10-PCS | Mod: S$GLB,,, | Performed by: PHYSICIAN ASSISTANT

## 2021-09-24 PROCEDURE — 4010F ACE/ARB THERAPY RXD/TAKEN: CPT | Mod: CPTII,S$GLB,, | Performed by: PHYSICIAN ASSISTANT

## 2021-09-24 PROCEDURE — 93005 ELECTROCARDIOGRAM TRACING: CPT | Mod: S$GLB,,, | Performed by: PHYSICIAN ASSISTANT

## 2021-09-24 PROCEDURE — 3066F NEPHROPATHY DOC TX: CPT | Mod: CPTII,S$GLB,, | Performed by: PHYSICIAN ASSISTANT

## 2021-09-24 PROCEDURE — 3008F PR BODY MASS INDEX (BMI) DOCUMENTED: ICD-10-PCS | Mod: CPTII,S$GLB,, | Performed by: PHYSICIAN ASSISTANT

## 2021-09-24 PROCEDURE — 71046 X-RAY EXAM CHEST 2 VIEWS: CPT | Mod: TC,FY,PO

## 2021-09-24 PROCEDURE — 80053 COMPREHEN METABOLIC PANEL: CPT | Performed by: PHYSICIAN ASSISTANT

## 2021-09-24 PROCEDURE — 3052F PR MOST RECENT HEMOGLOBIN A1C LEVEL 8.0 - < 9.0%: ICD-10-PCS | Mod: CPTII,S$GLB,, | Performed by: PHYSICIAN ASSISTANT

## 2021-09-24 PROCEDURE — 93010 ELECTROCARDIOGRAM REPORT: CPT | Mod: S$GLB,,, | Performed by: INTERNAL MEDICINE

## 2021-09-24 PROCEDURE — 3060F PR POS MICROALBUMINURIA RESULT DOCUMENTED/REVIEW: ICD-10-PCS | Mod: CPTII,S$GLB,, | Performed by: PHYSICIAN ASSISTANT

## 2021-09-24 PROCEDURE — 3060F POS MICROALBUMINURIA REV: CPT | Mod: CPTII,S$GLB,, | Performed by: PHYSICIAN ASSISTANT

## 2021-09-24 PROCEDURE — 71046 XR CHEST PA AND LATERAL: ICD-10-PCS | Mod: 26,,, | Performed by: RADIOLOGY

## 2021-09-24 PROCEDURE — 93010 EKG 12-LEAD: ICD-10-PCS | Mod: S$GLB,,, | Performed by: INTERNAL MEDICINE

## 2021-09-24 PROCEDURE — 3052F HG A1C>EQUAL 8.0%<EQUAL 9.0%: CPT | Mod: CPTII,S$GLB,, | Performed by: PHYSICIAN ASSISTANT

## 2021-09-24 PROCEDURE — 85025 COMPLETE CBC W/AUTO DIFF WBC: CPT | Performed by: PHYSICIAN ASSISTANT

## 2021-09-24 PROCEDURE — 99214 OFFICE O/P EST MOD 30 MIN: CPT | Mod: S$GLB,,, | Performed by: PHYSICIAN ASSISTANT

## 2021-09-24 PROCEDURE — 3074F SYST BP LT 130 MM HG: CPT | Mod: CPTII,S$GLB,, | Performed by: PHYSICIAN ASSISTANT

## 2021-09-24 PROCEDURE — 1159F PR MEDICATION LIST DOCUMENTED IN MEDICAL RECORD: ICD-10-PCS | Mod: CPTII,S$GLB,, | Performed by: PHYSICIAN ASSISTANT

## 2021-09-24 PROCEDURE — 3066F PR DOCUMENTATION OF TREATMENT FOR NEPHROPATHY: ICD-10-PCS | Mod: CPTII,S$GLB,, | Performed by: PHYSICIAN ASSISTANT

## 2021-09-24 PROCEDURE — 3078F DIAST BP <80 MM HG: CPT | Mod: CPTII,S$GLB,, | Performed by: PHYSICIAN ASSISTANT

## 2021-09-24 PROCEDURE — 1159F MED LIST DOCD IN RCRD: CPT | Mod: CPTII,S$GLB,, | Performed by: PHYSICIAN ASSISTANT

## 2021-09-24 PROCEDURE — 36415 COLL VENOUS BLD VENIPUNCTURE: CPT | Mod: PO | Performed by: PHYSICIAN ASSISTANT

## 2021-09-24 PROCEDURE — 99999 PR PBB SHADOW E&M-EST. PATIENT-LVL IV: ICD-10-PCS | Mod: PBBFAC,,, | Performed by: PHYSICIAN ASSISTANT

## 2021-09-24 PROCEDURE — 71046 X-RAY EXAM CHEST 2 VIEWS: CPT | Mod: 26,,, | Performed by: RADIOLOGY

## 2021-09-24 PROCEDURE — 3074F PR MOST RECENT SYSTOLIC BLOOD PRESSURE < 130 MM HG: ICD-10-PCS | Mod: CPTII,S$GLB,, | Performed by: PHYSICIAN ASSISTANT

## 2021-09-24 PROCEDURE — 3008F BODY MASS INDEX DOCD: CPT | Mod: CPTII,S$GLB,, | Performed by: PHYSICIAN ASSISTANT

## 2021-09-24 PROCEDURE — 99999 PR PBB SHADOW E&M-EST. PATIENT-LVL IV: CPT | Mod: PBBFAC,,, | Performed by: PHYSICIAN ASSISTANT

## 2021-09-24 PROCEDURE — 3078F PR MOST RECENT DIASTOLIC BLOOD PRESSURE < 80 MM HG: ICD-10-PCS | Mod: CPTII,S$GLB,, | Performed by: PHYSICIAN ASSISTANT

## 2021-09-25 ENCOUNTER — LAB VISIT (OUTPATIENT)
Dept: FAMILY MEDICINE | Facility: CLINIC | Age: 66
End: 2021-09-25
Payer: COMMERCIAL

## 2021-09-25 DIAGNOSIS — E11.36 DIABETIC CATARACT OF RIGHT EYE: ICD-10-CM

## 2021-09-25 PROCEDURE — U0003 INFECTIOUS AGENT DETECTION BY NUCLEIC ACID (DNA OR RNA); SEVERE ACUTE RESPIRATORY SYNDROME CORONAVIRUS 2 (SARS-COV-2) (CORONAVIRUS DISEASE [COVID-19]), AMPLIFIED PROBE TECHNIQUE, MAKING USE OF HIGH THROUGHPUT TECHNOLOGIES AS DESCRIBED BY CMS-2020-01-R: HCPCS | Performed by: OPHTHALMOLOGY

## 2021-09-25 PROCEDURE — U0005 INFEC AGEN DETEC AMPLI PROBE: HCPCS | Performed by: OPHTHALMOLOGY

## 2021-09-26 LAB
SARS-COV-2 RNA RESP QL NAA+PROBE: NOT DETECTED
SARS-COV-2- CYCLE NUMBER: NORMAL

## 2021-09-27 ENCOUNTER — ANESTHESIA EVENT (OUTPATIENT)
Dept: SURGERY | Facility: HOSPITAL | Age: 66
End: 2021-09-27
Payer: COMMERCIAL

## 2021-09-27 ENCOUNTER — PATIENT MESSAGE (OUTPATIENT)
Dept: FAMILY MEDICINE | Facility: CLINIC | Age: 66
End: 2021-09-27

## 2021-09-28 ENCOUNTER — ANESTHESIA (OUTPATIENT)
Dept: SURGERY | Facility: HOSPITAL | Age: 66
End: 2021-09-28
Payer: COMMERCIAL

## 2021-09-28 ENCOUNTER — HOSPITAL ENCOUNTER (OUTPATIENT)
Facility: HOSPITAL | Age: 66
Discharge: HOME | End: 2021-09-28
Attending: OPHTHALMOLOGY | Admitting: OPHTHALMOLOGY
Payer: COMMERCIAL

## 2021-09-28 VITALS
TEMPERATURE: 98 F | OXYGEN SATURATION: 98 % | DIASTOLIC BLOOD PRESSURE: 60 MMHG | SYSTOLIC BLOOD PRESSURE: 130 MMHG | RESPIRATION RATE: 16 BRPM | BODY MASS INDEX: 28.23 KG/M2 | WEIGHT: 220 LBS | HEIGHT: 74 IN | HEART RATE: 68 BPM

## 2021-09-28 DIAGNOSIS — E11.36 DIABETIC CATARACT OF RIGHT EYE: Primary | ICD-10-CM

## 2021-09-28 LAB — GLUCOSE SERPL-MCNC: 239 MG/DL (ref 70–110)

## 2021-09-28 PROCEDURE — 63600175 PHARM REV CODE 636 W HCPCS: Mod: PO | Performed by: NURSE ANESTHETIST, CERTIFIED REGISTERED

## 2021-09-28 PROCEDURE — 66999 UNLISTED PX ANT SEGMENT EYE: CPT | Mod: CSM,RT,, | Performed by: OPHTHALMOLOGY

## 2021-09-28 PROCEDURE — 63600175 PHARM REV CODE 636 W HCPCS: Mod: PO | Performed by: OPHTHALMOLOGY

## 2021-09-28 PROCEDURE — 37000009 HC ANESTHESIA EA ADD 15 MINS: Mod: PO | Performed by: OPHTHALMOLOGY

## 2021-09-28 PROCEDURE — 66999 PR INTRAOPERATIVE WAVEFRONT ABERROMETRY, ORA: ICD-10-PCS | Mod: CSM,RT,, | Performed by: OPHTHALMOLOGY

## 2021-09-28 PROCEDURE — 66984 PR REMOVAL, CATARACT, W/INSRT INTRAOC LENS, W/O ENDO CYCLO: ICD-10-PCS | Mod: RT,,, | Performed by: OPHTHALMOLOGY

## 2021-09-28 PROCEDURE — 66984 XCAPSL CTRC RMVL W/O ECP: CPT | Mod: RT,,, | Performed by: OPHTHALMOLOGY

## 2021-09-28 PROCEDURE — 25000003 PHARM REV CODE 250: Mod: PO

## 2021-09-28 PROCEDURE — 25000003 PHARM REV CODE 250: Mod: PO | Performed by: OPHTHALMOLOGY

## 2021-09-28 PROCEDURE — 71000015 HC POSTOP RECOV 1ST HR: Mod: PO | Performed by: OPHTHALMOLOGY

## 2021-09-28 PROCEDURE — 37000008 HC ANESTHESIA 1ST 15 MINUTES: Mod: PO | Performed by: OPHTHALMOLOGY

## 2021-09-28 PROCEDURE — D9220A PRA ANESTHESIA: ICD-10-PCS | Mod: ANES,,, | Performed by: ANESTHESIOLOGY

## 2021-09-28 PROCEDURE — V2788 PRESBYOPIA-CORRECT FUNCTION: HCPCS | Mod: PO | Performed by: OPHTHALMOLOGY

## 2021-09-28 PROCEDURE — D9220A PRA ANESTHESIA: ICD-10-PCS | Mod: CRNA,,, | Performed by: NURSE ANESTHETIST, CERTIFIED REGISTERED

## 2021-09-28 PROCEDURE — 36000707: Mod: PO | Performed by: OPHTHALMOLOGY

## 2021-09-28 PROCEDURE — 36000706: Mod: PO | Performed by: OPHTHALMOLOGY

## 2021-09-28 PROCEDURE — D9220A PRA ANESTHESIA: Mod: CRNA,,, | Performed by: NURSE ANESTHETIST, CERTIFIED REGISTERED

## 2021-09-28 PROCEDURE — 63600175 PHARM REV CODE 636 W HCPCS: Mod: PO | Performed by: ANESTHESIOLOGY

## 2021-09-28 PROCEDURE — D9220A PRA ANESTHESIA: Mod: ANES,,, | Performed by: ANESTHESIOLOGY

## 2021-09-28 DEVICE — IMPLANTABLE DEVICE: Type: IMPLANTABLE DEVICE | Site: EYE | Status: FUNCTIONAL

## 2021-09-28 RX ORDER — PHENYLEPHRINE HYDROCHLORIDE 25 MG/ML
1 SOLUTION/ DROPS OPHTHALMIC
Status: COMPLETED | OUTPATIENT
Start: 2021-09-28 | End: 2021-09-28

## 2021-09-28 RX ORDER — EPINEPHRINE 1 MG/ML
INJECTION INTRAMUSCULAR; INTRAVENOUS; SUBCUTANEOUS
Status: DISCONTINUED | OUTPATIENT
Start: 2021-09-28 | End: 2021-09-28 | Stop reason: HOSPADM

## 2021-09-28 RX ORDER — ONDANSETRON 2 MG/ML
INJECTION INTRAMUSCULAR; INTRAVENOUS
Status: DISCONTINUED | OUTPATIENT
Start: 2021-09-28 | End: 2021-09-28

## 2021-09-28 RX ORDER — HYDROMORPHONE HYDROCHLORIDE 2 MG/ML
0.2 INJECTION, SOLUTION INTRAMUSCULAR; INTRAVENOUS; SUBCUTANEOUS EVERY 5 MIN PRN
Status: DISCONTINUED | OUTPATIENT
Start: 2021-09-28 | End: 2021-09-28 | Stop reason: HOSPADM

## 2021-09-28 RX ORDER — OFLOXACIN 3 MG/ML
SOLUTION/ DROPS OPHTHALMIC
Status: DISCONTINUED | OUTPATIENT
Start: 2021-09-28 | End: 2021-09-28 | Stop reason: HOSPADM

## 2021-09-28 RX ORDER — MIDAZOLAM HYDROCHLORIDE 1 MG/ML
INJECTION INTRAMUSCULAR; INTRAVENOUS
Status: DISCONTINUED | OUTPATIENT
Start: 2021-09-28 | End: 2021-09-28

## 2021-09-28 RX ORDER — TROPICAMIDE 10 MG/ML
1 SOLUTION/ DROPS OPHTHALMIC
Status: COMPLETED | OUTPATIENT
Start: 2021-09-28 | End: 2021-09-28

## 2021-09-28 RX ORDER — LIDOCAINE HYDROCHLORIDE 10 MG/ML
1 INJECTION, SOLUTION EPIDURAL; INFILTRATION; INTRACAUDAL; PERINEURAL ONCE
Status: DISCONTINUED | OUTPATIENT
Start: 2021-09-28 | End: 2021-09-28 | Stop reason: HOSPADM

## 2021-09-28 RX ORDER — LIDOCAINE HYDROCHLORIDE 40 MG/ML
INJECTION, SOLUTION RETROBULBAR
Status: DISCONTINUED | OUTPATIENT
Start: 2021-09-28 | End: 2021-09-28 | Stop reason: HOSPADM

## 2021-09-28 RX ORDER — PROPARACAINE HYDROCHLORIDE 5 MG/ML
1 SOLUTION/ DROPS OPHTHALMIC
Status: COMPLETED | OUTPATIENT
Start: 2021-09-28 | End: 2021-09-28

## 2021-09-28 RX ORDER — FENTANYL CITRATE 50 UG/ML
25 INJECTION, SOLUTION INTRAMUSCULAR; INTRAVENOUS EVERY 5 MIN PRN
Status: DISCONTINUED | OUTPATIENT
Start: 2021-09-28 | End: 2021-09-28 | Stop reason: HOSPADM

## 2021-09-28 RX ORDER — SODIUM CHLORIDE, SODIUM LACTATE, POTASSIUM CHLORIDE, CALCIUM CHLORIDE 600; 310; 30; 20 MG/100ML; MG/100ML; MG/100ML; MG/100ML
INJECTION, SOLUTION INTRAVENOUS CONTINUOUS
Status: DISCONTINUED | OUTPATIENT
Start: 2021-09-28 | End: 2021-09-28 | Stop reason: HOSPADM

## 2021-09-28 RX ORDER — OXYCODONE HYDROCHLORIDE 5 MG/1
5 TABLET ORAL
Status: DISCONTINUED | OUTPATIENT
Start: 2021-09-28 | End: 2021-09-28 | Stop reason: HOSPADM

## 2021-09-28 RX ORDER — LIDOCAINE HYDROCHLORIDE 10 MG/ML
INJECTION INFILTRATION; PERINEURAL
Status: DISCONTINUED | OUTPATIENT
Start: 2021-09-28 | End: 2021-09-28 | Stop reason: HOSPADM

## 2021-09-28 RX ORDER — PHENYLEPHRINE HYDROCHLORIDE 100 MG/ML
1 SOLUTION/ DROPS OPHTHALMIC
Status: COMPLETED | OUTPATIENT
Start: 2021-09-28 | End: 2021-09-28

## 2021-09-28 RX ADMIN — PHENYLEPHRINE HYDROCHLORIDE 1 DROP: 25 SOLUTION/ DROPS OPHTHALMIC at 06:09

## 2021-09-28 RX ADMIN — TROPICAMIDE 1 DROP: 10 SOLUTION/ DROPS OPHTHALMIC at 06:09

## 2021-09-28 RX ADMIN — PROPARACAINE HYDROCHLORIDE 1 DROP: 5 SOLUTION/ DROPS OPHTHALMIC at 06:09

## 2021-09-28 RX ADMIN — PHENYLEPHRINE HYDROCHLORIDE 1 DROP: 100 SOLUTION/ DROPS OPHTHALMIC at 06:09

## 2021-09-28 RX ADMIN — SODIUM CHLORIDE, SODIUM LACTATE, POTASSIUM CHLORIDE, AND CALCIUM CHLORIDE: .6; .31; .03; .02 INJECTION, SOLUTION INTRAVENOUS at 07:09

## 2021-09-28 RX ADMIN — ONDANSETRON 4 MG: 2 INJECTION INTRAMUSCULAR; INTRAVENOUS at 07:09

## 2021-09-28 RX ADMIN — MIDAZOLAM HYDROCHLORIDE 2 MG: 1 INJECTION, SOLUTION INTRAMUSCULAR; INTRAVENOUS at 07:09

## 2021-09-29 ENCOUNTER — OFFICE VISIT (OUTPATIENT)
Dept: PODIATRY | Facility: CLINIC | Age: 66
End: 2021-09-29
Payer: COMMERCIAL

## 2021-09-29 ENCOUNTER — OFFICE VISIT (OUTPATIENT)
Dept: OPHTHALMOLOGY | Facility: CLINIC | Age: 66
End: 2021-09-29
Payer: COMMERCIAL

## 2021-09-29 VITALS — BODY MASS INDEX: 28.23 KG/M2 | WEIGHT: 220 LBS | HEIGHT: 74 IN

## 2021-09-29 DIAGNOSIS — E10.42 TYPE 1 DIABETES MELLITUS WITH POLYNEUROPATHY: ICD-10-CM

## 2021-09-29 DIAGNOSIS — Z96.1 STATUS POST CATARACT EXTRACTION AND INSERTION OF INTRAOCULAR LENS OF RIGHT EYE: Primary | ICD-10-CM

## 2021-09-29 DIAGNOSIS — L97.509 TYPE 1 DIABETES MELLITUS WITH DIABETIC TOE ULCER: Primary | ICD-10-CM

## 2021-09-29 DIAGNOSIS — E10.621 TYPE 1 DIABETES MELLITUS WITH DIABETIC TOE ULCER: Primary | ICD-10-CM

## 2021-09-29 DIAGNOSIS — Z98.41 STATUS POST CATARACT EXTRACTION AND INSERTION OF INTRAOCULAR LENS OF RIGHT EYE: Primary | ICD-10-CM

## 2021-09-29 DIAGNOSIS — M77.52 BONE SPUR OF LEFT FOOT: ICD-10-CM

## 2021-09-29 LAB — BACTERIA SPEC ANAEROBE CULT: ABNORMAL

## 2021-09-29 PROCEDURE — 99212 PR OFFICE/OUTPT VISIT, EST, LEVL II, 10-19 MIN: ICD-10-PCS | Mod: S$GLB,,, | Performed by: PODIATRIST

## 2021-09-29 PROCEDURE — 1160F PR REVIEW ALL MEDS BY PRESCRIBER/CLIN PHARMACIST DOCUMENTED: ICD-10-PCS | Mod: CPTII,S$GLB,, | Performed by: OPHTHALMOLOGY

## 2021-09-29 PROCEDURE — 99999 PR PBB SHADOW E&M-EST. PATIENT-LVL III: CPT | Mod: PBBFAC,,, | Performed by: PODIATRIST

## 2021-09-29 PROCEDURE — 4010F ACE/ARB THERAPY RXD/TAKEN: CPT | Mod: CPTII,S$GLB,, | Performed by: PODIATRIST

## 2021-09-29 PROCEDURE — 3066F NEPHROPATHY DOC TX: CPT | Mod: CPTII,S$GLB,, | Performed by: OPHTHALMOLOGY

## 2021-09-29 PROCEDURE — 4010F ACE/ARB THERAPY RXD/TAKEN: CPT | Mod: CPTII,S$GLB,, | Performed by: OPHTHALMOLOGY

## 2021-09-29 PROCEDURE — 4010F PR ACE/ARB THEARPY RXD/TAKEN: ICD-10-PCS | Mod: CPTII,S$GLB,, | Performed by: OPHTHALMOLOGY

## 2021-09-29 PROCEDURE — 3008F BODY MASS INDEX DOCD: CPT | Mod: CPTII,S$GLB,, | Performed by: PODIATRIST

## 2021-09-29 PROCEDURE — 99999 PR PBB SHADOW E&M-EST. PATIENT-LVL III: ICD-10-PCS | Mod: PBBFAC,,, | Performed by: OPHTHALMOLOGY

## 2021-09-29 PROCEDURE — 3052F PR MOST RECENT HEMOGLOBIN A1C LEVEL 8.0 - < 9.0%: ICD-10-PCS | Mod: CPTII,S$GLB,, | Performed by: PODIATRIST

## 2021-09-29 PROCEDURE — 99999 PR PBB SHADOW E&M-EST. PATIENT-LVL III: ICD-10-PCS | Mod: PBBFAC,,, | Performed by: PODIATRIST

## 2021-09-29 PROCEDURE — 3066F PR DOCUMENTATION OF TREATMENT FOR NEPHROPATHY: ICD-10-PCS | Mod: CPTII,S$GLB,, | Performed by: PODIATRIST

## 2021-09-29 PROCEDURE — 3060F PR POS MICROALBUMINURIA RESULT DOCUMENTED/REVIEW: ICD-10-PCS | Mod: CPTII,S$GLB,, | Performed by: OPHTHALMOLOGY

## 2021-09-29 PROCEDURE — 1159F MED LIST DOCD IN RCRD: CPT | Mod: CPTII,S$GLB,, | Performed by: OPHTHALMOLOGY

## 2021-09-29 PROCEDURE — 1159F PR MEDICATION LIST DOCUMENTED IN MEDICAL RECORD: ICD-10-PCS | Mod: CPTII,S$GLB,, | Performed by: PODIATRIST

## 2021-09-29 PROCEDURE — 4010F PR ACE/ARB THEARPY RXD/TAKEN: ICD-10-PCS | Mod: CPTII,S$GLB,, | Performed by: PODIATRIST

## 2021-09-29 PROCEDURE — 3052F HG A1C>EQUAL 8.0%<EQUAL 9.0%: CPT | Mod: CPTII,S$GLB,, | Performed by: OPHTHALMOLOGY

## 2021-09-29 PROCEDURE — 3066F PR DOCUMENTATION OF TREATMENT FOR NEPHROPATHY: ICD-10-PCS | Mod: CPTII,S$GLB,, | Performed by: OPHTHALMOLOGY

## 2021-09-29 PROCEDURE — 3008F PR BODY MASS INDEX (BMI) DOCUMENTED: ICD-10-PCS | Mod: CPTII,S$GLB,, | Performed by: PODIATRIST

## 2021-09-29 PROCEDURE — 3052F HG A1C>EQUAL 8.0%<EQUAL 9.0%: CPT | Mod: CPTII,S$GLB,, | Performed by: PODIATRIST

## 2021-09-29 PROCEDURE — 3052F PR MOST RECENT HEMOGLOBIN A1C LEVEL 8.0 - < 9.0%: ICD-10-PCS | Mod: CPTII,S$GLB,, | Performed by: OPHTHALMOLOGY

## 2021-09-29 PROCEDURE — 3060F PR POS MICROALBUMINURIA RESULT DOCUMENTED/REVIEW: ICD-10-PCS | Mod: CPTII,S$GLB,, | Performed by: PODIATRIST

## 2021-09-29 PROCEDURE — 1160F RVW MEDS BY RX/DR IN RCRD: CPT | Mod: CPTII,S$GLB,, | Performed by: OPHTHALMOLOGY

## 2021-09-29 PROCEDURE — 99024 POSTOP FOLLOW-UP VISIT: CPT | Mod: S$GLB,,, | Performed by: OPHTHALMOLOGY

## 2021-09-29 PROCEDURE — 3066F NEPHROPATHY DOC TX: CPT | Mod: CPTII,S$GLB,, | Performed by: PODIATRIST

## 2021-09-29 PROCEDURE — 1159F PR MEDICATION LIST DOCUMENTED IN MEDICAL RECORD: ICD-10-PCS | Mod: CPTII,S$GLB,, | Performed by: OPHTHALMOLOGY

## 2021-09-29 PROCEDURE — 3060F POS MICROALBUMINURIA REV: CPT | Mod: CPTII,S$GLB,, | Performed by: PODIATRIST

## 2021-09-29 PROCEDURE — 99024 PR POST-OP FOLLOW-UP VISIT: ICD-10-PCS | Mod: S$GLB,,, | Performed by: OPHTHALMOLOGY

## 2021-09-29 PROCEDURE — 3060F POS MICROALBUMINURIA REV: CPT | Mod: CPTII,S$GLB,, | Performed by: OPHTHALMOLOGY

## 2021-09-29 PROCEDURE — 99999 PR PBB SHADOW E&M-EST. PATIENT-LVL III: CPT | Mod: PBBFAC,,, | Performed by: OPHTHALMOLOGY

## 2021-09-29 PROCEDURE — 1159F MED LIST DOCD IN RCRD: CPT | Mod: CPTII,S$GLB,, | Performed by: PODIATRIST

## 2021-09-29 PROCEDURE — 99212 OFFICE O/P EST SF 10 MIN: CPT | Mod: S$GLB,,, | Performed by: PODIATRIST

## 2021-09-29 RX ORDER — ERYTHROMYCIN 5 MG/G
OINTMENT OPHTHALMIC NIGHTLY
Qty: 1 TUBE | Refills: 1 | Status: SHIPPED | OUTPATIENT
Start: 2021-09-29 | End: 2021-10-06

## 2021-09-29 RX ORDER — MOXIFLOXACIN 5 MG/ML
1 SOLUTION/ DROPS OPHTHALMIC 4 TIMES DAILY
COMMUNITY
End: 2022-01-14 | Stop reason: SDUPTHER

## 2021-09-30 ENCOUNTER — TELEPHONE (OUTPATIENT)
Dept: OPHTHALMOLOGY | Facility: CLINIC | Age: 66
End: 2021-09-30

## 2021-09-30 ENCOUNTER — PATIENT MESSAGE (OUTPATIENT)
Dept: FAMILY MEDICINE | Facility: CLINIC | Age: 66
End: 2021-09-30

## 2021-10-01 ENCOUNTER — OFFICE VISIT (OUTPATIENT)
Dept: OPHTHALMOLOGY | Facility: CLINIC | Age: 66
End: 2021-10-01
Payer: COMMERCIAL

## 2021-10-01 ENCOUNTER — IMMUNIZATION (OUTPATIENT)
Dept: FAMILY MEDICINE | Facility: CLINIC | Age: 66
End: 2021-10-01
Payer: COMMERCIAL

## 2021-10-01 DIAGNOSIS — Z23 NEED FOR VACCINATION: Primary | ICD-10-CM

## 2021-10-01 DIAGNOSIS — Z98.41 STATUS POST CATARACT EXTRACTION AND INSERTION OF INTRAOCULAR LENS OF RIGHT EYE: Primary | ICD-10-CM

## 2021-10-01 DIAGNOSIS — Z96.1 STATUS POST CATARACT EXTRACTION AND INSERTION OF INTRAOCULAR LENS OF RIGHT EYE: Primary | ICD-10-CM

## 2021-10-01 PROCEDURE — 99024 POSTOP FOLLOW-UP VISIT: CPT | Mod: S$GLB,,, | Performed by: OPHTHALMOLOGY

## 2021-10-01 PROCEDURE — 1160F RVW MEDS BY RX/DR IN RCRD: CPT | Mod: CPTII,S$GLB,, | Performed by: OPHTHALMOLOGY

## 2021-10-01 PROCEDURE — 99999 PR PBB SHADOW E&M-EST. PATIENT-LVL IV: CPT | Mod: PBBFAC,,, | Performed by: OPHTHALMOLOGY

## 2021-10-01 PROCEDURE — 0003A COVID-19, MRNA, LNP-S, PF, 30 MCG/0.3 ML DOSE VACCINE: CPT | Mod: PBBFAC | Performed by: INTERNAL MEDICINE

## 2021-10-01 PROCEDURE — 1159F PR MEDICATION LIST DOCUMENTED IN MEDICAL RECORD: ICD-10-PCS | Mod: CPTII,S$GLB,, | Performed by: OPHTHALMOLOGY

## 2021-10-01 PROCEDURE — 4010F PR ACE/ARB THEARPY RXD/TAKEN: ICD-10-PCS | Mod: CPTII,S$GLB,, | Performed by: OPHTHALMOLOGY

## 2021-10-01 PROCEDURE — 1160F PR REVIEW ALL MEDS BY PRESCRIBER/CLIN PHARMACIST DOCUMENTED: ICD-10-PCS | Mod: CPTII,S$GLB,, | Performed by: OPHTHALMOLOGY

## 2021-10-01 PROCEDURE — 3066F NEPHROPATHY DOC TX: CPT | Mod: CPTII,S$GLB,, | Performed by: OPHTHALMOLOGY

## 2021-10-01 PROCEDURE — 91300 COVID-19, MRNA, LNP-S, PF, 30 MCG/0.3 ML DOSE VACCINE: CPT | Mod: PBBFAC | Performed by: INTERNAL MEDICINE

## 2021-10-01 PROCEDURE — 3066F PR DOCUMENTATION OF TREATMENT FOR NEPHROPATHY: ICD-10-PCS | Mod: CPTII,S$GLB,, | Performed by: OPHTHALMOLOGY

## 2021-10-01 PROCEDURE — 3052F PR MOST RECENT HEMOGLOBIN A1C LEVEL 8.0 - < 9.0%: ICD-10-PCS | Mod: CPTII,S$GLB,, | Performed by: OPHTHALMOLOGY

## 2021-10-01 PROCEDURE — 3060F POS MICROALBUMINURIA REV: CPT | Mod: CPTII,S$GLB,, | Performed by: OPHTHALMOLOGY

## 2021-10-01 PROCEDURE — 3060F PR POS MICROALBUMINURIA RESULT DOCUMENTED/REVIEW: ICD-10-PCS | Mod: CPTII,S$GLB,, | Performed by: OPHTHALMOLOGY

## 2021-10-01 PROCEDURE — 99999 PR PBB SHADOW E&M-EST. PATIENT-LVL IV: ICD-10-PCS | Mod: PBBFAC,,, | Performed by: OPHTHALMOLOGY

## 2021-10-01 PROCEDURE — 3052F HG A1C>EQUAL 8.0%<EQUAL 9.0%: CPT | Mod: CPTII,S$GLB,, | Performed by: OPHTHALMOLOGY

## 2021-10-01 PROCEDURE — 4010F ACE/ARB THERAPY RXD/TAKEN: CPT | Mod: CPTII,S$GLB,, | Performed by: OPHTHALMOLOGY

## 2021-10-01 PROCEDURE — 1159F MED LIST DOCD IN RCRD: CPT | Mod: CPTII,S$GLB,, | Performed by: OPHTHALMOLOGY

## 2021-10-01 PROCEDURE — 99024 PR POST-OP FOLLOW-UP VISIT: ICD-10-PCS | Mod: S$GLB,,, | Performed by: OPHTHALMOLOGY

## 2021-10-01 RX ORDER — DORZOLAMIDE HYDROCHLORIDE AND TIMOLOL MALEATE 20; 5 MG/ML; MG/ML
1 SOLUTION/ DROPS OPHTHALMIC 2 TIMES DAILY
Qty: 10 ML | Refills: 1 | Status: SHIPPED | OUTPATIENT
Start: 2021-10-01 | End: 2021-10-28 | Stop reason: SDUPTHER

## 2021-10-04 DIAGNOSIS — Z01.818 PRE-OP TESTING: ICD-10-CM

## 2021-10-06 ENCOUNTER — OFFICE VISIT (OUTPATIENT)
Dept: PODIATRY | Facility: CLINIC | Age: 66
End: 2021-10-06
Payer: COMMERCIAL

## 2021-10-06 ENCOUNTER — PATIENT OUTREACH (OUTPATIENT)
Dept: ADMINISTRATIVE | Facility: OTHER | Age: 66
End: 2021-10-06

## 2021-10-06 VITALS — WEIGHT: 220 LBS | HEIGHT: 74 IN | BODY MASS INDEX: 28.23 KG/M2

## 2021-10-06 DIAGNOSIS — E10.42 TYPE 1 DIABETES MELLITUS WITH POLYNEUROPATHY: ICD-10-CM

## 2021-10-06 DIAGNOSIS — M77.52 BONE SPUR OF LEFT FOOT: ICD-10-CM

## 2021-10-06 DIAGNOSIS — Z87.2 HEALED ULCER OF LEFT FOOT ON EXAMINATION: Primary | ICD-10-CM

## 2021-10-06 PROCEDURE — 1159F MED LIST DOCD IN RCRD: CPT | Mod: CPTII,S$GLB,, | Performed by: PODIATRIST

## 2021-10-06 PROCEDURE — 3052F HG A1C>EQUAL 8.0%<EQUAL 9.0%: CPT | Mod: CPTII,S$GLB,, | Performed by: PODIATRIST

## 2021-10-06 PROCEDURE — 3052F PR MOST RECENT HEMOGLOBIN A1C LEVEL 8.0 - < 9.0%: ICD-10-PCS | Mod: CPTII,S$GLB,, | Performed by: PODIATRIST

## 2021-10-06 PROCEDURE — 3066F PR DOCUMENTATION OF TREATMENT FOR NEPHROPATHY: ICD-10-PCS | Mod: CPTII,S$GLB,, | Performed by: PODIATRIST

## 2021-10-06 PROCEDURE — 3060F POS MICROALBUMINURIA REV: CPT | Mod: CPTII,S$GLB,, | Performed by: PODIATRIST

## 2021-10-06 PROCEDURE — 99212 PR OFFICE/OUTPT VISIT, EST, LEVL II, 10-19 MIN: ICD-10-PCS | Mod: S$GLB,,, | Performed by: PODIATRIST

## 2021-10-06 PROCEDURE — 3008F PR BODY MASS INDEX (BMI) DOCUMENTED: ICD-10-PCS | Mod: CPTII,S$GLB,, | Performed by: PODIATRIST

## 2021-10-06 PROCEDURE — 99999 PR PBB SHADOW E&M-EST. PATIENT-LVL IV: ICD-10-PCS | Mod: PBBFAC,,, | Performed by: PODIATRIST

## 2021-10-06 PROCEDURE — 4010F ACE/ARB THERAPY RXD/TAKEN: CPT | Mod: CPTII,S$GLB,, | Performed by: PODIATRIST

## 2021-10-06 PROCEDURE — 3008F BODY MASS INDEX DOCD: CPT | Mod: CPTII,S$GLB,, | Performed by: PODIATRIST

## 2021-10-06 PROCEDURE — 99999 PR PBB SHADOW E&M-EST. PATIENT-LVL IV: CPT | Mod: PBBFAC,,, | Performed by: PODIATRIST

## 2021-10-06 PROCEDURE — 99212 OFFICE O/P EST SF 10 MIN: CPT | Mod: S$GLB,,, | Performed by: PODIATRIST

## 2021-10-06 PROCEDURE — 1159F PR MEDICATION LIST DOCUMENTED IN MEDICAL RECORD: ICD-10-PCS | Mod: CPTII,S$GLB,, | Performed by: PODIATRIST

## 2021-10-06 PROCEDURE — 4010F PR ACE/ARB THEARPY RXD/TAKEN: ICD-10-PCS | Mod: CPTII,S$GLB,, | Performed by: PODIATRIST

## 2021-10-06 PROCEDURE — 3060F PR POS MICROALBUMINURIA RESULT DOCUMENTED/REVIEW: ICD-10-PCS | Mod: CPTII,S$GLB,, | Performed by: PODIATRIST

## 2021-10-06 PROCEDURE — 3066F NEPHROPATHY DOC TX: CPT | Mod: CPTII,S$GLB,, | Performed by: PODIATRIST

## 2021-10-08 ENCOUNTER — OFFICE VISIT (OUTPATIENT)
Dept: OPHTHALMOLOGY | Facility: CLINIC | Age: 66
End: 2021-10-08
Payer: COMMERCIAL

## 2021-10-08 DIAGNOSIS — Z96.1 STATUS POST CATARACT EXTRACTION AND INSERTION OF INTRAOCULAR LENS OF RIGHT EYE: Primary | ICD-10-CM

## 2021-10-08 DIAGNOSIS — Z98.41 STATUS POST CATARACT EXTRACTION AND INSERTION OF INTRAOCULAR LENS OF RIGHT EYE: Primary | ICD-10-CM

## 2021-10-08 PROCEDURE — 3052F PR MOST RECENT HEMOGLOBIN A1C LEVEL 8.0 - < 9.0%: ICD-10-PCS | Mod: CPTII,S$GLB,, | Performed by: OPHTHALMOLOGY

## 2021-10-08 PROCEDURE — 4010F ACE/ARB THERAPY RXD/TAKEN: CPT | Mod: CPTII,S$GLB,, | Performed by: OPHTHALMOLOGY

## 2021-10-08 PROCEDURE — 4010F PR ACE/ARB THEARPY RXD/TAKEN: ICD-10-PCS | Mod: CPTII,S$GLB,, | Performed by: OPHTHALMOLOGY

## 2021-10-08 PROCEDURE — 3060F POS MICROALBUMINURIA REV: CPT | Mod: CPTII,S$GLB,, | Performed by: OPHTHALMOLOGY

## 2021-10-08 PROCEDURE — 99024 PR POST-OP FOLLOW-UP VISIT: ICD-10-PCS | Mod: S$GLB,,, | Performed by: OPHTHALMOLOGY

## 2021-10-08 PROCEDURE — 99999 PR PBB SHADOW E&M-EST. PATIENT-LVL III: CPT | Mod: PBBFAC,,, | Performed by: OPHTHALMOLOGY

## 2021-10-08 PROCEDURE — 3052F HG A1C>EQUAL 8.0%<EQUAL 9.0%: CPT | Mod: CPTII,S$GLB,, | Performed by: OPHTHALMOLOGY

## 2021-10-08 PROCEDURE — 99999 PR PBB SHADOW E&M-EST. PATIENT-LVL III: ICD-10-PCS | Mod: PBBFAC,,, | Performed by: OPHTHALMOLOGY

## 2021-10-08 PROCEDURE — 3060F PR POS MICROALBUMINURIA RESULT DOCUMENTED/REVIEW: ICD-10-PCS | Mod: CPTII,S$GLB,, | Performed by: OPHTHALMOLOGY

## 2021-10-08 PROCEDURE — 1160F RVW MEDS BY RX/DR IN RCRD: CPT | Mod: CPTII,S$GLB,, | Performed by: OPHTHALMOLOGY

## 2021-10-08 PROCEDURE — 3066F PR DOCUMENTATION OF TREATMENT FOR NEPHROPATHY: ICD-10-PCS | Mod: CPTII,S$GLB,, | Performed by: OPHTHALMOLOGY

## 2021-10-08 PROCEDURE — 3066F NEPHROPATHY DOC TX: CPT | Mod: CPTII,S$GLB,, | Performed by: OPHTHALMOLOGY

## 2021-10-08 PROCEDURE — 1159F MED LIST DOCD IN RCRD: CPT | Mod: CPTII,S$GLB,, | Performed by: OPHTHALMOLOGY

## 2021-10-08 PROCEDURE — 1159F PR MEDICATION LIST DOCUMENTED IN MEDICAL RECORD: ICD-10-PCS | Mod: CPTII,S$GLB,, | Performed by: OPHTHALMOLOGY

## 2021-10-08 PROCEDURE — 1160F PR REVIEW ALL MEDS BY PRESCRIBER/CLIN PHARMACIST DOCUMENTED: ICD-10-PCS | Mod: CPTII,S$GLB,, | Performed by: OPHTHALMOLOGY

## 2021-10-08 PROCEDURE — 99024 POSTOP FOLLOW-UP VISIT: CPT | Mod: S$GLB,,, | Performed by: OPHTHALMOLOGY

## 2021-10-08 RX ORDER — BRIMONIDINE TARTRATE AND TIMOLOL MALEATE 2; 5 MG/ML; MG/ML
1 SOLUTION OPHTHALMIC 2 TIMES DAILY
Qty: 5 ML | Refills: 3 | Status: SHIPPED | OUTPATIENT
Start: 2021-10-08 | End: 2021-10-28 | Stop reason: ALTCHOICE

## 2021-10-08 RX ORDER — BRIMONIDINE TARTRATE AND TIMOLOL MALEATE 2; 5 MG/ML; MG/ML
1 SOLUTION OPHTHALMIC 2 TIMES DAILY
Qty: 4 ML | Refills: 11 | Status: SHIPPED | OUTPATIENT
Start: 2021-10-08 | End: 2021-10-08 | Stop reason: CLARIF

## 2021-10-11 ENCOUNTER — LAB VISIT (OUTPATIENT)
Dept: FAMILY MEDICINE | Facility: CLINIC | Age: 66
End: 2021-10-11
Payer: COMMERCIAL

## 2021-10-11 DIAGNOSIS — Z01.818 PRE-OP TESTING: ICD-10-CM

## 2021-10-11 PROCEDURE — U0005 INFEC AGEN DETEC AMPLI PROBE: HCPCS | Performed by: PODIATRIST

## 2021-10-11 PROCEDURE — U0003 INFECTIOUS AGENT DETECTION BY NUCLEIC ACID (DNA OR RNA); SEVERE ACUTE RESPIRATORY SYNDROME CORONAVIRUS 2 (SARS-COV-2) (CORONAVIRUS DISEASE [COVID-19]), AMPLIFIED PROBE TECHNIQUE, MAKING USE OF HIGH THROUGHPUT TECHNOLOGIES AS DESCRIBED BY CMS-2020-01-R: HCPCS | Performed by: PODIATRIST

## 2021-10-12 ENCOUNTER — TELEPHONE (OUTPATIENT)
Dept: SURGERY | Facility: HOSPITAL | Age: 66
End: 2021-10-12

## 2021-10-12 LAB
SARS-COV-2 RNA RESP QL NAA+PROBE: NOT DETECTED
SARS-COV-2- CYCLE NUMBER: NORMAL

## 2021-10-13 ENCOUNTER — ANESTHESIA EVENT (OUTPATIENT)
Dept: SURGERY | Facility: HOSPITAL | Age: 66
End: 2021-10-13
Payer: COMMERCIAL

## 2021-10-13 ENCOUNTER — OFFICE VISIT (OUTPATIENT)
Dept: OPHTHALMOLOGY | Facility: CLINIC | Age: 66
End: 2021-10-13
Payer: COMMERCIAL

## 2021-10-13 DIAGNOSIS — Z96.1 STATUS POST CATARACT EXTRACTION AND INSERTION OF INTRAOCULAR LENS OF RIGHT EYE: Primary | ICD-10-CM

## 2021-10-13 DIAGNOSIS — Z98.41 STATUS POST CATARACT EXTRACTION AND INSERTION OF INTRAOCULAR LENS OF RIGHT EYE: Primary | ICD-10-CM

## 2021-10-13 PROCEDURE — 3066F PR DOCUMENTATION OF TREATMENT FOR NEPHROPATHY: ICD-10-PCS | Mod: CPTII,S$GLB,, | Performed by: OPHTHALMOLOGY

## 2021-10-13 PROCEDURE — 4010F PR ACE/ARB THEARPY RXD/TAKEN: ICD-10-PCS | Mod: CPTII,S$GLB,, | Performed by: OPHTHALMOLOGY

## 2021-10-13 PROCEDURE — 3060F POS MICROALBUMINURIA REV: CPT | Mod: CPTII,S$GLB,, | Performed by: OPHTHALMOLOGY

## 2021-10-13 PROCEDURE — 99999 PR PBB SHADOW E&M-EST. PATIENT-LVL IV: ICD-10-PCS | Mod: PBBFAC,,, | Performed by: OPHTHALMOLOGY

## 2021-10-13 PROCEDURE — 99024 PR POST-OP FOLLOW-UP VISIT: ICD-10-PCS | Mod: S$GLB,,, | Performed by: OPHTHALMOLOGY

## 2021-10-13 PROCEDURE — 3052F HG A1C>EQUAL 8.0%<EQUAL 9.0%: CPT | Mod: CPTII,S$GLB,, | Performed by: OPHTHALMOLOGY

## 2021-10-13 PROCEDURE — 1160F PR REVIEW ALL MEDS BY PRESCRIBER/CLIN PHARMACIST DOCUMENTED: ICD-10-PCS | Mod: CPTII,S$GLB,, | Performed by: OPHTHALMOLOGY

## 2021-10-13 PROCEDURE — 99024 POSTOP FOLLOW-UP VISIT: CPT | Mod: S$GLB,,, | Performed by: OPHTHALMOLOGY

## 2021-10-13 PROCEDURE — 1160F RVW MEDS BY RX/DR IN RCRD: CPT | Mod: CPTII,S$GLB,, | Performed by: OPHTHALMOLOGY

## 2021-10-13 PROCEDURE — 1159F MED LIST DOCD IN RCRD: CPT | Mod: CPTII,S$GLB,, | Performed by: OPHTHALMOLOGY

## 2021-10-13 PROCEDURE — 1159F PR MEDICATION LIST DOCUMENTED IN MEDICAL RECORD: ICD-10-PCS | Mod: CPTII,S$GLB,, | Performed by: OPHTHALMOLOGY

## 2021-10-13 PROCEDURE — 3066F NEPHROPATHY DOC TX: CPT | Mod: CPTII,S$GLB,, | Performed by: OPHTHALMOLOGY

## 2021-10-13 PROCEDURE — 3052F PR MOST RECENT HEMOGLOBIN A1C LEVEL 8.0 - < 9.0%: ICD-10-PCS | Mod: CPTII,S$GLB,, | Performed by: OPHTHALMOLOGY

## 2021-10-13 PROCEDURE — 4010F ACE/ARB THERAPY RXD/TAKEN: CPT | Mod: CPTII,S$GLB,, | Performed by: OPHTHALMOLOGY

## 2021-10-13 PROCEDURE — 99999 PR PBB SHADOW E&M-EST. PATIENT-LVL IV: CPT | Mod: PBBFAC,,, | Performed by: OPHTHALMOLOGY

## 2021-10-13 PROCEDURE — 3060F PR POS MICROALBUMINURIA RESULT DOCUMENTED/REVIEW: ICD-10-PCS | Mod: CPTII,S$GLB,, | Performed by: OPHTHALMOLOGY

## 2021-10-14 ENCOUNTER — HOSPITAL ENCOUNTER (OUTPATIENT)
Facility: HOSPITAL | Age: 66
Discharge: HOME | End: 2021-10-14
Attending: PODIATRIST | Admitting: PODIATRIST
Payer: COMMERCIAL

## 2021-10-14 ENCOUNTER — ANESTHESIA (OUTPATIENT)
Dept: SURGERY | Facility: HOSPITAL | Age: 66
End: 2021-10-14
Payer: COMMERCIAL

## 2021-10-14 ENCOUNTER — HOSPITAL ENCOUNTER (OUTPATIENT)
Dept: RADIOLOGY | Facility: HOSPITAL | Age: 66
Discharge: HOME | End: 2021-10-14
Attending: PODIATRIST | Admitting: PODIATRIST
Payer: COMMERCIAL

## 2021-10-14 VITALS
TEMPERATURE: 98 F | HEIGHT: 74 IN | DIASTOLIC BLOOD PRESSURE: 68 MMHG | RESPIRATION RATE: 16 BRPM | OXYGEN SATURATION: 99 % | BODY MASS INDEX: 28.23 KG/M2 | WEIGHT: 220 LBS | SYSTOLIC BLOOD PRESSURE: 140 MMHG | HEART RATE: 68 BPM

## 2021-10-14 DIAGNOSIS — M77.52 BONE SPUR OF LEFT FOOT: Primary | ICD-10-CM

## 2021-10-14 LAB — GLUCOSE SERPL-MCNC: 202 MG/DL (ref 70–110)

## 2021-10-14 PROCEDURE — 37000008 HC ANESTHESIA 1ST 15 MINUTES: Mod: PO | Performed by: PODIATRIST

## 2021-10-14 PROCEDURE — 73630 X-RAY EXAM OF FOOT: CPT | Mod: TC,FY,PO,LT

## 2021-10-14 PROCEDURE — 73630 X-RAY EXAM OF FOOT: CPT | Mod: 26,LT,, | Performed by: RADIOLOGY

## 2021-10-14 PROCEDURE — 25000003 PHARM REV CODE 250: Mod: PO | Performed by: NURSE ANESTHETIST, CERTIFIED REGISTERED

## 2021-10-14 PROCEDURE — 71000015 HC POSTOP RECOV 1ST HR: Mod: PO | Performed by: PODIATRIST

## 2021-10-14 PROCEDURE — 36000706: Mod: PO | Performed by: PODIATRIST

## 2021-10-14 PROCEDURE — 27201423 OPTIME MED/SURG SUP & DEVICES STERILE SUPPLY: Mod: PO | Performed by: PODIATRIST

## 2021-10-14 PROCEDURE — D9220A PRA ANESTHESIA: Mod: CRNA,,, | Performed by: NURSE ANESTHETIST, CERTIFIED REGISTERED

## 2021-10-14 PROCEDURE — 28124 PARTIAL REMOVAL OF TOE: CPT | Mod: TA,,, | Performed by: PODIATRIST

## 2021-10-14 PROCEDURE — 37000009 HC ANESTHESIA EA ADD 15 MINS: Mod: PO | Performed by: PODIATRIST

## 2021-10-14 PROCEDURE — D9220A PRA ANESTHESIA: ICD-10-PCS | Mod: ANES,,, | Performed by: ANESTHESIOLOGY

## 2021-10-14 PROCEDURE — 71000033 HC RECOVERY, INTIAL HOUR: Mod: PO | Performed by: PODIATRIST

## 2021-10-14 PROCEDURE — 28124 PR PART REMV PHALANX OF TOE: ICD-10-PCS | Mod: TA,,, | Performed by: PODIATRIST

## 2021-10-14 PROCEDURE — 63600175 PHARM REV CODE 636 W HCPCS: Mod: PO | Performed by: ANESTHESIOLOGY

## 2021-10-14 PROCEDURE — 25000003 PHARM REV CODE 250: Mod: PO | Performed by: PODIATRIST

## 2021-10-14 PROCEDURE — 63600175 PHARM REV CODE 636 W HCPCS: Mod: PO | Performed by: NURSE ANESTHETIST, CERTIFIED REGISTERED

## 2021-10-14 PROCEDURE — 82962 GLUCOSE BLOOD TEST: CPT | Mod: PO | Performed by: PODIATRIST

## 2021-10-14 PROCEDURE — 63600175 PHARM REV CODE 636 W HCPCS: Mod: PO | Performed by: PODIATRIST

## 2021-10-14 PROCEDURE — D9220A PRA ANESTHESIA: ICD-10-PCS | Mod: CRNA,,, | Performed by: NURSE ANESTHETIST, CERTIFIED REGISTERED

## 2021-10-14 PROCEDURE — 36000707: Mod: PO | Performed by: PODIATRIST

## 2021-10-14 PROCEDURE — D9220A PRA ANESTHESIA: Mod: ANES,,, | Performed by: ANESTHESIOLOGY

## 2021-10-14 PROCEDURE — 73630 XR FOOT COMPLETE 3 VIEW LEFT: ICD-10-PCS | Mod: 26,LT,, | Performed by: RADIOLOGY

## 2021-10-14 RX ORDER — FENTANYL CITRATE 50 UG/ML
25 INJECTION, SOLUTION INTRAMUSCULAR; INTRAVENOUS EVERY 5 MIN PRN
Status: DISCONTINUED | OUTPATIENT
Start: 2021-10-14 | End: 2021-10-14 | Stop reason: HOSPADM

## 2021-10-14 RX ORDER — EPHEDRINE SULFATE 50 MG/ML
INJECTION, SOLUTION INTRAVENOUS
Status: DISCONTINUED | OUTPATIENT
Start: 2021-10-14 | End: 2021-10-14

## 2021-10-14 RX ORDER — HYDROMORPHONE HYDROCHLORIDE 2 MG/ML
0.2 INJECTION, SOLUTION INTRAMUSCULAR; INTRAVENOUS; SUBCUTANEOUS EVERY 5 MIN PRN
Status: DISCONTINUED | OUTPATIENT
Start: 2021-10-14 | End: 2021-10-14 | Stop reason: HOSPADM

## 2021-10-14 RX ORDER — ONDANSETRON 2 MG/ML
INJECTION INTRAMUSCULAR; INTRAVENOUS
Status: DISCONTINUED | OUTPATIENT
Start: 2021-10-14 | End: 2021-10-14

## 2021-10-14 RX ORDER — LIDOCAINE HYDROCHLORIDE 20 MG/ML
INJECTION INTRAVENOUS
Status: DISCONTINUED | OUTPATIENT
Start: 2021-10-14 | End: 2021-10-14

## 2021-10-14 RX ORDER — HYDROCODONE BITARTRATE AND ACETAMINOPHEN 5; 325 MG/1; MG/1
1 TABLET ORAL EVERY 6 HOURS PRN
Qty: 28 TABLET | Refills: 0 | Status: SHIPPED | OUTPATIENT
Start: 2021-10-14 | End: 2021-11-23

## 2021-10-14 RX ORDER — PROPOFOL 10 MG/ML
VIAL (ML) INTRAVENOUS CONTINUOUS PRN
Status: DISCONTINUED | OUTPATIENT
Start: 2021-10-14 | End: 2021-10-14

## 2021-10-14 RX ORDER — CEFAZOLIN SODIUM 2 G/50ML
2 SOLUTION INTRAVENOUS ONCE
Status: DISCONTINUED | OUTPATIENT
Start: 2021-10-14 | End: 2021-10-14 | Stop reason: HOSPADM

## 2021-10-14 RX ORDER — KETAMINE HYDROCHLORIDE 100 MG/ML
INJECTION, SOLUTION INTRAMUSCULAR; INTRAVENOUS
Status: DISCONTINUED | OUTPATIENT
Start: 2021-10-14 | End: 2021-10-14

## 2021-10-14 RX ORDER — SODIUM CHLORIDE, SODIUM LACTATE, POTASSIUM CHLORIDE, CALCIUM CHLORIDE 600; 310; 30; 20 MG/100ML; MG/100ML; MG/100ML; MG/100ML
INJECTION, SOLUTION INTRAVENOUS CONTINUOUS
Status: DISCONTINUED | OUTPATIENT
Start: 2021-10-14 | End: 2021-10-14 | Stop reason: HOSPADM

## 2021-10-14 RX ORDER — PROPOFOL 10 MG/ML
VIAL (ML) INTRAVENOUS
Status: DISCONTINUED | OUTPATIENT
Start: 2021-10-14 | End: 2021-10-14

## 2021-10-14 RX ORDER — LIDOCAINE HYDROCHLORIDE 10 MG/ML
INJECTION, SOLUTION EPIDURAL; INFILTRATION; INTRACAUDAL; PERINEURAL
Status: DISCONTINUED | OUTPATIENT
Start: 2021-10-14 | End: 2021-10-14 | Stop reason: HOSPADM

## 2021-10-14 RX ORDER — OXYCODONE HYDROCHLORIDE 5 MG/1
5 TABLET ORAL
Status: DISCONTINUED | OUTPATIENT
Start: 2021-10-14 | End: 2021-10-14 | Stop reason: HOSPADM

## 2021-10-14 RX ORDER — HYDROCODONE BITARTRATE AND ACETAMINOPHEN 5; 325 MG/1; MG/1
1 TABLET ORAL EVERY 4 HOURS PRN
Status: DISCONTINUED | OUTPATIENT
Start: 2021-10-14 | End: 2021-10-14 | Stop reason: HOSPADM

## 2021-10-14 RX ORDER — MIDAZOLAM HYDROCHLORIDE 1 MG/ML
INJECTION, SOLUTION INTRAMUSCULAR; INTRAVENOUS
Status: DISCONTINUED | OUTPATIENT
Start: 2021-10-14 | End: 2021-10-14

## 2021-10-14 RX ORDER — BUPIVACAINE HYDROCHLORIDE 5 MG/ML
INJECTION, SOLUTION EPIDURAL; INTRACAUDAL
Status: DISCONTINUED | OUTPATIENT
Start: 2021-10-14 | End: 2021-10-14 | Stop reason: HOSPADM

## 2021-10-14 RX ADMIN — SODIUM CHLORIDE, SODIUM LACTATE, POTASSIUM CHLORIDE, AND CALCIUM CHLORIDE: .6; .31; .03; .02 INJECTION, SOLUTION INTRAVENOUS at 12:10

## 2021-10-14 RX ADMIN — ONDANSETRON 4 MG: 2 INJECTION INTRAMUSCULAR; INTRAVENOUS at 01:10

## 2021-10-14 RX ADMIN — PROPOFOL 125 MCG/KG/MIN: 10 INJECTION, EMULSION INTRAVENOUS at 01:10

## 2021-10-14 RX ADMIN — MIDAZOLAM HYDROCHLORIDE 2 MG: 1 INJECTION, SOLUTION INTRAMUSCULAR; INTRAVENOUS at 12:10

## 2021-10-14 RX ADMIN — KETAMINE HYDROCHLORIDE 20 MG: 100 INJECTION, SOLUTION, CONCENTRATE INTRAMUSCULAR; INTRAVENOUS at 01:10

## 2021-10-14 RX ADMIN — PROPOFOL 50 MG: 10 INJECTION, EMULSION INTRAVENOUS at 01:10

## 2021-10-14 RX ADMIN — GLYCOPYRROLATE 0.4 MG: 0.2 INJECTION, SOLUTION INTRAMUSCULAR; INTRAVITREAL at 01:10

## 2021-10-14 RX ADMIN — EPHEDRINE SULFATE 10 MG: 50 INJECTION INTRAVENOUS at 01:10

## 2021-10-14 RX ADMIN — EPHEDRINE SULFATE 10 MG: 50 INJECTION INTRAVENOUS at 02:10

## 2021-10-14 RX ADMIN — LIDOCAINE HYDROCHLORIDE 100 MG: 20 INJECTION, SOLUTION INTRAVENOUS at 01:10

## 2021-10-14 RX ADMIN — SODIUM CHLORIDE, SODIUM LACTATE, POTASSIUM CHLORIDE, AND CALCIUM CHLORIDE: .6; .31; .03; .02 INJECTION, SOLUTION INTRAVENOUS at 01:10

## 2021-10-21 ENCOUNTER — OFFICE VISIT (OUTPATIENT)
Dept: PODIATRY | Facility: CLINIC | Age: 66
End: 2021-10-21
Payer: COMMERCIAL

## 2021-10-21 VITALS — HEIGHT: 74 IN | BODY MASS INDEX: 28.23 KG/M2 | WEIGHT: 220 LBS

## 2021-10-21 DIAGNOSIS — E10.42 TYPE 1 DIABETES MELLITUS WITH POLYNEUROPATHY: ICD-10-CM

## 2021-10-21 DIAGNOSIS — Z98.890 POSTOPERATIVE STATE: Primary | ICD-10-CM

## 2021-10-21 PROCEDURE — 3052F HG A1C>EQUAL 8.0%<EQUAL 9.0%: CPT | Mod: CPTII,S$GLB,, | Performed by: PODIATRIST

## 2021-10-21 PROCEDURE — 99024 PR POST-OP FOLLOW-UP VISIT: ICD-10-PCS | Mod: S$GLB,,, | Performed by: PODIATRIST

## 2021-10-21 PROCEDURE — 4010F ACE/ARB THERAPY RXD/TAKEN: CPT | Mod: CPTII,S$GLB,, | Performed by: PODIATRIST

## 2021-10-21 PROCEDURE — 1159F PR MEDICATION LIST DOCUMENTED IN MEDICAL RECORD: ICD-10-PCS | Mod: CPTII,S$GLB,, | Performed by: PODIATRIST

## 2021-10-21 PROCEDURE — 3066F PR DOCUMENTATION OF TREATMENT FOR NEPHROPATHY: ICD-10-PCS | Mod: CPTII,S$GLB,, | Performed by: PODIATRIST

## 2021-10-21 PROCEDURE — 3008F PR BODY MASS INDEX (BMI) DOCUMENTED: ICD-10-PCS | Mod: CPTII,S$GLB,, | Performed by: PODIATRIST

## 2021-10-21 PROCEDURE — 99999 PR PBB SHADOW E&M-EST. PATIENT-LVL II: ICD-10-PCS | Mod: PBBFAC,,, | Performed by: PODIATRIST

## 2021-10-21 PROCEDURE — 3066F NEPHROPATHY DOC TX: CPT | Mod: CPTII,S$GLB,, | Performed by: PODIATRIST

## 2021-10-21 PROCEDURE — 3060F POS MICROALBUMINURIA REV: CPT | Mod: CPTII,S$GLB,, | Performed by: PODIATRIST

## 2021-10-21 PROCEDURE — 3060F PR POS MICROALBUMINURIA RESULT DOCUMENTED/REVIEW: ICD-10-PCS | Mod: CPTII,S$GLB,, | Performed by: PODIATRIST

## 2021-10-21 PROCEDURE — 1159F MED LIST DOCD IN RCRD: CPT | Mod: CPTII,S$GLB,, | Performed by: PODIATRIST

## 2021-10-21 PROCEDURE — 4010F PR ACE/ARB THEARPY RXD/TAKEN: ICD-10-PCS | Mod: CPTII,S$GLB,, | Performed by: PODIATRIST

## 2021-10-21 PROCEDURE — 3052F PR MOST RECENT HEMOGLOBIN A1C LEVEL 8.0 - < 9.0%: ICD-10-PCS | Mod: CPTII,S$GLB,, | Performed by: PODIATRIST

## 2021-10-21 PROCEDURE — 99024 POSTOP FOLLOW-UP VISIT: CPT | Mod: S$GLB,,, | Performed by: PODIATRIST

## 2021-10-21 PROCEDURE — 99999 PR PBB SHADOW E&M-EST. PATIENT-LVL II: CPT | Mod: PBBFAC,,, | Performed by: PODIATRIST

## 2021-10-21 PROCEDURE — 3008F BODY MASS INDEX DOCD: CPT | Mod: CPTII,S$GLB,, | Performed by: PODIATRIST

## 2021-10-24 ENCOUNTER — PATIENT MESSAGE (OUTPATIENT)
Dept: OPHTHALMOLOGY | Facility: CLINIC | Age: 66
End: 2021-10-24
Payer: COMMERCIAL

## 2021-10-25 ENCOUNTER — TELEPHONE (OUTPATIENT)
Dept: OPHTHALMOLOGY | Facility: CLINIC | Age: 66
End: 2021-10-25
Payer: COMMERCIAL

## 2021-10-28 ENCOUNTER — OFFICE VISIT (OUTPATIENT)
Dept: OPHTHALMOLOGY | Facility: CLINIC | Age: 66
End: 2021-10-28
Payer: COMMERCIAL

## 2021-10-28 ENCOUNTER — OFFICE VISIT (OUTPATIENT)
Dept: PODIATRY | Facility: CLINIC | Age: 66
End: 2021-10-28
Payer: COMMERCIAL

## 2021-10-28 VITALS — BODY MASS INDEX: 28.23 KG/M2 | HEIGHT: 74 IN | WEIGHT: 220 LBS

## 2021-10-28 DIAGNOSIS — Z96.1 STATUS POST CATARACT EXTRACTION AND INSERTION OF INTRAOCULAR LENS OF RIGHT EYE: ICD-10-CM

## 2021-10-28 DIAGNOSIS — Z98.890 POSTOPERATIVE STATE: Primary | ICD-10-CM

## 2021-10-28 DIAGNOSIS — Z98.41 STATUS POST CATARACT EXTRACTION AND INSERTION OF INTRAOCULAR LENS OF RIGHT EYE: ICD-10-CM

## 2021-10-28 DIAGNOSIS — E10.42 TYPE 1 DIABETES MELLITUS WITH POLYNEUROPATHY: ICD-10-CM

## 2021-10-28 PROCEDURE — 99999 PR PBB SHADOW E&M-EST. PATIENT-LVL III: ICD-10-PCS | Mod: PBBFAC,,, | Performed by: OPHTHALMOLOGY

## 2021-10-28 PROCEDURE — 4010F PR ACE/ARB THEARPY RXD/TAKEN: ICD-10-PCS | Mod: CPTII,S$GLB,, | Performed by: PODIATRIST

## 2021-10-28 PROCEDURE — 99024 PR POST-OP FOLLOW-UP VISIT: ICD-10-PCS | Mod: S$GLB,,, | Performed by: OPHTHALMOLOGY

## 2021-10-28 PROCEDURE — 99999 PR PBB SHADOW E&M-EST. PATIENT-LVL III: CPT | Mod: PBBFAC,,, | Performed by: OPHTHALMOLOGY

## 2021-10-28 PROCEDURE — 3060F PR POS MICROALBUMINURIA RESULT DOCUMENTED/REVIEW: ICD-10-PCS | Mod: CPTII,S$GLB,, | Performed by: PODIATRIST

## 2021-10-28 PROCEDURE — 3060F POS MICROALBUMINURIA REV: CPT | Mod: CPTII,S$GLB,, | Performed by: PODIATRIST

## 2021-10-28 PROCEDURE — 3052F PR MOST RECENT HEMOGLOBIN A1C LEVEL 8.0 - < 9.0%: ICD-10-PCS | Mod: CPTII,S$GLB,, | Performed by: PODIATRIST

## 2021-10-28 PROCEDURE — 3066F NEPHROPATHY DOC TX: CPT | Mod: CPTII,S$GLB,, | Performed by: OPHTHALMOLOGY

## 2021-10-28 PROCEDURE — 1159F PR MEDICATION LIST DOCUMENTED IN MEDICAL RECORD: ICD-10-PCS | Mod: CPTII,S$GLB,, | Performed by: OPHTHALMOLOGY

## 2021-10-28 PROCEDURE — 3052F PR MOST RECENT HEMOGLOBIN A1C LEVEL 8.0 - < 9.0%: ICD-10-PCS | Mod: CPTII,S$GLB,, | Performed by: OPHTHALMOLOGY

## 2021-10-28 PROCEDURE — 1160F PR REVIEW ALL MEDS BY PRESCRIBER/CLIN PHARMACIST DOCUMENTED: ICD-10-PCS | Mod: CPTII,S$GLB,, | Performed by: OPHTHALMOLOGY

## 2021-10-28 PROCEDURE — 1159F MED LIST DOCD IN RCRD: CPT | Mod: CPTII,S$GLB,, | Performed by: PODIATRIST

## 2021-10-28 PROCEDURE — 99024 PR POST-OP FOLLOW-UP VISIT: ICD-10-PCS | Mod: S$GLB,,, | Performed by: PODIATRIST

## 2021-10-28 PROCEDURE — 3060F PR POS MICROALBUMINURIA RESULT DOCUMENTED/REVIEW: ICD-10-PCS | Mod: CPTII,S$GLB,, | Performed by: OPHTHALMOLOGY

## 2021-10-28 PROCEDURE — 1159F PR MEDICATION LIST DOCUMENTED IN MEDICAL RECORD: ICD-10-PCS | Mod: CPTII,S$GLB,, | Performed by: PODIATRIST

## 2021-10-28 PROCEDURE — 4010F ACE/ARB THERAPY RXD/TAKEN: CPT | Mod: CPTII,S$GLB,, | Performed by: PODIATRIST

## 2021-10-28 PROCEDURE — 3052F HG A1C>EQUAL 8.0%<EQUAL 9.0%: CPT | Mod: CPTII,S$GLB,, | Performed by: OPHTHALMOLOGY

## 2021-10-28 PROCEDURE — 3060F POS MICROALBUMINURIA REV: CPT | Mod: CPTII,S$GLB,, | Performed by: OPHTHALMOLOGY

## 2021-10-28 PROCEDURE — 3008F PR BODY MASS INDEX (BMI) DOCUMENTED: ICD-10-PCS | Mod: CPTII,S$GLB,, | Performed by: PODIATRIST

## 2021-10-28 PROCEDURE — 3052F HG A1C>EQUAL 8.0%<EQUAL 9.0%: CPT | Mod: CPTII,S$GLB,, | Performed by: PODIATRIST

## 2021-10-28 PROCEDURE — 99999 PR PBB SHADOW E&M-EST. PATIENT-LVL III: CPT | Mod: PBBFAC,,, | Performed by: PODIATRIST

## 2021-10-28 PROCEDURE — 3066F NEPHROPATHY DOC TX: CPT | Mod: CPTII,S$GLB,, | Performed by: PODIATRIST

## 2021-10-28 PROCEDURE — 1159F MED LIST DOCD IN RCRD: CPT | Mod: CPTII,S$GLB,, | Performed by: OPHTHALMOLOGY

## 2021-10-28 PROCEDURE — 3066F PR DOCUMENTATION OF TREATMENT FOR NEPHROPATHY: ICD-10-PCS | Mod: CPTII,S$GLB,, | Performed by: OPHTHALMOLOGY

## 2021-10-28 PROCEDURE — 4010F PR ACE/ARB THEARPY RXD/TAKEN: ICD-10-PCS | Mod: CPTII,S$GLB,, | Performed by: OPHTHALMOLOGY

## 2021-10-28 PROCEDURE — 3066F PR DOCUMENTATION OF TREATMENT FOR NEPHROPATHY: ICD-10-PCS | Mod: CPTII,S$GLB,, | Performed by: PODIATRIST

## 2021-10-28 PROCEDURE — 3008F BODY MASS INDEX DOCD: CPT | Mod: CPTII,S$GLB,, | Performed by: PODIATRIST

## 2021-10-28 PROCEDURE — 99999 PR PBB SHADOW E&M-EST. PATIENT-LVL III: ICD-10-PCS | Mod: PBBFAC,,, | Performed by: PODIATRIST

## 2021-10-28 PROCEDURE — 4010F ACE/ARB THERAPY RXD/TAKEN: CPT | Mod: CPTII,S$GLB,, | Performed by: OPHTHALMOLOGY

## 2021-10-28 PROCEDURE — 99024 POSTOP FOLLOW-UP VISIT: CPT | Mod: S$GLB,,, | Performed by: PODIATRIST

## 2021-10-28 PROCEDURE — 1160F RVW MEDS BY RX/DR IN RCRD: CPT | Mod: CPTII,S$GLB,, | Performed by: OPHTHALMOLOGY

## 2021-10-28 PROCEDURE — 99024 POSTOP FOLLOW-UP VISIT: CPT | Mod: S$GLB,,, | Performed by: OPHTHALMOLOGY

## 2021-10-28 RX ORDER — DORZOLAMIDE HYDROCHLORIDE AND TIMOLOL MALEATE 20; 5 MG/ML; MG/ML
1 SOLUTION/ DROPS OPHTHALMIC 2 TIMES DAILY
Qty: 10 ML | Refills: 3 | Status: SHIPPED | OUTPATIENT
Start: 2021-10-28 | End: 2022-03-04

## 2021-11-01 ENCOUNTER — PATIENT MESSAGE (OUTPATIENT)
Dept: PODIATRY | Facility: CLINIC | Age: 66
End: 2021-11-01
Payer: COMMERCIAL

## 2021-11-04 ENCOUNTER — OFFICE VISIT (OUTPATIENT)
Dept: PODIATRY | Facility: CLINIC | Age: 66
End: 2021-11-04
Payer: COMMERCIAL

## 2021-11-04 VITALS — WEIGHT: 220 LBS | HEIGHT: 74 IN | BODY MASS INDEX: 28.23 KG/M2

## 2021-11-04 DIAGNOSIS — E10.42 TYPE 1 DIABETES MELLITUS WITH POLYNEUROPATHY: ICD-10-CM

## 2021-11-04 DIAGNOSIS — S90.414A ABRASION OF TOE OF RIGHT FOOT, INITIAL ENCOUNTER: ICD-10-CM

## 2021-11-04 DIAGNOSIS — Z98.890 POSTOPERATIVE STATE: Primary | ICD-10-CM

## 2021-11-04 PROCEDURE — 3008F BODY MASS INDEX DOCD: CPT | Mod: CPTII,S$GLB,, | Performed by: PODIATRIST

## 2021-11-04 PROCEDURE — 3052F PR MOST RECENT HEMOGLOBIN A1C LEVEL 8.0 - < 9.0%: ICD-10-PCS | Mod: CPTII,S$GLB,, | Performed by: PODIATRIST

## 2021-11-04 PROCEDURE — 99024 PR POST-OP FOLLOW-UP VISIT: ICD-10-PCS | Mod: S$GLB,,, | Performed by: PODIATRIST

## 2021-11-04 PROCEDURE — 3066F NEPHROPATHY DOC TX: CPT | Mod: CPTII,S$GLB,, | Performed by: PODIATRIST

## 2021-11-04 PROCEDURE — 3060F POS MICROALBUMINURIA REV: CPT | Mod: CPTII,S$GLB,, | Performed by: PODIATRIST

## 2021-11-04 PROCEDURE — 3066F PR DOCUMENTATION OF TREATMENT FOR NEPHROPATHY: ICD-10-PCS | Mod: CPTII,S$GLB,, | Performed by: PODIATRIST

## 2021-11-04 PROCEDURE — 99999 PR PBB SHADOW E&M-EST. PATIENT-LVL II: CPT | Mod: PBBFAC,,, | Performed by: PODIATRIST

## 2021-11-04 PROCEDURE — 1159F PR MEDICATION LIST DOCUMENTED IN MEDICAL RECORD: ICD-10-PCS | Mod: CPTII,S$GLB,, | Performed by: PODIATRIST

## 2021-11-04 PROCEDURE — 4010F PR ACE/ARB THEARPY RXD/TAKEN: ICD-10-PCS | Mod: CPTII,S$GLB,, | Performed by: PODIATRIST

## 2021-11-04 PROCEDURE — 99024 POSTOP FOLLOW-UP VISIT: CPT | Mod: S$GLB,,, | Performed by: PODIATRIST

## 2021-11-04 PROCEDURE — 3060F PR POS MICROALBUMINURIA RESULT DOCUMENTED/REVIEW: ICD-10-PCS | Mod: CPTII,S$GLB,, | Performed by: PODIATRIST

## 2021-11-04 PROCEDURE — 99999 PR PBB SHADOW E&M-EST. PATIENT-LVL II: ICD-10-PCS | Mod: PBBFAC,,, | Performed by: PODIATRIST

## 2021-11-04 PROCEDURE — 4010F ACE/ARB THERAPY RXD/TAKEN: CPT | Mod: CPTII,S$GLB,, | Performed by: PODIATRIST

## 2021-11-04 PROCEDURE — 3008F PR BODY MASS INDEX (BMI) DOCUMENTED: ICD-10-PCS | Mod: CPTII,S$GLB,, | Performed by: PODIATRIST

## 2021-11-04 PROCEDURE — 1159F MED LIST DOCD IN RCRD: CPT | Mod: CPTII,S$GLB,, | Performed by: PODIATRIST

## 2021-11-04 PROCEDURE — 3052F HG A1C>EQUAL 8.0%<EQUAL 9.0%: CPT | Mod: CPTII,S$GLB,, | Performed by: PODIATRIST

## 2021-11-04 NOTE — PROGRESS NOTES
Patient presents to clinic alone for a 1 week follow up for nurse visit for wound on the bottom of the great toe (right foot).     Patient is alert and oriented x3. Patient denies having any pain. He has kept the prior clinic dressing clean, dry, and intact x 1 week.  Denies having N/V/F/C/D.      No drainage. No odor or redness noted. No swelling to affected area.      Wound appears to be healed at this time.      Applied football dressing to foot 1 last week, to let skin mature.     Advised to keep today's dressing CDI x 1 week.     Advised to rest and elevate the affected extremity.     Instructed to minimize weight bearing to facilitate wound healing.     Advised to ambulate only in a postoperative shoe.     RTC in 1 week for wound care visit with Dr. Duffy.      Patient voiced all understanding.    Catrachita Corona LPN       No

## 2021-11-09 ENCOUNTER — PATIENT OUTREACH (OUTPATIENT)
Dept: ADMINISTRATIVE | Facility: OTHER | Age: 66
End: 2021-11-09
Payer: COMMERCIAL

## 2021-11-11 ENCOUNTER — OFFICE VISIT (OUTPATIENT)
Dept: PODIATRY | Facility: CLINIC | Age: 66
End: 2021-11-11
Payer: COMMERCIAL

## 2021-11-11 VITALS — BODY MASS INDEX: 28.23 KG/M2 | HEIGHT: 74 IN | WEIGHT: 220 LBS

## 2021-11-11 DIAGNOSIS — Z98.890 POSTOPERATIVE STATE: Primary | ICD-10-CM

## 2021-11-11 DIAGNOSIS — T81.31XA SURGICAL WOUND DEHISCENCE, INITIAL ENCOUNTER: ICD-10-CM

## 2021-11-11 DIAGNOSIS — E10.42 TYPE 1 DIABETES MELLITUS WITH POLYNEUROPATHY: ICD-10-CM

## 2021-11-11 PROCEDURE — 3066F NEPHROPATHY DOC TX: CPT | Mod: CPTII,S$GLB,, | Performed by: PODIATRIST

## 2021-11-11 PROCEDURE — 3052F HG A1C>EQUAL 8.0%<EQUAL 9.0%: CPT | Mod: CPTII,S$GLB,, | Performed by: PODIATRIST

## 2021-11-11 PROCEDURE — 3052F PR MOST RECENT HEMOGLOBIN A1C LEVEL 8.0 - < 9.0%: ICD-10-PCS | Mod: CPTII,S$GLB,, | Performed by: PODIATRIST

## 2021-11-11 PROCEDURE — 87070 CULTURE OTHR SPECIMN AEROBIC: CPT | Performed by: PODIATRIST

## 2021-11-11 PROCEDURE — 99024 PR POST-OP FOLLOW-UP VISIT: ICD-10-PCS | Mod: S$GLB,,, | Performed by: PODIATRIST

## 2021-11-11 PROCEDURE — 1159F MED LIST DOCD IN RCRD: CPT | Mod: CPTII,S$GLB,, | Performed by: PODIATRIST

## 2021-11-11 PROCEDURE — 3060F POS MICROALBUMINURIA REV: CPT | Mod: CPTII,S$GLB,, | Performed by: PODIATRIST

## 2021-11-11 PROCEDURE — 3008F PR BODY MASS INDEX (BMI) DOCUMENTED: ICD-10-PCS | Mod: CPTII,S$GLB,, | Performed by: PODIATRIST

## 2021-11-11 PROCEDURE — 87075 CULTR BACTERIA EXCEPT BLOOD: CPT | Performed by: PODIATRIST

## 2021-11-11 PROCEDURE — 99024 POSTOP FOLLOW-UP VISIT: CPT | Mod: S$GLB,,, | Performed by: PODIATRIST

## 2021-11-11 PROCEDURE — 4010F PR ACE/ARB THEARPY RXD/TAKEN: ICD-10-PCS | Mod: CPTII,S$GLB,, | Performed by: PODIATRIST

## 2021-11-11 PROCEDURE — 3066F PR DOCUMENTATION OF TREATMENT FOR NEPHROPATHY: ICD-10-PCS | Mod: CPTII,S$GLB,, | Performed by: PODIATRIST

## 2021-11-11 PROCEDURE — 1159F PR MEDICATION LIST DOCUMENTED IN MEDICAL RECORD: ICD-10-PCS | Mod: CPTII,S$GLB,, | Performed by: PODIATRIST

## 2021-11-11 PROCEDURE — 99999 PR PBB SHADOW E&M-EST. PATIENT-LVL II: CPT | Mod: PBBFAC,,, | Performed by: PODIATRIST

## 2021-11-11 PROCEDURE — 3060F PR POS MICROALBUMINURIA RESULT DOCUMENTED/REVIEW: ICD-10-PCS | Mod: CPTII,S$GLB,, | Performed by: PODIATRIST

## 2021-11-11 PROCEDURE — 3008F BODY MASS INDEX DOCD: CPT | Mod: CPTII,S$GLB,, | Performed by: PODIATRIST

## 2021-11-11 PROCEDURE — 99999 PR PBB SHADOW E&M-EST. PATIENT-LVL II: ICD-10-PCS | Mod: PBBFAC,,, | Performed by: PODIATRIST

## 2021-11-11 PROCEDURE — 4010F ACE/ARB THERAPY RXD/TAKEN: CPT | Mod: CPTII,S$GLB,, | Performed by: PODIATRIST

## 2021-11-15 LAB — BACTERIA SPEC AEROBE CULT: NORMAL

## 2021-11-16 ENCOUNTER — LAB VISIT (OUTPATIENT)
Dept: LAB | Facility: HOSPITAL | Age: 66
End: 2021-11-16
Payer: COMMERCIAL

## 2021-11-16 DIAGNOSIS — E10.43 TYPE 1 DIABETES MELLITUS WITH DIABETIC AUTONOMIC NEUROPATHY: ICD-10-CM

## 2021-11-16 LAB
ALBUMIN SERPL BCP-MCNC: 3.7 G/DL (ref 3.5–5.2)
ALP SERPL-CCNC: 108 U/L (ref 55–135)
ALT SERPL W/O P-5'-P-CCNC: 25 U/L (ref 10–44)
ANION GAP SERPL CALC-SCNC: 5 MMOL/L (ref 8–16)
AST SERPL-CCNC: 26 U/L (ref 10–40)
BILIRUB SERPL-MCNC: 0.5 MG/DL (ref 0.1–1)
BUN SERPL-MCNC: 25 MG/DL (ref 8–23)
CALCIUM SERPL-MCNC: 9.8 MG/DL (ref 8.7–10.5)
CHLORIDE SERPL-SCNC: 104 MMOL/L (ref 95–110)
CO2 SERPL-SCNC: 28 MMOL/L (ref 23–29)
CREAT SERPL-MCNC: 1.2 MG/DL (ref 0.5–1.4)
EST. GFR  (AFRICAN AMERICAN): >60 ML/MIN/1.73 M^2
EST. GFR  (NON AFRICAN AMERICAN): >60 ML/MIN/1.73 M^2
ESTIMATED AVG GLUCOSE: 180 MG/DL (ref 68–131)
GLUCOSE SERPL-MCNC: 121 MG/DL (ref 70–110)
HBA1C MFR BLD: 7.9 % (ref 4–5.6)
POTASSIUM SERPL-SCNC: 4.7 MMOL/L (ref 3.5–5.1)
PROT SERPL-MCNC: 7.3 G/DL (ref 6–8.4)
SODIUM SERPL-SCNC: 137 MMOL/L (ref 136–145)

## 2021-11-16 PROCEDURE — 83036 HEMOGLOBIN GLYCOSYLATED A1C: CPT | Performed by: NURSE PRACTITIONER

## 2021-11-16 PROCEDURE — 36415 COLL VENOUS BLD VENIPUNCTURE: CPT | Mod: PN | Performed by: NURSE PRACTITIONER

## 2021-11-16 PROCEDURE — 80053 COMPREHEN METABOLIC PANEL: CPT | Performed by: NURSE PRACTITIONER

## 2021-11-17 LAB — BACTERIA SPEC ANAEROBE CULT: NORMAL

## 2021-11-19 ENCOUNTER — OFFICE VISIT (OUTPATIENT)
Dept: PODIATRY | Facility: CLINIC | Age: 66
End: 2021-11-19
Payer: COMMERCIAL

## 2021-11-19 DIAGNOSIS — T81.31XD SURGICAL WOUND DEHISCENCE, SUBSEQUENT ENCOUNTER: ICD-10-CM

## 2021-11-19 DIAGNOSIS — Z98.890 POSTOPERATIVE STATE: Primary | ICD-10-CM

## 2021-11-19 DIAGNOSIS — E10.42 TYPE 1 DIABETES MELLITUS WITH POLYNEUROPATHY: ICD-10-CM

## 2021-11-19 PROCEDURE — 99024 POSTOP FOLLOW-UP VISIT: CPT | Mod: S$GLB,,, | Performed by: PODIATRIST

## 2021-11-19 PROCEDURE — 3051F PR MOST RECENT HEMOGLOBIN A1C LEVEL 7.0 - < 8.0%: ICD-10-PCS | Mod: CPTII,S$GLB,, | Performed by: PODIATRIST

## 2021-11-19 PROCEDURE — 99024 PR POST-OP FOLLOW-UP VISIT: ICD-10-PCS | Mod: S$GLB,,, | Performed by: PODIATRIST

## 2021-11-19 PROCEDURE — 3066F NEPHROPATHY DOC TX: CPT | Mod: CPTII,S$GLB,, | Performed by: PODIATRIST

## 2021-11-19 PROCEDURE — 3051F HG A1C>EQUAL 7.0%<8.0%: CPT | Mod: CPTII,S$GLB,, | Performed by: PODIATRIST

## 2021-11-19 PROCEDURE — 4010F PR ACE/ARB THEARPY RXD/TAKEN: ICD-10-PCS | Mod: CPTII,S$GLB,, | Performed by: PODIATRIST

## 2021-11-19 PROCEDURE — 3060F PR POS MICROALBUMINURIA RESULT DOCUMENTED/REVIEW: ICD-10-PCS | Mod: CPTII,S$GLB,, | Performed by: PODIATRIST

## 2021-11-19 PROCEDURE — 3060F POS MICROALBUMINURIA REV: CPT | Mod: CPTII,S$GLB,, | Performed by: PODIATRIST

## 2021-11-19 PROCEDURE — 4010F ACE/ARB THERAPY RXD/TAKEN: CPT | Mod: CPTII,S$GLB,, | Performed by: PODIATRIST

## 2021-11-19 PROCEDURE — 3066F PR DOCUMENTATION OF TREATMENT FOR NEPHROPATHY: ICD-10-PCS | Mod: CPTII,S$GLB,, | Performed by: PODIATRIST

## 2021-11-23 ENCOUNTER — CLINICAL SUPPORT (OUTPATIENT)
Dept: PODIATRY | Facility: CLINIC | Age: 66
End: 2021-11-23
Payer: COMMERCIAL

## 2021-11-23 ENCOUNTER — OFFICE VISIT (OUTPATIENT)
Dept: ENDOCRINOLOGY | Facility: CLINIC | Age: 66
End: 2021-11-23
Payer: COMMERCIAL

## 2021-11-23 VITALS
SYSTOLIC BLOOD PRESSURE: 110 MMHG | HEIGHT: 74 IN | HEART RATE: 73 BPM | DIASTOLIC BLOOD PRESSURE: 60 MMHG | WEIGHT: 218.38 LBS | BODY MASS INDEX: 28.03 KG/M2

## 2021-11-23 DIAGNOSIS — E78.5 HYPERLIPIDEMIA, UNSPECIFIED HYPERLIPIDEMIA TYPE: ICD-10-CM

## 2021-11-23 DIAGNOSIS — E10.21 TYPE 1 DIABETES MELLITUS WITH DIABETIC NEPHROPATHY: ICD-10-CM

## 2021-11-23 DIAGNOSIS — I10 ESSENTIAL HYPERTENSION: ICD-10-CM

## 2021-11-23 DIAGNOSIS — E10.649 HYPOGLYCEMIA UNAWARENESS IN TYPE 1 DIABETES MELLITUS: ICD-10-CM

## 2021-11-23 DIAGNOSIS — E10.43 TYPE 1 DIABETES MELLITUS WITH DIABETIC AUTONOMIC NEUROPATHY: Primary | ICD-10-CM

## 2021-11-23 PROCEDURE — 3060F PR POS MICROALBUMINURIA RESULT DOCUMENTED/REVIEW: ICD-10-PCS | Mod: CPTII,S$GLB,, | Performed by: NURSE PRACTITIONER

## 2021-11-23 PROCEDURE — 99999 PR PBB SHADOW E&M-EST. PATIENT-LVL V: ICD-10-PCS | Mod: PBBFAC,,, | Performed by: NURSE PRACTITIONER

## 2021-11-23 PROCEDURE — 3066F NEPHROPATHY DOC TX: CPT | Mod: CPTII,S$GLB,, | Performed by: NURSE PRACTITIONER

## 2021-11-23 PROCEDURE — 99214 OFFICE O/P EST MOD 30 MIN: CPT | Mod: S$GLB,,, | Performed by: NURSE PRACTITIONER

## 2021-11-23 PROCEDURE — 99214 PR OFFICE/OUTPT VISIT, EST, LEVL IV, 30-39 MIN: ICD-10-PCS | Mod: S$GLB,,, | Performed by: NURSE PRACTITIONER

## 2021-11-23 PROCEDURE — 95251 PR GLUCOSE MONITOR, 72 HOUR, PHYS INTERP: ICD-10-PCS | Mod: S$GLB,,, | Performed by: NURSE PRACTITIONER

## 2021-11-23 PROCEDURE — 3066F PR DOCUMENTATION OF TREATMENT FOR NEPHROPATHY: ICD-10-PCS | Mod: CPTII,S$GLB,, | Performed by: NURSE PRACTITIONER

## 2021-11-23 PROCEDURE — 95251 CONT GLUC MNTR ANALYSIS I&R: CPT | Mod: S$GLB,,, | Performed by: NURSE PRACTITIONER

## 2021-11-23 PROCEDURE — 99999 PR PBB SHADOW E&M-EST. PATIENT-LVL III: ICD-10-PCS | Mod: PBBFAC,,,

## 2021-11-23 PROCEDURE — 3060F POS MICROALBUMINURIA REV: CPT | Mod: CPTII,S$GLB,, | Performed by: NURSE PRACTITIONER

## 2021-11-23 PROCEDURE — 4010F PR ACE/ARB THEARPY RXD/TAKEN: ICD-10-PCS | Mod: CPTII,S$GLB,, | Performed by: NURSE PRACTITIONER

## 2021-11-23 PROCEDURE — 99999 PR PBB SHADOW E&M-EST. PATIENT-LVL III: CPT | Mod: PBBFAC,,,

## 2021-11-23 PROCEDURE — 4010F ACE/ARB THERAPY RXD/TAKEN: CPT | Mod: CPTII,S$GLB,, | Performed by: NURSE PRACTITIONER

## 2021-11-23 PROCEDURE — 99999 PR PBB SHADOW E&M-EST. PATIENT-LVL V: CPT | Mod: PBBFAC,,, | Performed by: NURSE PRACTITIONER

## 2021-11-23 RX ORDER — INSULIN ASPART 100 [IU]/ML
INJECTION, SOLUTION INTRAVENOUS; SUBCUTANEOUS
Qty: 45 ML | Refills: 3
Start: 2021-11-23 | End: 2022-10-12

## 2021-11-24 VITALS — BODY MASS INDEX: 28.01 KG/M2 | WEIGHT: 218.25 LBS | HEIGHT: 74 IN

## 2021-11-28 ENCOUNTER — PATIENT MESSAGE (OUTPATIENT)
Dept: PODIATRY | Facility: CLINIC | Age: 66
End: 2021-11-28
Payer: COMMERCIAL

## 2021-12-02 ENCOUNTER — CLINICAL SUPPORT (OUTPATIENT)
Dept: URGENT CARE | Facility: CLINIC | Age: 66
End: 2021-12-02
Payer: COMMERCIAL

## 2021-12-02 DIAGNOSIS — Z11.52 ENCOUNTER FOR SCREENING FOR COVID-19: Primary | ICD-10-CM

## 2021-12-02 LAB
CTP QC/QA: YES
SARS-COV-2 RDRP RESP QL NAA+PROBE: NEGATIVE

## 2021-12-02 PROCEDURE — U0002 COVID-19 LAB TEST NON-CDC: HCPCS | Mod: QW,S$GLB,, | Performed by: EMERGENCY MEDICINE

## 2021-12-02 PROCEDURE — 99211 PR OFFICE/OUTPT VISIT, EST, LEVL I: ICD-10-PCS | Mod: S$GLB,CS,, | Performed by: EMERGENCY MEDICINE

## 2021-12-02 PROCEDURE — U0002: ICD-10-PCS | Mod: QW,S$GLB,, | Performed by: EMERGENCY MEDICINE

## 2021-12-02 PROCEDURE — 99211 OFF/OP EST MAY X REQ PHY/QHP: CPT | Mod: S$GLB,CS,, | Performed by: EMERGENCY MEDICINE

## 2021-12-07 ENCOUNTER — OFFICE VISIT (OUTPATIENT)
Dept: PODIATRY | Facility: CLINIC | Age: 66
End: 2021-12-07
Payer: COMMERCIAL

## 2021-12-07 VITALS — WEIGHT: 218.25 LBS | HEIGHT: 74 IN | BODY MASS INDEX: 28.01 KG/M2

## 2021-12-07 DIAGNOSIS — T81.31XD SURGICAL WOUND DEHISCENCE, SUBSEQUENT ENCOUNTER: Primary | ICD-10-CM

## 2021-12-07 DIAGNOSIS — E10.42 TYPE 1 DIABETES MELLITUS WITH POLYNEUROPATHY: ICD-10-CM

## 2021-12-07 PROCEDURE — 87186 SC STD MICRODIL/AGAR DIL: CPT | Performed by: PODIATRIST

## 2021-12-07 PROCEDURE — 99024 PR POST-OP FOLLOW-UP VISIT: ICD-10-PCS | Mod: S$GLB,,, | Performed by: PODIATRIST

## 2021-12-07 PROCEDURE — 87070 CULTURE OTHR SPECIMN AEROBIC: CPT | Performed by: PODIATRIST

## 2021-12-07 PROCEDURE — 87075 CULTR BACTERIA EXCEPT BLOOD: CPT | Performed by: PODIATRIST

## 2021-12-07 PROCEDURE — 99024 POSTOP FOLLOW-UP VISIT: CPT | Mod: S$GLB,,, | Performed by: PODIATRIST

## 2021-12-07 PROCEDURE — 99999 PR PBB SHADOW E&M-EST. PATIENT-LVL II: ICD-10-PCS | Mod: PBBFAC,,, | Performed by: PODIATRIST

## 2021-12-07 PROCEDURE — 99999 PR PBB SHADOW E&M-EST. PATIENT-LVL II: CPT | Mod: PBBFAC,,, | Performed by: PODIATRIST

## 2021-12-07 PROCEDURE — 4010F ACE/ARB THERAPY RXD/TAKEN: CPT | Mod: CPTII,S$GLB,, | Performed by: PODIATRIST

## 2021-12-07 PROCEDURE — 87077 CULTURE AEROBIC IDENTIFY: CPT | Performed by: PODIATRIST

## 2021-12-07 PROCEDURE — 3066F PR DOCUMENTATION OF TREATMENT FOR NEPHROPATHY: ICD-10-PCS | Mod: CPTII,S$GLB,, | Performed by: PODIATRIST

## 2021-12-07 PROCEDURE — 4010F PR ACE/ARB THEARPY RXD/TAKEN: ICD-10-PCS | Mod: CPTII,S$GLB,, | Performed by: PODIATRIST

## 2021-12-07 PROCEDURE — 3066F NEPHROPATHY DOC TX: CPT | Mod: CPTII,S$GLB,, | Performed by: PODIATRIST

## 2021-12-07 PROCEDURE — 3060F POS MICROALBUMINURIA REV: CPT | Mod: CPTII,S$GLB,, | Performed by: PODIATRIST

## 2021-12-07 PROCEDURE — 3060F PR POS MICROALBUMINURIA RESULT DOCUMENTED/REVIEW: ICD-10-PCS | Mod: CPTII,S$GLB,, | Performed by: PODIATRIST

## 2021-12-07 RX ORDER — AMOXICILLIN AND CLAVULANATE POTASSIUM 875; 125 MG/1; MG/1
1 TABLET, FILM COATED ORAL EVERY 12 HOURS
Qty: 14 TABLET | Refills: 0 | Status: ON HOLD | OUTPATIENT
Start: 2021-12-07 | End: 2022-02-22 | Stop reason: HOSPADM

## 2021-12-10 ENCOUNTER — IMMUNIZATION (OUTPATIENT)
Dept: PHARMACY | Facility: CLINIC | Age: 66
End: 2021-12-10
Payer: COMMERCIAL

## 2021-12-11 ENCOUNTER — PATIENT MESSAGE (OUTPATIENT)
Dept: PODIATRY | Facility: CLINIC | Age: 66
End: 2021-12-11
Payer: COMMERCIAL

## 2021-12-11 LAB — BACTERIA SPEC AEROBE CULT: ABNORMAL

## 2021-12-13 LAB — BACTERIA SPEC ANAEROBE CULT: NORMAL

## 2021-12-14 ENCOUNTER — PATIENT MESSAGE (OUTPATIENT)
Dept: OPHTHALMOLOGY | Facility: CLINIC | Age: 66
End: 2021-12-14
Payer: COMMERCIAL

## 2021-12-14 ENCOUNTER — TELEPHONE (OUTPATIENT)
Dept: OPHTHALMOLOGY | Facility: CLINIC | Age: 66
End: 2021-12-14
Payer: COMMERCIAL

## 2021-12-16 ENCOUNTER — PATIENT OUTREACH (OUTPATIENT)
Dept: ADMINISTRATIVE | Facility: OTHER | Age: 66
End: 2021-12-16
Payer: COMMERCIAL

## 2021-12-16 ENCOUNTER — OFFICE VISIT (OUTPATIENT)
Dept: PODIATRY | Facility: CLINIC | Age: 66
End: 2021-12-16
Payer: COMMERCIAL

## 2021-12-16 VITALS — RESPIRATION RATE: 20 BRPM | HEIGHT: 74 IN | BODY MASS INDEX: 28.02 KG/M2

## 2021-12-16 DIAGNOSIS — T81.31XD SURGICAL WOUND DEHISCENCE, SUBSEQUENT ENCOUNTER: ICD-10-CM

## 2021-12-16 DIAGNOSIS — E10.42 TYPE 1 DIABETES MELLITUS WITH POLYNEUROPATHY: ICD-10-CM

## 2021-12-16 DIAGNOSIS — Z98.890 POSTOPERATIVE STATE: Primary | ICD-10-CM

## 2021-12-16 PROCEDURE — 3060F POS MICROALBUMINURIA REV: CPT | Mod: CPTII,S$GLB,, | Performed by: PODIATRIST

## 2021-12-16 PROCEDURE — 3066F PR DOCUMENTATION OF TREATMENT FOR NEPHROPATHY: ICD-10-PCS | Mod: CPTII,S$GLB,, | Performed by: PODIATRIST

## 2021-12-16 PROCEDURE — 4010F ACE/ARB THERAPY RXD/TAKEN: CPT | Mod: CPTII,S$GLB,, | Performed by: PODIATRIST

## 2021-12-16 PROCEDURE — 99999 PR PBB SHADOW E&M-EST. PATIENT-LVL III: ICD-10-PCS | Mod: PBBFAC,,, | Performed by: PODIATRIST

## 2021-12-16 PROCEDURE — 99499 UNLISTED E&M SERVICE: CPT | Mod: S$GLB,,, | Performed by: PODIATRIST

## 2021-12-16 PROCEDURE — 99999 PR PBB SHADOW E&M-EST. PATIENT-LVL III: CPT | Mod: PBBFAC,,, | Performed by: PODIATRIST

## 2021-12-16 PROCEDURE — 3066F NEPHROPATHY DOC TX: CPT | Mod: CPTII,S$GLB,, | Performed by: PODIATRIST

## 2021-12-16 PROCEDURE — 4010F PR ACE/ARB THEARPY RXD/TAKEN: ICD-10-PCS | Mod: CPTII,S$GLB,, | Performed by: PODIATRIST

## 2021-12-16 PROCEDURE — 99499 NO LOS: ICD-10-PCS | Mod: S$GLB,,, | Performed by: PODIATRIST

## 2021-12-16 PROCEDURE — 3060F PR POS MICROALBUMINURIA RESULT DOCUMENTED/REVIEW: ICD-10-PCS | Mod: CPTII,S$GLB,, | Performed by: PODIATRIST

## 2021-12-16 PROCEDURE — 99024 WOUND DEBRIDEMENT: ICD-10-PCS | Mod: S$GLB,,, | Performed by: PODIATRIST

## 2021-12-16 PROCEDURE — 99024 POSTOP FOLLOW-UP VISIT: CPT | Mod: S$GLB,,, | Performed by: PODIATRIST

## 2021-12-21 ENCOUNTER — PATIENT MESSAGE (OUTPATIENT)
Dept: PODIATRY | Facility: CLINIC | Age: 66
End: 2021-12-21
Payer: COMMERCIAL

## 2021-12-22 ENCOUNTER — CLINICAL SUPPORT (OUTPATIENT)
Dept: PODIATRY | Facility: CLINIC | Age: 66
End: 2021-12-22
Payer: COMMERCIAL

## 2021-12-22 DIAGNOSIS — T81.31XD SURGICAL WOUND DEHISCENCE, SUBSEQUENT ENCOUNTER: Primary | ICD-10-CM

## 2021-12-22 PROCEDURE — 99999 PR PBB SHADOW E&M-EST. PATIENT-LVL III: ICD-10-PCS | Mod: PBBFAC,,,

## 2021-12-22 PROCEDURE — 99999 PR PBB SHADOW E&M-EST. PATIENT-LVL III: CPT | Mod: PBBFAC,,,

## 2021-12-23 ENCOUNTER — OFFICE VISIT (OUTPATIENT)
Dept: OPHTHALMOLOGY | Facility: CLINIC | Age: 66
End: 2021-12-23
Payer: COMMERCIAL

## 2021-12-23 DIAGNOSIS — Z98.41 STATUS POST CATARACT EXTRACTION AND INSERTION OF INTRAOCULAR LENS OF RIGHT EYE: Primary | ICD-10-CM

## 2021-12-23 DIAGNOSIS — E11.36 DIABETIC CATARACT OF LEFT EYE: ICD-10-CM

## 2021-12-23 DIAGNOSIS — Z96.1 STATUS POST CATARACT EXTRACTION AND INSERTION OF INTRAOCULAR LENS OF RIGHT EYE: Primary | ICD-10-CM

## 2021-12-23 PROCEDURE — 4010F ACE/ARB THERAPY RXD/TAKEN: CPT | Mod: CPTII,S$GLB,, | Performed by: OPHTHALMOLOGY

## 2021-12-23 PROCEDURE — 3066F NEPHROPATHY DOC TX: CPT | Mod: CPTII,S$GLB,, | Performed by: OPHTHALMOLOGY

## 2021-12-23 PROCEDURE — 1160F PR REVIEW ALL MEDS BY PRESCRIBER/CLIN PHARMACIST DOCUMENTED: ICD-10-PCS | Mod: CPTII,S$GLB,, | Performed by: OPHTHALMOLOGY

## 2021-12-23 PROCEDURE — 99024 POSTOP FOLLOW-UP VISIT: CPT | Mod: S$GLB,,, | Performed by: OPHTHALMOLOGY

## 2021-12-23 PROCEDURE — 1160F RVW MEDS BY RX/DR IN RCRD: CPT | Mod: CPTII,S$GLB,, | Performed by: OPHTHALMOLOGY

## 2021-12-23 PROCEDURE — 1159F PR MEDICATION LIST DOCUMENTED IN MEDICAL RECORD: ICD-10-PCS | Mod: CPTII,S$GLB,, | Performed by: OPHTHALMOLOGY

## 2021-12-23 PROCEDURE — 4010F PR ACE/ARB THEARPY RXD/TAKEN: ICD-10-PCS | Mod: CPTII,S$GLB,, | Performed by: OPHTHALMOLOGY

## 2021-12-23 PROCEDURE — 1159F MED LIST DOCD IN RCRD: CPT | Mod: CPTII,S$GLB,, | Performed by: OPHTHALMOLOGY

## 2021-12-23 PROCEDURE — 3051F HG A1C>EQUAL 7.0%<8.0%: CPT | Mod: CPTII,S$GLB,, | Performed by: OPHTHALMOLOGY

## 2021-12-23 PROCEDURE — 3066F PR DOCUMENTATION OF TREATMENT FOR NEPHROPATHY: ICD-10-PCS | Mod: CPTII,S$GLB,, | Performed by: OPHTHALMOLOGY

## 2021-12-23 PROCEDURE — 3051F PR MOST RECENT HEMOGLOBIN A1C LEVEL 7.0 - < 8.0%: ICD-10-PCS | Mod: CPTII,S$GLB,, | Performed by: OPHTHALMOLOGY

## 2021-12-23 PROCEDURE — 3060F PR POS MICROALBUMINURIA RESULT DOCUMENTED/REVIEW: ICD-10-PCS | Mod: CPTII,S$GLB,, | Performed by: OPHTHALMOLOGY

## 2021-12-23 PROCEDURE — 99999 PR PBB SHADOW E&M-EST. PATIENT-LVL III: CPT | Mod: PBBFAC,,, | Performed by: OPHTHALMOLOGY

## 2021-12-23 PROCEDURE — 99999 PR PBB SHADOW E&M-EST. PATIENT-LVL III: ICD-10-PCS | Mod: PBBFAC,,, | Performed by: OPHTHALMOLOGY

## 2021-12-23 PROCEDURE — 99024 PR POST-OP FOLLOW-UP VISIT: ICD-10-PCS | Mod: S$GLB,,, | Performed by: OPHTHALMOLOGY

## 2021-12-23 PROCEDURE — 3060F POS MICROALBUMINURIA REV: CPT | Mod: CPTII,S$GLB,, | Performed by: OPHTHALMOLOGY

## 2021-12-27 ENCOUNTER — PATIENT MESSAGE (OUTPATIENT)
Dept: PODIATRY | Facility: CLINIC | Age: 66
End: 2021-12-27
Payer: COMMERCIAL

## 2022-01-03 ENCOUNTER — PATIENT MESSAGE (OUTPATIENT)
Dept: ENDOCRINOLOGY | Facility: CLINIC | Age: 67
End: 2022-01-03
Payer: COMMERCIAL

## 2022-01-06 ENCOUNTER — OFFICE VISIT (OUTPATIENT)
Dept: PODIATRY | Facility: CLINIC | Age: 67
End: 2022-01-06
Payer: COMMERCIAL

## 2022-01-06 VITALS — BODY MASS INDEX: 28.01 KG/M2 | WEIGHT: 218.25 LBS | HEIGHT: 74 IN

## 2022-01-06 DIAGNOSIS — E10.42 TYPE 1 DIABETES MELLITUS WITH POLYNEUROPATHY: ICD-10-CM

## 2022-01-06 DIAGNOSIS — T81.31XD SURGICAL WOUND DEHISCENCE, SUBSEQUENT ENCOUNTER: Primary | ICD-10-CM

## 2022-01-06 PROCEDURE — 99499 UNLISTED E&M SERVICE: CPT | Mod: S$GLB,,, | Performed by: PODIATRIST

## 2022-01-06 PROCEDURE — 1159F MED LIST DOCD IN RCRD: CPT | Mod: CPTII,S$GLB,, | Performed by: PODIATRIST

## 2022-01-06 PROCEDURE — 99024 POSTOP FOLLOW-UP VISIT: CPT | Mod: S$GLB,,, | Performed by: PODIATRIST

## 2022-01-06 PROCEDURE — 99999 PR PBB SHADOW E&M-EST. PATIENT-LVL II: ICD-10-PCS | Mod: PBBFAC,,, | Performed by: PODIATRIST

## 2022-01-06 PROCEDURE — 99024 WOUND DEBRIDEMENT: ICD-10-PCS | Mod: S$GLB,,, | Performed by: PODIATRIST

## 2022-01-06 PROCEDURE — 3008F BODY MASS INDEX DOCD: CPT | Mod: CPTII,S$GLB,, | Performed by: PODIATRIST

## 2022-01-06 PROCEDURE — 3008F PR BODY MASS INDEX (BMI) DOCUMENTED: ICD-10-PCS | Mod: CPTII,S$GLB,, | Performed by: PODIATRIST

## 2022-01-06 PROCEDURE — 1159F PR MEDICATION LIST DOCUMENTED IN MEDICAL RECORD: ICD-10-PCS | Mod: CPTII,S$GLB,, | Performed by: PODIATRIST

## 2022-01-06 PROCEDURE — 99499 NO LOS: ICD-10-PCS | Mod: S$GLB,,, | Performed by: PODIATRIST

## 2022-01-06 PROCEDURE — 99999 PR PBB SHADOW E&M-EST. PATIENT-LVL II: CPT | Mod: PBBFAC,,, | Performed by: PODIATRIST

## 2022-01-06 NOTE — PROGRESS NOTES
Subjective:      Patient ID: Hammad Douglas III is a 66 y.o. male.    Chief Complaint: Wound Care  Patient presents to clinic 9 weeks S/P bone spur resection from the plantar medial base of the proximal phalanx of the Lt. Hallux.  Denies pain from the surgical site with today's exam.  Has kept the prior football wrap clean, dry, and intact for the past two weeks on account of being diagnosed with COVID-19.  Relates minimal ambulation, the past two weeks on account of said illness.  Denies having N/V/F/C/D.  Denies any additional pedal complaints.    Past Medical History:   Diagnosis Date    Cataract     OS    Chronic kidney disease     Diabetes mellitus type I     Diagnosed at age 19    Diabetic retinopathy     See's Dr. Ledesma    Hyperlipidemia     Hypertension        Past Surgical History:   Procedure Laterality Date    CATARACT EXTRACTION W/  INTRAOCULAR LENS IMPLANT Right 09/28/2021    Dr Solis    COLONOSCOPY      2011 wnl    PHACOEMULSIFICATION OF CATARACT Right 9/28/2021    Procedure: PHACOEMULSIFICATION, CATARACT;  Surgeon: Vicente Solis Jr., MD;  Location: [facility] OR;  Service: Ophthalmology;  Laterality: Right;  DM/right    SURGICAL REMOVAL OF BONE SPUR Left 10/14/2021    Procedure: EXCISION, BONE SPUR;  Surgeon: Caleb Duffy DPM;  Location: [facility] OR;  Service: Podiatry;  Laterality: Left;    TOE AMPUTATION Right 7/2/2020    Procedure: AMPUTATION, TOE; 3rd;  Surgeon: Caleb Duffy DPM;  Location: [facility] OR;  Service: Podiatry;  Laterality: Right;    TOE SURGERY Right     right big toe       Family History   Problem Relation Age of Onset    Cataracts Mother     Cataracts Father     Breast cancer Sister     Glaucoma Neg Hx     Macular degeneration Neg Hx     Retinal detachment Neg Hx        Social History     Socioeconomic History    Marital status:    Tobacco Use    Smoking status: Former Smoker    Smokeless tobacco: Never Used    Tobacco comment: Former cigar use   Substance  "and Sexual Activity    Alcohol use: Yes     Alcohol/week: 2.0 standard drinks     Types: 2 Cans of beer per week     Comment: "2 beers a week"    Drug use: No    Sexual activity: Yes     Partners: Female   Social History Narrative    Retired former radiation  at [employer]       Current Outpatient Medications   Medication Sig Dispense Refill    amoxicillin-clavulanate 875-125mg (AUGMENTIN) 875-125 mg per tablet Take 1 tablet by mouth every 12 (twelve) hours. 14 tablet 0    atorvastatin (LIPITOR) 40 MG tablet TAKE 1 TABLET(40 MG) BY MOUTH EVERY DAY 90 tablet 3    BD BETSY 2ND GEN PEN NEEDLE 32 gauge x 5/32" Ndle       dorzolamide-timolol 2-0.5% (COSOPT) 22.3-6.8 mg/mL ophthalmic solution Place 1 drop into both eyes 2 (two) times daily. 10 mL 3    FLUoxetine 20 MG capsule TAKE ONE CAPSULE BY MOUTH EVERY DAY 90 capsule 3    insulin aspart U-100 (NOVOLOG) 100 unit/mL (3 mL) InPn pen INJECT USING INSULIN:CARB RATIO OF 1:5 BEFORE A MEAL. MAX TOTAL DAILY DOSE 40 UNITS 45 mL 3    insulin glargine U-300 conc (TOUJEO MAX U-300 SOLOSTAR) 300 unit/mL (3 mL) insulin pen ADMINISTER 26 UNITS UNDER THE SKIN EVERY EVENING 4 pen 3    losartan (COZAAR) 100 MG tablet TAKE 1 TABLET(100 MG) BY MOUTH EVERY DAY 90 tablet 3    moxifloxacin (VIGAMOX) 0.5 % ophthalmic solution Place 1 drop into the right eye 4 (four) times daily.      pen needle, diabetic (COMFORT EZ PEN NEEDLES) 31 gauge x 3/16" Ndle 1 each by Misc.(Non-Drug; Combo Route) route 4 (four) times daily. 150 each 11    sulfamethoxazole-trimethoprim 800-160mg (BACTRIM DS) 800-160 mg Tab TAKE 1 TABLET BY MOUTH TWICE DAILY 14 tablet 0    GLUCAGEN HYPOKIT 1 mg SolR Inject 1 mg into the muscle as needed (for severe hypoglycemia). 1 each 0     Current Facility-Administered Medications   Medication Dose Route Frequency Provider Last Rate Last Admin    glucagon (human recombinant) injection 1 mg  1 mg Intramuscular Once Tania Mckeon NP     "     Facility-Administered Medications Ordered in Other Visits   Medication Dose Route Frequency Provider Last Rate Last Admin    electrolyte-S (ISOLYTE)   Intravenous Continuous Jostin Resendiz MD        lactated ringers infusion   Intravenous Continuous Jostin Resendiz MD 10 mL/hr at 07/02/20 1232 New Bag at 10/14/21 1355    lidocaine (PF) 10 mg/ml (1%) injection 10 mg  1 mL Intradermal Once Jostin Resendiz MD           Review of patient's allergies indicates:   Allergen Reactions    Altace [ramipril]      Cough         Review of Systems   Constitution: Negative for chills and fever.   Cardiovascular: Negative for claudication and leg swelling.   Skin: Negative for color change, dry skin and poor wound healing.   Musculoskeletal: Negative for joint pain, joint swelling, muscle cramps and muscle weakness.   Neurological: Positive for numbness.   Psychiatric/Behavioral: Negative for altered mental status.           Objective:      Physical Exam   Constitutional: He appears well-developed and well-nourished. No distress.   Cardiovascular:   Pulses:       Dorsalis pedis pulses are 2+ on the right side, and 2+ on the left side.        Posterior tibial pulses are 1+ on the right side, and 1+ on the left side.   CFT is < 3 seconds bilateral.  Pedal hair growth is present bilateral.  Varicosities noted bilateral.  No lower extremity edema noted.  Toes are warm to touch bilateral.     Musculoskeletal: He exhibits no edema or tenderness.   Muscle strength 5/5 in all muscle groups bilateral.  No tenderness nor crepitation with ROM of foot/ankle joints bilateral.  No tenderness with palpation of bilateral foot and ankle.  Semi-reducible contracture of the lesser digits bilateral.   Amputation of Rt. 3rd toe.  Neurological: He has normal strength. A sensory deficit is present.   Protective sensation per [place] monofilament is decreased bilateral.  Light touch is intact bilateral.   Skin: Skin is warm and dry.  Capillary refill takes less than 2 seconds. Lesion noted. No abrasion, no burn, no ecchymosis, no laceration and no petechiae noted. He is not diaphoretic.   Surgical dehiscence noted to the plantar central aspect of the Lt. Hallux at the site of the incision. Wound base is down to dermis and comprised of fibrin.  Skylar wound is devoid of erythema, purulence, fluctuance, and malodor noted.  Mild edema noted to the skylar wound.  Pre-debridement measurement: 0.3 x 0.2 x 0.1cm   Post-debridement measurement: 0.5 x 0.4 x 0.1cm.               Assessment:       Encounter Diagnoses   Name Primary?    Surgical wound dehiscence, subsequent encounter - Left Foot Yes    Type 1 diabetes mellitus with polyneuropathy          Plan:       Hammad was seen today for wound care.    Diagnoses and all orders for this visit:    Surgical wound dehiscence, subsequent encounter - Left Foot    Type 1 diabetes mellitus with polyneuropathy      I counseled the patient on his conditions, their implications and medical management.    Continued contracture of the surgical dehiscence site noted.  Prior infection appears resolved with today's exam.     Performed a selective excisional debridement of the site.  See attached procedure note.    The site was covered with a thin layer of sawyer, and a football dressing was then applied.    To continue with weight bearing to tolerance while in the postoperative shoe.    To keep today's dressing clean, dry, and intact x 1 week.      RTC in 1 week for follow up.    Caleb Duffy DPM

## 2022-01-06 NOTE — PROCEDURES
Wound Debridement    Date/Time: 1/6/2022 9:00 AM  Performed by: Caleb Duffy DPM  Authorized by: Caleb Duffy DPM     Consent Done?:  Yes (Verbal)    Preparation: Patient was prepped and draped in usual sterile fashion    Local anesthesia used?: No      Wound Details:    Location:  Left foot    Location:  Left 1st Toe    Type of Debridement:  Excisional       Length (cm):  0.3       Area (sq cm):  0.06       Width (cm):  0.2       Percent Debrided (%):  100       Depth (cm):  0.1       Total Area Debrided (sq cm):  0.06    Depth of debridement:  Epidermis/Dermis    Tissue debrided:  Dermis    Devitalized tissue debrided:  Fibrin    Instruments:  Blade    Bleeding:  Minimal  Hemostasis Achieved: Yes    Method Used:  Pressure  Patient tolerance:  Patient tolerated the procedure well with no immediate complications

## 2022-01-12 ENCOUNTER — PATIENT MESSAGE (OUTPATIENT)
Dept: PODIATRY | Facility: CLINIC | Age: 67
End: 2022-01-12

## 2022-01-12 ENCOUNTER — OFFICE VISIT (OUTPATIENT)
Dept: PODIATRY | Facility: CLINIC | Age: 67
End: 2022-01-12
Payer: COMMERCIAL

## 2022-01-12 VITALS — HEIGHT: 74 IN | BODY MASS INDEX: 28.01 KG/M2 | WEIGHT: 218.25 LBS

## 2022-01-12 DIAGNOSIS — Z87.2 HEALED ULCER OF LEFT FOOT: ICD-10-CM

## 2022-01-12 DIAGNOSIS — Z98.890 POSTOPERATIVE STATE: Primary | ICD-10-CM

## 2022-01-12 DIAGNOSIS — E10.42 TYPE 1 DIABETES MELLITUS WITH POLYNEUROPATHY: ICD-10-CM

## 2022-01-12 PROCEDURE — 99999 PR PBB SHADOW E&M-EST. PATIENT-LVL III: CPT | Mod: PBBFAC,,, | Performed by: PODIATRIST

## 2022-01-12 PROCEDURE — 99024 POSTOP FOLLOW-UP VISIT: CPT | Mod: S$GLB,,, | Performed by: PODIATRIST

## 2022-01-12 PROCEDURE — 1159F PR MEDICATION LIST DOCUMENTED IN MEDICAL RECORD: ICD-10-PCS | Mod: CPTII,S$GLB,, | Performed by: PODIATRIST

## 2022-01-12 PROCEDURE — 3008F BODY MASS INDEX DOCD: CPT | Mod: CPTII,S$GLB,, | Performed by: PODIATRIST

## 2022-01-12 PROCEDURE — 99999 PR PBB SHADOW E&M-EST. PATIENT-LVL III: ICD-10-PCS | Mod: PBBFAC,,, | Performed by: PODIATRIST

## 2022-01-12 PROCEDURE — 1159F MED LIST DOCD IN RCRD: CPT | Mod: CPTII,S$GLB,, | Performed by: PODIATRIST

## 2022-01-12 PROCEDURE — 3008F PR BODY MASS INDEX (BMI) DOCUMENTED: ICD-10-PCS | Mod: CPTII,S$GLB,, | Performed by: PODIATRIST

## 2022-01-12 PROCEDURE — 99024 PR POST-OP FOLLOW-UP VISIT: ICD-10-PCS | Mod: S$GLB,,, | Performed by: PODIATRIST

## 2022-01-13 NOTE — PROGRESS NOTES
Subjective:      Patient ID: Hammad Douglas III is a 66 y.o. male.    Chief Complaint: Wound Check  Patient presents to clinic 10 weeks S/P bone spur resection from the plantar medial base of the proximal phalanx of the Lt. Hallux.  Denies pain from the surgical site with today's exam.  Has kept the prior football wrap clean, dry, and intact x 1 week.  Relates ambulation to tolerance while in the postoperative shoe.  Denies having N/V/F/C/D.  Denies any additional pedal complaints.    Past Medical History:   Diagnosis Date    Cataract     OS    Chronic kidney disease     Diabetes mellitus type I     Diagnosed at age 19    Diabetic retinopathy     See's Dr. Ledesma    Hyperlipidemia     Hypertension        Past Surgical History:   Procedure Laterality Date    CATARACT EXTRACTION W/  INTRAOCULAR LENS IMPLANT Right 09/28/2021    Dr Solis    COLONOSCOPY      2011 wnl    PHACOEMULSIFICATION OF CATARACT Right 9/28/2021    Procedure: PHACOEMULSIFICATION, CATARACT;  Surgeon: Vicente Solis Jr., MD;  Location: [facility] OR;  Service: Ophthalmology;  Laterality: Right;  DM/right    SURGICAL REMOVAL OF BONE SPUR Left 10/14/2021    Procedure: EXCISION, BONE SPUR;  Surgeon: Caleb Duffy DPM;  Location: [facility] OR;  Service: Podiatry;  Laterality: Left;    TOE AMPUTATION Right 7/2/2020    Procedure: AMPUTATION, TOE; 3rd;  Surgeon: Caleb Duffy DPM;  Location: [facility] OR;  Service: Podiatry;  Laterality: Right;    TOE SURGERY Right     right big toe       Family History   Problem Relation Age of Onset    Cataracts Mother     Cataracts Father     Breast cancer Sister     Glaucoma Neg Hx     Macular degeneration Neg Hx     Retinal detachment Neg Hx        Social History     Socioeconomic History    Marital status:    Tobacco Use    Smoking status: Former Smoker    Smokeless tobacco: Never Used    Tobacco comment: Former cigar use   Substance and Sexual Activity    Alcohol use: Yes     Alcohol/week: 2.0  "standard drinks     Types: 2 Cans of beer per week     Comment: "2 beers a week"    Drug use: No    Sexual activity: Yes     Partners: Female   Social History Narrative    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 14 tablet 0    atorvastatin (LIPITOR) 40 MG tablet TAKE 1 TABLET(40 MG) BY MOUTH EVERY DAY 90 tablet 3    BD BETSY 2ND GEN PEN NEEDLE 32 gauge x 5/32" Ndle       dorzolamide-timolol 2-0.5% (COSOPT) 22.3-6.8 mg/mL ophthalmic solution Place 1 drop into both eyes 2 (two) times daily. 10 mL 3    FLUoxetine 20 MG capsule TAKE ONE CAPSULE BY MOUTH EVERY DAY 90 capsule 3    insulin aspart U-100 (NOVOLOG) 100 unit/mL (3 mL) InPn pen INJECT USING INSULIN:CARB RATIO OF 1:5 BEFORE A MEAL. MAX TOTAL DAILY DOSE 40 UNITS 45 mL 3    insulin glargine U-300 conc (TOUJEO MAX U-300 SOLOSTAR) 300 unit/mL (3 mL) insulin pen ADMINISTER 26 UNITS UNDER THE SKIN EVERY EVENING 4 pen 3    losartan (COZAAR) 100 MG tablet TAKE 1 TABLET(100 MG) BY MOUTH EVERY DAY 90 tablet 3    moxifloxacin (VIGAMOX) 0.5 % ophthalmic solution Place 1 drop into the right eye 4 (four) times daily.      pen needle, diabetic (COMFORT EZ PEN NEEDLES) 31 gauge x 3/16" Ndle 1 each by Misc.(Non-Drug; Combo Route) route 4 (four) times daily. 150 each 11    sulfamethoxazole-trimethoprim 800-160mg (BACTRIM DS) 800-160 mg Tab TAKE 1 TABLET BY MOUTH TWICE DAILY 14 tablet 0    GLUCAGEN HYPOKIT 1 mg SolR Inject 1 mg into the muscle as needed (for severe hypoglycemia). 1 each 0     Current Facility-Administered Medications   Medication Dose Route Frequency Provider Last Rate Last Admin    glucagon (human recombinant) injection 1 mg  1 mg Intramuscular Once Tania Mckeon NP         Facility-Administered Medications Ordered in Other Visits "   Medication Dose Route Frequency Provider Last Rate Last Admin    electrolyte-S (ISOLYTE)   Intravenous Continuous Jostin Resendiz MD        lactated ringers infusion   Intravenous Continuous Jostin Resendiz MD 10 mL/hr at 07/02/20 1232 New Bag at 10/14/21 1355    lidocaine (PF) 10 mg/ml (1%) injection 10 mg  1 mL Intradermal Once Jostin Resendiz MD           Review of patient's allergies indicates:   Allergen Reactions    Altace [ramipril]      Cough         Review of Systems   Constitution: Negative for chills and fever.   Cardiovascular: Negative for claudication and leg swelling.   Skin: Negative for color change, dry skin and poor wound healing.   Musculoskeletal: Negative for joint pain, joint swelling, muscle cramps and muscle weakness.   Neurological: Positive for numbness.   Psychiatric/Behavioral: Negative for altered mental status.           Objective:      Physical Exam   Constitutional: He appears well-developed and well-nourished. No distress.   Cardiovascular:   Pulses:       Dorsalis pedis pulses are 2+ on the right side, and 2+ on the left side.        Posterior tibial pulses are 1+ on the right side, and 1+ on the left side.   CFT is < 3 seconds bilateral.  Pedal hair growth is present bilateral.  Varicosities noted bilateral.  No lower extremity edema noted.  Toes are warm to touch bilateral.     Musculoskeletal: He exhibits no edema or tenderness.   Muscle strength 5/5 in all muscle groups bilateral.  No tenderness nor crepitation with ROM of foot/ankle joints bilateral.  No tenderness with palpation of bilateral foot and ankle.  Semi-reducible contracture of the lesser digits bilateral.   Amputation of Rt. 3rd toe.  Neurological: He has normal strength. A sensory deficit is present.   Protective sensation per [place] monofilament is decreased bilateral.  Light touch is intact bilateral.   Skin: Skin is warm and dry. Capillary refill takes less than 2 seconds. Lesion noted. No  abrasion, no burn, no ecchymosis, no laceration and no petechiae noted. He is not diaphoretic.   Mature epithelium noted to the plantar central aspect of the Lt. Hallux at the site of the incision.  No obvious break in adjacent skin integrity noted.            Assessment:       Encounter Diagnoses   Name Primary?    Postoperative state Yes    Type 1 diabetes mellitus with polyneuropathy     Healed ulcer of left foot          Plan:       Hammad was seen today for wound check.    Diagnoses and all orders for this visit:    Postoperative state    Type 1 diabetes mellitus with polyneuropathy    Healed ulcer of left foot      I counseled the patient on his conditions, their implications and medical management.    Resolution of prior surgical wound dehiscence of the Lt. Great toe.    To protect the new skin and aid in further maturation, a final football dressing was then applied.    To continue with weight bearing to tolerance while in the postoperative shoe.    To keep today's dressing clean, dry, and intact x 1 week.  May remove the dressing in one week with transition back into casual shoe gear.    May resume his normal showering routine in one week, however, he was discouraged from soaking/scrubbing the site x 2 weeks.      RTC in 1 month for a final follow up.    Caleb Duffy DPM

## 2022-01-14 ENCOUNTER — OFFICE VISIT (OUTPATIENT)
Dept: OPHTHALMOLOGY | Facility: CLINIC | Age: 67
End: 2022-01-14
Payer: COMMERCIAL

## 2022-01-14 VITALS — SYSTOLIC BLOOD PRESSURE: 129 MMHG | HEART RATE: 89 BPM | DIASTOLIC BLOOD PRESSURE: 72 MMHG

## 2022-01-14 DIAGNOSIS — E11.36 DIABETIC CATARACT OF LEFT EYE: Primary | ICD-10-CM

## 2022-01-14 PROCEDURE — 1159F MED LIST DOCD IN RCRD: CPT | Mod: CPTII,S$GLB,, | Performed by: OPHTHALMOLOGY

## 2022-01-14 PROCEDURE — 99999 PR PBB SHADOW E&M-EST. PATIENT-LVL IV: ICD-10-PCS | Mod: PBBFAC,,, | Performed by: OPHTHALMOLOGY

## 2022-01-14 PROCEDURE — 3078F DIAST BP <80 MM HG: CPT | Mod: CPTII,S$GLB,, | Performed by: OPHTHALMOLOGY

## 2022-01-14 PROCEDURE — 3051F PR MOST RECENT HEMOGLOBIN A1C LEVEL 7.0 - < 8.0%: ICD-10-PCS | Mod: CPTII,S$GLB,, | Performed by: OPHTHALMOLOGY

## 2022-01-14 PROCEDURE — 3074F SYST BP LT 130 MM HG: CPT | Mod: CPTII,S$GLB,, | Performed by: OPHTHALMOLOGY

## 2022-01-14 PROCEDURE — 3074F PR MOST RECENT SYSTOLIC BLOOD PRESSURE < 130 MM HG: ICD-10-PCS | Mod: CPTII,S$GLB,, | Performed by: OPHTHALMOLOGY

## 2022-01-14 PROCEDURE — 99999 PR PBB SHADOW E&M-EST. PATIENT-LVL IV: CPT | Mod: PBBFAC,,, | Performed by: OPHTHALMOLOGY

## 2022-01-14 PROCEDURE — 92136 IOL MASTER - OS - LEFT EYE: ICD-10-PCS | Mod: LT,S$GLB,, | Performed by: OPHTHALMOLOGY

## 2022-01-14 PROCEDURE — 1160F RVW MEDS BY RX/DR IN RCRD: CPT | Mod: CPTII,S$GLB,, | Performed by: OPHTHALMOLOGY

## 2022-01-14 PROCEDURE — 3078F PR MOST RECENT DIASTOLIC BLOOD PRESSURE < 80 MM HG: ICD-10-PCS | Mod: CPTII,S$GLB,, | Performed by: OPHTHALMOLOGY

## 2022-01-14 PROCEDURE — 1160F PR REVIEW ALL MEDS BY PRESCRIBER/CLIN PHARMACIST DOCUMENTED: ICD-10-PCS | Mod: CPTII,S$GLB,, | Performed by: OPHTHALMOLOGY

## 2022-01-14 PROCEDURE — 99213 OFFICE O/P EST LOW 20 MIN: CPT | Mod: S$GLB,,, | Performed by: OPHTHALMOLOGY

## 2022-01-14 PROCEDURE — 92136 OPHTHALMIC BIOMETRY: CPT | Mod: LT,S$GLB,, | Performed by: OPHTHALMOLOGY

## 2022-01-14 PROCEDURE — 3051F HG A1C>EQUAL 7.0%<8.0%: CPT | Mod: CPTII,S$GLB,, | Performed by: OPHTHALMOLOGY

## 2022-01-14 PROCEDURE — 1159F PR MEDICATION LIST DOCUMENTED IN MEDICAL RECORD: ICD-10-PCS | Mod: CPTII,S$GLB,, | Performed by: OPHTHALMOLOGY

## 2022-01-14 PROCEDURE — 99213 PR OFFICE/OUTPT VISIT, EST, LEVL III, 20-29 MIN: ICD-10-PCS | Mod: S$GLB,,, | Performed by: OPHTHALMOLOGY

## 2022-01-14 RX ORDER — PHENYLEPHRINE HYDROCHLORIDE 100 MG/ML
1 SOLUTION/ DROPS OPHTHALMIC
Status: CANCELLED | OUTPATIENT
Start: 2022-01-14 | End: 2022-01-14

## 2022-01-14 RX ORDER — MOXIFLOXACIN 5 MG/ML
1 SOLUTION/ DROPS OPHTHALMIC 4 TIMES DAILY
Qty: 3 ML | Refills: 1 | Status: SHIPPED | OUTPATIENT
Start: 2022-01-14 | End: 2022-01-29

## 2022-01-14 RX ORDER — DICLOFENAC SODIUM 1 MG/ML
1 SOLUTION/ DROPS OPHTHALMIC 4 TIMES DAILY
Qty: 5 ML | Refills: 3 | Status: SHIPPED | OUTPATIENT
Start: 2022-01-14 | End: 2022-02-13

## 2022-01-14 RX ORDER — TROPICAMIDE 10 MG/ML
1 SOLUTION/ DROPS OPHTHALMIC
Status: CANCELLED | OUTPATIENT
Start: 2022-01-14 | End: 2022-01-14

## 2022-01-14 RX ORDER — PROPARACAINE HYDROCHLORIDE 5 MG/ML
1 SOLUTION/ DROPS OPHTHALMIC
Status: CANCELLED | OUTPATIENT
Start: 2022-01-14 | End: 2022-01-14

## 2022-01-14 RX ORDER — PHENYLEPHRINE HYDROCHLORIDE 25 MG/ML
1 SOLUTION/ DROPS OPHTHALMIC
Status: CANCELLED | OUTPATIENT
Start: 2022-01-14 | End: 2022-01-14

## 2022-01-14 RX ORDER — ATROPINE SULFATE 10 MG/ML
1 SOLUTION/ DROPS OPHTHALMIC 2 TIMES DAILY
Qty: 2 ML | Refills: 0 | Status: SHIPPED | OUTPATIENT
Start: 2022-01-14 | End: 2022-01-17

## 2022-01-14 RX ORDER — PREDNISOLONE ACETATE 10 MG/ML
1 SUSPENSION/ DROPS OPHTHALMIC 4 TIMES DAILY
Qty: 5 ML | Refills: 3 | Status: SHIPPED | OUTPATIENT
Start: 2022-01-14 | End: 2022-02-13

## 2022-01-14 NOTE — H&P
"CC: H&P for cataract surgery  HPI: Patient has been diagnosed with cataracts, which are interfering with daily activities due to blurred vision and glare which cannot adequately be corrected with glasses.   PMH:  Past Medical History:   Diagnosis Date    Cataract     OS    Chronic kidney disease     Diabetes mellitus type I     Diagnosed at age 19    Diabetic retinopathy     See's Dr. Ledesma    Hyperlipidemia     Hypertension        FH:  Family History   Problem Relation Age of Onset    Cataracts Mother     Cataracts Father     Breast cancer Sister     Glaucoma Neg Hx     Macular degeneration Neg Hx     Retinal detachment Neg Hx        SH:  Social History     Socioeconomic History    Marital status:    Tobacco Use    Smoking status: Former Smoker    Smokeless tobacco: Never Used    Tobacco comment: Former cigar use   Substance and Sexual Activity    Alcohol use: Yes     Alcohol/week: 2.0 standard drinks     Types: 2 Cans of beer per week     Comment: "2 beers a week"    Drug use: No    Sexual activity: Yes     Partners: Female   Social History Narrative    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 Cataract - Indications, risks and alternatives to the procedure were explained to the patient. The patient was given the opportunity to ask questions and consented to the procedure in writing.  Proceed with cataract surgery as scheduled.  2. Other health issues - patient has been cleared by their PCP. See last note for details.    "

## 2022-01-14 NOTE — PROGRESS NOTES
HPI     Pre op sx Panoptix OS     Updated OCT Mac.    Last edited by Yosi Dallas on 1/14/2022  2:33 PM. (History)        ROS     Negative for: Constitutional, Gastrointestinal, Neurological, Skin,   Genitourinary, Musculoskeletal, HENT, Endocrine, Cardiovascular, Eyes,   Respiratory, Psychiatric, Allergic/Imm, Heme/Lymph    Last edited by Vicente Solis Jr., MD on 1/14/2022  2:38 PM. (History)        Assessment /Plan     For exam results, see Encounter Report.    Diabetic cataract of left eye  -     diclofenac (VOLTAREN) 0.1 % ophthalmic solution; Place 1 drop into the left eye 4 (four) times daily.  Dispense: 5 mL; Refill: 3  -     moxifloxacin (VIGAMOX) 0.5 % ophthalmic solution; Place 1 drop into the left eye 4 (four) times daily. for 7 days  Dispense: 3 mL; Refill: 1  -     Case Request Operating Room: PHACOEMULSIFICATION, CATARACT  -     prednisoLONE acetate (PRED FORTE) 1 % DrpS; Place 1 drop into the left eye 4 (four) times daily.  Dispense: 5 mL; Refill: 3  -     IOL Master - OS - Left Eye  -     COVID-19 Routine Screening; Future; Expected date: 01/15/2022  -     atropine 1% (ISOPTO ATROPINE) 1 % Drop; Place 1 drop into the left eye 2 (two) times daily. for 3 days  Dispense: 2 mL; Refill: 0      Pre-op atropine OS BID 3 days prior to surgery  The patient expresses a desire to reduce spectacle dependence. I reviewed various IOL and LASER refractive surgical options and we will attempt to minimize spectacle dependence by managing astigmatism and optimizing IOL selection. Femtosecond LASER assisted cataract surgery (FLACS) technology was explained to the patient with educational videos and discussion.  The patient voices understanding and wishes to implement this technology during the cataract procedure.  I explained the increased precision of the LASER versus manual techniques, especially as it relates to astigmatism reduction with arcuate incisions.  I emphasized that although our goal is to reduce the  need for refractive correction after surgery, there may still be a need for spectacle correction to achieve optimal visual acuity, and that a reasonable range of functional vision should be the expectation.  No guarantees are made about post operative refraction or visual acuity, as the eye may heal in unpredictable ways, and the standard risks, benefits, and alternatives to cataract surgery were explained.  The patient understands that the refractive portions of this cataract procedure are not covered by insurance, and that there is an out of pocket expense. I also explained that even though our pre-operative plan is to utilize advanced refractive technologies during surgery, that I may decide to eliminate part or all of this plan if surgical challenges or complications arise, or I feel that it is not in the patient's best interest. Consent forms and an ABN form were given to the patient to review.  Follow up in about 11 days (around 1/25/2022) for cataract surgery left eye.

## 2022-01-22 ENCOUNTER — LAB VISIT (OUTPATIENT)
Dept: FAMILY MEDICINE | Facility: CLINIC | Age: 67
End: 2022-01-22
Payer: COMMERCIAL

## 2022-01-22 DIAGNOSIS — E11.36 DIABETIC CATARACT OF LEFT EYE: ICD-10-CM

## 2022-01-22 PROCEDURE — U0003 INFECTIOUS AGENT DETECTION BY NUCLEIC ACID (DNA OR RNA); SEVERE ACUTE RESPIRATORY SYNDROME CORONAVIRUS 2 (SARS-COV-2) (CORONAVIRUS DISEASE [COVID-19]), AMPLIFIED PROBE TECHNIQUE, MAKING USE OF HIGH THROUGHPUT TECHNOLOGIES AS DESCRIBED BY CMS-2020-01-R: HCPCS | Performed by: OPHTHALMOLOGY

## 2022-01-22 PROCEDURE — U0005 INFEC AGEN DETEC AMPLI PROBE: HCPCS | Performed by: OPHTHALMOLOGY

## 2022-01-24 ENCOUNTER — PATIENT MESSAGE (OUTPATIENT)
Dept: OPHTHALMOLOGY | Facility: CLINIC | Age: 67
End: 2022-01-24
Payer: COMMERCIAL

## 2022-01-24 ENCOUNTER — TELEPHONE (OUTPATIENT)
Dept: SURGERY | Facility: HOSPITAL | Age: 67
End: 2022-01-24
Payer: COMMERCIAL

## 2022-01-24 DIAGNOSIS — U07.1 COVID-19 VIRUS DETECTED: ICD-10-CM

## 2022-01-24 LAB
SARS-COV-2 RNA RESP QL NAA+PROBE: DETECTED
SARS-COV-2- CYCLE NUMBER: 34

## 2022-01-24 NOTE — TELEPHONE ENCOUNTER
Good morning,     Mr. Douglas tested positive for COVID pre-operatively. Please contact him to notify him of his test results. His procedure will need to be rescheduled for at least 10 days following the first day he becomes asymptomatic. Thank you.

## 2022-01-25 ENCOUNTER — TELEPHONE (OUTPATIENT)
Dept: OPHTHALMOLOGY | Facility: CLINIC | Age: 67
End: 2022-01-25
Payer: COMMERCIAL

## 2022-01-27 ENCOUNTER — PATIENT MESSAGE (OUTPATIENT)
Dept: ENDOCRINOLOGY | Facility: CLINIC | Age: 67
End: 2022-01-27
Payer: COMMERCIAL

## 2022-01-30 ENCOUNTER — PATIENT MESSAGE (OUTPATIENT)
Dept: PODIATRY | Facility: CLINIC | Age: 67
End: 2022-01-30
Payer: COMMERCIAL

## 2022-01-31 ENCOUNTER — TELEPHONE (OUTPATIENT)
Dept: ENDOCRINOLOGY | Facility: CLINIC | Age: 67
End: 2022-01-31
Payer: COMMERCIAL

## 2022-02-01 ENCOUNTER — PATIENT MESSAGE (OUTPATIENT)
Dept: ENDOCRINOLOGY | Facility: CLINIC | Age: 67
End: 2022-02-01
Payer: COMMERCIAL

## 2022-02-06 ENCOUNTER — PATIENT MESSAGE (OUTPATIENT)
Dept: PODIATRY | Facility: CLINIC | Age: 67
End: 2022-02-06
Payer: COMMERCIAL

## 2022-02-09 ENCOUNTER — LAB VISIT (OUTPATIENT)
Dept: LAB | Facility: HOSPITAL | Age: 67
End: 2022-02-09
Attending: NURSE PRACTITIONER
Payer: COMMERCIAL

## 2022-02-09 DIAGNOSIS — E10.43 TYPE 1 DIABETES MELLITUS WITH DIABETIC AUTONOMIC NEUROPATHY: ICD-10-CM

## 2022-02-09 LAB
ESTIMATED AVG GLUCOSE: 169 MG/DL (ref 68–131)
HBA1C MFR BLD: 7.5 % (ref 4–5.6)
TSH SERPL DL<=0.005 MIU/L-ACNC: 1.79 UIU/ML (ref 0.4–4)

## 2022-02-09 PROCEDURE — 36415 COLL VENOUS BLD VENIPUNCTURE: CPT | Mod: PN | Performed by: NURSE PRACTITIONER

## 2022-02-09 PROCEDURE — 84443 ASSAY THYROID STIM HORMONE: CPT | Performed by: NURSE PRACTITIONER

## 2022-02-09 PROCEDURE — 83036 HEMOGLOBIN GLYCOSYLATED A1C: CPT | Performed by: NURSE PRACTITIONER

## 2022-02-10 ENCOUNTER — OFFICE VISIT (OUTPATIENT)
Dept: PODIATRY | Facility: CLINIC | Age: 67
End: 2022-02-10
Payer: COMMERCIAL

## 2022-02-10 VITALS — WEIGHT: 214.94 LBS | HEIGHT: 74 IN | BODY MASS INDEX: 27.59 KG/M2

## 2022-02-10 DIAGNOSIS — E10.42 TYPE 1 DIABETES MELLITUS WITH POLYNEUROPATHY: ICD-10-CM

## 2022-02-10 DIAGNOSIS — L97.521 DIABETIC ULCER OF TOE OF LEFT FOOT ASSOCIATED WITH TYPE 1 DIABETES MELLITUS, LIMITED TO BREAKDOWN OF SKIN: Primary | ICD-10-CM

## 2022-02-10 DIAGNOSIS — E10.621 DIABETIC ULCER OF TOE OF LEFT FOOT ASSOCIATED WITH TYPE 1 DIABETES MELLITUS, LIMITED TO BREAKDOWN OF SKIN: Primary | ICD-10-CM

## 2022-02-10 PROCEDURE — 99213 PR OFFICE/OUTPT VISIT, EST, LEVL III, 20-29 MIN: ICD-10-PCS | Mod: 25,S$GLB,, | Performed by: PODIATRIST

## 2022-02-10 PROCEDURE — 3051F PR MOST RECENT HEMOGLOBIN A1C LEVEL 7.0 - < 8.0%: ICD-10-PCS | Mod: CPTII,S$GLB,, | Performed by: PODIATRIST

## 2022-02-10 PROCEDURE — 87070 CULTURE OTHR SPECIMN AEROBIC: CPT | Performed by: PODIATRIST

## 2022-02-10 PROCEDURE — 87186 SC STD MICRODIL/AGAR DIL: CPT | Performed by: PODIATRIST

## 2022-02-10 PROCEDURE — 1159F PR MEDICATION LIST DOCUMENTED IN MEDICAL RECORD: ICD-10-PCS | Mod: CPTII,S$GLB,, | Performed by: PODIATRIST

## 2022-02-10 PROCEDURE — 1159F MED LIST DOCD IN RCRD: CPT | Mod: CPTII,S$GLB,, | Performed by: PODIATRIST

## 2022-02-10 PROCEDURE — 87075 CULTR BACTERIA EXCEPT BLOOD: CPT | Performed by: PODIATRIST

## 2022-02-10 PROCEDURE — 87077 CULTURE AEROBIC IDENTIFY: CPT | Performed by: PODIATRIST

## 2022-02-10 PROCEDURE — 4010F PR ACE/ARB THEARPY RXD/TAKEN: ICD-10-PCS | Mod: CPTII,S$GLB,, | Performed by: PODIATRIST

## 2022-02-10 PROCEDURE — 99213 OFFICE O/P EST LOW 20 MIN: CPT | Mod: 25,S$GLB,, | Performed by: PODIATRIST

## 2022-02-10 PROCEDURE — 3051F HG A1C>EQUAL 7.0%<8.0%: CPT | Mod: CPTII,S$GLB,, | Performed by: PODIATRIST

## 2022-02-10 PROCEDURE — 99999 PR PBB SHADOW E&M-EST. PATIENT-LVL II: CPT | Mod: PBBFAC,,, | Performed by: PODIATRIST

## 2022-02-10 PROCEDURE — 99999 PR PBB SHADOW E&M-EST. PATIENT-LVL II: ICD-10-PCS | Mod: PBBFAC,,, | Performed by: PODIATRIST

## 2022-02-10 PROCEDURE — 4010F ACE/ARB THERAPY RXD/TAKEN: CPT | Mod: CPTII,S$GLB,, | Performed by: PODIATRIST

## 2022-02-10 PROCEDURE — 97597 DBRDMT OPN WND 1ST 20 CM/<: CPT | Mod: S$GLB,,, | Performed by: PODIATRIST

## 2022-02-10 PROCEDURE — 97597 WOUND DEBRIDEMENT: ICD-10-PCS | Mod: S$GLB,,, | Performed by: PODIATRIST

## 2022-02-10 PROCEDURE — 3008F PR BODY MASS INDEX (BMI) DOCUMENTED: ICD-10-PCS | Mod: CPTII,S$GLB,, | Performed by: PODIATRIST

## 2022-02-10 PROCEDURE — 3008F BODY MASS INDEX DOCD: CPT | Mod: CPTII,S$GLB,, | Performed by: PODIATRIST

## 2022-02-10 NOTE — PROCEDURES
Wound Debridement    Date/Time: 2/10/2022 7:00 AM  Performed by: Caleb Duffy DPM  Authorized by: Caleb Duffy DPM     Consent Done?:  Yes (Verbal)    Preparation: Patient was prepped and draped in usual sterile fashion    Local anesthesia used?: No      Wound Details:    Location:  Left foot    Location:  Left 1st Toe    Type of Debridement:  Excisional       Length (cm):  0.2       Area (sq cm):  0.04       Width (cm):  0.2       Percent Debrided (%):  100       Depth (cm):  0.1       Total Area Debrided (sq cm):  0.04    Depth of debridement:  Epidermis/Dermis    Tissue debrided:  Dermis    Devitalized tissue debrided:  Fibrin    Instruments:  Blade    Bleeding:  Minimal  Hemostasis Achieved: Yes    Method Used:  Pressure  Patient tolerance:  Patient tolerated the procedure well with no immediate complications

## 2022-02-14 DIAGNOSIS — E11.628 DIABETIC FOOT INFECTION: Primary | ICD-10-CM

## 2022-02-14 DIAGNOSIS — L08.9 DIABETIC FOOT INFECTION: Primary | ICD-10-CM

## 2022-02-14 LAB
BACTERIA SPEC AEROBE CULT: ABNORMAL
BACTERIA SPEC AEROBE CULT: ABNORMAL

## 2022-02-14 RX ORDER — AMOXICILLIN AND CLAVULANATE POTASSIUM 875; 125 MG/1; MG/1
1 TABLET, FILM COATED ORAL EVERY 12 HOURS
Qty: 14 TABLET | Refills: 0 | Status: SHIPPED | OUTPATIENT
Start: 2022-02-14 | End: 2022-03-28 | Stop reason: ALTCHOICE

## 2022-02-15 LAB — BACTERIA SPEC ANAEROBE CULT: NORMAL

## 2022-02-16 ENCOUNTER — PATIENT MESSAGE (OUTPATIENT)
Dept: PODIATRY | Facility: CLINIC | Age: 67
End: 2022-02-16
Payer: COMMERCIAL

## 2022-02-18 DIAGNOSIS — E11.36 DIABETIC CATARACT OF LEFT EYE: Primary | ICD-10-CM

## 2022-02-19 ENCOUNTER — LAB VISIT (OUTPATIENT)
Dept: FAMILY MEDICINE | Facility: CLINIC | Age: 67
End: 2022-02-19
Payer: COMMERCIAL

## 2022-02-19 DIAGNOSIS — E11.36 DIABETIC CATARACT OF LEFT EYE: ICD-10-CM

## 2022-02-19 PROCEDURE — U0003 INFECTIOUS AGENT DETECTION BY NUCLEIC ACID (DNA OR RNA); SEVERE ACUTE RESPIRATORY SYNDROME CORONAVIRUS 2 (SARS-COV-2) (CORONAVIRUS DISEASE [COVID-19]), AMPLIFIED PROBE TECHNIQUE, MAKING USE OF HIGH THROUGHPUT TECHNOLOGIES AS DESCRIBED BY CMS-2020-01-R: HCPCS | Performed by: OPHTHALMOLOGY

## 2022-02-21 ENCOUNTER — ANESTHESIA EVENT (OUTPATIENT)
Dept: SURGERY | Facility: HOSPITAL | Age: 67
End: 2022-02-21
Payer: COMMERCIAL

## 2022-02-21 LAB
SARS-COV-2 RNA RESP QL NAA+PROBE: NOT DETECTED
SARS-COV-2- CYCLE NUMBER: NORMAL

## 2022-02-22 ENCOUNTER — ANESTHESIA (OUTPATIENT)
Dept: SURGERY | Facility: HOSPITAL | Age: 67
End: 2022-02-22
Payer: COMMERCIAL

## 2022-02-22 ENCOUNTER — HOSPITAL ENCOUNTER (OUTPATIENT)
Facility: HOSPITAL | Age: 67
Discharge: HOME | End: 2022-02-22
Attending: OPHTHALMOLOGY | Admitting: OPHTHALMOLOGY
Payer: COMMERCIAL

## 2022-02-22 VITALS
RESPIRATION RATE: 17 BRPM | HEIGHT: 74 IN | SYSTOLIC BLOOD PRESSURE: 140 MMHG | BODY MASS INDEX: 27.59 KG/M2 | OXYGEN SATURATION: 100 % | WEIGHT: 215 LBS | HEART RATE: 69 BPM | TEMPERATURE: 98 F | DIASTOLIC BLOOD PRESSURE: 80 MMHG

## 2022-02-22 DIAGNOSIS — E11.36 DIABETIC CATARACT OF LEFT EYE: Primary | ICD-10-CM

## 2022-02-22 PROCEDURE — 66984 XCAPSL CTRC RMVL W/O ECP: CPT | Mod: LT,,, | Performed by: OPHTHALMOLOGY

## 2022-02-22 PROCEDURE — 71000015 HC POSTOP RECOV 1ST HR: Mod: PO | Performed by: OPHTHALMOLOGY

## 2022-02-22 PROCEDURE — 36000707: Mod: PO | Performed by: OPHTHALMOLOGY

## 2022-02-22 PROCEDURE — 25000003 PHARM REV CODE 250: Mod: PO | Performed by: OPHTHALMOLOGY

## 2022-02-22 PROCEDURE — D9220A PRA ANESTHESIA: Mod: CRNA,,, | Performed by: NURSE ANESTHETIST, CERTIFIED REGISTERED

## 2022-02-22 PROCEDURE — V2788 PRESBYOPIA-CORRECT FUNCTION: HCPCS | Mod: PO | Performed by: OPHTHALMOLOGY

## 2022-02-22 PROCEDURE — D9220A PRA ANESTHESIA: Mod: ANES,,, | Performed by: ANESTHESIOLOGY

## 2022-02-22 PROCEDURE — 37000008 HC ANESTHESIA 1ST 15 MINUTES: Mod: PO | Performed by: OPHTHALMOLOGY

## 2022-02-22 PROCEDURE — 66999 UNLISTED PX ANT SEGMENT EYE: CPT | Mod: CSM,LT,, | Performed by: OPHTHALMOLOGY

## 2022-02-22 PROCEDURE — D9220A PRA ANESTHESIA: ICD-10-PCS | Mod: CRNA,,, | Performed by: NURSE ANESTHETIST, CERTIFIED REGISTERED

## 2022-02-22 PROCEDURE — 37000009 HC ANESTHESIA EA ADD 15 MINS: Mod: PO | Performed by: OPHTHALMOLOGY

## 2022-02-22 PROCEDURE — D9220A PRA ANESTHESIA: ICD-10-PCS | Mod: ANES,,, | Performed by: ANESTHESIOLOGY

## 2022-02-22 PROCEDURE — 27201423 OPTIME MED/SURG SUP & DEVICES STERILE SUPPLY: Mod: PO | Performed by: OPHTHALMOLOGY

## 2022-02-22 PROCEDURE — 63600175 PHARM REV CODE 636 W HCPCS: Mod: PO | Performed by: OPHTHALMOLOGY

## 2022-02-22 PROCEDURE — 66999 PR FEMTO MFIOL: ICD-10-PCS | Mod: CSM,LT,, | Performed by: OPHTHALMOLOGY

## 2022-02-22 PROCEDURE — 36000706: Mod: PO | Performed by: OPHTHALMOLOGY

## 2022-02-22 PROCEDURE — 25000003 PHARM REV CODE 250: Mod: PO

## 2022-02-22 PROCEDURE — 66984 PR REMOVAL, CATARACT, W/INSRT INTRAOC LENS, W/O ENDO CYCLO: ICD-10-PCS | Mod: LT,,, | Performed by: OPHTHALMOLOGY

## 2022-02-22 PROCEDURE — 63600175 PHARM REV CODE 636 W HCPCS: Mod: PO | Performed by: NURSE ANESTHETIST, CERTIFIED REGISTERED

## 2022-02-22 DEVICE — IMPLANTABLE DEVICE: Type: IMPLANTABLE DEVICE | Site: EYE | Status: FUNCTIONAL

## 2022-02-22 RX ORDER — HYDROMORPHONE HYDROCHLORIDE 2 MG/ML
0.2 INJECTION, SOLUTION INTRAMUSCULAR; INTRAVENOUS; SUBCUTANEOUS EVERY 5 MIN PRN
Status: DISCONTINUED | OUTPATIENT
Start: 2022-02-22 | End: 2022-04-22

## 2022-02-22 RX ORDER — LORAZEPAM 2 MG/ML
0.25 INJECTION INTRAMUSCULAR ONCE AS NEEDED
Status: DISCONTINUED | OUTPATIENT
Start: 2022-02-22 | End: 2022-04-22

## 2022-02-22 RX ORDER — LIDOCAINE HYDROCHLORIDE 10 MG/ML
INJECTION INFILTRATION; PERINEURAL
Status: DISCONTINUED | OUTPATIENT
Start: 2022-02-22 | End: 2022-02-22 | Stop reason: HOSPADM

## 2022-02-22 RX ORDER — PROPARACAINE HYDROCHLORIDE 5 MG/ML
1 SOLUTION/ DROPS OPHTHALMIC
Status: COMPLETED | OUTPATIENT
Start: 2022-02-22 | End: 2022-02-22

## 2022-02-22 RX ORDER — DIPHENHYDRAMINE HYDROCHLORIDE 50 MG/ML
12.5 INJECTION INTRAMUSCULAR; INTRAVENOUS ONCE AS NEEDED
Status: DISCONTINUED | OUTPATIENT
Start: 2022-02-22 | End: 2022-04-22

## 2022-02-22 RX ORDER — PROCHLORPERAZINE EDISYLATE 5 MG/ML
5 INJECTION INTRAMUSCULAR; INTRAVENOUS EVERY 30 MIN PRN
Status: DISCONTINUED | OUTPATIENT
Start: 2022-02-22 | End: 2022-04-22

## 2022-02-22 RX ORDER — PHENYLEPHRINE HYDROCHLORIDE 25 MG/ML
1 SOLUTION/ DROPS OPHTHALMIC
Status: COMPLETED | OUTPATIENT
Start: 2022-02-22 | End: 2022-02-22

## 2022-02-22 RX ORDER — OXYCODONE HYDROCHLORIDE 5 MG/1
5 TABLET ORAL ONCE AS NEEDED
Status: DISCONTINUED | OUTPATIENT
Start: 2022-02-22 | End: 2022-04-22

## 2022-02-22 RX ORDER — TROPICAMIDE 10 MG/ML
1 SOLUTION/ DROPS OPHTHALMIC
Status: COMPLETED | OUTPATIENT
Start: 2022-02-22 | End: 2022-02-22

## 2022-02-22 RX ORDER — LIDOCAINE HYDROCHLORIDE 10 MG/ML
1 INJECTION, SOLUTION EPIDURAL; INFILTRATION; INTRACAUDAL; PERINEURAL ONCE
Status: DISCONTINUED | OUTPATIENT
Start: 2022-02-22 | End: 2022-04-22

## 2022-02-22 RX ORDER — MEPERIDINE HYDROCHLORIDE 50 MG/ML
12.5 INJECTION INTRAMUSCULAR; INTRAVENOUS; SUBCUTANEOUS ONCE AS NEEDED
Status: ACTIVE | OUTPATIENT
Start: 2022-02-22 | End: 2022-02-23

## 2022-02-22 RX ORDER — SODIUM CHLORIDE 0.9 % (FLUSH) 0.9 %
3 SYRINGE (ML) INJECTION EVERY 8 HOURS
Status: DISCONTINUED | OUTPATIENT
Start: 2022-02-22 | End: 2022-04-22

## 2022-02-22 RX ORDER — SODIUM CHLORIDE, SODIUM LACTATE, POTASSIUM CHLORIDE, CALCIUM CHLORIDE 600; 310; 30; 20 MG/100ML; MG/100ML; MG/100ML; MG/100ML
10 INJECTION, SOLUTION INTRAVENOUS CONTINUOUS
Status: DISCONTINUED | OUTPATIENT
Start: 2022-02-22 | End: 2022-04-22

## 2022-02-22 RX ORDER — MIDAZOLAM HYDROCHLORIDE 1 MG/ML
INJECTION INTRAMUSCULAR; INTRAVENOUS
Status: DISCONTINUED | OUTPATIENT
Start: 2022-02-22 | End: 2022-02-22

## 2022-02-22 RX ORDER — OFLOXACIN 3 MG/ML
SOLUTION/ DROPS OPHTHALMIC
Status: DISCONTINUED | OUTPATIENT
Start: 2022-02-22 | End: 2022-02-22 | Stop reason: HOSPADM

## 2022-02-22 RX ORDER — SODIUM CHLORIDE, SODIUM LACTATE, POTASSIUM CHLORIDE, CALCIUM CHLORIDE 600; 310; 30; 20 MG/100ML; MG/100ML; MG/100ML; MG/100ML
500 INJECTION, SOLUTION INTRAVENOUS ONCE
Status: DISCONTINUED | OUTPATIENT
Start: 2022-02-22 | End: 2022-04-22

## 2022-02-22 RX ORDER — ONDANSETRON 2 MG/ML
INJECTION INTRAMUSCULAR; INTRAVENOUS
Status: DISCONTINUED | OUTPATIENT
Start: 2022-02-22 | End: 2022-02-22

## 2022-02-22 RX ORDER — PHENYLEPHRINE HYDROCHLORIDE 100 MG/ML
1 SOLUTION/ DROPS OPHTHALMIC
Status: COMPLETED | OUTPATIENT
Start: 2022-02-22 | End: 2022-02-22

## 2022-02-22 RX ADMIN — PHENYLEPHRINE HYDROCHLORIDE 1 DROP: 25 SOLUTION/ DROPS OPHTHALMIC at 11:02

## 2022-02-22 RX ADMIN — MIDAZOLAM HYDROCHLORIDE 1 MG: 1 INJECTION, SOLUTION INTRAMUSCULAR; INTRAVENOUS at 01:02

## 2022-02-22 RX ADMIN — PHENYLEPHRINE HYDROCHLORIDE 1 DROP: 100 SOLUTION/ DROPS OPHTHALMIC at 11:02

## 2022-02-22 RX ADMIN — PROPARACAINE HYDROCHLORIDE 1 DROP: 5 SOLUTION/ DROPS OPHTHALMIC at 11:02

## 2022-02-22 RX ADMIN — TROPICAMIDE 1 DROP: 10 SOLUTION/ DROPS OPHTHALMIC at 11:02

## 2022-02-22 RX ADMIN — ONDANSETRON 4 MG: 2 INJECTION INTRAMUSCULAR; INTRAVENOUS at 01:02

## 2022-02-22 RX ADMIN — MIDAZOLAM HYDROCHLORIDE 2 MG: 1 INJECTION, SOLUTION INTRAMUSCULAR; INTRAVENOUS at 01:02

## 2022-02-22 RX ADMIN — PROPARACAINE HYDROCHLORIDE 1 DROP: 5 SOLUTION/ DROPS OPHTHALMIC at 12:02

## 2022-02-22 RX ADMIN — PHENYLEPHRINE HYDROCHLORIDE 1 DROP: 100 SOLUTION/ DROPS OPHTHALMIC at 12:02

## 2022-02-22 RX ADMIN — PHENYLEPHRINE HYDROCHLORIDE 1 DROP: 25 SOLUTION/ DROPS OPHTHALMIC at 12:02

## 2022-02-22 NOTE — BRIEF OP NOTE
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]    Procedure(s) (LRB):  PHACOEMULSIFICATION, CATARACT (Left)    Anesthesia: Monitor Anesthesia Care    Findings/Key Components:  Same as post op diagnosis    Estimated Blood Loss: * No values recorded between 2/22/2022  1:15 PM and 2/22/2022  1:56 PM *         Specimens (From admission, onward)    None            Discharge Note      SUMMARY     Admit Date: 2/22/2022    Attending Physician: Vicente Solis Jr., MD     Discharge Physician: Vicente Solis Jr., MD    Discharge Date: 2/22/2022     Final Diagnosis: 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 The patient was discharged home in stable condition the same day.    Diet: Advance as tolerated    Follow-Up: Follow up with Dr. Solis, next day, as previously scheduled  Activity as tolerated.      Activity: Per Handout Instructions    Disposition: Home or self care    Patient Instructions:   Current Discharge Medication List      CONTINUE these medications which have NOT CHANGED    Details   amoxicillin-clavulanate 875-125mg (AUGMENTIN) 875-125 mg per tablet Take 1 tablet by mouth every 12 (twelve) hours.  Qty: 14 tablet, Refills: 0    Associated Diagnoses: Diabetic foot infection      atorvastatin (LIPITOR) 40 MG tablet TAKE 1 TABLET(40 MG) BY MOUTH EVERY DAY  Qty: 90 tablet, Refills: 3    Associated Diagnoses: Hyperlipidemia, unspecified hyperlipidemia type      dorzolamide-timolol 2-0.5% (COSOPT) 22.3-6.8 mg/mL ophthalmic solution Place 1 drop into both eyes 2 (two) times daily.  Qty: 10 mL, Refills: 3    Associated Diagnoses: Status post cataract extraction and insertion of intraocular lens of right eye      FLUoxetine 20  "MG capsule TAKE ONE CAPSULE BY MOUTH EVERY DAY  Qty: 90 capsule, Refills: 3      insulin aspart U-100 (NOVOLOG) 100 unit/mL (3 mL) InPn pen INJECT USING INSULIN:CARB RATIO OF 1:5 BEFORE A MEAL. 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      pen needle, diabetic (BD ULTRA-FINE MINI PEN NEEDLE) 31 gauge x 3/16" Ndle USE 1 FOUR TIMES DAILY  Qty: 150 each, Refills: 11      sulfamethoxazole-trimethoprim 800-160mg (BACTRIM DS) 800-160 mg Tab TAKE 1 TABLET BY MOUTH TWICE DAILY  Qty: 14 tablet, Refills: 0    Associated Diagnoses: Diabetic foot infection             Discharge Procedure Orders (must include Diet, Follow-up, Activity)   Discharge Procedure Orders (must include Diet, Follow-up, Activity)   Diet general     Lifting restrictions     Leave dressing on - Keep it clean, dry, and intact until clinic visit     Call MD for:  persistent nausea and vomiting     Call MD for:  severe uncontrolled pain     Activity as tolerated      "

## 2022-02-22 NOTE — OP NOTE
SURGEON: Vicente Solis Jr M.D.    PREOPERATIVE DIAGNOSIS:    Nuclear Sclerotic Cataract Left Eye    POSTOPERATIVE DIAGNOSIS:    Nuclear Sclerotic Cataract Left Eye    PROCEDURES:    Phacoemulsification with  intraocular lens, Left eye (17630)  With Femtosecond LASER assist    DATE OF SURGERY: 02/22/2022    IMPLANT: TFNT40 21.0 D    ANESTHESIA:  MAC with topical Lidocaine    COMPLICATIONS:  None    ESTIMATED BLOOD LOSS: None    SPECIMENS: None    INDICATIONS:    The patient has a history of painless progressive visual loss and  difficulty with activities of daily living secondary to cataract formation.  After a thorough discussion of the risks, benefits, and alternatives to cataract surgery, including, but not limited to, the rare risks of infection, retinal detachment, hemorrhage, need for additional surgery, loss of vision, and even loss of the eye, the patient voices understanding and desires to proceed.    DESCRIPTION OF FEMTO PROCEDURE:    After verification of consent and marking of the operative eye, the patient was positioned under the femtosecond LASER. Topical anesthetic drops were administered. A surgical timeout was initiated with verification of patient identifiers and the laser surgical plan. The eye was docked securely and the laser portion of the cataract procedure was carried out without complication.  The patient was returned to the pre-operative area to await the intraocular surgical portion of the cataract procedure.  The patients IOL calculations were reviewed, and the lens selection confirmed.   After verification and marking of the proper eye in the preop holding area, the patient was brought to the operating room in supine position.    Procedure in detail:    Informed consent was obtained. Indications, risks and alternatives to the procedure were explained to the patient. The patient was given the opportunity to ask questions and consented to the procedure in writing. In the preoperative  holding area, the patients identity and operative site were confirmed.  Topical anesthetic was administered, consisting of alternating 0.5% Proparacaine and 4% preservative-free Lidocaine Q 5 minutes times 3.  The patient was taken to the operative suite.  A time-out was performed.  The left eye was prepped with 5% Betadine and draped in sterile fashion.  A lid speculum was placed in the left eye.  The temporal clear corneal incision was developed using a LRI denise knife and crescent blade for a 3 plane incision.  A paracentesis was done with a 1mm denise blade.  Before instillation of the Viscoat, approximately 0.1 to 0.3cc of diluted preservative free intracameral lidocaine was instilled.  Viscoat was injected into the anterior chamber with a 27-gauge needle.  A 2.4mm denise blade was used to make a temporal clear corneal incision.  Afterwards, a cystotome was used to perform a continuous curvilinear capsulorrhexis with the assistance of utrata forceps.  Hydrodissection was performed using balanced salt solution,injected under the anterior capsule using a dan cannula and hydrodelineation was performed using a blunt-tipped cannula.  Next, phacoemulsification performed in a divide and conquer fashion with the use of a nucleus rotator to turn the lens and protect the posterior capsule.  Cortical material was then removed using irrigation and aspiration.  Healon was then injected into the anterior chamber and into capsular bag to inflate the bag for the placement of the lens implant and then an Thai TFNT40 21.0 D lens was injected into the capsular bag and rotated in position with the assistance of a Sinsky hook.  Irrigation and aspiration were used to remove the remaining viscoelastic. Next, Miochol was injected into the anterior chamber and the pupil was allowed to constrict in a symmetrical fashion.  Wound hydrated with BSS. A collagen shield was placed into the eye and the eye was covered with a Mcdonough  shield.  The patient tolerated the procedure well and was transferred to the recovery area in good condition.  Estimated blood loss:  none  Specimens:   none  Complications:  none  Disposition:   stable to PACU

## 2022-02-22 NOTE — ANESTHESIA POSTPROCEDURE EVALUATION
Anesthesia Post Evaluation    Patient: Hammad Douglas III    Procedure(s) Performed: Procedure(s) (LRB):  PHACOEMULSIFICATION, CATARACT (Left)    Final Anesthesia Type: MAC      Patient location during evaluation: PACU  Patient participation: Yes- Able to Participate  Level of consciousness: awake and alert  Post-procedure vital signs: reviewed and stable  Pain management: adequate  Airway patency: patent    PONV status at discharge: No PONV  Anesthetic complications: no      Cardiovascular status: hemodynamically stable  Respiratory status: unassisted and room air  Hydration status: euvolemic  Follow-up not needed.          Vitals Value Taken Time   /80 02/22/22 1405        Pulse 69 02/22/22 1405   Resp 17 02/22/22 1405   SpO2 100 % 02/22/22 1405         Event Time   Out of Recovery 13:59:00         Pain/Alex Score: Alex Score: 10 (2/22/2022  2:05 PM)

## 2022-02-22 NOTE — DISCHARGE INSTRUCTIONS
Eye Care     No bending or lifting.  Sit upright in bed or in recliner.  Do not remove shield.  Do not use drops in surgical eye.   If lens falls out of eye, do not attempt to replace it.  Follow up tomorrow in clinic.  Bring black bag with drops to clinic.

## 2022-02-22 NOTE — TRANSFER OF CARE
"Anesthesia Transfer of Care Note    Patient: Hamamd Douglas III    Procedure(s) Performed: Procedure(s) (LRB):  PHACOEMULSIFICATION, CATARACT (Left)    Anesthesia PACU Handoff    Last vitals:   Visit Vitals  BP (!) 160/77 (BP Location: Right arm, Patient Position: Lying)   Pulse 69   Temp 36.6 °C (97.9 °F) (Skin)   Resp 16   Ht 6' 2" (1.88 m)   Wt 97.5 kg (215 lb)   SpO2 98%   BMI 27.60 kg/m²     "

## 2022-02-22 NOTE — TRANSFER OF CARE
"Anesthesia Transfer of Care Note    Patient: Hammad Douglas III    Procedure(s) Performed: Procedure(s) (LRB):  PHACOEMULSIFICATION, CATARACT (Left)    Patient location: PACU    Anesthesia Type: MAC    Transport from OR: Transported from OR on room air with adequate spontaneous ventilation    Post pain: adequate analgesia    Post assessment: no apparent anesthetic complications    Post vital signs: stable    Level of consciousness: awake    Nausea/Vomiting: no nausea/vomiting    Complications: none    Transfer of care protocol was followed      Last vitals:   Visit Vitals  BP (!) 160/77 (BP Location: Right arm, Patient Position: Lying)   Pulse 69   Temp 36.6 °C (97.9 °F) (Skin)   Resp 16   Ht 6' 2" (1.88 m)   Wt 97.5 kg (215 lb)   SpO2 98%   BMI 27.60 kg/m²     "

## 2022-02-22 NOTE — ANESTHESIA PREPROCEDURE EVALUATION
02/22/2022  Hammad Douglas III is a 66 y.o., male.      Pre-op Assessment    I have reviewed the Patient Summary Reports.     I have reviewed the Nursing Notes. I have reviewed the NPO Status.   I have reviewed the Medications.     Review of Systems  Anesthesia Hx:  No problems with previous Anesthesia    Social:  Former Smoker    Cardiovascular:   Hypertension PVD    Pulmonary:  Pulmonary Normal    Renal/:   Chronic Renal Disease, CRI    Hepatic/GI:  Hepatic/GI Normal    Neurological:   Peripheral Neuropathy    Endocrine:   Diabetes, type 1        Physical Exam  General: Well nourished    Airway:  Mallampati: II   Mouth Opening: Normal  TM Distance: Normal  Tongue: Normal  Neck ROM: Normal ROM    Dental:  Intact        Anesthesia Plan  Type of Anesthesia, risks & benefits discussed:    Anesthesia Type: MAC  Intra-op Monitoring Plan: Standard ASA Monitors  Post Op Pain Control Plan: multimodal analgesia  Informed Consent: Informed consent signed with the Patient and all parties understand the risks and agree with anesthesia plan.  All questions answered.   ASA Score: 3  Day of Surgery Review of History & Physical: H&P Update referred to the surgeon/provider.    Ready For Surgery From Anesthesia Perspective.     .

## 2022-02-22 NOTE — H&P
"CC: H&P for cataract surgery  HPI: Patient has been diagnosed with cataracts, which are interfering with daily activities due to blurred vision and glare which cannot adequately be corrected with glasses.   PMH:  Past Medical History:   Diagnosis Date    Cataract     OS    Chronic kidney disease     Diabetes mellitus type I     Diagnosed at age 19    Diabetic retinopathy     See's Dr. Ledesma    Hyperlipidemia     Hypertension        FH:  Family History   Problem Relation Age of Onset    Cataracts Mother     Cataracts Father     Breast cancer Sister     Glaucoma Neg Hx     Macular degeneration Neg Hx     Retinal detachment Neg Hx        SH:  Social History     Socioeconomic History    Marital status:    Tobacco Use    Smoking status: Former Smoker    Smokeless tobacco: Never Used    Tobacco comment: Former cigar use   Substance and Sexual Activity    Alcohol use: Yes     Alcohol/week: 2.0 standard drinks     Types: 2 Cans of beer per week     Comment: "2 beers a week"    Drug use: No    Sexual activity: Yes     Partners: Female   Social History Narrative    Retired former radiation  at [employer]       ROS:  HEENT: Blurred vision  CV: No chest pain  Pulm: No SOB  Please see my last exam note for additional ROS details    PE:  There were no vitals filed for this visit.  HEENT: Cataract  CV: N S1 and S2 no murmurs  Pulm: CTAB wheezes, rales, or ronchi  Abd:  soft nabs NTND no masses  Extremeties: No edema    A/P  1. Cataract - Indications, risks and alternatives to the procedure were explained to the patient. The patient was given the opportunity to ask questions and consented to the procedure in writing.  Proceed with cataract surgery as scheduled.  2. Other health issues - patient has been cleared by their PCP. See last note for details.    "

## 2022-02-23 ENCOUNTER — OFFICE VISIT (OUTPATIENT)
Dept: OPHTHALMOLOGY | Facility: CLINIC | Age: 67
End: 2022-02-23
Payer: COMMERCIAL

## 2022-02-23 DIAGNOSIS — Z96.1 STATUS POST CATARACT EXTRACTION AND INSERTION OF INTRAOCULAR LENS OF LEFT EYE: Primary | ICD-10-CM

## 2022-02-23 DIAGNOSIS — Z98.42 STATUS POST CATARACT EXTRACTION AND INSERTION OF INTRAOCULAR LENS OF LEFT EYE: Primary | ICD-10-CM

## 2022-02-23 PROCEDURE — 4010F PR ACE/ARB THEARPY RXD/TAKEN: ICD-10-PCS | Mod: CPTII,S$GLB,, | Performed by: OPHTHALMOLOGY

## 2022-02-23 PROCEDURE — 3051F PR MOST RECENT HEMOGLOBIN A1C LEVEL 7.0 - < 8.0%: ICD-10-PCS | Mod: CPTII,S$GLB,, | Performed by: OPHTHALMOLOGY

## 2022-02-23 PROCEDURE — 1160F PR REVIEW ALL MEDS BY PRESCRIBER/CLIN PHARMACIST DOCUMENTED: ICD-10-PCS | Mod: CPTII,S$GLB,, | Performed by: OPHTHALMOLOGY

## 2022-02-23 PROCEDURE — 99024 POSTOP FOLLOW-UP VISIT: CPT | Mod: S$GLB,,, | Performed by: OPHTHALMOLOGY

## 2022-02-23 PROCEDURE — 99999 PR PBB SHADOW E&M-EST. PATIENT-LVL III: ICD-10-PCS | Mod: PBBFAC,,, | Performed by: OPHTHALMOLOGY

## 2022-02-23 PROCEDURE — 1159F PR MEDICATION LIST DOCUMENTED IN MEDICAL RECORD: ICD-10-PCS | Mod: CPTII,S$GLB,, | Performed by: OPHTHALMOLOGY

## 2022-02-23 PROCEDURE — 1160F RVW MEDS BY RX/DR IN RCRD: CPT | Mod: CPTII,S$GLB,, | Performed by: OPHTHALMOLOGY

## 2022-02-23 PROCEDURE — 99999 PR PBB SHADOW E&M-EST. PATIENT-LVL III: CPT | Mod: PBBFAC,,, | Performed by: OPHTHALMOLOGY

## 2022-02-23 PROCEDURE — 3051F HG A1C>EQUAL 7.0%<8.0%: CPT | Mod: CPTII,S$GLB,, | Performed by: OPHTHALMOLOGY

## 2022-02-23 PROCEDURE — 1159F MED LIST DOCD IN RCRD: CPT | Mod: CPTII,S$GLB,, | Performed by: OPHTHALMOLOGY

## 2022-02-23 PROCEDURE — 4010F ACE/ARB THERAPY RXD/TAKEN: CPT | Mod: CPTII,S$GLB,, | Performed by: OPHTHALMOLOGY

## 2022-02-23 PROCEDURE — 99024 PR POST-OP FOLLOW-UP VISIT: ICD-10-PCS | Mod: S$GLB,,, | Performed by: OPHTHALMOLOGY

## 2022-02-23 RX ORDER — PREDNISOLONE ACETATE 10 MG/ML
1 SUSPENSION/ DROPS OPHTHALMIC 4 TIMES DAILY
COMMUNITY
End: 2022-03-28 | Stop reason: ALTCHOICE

## 2022-02-23 RX ORDER — DICLOFENAC SODIUM 1 MG/ML
1 SOLUTION/ DROPS OPHTHALMIC 4 TIMES DAILY
COMMUNITY
End: 2022-03-28 | Stop reason: ALTCHOICE

## 2022-02-23 RX ORDER — MOXIFLOXACIN 5 MG/ML
1 SOLUTION/ DROPS OPHTHALMIC 4 TIMES DAILY
COMMUNITY
End: 2022-03-28 | Stop reason: ALTCHOICE

## 2022-02-23 NOTE — PROGRESS NOTES
HPI     Post-op Evaluation      Additional comments: 1 day s/p phaco iol OS 2/22/22              Comments     Pt states OS feeling good and vision doing well.     Gtts: PF, Diclo, Moxi QID OS - Installed 1 drop into OS at           Dorzolamide-timolol BID OU          Last edited by Cheryle Quintana on 2/23/2022 10:06 AM. (History)        ROS     Negative for: Constitutional, Gastrointestinal, Neurological, Skin,   Genitourinary, Musculoskeletal, HENT, Endocrine, Cardiovascular, Eyes,   Respiratory, Psychiatric, Allergic/Imm, Heme/Lymph    Last edited by Vicente Solis Jr., MD on 2/23/2022 11:58 AM. (History)        Assessment /Plan     For exam results, see Encounter Report.    Status post cataract extraction and insertion of intraocular lens of left eye      Elevated IOP post op day 1 IOP 38  left EYE, Proparacaine used in eye, 30 gauge needle used to release fluid from anterior chamber   Post Burp IOP 10 left EYE  Pre and post burp antibiotic drop given to patient  Prednisolone acetate (pink or white top drop) 1 drop 4x daily left eye; shake well before using drop  Vigamox 1 drop 4x daily left eye (tan top)  Diclofenac 1 drop 4x daily left eye (gray top)  No bending no lifting  Keep head elevated when laying down  Keep dry   F/u 1 week

## 2022-02-24 ENCOUNTER — PATIENT MESSAGE (OUTPATIENT)
Dept: PODIATRY | Facility: CLINIC | Age: 67
End: 2022-02-24

## 2022-02-24 ENCOUNTER — PATIENT MESSAGE (OUTPATIENT)
Dept: OPHTHALMOLOGY | Facility: CLINIC | Age: 67
End: 2022-02-24
Payer: COMMERCIAL

## 2022-02-24 ENCOUNTER — OFFICE VISIT (OUTPATIENT)
Dept: PODIATRY | Facility: CLINIC | Age: 67
End: 2022-02-24
Payer: COMMERCIAL

## 2022-02-24 DIAGNOSIS — E10.42 TYPE 1 DIABETES MELLITUS WITH POLYNEUROPATHY: ICD-10-CM

## 2022-02-24 DIAGNOSIS — L97.521 DIABETIC ULCER OF TOE OF LEFT FOOT ASSOCIATED WITH TYPE 1 DIABETES MELLITUS, LIMITED TO BREAKDOWN OF SKIN: Primary | ICD-10-CM

## 2022-02-24 DIAGNOSIS — E10.621 DIABETIC ULCER OF TOE OF LEFT FOOT ASSOCIATED WITH TYPE 1 DIABETES MELLITUS, LIMITED TO BREAKDOWN OF SKIN: Primary | ICD-10-CM

## 2022-02-24 PROCEDURE — 3051F HG A1C>EQUAL 7.0%<8.0%: CPT | Mod: CPTII,S$GLB,, | Performed by: PODIATRIST

## 2022-02-24 PROCEDURE — 97597 WOUND DEBRIDEMENT: ICD-10-PCS | Mod: S$GLB,,, | Performed by: PODIATRIST

## 2022-02-24 PROCEDURE — 99499 UNLISTED E&M SERVICE: CPT | Mod: S$GLB,,, | Performed by: PODIATRIST

## 2022-02-24 PROCEDURE — 4010F PR ACE/ARB THEARPY RXD/TAKEN: ICD-10-PCS | Mod: CPTII,S$GLB,, | Performed by: PODIATRIST

## 2022-02-24 PROCEDURE — 97597 DBRDMT OPN WND 1ST 20 CM/<: CPT | Mod: S$GLB,,, | Performed by: PODIATRIST

## 2022-02-24 PROCEDURE — 4010F ACE/ARB THERAPY RXD/TAKEN: CPT | Mod: CPTII,S$GLB,, | Performed by: PODIATRIST

## 2022-02-24 PROCEDURE — 3051F PR MOST RECENT HEMOGLOBIN A1C LEVEL 7.0 - < 8.0%: ICD-10-PCS | Mod: CPTII,S$GLB,, | Performed by: PODIATRIST

## 2022-02-24 PROCEDURE — 99499 NO LOS: ICD-10-PCS | Mod: S$GLB,,, | Performed by: PODIATRIST

## 2022-02-24 NOTE — PROCEDURES
Wound Debridement    Date/Time: 2/24/2022 7:00 AM  Performed by: Caleb Duffy DPM  Authorized by: Caleb Duffy DPM     Consent Done?:  Yes (Verbal)    Preparation: Patient was prepped and draped in usual sterile fashion    Local anesthesia used?: No      Wound Details:    Location:  Left foot    Location:  Left 1st Toe    Type of Debridement:  Excisional       Length (cm):  0.1       Area (sq cm):  0.01       Width (cm):  0.1       Percent Debrided (%):  100       Depth (cm):  0.1       Total Area Debrided (sq cm):  0.01    Depth of debridement:  Epidermis/Dermis    Tissue debrided:  Dermis    Devitalized tissue debrided:  Fibrin    Instruments:  Blade    Bleeding:  Minimal  Hemostasis Achieved: Yes    Method Used:  Pressure  Patient tolerance:  Patient tolerated the procedure well with no immediate complications

## 2022-02-24 NOTE — PROGRESS NOTES
Subjective:      Patient ID: Hammad Douglas III is a 66 y.o. male.    Chief Complaint: No chief complaint on file.  Patient presents to clinic for a one week wound check of the Lt. Hallux.  Denies pain from the site with today's exam.  Has been keeping the site covered with sawyer and a band aid every two days.  Notes continued offloading with both the toe sleeve and hoka shoe gear.  Relates finishing the oral antibiotic without issue. Denies experiencing N/V/F/C/D. Denies any additional pedal complaints.    Past Medical History:   Diagnosis Date    Cataract     Done OU    Chronic kidney disease     Diabetes mellitus type I     Diagnosed at age 19    Diabetic retinopathy     See's Dr. Ledesma    Hyperlipidemia     Hypertension        Past Surgical History:   Procedure Laterality Date    CATARACT EXTRACTION W/  INTRAOCULAR LENS IMPLANT Right 09/28/2021    Dr Solis    CATARACT EXTRACTION W/  INTRAOCULAR LENS IMPLANT Left 02/22/2022    Dr Solis    COLONOSCOPY      2011 wnl    PHACOEMULSIFICATION OF CATARACT Right 9/28/2021    Procedure: PHACOEMULSIFICATION, CATARACT;  Surgeon: Vicente Solis Jr., MD;  Location: [facility] OR;  Service: Ophthalmology;  Laterality: Right;  DM/right    PHACOEMULSIFICATION OF CATARACT Left 2/22/2022    Procedure: PHACOEMULSIFICATION, CATARACT;  Surgeon: Vicente Solis Jr., MD;  Location: [facility] OR;  Service: Ophthalmology;  Laterality: Left;  left    SURGICAL REMOVAL OF BONE SPUR Left 10/14/2021    Procedure: EXCISION, BONE SPUR;  Surgeon: Caleb Duffy DPM;  Location: [facility];  Service: Podiatry;  Laterality: Left;    TOE AMPUTATION Right 7/2/2020    Procedure: AMPUTATION, TOE; 3rd;  Surgeon: Caleb Duffy DPM;  Location: [facility];  Service: Podiatry;  Laterality: Right;    TOE SURGERY Right     right big toe       Family History   Problem Relation Age of Onset    Cataracts Mother     Cataracts Father     Breast cancer Sister     Glaucoma Neg Hx     Macular degeneration  "Neg Hx     Retinal detachment Neg Hx        Social History     Socioeconomic History    Marital status:    Tobacco Use    Smoking status: Former Smoker    Smokeless tobacco: Never Used    Tobacco comment: Former cigar use   Substance and Sexual Activity    Alcohol use: Yes     Alcohol/week: 2.0 standard drinks     Types: 2 Cans of beer per week     Comment: "2 beers a week"    Drug use: No    Sexual activity: Yes     Partners: Female   Social History Narrative    Retired former radiation  at [employer]       Current Outpatient Medications   Medication Sig Dispense Refill    amoxicillin-clavulanate 875-125mg (AUGMENTIN) 875-125 mg per tablet Take 1 tablet by mouth every 12 (twelve) hours. 14 tablet 0    atorvastatin (LIPITOR) 40 MG tablet TAKE 1 TABLET(40 MG) BY MOUTH EVERY DAY 90 tablet 3    BD BETSY 2ND GEN PEN NEEDLE 32 gauge x 5/32" Ndle       diclofenac (VOLTAREN) 0.1 % ophthalmic solution Place 1 drop into the left eye 4 (four) times daily.      dorzolamide-timolol 2-0.5% (COSOPT) 22.3-6.8 mg/mL ophthalmic solution Place 1 drop into both eyes 2 (two) times daily. 10 mL 3    FLUoxetine 20 MG capsule TAKE ONE CAPSULE BY MOUTH EVERY DAY 90 capsule 3    GLUCAGEN HYPOKIT 1 mg SolR Inject 1 mg into the muscle as needed (for severe hypoglycemia). 1 each 0    insulin aspart U-100 (NOVOLOG) 100 unit/mL (3 mL) InPn pen INJECT USING INSULIN:CARB RATIO OF 1:5 BEFORE A MEAL. MAX TOTAL DAILY DOSE 40 UNITS 45 mL 3    insulin glargine U-300 conc (TOUJEO MAX U-300 SOLOSTAR) 300 unit/mL (3 mL) insulin pen ADMINISTER 26 UNITS UNDER THE SKIN EVERY EVENING 4 pen 3    losartan (COZAAR) 100 MG tablet TAKE 1 TABLET(100 MG) BY MOUTH EVERY DAY 90 tablet 3    moxifloxacin (VIGAMOX) 0.5 % ophthalmic solution Place 1 drop into the left eye 4 (four) times daily.      pen needle, diabetic (BD ULTRA-FINE MINI PEN NEEDLE) 31 gauge x 3/16" Ndle USE 1 FOUR TIMES DAILY 150 each 11    prednisoLONE " acetate (PRED FORTE) 1 % DrpS Place 1 drop into the left eye 4 (four) times daily.      sulfamethoxazole-trimethoprim 800-160mg (BACTRIM DS) 800-160 mg Tab TAKE 1 TABLET BY MOUTH TWICE DAILY 14 tablet 0     Current Facility-Administered Medications   Medication Dose Route Frequency Provider Last Rate Last Admin    glucagon (human recombinant) injection 1 mg  1 mg Intramuscular Once Tania Mckeon NP         Facility-Administered Medications Ordered in Other Visits   Medication Dose Route Frequency Provider Last Rate Last Admin    diphenhydrAMINE injection 12.5 mg  12.5 mg Intravenous Once PRN Lauro Zambrano MD        electrolyte-S (ISOLYTE)   Intravenous Continuous Jostin Resendiz MD        electrolyte-S (ISOLYTE)   Intravenous Continuous Lauro Zambrano MD        HYDROmorphone (PF) injection 0.2 mg  0.2 mg Intravenous Q5 Min PRN Lauro Zambrano MD        HYDROmorphone (PF) injection 0.2 mg  0.2 mg Intravenous Q5 Min PRN Lauro Zambrano MD        lactated ringers infusion   Intravenous Continuous Jostin Resendiz MD 10 mL/hr at 07/02/20 1232 New Bag at 10/14/21 1355    lactated ringers infusion  10 mL/hr Intravenous Continuous Lauro Zambrano MD        lactated ringers infusion  500 mL Intravenous Once Lauro Zambrano MD        lidocaine (PF) 10 mg/ml (1%) injection 10 mg  1 mL Intradermal Once Jostin Resendiz MD        LIDOcaine (PF) 10 mg/ml (1%) injection 10 mg  1 mL Intradermal Once Lauro Zambrano MD        lorazepam injection 0.25 mg  0.25 mg Intravenous Once PRN Lauro Zambrano MD        oxyCODONE immediate release tablet 5 mg  5 mg Oral Once PRN Lauro Zambrano MD        prochlorperazine injection Soln 5 mg  5 mg Intravenous Q30 Min PRN Lauro Zambrano MD        sodium chloride 0.9% flush 3 mL  3 mL Intravenous Q8H Lauro Zambrano MD           Review of patient's allergies indicates:   Allergen Reactions    Altace [ramipril]       Cough         Review of Systems   Constitution: Negative for chills and fever.   Cardiovascular: Negative for claudication and leg swelling.   Skin: Negative for color change, dry skin and poor wound healing.   Musculoskeletal: Negative for joint pain, joint swelling, muscle cramps and muscle weakness.   Neurological: Positive for numbness.   Psychiatric/Behavioral: Negative for altered mental status.           Objective:      Physical Exam   Constitutional: He appears well-developed and well-nourished. No distress.   Cardiovascular:   Pulses:       Dorsalis pedis pulses are 2+ on the right side, and 2+ on the left side.        Posterior tibial pulses are 1+ on the right side, and 1+ on the left side.   CFT is < 3 seconds bilateral.  Pedal hair growth is present bilateral.  Varicosities noted bilateral.  No lower extremity edema noted.  Toes are warm to touch bilateral.     Musculoskeletal: He exhibits no edema or tenderness.   Muscle strength 5/5 in all muscle groups bilateral.  No tenderness nor crepitation with ROM of foot/ankle joints bilateral.  No tenderness with palpation of bilateral foot and ankle.  Semi-reducible contracture of the lesser digits bilateral.   Amputation of Rt. 3rd toe.  Neurological: He has normal strength. A sensory deficit is present.   Protective sensation per [place]-Jayson monofilament is decreased bilateral.  Light touch is intact bilateral.   Skin: Skin is warm and dry. Capillary refill takes less than 2 seconds. Lesion noted. No abrasion, no burn, no ecchymosis, no laceration and no petechiae noted. He is not diaphoretic.   Location: Open wound noted to the plantar aspect of the Lt. hallux  Base: Down to dermis and comprised of fibrin.    Drainage: None  Skylar wound: Devoid of erythema, edema, fluctuance, purulence, and malodor.   Pre-debridement measurement: 0.1 x 0.1 x 0.1cm  Post-debridement measurement: 0.3 x 0.3 x 0.1cm            Assessment:       Encounter Diagnoses   Name  Primary?    Diabetic ulcer of toe of left foot associated with type 1 diabetes mellitus, limited to breakdown of skin Yes    Type 1 diabetes mellitus with polyneuropathy          Plan:       Diagnoses and all orders for this visit:    Diabetic ulcer of toe of left foot associated with type 1 diabetes mellitus, limited to breakdown of skin  -     Wound Debridement    Type 1 diabetes mellitus with polyneuropathy      I counseled the patient on his conditions, their implications and medical management.    Performed a selective excisional debridement of the Lt. foot.  See attached procedure note.      The wound base was covered with sawyer and a band aid.     Fitted and dispensed a new toe sleeve to worn at all times while ambulatory.      To continue use of Hoka shoes, as this will limit pressure to the forefoot.    Instructed to continue changing his bandage every two days.    Instructed to call if the site begins to appear infected.    RTC in 2 weeks for follow up.    Caleb Duffy DPM

## 2022-02-28 ENCOUNTER — PATIENT MESSAGE (OUTPATIENT)
Dept: OPHTHALMOLOGY | Facility: CLINIC | Age: 67
End: 2022-02-28

## 2022-03-04 ENCOUNTER — OFFICE VISIT (OUTPATIENT)
Dept: OPHTHALMOLOGY | Facility: CLINIC | Age: 67
End: 2022-03-04
Payer: COMMERCIAL

## 2022-03-04 DIAGNOSIS — Z98.42 STATUS POST CATARACT EXTRACTION AND INSERTION OF INTRAOCULAR LENS OF LEFT EYE: Primary | ICD-10-CM

## 2022-03-04 DIAGNOSIS — Z96.1 STATUS POST CATARACT EXTRACTION AND INSERTION OF INTRAOCULAR LENS OF LEFT EYE: Primary | ICD-10-CM

## 2022-03-04 PROCEDURE — 99024 PR POST-OP FOLLOW-UP VISIT: ICD-10-PCS | Mod: S$GLB,,, | Performed by: OPHTHALMOLOGY

## 2022-03-04 PROCEDURE — 4010F ACE/ARB THERAPY RXD/TAKEN: CPT | Mod: CPTII,S$GLB,, | Performed by: OPHTHALMOLOGY

## 2022-03-04 PROCEDURE — 99999 PR PBB SHADOW E&M-EST. PATIENT-LVL III: CPT | Mod: PBBFAC,,, | Performed by: OPHTHALMOLOGY

## 2022-03-04 PROCEDURE — 1159F PR MEDICATION LIST DOCUMENTED IN MEDICAL RECORD: ICD-10-PCS | Mod: CPTII,S$GLB,, | Performed by: OPHTHALMOLOGY

## 2022-03-04 PROCEDURE — 1160F RVW MEDS BY RX/DR IN RCRD: CPT | Mod: CPTII,S$GLB,, | Performed by: OPHTHALMOLOGY

## 2022-03-04 PROCEDURE — 1160F PR REVIEW ALL MEDS BY PRESCRIBER/CLIN PHARMACIST DOCUMENTED: ICD-10-PCS | Mod: CPTII,S$GLB,, | Performed by: OPHTHALMOLOGY

## 2022-03-04 PROCEDURE — 3051F PR MOST RECENT HEMOGLOBIN A1C LEVEL 7.0 - < 8.0%: ICD-10-PCS | Mod: CPTII,S$GLB,, | Performed by: OPHTHALMOLOGY

## 2022-03-04 PROCEDURE — 99024 POSTOP FOLLOW-UP VISIT: CPT | Mod: S$GLB,,, | Performed by: OPHTHALMOLOGY

## 2022-03-04 PROCEDURE — 1159F MED LIST DOCD IN RCRD: CPT | Mod: CPTII,S$GLB,, | Performed by: OPHTHALMOLOGY

## 2022-03-04 PROCEDURE — 3051F HG A1C>EQUAL 7.0%<8.0%: CPT | Mod: CPTII,S$GLB,, | Performed by: OPHTHALMOLOGY

## 2022-03-04 PROCEDURE — 99999 PR PBB SHADOW E&M-EST. PATIENT-LVL III: ICD-10-PCS | Mod: PBBFAC,,, | Performed by: OPHTHALMOLOGY

## 2022-03-04 PROCEDURE — 4010F PR ACE/ARB THEARPY RXD/TAKEN: ICD-10-PCS | Mod: CPTII,S$GLB,, | Performed by: OPHTHALMOLOGY

## 2022-03-04 RX ORDER — DORZOLAMIDE HCL 20 MG/ML
1 SOLUTION/ DROPS OPHTHALMIC 2 TIMES DAILY
Qty: 10 ML | Refills: 1 | Status: SHIPPED | OUTPATIENT
Start: 2022-03-04 | End: 2022-04-22

## 2022-03-04 NOTE — PROGRESS NOTES
HPI     Pt here for 1 wk PO OS s/p cat sx.     Pt is using Moxi, Pred, and Diclo QID OS. Pt using Combigan BID OS. Pt sts   VA is stable since sx. Pt has not had any pain.     Last edited by Nallely Griffith on 3/4/2022 10:05 AM. (History)            Assessment /Plan     For exam results, see Encounter Report.    Status post cataract extraction and insertion of intraocular lens of left eye  -     dorzolamide (TRUSOPT) 2 % ophthalmic solution; Place 1 drop into the left eye 2 (two) times daily.  Dispense: 10 mL; Refill: 1      IOP OS 26   Start Dorzolamide 1 drop 2 x daily left eye  Continue Combigan 1 drop 2 x daily left eye  Stop Moxifloxacin  Decrease PF and Diclofenac 1x daily left  No follow-ups on file.

## 2022-03-07 ENCOUNTER — PATIENT MESSAGE (OUTPATIENT)
Dept: PODIATRY | Facility: CLINIC | Age: 67
End: 2022-03-07
Payer: COMMERCIAL

## 2022-03-10 ENCOUNTER — PATIENT OUTREACH (OUTPATIENT)
Dept: ADMINISTRATIVE | Facility: OTHER | Age: 67
End: 2022-03-10
Payer: COMMERCIAL

## 2022-03-10 NOTE — PROGRESS NOTES
Health Maintenance Due   Topic Date Due    TETANUS VACCINE  Never done    Shingles Vaccine (1 of 2) Never done    Foot Exam  03/10/2021    COVID-19 Vaccine (4 - Booster for Pfizer series) 03/01/2022     Updates were requested from care everywhere.  Chart was reviewed for overdue Proactive [facility] Encounters (ADRIANA) topics (CRS, Breast Cancer Screening, Eye exam)  Health Maintenance has been updated.  LINKS immunization registry triggered.  Immunizations were reconciled.

## 2022-03-14 ENCOUNTER — OFFICE VISIT (OUTPATIENT)
Dept: PODIATRY | Facility: CLINIC | Age: 67
End: 2022-03-14
Payer: COMMERCIAL

## 2022-03-14 VITALS — HEIGHT: 74 IN | BODY MASS INDEX: 27.59 KG/M2 | WEIGHT: 214.94 LBS

## 2022-03-14 DIAGNOSIS — E10.42 TYPE 1 DIABETES MELLITUS WITH POLYNEUROPATHY: ICD-10-CM

## 2022-03-14 DIAGNOSIS — T14.8XXD DELAYED WOUND HEALING: ICD-10-CM

## 2022-03-14 DIAGNOSIS — E10.621 DIABETIC ULCER OF TOE OF LEFT FOOT ASSOCIATED WITH TYPE 1 DIABETES MELLITUS, LIMITED TO BREAKDOWN OF SKIN: Primary | ICD-10-CM

## 2022-03-14 DIAGNOSIS — L97.521 DIABETIC ULCER OF TOE OF LEFT FOOT ASSOCIATED WITH TYPE 1 DIABETES MELLITUS, LIMITED TO BREAKDOWN OF SKIN: Primary | ICD-10-CM

## 2022-03-14 PROCEDURE — 99499 UNLISTED E&M SERVICE: CPT | Mod: S$GLB,,, | Performed by: PODIATRIST

## 2022-03-14 PROCEDURE — 99999 PR PBB SHADOW E&M-EST. PATIENT-LVL II: ICD-10-PCS | Mod: PBBFAC,,, | Performed by: PODIATRIST

## 2022-03-14 PROCEDURE — 1159F PR MEDICATION LIST DOCUMENTED IN MEDICAL RECORD: ICD-10-PCS | Mod: CPTII,S$GLB,, | Performed by: PODIATRIST

## 2022-03-14 PROCEDURE — 97597 DBRDMT OPN WND 1ST 20 CM/<: CPT | Mod: S$GLB,,, | Performed by: PODIATRIST

## 2022-03-14 PROCEDURE — 87075 CULTR BACTERIA EXCEPT BLOOD: CPT | Performed by: PODIATRIST

## 2022-03-14 PROCEDURE — 3008F BODY MASS INDEX DOCD: CPT | Mod: CPTII,S$GLB,, | Performed by: PODIATRIST

## 2022-03-14 PROCEDURE — 99999 PR PBB SHADOW E&M-EST. PATIENT-LVL II: CPT | Mod: PBBFAC,,, | Performed by: PODIATRIST

## 2022-03-14 PROCEDURE — 87070 CULTURE OTHR SPECIMN AEROBIC: CPT | Performed by: PODIATRIST

## 2022-03-14 PROCEDURE — 4010F ACE/ARB THERAPY RXD/TAKEN: CPT | Mod: CPTII,S$GLB,, | Performed by: PODIATRIST

## 2022-03-14 PROCEDURE — 3051F PR MOST RECENT HEMOGLOBIN A1C LEVEL 7.0 - < 8.0%: ICD-10-PCS | Mod: CPTII,S$GLB,, | Performed by: PODIATRIST

## 2022-03-14 PROCEDURE — 99499 NO LOS: ICD-10-PCS | Mod: S$GLB,,, | Performed by: PODIATRIST

## 2022-03-14 PROCEDURE — 87077 CULTURE AEROBIC IDENTIFY: CPT | Performed by: PODIATRIST

## 2022-03-14 PROCEDURE — 3051F HG A1C>EQUAL 7.0%<8.0%: CPT | Mod: CPTII,S$GLB,, | Performed by: PODIATRIST

## 2022-03-14 PROCEDURE — 87186 SC STD MICRODIL/AGAR DIL: CPT | Performed by: PODIATRIST

## 2022-03-14 PROCEDURE — 1159F MED LIST DOCD IN RCRD: CPT | Mod: CPTII,S$GLB,, | Performed by: PODIATRIST

## 2022-03-14 PROCEDURE — 3008F PR BODY MASS INDEX (BMI) DOCUMENTED: ICD-10-PCS | Mod: CPTII,S$GLB,, | Performed by: PODIATRIST

## 2022-03-14 PROCEDURE — 97597 WOUND DEBRIDEMENT: ICD-10-PCS | Mod: S$GLB,,, | Performed by: PODIATRIST

## 2022-03-14 PROCEDURE — 4010F PR ACE/ARB THEARPY RXD/TAKEN: ICD-10-PCS | Mod: CPTII,S$GLB,, | Performed by: PODIATRIST

## 2022-03-14 NOTE — PROGRESS NOTES
Subjective:      Patient ID: Hammad Douglas III is a 66 y.o. male.    Chief Complaint: Wound Care  Patient presents to clinic for a two week wound check of the Lt. Hallux.  Denies pain from the site with today's exam.  Notes continued drainage from the wound onto a band aid.  Denies noticing redness or swelling to the digit.   Has been keeping the site covered with sawyer and a band aid every two days. Continues use of Hoka shoes to offload the affected toe.  Denies experiencing N/V/F/C/D. Denies any additional pedal complaints.    Past Medical History:   Diagnosis Date    Cataract     Done OU    Chronic kidney disease     Diabetes mellitus type I     Diagnosed at age 19    Diabetic retinopathy     See's Dr. Ledesma    Hyperlipidemia     Hypertension        Past Surgical History:   Procedure Laterality Date    CATARACT EXTRACTION W/  INTRAOCULAR LENS IMPLANT Right 09/28/2021    Dr Solis    CATARACT EXTRACTION W/  INTRAOCULAR LENS IMPLANT Left 02/22/2022    Dr Solis    COLONOSCOPY      2011 wnl    PHACOEMULSIFICATION OF CATARACT Right 9/28/2021    Procedure: PHACOEMULSIFICATION, CATARACT;  Surgeon: Vicente Solis Jr., MD;  Location: [facility] OR;  Service: Ophthalmology;  Laterality: Right;  DM/right    PHACOEMULSIFICATION OF CATARACT Left 2/22/2022    Procedure: PHACOEMULSIFICATION, CATARACT;  Surgeon: Vicente Solis Jr., MD;  Location: [facility] OR;  Service: Ophthalmology;  Laterality: Left;  left    SURGICAL REMOVAL OF BONE SPUR Left 10/14/2021    Procedure: EXCISION, BONE SPUR;  Surgeon: Caleb Duffy DPM;  Location: [facility] OR;  Service: Podiatry;  Laterality: Left;    TOE AMPUTATION Right 7/2/2020    Procedure: AMPUTATION, TOE; 3rd;  Surgeon: Caleb Duffy DPM;  Location: [facility] OR;  Service: Podiatry;  Laterality: Right;    TOE SURGERY Right     right big toe       Family History   Problem Relation Age of Onset    Cataracts Mother     Cataracts Father     Breast cancer Sister     Glaucoma Neg Hx   "   Macular degeneration Neg Hx     Retinal detachment Neg Hx        Social History     Socioeconomic History    Marital status:    Tobacco Use    Smoking status: Former Smoker    Smokeless tobacco: Never Used    Tobacco comment: Former cigar use   Substance and Sexual Activity    Alcohol use: Yes     Alcohol/week: 2.0 standard drinks     Types: 2 Cans of beer per week     Comment: "2 beers a week"    Drug use: No    Sexual activity: Yes     Partners: Female   Social History Narrative    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 (Patient not taking: Reported on 3/4/2022) 14 tablet 0    atorvastatin (LIPITOR) 40 MG tablet TAKE 1 TABLET(40 MG) BY MOUTH EVERY DAY 90 tablet 3    BD BETSY 2ND GEN PEN NEEDLE 32 gauge x 5/32" Ndle       diclofenac (VOLTAREN) 0.1 % ophthalmic solution Place 1 drop into the left eye 4 (four) times daily.      dorzolamide (TRUSOPT) 2 % ophthalmic solution Place 1 drop into the left eye 2 (two) times daily. 10 mL 1    FLUoxetine 20 MG capsule TAKE ONE CAPSULE BY MOUTH EVERY DAY 90 capsule 3    GLUCAGEN HYPOKIT 1 mg SolR Inject 1 mg into the muscle as needed (for severe hypoglycemia). 1 each 0    insulin aspart U-100 (NOVOLOG) 100 unit/mL (3 mL) InPn pen INJECT USING INSULIN:CARB RATIO OF 1:5 BEFORE A MEAL. MAX TOTAL DAILY DOSE 40 UNITS 45 mL 3    insulin glargine U-300 conc (TOUJEO MAX U-300 SOLOSTAR) 300 unit/mL (3 mL) insulin pen ADMINISTER 26 UNITS UNDER THE SKIN EVERY EVENING 4 pen 3    losartan (COZAAR) 100 MG tablet TAKE 1 TABLET(100 MG) BY MOUTH EVERY DAY 90 tablet 3    moxifloxacin (VIGAMOX) 0.5 % ophthalmic solution Place 1 drop into the left eye 4 (four) times daily.      pen needle, diabetic (BD ULTRA-FINE MINI PEN NEEDLE) 31 gauge x 3/16" Ndle USE 1 FOUR " TIMES DAILY 150 each 11    prednisoLONE acetate (PRED FORTE) 1 % DrpS Place 1 drop into the left eye 4 (four) times daily.      sulfamethoxazole-trimethoprim 800-160mg (BACTRIM DS) 800-160 mg Tab TAKE 1 TABLET BY MOUTH TWICE DAILY 14 tablet 0     Current Facility-Administered Medications   Medication Dose Route Frequency Provider Last Rate Last Admin    glucagon (human recombinant) injection 1 mg  1 mg Intramuscular Once Tania Mckeon NP         Facility-Administered Medications Ordered in Other Visits   Medication Dose Route Frequency Provider Last Rate Last Admin    diphenhydrAMINE injection 12.5 mg  12.5 mg Intravenous Once PRN Lauro Zambrano MD        electrolyte-S (ISOLYTE)   Intravenous Continuous Jostin Resendiz MD        electrolyte-S (ISOLYTE)   Intravenous Continuous Lauro Zambrano MD        HYDROmorphone (PF) injection 0.2 mg  0.2 mg Intravenous Q5 Min PRN Lauro Zambrano MD        HYDROmorphone (PF) injection 0.2 mg  0.2 mg Intravenous Q5 Min PRN Lauro Zambrano MD        lactated ringers infusion   Intravenous Continuous Jostin Resendiz MD 10 mL/hr at 07/02/20 1232 New Bag at 10/14/21 1355    lactated ringers infusion  10 mL/hr Intravenous Continuous Lauro Zambrano MD        lactated ringers infusion  500 mL Intravenous Once Lauro Zambrano MD        lidocaine (PF) 10 mg/ml (1%) injection 10 mg  1 mL Intradermal Once Jostin Resendiz MD        LIDOcaine (PF) 10 mg/ml (1%) injection 10 mg  1 mL Intradermal Once Lauro Zambrano MD        lorazepam injection 0.25 mg  0.25 mg Intravenous Once PRN Lauro Zambrano MD        oxyCODONE immediate release tablet 5 mg  5 mg Oral Once PRN Lauro Zambrano MD        prochlorperazine injection Soln 5 mg  5 mg Intravenous Q30 Min PRN Lauro Zambrano MD        sodium chloride 0.9% flush 3 mL  3 mL Intravenous Q8H Lauro Zambrano MD           Review of patient's allergies indicates:    Allergen Reactions    Altace [ramipril]      Cough         Review of Systems   Constitution: Negative for chills and fever.   Cardiovascular: Negative for claudication and leg swelling.   Skin: Negative for color change, dry skin and poor wound healing.   Musculoskeletal: Negative for joint pain, joint swelling, muscle cramps and muscle weakness.   Neurological: Positive for numbness.   Psychiatric/Behavioral: Negative for altered mental status.           Objective:      Physical Exam   Constitutional: He appears well-developed and well-nourished. No distress.   Cardiovascular:   Pulses:       Dorsalis pedis pulses are 2+ on the right side, and 2+ on the left side.        Posterior tibial pulses are 1+ on the right side, and 1+ on the left side.   CFT is < 3 seconds bilateral.  Pedal hair growth is present bilateral.  Varicosities noted bilateral.  No lower extremity edema noted.  Toes are warm to touch bilateral.     Musculoskeletal: He exhibits no edema or tenderness.   Muscle strength 5/5 in all muscle groups bilateral.  No tenderness nor crepitation with ROM of foot/ankle joints bilateral.  No tenderness with palpation of bilateral foot and ankle.  Semi-reducible contracture of the lesser digits bilateral.   Amputation of Rt. 3rd toe.  Neurological: He has normal strength. A sensory deficit is present.   Protective sensation per [place]-Jayson monofilament is decreased bilateral.  Light touch is intact bilateral.   Skin: Skin is warm and dry. Capillary refill takes less than 2 seconds. Lesion noted. No abrasion, no burn, no ecchymosis, no laceration and no petechiae noted. He is not diaphoretic.   Location: Open wound noted to the plantar aspect of the Lt. hallux  Base: Down to dermis and comprised of fibrin.    Drainage: None  Skylar wound: Devoid of erythema, edema, fluctuance, purulence, and malodor.   Pre-debridement measurement: 0.2 x 0.2 x 0.1cm  Post-debridement measurement: 0.4 x 0.4 x 0.1cm             Assessment:       Encounter Diagnoses   Name Primary?    Diabetic ulcer of toe of left foot associated with type 1 diabetes mellitus, limited to breakdown of skin Yes    Type 1 diabetes mellitus with polyneuropathy     Delayed wound healing          Plan:       Hammad was seen today for wound care.    Diagnoses and all orders for this visit:    Diabetic ulcer of toe of left foot associated with type 1 diabetes mellitus, limited to breakdown of skin  -     Aerobic culture  -     Culture, Anaerobic  -     Wound Debridement    Type 1 diabetes mellitus with polyneuropathy    Delayed wound healing      I counseled the patient on his conditions, their implications and medical management.    Performed a selective excisional debridement of the Lt. foot.  See attached procedure note.      Wound cultures were obtained.    The wound base was covered with iodosorb ointment and a band aid.     Fitted and dispensed a new toe sleeve to worn at all times while ambulatory.      To continue use of Hoka shoes, as this will limit pressure to the forefoot.    Instructed to continue changing his bandage every two days.  To continue applying sawyer to the site.    Instructed to call if the site begins to appear infected.    Will place a consult with Dr. Adams for a 2nd opinion.    RTC in 2 weeks for follow up.    Caleb Duffy DPM

## 2022-03-14 NOTE — PROCEDURES
Wound Debridement    Date/Time: 3/14/2022 8:00 AM  Performed by: Caleb Duffy DPM  Authorized by: Caleb Duffy DPM     Consent Done?:  Yes (Verbal)    Preparation: Patient was prepped and draped in usual sterile fashion    Local anesthesia used?: No      Wound Details:    Location:  Left foot    Location:  Left 1st Toe    Type of Debridement:  Excisional       Length (cm):  0.2       Area (sq cm):  0.04       Width (cm):  0.2       Percent Debrided (%):  100       Depth (cm):  0.1       Total Area Debrided (sq cm):  0.04    Depth of debridement:  Epidermis/Dermis    Tissue debrided:  Dermis    Devitalized tissue debrided:  Fibrin    Instruments:  Blade    Bleeding:  Minimal  Hemostasis Achieved: Yes    Method Used:  Pressure  Patient tolerance:  Patient tolerated the procedure well with no immediate complications

## 2022-03-18 LAB — BACTERIA SPEC AEROBE CULT: ABNORMAL

## 2022-03-20 DIAGNOSIS — E11.628 DIABETIC FOOT INFECTION: Primary | ICD-10-CM

## 2022-03-20 DIAGNOSIS — L08.9 DIABETIC FOOT INFECTION: Primary | ICD-10-CM

## 2022-03-20 RX ORDER — CIPROFLOXACIN 250 MG/1
250 TABLET, FILM COATED ORAL 2 TIMES DAILY
Qty: 14 TABLET | Refills: 0 | Status: SHIPPED | OUTPATIENT
Start: 2022-03-20 | End: 2022-04-22

## 2022-03-21 LAB — BACTERIA SPEC ANAEROBE CULT: NORMAL

## 2022-03-27 ENCOUNTER — PATIENT MESSAGE (OUTPATIENT)
Dept: AUDIOLOGY | Facility: CLINIC | Age: 67
End: 2022-03-27
Payer: COMMERCIAL

## 2022-03-28 ENCOUNTER — OFFICE VISIT (OUTPATIENT)
Dept: OPHTHALMOLOGY | Facility: CLINIC | Age: 67
End: 2022-03-28
Payer: COMMERCIAL

## 2022-03-28 DIAGNOSIS — Z98.42 STATUS POST CATARACT EXTRACTION AND INSERTION OF INTRAOCULAR LENS OF LEFT EYE: Primary | ICD-10-CM

## 2022-03-28 DIAGNOSIS — Z98.41 STATUS POST CATARACT EXTRACTION AND INSERTION OF INTRAOCULAR LENS OF RIGHT EYE: ICD-10-CM

## 2022-03-28 DIAGNOSIS — Z96.1 STATUS POST CATARACT EXTRACTION AND INSERTION OF INTRAOCULAR LENS OF RIGHT EYE: ICD-10-CM

## 2022-03-28 DIAGNOSIS — H26.493 PCO (POSTERIOR CAPSULAR OPACIFICATION), BILATERAL: ICD-10-CM

## 2022-03-28 DIAGNOSIS — Z96.1 STATUS POST CATARACT EXTRACTION AND INSERTION OF INTRAOCULAR LENS OF LEFT EYE: Primary | ICD-10-CM

## 2022-03-28 PROCEDURE — 99999 PR PBB SHADOW E&M-EST. PATIENT-LVL III: ICD-10-PCS | Mod: PBBFAC,,, | Performed by: OPHTHALMOLOGY

## 2022-03-28 PROCEDURE — 99999 PR PBB SHADOW E&M-EST. PATIENT-LVL III: CPT | Mod: PBBFAC,,, | Performed by: OPHTHALMOLOGY

## 2022-03-28 PROCEDURE — 3051F PR MOST RECENT HEMOGLOBIN A1C LEVEL 7.0 - < 8.0%: ICD-10-PCS | Mod: CPTII,S$GLB,, | Performed by: OPHTHALMOLOGY

## 2022-03-28 PROCEDURE — 99024 POSTOP FOLLOW-UP VISIT: CPT | Mod: S$GLB,,, | Performed by: OPHTHALMOLOGY

## 2022-03-28 PROCEDURE — 4010F ACE/ARB THERAPY RXD/TAKEN: CPT | Mod: CPTII,S$GLB,, | Performed by: OPHTHALMOLOGY

## 2022-03-28 PROCEDURE — 99024 PR POST-OP FOLLOW-UP VISIT: ICD-10-PCS | Mod: S$GLB,,, | Performed by: OPHTHALMOLOGY

## 2022-03-28 PROCEDURE — 1159F MED LIST DOCD IN RCRD: CPT | Mod: CPTII,S$GLB,, | Performed by: OPHTHALMOLOGY

## 2022-03-28 PROCEDURE — 1160F PR REVIEW ALL MEDS BY PRESCRIBER/CLIN PHARMACIST DOCUMENTED: ICD-10-PCS | Mod: CPTII,S$GLB,, | Performed by: OPHTHALMOLOGY

## 2022-03-28 PROCEDURE — 1159F PR MEDICATION LIST DOCUMENTED IN MEDICAL RECORD: ICD-10-PCS | Mod: CPTII,S$GLB,, | Performed by: OPHTHALMOLOGY

## 2022-03-28 PROCEDURE — 3051F HG A1C>EQUAL 7.0%<8.0%: CPT | Mod: CPTII,S$GLB,, | Performed by: OPHTHALMOLOGY

## 2022-03-28 PROCEDURE — 1160F RVW MEDS BY RX/DR IN RCRD: CPT | Mod: CPTII,S$GLB,, | Performed by: OPHTHALMOLOGY

## 2022-03-28 PROCEDURE — 4010F PR ACE/ARB THEARPY RXD/TAKEN: ICD-10-PCS | Mod: CPTII,S$GLB,, | Performed by: OPHTHALMOLOGY

## 2022-03-28 NOTE — PROGRESS NOTES
HPI     Post-op Evaluation      Additional comments: 1 month s/p phaco iol OS              Comments     Pt states vision doing well OS. Still seeing the light in his vision.           Last edited by Cheryle Quintana on 3/28/2022  2:43 PM. (History)        ROS     Negative for: Constitutional, Gastrointestinal, Neurological, Skin,   Genitourinary, Musculoskeletal, HENT, Endocrine, Cardiovascular, Eyes,   Respiratory, Psychiatric, Allergic/Imm, Heme/Lymph    Last edited by Vicente Solis Jr., MD on 3/28/2022  6:24 PM. (History)        Assessment /Plan     For exam results, see Encounter Report.    Status post cataract extraction and insertion of intraocular lens of left eye    Status post cataract extraction and insertion of intraocular lens of right eye    PCO (posterior capsular opacification), bilateral      Symptomatic PCO  Will hold off on glasses  Schedule YAG OU  D/c drops  Follow up in about 2 months (around 5/28/2022) for followup YAG OU.

## 2022-03-29 ENCOUNTER — OFFICE VISIT (OUTPATIENT)
Dept: PODIATRY | Facility: CLINIC | Age: 67
End: 2022-03-29
Payer: COMMERCIAL

## 2022-03-29 ENCOUNTER — PATIENT MESSAGE (OUTPATIENT)
Dept: ENDOCRINOLOGY | Facility: CLINIC | Age: 67
End: 2022-03-29
Payer: COMMERCIAL

## 2022-03-29 VITALS — HEIGHT: 74 IN | WEIGHT: 214.94 LBS | BODY MASS INDEX: 27.59 KG/M2

## 2022-03-29 DIAGNOSIS — E10.621 DIABETIC ULCER OF TOE OF LEFT FOOT ASSOCIATED WITH TYPE 1 DIABETES MELLITUS, LIMITED TO BREAKDOWN OF SKIN: ICD-10-CM

## 2022-03-29 DIAGNOSIS — L97.521 DIABETIC ULCER OF TOE OF LEFT FOOT ASSOCIATED WITH TYPE 1 DIABETES MELLITUS, LIMITED TO BREAKDOWN OF SKIN: ICD-10-CM

## 2022-03-29 DIAGNOSIS — E10.42 TYPE 1 DIABETES MELLITUS WITH POLYNEUROPATHY: ICD-10-CM

## 2022-03-29 DIAGNOSIS — T14.8XXD DELAYED WOUND HEALING: Primary | ICD-10-CM

## 2022-03-29 PROCEDURE — 4010F ACE/ARB THERAPY RXD/TAKEN: CPT | Mod: CPTII,S$GLB,, | Performed by: STUDENT IN AN ORGANIZED HEALTH CARE EDUCATION/TRAINING PROGRAM

## 2022-03-29 PROCEDURE — 3051F HG A1C>EQUAL 7.0%<8.0%: CPT | Mod: CPTII,S$GLB,, | Performed by: STUDENT IN AN ORGANIZED HEALTH CARE EDUCATION/TRAINING PROGRAM

## 2022-03-29 PROCEDURE — 99214 PR OFFICE/OUTPT VISIT, EST, LEVL IV, 30-39 MIN: ICD-10-PCS | Mod: 25,S$GLB,, | Performed by: STUDENT IN AN ORGANIZED HEALTH CARE EDUCATION/TRAINING PROGRAM

## 2022-03-29 PROCEDURE — 1159F MED LIST DOCD IN RCRD: CPT | Mod: CPTII,S$GLB,, | Performed by: STUDENT IN AN ORGANIZED HEALTH CARE EDUCATION/TRAINING PROGRAM

## 2022-03-29 PROCEDURE — 11042 DBRDMT SUBQ TIS 1ST 20SQCM/<: CPT | Mod: S$GLB,,, | Performed by: STUDENT IN AN ORGANIZED HEALTH CARE EDUCATION/TRAINING PROGRAM

## 2022-03-29 PROCEDURE — 99214 OFFICE O/P EST MOD 30 MIN: CPT | Mod: 25,S$GLB,, | Performed by: STUDENT IN AN ORGANIZED HEALTH CARE EDUCATION/TRAINING PROGRAM

## 2022-03-29 PROCEDURE — 99999 PR PBB SHADOW E&M-EST. PATIENT-LVL III: CPT | Mod: PBBFAC,,, | Performed by: STUDENT IN AN ORGANIZED HEALTH CARE EDUCATION/TRAINING PROGRAM

## 2022-03-29 PROCEDURE — 3008F PR BODY MASS INDEX (BMI) DOCUMENTED: ICD-10-PCS | Mod: CPTII,S$GLB,, | Performed by: STUDENT IN AN ORGANIZED HEALTH CARE EDUCATION/TRAINING PROGRAM

## 2022-03-29 PROCEDURE — 11042 WOUND DEBRIDEMENT: ICD-10-PCS | Mod: S$GLB,,, | Performed by: STUDENT IN AN ORGANIZED HEALTH CARE EDUCATION/TRAINING PROGRAM

## 2022-03-29 PROCEDURE — 3051F PR MOST RECENT HEMOGLOBIN A1C LEVEL 7.0 - < 8.0%: ICD-10-PCS | Mod: CPTII,S$GLB,, | Performed by: STUDENT IN AN ORGANIZED HEALTH CARE EDUCATION/TRAINING PROGRAM

## 2022-03-29 PROCEDURE — 1160F RVW MEDS BY RX/DR IN RCRD: CPT | Mod: CPTII,S$GLB,, | Performed by: STUDENT IN AN ORGANIZED HEALTH CARE EDUCATION/TRAINING PROGRAM

## 2022-03-29 PROCEDURE — 1160F PR REVIEW ALL MEDS BY PRESCRIBER/CLIN PHARMACIST DOCUMENTED: ICD-10-PCS | Mod: CPTII,S$GLB,, | Performed by: STUDENT IN AN ORGANIZED HEALTH CARE EDUCATION/TRAINING PROGRAM

## 2022-03-29 PROCEDURE — 4010F PR ACE/ARB THEARPY RXD/TAKEN: ICD-10-PCS | Mod: CPTII,S$GLB,, | Performed by: STUDENT IN AN ORGANIZED HEALTH CARE EDUCATION/TRAINING PROGRAM

## 2022-03-29 PROCEDURE — 3008F BODY MASS INDEX DOCD: CPT | Mod: CPTII,S$GLB,, | Performed by: STUDENT IN AN ORGANIZED HEALTH CARE EDUCATION/TRAINING PROGRAM

## 2022-03-29 PROCEDURE — 99999 PR PBB SHADOW E&M-EST. PATIENT-LVL III: ICD-10-PCS | Mod: PBBFAC,,, | Performed by: STUDENT IN AN ORGANIZED HEALTH CARE EDUCATION/TRAINING PROGRAM

## 2022-03-29 PROCEDURE — 1159F PR MEDICATION LIST DOCUMENTED IN MEDICAL RECORD: ICD-10-PCS | Mod: CPTII,S$GLB,, | Performed by: STUDENT IN AN ORGANIZED HEALTH CARE EDUCATION/TRAINING PROGRAM

## 2022-03-30 NOTE — PROGRESS NOTES
Subjective:      Patient ID: Hammad Douglas III is a 66 y.o. male.    Chief Complaint: Follow-up (Left great toe wound.) and Diabetes Mellitus    Hammad Douglas III is a 66 y.o. male who  has a past medical history of Cataract, Chronic kidney disease, Diabetes mellitus type I, Diabetic retinopathy, Hyperlipidemia, and Hypertension.    Patient presents today for 2nd opinion for his left great toe wound.  He has been dealing with this for some time and had surgery to remove the bone spur the bottom of the foot.  Following the surgery he did have resolution the wound but is soon returned and has been difficult to heal since.  He has minimal feeling to the feet.      Review of Systems   Skin:        Wound L great toe   Neurological: Positive for numbness.   All other systems reviewed and are negative.          Objective:        Physical Exam  Cardiovascular:      Pulses:           Dorsalis pedis pulses are 2+ on the right side and 2+ on the left side.        Posterior tibial pulses are 2+ on the right side and 2+ on the left side.      Comments: Capillary refill time is brisk < 3 seconds.  No edema to the lower extremities.  Hair growth and distribution is normal.  No significant signs of atrophy appreciated.     Skin:     General: Skin is warm.      Capillary Refill: Capillary refill takes less than 2 seconds.      Comments: Patient has a small ulceration at the IPJ of the left great toe.  Measures about 0.3 x 0.3 x 0.2 cm with exposed subcutaneous tissue.  There is no exposed bone or probe to bone appreciated.  There is a large amount of overlying callus.  There does not appear to be any bony plantar prominence appreciated.               Assessment:       Encounter Diagnoses   Name Primary?    Delayed wound healing Yes    Type 1 diabetes mellitus with polyneuropathy     Diabetic ulcer of toe of left foot associated with type 1 diabetes mellitus, limited to breakdown of skin          Plan:       Hammad was seen today  for follow-up and diabetes mellitus.    Diagnoses and all orders for this visit:    Delayed wound healing    Type 1 diabetes mellitus with polyneuropathy    Diabetic ulcer of toe of left foot associated with type 1 diabetes mellitus, limited to breakdown of skin      I counseled the patient on his conditions, their implications and medical management.    The wound was debrided today of any lose, overlying callus.  We will continue dressing changes that he can do at home himself.  He is confident in his abilities to perform some wound care.  I also dispensed several crest pads to use underneath the great toes to help offload that area.  We will trial this to see if any further offloading needs to be done.    Patient to follow-up in about 2 weeks.    Wound Debridement    Date/Time: 3/29/2022 9:00 AM  Performed by: Tunde Adams DPM  Authorized by: Tunde Adams DPM     Consent Done?:  Yes (Verbal)       Length (cm):  0.3       Area (sq cm):  0.09       Width (cm):  0.3       Percent Debrided (%):  100       Depth (cm):  0.2       Total Area Debrided (sq cm):  0.09    Depth of debridement:  Subcutaneous tissue    Tissue debrided:  Subcutaneous    Devitalized tissue debrided:  Callus and Slough    Instruments:  Nippers, Blade and Curette    Bleeding:  None  Patient tolerance:  Patient tolerated the procedure well with no immediate complications

## 2022-04-01 ENCOUNTER — PATIENT MESSAGE (OUTPATIENT)
Dept: OPHTHALMOLOGY | Facility: CLINIC | Age: 67
End: 2022-04-01
Payer: COMMERCIAL

## 2022-04-01 ENCOUNTER — PATIENT MESSAGE (OUTPATIENT)
Dept: FAMILY MEDICINE | Facility: CLINIC | Age: 67
End: 2022-04-01
Payer: COMMERCIAL

## 2022-04-06 ENCOUNTER — PATIENT MESSAGE (OUTPATIENT)
Dept: AUDIOLOGY | Facility: CLINIC | Age: 67
End: 2022-04-06
Payer: COMMERCIAL

## 2022-04-11 ENCOUNTER — PATIENT OUTREACH (OUTPATIENT)
Dept: ADMINISTRATIVE | Facility: OTHER | Age: 67
End: 2022-04-11
Payer: COMMERCIAL

## 2022-04-11 NOTE — PROGRESS NOTES
Health Maintenance Due   Topic Date Due    TETANUS VACCINE  Never done    Shingles Vaccine (1 of 2) Never done    Foot Exam  03/10/2021    COVID-19 Vaccine (4 - Booster for Pfizer series) 01/01/2022    Diabetes Urine Screening  05/11/2022     Updates were requested from care everywhere.  Chart was reviewed for overdue Proactive [facility] Encounters ([facility]) topics (CRS, Breast Cancer Screening, Eye exam)  Health Maintenance has been updated.  LINKS immunization registry triggered.  Immunizations were reconciled.

## 2022-04-12 ENCOUNTER — OFFICE VISIT (OUTPATIENT)
Dept: PODIATRY | Facility: CLINIC | Age: 67
End: 2022-04-12
Payer: COMMERCIAL

## 2022-04-12 ENCOUNTER — TELEPHONE (OUTPATIENT)
Dept: OPHTHALMOLOGY | Facility: CLINIC | Age: 67
End: 2022-04-12
Payer: COMMERCIAL

## 2022-04-12 ENCOUNTER — PATIENT MESSAGE (OUTPATIENT)
Dept: OPHTHALMOLOGY | Facility: CLINIC | Age: 67
End: 2022-04-12
Payer: COMMERCIAL

## 2022-04-12 DIAGNOSIS — L97.521 DIABETIC ULCER OF TOE OF LEFT FOOT ASSOCIATED WITH TYPE 1 DIABETES MELLITUS, LIMITED TO BREAKDOWN OF SKIN: ICD-10-CM

## 2022-04-12 DIAGNOSIS — E10.621 DIABETIC ULCER OF TOE OF LEFT FOOT ASSOCIATED WITH TYPE 1 DIABETES MELLITUS, LIMITED TO BREAKDOWN OF SKIN: ICD-10-CM

## 2022-04-12 DIAGNOSIS — E10.42 TYPE 1 DIABETES MELLITUS WITH POLYNEUROPATHY: ICD-10-CM

## 2022-04-12 DIAGNOSIS — T14.8XXD DELAYED WOUND HEALING: Primary | ICD-10-CM

## 2022-04-12 PROCEDURE — 1160F RVW MEDS BY RX/DR IN RCRD: CPT | Mod: CPTII,S$GLB,, | Performed by: STUDENT IN AN ORGANIZED HEALTH CARE EDUCATION/TRAINING PROGRAM

## 2022-04-12 PROCEDURE — 11042 DBRDMT SUBQ TIS 1ST 20SQCM/<: CPT | Mod: S$GLB,,, | Performed by: STUDENT IN AN ORGANIZED HEALTH CARE EDUCATION/TRAINING PROGRAM

## 2022-04-12 PROCEDURE — 11042 WOUND DEBRIDEMENT: ICD-10-PCS | Mod: S$GLB,,, | Performed by: STUDENT IN AN ORGANIZED HEALTH CARE EDUCATION/TRAINING PROGRAM

## 2022-04-12 PROCEDURE — 3051F HG A1C>EQUAL 7.0%<8.0%: CPT | Mod: CPTII,S$GLB,, | Performed by: STUDENT IN AN ORGANIZED HEALTH CARE EDUCATION/TRAINING PROGRAM

## 2022-04-12 PROCEDURE — 99999 PR PBB SHADOW E&M-EST. PATIENT-LVL III: ICD-10-PCS | Mod: PBBFAC,,, | Performed by: STUDENT IN AN ORGANIZED HEALTH CARE EDUCATION/TRAINING PROGRAM

## 2022-04-12 PROCEDURE — 99499 UNLISTED E&M SERVICE: CPT | Mod: S$GLB,,, | Performed by: STUDENT IN AN ORGANIZED HEALTH CARE EDUCATION/TRAINING PROGRAM

## 2022-04-12 PROCEDURE — 4010F ACE/ARB THERAPY RXD/TAKEN: CPT | Mod: CPTII,S$GLB,, | Performed by: STUDENT IN AN ORGANIZED HEALTH CARE EDUCATION/TRAINING PROGRAM

## 2022-04-12 PROCEDURE — 99499 NO LOS: ICD-10-PCS | Mod: S$GLB,,, | Performed by: STUDENT IN AN ORGANIZED HEALTH CARE EDUCATION/TRAINING PROGRAM

## 2022-04-12 PROCEDURE — 1159F PR MEDICATION LIST DOCUMENTED IN MEDICAL RECORD: ICD-10-PCS | Mod: CPTII,S$GLB,, | Performed by: STUDENT IN AN ORGANIZED HEALTH CARE EDUCATION/TRAINING PROGRAM

## 2022-04-12 PROCEDURE — 4010F PR ACE/ARB THEARPY RXD/TAKEN: ICD-10-PCS | Mod: CPTII,S$GLB,, | Performed by: STUDENT IN AN ORGANIZED HEALTH CARE EDUCATION/TRAINING PROGRAM

## 2022-04-12 PROCEDURE — 1159F MED LIST DOCD IN RCRD: CPT | Mod: CPTII,S$GLB,, | Performed by: STUDENT IN AN ORGANIZED HEALTH CARE EDUCATION/TRAINING PROGRAM

## 2022-04-12 PROCEDURE — 1160F PR REVIEW ALL MEDS BY PRESCRIBER/CLIN PHARMACIST DOCUMENTED: ICD-10-PCS | Mod: CPTII,S$GLB,, | Performed by: STUDENT IN AN ORGANIZED HEALTH CARE EDUCATION/TRAINING PROGRAM

## 2022-04-12 PROCEDURE — 3051F PR MOST RECENT HEMOGLOBIN A1C LEVEL 7.0 - < 8.0%: ICD-10-PCS | Mod: CPTII,S$GLB,, | Performed by: STUDENT IN AN ORGANIZED HEALTH CARE EDUCATION/TRAINING PROGRAM

## 2022-04-12 PROCEDURE — 99999 PR PBB SHADOW E&M-EST. PATIENT-LVL III: CPT | Mod: PBBFAC,,, | Performed by: STUDENT IN AN ORGANIZED HEALTH CARE EDUCATION/TRAINING PROGRAM

## 2022-04-12 NOTE — TELEPHONE ENCOUNTER
----- Message from Yosi Dallas sent at 4/12/2022 11:58 AM CDT -----  Hammad Douglas III, Larry Jr., MD Just now (11:58 AM)          What date and time for scar tissue removal from both eyes after cataract surgery

## 2022-04-12 NOTE — PROGRESS NOTES
Subjective:      Patient ID: Hammad Douglas III is a 66 y.o. male.    Chief Complaint: Follow-up and Diabetes Mellitus    Hammad Douglas III is a 66 y.o. male who  has a past medical history of Cataract, Chronic kidney disease, Diabetes mellitus type I, Diabetic retinopathy, Hyperlipidemia, and Hypertension.    Patient presents today for 2nd opinion for his left great toe wound.  He has been dealing with this for some time and had surgery to remove the bone spur the bottom of the foot.  Following the surgery he did have resolution the wound but is soon returned and has been difficult to heal since.  He has minimal feeling to the feet.    4/12/22:   Patient returns for f/u L great toe wound.     Review of Systems   Skin:        Wound L great toe   Neurological: Positive for numbness.   All other systems reviewed and are negative.          Objective:        Physical Exam  Cardiovascular:      Pulses:           Dorsalis pedis pulses are 2+ on the right side and 2+ on the left side.        Posterior tibial pulses are 2+ on the right side and 2+ on the left side.      Comments: Capillary refill time is brisk < 3 seconds.  No edema to the lower extremities.  Hair growth and distribution is normal.  No significant signs of atrophy appreciated.     Skin:     General: Skin is warm.      Capillary Refill: Capillary refill takes less than 2 seconds.      Comments: Patient has a small ulceration at the IPJ of the left great toe.  Measures about 0.3 x 0.3 x 0.2 cm with exposed subcutaneous tissue.  There is no exposed bone or probe to bone appreciated.  There is a large amount of overlying callus.  There does not appear to be any bony plantar prominence appreciated.               Assessment:       Encounter Diagnoses   Name Primary?    Delayed wound healing Yes    Type 1 diabetes mellitus with polyneuropathy     Diabetic ulcer of toe of left foot associated with type 1 diabetes mellitus, limited to breakdown of skin           Plan:       Hammad was seen today for follow-up and diabetes mellitus.    Diagnoses and all orders for this visit:    Delayed wound healing    Type 1 diabetes mellitus with polyneuropathy    Diabetic ulcer of toe of left foot associated with type 1 diabetes mellitus, limited to breakdown of skin      I counseled the patient on his conditions, their implications and medical management.    Wound is pretty much unchanged. Still diffuse callusing with possible some minor enlargement of the wound. Wound was debrided and he was given an orthowedge, forefoot offloader. I discussed with the patient the risks of wearing this type of shoe and he's confident that he can handle wearing it. He's also performing his own wound care successfully.     Patient to follow-up in about 2 weeks.    Wound Debridement    Date/Time: 4/12/2022 9:20 AM  Performed by: Tunde Adams DPM  Authorized by: Tunde Adams DPM     Consent Done?:  Yes (Verbal)  Local anesthesia used?: No      Wound Details:    Location:  Left foot    Location:  Left 1st Toe    Type of Debridement:  Excisional       Length (cm):  0.6       Area (sq cm):  0.18       Width (cm):  0.3       Percent Debrided (%):  100       Depth (cm):  0.2       Total Area Debrided (sq cm):  0.18    Depth of debridement:  Subcutaneous tissue    Tissue debrided:  Subcutaneous    Devitalized tissue debrided:  Biofilm, Slough and Callus    Instruments:  Blade and Curette    Bleeding:  Minimal  Hemostasis Achieved: Yes    Method Used:  Pressure  Patient tolerance:  Patient tolerated the procedure well with no immediate complications

## 2022-04-13 ENCOUNTER — PATIENT MESSAGE (OUTPATIENT)
Dept: PODIATRY | Facility: CLINIC | Age: 67
End: 2022-04-13
Payer: COMMERCIAL

## 2022-04-19 ENCOUNTER — TELEPHONE (OUTPATIENT)
Dept: PODIATRY | Facility: CLINIC | Age: 67
End: 2022-04-19
Payer: COMMERCIAL

## 2022-04-19 NOTE — TELEPHONE ENCOUNTER
----- Message from Caleb Duffy DPM sent at 4/19/2022  1:09 PM CDT -----  Please let the patient know that I've discussed his case with Dr. Adams.  We will try to offload that area with the forefoot offloading DARCO shoe for another two weeks.  If that fails to resolve the wound, we may have to consider another surgery in the future.  Let's get him scheduled with me for a follow up in roughly two weeks.  Thanks!     [facility] was given a hand written script from Dr Edgard Huerta for a scooter and they lost this   Pt DX: Back Pain, CHF, Arthritis Elias Avalos with [facility]  [id number] Nothing in the chart  Do you need to see him to write this or can you write a script? Once we have written I will call Elias Avalos and fax it over

## 2022-04-19 NOTE — TELEPHONE ENCOUNTER
"Informed Pt per Dr. Duffy, "Please let the Pt know that I've discussed his case with Dr. Adams. We will try to offload that area with the forefoot offloading DARCO shoe for anther two weeks.  If that fails to resolve the wound, we may have consider another surgery in the future. Pt is scheduled on Dr. Adams's clinic schedule.  "

## 2022-04-22 ENCOUNTER — LAB VISIT (OUTPATIENT)
Dept: LAB | Facility: HOSPITAL | Age: 67
End: 2022-04-22
Attending: INTERNAL MEDICINE
Payer: COMMERCIAL

## 2022-04-22 ENCOUNTER — OFFICE VISIT (OUTPATIENT)
Dept: FAMILY MEDICINE | Facility: CLINIC | Age: 67
End: 2022-04-22
Payer: COMMERCIAL

## 2022-04-22 VITALS
SYSTOLIC BLOOD PRESSURE: 139 MMHG | BODY MASS INDEX: 27.59 KG/M2 | DIASTOLIC BLOOD PRESSURE: 60 MMHG | HEART RATE: 81 BPM | RESPIRATION RATE: 18 BRPM | HEIGHT: 74 IN | WEIGHT: 214.94 LBS | OXYGEN SATURATION: 97 %

## 2022-04-22 DIAGNOSIS — F41.9 ANXIETY AND DEPRESSION: ICD-10-CM

## 2022-04-22 DIAGNOSIS — E10.21 TYPE 1 DIABETES MELLITUS WITH DIABETIC NEPHROPATHY: ICD-10-CM

## 2022-04-22 DIAGNOSIS — Z00.00 PREVENTATIVE HEALTH CARE: Primary | ICD-10-CM

## 2022-04-22 DIAGNOSIS — F32.A ANXIETY AND DEPRESSION: ICD-10-CM

## 2022-04-22 LAB
ALBUMIN/CREAT UR: 412 UG/MG (ref 0–30)
CREAT UR-MCNC: 75 MG/DL (ref 23–375)
MICROALBUMIN UR DL<=1MG/L-MCNC: 309 UG/ML

## 2022-04-22 PROCEDURE — 3051F PR MOST RECENT HEMOGLOBIN A1C LEVEL 7.0 - < 8.0%: ICD-10-PCS | Mod: CPTII,S$GLB,, | Performed by: INTERNAL MEDICINE

## 2022-04-22 PROCEDURE — 4010F PR ACE/ARB THEARPY RXD/TAKEN: ICD-10-PCS | Mod: CPTII,S$GLB,, | Performed by: INTERNAL MEDICINE

## 2022-04-22 PROCEDURE — 1160F PR REVIEW ALL MEDS BY PRESCRIBER/CLIN PHARMACIST DOCUMENTED: ICD-10-PCS | Mod: CPTII,S$GLB,, | Performed by: INTERNAL MEDICINE

## 2022-04-22 PROCEDURE — 3008F PR BODY MASS INDEX (BMI) DOCUMENTED: ICD-10-PCS | Mod: CPTII,S$GLB,, | Performed by: INTERNAL MEDICINE

## 2022-04-22 PROCEDURE — 1160F RVW MEDS BY RX/DR IN RCRD: CPT | Mod: CPTII,S$GLB,, | Performed by: INTERNAL MEDICINE

## 2022-04-22 PROCEDURE — 99999 PR PBB SHADOW E&M-EST. PATIENT-LVL III: ICD-10-PCS | Mod: PBBFAC,,, | Performed by: INTERNAL MEDICINE

## 2022-04-22 PROCEDURE — 3075F SYST BP GE 130 - 139MM HG: CPT | Mod: CPTII,S$GLB,, | Performed by: INTERNAL MEDICINE

## 2022-04-22 PROCEDURE — 82570 ASSAY OF URINE CREATININE: CPT | Performed by: INTERNAL MEDICINE

## 2022-04-22 PROCEDURE — 4010F ACE/ARB THERAPY RXD/TAKEN: CPT | Mod: CPTII,S$GLB,, | Performed by: INTERNAL MEDICINE

## 2022-04-22 PROCEDURE — 3008F BODY MASS INDEX DOCD: CPT | Mod: CPTII,S$GLB,, | Performed by: INTERNAL MEDICINE

## 2022-04-22 PROCEDURE — 3078F DIAST BP <80 MM HG: CPT | Mod: CPTII,S$GLB,, | Performed by: INTERNAL MEDICINE

## 2022-04-22 PROCEDURE — 99999 PR PBB SHADOW E&M-EST. PATIENT-LVL III: CPT | Mod: PBBFAC,,, | Performed by: INTERNAL MEDICINE

## 2022-04-22 PROCEDURE — 3051F HG A1C>EQUAL 7.0%<8.0%: CPT | Mod: CPTII,S$GLB,, | Performed by: INTERNAL MEDICINE

## 2022-04-22 PROCEDURE — 1159F PR MEDICATION LIST DOCUMENTED IN MEDICAL RECORD: ICD-10-PCS | Mod: CPTII,S$GLB,, | Performed by: INTERNAL MEDICINE

## 2022-04-22 PROCEDURE — 99396 PR PREVENTIVE VISIT,EST,40-64: ICD-10-PCS | Mod: S$GLB,,, | Performed by: INTERNAL MEDICINE

## 2022-04-22 PROCEDURE — 3075F PR MOST RECENT SYSTOLIC BLOOD PRESS GE 130-139MM HG: ICD-10-PCS | Mod: CPTII,S$GLB,, | Performed by: INTERNAL MEDICINE

## 2022-04-22 PROCEDURE — 1159F MED LIST DOCD IN RCRD: CPT | Mod: CPTII,S$GLB,, | Performed by: INTERNAL MEDICINE

## 2022-04-22 PROCEDURE — 82043 UR ALBUMIN QUANTITATIVE: CPT | Performed by: INTERNAL MEDICINE

## 2022-04-22 PROCEDURE — 3078F PR MOST RECENT DIASTOLIC BLOOD PRESSURE < 80 MM HG: ICD-10-PCS | Mod: CPTII,S$GLB,, | Performed by: INTERNAL MEDICINE

## 2022-04-22 PROCEDURE — 99396 PREV VISIT EST AGE 40-64: CPT | Mod: S$GLB,,, | Performed by: INTERNAL MEDICINE

## 2022-04-22 RX ORDER — FLUOXETINE 10 MG/1
30 CAPSULE ORAL DAILY
Qty: 270 CAPSULE | Refills: 1 | Status: SHIPPED | OUTPATIENT
Start: 2022-04-22 | End: 2023-04-12 | Stop reason: SDUPTHER

## 2022-04-22 NOTE — PROGRESS NOTES
Assessment and Plan:    1. Preventative health care  Discussed age appropriate preventative healthcare items such as cancer screenings and recommended immunizations. Discussed whether patient is using tobacco, alcohol, or illicit drugs and any concerns were discussed. Discussed maintenance of a healthy weight. Patient queried if he has any additional questions about preventative healthcare and all questions were answered.  Discussed getting COVID booster    2. Anxiety and depression  Will try increased dose.  - FLUoxetine 10 MG capsule; Take 3 capsules (30 mg total) by mouth once daily.  Dispense: 270 capsule; Refill: 1    3. Type 1 diabetes mellitus with diabetic nephropathy  Continue follow up with Endocrine, Podiatry, and Ophthalmology  - Microalbumin/Creatinine Ratio, Urine; Future      ______________________________________________________________________    Subjective:    Chief Complaint:  Annual exam    HPI:  Hammad is a 66 y.o. year old patient here for annual exam.     Has not been getting dedicated exercise but has been trying to stay active. He has a non-healing wound on his left foot and is in an offloading shoe which significantly limits his ability to exercise.     He does not smoke. Drinks 1-2 beers nightly.    DM1- Diagnosed age 19. Sees Endocrinology (Adam) regularly. Last A1c improved to 7.5. States that recent blood glucose has been well controlled.    Has known complications including CKD3 and retinopathy. Sees ophthalmology for retinopathy.     Sees Dr. Duffy for podiatry.     He reports that his job has been a lot more stressful lately, and he has been dealing with a lot of anxiety related to this as well as all the problems with his foot. He has been on the same dose of fluoxetine for many years.      Past Medical History:  Past Medical History:   Diagnosis Date    Cataract     Done OU    Chronic kidney disease     Diabetes mellitus type I     Diagnosed at age 19    Diabetic retinopathy  "    See's Dr. Ledesma    Hyperlipidemia     Hypertension        Past Surgical History:  Past Surgical History:   Procedure Laterality Date    CATARACT EXTRACTION W/  INTRAOCULAR LENS IMPLANT Right 09/28/2021    Dr Solis    CATARACT EXTRACTION W/  INTRAOCULAR LENS IMPLANT Left 02/22/2022    Dr Solis    COLONOSCOPY      2011 wnl    PHACOEMULSIFICATION OF CATARACT Right 9/28/2021    Procedure: PHACOEMULSIFICATION, CATARACT;  Surgeon: Vicente Solis Jr., MD;  Location: [facility] OR;  Service: Ophthalmology;  Laterality: Right;  DM/right    PHACOEMULSIFICATION OF CATARACT Left 2/22/2022    Procedure: PHACOEMULSIFICATION, CATARACT;  Surgeon: Vicente Solis Jr., MD;  Location: [facility] OR;  Service: Ophthalmology;  Laterality: Left;  left    SURGICAL REMOVAL OF BONE SPUR Left 10/14/2021    Procedure: EXCISION, BONE SPUR;  Surgeon: Caleb Duffy DPM;  Location: [facility] OR;  Service: Podiatry;  Laterality: Left;    TOE AMPUTATION Right 7/2/2020    Procedure: AMPUTATION, TOE; 3rd;  Surgeon: Caleb Duffy DPM;  Location: [facility] OR;  Service: Podiatry;  Laterality: Right;    TOE SURGERY Right     right big toe       Family History:  Family History   Problem Relation Age of Onset    Cataracts Mother     Cataracts Father     Breast cancer Sister     Glaucoma Neg Hx     Macular degeneration Neg Hx     Retinal detachment Neg Hx        Social History:  Social History     Socioeconomic History    Marital status:    Tobacco Use    Smoking status: Former Smoker    Smokeless tobacco: Never Used    Tobacco comment: Former cigar use   Substance and Sexual Activity    Alcohol use: Yes     Alcohol/week: 2.0 standard drinks     Types: 2 Cans of beer per week     Comment: "2 beers a week"    Drug use: No    Sexual activity: Yes     Partners: Female   Social History Narrative    Retired former radiation  at [employer]       Medications:  Current Outpatient Medications on File Prior to Visit " "  Medication Sig Dispense Refill    atorvastatin (LIPITOR) 40 MG tablet TAKE 1 TABLET(40 MG) BY MOUTH EVERY DAY 90 tablet 3    BD BETSY 2ND GEN PEN NEEDLE 32 gauge x 5/32" Ndle       FLUoxetine 20 MG capsule TAKE ONE CAPSULE BY MOUTH EVERY DAY 90 capsule 3    insulin aspart U-100 (NOVOLOG) 100 unit/mL (3 mL) InPn pen INJECT USING INSULIN:CARB RATIO OF 1:5 BEFORE A MEAL. MAX TOTAL DAILY DOSE 40 UNITS 45 mL 3    insulin glargine U-300 conc (TOUJEO MAX U-300 SOLOSTAR) 300 unit/mL (3 mL) insulin pen ADMINISTER 26 UNITS UNDER THE SKIN EVERY EVENING 2 pen 6    losartan (COZAAR) 100 MG tablet TAKE 1 TABLET(100 MG) BY MOUTH EVERY DAY 90 tablet 3    pen needle, diabetic (BD ULTRA-FINE MINI PEN NEEDLE) 31 gauge x 3/16" Ndle USE 1 FOUR TIMES DAILY 150 each 11    [DISCONTINUED] blood-glucose sensor (DEXCOM G6 SENSOR) Jenni Change sensor every 10 days. 9 Device 3    [DISCONTINUED] blood-glucose transmitter (DEXCOM G6 TRANSMITTER) Jenni For continuous blood glucose monitoring. Change device every 90 days. 1 Device 3    [DISCONTINUED] ciprofloxacin HCl (CIPRO) 250 MG tablet Take 1 tablet (250 mg total) by mouth 2 (two) times daily. 14 tablet 0    [DISCONTINUED] dorzolamide (TRUSOPT) 2 % ophthalmic solution Place 1 drop into the left eye 2 (two) times daily. 10 mL 1    [DISCONTINUED] GLUCAGEN HYPOKIT 1 mg SolR Inject 1 mg into the muscle as needed (for severe hypoglycemia). 1 each 0    [DISCONTINUED] sulfamethoxazole-trimethoprim 800-160mg (BACTRIM DS) 800-160 mg Tab TAKE 1 TABLET BY MOUTH TWICE DAILY 14 tablet 0     Current Facility-Administered Medications on File Prior to Visit   Medication Dose Route Frequency Provider Last Rate Last Admin    diphenhydrAMINE injection 12.5 mg  12.5 mg Intravenous Once PRN Lauro Zambrano MD        electrolyte-S (ISOLYTE)   Intravenous Continuous Jostin Resendiz MD        electrolyte-S (ISOLYTE)   Intravenous Continuous Lauro Zambrano MD        glucagon (human " recombinant) injection 1 mg  1 mg Intramuscular Once Tania Mckeon NP        HYDROmorphone (PF) injection 0.2 mg  0.2 mg Intravenous Q5 Min PRN Lauro Zambrano MD        HYDROmorphone (PF) injection 0.2 mg  0.2 mg Intravenous Q5 Min PRN Lauro Zambrano MD        lactated ringers infusion   Intravenous Continuous Jostin Resendiz MD 10 mL/hr at 07/02/20 1232 New Bag at 10/14/21 1355    lactated ringers infusion  10 mL/hr Intravenous Continuous Lauro Zambrano MD        lactated ringers infusion  500 mL Intravenous Once Lauro Zambrano MD        lidocaine (PF) 10 mg/ml (1%) injection 10 mg  1 mL Intradermal Once Jostin Resendiz MD        LIDOcaine (PF) 10 mg/ml (1%) injection 10 mg  1 mL Intradermal Once Lauro Zambrano MD        lorazepam injection 0.25 mg  0.25 mg Intravenous Once PRN Lauro Zambrano MD        oxyCODONE immediate release tablet 5 mg  5 mg Oral Once PRN Lauro Zambrano MD        prochlorperazine injection Soln 5 mg  5 mg Intravenous Q30 Min PRN Lauro Zambrano MD        sodium chloride 0.9% flush 3 mL  3 mL Intravenous Q8H Lauro Zambrano MD           Allergies:  Altace [ramipril]    Immunizations:  Immunization History   Administered Date(s) Administered    COVID-19, MRNA, LN-S, PF (Pfizer) (Purple Cap) 02/27/2021, 03/20/2021, 10/01/2021    Influenza 10/02/2018    Influenza - Quadrivalent 10/25/2019    Influenza - Quadrivalent - MDCK - PF 10/17/2020    Influenza - Quadrivalent - PF *Preferred* (6 months and older) 11/08/2007, 09/19/2011, 01/11/2013, 12/09/2021    Influenza A (H1N1) 2009 Monovalent - IM 11/09/2009    Influenza Split 01/11/2013       Review of Systems:  Review of Systems   Constitutional: Negative for activity change and unexpected weight change.   HENT: Positive for hearing loss and rhinorrhea. Negative for trouble swallowing.    Eyes: Positive for visual disturbance. Negative for discharge.   Respiratory:  "Negative for chest tightness and wheezing.    Cardiovascular: Negative for chest pain and palpitations.   Gastrointestinal: Positive for constipation. Negative for blood in stool, diarrhea and vomiting.   Endocrine: Negative for polydipsia and polyuria.   Genitourinary: Positive for difficulty urinating. Negative for hematuria and urgency.   Musculoskeletal: Negative for arthralgias, joint swelling and neck pain.   Neurological: Positive for weakness and headaches.   Psychiatric/Behavioral: Negative for confusion and dysphoric mood.     Entered by patient and reviewed and updated during visit      Objective:    Vitals:  Vitals:    04/22/22 1048   BP: 139/60   Pulse: 81   Resp: 18   SpO2: 97%   Weight: 97.5 kg (214 lb 15.2 oz)   Height: 6' 2" (1.88 m)   PainSc: 0-No pain       Physical Exam  Vitals reviewed.   Constitutional:       General: He is not in acute distress.     Appearance: He is well-developed.   Eyes:      General: No scleral icterus.  Cardiovascular:      Rate and Rhythm: Normal rate and regular rhythm.      Heart sounds: No murmur heard.  Pulmonary:      Effort: Pulmonary effort is normal. No respiratory distress.      Breath sounds: Normal breath sounds. No wheezing, rhonchi or rales.   Musculoskeletal:      Right lower leg: No edema.      Left lower leg: No edema.      Comments: left foot in offloading shoe   Skin:     General: Skin is warm and dry.   Neurological:      Mental Status: He is alert and oriented to person, place, and time.   Psychiatric:         Behavior: Behavior normal.         Data:  A1c 7.5    Adelaide Bran MD  Internal Medicine        "

## 2022-04-26 ENCOUNTER — OFFICE VISIT (OUTPATIENT)
Dept: PODIATRY | Facility: CLINIC | Age: 67
End: 2022-04-26
Payer: COMMERCIAL

## 2022-04-26 ENCOUNTER — PATIENT MESSAGE (OUTPATIENT)
Dept: PODIATRY | Facility: CLINIC | Age: 67
End: 2022-04-26

## 2022-04-26 DIAGNOSIS — T14.8XXD DELAYED WOUND HEALING: ICD-10-CM

## 2022-04-26 DIAGNOSIS — L97.521 DIABETIC ULCER OF TOE OF LEFT FOOT ASSOCIATED WITH TYPE 1 DIABETES MELLITUS, LIMITED TO BREAKDOWN OF SKIN: ICD-10-CM

## 2022-04-26 DIAGNOSIS — E10.42 TYPE 1 DIABETES MELLITUS WITH POLYNEUROPATHY: Primary | ICD-10-CM

## 2022-04-26 DIAGNOSIS — E10.621 DIABETIC ULCER OF TOE OF LEFT FOOT ASSOCIATED WITH TYPE 1 DIABETES MELLITUS, LIMITED TO BREAKDOWN OF SKIN: ICD-10-CM

## 2022-04-26 PROCEDURE — 1160F PR REVIEW ALL MEDS BY PRESCRIBER/CLIN PHARMACIST DOCUMENTED: ICD-10-PCS | Mod: CPTII,S$GLB,, | Performed by: STUDENT IN AN ORGANIZED HEALTH CARE EDUCATION/TRAINING PROGRAM

## 2022-04-26 PROCEDURE — 3062F POS MACROALBUMINURIA REV: CPT | Mod: CPTII,S$GLB,, | Performed by: STUDENT IN AN ORGANIZED HEALTH CARE EDUCATION/TRAINING PROGRAM

## 2022-04-26 PROCEDURE — 3066F NEPHROPATHY DOC TX: CPT | Mod: CPTII,S$GLB,, | Performed by: STUDENT IN AN ORGANIZED HEALTH CARE EDUCATION/TRAINING PROGRAM

## 2022-04-26 PROCEDURE — 3062F PR POS MACROALBUMINURIA RESULT DOCUMENTED/REVIEW: ICD-10-PCS | Mod: CPTII,S$GLB,, | Performed by: STUDENT IN AN ORGANIZED HEALTH CARE EDUCATION/TRAINING PROGRAM

## 2022-04-26 PROCEDURE — 99499 NO LOS: ICD-10-PCS | Mod: S$GLB,,, | Performed by: STUDENT IN AN ORGANIZED HEALTH CARE EDUCATION/TRAINING PROGRAM

## 2022-04-26 PROCEDURE — 11042 WOUND DEBRIDEMENT: ICD-10-PCS | Mod: S$GLB,,, | Performed by: STUDENT IN AN ORGANIZED HEALTH CARE EDUCATION/TRAINING PROGRAM

## 2022-04-26 PROCEDURE — 1160F RVW MEDS BY RX/DR IN RCRD: CPT | Mod: CPTII,S$GLB,, | Performed by: STUDENT IN AN ORGANIZED HEALTH CARE EDUCATION/TRAINING PROGRAM

## 2022-04-26 PROCEDURE — 3051F PR MOST RECENT HEMOGLOBIN A1C LEVEL 7.0 - < 8.0%: ICD-10-PCS | Mod: CPTII,S$GLB,, | Performed by: STUDENT IN AN ORGANIZED HEALTH CARE EDUCATION/TRAINING PROGRAM

## 2022-04-26 PROCEDURE — 99999 PR PBB SHADOW E&M-EST. PATIENT-LVL III: ICD-10-PCS | Mod: PBBFAC,,, | Performed by: STUDENT IN AN ORGANIZED HEALTH CARE EDUCATION/TRAINING PROGRAM

## 2022-04-26 PROCEDURE — 11042 DBRDMT SUBQ TIS 1ST 20SQCM/<: CPT | Mod: S$GLB,,, | Performed by: STUDENT IN AN ORGANIZED HEALTH CARE EDUCATION/TRAINING PROGRAM

## 2022-04-26 PROCEDURE — 3066F PR DOCUMENTATION OF TREATMENT FOR NEPHROPATHY: ICD-10-PCS | Mod: CPTII,S$GLB,, | Performed by: STUDENT IN AN ORGANIZED HEALTH CARE EDUCATION/TRAINING PROGRAM

## 2022-04-26 PROCEDURE — 4010F PR ACE/ARB THEARPY RXD/TAKEN: ICD-10-PCS | Mod: CPTII,S$GLB,, | Performed by: STUDENT IN AN ORGANIZED HEALTH CARE EDUCATION/TRAINING PROGRAM

## 2022-04-26 PROCEDURE — 99999 PR PBB SHADOW E&M-EST. PATIENT-LVL III: CPT | Mod: PBBFAC,,, | Performed by: STUDENT IN AN ORGANIZED HEALTH CARE EDUCATION/TRAINING PROGRAM

## 2022-04-26 PROCEDURE — 4010F ACE/ARB THERAPY RXD/TAKEN: CPT | Mod: CPTII,S$GLB,, | Performed by: STUDENT IN AN ORGANIZED HEALTH CARE EDUCATION/TRAINING PROGRAM

## 2022-04-26 PROCEDURE — 99499 UNLISTED E&M SERVICE: CPT | Mod: S$GLB,,, | Performed by: STUDENT IN AN ORGANIZED HEALTH CARE EDUCATION/TRAINING PROGRAM

## 2022-04-26 PROCEDURE — 1159F PR MEDICATION LIST DOCUMENTED IN MEDICAL RECORD: ICD-10-PCS | Mod: CPTII,S$GLB,, | Performed by: STUDENT IN AN ORGANIZED HEALTH CARE EDUCATION/TRAINING PROGRAM

## 2022-04-26 PROCEDURE — 1159F MED LIST DOCD IN RCRD: CPT | Mod: CPTII,S$GLB,, | Performed by: STUDENT IN AN ORGANIZED HEALTH CARE EDUCATION/TRAINING PROGRAM

## 2022-04-26 PROCEDURE — 3051F HG A1C>EQUAL 7.0%<8.0%: CPT | Mod: CPTII,S$GLB,, | Performed by: STUDENT IN AN ORGANIZED HEALTH CARE EDUCATION/TRAINING PROGRAM

## 2022-04-26 NOTE — PROGRESS NOTES
Subjective:      Patient ID: Hammad Douglas III is a 66 y.o. male.    Chief Complaint: Diabetes Mellitus, Follow-up, and Wound Care    Hammad Douglas III is a 66 y.o. male who  has a past medical history of Cataract, Chronic kidney disease, Diabetes mellitus type I, Diabetic retinopathy, Hyperlipidemia, and Hypertension.    Patient presents today for 2nd opinion for his left great toe wound.  He has been dealing with this for some time and had surgery to remove the bone spur the bottom of the foot.  Following the surgery he did have resolution the wound but is soon returned and has been difficult to heal since.  He has minimal feeling to the feet.    4/12/22:   Patient returns for f/u L great toe wound.     4/26/22: f.u L great toe wound. Tolerating the forefoot offloader well.     Review of Systems   Skin:        Wound L great toe   Neurological: Positive for numbness.   All other systems reviewed and are negative.          Objective:        Physical Exam  Cardiovascular:      Pulses:           Dorsalis pedis pulses are 2+ on the right side and 2+ on the left side.        Posterior tibial pulses are 2+ on the right side and 2+ on the left side.      Comments: Capillary refill time is brisk < 3 seconds.  No edema to the lower extremities.  Hair growth and distribution is normal.  No significant signs of atrophy appreciated.     Skin:     General: Skin is warm.      Capillary Refill: Capillary refill takes less than 2 seconds.      Comments: Patient has a small ulceration at the IPJ of the left great toe.  Measures about 0.3 x 0.3 x 0.2 cm with exposed subcutaneous tissue.  There is no exposed bone or probe to bone appreciated.  There is a large amount of overlying callus.  There does not appear to be any bony plantar prominence appreciated.               Assessment:       Encounter Diagnoses   Name Primary?    Type 1 diabetes mellitus with polyneuropathy Yes    Delayed wound healing     Diabetic ulcer of toe of  left foot associated with type 1 diabetes mellitus, limited to breakdown of skin          Plan:       Hammad was seen today for diabetes mellitus, follow-up and wound care.    Diagnoses and all orders for this visit:    Type 1 diabetes mellitus with polyneuropathy    Delayed wound healing    Diabetic ulcer of toe of left foot associated with type 1 diabetes mellitus, limited to breakdown of skin      I counseled the patient on his conditions, their implications and medical management.    Improvement with wound today after use of the offloader. Continue dressing changes q3days with sawyer.     Patient to follow-up in about 2 weeks.    Wound Debridement    Date/Time: 4/26/2022 9:20 AM  Performed by: Tunde Adams DPM  Authorized by: Tunde Adams DPM     Consent Done?:  Yes (Verbal)    Wound Details:    Location:  Left foot    Location:  Left 1st Toe    Type of Debridement:  Excisional       Length (cm):  0.3       Area (sq cm):  0.09       Width (cm):  0.3       Percent Debrided (%):  100       Depth (cm):  0.2       Total Area Debrided (sq cm):  0.09    Depth of debridement:  Subcutaneous tissue    Tissue debrided:  Subcutaneous    Devitalized tissue debrided:  Callus and Slough    Instruments:  Curette    Bleeding:  None  Patient tolerance:  Patient tolerated the procedure well with no immediate complications

## 2022-05-03 ENCOUNTER — TELEPHONE (OUTPATIENT)
Dept: ENDOCRINOLOGY | Facility: CLINIC | Age: 67
End: 2022-05-03
Payer: COMMERCIAL

## 2022-05-10 ENCOUNTER — OFFICE VISIT (OUTPATIENT)
Dept: PODIATRY | Facility: CLINIC | Age: 67
End: 2022-05-10
Payer: COMMERCIAL

## 2022-05-10 ENCOUNTER — HOSPITAL ENCOUNTER (OUTPATIENT)
Dept: RADIOLOGY | Facility: HOSPITAL | Age: 67
Discharge: HOME | End: 2022-05-10
Attending: STUDENT IN AN ORGANIZED HEALTH CARE EDUCATION/TRAINING PROGRAM
Payer: COMMERCIAL

## 2022-05-10 DIAGNOSIS — T14.8XXD DELAYED WOUND HEALING: ICD-10-CM

## 2022-05-10 DIAGNOSIS — L97.521 DIABETIC ULCER OF TOE OF LEFT FOOT ASSOCIATED WITH TYPE 1 DIABETES MELLITUS, LIMITED TO BREAKDOWN OF SKIN: ICD-10-CM

## 2022-05-10 DIAGNOSIS — E10.621 DIABETIC ULCER OF TOE OF LEFT FOOT ASSOCIATED WITH TYPE 1 DIABETES MELLITUS, LIMITED TO BREAKDOWN OF SKIN: ICD-10-CM

## 2022-05-10 DIAGNOSIS — E10.42 TYPE 1 DIABETES MELLITUS WITH POLYNEUROPATHY: ICD-10-CM

## 2022-05-10 DIAGNOSIS — E10.42 TYPE 1 DIABETES MELLITUS WITH POLYNEUROPATHY: Primary | ICD-10-CM

## 2022-05-10 PROCEDURE — 73630 X-RAY EXAM OF FOOT: CPT | Mod: 26,LT,, | Performed by: RADIOLOGY

## 2022-05-10 PROCEDURE — 1160F RVW MEDS BY RX/DR IN RCRD: CPT | Mod: CPTII,S$GLB,, | Performed by: STUDENT IN AN ORGANIZED HEALTH CARE EDUCATION/TRAINING PROGRAM

## 2022-05-10 PROCEDURE — 3066F PR DOCUMENTATION OF TREATMENT FOR NEPHROPATHY: ICD-10-PCS | Mod: CPTII,S$GLB,, | Performed by: STUDENT IN AN ORGANIZED HEALTH CARE EDUCATION/TRAINING PROGRAM

## 2022-05-10 PROCEDURE — 1159F MED LIST DOCD IN RCRD: CPT | Mod: CPTII,S$GLB,, | Performed by: STUDENT IN AN ORGANIZED HEALTH CARE EDUCATION/TRAINING PROGRAM

## 2022-05-10 PROCEDURE — 99213 OFFICE O/P EST LOW 20 MIN: CPT | Mod: 25,S$GLB,, | Performed by: STUDENT IN AN ORGANIZED HEALTH CARE EDUCATION/TRAINING PROGRAM

## 2022-05-10 PROCEDURE — 4010F PR ACE/ARB THEARPY RXD/TAKEN: ICD-10-PCS | Mod: CPTII,S$GLB,, | Performed by: STUDENT IN AN ORGANIZED HEALTH CARE EDUCATION/TRAINING PROGRAM

## 2022-05-10 PROCEDURE — 99999 PR PBB SHADOW E&M-EST. PATIENT-LVL II: ICD-10-PCS | Mod: PBBFAC,,, | Performed by: STUDENT IN AN ORGANIZED HEALTH CARE EDUCATION/TRAINING PROGRAM

## 2022-05-10 PROCEDURE — 3062F POS MACROALBUMINURIA REV: CPT | Mod: CPTII,S$GLB,, | Performed by: STUDENT IN AN ORGANIZED HEALTH CARE EDUCATION/TRAINING PROGRAM

## 2022-05-10 PROCEDURE — 3062F PR POS MACROALBUMINURIA RESULT DOCUMENTED/REVIEW: ICD-10-PCS | Mod: CPTII,S$GLB,, | Performed by: STUDENT IN AN ORGANIZED HEALTH CARE EDUCATION/TRAINING PROGRAM

## 2022-05-10 PROCEDURE — 97597 WOUND DEBRIDEMENT: ICD-10-PCS | Mod: S$GLB,,, | Performed by: STUDENT IN AN ORGANIZED HEALTH CARE EDUCATION/TRAINING PROGRAM

## 2022-05-10 PROCEDURE — 1160F PR REVIEW ALL MEDS BY PRESCRIBER/CLIN PHARMACIST DOCUMENTED: ICD-10-PCS | Mod: CPTII,S$GLB,, | Performed by: STUDENT IN AN ORGANIZED HEALTH CARE EDUCATION/TRAINING PROGRAM

## 2022-05-10 PROCEDURE — 4010F ACE/ARB THERAPY RXD/TAKEN: CPT | Mod: CPTII,S$GLB,, | Performed by: STUDENT IN AN ORGANIZED HEALTH CARE EDUCATION/TRAINING PROGRAM

## 2022-05-10 PROCEDURE — 1159F PR MEDICATION LIST DOCUMENTED IN MEDICAL RECORD: ICD-10-PCS | Mod: CPTII,S$GLB,, | Performed by: STUDENT IN AN ORGANIZED HEALTH CARE EDUCATION/TRAINING PROGRAM

## 2022-05-10 PROCEDURE — 99213 PR OFFICE/OUTPT VISIT, EST, LEVL III, 20-29 MIN: ICD-10-PCS | Mod: 25,S$GLB,, | Performed by: STUDENT IN AN ORGANIZED HEALTH CARE EDUCATION/TRAINING PROGRAM

## 2022-05-10 PROCEDURE — 3051F HG A1C>EQUAL 7.0%<8.0%: CPT | Mod: CPTII,S$GLB,, | Performed by: STUDENT IN AN ORGANIZED HEALTH CARE EDUCATION/TRAINING PROGRAM

## 2022-05-10 PROCEDURE — 3066F NEPHROPATHY DOC TX: CPT | Mod: CPTII,S$GLB,, | Performed by: STUDENT IN AN ORGANIZED HEALTH CARE EDUCATION/TRAINING PROGRAM

## 2022-05-10 PROCEDURE — 73630 X-RAY EXAM OF FOOT: CPT | Mod: TC,PO,LT

## 2022-05-10 PROCEDURE — 3051F PR MOST RECENT HEMOGLOBIN A1C LEVEL 7.0 - < 8.0%: ICD-10-PCS | Mod: CPTII,S$GLB,, | Performed by: STUDENT IN AN ORGANIZED HEALTH CARE EDUCATION/TRAINING PROGRAM

## 2022-05-10 PROCEDURE — 99999 PR PBB SHADOW E&M-EST. PATIENT-LVL II: CPT | Mod: PBBFAC,,, | Performed by: STUDENT IN AN ORGANIZED HEALTH CARE EDUCATION/TRAINING PROGRAM

## 2022-05-10 PROCEDURE — 97597 DBRDMT OPN WND 1ST 20 CM/<: CPT | Mod: S$GLB,,, | Performed by: STUDENT IN AN ORGANIZED HEALTH CARE EDUCATION/TRAINING PROGRAM

## 2022-05-10 PROCEDURE — 73630 XR FOOT COMPLETE 3 VIEW LEFT: ICD-10-PCS | Mod: 26,LT,, | Performed by: RADIOLOGY

## 2022-05-10 NOTE — PROGRESS NOTES
Subjective:      Patient ID: Hammad Douglas III is a 66 y.o. male.    Chief Complaint: Diabetes Mellitus and Follow-up    Hammad Douglas III is a 66 y.o. male who  has a past medical history of Cataract, Chronic kidney disease, Diabetes mellitus type I, Diabetic retinopathy, Hyperlipidemia, and Hypertension.    Patient presents today for 2nd opinion for his left great toe wound.  He has been dealing with this for some time and had surgery to remove the bone spur the bottom of the foot.  Following the surgery he did have resolution the wound but is soon returned and has been difficult to heal since.  He has minimal feeling to the feet.    4/12/22:   Patient returns for f/u L great toe wound.     4/26/22: f.u L great toe wound. Tolerating the forefoot offloader well.     5/10/22: f/u left great toe. No pain.    Review of Systems   Skin:        Wound L great toe   Neurological: Positive for numbness.   All other systems reviewed and are negative.          Objective:        Physical Exam  Cardiovascular:      Pulses:           Dorsalis pedis pulses are 2+ on the right side and 2+ on the left side.        Posterior tibial pulses are 2+ on the right side and 2+ on the left side.      Comments: Capillary refill time is brisk < 3 seconds.  No edema to the lower extremities.  Hair growth and distribution is normal.  No significant signs of atrophy appreciated.     Skin:     General: Skin is warm.      Capillary Refill: Capillary refill takes less than 2 seconds.      Comments: Patient has a small ulceration at the IPJ of the left great toe.  Measures about 0.1 x 0.1 x 0.2 cm with exposed subcutaneous tissue.  There is no exposed bone or probe to bone appreciated.  There is a large amount of overlying callus.  There does not appear to be any bony plantar prominence appreciated.               Assessment:       Encounter Diagnoses   Name Primary?    Type 1 diabetes mellitus with polyneuropathy Yes    Delayed wound healing      Diabetic ulcer of toe of left foot associated with type 1 diabetes mellitus, limited to breakdown of skin          Plan:       Hammad was seen today for diabetes mellitus and follow-up.    Diagnoses and all orders for this visit:    Type 1 diabetes mellitus with polyneuropathy  -     X-Ray Foot Complete Left; Future    Delayed wound healing  -     X-Ray Foot Complete Left; Future    Diabetic ulcer of toe of left foot associated with type 1 diabetes mellitus, limited to breakdown of skin  -     X-Ray Foot Complete Left; Future      I counseled the patient on his conditions, their implications and medical management.    There has been some increased filling of the wound since last visit. There is still a small punctate lesion with about 0.2cm of depth. Progress has overall been slow despite aggressive offloading measures.     X-rays ordered to see if there is recurrence of the spur. There are interval degenerative changes at the hallux IPJ with some erosions of the phalangeal head.     Wound Debridement    Date/Time: 5/10/2022 9:20 AM  Performed by: Tunde Adams DPM  Authorized by: Tunde Adams DPM     Consent Done?:  Yes (Verbal)    Wound Details:    Location:  Left foot    Location:  Left 1st Toe    Type of Debridement:  Excisional       Length (cm):  0.1       Area (sq cm):  0.01       Width (cm):  0.1       Percent Debrided (%):  100       Depth (cm):  0.2       Total Area Debrided (sq cm):  0.01    Depth of debridement:  Epidermis/Dermis    Devitalized tissue debrided:  Biofilm, Callus and Slough    Instruments:  Blade and Curette    Bleeding:  None  Patient tolerance:  Patient tolerated the procedure well with no immediate complications

## 2022-05-12 ENCOUNTER — PATIENT MESSAGE (OUTPATIENT)
Dept: OPHTHALMOLOGY | Facility: CLINIC | Age: 67
End: 2022-05-12
Payer: COMMERCIAL

## 2022-05-25 ENCOUNTER — OFFICE VISIT (OUTPATIENT)
Dept: PODIATRY | Facility: CLINIC | Age: 67
End: 2022-05-25
Payer: COMMERCIAL

## 2022-05-25 ENCOUNTER — HOSPITAL ENCOUNTER (OUTPATIENT)
Dept: RADIOLOGY | Facility: HOSPITAL | Age: 67
Discharge: HOME | End: 2022-05-25
Attending: STUDENT IN AN ORGANIZED HEALTH CARE EDUCATION/TRAINING PROGRAM
Payer: COMMERCIAL

## 2022-05-25 DIAGNOSIS — E10.42 TYPE 1 DIABETES MELLITUS WITH POLYNEUROPATHY: ICD-10-CM

## 2022-05-25 DIAGNOSIS — E10.42 TYPE 1 DIABETES MELLITUS WITH POLYNEUROPATHY: Primary | ICD-10-CM

## 2022-05-25 DIAGNOSIS — T14.8XXD DELAYED WOUND HEALING: ICD-10-CM

## 2022-05-25 PROCEDURE — 99213 OFFICE O/P EST LOW 20 MIN: CPT | Mod: S$GLB,,, | Performed by: STUDENT IN AN ORGANIZED HEALTH CARE EDUCATION/TRAINING PROGRAM

## 2022-05-25 PROCEDURE — 99213 PR OFFICE/OUTPT VISIT, EST, LEVL III, 20-29 MIN: ICD-10-PCS | Mod: S$GLB,,, | Performed by: STUDENT IN AN ORGANIZED HEALTH CARE EDUCATION/TRAINING PROGRAM

## 2022-05-25 PROCEDURE — 1160F RVW MEDS BY RX/DR IN RCRD: CPT | Mod: CPTII,S$GLB,, | Performed by: STUDENT IN AN ORGANIZED HEALTH CARE EDUCATION/TRAINING PROGRAM

## 2022-05-25 PROCEDURE — 1160F PR REVIEW ALL MEDS BY PRESCRIBER/CLIN PHARMACIST DOCUMENTED: ICD-10-PCS | Mod: CPTII,S$GLB,, | Performed by: STUDENT IN AN ORGANIZED HEALTH CARE EDUCATION/TRAINING PROGRAM

## 2022-05-25 PROCEDURE — 3051F PR MOST RECENT HEMOGLOBIN A1C LEVEL 7.0 - < 8.0%: ICD-10-PCS | Mod: CPTII,S$GLB,, | Performed by: STUDENT IN AN ORGANIZED HEALTH CARE EDUCATION/TRAINING PROGRAM

## 2022-05-25 PROCEDURE — 73660 XR TOE 2 OR MORE VIEWS LEFT: ICD-10-PCS | Mod: 26,LT,, | Performed by: RADIOLOGY

## 2022-05-25 PROCEDURE — 3066F PR DOCUMENTATION OF TREATMENT FOR NEPHROPATHY: ICD-10-PCS | Mod: CPTII,S$GLB,, | Performed by: STUDENT IN AN ORGANIZED HEALTH CARE EDUCATION/TRAINING PROGRAM

## 2022-05-25 PROCEDURE — 3051F HG A1C>EQUAL 7.0%<8.0%: CPT | Mod: CPTII,S$GLB,, | Performed by: STUDENT IN AN ORGANIZED HEALTH CARE EDUCATION/TRAINING PROGRAM

## 2022-05-25 PROCEDURE — 1101F PR PT FALLS ASSESS DOC 0-1 FALLS W/OUT INJ PAST YR: ICD-10-PCS | Mod: CPTII,S$GLB,, | Performed by: STUDENT IN AN ORGANIZED HEALTH CARE EDUCATION/TRAINING PROGRAM

## 2022-05-25 PROCEDURE — 1159F MED LIST DOCD IN RCRD: CPT | Mod: CPTII,S$GLB,, | Performed by: STUDENT IN AN ORGANIZED HEALTH CARE EDUCATION/TRAINING PROGRAM

## 2022-05-25 PROCEDURE — 3062F POS MACROALBUMINURIA REV: CPT | Mod: CPTII,S$GLB,, | Performed by: STUDENT IN AN ORGANIZED HEALTH CARE EDUCATION/TRAINING PROGRAM

## 2022-05-25 PROCEDURE — 73660 X-RAY EXAM OF TOE(S): CPT | Mod: TC,PO,LT

## 2022-05-25 PROCEDURE — 1101F PT FALLS ASSESS-DOCD LE1/YR: CPT | Mod: CPTII,S$GLB,, | Performed by: STUDENT IN AN ORGANIZED HEALTH CARE EDUCATION/TRAINING PROGRAM

## 2022-05-25 PROCEDURE — 3288F PR FALLS RISK ASSESSMENT DOCUMENTED: ICD-10-PCS | Mod: CPTII,S$GLB,, | Performed by: STUDENT IN AN ORGANIZED HEALTH CARE EDUCATION/TRAINING PROGRAM

## 2022-05-25 PROCEDURE — 3288F FALL RISK ASSESSMENT DOCD: CPT | Mod: CPTII,S$GLB,, | Performed by: STUDENT IN AN ORGANIZED HEALTH CARE EDUCATION/TRAINING PROGRAM

## 2022-05-25 PROCEDURE — 73660 X-RAY EXAM OF TOE(S): CPT | Mod: 26,LT,, | Performed by: RADIOLOGY

## 2022-05-25 PROCEDURE — 99999 PR PBB SHADOW E&M-EST. PATIENT-LVL II: ICD-10-PCS | Mod: PBBFAC,,, | Performed by: STUDENT IN AN ORGANIZED HEALTH CARE EDUCATION/TRAINING PROGRAM

## 2022-05-25 PROCEDURE — 4010F ACE/ARB THERAPY RXD/TAKEN: CPT | Mod: CPTII,S$GLB,, | Performed by: STUDENT IN AN ORGANIZED HEALTH CARE EDUCATION/TRAINING PROGRAM

## 2022-05-25 PROCEDURE — 99999 PR PBB SHADOW E&M-EST. PATIENT-LVL II: CPT | Mod: PBBFAC,,, | Performed by: STUDENT IN AN ORGANIZED HEALTH CARE EDUCATION/TRAINING PROGRAM

## 2022-05-25 PROCEDURE — 3066F NEPHROPATHY DOC TX: CPT | Mod: CPTII,S$GLB,, | Performed by: STUDENT IN AN ORGANIZED HEALTH CARE EDUCATION/TRAINING PROGRAM

## 2022-05-25 PROCEDURE — 1159F PR MEDICATION LIST DOCUMENTED IN MEDICAL RECORD: ICD-10-PCS | Mod: CPTII,S$GLB,, | Performed by: STUDENT IN AN ORGANIZED HEALTH CARE EDUCATION/TRAINING PROGRAM

## 2022-05-25 PROCEDURE — 3062F PR POS MACROALBUMINURIA RESULT DOCUMENTED/REVIEW: ICD-10-PCS | Mod: CPTII,S$GLB,, | Performed by: STUDENT IN AN ORGANIZED HEALTH CARE EDUCATION/TRAINING PROGRAM

## 2022-05-25 PROCEDURE — 4010F PR ACE/ARB THEARPY RXD/TAKEN: ICD-10-PCS | Mod: CPTII,S$GLB,, | Performed by: STUDENT IN AN ORGANIZED HEALTH CARE EDUCATION/TRAINING PROGRAM

## 2022-05-25 NOTE — PROGRESS NOTES
Subjective:      Patient ID: Hammad Douglas III is a 67 y.o. male.    Chief Complaint: Follow-up and Diabetes Mellitus    Hammad Douglas III is a 67 y.o. male who  has a past medical history of Cataract, Chronic kidney disease, Diabetes mellitus type I, Diabetic retinopathy, Hyperlipidemia, and Hypertension.    Patient presents today for 2nd opinion for his left great toe wound.  He has been dealing with this for some time and had surgery to remove the bone spur the bottom of the foot.  Following the surgery he did have resolution the wound but is soon returned and has been difficult to heal since.  He has minimal feeling to the feet.    4/12/22:   Patient returns for f/u L great toe wound.     4/26/22: f.u L great toe wound. Tolerating the forefoot offloader well.     5/10/22: f/u left great toe. No pain.    5/25/22: f/u left great toe wound.     Review of Systems   Skin:        Wound L great toe   Neurological: Positive for numbness.   All other systems reviewed and are negative.          Objective:        Physical Exam  Cardiovascular:      Pulses:           Dorsalis pedis pulses are 2+ on the right side and 2+ on the left side.        Posterior tibial pulses are 2+ on the right side and 2+ on the left side.      Comments: Capillary refill time is brisk < 3 seconds.  No edema to the lower extremities.  Hair growth and distribution is normal.  No significant signs of atrophy appreciated.     Skin:     General: Skin is warm.      Capillary Refill: Capillary refill takes less than 2 seconds.      Comments: Patient has a small ulceration at the IPJ of the left great toe.  Measures about 0.1 x 0.1 x 0.2 cm with exposed subcutaneous tissue.  There is no exposed bone or probe to bone appreciated.  There is a large amount of overlying callus.  There does not appear to be any bony plantar prominence appreciated.               Assessment:       Encounter Diagnoses   Name Primary?    Type 1 diabetes mellitus with  polyneuropathy Yes    Delayed wound healing          Plan:       Hammad was seen today for follow-up and diabetes mellitus.    Diagnoses and all orders for this visit:    Type 1 diabetes mellitus with polyneuropathy  -     X-Ray Toe 2 or More Views Left; Future    Delayed wound healing  -     X-Ray Toe 2 or More Views Left; Future      I counseled the patient on his conditions, their implications and medical management.    Xray left great toe reviewed. Difficult to see if there is any recurrence of the plantar spur at the IPJ. There are degenerative changes at the hallux IPJ.     There is still a left great toe wound that is almost unchanged since last visit. Various offloading techniques employed with minimal improvement. I discussed other options with the patient pending the x-ray results. He may benefit from a hallux IPJ arthroplasty vs IPJ fusion with remodeling of the plantar aspects of the bones to remove any residual spurs. F/u in 2 weeks.         Tunde Adams DPM

## 2022-06-08 ENCOUNTER — PATIENT MESSAGE (OUTPATIENT)
Dept: FAMILY MEDICINE | Facility: CLINIC | Age: 67
End: 2022-06-08
Payer: COMMERCIAL

## 2022-06-08 ENCOUNTER — PATIENT MESSAGE (OUTPATIENT)
Dept: PODIATRY | Facility: CLINIC | Age: 67
End: 2022-06-08
Payer: COMMERCIAL

## 2022-06-08 ENCOUNTER — TELEPHONE (OUTPATIENT)
Dept: OPHTHALMOLOGY | Facility: CLINIC | Age: 67
End: 2022-06-08
Payer: COMMERCIAL

## 2022-06-08 NOTE — TELEPHONE ENCOUNTER
"----- Message from Margi Aaron sent at 6/8/2022  2:50 PM CDT -----  "Type:  Patient Call Back    Who Called:ASHLEY PADILLA III [[id number]]    What is the reqeust in detail:Pt requesting call back before appointment scheduled tomorrow Please advise    Can the clinic reply by [facility]?no    Best Call Back Number:[phone]      Additional Information:Pt has a few questions             "

## 2022-06-09 ENCOUNTER — PROCEDURE VISIT (OUTPATIENT)
Dept: OPHTHALMOLOGY | Facility: CLINIC | Age: 67
End: 2022-06-09
Payer: COMMERCIAL

## 2022-06-09 DIAGNOSIS — H26.493 PCO (POSTERIOR CAPSULAR OPACIFICATION), BILATERAL: Primary | ICD-10-CM

## 2022-06-09 PROCEDURE — 66821 AFTER CATARACT LASER SURGERY: CPT | Mod: 50,S$GLB,, | Performed by: OPHTHALMOLOGY

## 2022-06-09 PROCEDURE — 99499 NO LOS: ICD-10-PCS | Mod: S$GLB,,, | Performed by: OPHTHALMOLOGY

## 2022-06-09 PROCEDURE — 99499 UNLISTED E&M SERVICE: CPT | Mod: S$GLB,,, | Performed by: OPHTHALMOLOGY

## 2022-06-09 PROCEDURE — 66821 YAG CAPSULOTOMY - OU - BOTH EYES: ICD-10-PCS | Mod: 50,S$GLB,, | Performed by: OPHTHALMOLOGY

## 2022-06-09 NOTE — PROGRESS NOTES
HPI     Laser Treatment      Additional comments: Yag cap OU              Comments     DLS: 3/28/22    Pt is here for yag cap OU. Pt states he is hard to dilate, will use 10%   phenylephrine.          Last edited by Cheryle Quintana on 6/9/2022  3:08 PM. (History)            Assessment /Plan     For exam results, see Encounter Report.    PCO (posterior capsular opacification), bilateral  -     Yag Capsulotomy - OU - Both Eyes      [YAG laser] Procedure Note: Informed consent obtained.  YAG laser applied to posterior capsule.  Tolerated well.  Iopidine pre- and post- procedure.    Follow up in about 2 weeks (around 6/23/2022) for followup YAG OU.

## 2022-06-15 ENCOUNTER — PATIENT MESSAGE (OUTPATIENT)
Dept: FAMILY MEDICINE | Facility: CLINIC | Age: 67
End: 2022-06-15
Payer: COMMERCIAL

## 2022-06-15 ENCOUNTER — OFFICE VISIT (OUTPATIENT)
Dept: PODIATRY | Facility: CLINIC | Age: 67
End: 2022-06-15
Payer: COMMERCIAL

## 2022-06-15 VITALS — BODY MASS INDEX: 27.59 KG/M2 | HEIGHT: 74 IN | WEIGHT: 214.94 LBS

## 2022-06-15 DIAGNOSIS — L97.521 DIABETIC ULCER OF TOE OF LEFT FOOT ASSOCIATED WITH TYPE 1 DIABETES MELLITUS, LIMITED TO BREAKDOWN OF SKIN: ICD-10-CM

## 2022-06-15 DIAGNOSIS — E10.621 DIABETIC ULCER OF TOE OF LEFT FOOT ASSOCIATED WITH TYPE 1 DIABETES MELLITUS, LIMITED TO BREAKDOWN OF SKIN: ICD-10-CM

## 2022-06-15 DIAGNOSIS — M19.079 ARTHRITIS OF BIG TOE: ICD-10-CM

## 2022-06-15 DIAGNOSIS — E10.42 TYPE 1 DIABETES MELLITUS WITH POLYNEUROPATHY: ICD-10-CM

## 2022-06-15 DIAGNOSIS — T14.8XXD DELAYED WOUND HEALING: Primary | ICD-10-CM

## 2022-06-15 PROCEDURE — 3066F PR DOCUMENTATION OF TREATMENT FOR NEPHROPATHY: ICD-10-PCS | Mod: CPTII,S$GLB,, | Performed by: PODIATRIST

## 2022-06-15 PROCEDURE — 3008F BODY MASS INDEX DOCD: CPT | Mod: CPTII,S$GLB,, | Performed by: PODIATRIST

## 2022-06-15 PROCEDURE — 97597 WOUND DEBRIDEMENT: ICD-10-PCS | Mod: S$GLB,,, | Performed by: PODIATRIST

## 2022-06-15 PROCEDURE — 3062F POS MACROALBUMINURIA REV: CPT | Mod: CPTII,S$GLB,, | Performed by: PODIATRIST

## 2022-06-15 PROCEDURE — 1101F PR PT FALLS ASSESS DOC 0-1 FALLS W/OUT INJ PAST YR: ICD-10-PCS | Mod: CPTII,S$GLB,, | Performed by: PODIATRIST

## 2022-06-15 PROCEDURE — 3066F NEPHROPATHY DOC TX: CPT | Mod: CPTII,S$GLB,, | Performed by: PODIATRIST

## 2022-06-15 PROCEDURE — 99999 PR PBB SHADOW E&M-EST. PATIENT-LVL II: ICD-10-PCS | Mod: PBBFAC,,, | Performed by: PODIATRIST

## 2022-06-15 PROCEDURE — 99499 NO LOS: ICD-10-PCS | Mod: S$GLB,,, | Performed by: PODIATRIST

## 2022-06-15 PROCEDURE — 97597 DBRDMT OPN WND 1ST 20 CM/<: CPT | Mod: S$GLB,,, | Performed by: PODIATRIST

## 2022-06-15 PROCEDURE — 1159F PR MEDICATION LIST DOCUMENTED IN MEDICAL RECORD: ICD-10-PCS | Mod: CPTII,S$GLB,, | Performed by: PODIATRIST

## 2022-06-15 PROCEDURE — 4010F ACE/ARB THERAPY RXD/TAKEN: CPT | Mod: CPTII,S$GLB,, | Performed by: PODIATRIST

## 2022-06-15 PROCEDURE — 3051F PR MOST RECENT HEMOGLOBIN A1C LEVEL 7.0 - < 8.0%: ICD-10-PCS | Mod: CPTII,S$GLB,, | Performed by: PODIATRIST

## 2022-06-15 PROCEDURE — 1101F PT FALLS ASSESS-DOCD LE1/YR: CPT | Mod: CPTII,S$GLB,, | Performed by: PODIATRIST

## 2022-06-15 PROCEDURE — 4010F PR ACE/ARB THEARPY RXD/TAKEN: ICD-10-PCS | Mod: CPTII,S$GLB,, | Performed by: PODIATRIST

## 2022-06-15 PROCEDURE — 3288F FALL RISK ASSESSMENT DOCD: CPT | Mod: CPTII,S$GLB,, | Performed by: PODIATRIST

## 2022-06-15 PROCEDURE — 99499 UNLISTED E&M SERVICE: CPT | Mod: S$GLB,,, | Performed by: PODIATRIST

## 2022-06-15 PROCEDURE — 3008F PR BODY MASS INDEX (BMI) DOCUMENTED: ICD-10-PCS | Mod: CPTII,S$GLB,, | Performed by: PODIATRIST

## 2022-06-15 PROCEDURE — 1159F MED LIST DOCD IN RCRD: CPT | Mod: CPTII,S$GLB,, | Performed by: PODIATRIST

## 2022-06-15 PROCEDURE — 3062F PR POS MACROALBUMINURIA RESULT DOCUMENTED/REVIEW: ICD-10-PCS | Mod: CPTII,S$GLB,, | Performed by: PODIATRIST

## 2022-06-15 PROCEDURE — 3288F PR FALLS RISK ASSESSMENT DOCUMENTED: ICD-10-PCS | Mod: CPTII,S$GLB,, | Performed by: PODIATRIST

## 2022-06-15 PROCEDURE — 3051F HG A1C>EQUAL 7.0%<8.0%: CPT | Mod: CPTII,S$GLB,, | Performed by: PODIATRIST

## 2022-06-15 PROCEDURE — 99999 PR PBB SHADOW E&M-EST. PATIENT-LVL II: CPT | Mod: PBBFAC,,, | Performed by: PODIATRIST

## 2022-06-16 ENCOUNTER — PATIENT MESSAGE (OUTPATIENT)
Dept: ENDOCRINOLOGY | Facility: CLINIC | Age: 67
End: 2022-06-16
Payer: COMMERCIAL

## 2022-06-17 ENCOUNTER — PATIENT MESSAGE (OUTPATIENT)
Dept: ENDOCRINOLOGY | Facility: CLINIC | Age: 67
End: 2022-06-17
Payer: COMMERCIAL

## 2022-06-19 ENCOUNTER — PATIENT MESSAGE (OUTPATIENT)
Dept: OTOLARYNGOLOGY | Facility: CLINIC | Age: 67
End: 2022-06-19
Payer: COMMERCIAL

## 2022-06-19 NOTE — PROGRESS NOTES
Subjective:      Patient ID: Hammad Douglas III is a 67 y.o. male.    Chief Complaint: Wound Care  Patient presents to clinic for a follow up after being seen by Dr. Adams for a 2nd opinion.  Notes the ulceration to the plantar aspect of the Lt. Hallux remains unchanged.  Denies this being a source of pain due to his neuropathy.  Inquires as to how this can be resolved, as he has failed traditional offloading in different pairs of DARCO shoes as well as with motion control shoes.  Denies experiencing N/V/F/C/D. Denies any additional pedal complaints.    Past Medical History:   Diagnosis Date    Cataract     Done OU    Chronic kidney disease     Diabetes mellitus type I     Diagnosed at age 19    Diabetic retinopathy     See's Dr. Ledesma    Hyperlipidemia     Hypertension        Past Surgical History:   Procedure Laterality Date    CATARACT EXTRACTION W/  INTRAOCULAR LENS IMPLANT Right 09/28/2021    Dr Solis    CATARACT EXTRACTION W/  INTRAOCULAR LENS IMPLANT Left 02/22/2022    Dr Solis    COLONOSCOPY      2011 wnl    PHACOEMULSIFICATION OF CATARACT Right 9/28/2021    Procedure: PHACOEMULSIFICATION, CATARACT;  Surgeon: Vicente Solis Jr., MD;  Location: [facility] OR;  Service: Ophthalmology;  Laterality: Right;  DM/right    PHACOEMULSIFICATION OF CATARACT Left 2/22/2022    Procedure: PHACOEMULSIFICATION, CATARACT;  Surgeon: Vicente Solis Jr., MD;  Location: [facility] OR;  Service: Ophthalmology;  Laterality: Left;  left    SURGICAL REMOVAL OF BONE SPUR Left 10/14/2021    Procedure: EXCISION, BONE SPUR;  Surgeon: Caleb Duffy DPM;  Location: [facility] OR;  Service: Podiatry;  Laterality: Left;    TOE AMPUTATION Right 7/2/2020    Procedure: AMPUTATION, TOE; 3rd;  Surgeon: Caleb Duffy DPM;  Location: [facility] OR;  Service: Podiatry;  Laterality: Right;    TOE SURGERY Right     right big toe       Family History   Problem Relation Age of Onset    Cataracts Mother     Cataracts Father     Breast cancer Sister   "   Glaucoma Neg Hx     Macular degeneration Neg Hx     Retinal detachment Neg Hx        Social History     Socioeconomic History    Marital status:    Tobacco Use    Smoking status: Former Smoker    Smokeless tobacco: Never Used    Tobacco comment: Former cigar use   Substance and Sexual Activity    Alcohol use: Yes     Alcohol/week: 2.0 standard drinks     Types: 2 Cans of beer per week     Comment: "2 beers a week"    Drug use: No    Sexual activity: Yes     Partners: Female   Social History Narrative    Retired former radiation  at [employer]       Current Outpatient Medications   Medication Sig Dispense Refill    atorvastatin (LIPITOR) 40 MG tablet TAKE 1 TABLET(40 MG) BY MOUTH EVERY DAY 90 tablet 3    BD BETSY 2ND GEN PEN NEEDLE 32 gauge x 5/32" Ndle       FLUoxetine 10 MG capsule Take 3 capsules (30 mg total) by mouth once daily. 270 capsule 1    insulin aspart U-100 (NOVOLOG) 100 unit/mL (3 mL) InPn pen INJECT USING INSULIN:CARB RATIO OF 1:5 BEFORE A MEAL. MAX TOTAL DAILY DOSE 40 UNITS 45 mL 3    insulin glargine U-300 conc (TOUJEO MAX U-300 SOLOSTAR) 300 unit/mL (3 mL) insulin pen ADMINISTER 26 UNITS UNDER THE SKIN EVERY EVENING 2 pen 6    losartan (COZAAR) 100 MG tablet TAKE 1 TABLET(100 MG) BY MOUTH EVERY DAY 90 tablet 3    pen needle, diabetic (BD ULTRA-FINE MINI PEN NEEDLE) 31 gauge x 3/16" Ndle USE 1 FOUR TIMES DAILY 150 each 11     Current Facility-Administered Medications   Medication Dose Route Frequency Provider Last Rate Last Admin    glucagon (human recombinant) injection 1 mg  1 mg Intramuscular Once Tania Mckeon NP           Review of patient's allergies indicates:   Allergen Reactions    Altace [ramipril]      Cough         Review of Systems   Constitution: Negative for chills and fever.   Cardiovascular: Negative for claudication and leg swelling.   Skin: Negative for color change, dry skin and poor wound healing.   Musculoskeletal: " Negative for joint pain, joint swelling, muscle cramps and muscle weakness.   Neurological: Positive for numbness.   Psychiatric/Behavioral: Negative for altered mental status.           Objective:      Physical Exam   Constitutional: He appears well-developed and well-nourished. No distress.   Cardiovascular:   Pulses:       Dorsalis pedis pulses are 2+ on the right side, and 2+ on the left side.        Posterior tibial pulses are 1+ on the right side, and 1+ on the left side.   CFT is < 3 seconds bilateral.  Pedal hair growth is present bilateral.  Varicosities noted bilateral.  No lower extremity edema noted.  Toes are warm to touch bilateral.     Musculoskeletal: He exhibits no edema or tenderness.   Muscle strength 5/5 in all muscle groups bilateral.  No tenderness nor crepitation with ROM of foot/ankle joints bilateral.  No tenderness with palpation of bilateral foot and ankle.  Semi-reducible contracture of the lesser digits bilateral.   Amputation of Rt. 3rd toe.  Neurological: He has normal strength. A sensory deficit is present.   Protective sensation per [place] monofilament is decreased bilateral.  Light touch is intact bilateral.   Skin: Skin is warm and dry. Capillary refill takes less than 2 seconds. Lesion noted. No abrasion, no burn, no ecchymosis, no laceration and no petechiae noted. He is not diaphoretic.   Location: Open wound noted to the plantar aspect of the Lt. hallux  Base: Down to dermis and comprised of fibrin.    Drainage: None  Skylar wound: Devoid of erythema, edema, fluctuance, purulence, and malodor.   Pre-debridement measurement: 0.2 x 0.2 x 0.1cm  Post-debridement measurement: 0.4 x 0.4 x 0.1cm  Unchanged in comparison to our last exam.            Assessment:       Encounter Diagnoses   Name Primary?    Delayed wound healing Yes    Diabetic ulcer of toe of left foot associated with type 1 diabetes mellitus, limited to breakdown of skin     Arthritis of big toe     Type 1  diabetes mellitus with polyneuropathy          Plan:       Hammad was seen today for wound care.    Diagnoses and all orders for this visit:    Delayed wound healing    Diabetic ulcer of toe of left foot associated with type 1 diabetes mellitus, limited to breakdown of skin    Arthritis of big toe    Type 1 diabetes mellitus with polyneuropathy      I counseled the patient on his conditions, their implications and medical management.    Prior x-rays of the Lt. Hallux reveal extensive arthritis of the Hallux IP joint.    Performed a selective excisional debridement of the Lt. foot.  See attached procedure note.      The wound base was covered with sawyer and a band aid.     Advised to continue wearing a sleeve to protect the digit with ambulation.      May continue using either a DARCO shoe or motion control tennis shoe to minimize pressure to the site.    Instructed to continue changing his bandage every two days, by applying sawyer to the site.    Instructed to call if the site begins to appear infected.    Based on current films, I feel a hallux interphalangeal joint arthroplasty would be more appropriate for resolving ulceration to the plantar aspect of the hallux..  Explained this would be an outpatient procedure under local MAC.  Patient is amenable to said plan.     Advised to begin the process of obtaining surgical clearance.     Will obtain informed written consent the day of the procedure.       Will place a case request after clearance has been obtained.  Likely this will take place in early July.      RTC 1 week postop.    Caleb Duffy DPM

## 2022-06-19 NOTE — H&P (VIEW-ONLY)
Subjective:      Patient ID: Hammad Douglas III is a 67 y.o. male.    Chief Complaint: Wound Care  Patient presents to clinic for a follow up after being seen by Dr. Adams for a 2nd opinion.  Notes the ulceration to the plantar aspect of the Lt. Hallux remains unchanged.  Denies this being a source of pain due to his neuropathy.  Inquires as to how this can be resolved, as he has failed traditional offloading in different pairs of DARCO shoes as well as with motion control shoes.  Denies experiencing N/V/F/C/D. Denies any additional pedal complaints.    Past Medical History:   Diagnosis Date    Cataract     Done OU    Chronic kidney disease     Diabetes mellitus type I     Diagnosed at age 19    Diabetic retinopathy     See's Dr. Ledesma    Hyperlipidemia     Hypertension        Past Surgical History:   Procedure Laterality Date    CATARACT EXTRACTION W/  INTRAOCULAR LENS IMPLANT Right 09/28/2021    Dr Solis    CATARACT EXTRACTION W/  INTRAOCULAR LENS IMPLANT Left 02/22/2022    Dr Solis    COLONOSCOPY      2011 wnl    PHACOEMULSIFICATION OF CATARACT Right 9/28/2021    Procedure: PHACOEMULSIFICATION, CATARACT;  Surgeon: Vicente Solis Jr., MD;  Location: [facility] OR;  Service: Ophthalmology;  Laterality: Right;  DM/right    PHACOEMULSIFICATION OF CATARACT Left 2/22/2022    Procedure: PHACOEMULSIFICATION, CATARACT;  Surgeon: Vicente Solis Jr., MD;  Location: [facility] OR;  Service: Ophthalmology;  Laterality: Left;  left    SURGICAL REMOVAL OF BONE SPUR Left 10/14/2021    Procedure: EXCISION, BONE SPUR;  Surgeon: Caleb Duffy DPM;  Location: [facility] OR;  Service: Podiatry;  Laterality: Left;    TOE AMPUTATION Right 7/2/2020    Procedure: AMPUTATION, TOE; 3rd;  Surgeon: Caleb Duffy DPM;  Location: [facility] OR;  Service: Podiatry;  Laterality: Right;    TOE SURGERY Right     right big toe       Family History   Problem Relation Age of Onset    Cataracts Mother     Cataracts Father     Breast cancer Sister   "   Glaucoma Neg Hx     Macular degeneration Neg Hx     Retinal detachment Neg Hx        Social History     Socioeconomic History    Marital status:    Tobacco Use    Smoking status: Former Smoker    Smokeless tobacco: Never Used    Tobacco comment: Former cigar use   Substance and Sexual Activity    Alcohol use: Yes     Alcohol/week: 2.0 standard drinks     Types: 2 Cans of beer per week     Comment: "2 beers a week"    Drug use: No    Sexual activity: Yes     Partners: Female   Social History Narrative    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 270 capsule 1    insulin aspart U-100 (NOVOLOG) 100 unit/mL (3 mL) InPn pen INJECT USING INSULIN:CARB RATIO OF 1:5 BEFORE A MEAL. MAX TOTAL DAILY DOSE 40 UNITS 45 mL 3    insulin glargine U-300 conc (TOUJEO MAX U-300 SOLOSTAR) 300 unit/mL (3 mL) insulin pen ADMINISTER 26 UNITS UNDER THE SKIN EVERY EVENING 2 pen 6    losartan (COZAAR) 100 MG tablet TAKE 1 TABLET(100 MG) BY MOUTH EVERY DAY 90 tablet 3    pen needle, diabetic (BD ULTRA-FINE MINI PEN NEEDLE) 31 gauge x 3/16" Ndle USE 1 FOUR TIMES DAILY 150 each 11     Current Facility-Administered Medications   Medication Dose Route Frequency Provider Last Rate Last Admin    glucagon (human recombinant) injection 1 mg  1 mg Intramuscular Once Tania Mckeon NP           Review of patient's allergies indicates:   Allergen Reactions    Altace [ramipril]      Cough         Review of Systems   Constitution: Negative for chills and fever.   Cardiovascular: Negative for claudication and leg swelling.   Skin: Negative for color change, dry skin and poor wound healing.   Musculoskeletal: " Negative for joint pain, joint swelling, muscle cramps and muscle weakness.   Neurological: Positive for numbness.   Psychiatric/Behavioral: Negative for altered mental status.           Objective:      Physical Exam   Constitutional: He appears well-developed and well-nourished. No distress.   Cardiovascular:   Pulses:       Dorsalis pedis pulses are 2+ on the right side, and 2+ on the left side.        Posterior tibial pulses are 1+ on the right side, and 1+ on the left side.   CFT is < 3 seconds bilateral.  Pedal hair growth is present bilateral.  Varicosities noted bilateral.  No lower extremity edema noted.  Toes are warm to touch bilateral.     Musculoskeletal: He exhibits no edema or tenderness.   Muscle strength 5/5 in all muscle groups bilateral.  No tenderness nor crepitation with ROM of foot/ankle joints bilateral.  No tenderness with palpation of bilateral foot and ankle.  Semi-reducible contracture of the lesser digits bilateral.   Amputation of Rt. 3rd toe.  Neurological: He has normal strength. A sensory deficit is present.   Protective sensation per [place] monofilament is decreased bilateral.  Light touch is intact bilateral.   Skin: Skin is warm and dry. Capillary refill takes less than 2 seconds. Lesion noted. No abrasion, no burn, no ecchymosis, no laceration and no petechiae noted. He is not diaphoretic.   Location: Open wound noted to the plantar aspect of the Lt. hallux  Base: Down to dermis and comprised of fibrin.    Drainage: None  Skylar wound: Devoid of erythema, edema, fluctuance, purulence, and malodor.   Pre-debridement measurement: 0.2 x 0.2 x 0.1cm  Post-debridement measurement: 0.4 x 0.4 x 0.1cm  Unchanged in comparison to our last exam.            Assessment:       Encounter Diagnoses   Name Primary?    Delayed wound healing Yes    Diabetic ulcer of toe of left foot associated with type 1 diabetes mellitus, limited to breakdown of skin     Arthritis of big toe     Type 1  diabetes mellitus with polyneuropathy          Plan:       Hammad was seen today for wound care.    Diagnoses and all orders for this visit:    Delayed wound healing    Diabetic ulcer of toe of left foot associated with type 1 diabetes mellitus, limited to breakdown of skin    Arthritis of big toe    Type 1 diabetes mellitus with polyneuropathy      I counseled the patient on his conditions, their implications and medical management.    Prior x-rays of the Lt. Hallux reveal extensive arthritis of the Hallux IP joint.    Performed a selective excisional debridement of the Lt. foot.  See attached procedure note.      The wound base was covered with sawyer and a band aid.     Advised to continue wearing a sleeve to protect the digit with ambulation.      May continue using either a DARCO shoe or motion control tennis shoe to minimize pressure to the site.    Instructed to continue changing his bandage every two days, by applying sawyer to the site.    Instructed to call if the site begins to appear infected.    Based on current films, I feel a hallux interphalangeal joint arthroplasty would be more appropriate for resolving ulceration to the plantar aspect of the hallux..  Explained this would be an outpatient procedure under local MAC.  Patient is amenable to said plan.     Advised to begin the process of obtaining surgical clearance.     Will obtain informed written consent the day of the procedure.       Will place a case request after clearance has been obtained.  Likely this will take place in early July.      RTC 1 week postop.    Caleb Duffy DPM

## 2022-06-19 NOTE — PROCEDURES
"Wound Debridement    Date/Time: 6/15/2022 9:20 AM  Performed by: Caleb Duffy DPM  Authorized by: Caleb Duffy DPM     Time out: Immediately prior to procedure a "time out" was called to verify the correct patient, procedure, equipment, support staff and site/side marked as required.    Consent Done?:  Yes (Verbal)    Preparation: Patient was prepped and draped in usual sterile fashion    Local anesthesia used?: No      Wound Details:    Location:  Left foot    Location:  Left 1st Toe    Type of Debridement:  Excisional       Length (cm):  0.2       Area (sq cm):  0.04       Width (cm):  0.2       Percent Debrided (%):  100       Depth (cm):  0.1       Total Area Debrided (sq cm):  0.04    Depth of debridement:  Epidermis/Dermis    Tissue debrided:  Dermis    Devitalized tissue debrided:  Fibrin    Instruments:  Blade    Bleeding:  Minimal  Hemostasis Achieved: Yes    Method Used:  Pressure  Patient tolerance:  Patient tolerated the procedure well with no immediate complications      "

## 2022-06-20 ENCOUNTER — TELEPHONE (OUTPATIENT)
Dept: AUDIOLOGY | Facility: CLINIC | Age: 67
End: 2022-06-20
Payer: COMMERCIAL

## 2022-06-20 NOTE — TELEPHONE ENCOUNTER
Attempted to call patient to get him scheduled for his annual audiogram and to see Yvonne. The patient was having trouble hearing me and told me he will call me back.

## 2022-06-22 ENCOUNTER — OFFICE VISIT (OUTPATIENT)
Dept: ENDOCRINOLOGY | Facility: CLINIC | Age: 67
End: 2022-06-22
Payer: COMMERCIAL

## 2022-06-22 ENCOUNTER — HOSPITAL ENCOUNTER (OUTPATIENT)
Dept: RADIOLOGY | Facility: HOSPITAL | Age: 67
Discharge: HOME | End: 2022-06-22
Attending: NURSE PRACTITIONER
Payer: COMMERCIAL

## 2022-06-22 ENCOUNTER — OFFICE VISIT (OUTPATIENT)
Dept: FAMILY MEDICINE | Facility: CLINIC | Age: 67
End: 2022-06-22
Payer: COMMERCIAL

## 2022-06-22 VITALS
HEIGHT: 74 IN | OXYGEN SATURATION: 96 % | DIASTOLIC BLOOD PRESSURE: 76 MMHG | WEIGHT: 221.31 LBS | BODY MASS INDEX: 28.4 KG/M2 | SYSTOLIC BLOOD PRESSURE: 132 MMHG | SYSTOLIC BLOOD PRESSURE: 140 MMHG | WEIGHT: 221.44 LBS | HEIGHT: 74 IN | HEART RATE: 85 BPM | DIASTOLIC BLOOD PRESSURE: 60 MMHG | BODY MASS INDEX: 28.42 KG/M2 | HEART RATE: 86 BPM | RESPIRATION RATE: 18 BRPM

## 2022-06-22 DIAGNOSIS — E10.621 DIABETIC ULCER OF TOE OF LEFT FOOT ASSOCIATED WITH TYPE 1 DIABETES MELLITUS, UNSPECIFIED ULCER STAGE: ICD-10-CM

## 2022-06-22 DIAGNOSIS — E10.21 TYPE 1 DIABETES MELLITUS WITH DIABETIC NEPHROPATHY: ICD-10-CM

## 2022-06-22 DIAGNOSIS — T14.8XXD DELAYED WOUND HEALING: ICD-10-CM

## 2022-06-22 DIAGNOSIS — E10.649 HYPOGLYCEMIA UNAWARENESS IN TYPE 1 DIABETES MELLITUS: ICD-10-CM

## 2022-06-22 DIAGNOSIS — E78.5 HYPERLIPIDEMIA, UNSPECIFIED HYPERLIPIDEMIA TYPE: ICD-10-CM

## 2022-06-22 DIAGNOSIS — E10.621 DIABETIC ULCER OF TOE OF LEFT FOOT ASSOCIATED WITH TYPE 1 DIABETES MELLITUS, LIMITED TO BREAKDOWN OF SKIN: ICD-10-CM

## 2022-06-22 DIAGNOSIS — L97.521 DIABETIC ULCER OF TOE OF LEFT FOOT ASSOCIATED WITH TYPE 1 DIABETES MELLITUS, LIMITED TO BREAKDOWN OF SKIN: ICD-10-CM

## 2022-06-22 DIAGNOSIS — L97.529 DIABETIC ULCER OF TOE OF LEFT FOOT ASSOCIATED WITH TYPE 1 DIABETES MELLITUS, UNSPECIFIED ULCER STAGE: ICD-10-CM

## 2022-06-22 DIAGNOSIS — E10.43 TYPE 1 DIABETES MELLITUS WITH DIABETIC AUTONOMIC NEUROPATHY: Primary | ICD-10-CM

## 2022-06-22 DIAGNOSIS — I10 ESSENTIAL HYPERTENSION: ICD-10-CM

## 2022-06-22 DIAGNOSIS — Z01.818 PREOPERATIVE EXAMINATION: Primary | ICD-10-CM

## 2022-06-22 DIAGNOSIS — Z01.818 PREOPERATIVE EXAMINATION: ICD-10-CM

## 2022-06-22 PROCEDURE — 95251 PR GLUCOSE MONITOR, 72 HOUR, PHYS INTERP: ICD-10-PCS | Mod: S$GLB,,, | Performed by: NURSE PRACTITIONER

## 2022-06-22 PROCEDURE — 3062F PR POS MACROALBUMINURIA RESULT DOCUMENTED/REVIEW: ICD-10-PCS | Mod: CPTII,S$GLB,, | Performed by: NURSE PRACTITIONER

## 2022-06-22 PROCEDURE — 99214 OFFICE O/P EST MOD 30 MIN: CPT | Mod: S$GLB,,, | Performed by: NURSE PRACTITIONER

## 2022-06-22 PROCEDURE — 93010 ELECTROCARDIOGRAM REPORT: CPT | Mod: S$GLB,,, | Performed by: INTERNAL MEDICINE

## 2022-06-22 PROCEDURE — 1159F PR MEDICATION LIST DOCUMENTED IN MEDICAL RECORD: ICD-10-PCS | Mod: CPTII,S$GLB,, | Performed by: NURSE PRACTITIONER

## 2022-06-22 PROCEDURE — 71046 X-RAY EXAM CHEST 2 VIEWS: CPT | Mod: TC,PN

## 2022-06-22 PROCEDURE — 3078F PR MOST RECENT DIASTOLIC BLOOD PRESSURE < 80 MM HG: ICD-10-PCS | Mod: CPTII,S$GLB,, | Performed by: NURSE PRACTITIONER

## 2022-06-22 PROCEDURE — 71046 XR CHEST PA AND LATERAL: ICD-10-PCS | Mod: 26,,, | Performed by: RADIOLOGY

## 2022-06-22 PROCEDURE — 3062F POS MACROALBUMINURIA REV: CPT | Mod: CPTII,S$GLB,, | Performed by: NURSE PRACTITIONER

## 2022-06-22 PROCEDURE — 3051F PR MOST RECENT HEMOGLOBIN A1C LEVEL 7.0 - < 8.0%: ICD-10-PCS | Mod: CPTII,S$GLB,, | Performed by: NURSE PRACTITIONER

## 2022-06-22 PROCEDURE — 1101F PT FALLS ASSESS-DOCD LE1/YR: CPT | Mod: CPTII,S$GLB,, | Performed by: NURSE PRACTITIONER

## 2022-06-22 PROCEDURE — 3008F BODY MASS INDEX DOCD: CPT | Mod: CPTII,S$GLB,, | Performed by: NURSE PRACTITIONER

## 2022-06-22 PROCEDURE — 4010F PR ACE/ARB THEARPY RXD/TAKEN: ICD-10-PCS | Mod: CPTII,S$GLB,, | Performed by: NURSE PRACTITIONER

## 2022-06-22 PROCEDURE — 3077F PR MOST RECENT SYSTOLIC BLOOD PRESSURE >= 140 MM HG: ICD-10-PCS | Mod: CPTII,S$GLB,, | Performed by: NURSE PRACTITIONER

## 2022-06-22 PROCEDURE — 3077F SYST BP >= 140 MM HG: CPT | Mod: CPTII,S$GLB,, | Performed by: NURSE PRACTITIONER

## 2022-06-22 PROCEDURE — 3078F DIAST BP <80 MM HG: CPT | Mod: CPTII,S$GLB,, | Performed by: NURSE PRACTITIONER

## 2022-06-22 PROCEDURE — 3008F PR BODY MASS INDEX (BMI) DOCUMENTED: ICD-10-PCS | Mod: CPTII,S$GLB,, | Performed by: NURSE PRACTITIONER

## 2022-06-22 PROCEDURE — 3288F FALL RISK ASSESSMENT DOCD: CPT | Mod: CPTII,S$GLB,, | Performed by: NURSE PRACTITIONER

## 2022-06-22 PROCEDURE — 99214 PR OFFICE/OUTPT VISIT, EST, LEVL IV, 30-39 MIN: ICD-10-PCS | Mod: S$GLB,,, | Performed by: NURSE PRACTITIONER

## 2022-06-22 PROCEDURE — 71046 X-RAY EXAM CHEST 2 VIEWS: CPT | Mod: 26,,, | Performed by: RADIOLOGY

## 2022-06-22 PROCEDURE — 99999 PR PBB SHADOW E&M-EST. PATIENT-LVL III: CPT | Mod: PBBFAC,,, | Performed by: NURSE PRACTITIONER

## 2022-06-22 PROCEDURE — 3075F SYST BP GE 130 - 139MM HG: CPT | Mod: CPTII,S$GLB,, | Performed by: NURSE PRACTITIONER

## 2022-06-22 PROCEDURE — 3066F NEPHROPATHY DOC TX: CPT | Mod: CPTII,S$GLB,, | Performed by: NURSE PRACTITIONER

## 2022-06-22 PROCEDURE — 1160F RVW MEDS BY RX/DR IN RCRD: CPT | Mod: CPTII,S$GLB,, | Performed by: NURSE PRACTITIONER

## 2022-06-22 PROCEDURE — 4010F ACE/ARB THERAPY RXD/TAKEN: CPT | Mod: CPTII,S$GLB,, | Performed by: NURSE PRACTITIONER

## 2022-06-22 PROCEDURE — 99999 PR PBB SHADOW E&M-EST. PATIENT-LVL III: ICD-10-PCS | Mod: PBBFAC,,, | Performed by: NURSE PRACTITIONER

## 2022-06-22 PROCEDURE — 93010 EKG 12-LEAD: ICD-10-PCS | Mod: S$GLB,,, | Performed by: INTERNAL MEDICINE

## 2022-06-22 PROCEDURE — 3066F PR DOCUMENTATION OF TREATMENT FOR NEPHROPATHY: ICD-10-PCS | Mod: CPTII,S$GLB,, | Performed by: NURSE PRACTITIONER

## 2022-06-22 PROCEDURE — 3075F PR MOST RECENT SYSTOLIC BLOOD PRESS GE 130-139MM HG: ICD-10-PCS | Mod: CPTII,S$GLB,, | Performed by: NURSE PRACTITIONER

## 2022-06-22 PROCEDURE — 1160F PR REVIEW ALL MEDS BY PRESCRIBER/CLIN PHARMACIST DOCUMENTED: ICD-10-PCS | Mod: CPTII,S$GLB,, | Performed by: NURSE PRACTITIONER

## 2022-06-22 PROCEDURE — 3051F HG A1C>EQUAL 7.0%<8.0%: CPT | Mod: CPTII,S$GLB,, | Performed by: NURSE PRACTITIONER

## 2022-06-22 PROCEDURE — 1101F PR PT FALLS ASSESS DOC 0-1 FALLS W/OUT INJ PAST YR: ICD-10-PCS | Mod: CPTII,S$GLB,, | Performed by: NURSE PRACTITIONER

## 2022-06-22 PROCEDURE — 99999 PR PBB SHADOW E&M-EST. PATIENT-LVL IV: ICD-10-PCS | Mod: PBBFAC,,, | Performed by: NURSE PRACTITIONER

## 2022-06-22 PROCEDURE — 93005 ELECTROCARDIOGRAM TRACING: CPT | Mod: S$GLB,,, | Performed by: NURSE PRACTITIONER

## 2022-06-22 PROCEDURE — 1159F MED LIST DOCD IN RCRD: CPT | Mod: CPTII,S$GLB,, | Performed by: NURSE PRACTITIONER

## 2022-06-22 PROCEDURE — 99999 PR PBB SHADOW E&M-EST. PATIENT-LVL IV: CPT | Mod: PBBFAC,,, | Performed by: NURSE PRACTITIONER

## 2022-06-22 PROCEDURE — 93005 EKG 12-LEAD: ICD-10-PCS | Mod: S$GLB,,, | Performed by: NURSE PRACTITIONER

## 2022-06-22 PROCEDURE — 95251 CONT GLUC MNTR ANALYSIS I&R: CPT | Mod: S$GLB,,, | Performed by: NURSE PRACTITIONER

## 2022-06-22 PROCEDURE — 3288F PR FALLS RISK ASSESSMENT DOCUMENTED: ICD-10-PCS | Mod: CPTII,S$GLB,, | Performed by: NURSE PRACTITIONER

## 2022-06-22 RX ORDER — INSULIN GLARGINE 300 U/ML
INJECTION, SOLUTION SUBCUTANEOUS
Qty: 2 PEN | Refills: 6
Start: 2022-06-22 | End: 2022-12-15

## 2022-06-22 RX ORDER — FLUOXETINE HYDROCHLORIDE 20 MG/1
20 CAPSULE ORAL NIGHTLY
COMMUNITY
Start: 2022-06-06 | End: 2022-07-28

## 2022-06-22 NOTE — PROGRESS NOTES
CC: Mr. Hammad Douglas III arrives today for management of Type 1  DM and review of chronic medical conditions, as listed in the visit diagnosis section of this encounter.     HPI: Mr. Hammad Douglas III was diagnosed with Type 1  DM at age 19 after a viral illness - had polyuria, polydipsia at this time.  Initial treatment consisted of insulin. Denies FH of DM. Reports past hospitalizations due to DKA > 30 years ago.   Has diagnosis of hypoglycemia unawareness with past severe hypoglycemic episodes requiring 3rd party assistance. Began using Dexcom G5 in 2017 and later up graded to G6.    Patient was last seen by me in November.     He is following closely with podiatry. He will be having L foot surgery for non-healing wound from bone spur surgery in October.     BG monitoring per Dexcom G6. He uses .     Hypoglycemia: Rare  Hypoglycemic Symptoms: often doesn't have symptoms.   Hypoglycemia Treatment: Juice or glucose tabs.     Missing Insulin/PO medication doses: Yes - sometimes misses breakfast dose and then takes once glucose is elevated.   Timing prandial insulin 5-15 minutes before meals: yes    Exercise: not recently due to foot wound.     Dietary Habits: Eats 2-3 meals/day. Rare snacking. Avoids sugary beverages.     Last DM education appointment: October 2017      CURRENT DIABETIC MEDS:  Toujeo Max 26 units QHS, Novolog I:C ratio 1:5, correction target 150, ISF 50 (average dose 10-12 units)  Vial or pen: pen    Previous insulins:  Lantus    Last Eye Exam: 3/2022, + proliferative DR. + R cataract.   Last Podiatry Exam: 6/2022, Past amputation of R third toe.     REVIEW OF SYSTEMS  Constitutional: no c/o weakness, fatigue, weight loss.   Cardiac: no palpitations or chest pain.  Respiratory: no cough or dyspnea.   GI:  no c/o abdominal pain or nausea.   Skin: no rashes. +  wound to L foot  Neuro: no numbness, tingling, or parasthesias.  Endocrine: denies polyphagia, polydipsia, polyuria    Personally  "reviewed Past Medical, Surgical, Social History.    Vital Signs  /60   Pulse 86   Ht 6' 2" (1.88 m)   Wt 100.4 kg (221 lb 7.2 oz)   BMI 28.43 kg/m²      Personally reviewed the below labs:    Hemoglobin A1C   Date Value Ref Range Status   02/09/2022 7.5 (H) 4.0 - 5.6 % Final     Comment:     ADA Screening Guidelines:  5.7-6.4%  Consistent with prediabetes  >or=6.5%  Consistent with diabetes    High levels of fetal hemoglobin interfere with the HbA1C  assay. Heterozygous hemoglobin variants (HbS, HgC, etc)do  not significantly interfere with this assay.   However, presence of multiple variants may affect accuracy.     11/16/2021 7.9 (H) 4.0 - 5.6 % Final     Comment:     ADA Screening Guidelines:  5.7-6.4%  Consistent with prediabetes  >or=6.5%  Consistent with diabetes    High levels of fetal hemoglobin interfere with the HbA1C  assay. Heterozygous hemoglobin variants (HbS, HgC, etc)do  not significantly interfere with this assay.   However, presence of multiple variants may affect accuracy.     08/12/2021 8.3 (H) 4.0 - 5.6 % Final     Comment:     ADA Screening Guidelines:  5.7-6.4%  Consistent with prediabetes  >or=6.5%  Consistent with diabetes    High levels of fetal hemoglobin interfere with the HbA1C  assay. Heterozygous hemoglobin variants (HbS, HgC, etc)do  not significantly interfere with this assay.   However, presence of multiple variants may affect accuracy.         Chemistry        Component Value Date/Time     11/16/2021 1027    K 4.7 11/16/2021 1027     11/16/2021 1027    CO2 28 11/16/2021 1027    BUN 25 (H) 11/16/2021 1027    CREATININE 1.2 11/16/2021 1027     (H) 11/16/2021 1027        Component Value Date/Time    CALCIUM 9.8 11/16/2021 1027    ALKPHOS 108 11/16/2021 1027    AST 26 11/16/2021 1027    ALT 25 11/16/2021 1027    BILITOT 0.5 11/16/2021 1027    ESTGFRAFRICA >60.0 11/16/2021 1027    EGFRNONAA >60.0 11/16/2021 1027          Lab Results   Component Value Date    " CHOL 150 08/12/2021    CHOL 154 07/27/2020    CHOL 163 06/26/2019     Lab Results   Component Value Date    HDL 46 08/12/2021    HDL 46 07/27/2020    HDL 51 06/26/2019     Lab Results   Component Value Date    LDLCALC 91.4 08/12/2021    LDLCALC 95.8 07/27/2020    LDLCALC 99.6 06/26/2019     Lab Results   Component Value Date    TRIG 63 08/12/2021    TRIG 61 07/27/2020    TRIG 62 06/26/2019     Lab Results   Component Value Date    CHOLHDL 30.7 08/12/2021    CHOLHDL 29.9 07/27/2020    CHOLHDL 31.3 06/26/2019       Lab Results   Component Value Date    MICALBCREAT 412.0 (H) 04/22/2022     Lab Results   Component Value Date    TSH 1.789 02/09/2022       CrCl cannot be calculated (Patient's most recent lab result is older than the maximum 7 days allowed.).    Vit D, 25-Hydroxy   Date Value Ref Range Status   08/06/2018 35 30 - 96 ng/mL Final     Comment:     Vitamin D deficiency.........<10 ng/mL                              Vitamin D insufficiency......10-29 ng/mL       Vitamin D sufficiency........> or equal to 30 ng/mL  Vitamin D toxicity............>100 ng/mL         PHYSICAL EXAMINATION  Constitutional: Appears well, no distress  Neck: Supple, trachea midline.  Respiratory: CTA, even and unlabored.   Cardiovascular: RRR, no murmurs.  GI: active bowel sounds, no hernia  Skin: warm and dry  Neuro: oriented to person, place, time.  Feet: appropriate footwear.       DEXCOM DOWNLOAD: See media file for details. Fasting glucoses vary. However, some recent early morning hypoglycemia noted. Occasional hypoglycemia also noted during the day, sometimes following an excursion. Largest prandial excursions noted after breakfast.   Average glucose: 167 mg/dL  Above 250 mg/dL: 8 %  181-250 mg/dL: 35 %   mg/dL: 53 %  54-69 mg/dL: 3 %  Below 54 mg/dL: <1 %         Goals      HEMOGLOBIN A1C < 7.5         due to hypoglycemia unawareness      Assessment/Plan  1. Type 1 diabetes mellitus with diabetic autonomic neuropathy  --  A1c is pending. Having some early AM hypoglycemia. Difficult to determine whether afternoon/evening hypoglycemia is related to basal insulin or Novolog administration.  Reinforced proper timing of Novolog at the BEGINNING of the meal.   -- Continue Novolog insulin:carb ratio 1:5. Continue correction scale. DO NOT administer additional Novolog dose until 4 hours has passed since last food/Novolog dose.   -- slightly decrease Toujeo Max to 24 units.   -- not interested in insulin pump.   -- BG monitoring per Dexcom    -- Discussed diagnosis of DM, A1c goals, progression of disease, long term complications and tx options.    -- Reviewed hypoglycemia management: treat with 1/2 glass of juice, 1/2 can regular coke, or 4 glucose tablets. Monitor and repeat treatment every 15 minutes until BG is >70 Then have a snack, which includes a complex carbohydrate and protein.  Advised patient to check BG before activities, such as driving or exercise.   2. Type 1 diabetes mellitus with diabetic nephropathy  -- uncontrolled.   -- continue losartan   3. Proliferative diabetic retinopathy without macular edema associated with type 1 diabetes mellitus, unspecified laterality  -- stable  -- keep follow ups   4. Essential hypertension  -- controlled  -- continue current meds   5. Hyperlipidemia, unspecified hyperlipidemia type  -- controlled  -- continue atorvastatin  -- lipid panel with RTC   6. Hypoglycemia unawareness in Type 1 DM -- continue Dexcom.   -- has glucagon   7. Diabetic ulcer to L foot -- following with Podiatry  -- reduce Toujeo Max to 22 units the night before surgery.  -- optimize DM control         FOLLOW UP   Follow up in about 3 months (around 9/22/2022).   Patient instructed to bring BG logs to each follow up   Patient encouraged to call for any BG/medication issues, concerns, or questions.     Orders Placed This Encounter   Procedures    Hemoglobin A1C    Lipid Panel

## 2022-06-22 NOTE — H&P (VIEW-ONLY)
Subjective:       Patient ID: Hammad Douglas III is a 67 y.o. male.    Chief Complaint: Pre-op Exam    Pt is a 67 year old male who presents today for preoperative clearance. He is going to have left toe surgery with  but no official date has been set. He is feeling well and is without complaints      Past Medical History:  No date: Cataract      Comment:  Done OU  No date: Chronic kidney disease  No date: Diabetes mellitus type I      Comment:  Diagnosed at age 19  No date: Diabetic retinopathy      Comment:  See's Dr. Ledesma  No date: Hyperlipidemia  No date: Hypertension    Past Surgical History:  09/28/2021: CATARACT EXTRACTION W/  INTRAOCULAR LENS IMPLANT; Right      Comment:  Dr Solis  02/22/2022: CATARACT EXTRACTION W/  INTRAOCULAR LENS IMPLANT; Left      Comment:  Dr Solis  No date: COLONOSCOPY      Comment:  2011 wnl  9/28/2021: PHACOEMULSIFICATION OF CATARACT; Right      Comment:  Procedure: PHACOEMULSIFICATION, CATARACT;  Surgeon:                Vicente Solis Jr., MD;  Location: [facility] OR;  Service:                Ophthalmology;  Laterality: Right;  DM/right  2/22/2022: PHACOEMULSIFICATION OF CATARACT; Left      Comment:  Procedure: PHACOEMULSIFICATION, CATARACT;  Surgeon:                Vicente Solis Jr., MD;  Location: [facility] OR;  Service:                Ophthalmology;  Laterality: Left;  left  10/14/2021: SURGICAL REMOVAL OF BONE SPUR; Left      Comment:  Procedure: EXCISION, BONE SPUR;  Surgeon: Caleb Duffy DPM;  Location: [facility] OR;  Service: Podiatry;  Laterality:               Left;  7/2/2020: TOE AMPUTATION; Right      Comment:  Procedure: AMPUTATION, TOE; 3rd;  Surgeon: Caleb Duffy DPM;  Location: [facility] OR;  Service: Podiatry;                 Laterality: Right;  No date: TOE SURGERY; Right      Comment:  right big toe    Review of patient's family history indicates:  Problem: Cataracts      Relation: Mother          Age of Onset: (Not  "Specified)  Problem: Cataracts      Relation: Father          Age of Onset: (Not Specified)  Problem: Breast cancer      Relation: Sister          Age of Onset: (Not Specified)  Problem: Glaucoma      Relation: Neg Hx          Age of Onset: (Not Specified)  Problem: Macular degeneration      Relation: Neg Hx          Age of Onset: (Not Specified)  Problem: Retinal detachment      Relation: Neg Hx          Age of Onset: (Not Specified)      Social History    Socioeconomic History      Marital status:     Tobacco Use      Smoking status: Former Smoker      Smokeless tobacco: Never Used      Tobacco comment: Former cigar use    Substance and Sexual Activity      Alcohol use: Yes        Alcohol/week: 2.0 standard drinks        Types: 2 Cans of beer per week        Comment: "2 beers a week"      Drug use: No      Sexual activity: Yes        Partners: Female    Social History Narrative      Retired former radiation  at [employer]    Current Outpatient Medications:  atorvastatin (LIPITOR) 40 MG tablet, TAKE 1 TABLET(40 MG) BY MOUTH EVERY DAY, Disp: 90 tablet, Rfl: 3  BD BETSY 2ND GEN PEN NEEDLE 32 gauge x 5/32" Ndle, , Disp: , Rfl:   FLUoxetine 10 MG capsule, Take 3 capsules (30 mg total) by mouth once daily., Disp: 270 capsule, Rfl: 1  insulin aspart U-100 (NOVOLOG) 100 unit/mL (3 mL) InPn pen, INJECT USING INSULIN:CARB RATIO OF 1:5 BEFORE A MEAL. MAX TOTAL DAILY DOSE 40 UNITS, Disp: 45 mL, Rfl: 3  insulin glargine U-300 conc (TOUJEO MAX U-300 SOLOSTAR) 300 unit/mL (3 mL) insulin pen, ADMINISTER 26 UNITS UNDER THE SKIN EVERY EVENING, Disp: 2 pen, Rfl: 6  losartan (COZAAR) 100 MG tablet, TAKE 1 TABLET(100 MG) BY MOUTH EVERY DAY, Disp: 90 tablet, Rfl: 3  pen needle, diabetic (BD ULTRA-FINE MINI PEN NEEDLE) 31 gauge x 3/16" Ndle, USE 1 FOUR TIMES DAILY, Disp: 150 each, Rfl: 11    Current Facility-Administered Medications:  glucagon (human recombinant) injection 1 mg, 1 mg, Intramuscular, Once, " Tania Mckeon NP        Review of patient's allergies indicates:   -- Altace (ramipril)     --  Cough      Review of Systems   Constitutional: Negative for activity change, appetite change, fatigue and fever.   Respiratory: Negative for cough, chest tightness, shortness of breath and wheezing.    Cardiovascular: Negative for chest pain, palpitations and leg swelling.   Gastrointestinal: Negative for abdominal pain, blood in stool, constipation, diarrhea, nausea and vomiting.   Neurological: Negative for dizziness, weakness, light-headedness and headaches.         Objective:      Physical Exam  Vitals reviewed.   Constitutional:       General: He is not in acute distress.     Appearance: Normal appearance. He is normal weight.   HENT:      Right Ear: Tympanic membrane, ear canal and external ear normal. There is no impacted cerumen.      Left Ear: Tympanic membrane, ear canal and external ear normal. There is no impacted cerumen.      Nose: Nose normal.      Mouth/Throat:      Mouth: Mucous membranes are moist.   Cardiovascular:      Rate and Rhythm: Normal rate and regular rhythm.      Pulses: Normal pulses.      Heart sounds: Normal heart sounds.   Pulmonary:      Effort: Pulmonary effort is normal.      Breath sounds: Normal breath sounds.   Abdominal:      General: Abdomen is flat. Bowel sounds are normal.      Palpations: Abdomen is soft.   Skin:     General: Skin is warm and dry.   Neurological:      Mental Status: He is alert and oriented to person, place, and time.   Psychiatric:         Mood and Affect: Mood normal.         Behavior: Behavior normal.         Judgment: Judgment normal.         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    Relevant Orders    Basic Metabolic Panel    Hemoglobin A1C    Type 1 diabetes mellitus with diabetic nephropathy    Relevant Orders     Basic Metabolic Panel    Hemoglobin A1C      Other Visit Diagnoses     Preoperative examination    -  Primary    Relevant Orders    IN OFFICE EKG 12-LEAD (to Muse)    X-Ray Chest PA And Lateral    CBC Auto Differential    Basic Metabolic Panel    Hemoglobin A1C    Delayed wound healing        Relevant Orders    Basic Metabolic Panel    Hemoglobin A1C    Diabetic ulcer of toe of left foot associated with type 1 diabetes mellitus, limited to breakdown of skin        Relevant Orders    Basic Metabolic Panel    Hemoglobin A1C          Plan:       Hammad was seen today for pre-op exam.    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  -     Basic Metabolic Panel; Future  -     Hemoglobin A1C; Future    Type 1 diabetes mellitus with diabetic nephropathy  -     Basic Metabolic Panel; Future  -     Hemoglobin A1C; Future    Delayed wound healing  -     Basic Metabolic Panel; Future  -     Hemoglobin A1C; Future    Diabetic ulcer of toe of left foot associated with type 1 diabetes mellitus, limited to breakdown of skin  -     Basic Metabolic Panel; Future  -     Hemoglobin A1C; Future      Labs reviewed-   BMP and CBC stable  A1c 7.4 (cleared by endocrinology)  Pt medically cleared for surgery with       IN OFFICE EKG 12-LEAD (to Muse)  Order: [id number]       Narrative  Performed by: ANCA  Test Reason : Z01.818,     Vent. Rate : 083 BPM     Atrial Rate : 083 BPM      P-R Int : 182 ms          QRS Dur : 106 ms       QT Int : 390 ms       P-R-T Axes : 026 039 053 degrees      QTc Int : 458 ms     Normal sinus rhythm   Normal ECG   When compared with ECG of 24-SEP-2021 09:08,   No significant change  was found   Confirmed by THIAGO NGUYEN MD ([id number]) on 6/22/2022 11:16:59 AM     Referred By: TAWNY HERNANDEZ           Confirmed By:THIAGO NGUYEN MD      Specimen Collected: 06/22/22 08:12 Last Resulted: 06/22/22 11:17                  EXAMINATION:  XR CHEST PA AND LATERAL     CLINICAL HISTORY:  Encounter for other preprocedural examination     TECHNIQUE:  PA and lateral views of the chest were performed.     COMPARISON:  06/24/2020     FINDINGS:  The lungs are clear, with normal appearance of pulmonary vasculature and no pleural effusion or pneumothorax.     The cardiac silhouette is normal in size. The hilar and mediastinal contours are unremarkable.     Bones are intact.     Impression:     No acute abnormality.        Electronically signed by: Nika Saavedra  Date:                                            06/22/2022  Time:                                           08:43

## 2022-06-22 NOTE — PROGRESS NOTES
Subjective:       Patient ID: Hamamd Douglas III is a 67 y.o. male.    Chief Complaint: Pre-op Exam    Pt is a 67 year old male who presents today for preoperative clearance. He is going to have left toe surgery with  but no official date has been set. He is feeling well and is without complaints      Past Medical History:  No date: Cataract      Comment:  Done OU  No date: Chronic kidney disease  No date: Diabetes mellitus type I      Comment:  Diagnosed at age 19  No date: Diabetic retinopathy      Comment:  See's Dr. Ledesma  No date: Hyperlipidemia  No date: Hypertension    Past Surgical History:  09/28/2021: CATARACT EXTRACTION W/  INTRAOCULAR LENS IMPLANT; Right      Comment:  Dr Solis  02/22/2022: CATARACT EXTRACTION W/  INTRAOCULAR LENS IMPLANT; Left      Comment:  Dr Solis  No date: COLONOSCOPY      Comment:  2011 wnl  9/28/2021: PHACOEMULSIFICATION OF CATARACT; Right      Comment:  Procedure: PHACOEMULSIFICATION, CATARACT;  Surgeon:                Vicente Solis Jr., MD;  Location: [facility] OR;  Service:                Ophthalmology;  Laterality: Right;  DM/right  2/22/2022: PHACOEMULSIFICATION OF CATARACT; Left      Comment:  Procedure: PHACOEMULSIFICATION, CATARACT;  Surgeon:                Vicente Solis Jr., MD;  Location: [facility] OR;  Service:                Ophthalmology;  Laterality: Left;  left  10/14/2021: SURGICAL REMOVAL OF BONE SPUR; Left      Comment:  Procedure: EXCISION, BONE SPUR;  Surgeon: Caleb Duffy DPM;  Location: [facility] OR;  Service: Podiatry;  Laterality:               Left;  7/2/2020: TOE AMPUTATION; Right      Comment:  Procedure: AMPUTATION, TOE; 3rd;  Surgeon: Caleb Duffy DPM;  Location: [facility] OR;  Service: Podiatry;                 Laterality: Right;  No date: TOE SURGERY; Right      Comment:  right big toe    Review of patient's family history indicates:  Problem: Cataracts      Relation: Mother          Age of Onset: (Not  "Specified)  Problem: Cataracts      Relation: Father          Age of Onset: (Not Specified)  Problem: Breast cancer      Relation: Sister          Age of Onset: (Not Specified)  Problem: Glaucoma      Relation: Neg Hx          Age of Onset: (Not Specified)  Problem: Macular degeneration      Relation: Neg Hx          Age of Onset: (Not Specified)  Problem: Retinal detachment      Relation: Neg Hx          Age of Onset: (Not Specified)      Social History    Socioeconomic History      Marital status:     Tobacco Use      Smoking status: Former Smoker      Smokeless tobacco: Never Used      Tobacco comment: Former cigar use    Substance and Sexual Activity      Alcohol use: Yes        Alcohol/week: 2.0 standard drinks        Types: 2 Cans of beer per week        Comment: "2 beers a week"      Drug use: No      Sexual activity: Yes        Partners: Female    Social History Narrative      Retired former radiation  at [place]    Current Outpatient Medications:  atorvastatin (LIPITOR) 40 MG tablet, TAKE 1 TABLET(40 MG) BY MOUTH EVERY DAY, Disp: 90 tablet, Rfl: 3  BD BETSY 2ND GEN PEN NEEDLE 32 gauge x 5/32" Ndle, , Disp: , Rfl:   FLUoxetine 10 MG capsule, Take 3 capsules (30 mg total) by mouth once daily., Disp: 270 capsule, Rfl: 1  insulin aspart U-100 (NOVOLOG) 100 unit/mL (3 mL) InPn pen, INJECT USING INSULIN:CARB RATIO OF 1:5 BEFORE A MEAL. MAX TOTAL DAILY DOSE 40 UNITS, Disp: 45 mL, Rfl: 3  insulin glargine U-300 conc (TOUJEO MAX U-300 SOLOSTAR) 300 unit/mL (3 mL) insulin pen, ADMINISTER 26 UNITS UNDER THE SKIN EVERY EVENING, Disp: 2 pen, Rfl: 6  losartan (COZAAR) 100 MG tablet, TAKE 1 TABLET(100 MG) BY MOUTH EVERY DAY, Disp: 90 tablet, Rfl: 3  pen needle, diabetic (BD ULTRA-FINE MINI PEN NEEDLE) 31 gauge x 3/16" Ndle, USE 1 FOUR TIMES DAILY, Disp: 150 each, Rfl: 11    Current Facility-Administered Medications:  glucagon (human recombinant) injection 1 mg, 1 mg, Intramuscular, Once, " Tania Mckeon NP        Review of patient's allergies indicates:   -- Altace (ramipril)     --  Cough      Review of Systems   Constitutional: Negative for activity change, appetite change, fatigue and fever.   Respiratory: Negative for cough, chest tightness, shortness of breath and wheezing.    Cardiovascular: Negative for chest pain, palpitations and leg swelling.   Gastrointestinal: Negative for abdominal pain, blood in stool, constipation, diarrhea, nausea and vomiting.   Neurological: Negative for dizziness, weakness, light-headedness and headaches.         Objective:      Physical Exam  Vitals reviewed.   Constitutional:       General: He is not in acute distress.     Appearance: Normal appearance. He is normal weight.   HENT:      Right Ear: Tympanic membrane, ear canal and external ear normal. There is no impacted cerumen.      Left Ear: Tympanic membrane, ear canal and external ear normal. There is no impacted cerumen.      Nose: Nose normal.      Mouth/Throat:      Mouth: Mucous membranes are moist.   Cardiovascular:      Rate and Rhythm: Normal rate and regular rhythm.      Pulses: Normal pulses.      Heart sounds: Normal heart sounds.   Pulmonary:      Effort: Pulmonary effort is normal.      Breath sounds: Normal breath sounds.   Abdominal:      General: Abdomen is flat. Bowel sounds are normal.      Palpations: Abdomen is soft.   Skin:     General: Skin is warm and dry.   Neurological:      Mental Status: He is alert and oriented to person, place, and time.   Psychiatric:         Mood and Affect: Mood normal.         Behavior: Behavior normal.         Judgment: Judgment normal.         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    Relevant Orders    Basic Metabolic Panel    Hemoglobin A1C    Type 1 diabetes mellitus with diabetic nephropathy    Relevant Orders     Basic Metabolic Panel    Hemoglobin A1C      Other Visit Diagnoses     Preoperative examination    -  Primary    Relevant Orders    IN OFFICE EKG 12-LEAD (to Muse)    X-Ray Chest PA And Lateral    CBC Auto Differential    Basic Metabolic Panel    Hemoglobin A1C    Delayed wound healing        Relevant Orders    Basic Metabolic Panel    Hemoglobin A1C    Diabetic ulcer of toe of left foot associated with type 1 diabetes mellitus, limited to breakdown of skin        Relevant Orders    Basic Metabolic Panel    Hemoglobin A1C          Plan:       Hammad was seen today for pre-op exam.    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 Rate : 083 BPM     Atrial Rate : 083 BPM      P-R Int : 182 ms          QRS Dur : 106 ms       QT Int : 390 ms       P-R-T Axes : 026 039 053 degrees      QTc Int : 458 ms     Normal sinus rhythm   Normal ECG   When compared with ECG of 24-SEP-2021 09:08,   No significant change  was found   Confirmed by THIAGO NGUYEN MD ([id number]) on 6/22/2022 11:16:59 AM     Referred By: TAWNY HERNANDEZ           Confirmed By:THIAGO NGUYEN MD      Specimen Collected: 06/22/22 08:12 Last Resulted: 06/22/22 11:17                  EXAMINATION:  XR CHEST PA AND LATERAL     CLINICAL HISTORY:  Encounter for other preprocedural examination     TECHNIQUE:  PA and lateral views of the chest were performed.     COMPARISON:  06/24/2020     FINDINGS:  The lungs are clear, with normal appearance of pulmonary vasculature and no pleural effusion or pneumothorax.     The cardiac silhouette is normal in size. The hilar and mediastinal contours are unremarkable.     Bones are intact.     Impression:     No acute abnormality.        Electronically signed by: Nika Saavedra  Date:                                            06/22/2022  Time:                                           08:43

## 2022-06-23 ENCOUNTER — PATIENT MESSAGE (OUTPATIENT)
Dept: PODIATRY | Facility: CLINIC | Age: 67
End: 2022-06-23
Payer: COMMERCIAL

## 2022-06-23 ENCOUNTER — PATIENT MESSAGE (OUTPATIENT)
Dept: ENDOCRINOLOGY | Facility: CLINIC | Age: 67
End: 2022-06-23
Payer: COMMERCIAL

## 2022-06-28 ENCOUNTER — OFFICE VISIT (OUTPATIENT)
Dept: OPHTHALMOLOGY | Facility: CLINIC | Age: 67
End: 2022-06-28
Payer: COMMERCIAL

## 2022-06-28 DIAGNOSIS — Z98.890 S/P YAG CAPSULOTOMY, BILATERAL: ICD-10-CM

## 2022-06-28 DIAGNOSIS — H26.493 PCO (POSTERIOR CAPSULAR OPACIFICATION), BILATERAL: Primary | ICD-10-CM

## 2022-06-28 PROCEDURE — 3051F PR MOST RECENT HEMOGLOBIN A1C LEVEL 7.0 - < 8.0%: ICD-10-PCS | Mod: CPTII,S$GLB,, | Performed by: OPHTHALMOLOGY

## 2022-06-28 PROCEDURE — 3051F HG A1C>EQUAL 7.0%<8.0%: CPT | Mod: CPTII,S$GLB,, | Performed by: OPHTHALMOLOGY

## 2022-06-28 PROCEDURE — 1160F RVW MEDS BY RX/DR IN RCRD: CPT | Mod: CPTII,S$GLB,, | Performed by: OPHTHALMOLOGY

## 2022-06-28 PROCEDURE — 99024 POSTOP FOLLOW-UP VISIT: CPT | Mod: S$GLB,,, | Performed by: OPHTHALMOLOGY

## 2022-06-28 PROCEDURE — 99999 PR PBB SHADOW E&M-EST. PATIENT-LVL III: ICD-10-PCS | Mod: PBBFAC,,, | Performed by: OPHTHALMOLOGY

## 2022-06-28 PROCEDURE — 3062F PR POS MACROALBUMINURIA RESULT DOCUMENTED/REVIEW: ICD-10-PCS | Mod: CPTII,S$GLB,, | Performed by: OPHTHALMOLOGY

## 2022-06-28 PROCEDURE — 1101F PT FALLS ASSESS-DOCD LE1/YR: CPT | Mod: CPTII,S$GLB,, | Performed by: OPHTHALMOLOGY

## 2022-06-28 PROCEDURE — 3288F PR FALLS RISK ASSESSMENT DOCUMENTED: ICD-10-PCS | Mod: CPTII,S$GLB,, | Performed by: OPHTHALMOLOGY

## 2022-06-28 PROCEDURE — 99999 PR PBB SHADOW E&M-EST. PATIENT-LVL III: CPT | Mod: PBBFAC,,, | Performed by: OPHTHALMOLOGY

## 2022-06-28 PROCEDURE — 1160F PR REVIEW ALL MEDS BY PRESCRIBER/CLIN PHARMACIST DOCUMENTED: ICD-10-PCS | Mod: CPTII,S$GLB,, | Performed by: OPHTHALMOLOGY

## 2022-06-28 PROCEDURE — 4010F ACE/ARB THERAPY RXD/TAKEN: CPT | Mod: CPTII,S$GLB,, | Performed by: OPHTHALMOLOGY

## 2022-06-28 PROCEDURE — 3062F POS MACROALBUMINURIA REV: CPT | Mod: CPTII,S$GLB,, | Performed by: OPHTHALMOLOGY

## 2022-06-28 PROCEDURE — 1101F PR PT FALLS ASSESS DOC 0-1 FALLS W/OUT INJ PAST YR: ICD-10-PCS | Mod: CPTII,S$GLB,, | Performed by: OPHTHALMOLOGY

## 2022-06-28 PROCEDURE — 1159F MED LIST DOCD IN RCRD: CPT | Mod: CPTII,S$GLB,, | Performed by: OPHTHALMOLOGY

## 2022-06-28 PROCEDURE — 3288F FALL RISK ASSESSMENT DOCD: CPT | Mod: CPTII,S$GLB,, | Performed by: OPHTHALMOLOGY

## 2022-06-28 PROCEDURE — 99024 PR POST-OP FOLLOW-UP VISIT: ICD-10-PCS | Mod: S$GLB,,, | Performed by: OPHTHALMOLOGY

## 2022-06-28 PROCEDURE — 1159F PR MEDICATION LIST DOCUMENTED IN MEDICAL RECORD: ICD-10-PCS | Mod: CPTII,S$GLB,, | Performed by: OPHTHALMOLOGY

## 2022-06-28 PROCEDURE — 3066F NEPHROPATHY DOC TX: CPT | Mod: CPTII,S$GLB,, | Performed by: OPHTHALMOLOGY

## 2022-06-28 PROCEDURE — 4010F PR ACE/ARB THEARPY RXD/TAKEN: ICD-10-PCS | Mod: CPTII,S$GLB,, | Performed by: OPHTHALMOLOGY

## 2022-06-28 PROCEDURE — 3066F PR DOCUMENTATION OF TREATMENT FOR NEPHROPATHY: ICD-10-PCS | Mod: CPTII,S$GLB,, | Performed by: OPHTHALMOLOGY

## 2022-06-30 DIAGNOSIS — M19.079 ARTHRITIS OF BIG TOE: Primary | ICD-10-CM

## 2022-06-30 DIAGNOSIS — E10.621 DIABETIC ULCER OF TOE OF LEFT FOOT ASSOCIATED WITH TYPE 1 DIABETES MELLITUS, LIMITED TO BREAKDOWN OF SKIN: ICD-10-CM

## 2022-06-30 DIAGNOSIS — L97.521 DIABETIC ULCER OF TOE OF LEFT FOOT ASSOCIATED WITH TYPE 1 DIABETES MELLITUS, LIMITED TO BREAKDOWN OF SKIN: ICD-10-CM

## 2022-06-30 NOTE — PROGRESS NOTES
HPI     Pt here for 2 wk YAG f/u. Pt sts va has cleared up a little. Denies   flashes/floaters/eye pain. No gtts.     Last edited by Negin South MA on 6/28/2022  4:18 PM. (History)        ROS     Negative for: Constitutional, Gastrointestinal, Neurological, Skin,   Genitourinary, Musculoskeletal, HENT, Endocrine, Cardiovascular, Eyes,   Respiratory, Psychiatric, Allergic/Imm, Heme/Lymph    Last edited by Vicente Solis Jr., MD on 6/28/2022  4:33 PM. (History)        Assessment /Plan     For exam results, see Encounter Report.    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

## 2022-07-06 ENCOUNTER — CLINICAL SUPPORT (OUTPATIENT)
Dept: FAMILY MEDICINE | Facility: CLINIC | Age: 67
End: 2022-07-06
Payer: COMMERCIAL

## 2022-07-06 VITALS — SYSTOLIC BLOOD PRESSURE: 134 MMHG | DIASTOLIC BLOOD PRESSURE: 70 MMHG

## 2022-07-06 PROCEDURE — 99999 PR PBB SHADOW E&M-EST. PATIENT-LVL I: ICD-10-PCS | Mod: PBBFAC,,,

## 2022-07-06 PROCEDURE — 99999 PR PBB SHADOW E&M-EST. PATIENT-LVL I: CPT | Mod: PBBFAC,,,

## 2022-07-06 NOTE — PROGRESS NOTES
"Hammad Douglas III 67 y.o. male is here today for Blood Pressure check.   History of HTN yes.    Review of patient's allergies indicates:   Allergen Reactions    Altace [ramipril]      Cough     Creatinine   Date Value Ref Range Status   06/22/2022 1.3 0.5 - 1.4 mg/dL Final     Sodium   Date Value Ref Range Status   06/22/2022 137 136 - 145 mmol/L Final     Potassium   Date Value Ref Range Status   06/22/2022 5.2 (H) 3.5 - 5.1 mmol/L Final   ]  Patient verifies taking blood pressure medications on a regular basis at the same time of the day.     Current Outpatient Medications:     atorvastatin (LIPITOR) 40 MG tablet, TAKE 1 TABLET(40 MG) BY MOUTH EVERY DAY, Disp: 90 tablet, Rfl: 3    BD BETSY 2ND GEN PEN NEEDLE 32 gauge x 5/32" Ndle, , Disp: , Rfl:     FLUoxetine 10 MG capsule, Take 3 capsules (30 mg total) by mouth once daily., Disp: 270 capsule, Rfl: 1    FLUoxetine 20 MG capsule, 20 mg every evening. Taking 1 tab daily with 10 mg tablet, Disp: , Rfl:     insulin aspart U-100 (NOVOLOG) 100 unit/mL (3 mL) InPn pen, INJECT USING INSULIN:CARB RATIO OF 1:5 BEFORE A MEAL. MAX TOTAL DAILY DOSE 40 UNITS, Disp: 45 mL, Rfl: 3    insulin glargine U-300 conc (TOUJEO MAX U-300 SOLOSTAR) 300 unit/mL (3 mL) insulin pen, ADMINISTER 24 UNITS UNDER THE SKIN EVERY EVENING, Disp: 2 pen, Rfl: 6    losartan (COZAAR) 100 MG tablet, TAKE 1 TABLET(100 MG) BY MOUTH EVERY DAY, Disp: 90 tablet, Rfl: 3    pen needle, diabetic (BD ULTRA-FINE MINI PEN NEEDLE) 31 gauge x 3/16" Ndle, USE 1 FOUR TIMES DAILY, Disp: 150 each, Rfl: 11    Current Facility-Administered Medications:     glucagon (human recombinant) injection 1 mg, 1 mg, Intramuscular, Once, Tania Mckeon NP  Does patient have record of home blood pressure readings no. Readings have been averaging .   Last dose of blood pressure medication was taken at 8:30 AM.  Patient is asymptomatic.   Complains of headaches in mornings sometimes.  Nothing major, BP was good today.   "  ,   .

## 2022-07-13 ENCOUNTER — ANESTHESIA EVENT (OUTPATIENT)
Dept: SURGERY | Facility: HOSPITAL | Age: 67
End: 2022-07-13
Payer: COMMERCIAL

## 2022-07-13 ENCOUNTER — PATIENT MESSAGE (OUTPATIENT)
Dept: SURGERY | Facility: HOSPITAL | Age: 67
End: 2022-07-13
Payer: COMMERCIAL

## 2022-07-14 ENCOUNTER — ANESTHESIA (OUTPATIENT)
Dept: SURGERY | Facility: HOSPITAL | Age: 67
End: 2022-07-14
Payer: COMMERCIAL

## 2022-07-14 ENCOUNTER — HOSPITAL ENCOUNTER (OUTPATIENT)
Facility: HOSPITAL | Age: 67
Discharge: HOME | End: 2022-07-14
Attending: PODIATRIST | Admitting: PODIATRIST
Payer: COMMERCIAL

## 2022-07-14 VITALS
SYSTOLIC BLOOD PRESSURE: 125 MMHG | OXYGEN SATURATION: 99 % | BODY MASS INDEX: 28.36 KG/M2 | DIASTOLIC BLOOD PRESSURE: 69 MMHG | TEMPERATURE: 97 F | WEIGHT: 214 LBS | HEART RATE: 66 BPM | RESPIRATION RATE: 13 BRPM | HEIGHT: 73 IN

## 2022-07-14 DIAGNOSIS — Z87.898 HISTORY OF ULCERATION: Primary | ICD-10-CM

## 2022-07-14 DIAGNOSIS — M19.079 ARTHRITIS OF BIG TOE: ICD-10-CM

## 2022-07-14 LAB — GLUCOSE SERPL-MCNC: 159 MG/DL (ref 70–110)

## 2022-07-14 PROCEDURE — 37000009 HC ANESTHESIA EA ADD 15 MINS: Mod: PO | Performed by: PODIATRIST

## 2022-07-14 PROCEDURE — 71000033 HC RECOVERY, INTIAL HOUR: Mod: PO | Performed by: PODIATRIST

## 2022-07-14 PROCEDURE — 25000003 PHARM REV CODE 250: Mod: PO | Performed by: PODIATRIST

## 2022-07-14 PROCEDURE — 28160 PARTIAL REMOVAL OF TOE: CPT | Mod: TA,,, | Performed by: PODIATRIST

## 2022-07-14 PROCEDURE — 27201423 OPTIME MED/SURG SUP & DEVICES STERILE SUPPLY: Mod: PO | Performed by: PODIATRIST

## 2022-07-14 PROCEDURE — 82962 GLUCOSE BLOOD TEST: CPT | Mod: PO | Performed by: PODIATRIST

## 2022-07-14 PROCEDURE — 63600175 PHARM REV CODE 636 W HCPCS: Mod: PO | Performed by: NURSE ANESTHETIST, CERTIFIED REGISTERED

## 2022-07-14 PROCEDURE — 36000709 HC OR TIME LEV III EA ADD 15 MIN: Mod: PO | Performed by: PODIATRIST

## 2022-07-14 PROCEDURE — 28160 PR RESEC TOE AT I-P JT,SINGLE,EA: ICD-10-PCS | Mod: TA,,, | Performed by: PODIATRIST

## 2022-07-14 PROCEDURE — D9220A PRA ANESTHESIA: ICD-10-PCS | Mod: CRNA,,, | Performed by: NURSE ANESTHETIST, CERTIFIED REGISTERED

## 2022-07-14 PROCEDURE — D9220A PRA ANESTHESIA: ICD-10-PCS | Mod: ANES,,, | Performed by: ANESTHESIOLOGY

## 2022-07-14 PROCEDURE — 63600175 PHARM REV CODE 636 W HCPCS: Mod: PO | Performed by: PODIATRIST

## 2022-07-14 PROCEDURE — 25000003 PHARM REV CODE 250: Mod: PO | Performed by: NURSE ANESTHETIST, CERTIFIED REGISTERED

## 2022-07-14 PROCEDURE — D9220A PRA ANESTHESIA: Mod: CRNA,,, | Performed by: NURSE ANESTHETIST, CERTIFIED REGISTERED

## 2022-07-14 PROCEDURE — 36000708 HC OR TIME LEV III 1ST 15 MIN: Mod: PO | Performed by: PODIATRIST

## 2022-07-14 PROCEDURE — 37000008 HC ANESTHESIA 1ST 15 MINUTES: Mod: PO | Performed by: PODIATRIST

## 2022-07-14 PROCEDURE — 71000039 HC RECOVERY, EACH ADD'L HOUR: Mod: PO | Performed by: PODIATRIST

## 2022-07-14 PROCEDURE — 63600175 PHARM REV CODE 636 W HCPCS: Mod: PO | Performed by: ANESTHESIOLOGY

## 2022-07-14 PROCEDURE — D9220A PRA ANESTHESIA: Mod: ANES,,, | Performed by: ANESTHESIOLOGY

## 2022-07-14 PROCEDURE — 71000015 HC POSTOP RECOV 1ST HR: Mod: PO | Performed by: PODIATRIST

## 2022-07-14 RX ORDER — DIPHENHYDRAMINE HYDROCHLORIDE 50 MG/ML
25 INJECTION INTRAMUSCULAR; INTRAVENOUS EVERY 6 HOURS PRN
Status: DISCONTINUED | OUTPATIENT
Start: 2022-07-14 | End: 2022-07-14 | Stop reason: HOSPADM

## 2022-07-14 RX ORDER — OXYCODONE HYDROCHLORIDE 5 MG/1
5 TABLET ORAL
Status: DISCONTINUED | OUTPATIENT
Start: 2022-07-14 | End: 2022-07-14 | Stop reason: HOSPADM

## 2022-07-14 RX ORDER — HYDROCODONE BITARTRATE AND ACETAMINOPHEN 5; 325 MG/1; MG/1
1 TABLET ORAL EVERY 6 HOURS PRN
Qty: 28 TABLET | Refills: 0 | Status: SHIPPED | OUTPATIENT
Start: 2022-07-14 | End: 2022-09-22

## 2022-07-14 RX ORDER — HYDROCODONE BITARTRATE AND ACETAMINOPHEN 5; 325 MG/1; MG/1
1 TABLET ORAL EVERY 4 HOURS PRN
Status: DISCONTINUED | OUTPATIENT
Start: 2022-07-14 | End: 2022-07-14 | Stop reason: HOSPADM

## 2022-07-14 RX ORDER — ONDANSETRON 2 MG/ML
INJECTION INTRAMUSCULAR; INTRAVENOUS
Status: DISCONTINUED | OUTPATIENT
Start: 2022-07-14 | End: 2022-07-14

## 2022-07-14 RX ORDER — PROPOFOL 10 MG/ML
VIAL (ML) INTRAVENOUS CONTINUOUS PRN
Status: DISCONTINUED | OUTPATIENT
Start: 2022-07-14 | End: 2022-07-14

## 2022-07-14 RX ORDER — MIDAZOLAM HYDROCHLORIDE 1 MG/ML
INJECTION INTRAMUSCULAR; INTRAVENOUS
Status: DISCONTINUED | OUTPATIENT
Start: 2022-07-14 | End: 2022-07-14

## 2022-07-14 RX ORDER — LIDOCAINE HCL/PF 100 MG/5ML
SYRINGE (ML) INTRAVENOUS
Status: DISCONTINUED | OUTPATIENT
Start: 2022-07-14 | End: 2022-07-14

## 2022-07-14 RX ORDER — BUPIVACAINE HYDROCHLORIDE 5 MG/ML
INJECTION, SOLUTION EPIDURAL; INTRACAUDAL
Status: DISCONTINUED | OUTPATIENT
Start: 2022-07-14 | End: 2022-07-14 | Stop reason: HOSPADM

## 2022-07-14 RX ORDER — LIDOCAINE HYDROCHLORIDE 10 MG/ML
1 INJECTION, SOLUTION EPIDURAL; INFILTRATION; INTRACAUDAL; PERINEURAL ONCE
Status: DISCONTINUED | OUTPATIENT
Start: 2022-07-14 | End: 2022-07-14 | Stop reason: HOSPADM

## 2022-07-14 RX ORDER — MEPERIDINE HYDROCHLORIDE 50 MG/ML
12.5 INJECTION INTRAMUSCULAR; INTRAVENOUS; SUBCUTANEOUS ONCE AS NEEDED
Status: DISCONTINUED | OUTPATIENT
Start: 2022-07-14 | End: 2022-07-14 | Stop reason: HOSPADM

## 2022-07-14 RX ORDER — LIDOCAINE HYDROCHLORIDE 10 MG/ML
INJECTION, SOLUTION EPIDURAL; INFILTRATION; INTRACAUDAL; PERINEURAL
Status: DISCONTINUED | OUTPATIENT
Start: 2022-07-14 | End: 2022-07-14 | Stop reason: HOSPADM

## 2022-07-14 RX ORDER — FENTANYL CITRATE 50 UG/ML
INJECTION, SOLUTION INTRAMUSCULAR; INTRAVENOUS
Status: DISCONTINUED | OUTPATIENT
Start: 2022-07-14 | End: 2022-07-14

## 2022-07-14 RX ORDER — SODIUM CHLORIDE 0.9 % (FLUSH) 0.9 %
3 SYRINGE (ML) INJECTION
Status: DISCONTINUED | OUTPATIENT
Start: 2022-07-14 | End: 2022-07-14 | Stop reason: HOSPADM

## 2022-07-14 RX ORDER — PROPOFOL 10 MG/ML
VIAL (ML) INTRAVENOUS
Status: DISCONTINUED | OUTPATIENT
Start: 2022-07-14 | End: 2022-07-14

## 2022-07-14 RX ORDER — SODIUM CHLORIDE, SODIUM LACTATE, POTASSIUM CHLORIDE, CALCIUM CHLORIDE 600; 310; 30; 20 MG/100ML; MG/100ML; MG/100ML; MG/100ML
INJECTION, SOLUTION INTRAVENOUS CONTINUOUS
Status: DISCONTINUED | OUTPATIENT
Start: 2022-07-14 | End: 2022-07-14 | Stop reason: HOSPADM

## 2022-07-14 RX ORDER — FENTANYL CITRATE 50 UG/ML
25 INJECTION, SOLUTION INTRAMUSCULAR; INTRAVENOUS EVERY 5 MIN PRN
Status: DISCONTINUED | OUTPATIENT
Start: 2022-07-14 | End: 2022-07-14 | Stop reason: HOSPADM

## 2022-07-14 RX ORDER — HYDROMORPHONE HYDROCHLORIDE 2 MG/ML
0.2 INJECTION, SOLUTION INTRAMUSCULAR; INTRAVENOUS; SUBCUTANEOUS EVERY 5 MIN PRN
Status: DISCONTINUED | OUTPATIENT
Start: 2022-07-14 | End: 2022-07-14 | Stop reason: HOSPADM

## 2022-07-14 RX ADMIN — LIDOCAINE HYDROCHLORIDE 100 MG: 20 INJECTION, SOLUTION INTRAVENOUS at 02:07

## 2022-07-14 RX ADMIN — DEXTROSE 2 G: 50 INJECTION, SOLUTION INTRAVENOUS at 02:07

## 2022-07-14 RX ADMIN — SODIUM CHLORIDE, SODIUM LACTATE, POTASSIUM CHLORIDE, AND CALCIUM CHLORIDE: .6; .31; .03; .02 INJECTION, SOLUTION INTRAVENOUS at 02:07

## 2022-07-14 RX ADMIN — PROPOFOL 100 MCG/KG/MIN: 10 INJECTION, EMULSION INTRAVENOUS at 02:07

## 2022-07-14 RX ADMIN — FENTANYL CITRATE 50 MCG: 50 INJECTION, SOLUTION INTRAMUSCULAR; INTRAVENOUS at 02:07

## 2022-07-14 RX ADMIN — MIDAZOLAM HYDROCHLORIDE 2 MG: 1 INJECTION, SOLUTION INTRAMUSCULAR; INTRAVENOUS at 02:07

## 2022-07-14 RX ADMIN — PROPOFOL 100 MG: 10 INJECTION, EMULSION INTRAVENOUS at 02:07

## 2022-07-14 RX ADMIN — ONDANSETRON 4 MG: 2 INJECTION, SOLUTION INTRAMUSCULAR; INTRAVENOUS at 03:07

## 2022-07-14 NOTE — TRANSFER OF CARE
"Anesthesia Transfer of Care Note    Patient: Hammad Douglas III    Procedure(s) Performed: Procedure(s) (LRB):  ARTHROPLASTY, TOE (Left)    Patient location: PACU    Anesthesia Type: MAC    Transport from OR: Transported from OR on room air with adequate spontaneous ventilation    Post pain: adequate analgesia    Post assessment: no apparent anesthetic complications    Post vital signs: stable    Level of consciousness: awake    Nausea/Vomiting: no nausea/vomiting    Complications: none    Transfer of care protocol was followed      Last vitals:   Visit Vitals  /68 (BP Location: Right arm, Patient Position: Sitting)   Pulse 67   Temp 36.8 °C (98.2 °F) (Skin)   Resp 17   Ht 6' 1" (1.854 m)   Wt 97.1 kg (214 lb)   SpO2 97%   BMI 28.23 kg/m²     "

## 2022-07-14 NOTE — DISCHARGE INSTRUCTIONS
FOOT SURGERY  After surgery:    DO:  Keep leg elevated until first post operative visit.  Keep ice pack on foot for 20 minutes each hour while awake for next 24-48 hours.  Keep dressing clean and dry. Do not change the bandages.  Advance diet as tolerated.   Wear surgical shoe. May remove to shower.  Weight baring on heel with shoe on.  Resume home medications.    DO NOT:  Do not remove your dressing.  Do not get dressing wet.  Do not drive until released by your doctor.  Do not take additional tylenol/acetaminophen while taking narcotic pain medication that contains tylenol/acetaminophen.     CALL PHYSICIAN FOR:  Burning, or numbness of the toes not relieved by elevation of the leg.  Redness, swelling, or bleeding.  Fever greater than 101,  Drainage (pus) from the operative site.  Pain unrelieved by pain medication.    FOR EMERGENCIES CONTACT YOUR PHYSICIAN'S OFFICE  [phone]

## 2022-07-14 NOTE — ANESTHESIA PREPROCEDURE EVALUATION
07/14/2022  Hammad Douglas III is a 67 y.o., male.      Pre-op Assessment    I have reviewed the NPO Status.   I have reviewed the Medications.     Review of Systems  Anesthesia Hx:  No problems with previous Anesthesia    Social:  Non-Smoker    Cardiovascular:   Exercise tolerance: good Hypertension, well controlled ECG has been reviewed.    Pulmonary:  Pulmonary Normal    Renal/:   Chronic Renal Disease, CRI    Hepatic/GI:  Hepatic/GI Normal    Neurological:  Neurology Normal    Endocrine:   Diabetes, type 1        Physical Exam  General: Well nourished    Airway:  Mallampati: III / II  Mouth Opening: Normal  TM Distance: Normal  Tongue: Normal  Neck ROM: Normal ROM    Dental:  Intact    Chest/Lungs:  Clear to auscultation, Normal Respiratory Rate        Anesthesia Plan  Type of Anesthesia, risks & benefits discussed:    Anesthesia Type: Gen Natural Airway  Intra-op Monitoring Plan: Standard ASA Monitors  Induction:  IV  Informed Consent: Informed consent signed with the Patient and all parties understand the risks and agree with anesthesia plan.  All questions answered. Patient consented to blood products? No  ASA Score: 3    Ready For Surgery From Anesthesia Perspective.     .

## 2022-07-15 ENCOUNTER — TELEPHONE (OUTPATIENT)
Dept: PODIATRY | Facility: CLINIC | Age: 67
End: 2022-07-15
Payer: COMMERCIAL

## 2022-07-15 NOTE — TELEPHONE ENCOUNTER
Called and spoke to patient's wife she said he is sleeping, He had a good night, no pain keeping it elevated and iced.

## 2022-07-15 NOTE — ANESTHESIA POSTPROCEDURE EVALUATION
Anesthesia Post Evaluation    Patient: Hammad Douglas III    Procedure(s) Performed: Procedure(s) (LRB):  ARTHROPLASTY, TOE (Left)    Final Anesthesia Type: MAC      Patient location during evaluation: PACU  Patient participation: Yes- Able to Participate  Level of consciousness: awake and alert and oriented  Post-procedure vital signs: reviewed and stable  Pain management: adequate  Airway patency: patent  YAS mitigation strategies: Multimodal analgesia  PONV status at discharge: No PONV  Anesthetic complications: no      Cardiovascular status: blood pressure returned to baseline  Respiratory status: unassisted, spontaneous ventilation and room air  Hydration status: euvolemic  Follow-up not needed.          Vitals Value Taken Time   /69 07/14/22 1627   Temp 36.2 °C (97.2 °F) 07/14/22 1606   Pulse 66 07/14/22 1627   Resp 13 07/14/22 1627   SpO2 99 % 07/14/22 1627         Event Time   Out of Recovery 07/14/2022 16:13:37         Pain/Alex Score: Alex Score: 10 (7/14/2022  4:20 PM)

## 2022-07-15 NOTE — BRIEF OP NOTE
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    DISPOSITION: Home or Self Care    FINAL DIAGNOSIS: As above    FOLLOWUP: In clinic    DISCHARGE INSTRUCTIONS:    Discharge Procedure Orders   Diet general     Keep surgical extremity elevated     Ice to affected area     Leave dressing on - Keep it clean, dry, and intact until clinic visit     Call MD for:  temperature >100.4     Call MD for:  persistent nausea and vomiting     Call MD for:  severe uncontrolled pain     Call MD for:  difficulty breathing, headache or visual disturbances     Call MD for:  redness, tenderness, or signs of infection (pain, swelling, redness, odor or green/yellow discharge around incision site)     Call MD for:  hives     Call MD for:  persistent dizziness or light-headedness     Call MD for:  extreme fatigue     Call MD for:     Weight bearing restrictions (specify)   Order Comments: Partial weight bearing to the heel for transfers

## 2022-07-17 NOTE — OP NOTE
Op Note:    Patient name: Hammad Douglas III  MRN: [id number]  Date of surgery: 7/14/22    Surgeon: Dr. Tati DPM    Preoperative diagnosis: 1. Arthritis of great toe, Lt.  2. Diabetic ulcer of great toe, Lt.    Postoperative diagnosis: Same  Procedure: Arthroplasty of hallux IP joint, Lt.  Anesthesia: Local MAC  Hemostasis: Pneumatic tourniquet  Estimated blood loss: < 1 mL  Specimen: None  Culture: None  Complications: None  Condition upon discharge: Stable    Procedure in detail: Under mild sedation, patient was brought into the OR and placed supine on the OR table.  A pneumatic tourniquet was applied to the Lt. Ankle.  Following IV sedation, 10 mL of a 1:1 mixture of 1% lidocaine plain and 0.5% bupivacaine plain was infiltrated in an ankle block.  A timeout was then performed taking care to identify the correct patient, procedure, and extremity to which all present were in agreement.  The limb was then scrubbed, draped, and prepped in the usual aseptic manner.  An esmarch bandage was used to exsanguinate the extremity, with the tourniquet then inflated to 250 mmHg.      Attention was directed to the dorsum of the Lt. Hallux IP joint.  A #15 scalpel was used to make a lazy S-shaped incision overlying said joint.  Dissection was carried through subcutaneous tissues taking care to identify and retract all major neurovascular structures.  The extensor hallucis longus tendon was identified and transected to allow for better exposure of the IP joint capsule.  This too was transversely incised taking care to reflect the joint capsule and underlying periosteum both proximal and distal to allow better exposure of the arthritic joint.  A sagittal bone saw was then used to resect the head of the proximal phalanx.  This was then removed from the operative field.  All rough edges at the site of resection were smoothed with a bone rasp. The site was then irrigated with normal sterile saline.  The EHL tendon, joint capsule, and  subQ tissues were re-approximated with 3-0 and 4-0 vicryl.  The skin was re-approximated with 3-0 nylon utilizing a simple interrupted suture technique.  The tourniquet was then deflated with a prompt hyperemic response noted to all digits of the surgical extremity.      Following the procedure, the incision was covered with xerform, and a toe sulcus pad was applied to the plantar portion of the surgical digit.  A football dressing was then applied.  The patient was noted to have tolerated the procedure and anesthesia quite well.  He was then transported to recovery with all vitals and vascular status noted to be intact.  After a brief period of postoperative monitoring, he was discharged with the following verbal and written instructions:     1. Keep dressings, clean, dry, and intact to surgical extremity.  2. Rest, ice, and elevate the surgical extremity.  3. Weight bearing to heel of surgical extremity in postoperative shoe.  4. Take all medication as discussed at discharge.  5. Contact the clinic with any postoperative concerns or complications.  6. Follow up in one week for 1st post op.    Caleb Duffy DPM

## 2022-07-20 ENCOUNTER — PATIENT MESSAGE (OUTPATIENT)
Dept: PODIATRY | Facility: CLINIC | Age: 67
End: 2022-07-20
Payer: COMMERCIAL

## 2022-07-28 ENCOUNTER — OFFICE VISIT (OUTPATIENT)
Dept: PODIATRY | Facility: CLINIC | Age: 67
End: 2022-07-28
Payer: COMMERCIAL

## 2022-07-28 VITALS — HEIGHT: 73 IN | BODY MASS INDEX: 28.37 KG/M2 | WEIGHT: 214.06 LBS

## 2022-07-28 DIAGNOSIS — Z98.890 POSTOPERATIVE STATE: Primary | ICD-10-CM

## 2022-07-28 DIAGNOSIS — E10.42 TYPE 1 DIABETES MELLITUS WITH POLYNEUROPATHY: ICD-10-CM

## 2022-07-28 PROCEDURE — 3066F NEPHROPATHY DOC TX: CPT | Mod: CPTII,S$GLB,, | Performed by: PODIATRIST

## 2022-07-28 PROCEDURE — 3062F POS MACROALBUMINURIA REV: CPT | Mod: CPTII,S$GLB,, | Performed by: PODIATRIST

## 2022-07-28 PROCEDURE — 1159F MED LIST DOCD IN RCRD: CPT | Mod: CPTII,S$GLB,, | Performed by: PODIATRIST

## 2022-07-28 PROCEDURE — 99024 POSTOP FOLLOW-UP VISIT: CPT | Mod: S$GLB,,, | Performed by: PODIATRIST

## 2022-07-28 PROCEDURE — 3008F BODY MASS INDEX DOCD: CPT | Mod: CPTII,S$GLB,, | Performed by: PODIATRIST

## 2022-07-28 PROCEDURE — 3051F HG A1C>EQUAL 7.0%<8.0%: CPT | Mod: CPTII,S$GLB,, | Performed by: PODIATRIST

## 2022-07-28 PROCEDURE — 1101F PT FALLS ASSESS-DOCD LE1/YR: CPT | Mod: CPTII,S$GLB,, | Performed by: PODIATRIST

## 2022-07-28 PROCEDURE — 3288F FALL RISK ASSESSMENT DOCD: CPT | Mod: CPTII,S$GLB,, | Performed by: PODIATRIST

## 2022-07-28 PROCEDURE — 3051F PR MOST RECENT HEMOGLOBIN A1C LEVEL 7.0 - < 8.0%: ICD-10-PCS | Mod: CPTII,S$GLB,, | Performed by: PODIATRIST

## 2022-07-28 PROCEDURE — 3008F PR BODY MASS INDEX (BMI) DOCUMENTED: ICD-10-PCS | Mod: CPTII,S$GLB,, | Performed by: PODIATRIST

## 2022-07-28 PROCEDURE — 99999 PR PBB SHADOW E&M-EST. PATIENT-LVL II: ICD-10-PCS | Mod: PBBFAC,,, | Performed by: PODIATRIST

## 2022-07-28 PROCEDURE — 99024 PR POST-OP FOLLOW-UP VISIT: ICD-10-PCS | Mod: S$GLB,,, | Performed by: PODIATRIST

## 2022-07-28 PROCEDURE — 3062F PR POS MACROALBUMINURIA RESULT DOCUMENTED/REVIEW: ICD-10-PCS | Mod: CPTII,S$GLB,, | Performed by: PODIATRIST

## 2022-07-28 PROCEDURE — 99999 PR PBB SHADOW E&M-EST. PATIENT-LVL II: CPT | Mod: PBBFAC,,, | Performed by: PODIATRIST

## 2022-07-28 PROCEDURE — 4010F PR ACE/ARB THEARPY RXD/TAKEN: ICD-10-PCS | Mod: CPTII,S$GLB,, | Performed by: PODIATRIST

## 2022-07-28 PROCEDURE — 1159F PR MEDICATION LIST DOCUMENTED IN MEDICAL RECORD: ICD-10-PCS | Mod: CPTII,S$GLB,, | Performed by: PODIATRIST

## 2022-07-28 PROCEDURE — 3066F PR DOCUMENTATION OF TREATMENT FOR NEPHROPATHY: ICD-10-PCS | Mod: CPTII,S$GLB,, | Performed by: PODIATRIST

## 2022-07-28 PROCEDURE — 3288F PR FALLS RISK ASSESSMENT DOCUMENTED: ICD-10-PCS | Mod: CPTII,S$GLB,, | Performed by: PODIATRIST

## 2022-07-28 PROCEDURE — 1101F PR PT FALLS ASSESS DOC 0-1 FALLS W/OUT INJ PAST YR: ICD-10-PCS | Mod: CPTII,S$GLB,, | Performed by: PODIATRIST

## 2022-07-28 PROCEDURE — 4010F ACE/ARB THERAPY RXD/TAKEN: CPT | Mod: CPTII,S$GLB,, | Performed by: PODIATRIST

## 2022-07-28 RX ORDER — FLUOXETINE HYDROCHLORIDE 20 MG/1
CAPSULE ORAL
Qty: 90 CAPSULE | Refills: 2 | Status: SHIPPED | OUTPATIENT
Start: 2022-07-28 | End: 2023-03-28

## 2022-07-28 NOTE — PROGRESS NOTES
Subjective:      Patient ID: Hammad Douglas III is a 67 y.o. male.    Chief Complaint: Post-op Evaluation    Patient presents to clinic 2 weeks S/P arthroplasty of the Lt. Hallux IP joint.   Denies experiencing pain from the surgical extremity with today's exam.  Has kept the prior surgical dressing clean, dry, and intact for the past two weeks.  Has limited weight bearing with use of crutches.  Denies experiencing N/V/F/C/D.  Denies any additional pedal complaints.      Past Medical History:   Diagnosis Date    Cataract     Done OU    Chronic kidney disease     Diabetes mellitus type I     Diagnosed at age 19    Diabetic retinopathy     See's Dr. Ledesma    Hyperlipidemia     Hypertension        Past Surgical History:   Procedure Laterality Date    ARTHROPLASTY OF TOE Left 7/14/2022    Procedure: ARTHROPLASTY, TOE;  Surgeon: Caleb Duffy DPM;  Location: [facility] OR;  Service: Podiatry;  Laterality: Left;  Left Hallux    CATARACT EXTRACTION W/  INTRAOCULAR LENS IMPLANT Right 09/28/2021    Dr Solis    CATARACT EXTRACTION W/  INTRAOCULAR LENS IMPLANT Left 02/22/2022    Dr Solis    COLONOSCOPY      2011 wnl    PHACOEMULSIFICATION OF CATARACT Right 9/28/2021    Procedure: PHACOEMULSIFICATION, CATARACT;  Surgeon: Vicente Solis Jr., MD;  Location: [facility] OR;  Service: Ophthalmology;  Laterality: Right;  DM/right    PHACOEMULSIFICATION OF CATARACT Left 2/22/2022    Procedure: PHACOEMULSIFICATION, CATARACT;  Surgeon: Vicente Solis Jr., MD;  Location: [facility] OR;  Service: Ophthalmology;  Laterality: Left;  left    SURGICAL REMOVAL OF BONE SPUR Left 10/14/2021    Procedure: EXCISION, BONE SPUR;  Surgeon: Caleb Duffy DPM;  Location: [facility] OR;  Service: Podiatry;  Laterality: Left;    TOE AMPUTATION Right 7/2/2020    Procedure: AMPUTATION, TOE; 3rd;  Surgeon: Caleb Duffy DPM;  Location: [facility] OR;  Service: Podiatry;  Laterality: Right;    TOE SURGERY Right     right big toe       Family History   Problem  "Relation Age of Onset    Cataracts Mother     Cataracts Father     Breast cancer Sister     Glaucoma Neg Hx     Macular degeneration Neg Hx     Retinal detachment Neg Hx        Social History     Socioeconomic History    Marital status:    Tobacco Use    Smoking status: Former Smoker    Smokeless tobacco: Never Used    Tobacco comment: Former cigar use   Substance and Sexual Activity    Alcohol use: Not Currently    Drug use: No    Sexual activity: Yes     Partners: Female   Social History Narrative    Retired former radiation  at [employer]       Current Outpatient Medications   Medication Sig Dispense Refill    atorvastatin (LIPITOR) 40 MG tablet TAKE 1 TABLET(40 MG) BY MOUTH EVERY DAY 90 tablet 3    BD BETSY 2ND GEN PEN NEEDLE 32 gauge x 5/32" Ndle       FLUoxetine 10 MG capsule Take 3 capsules (30 mg total) by mouth once daily. 270 capsule 1    HYDROcodone-acetaminophen (NORCO) 5-325 mg per tablet Take 1 tablet by mouth every 6 (six) hours as needed for Pain. 28 tablet 0    insulin aspart U-100 (NOVOLOG) 100 unit/mL (3 mL) InPn pen INJECT USING INSULIN:CARB RATIO OF 1:5 BEFORE A MEAL. MAX TOTAL DAILY DOSE 40 UNITS 45 mL 3    insulin glargine U-300 conc (TOUJEO MAX U-300 SOLOSTAR) 300 unit/mL (3 mL) insulin pen ADMINISTER 24 UNITS UNDER THE SKIN EVERY EVENING 2 pen 6    losartan (COZAAR) 100 MG tablet TAKE 1 TABLET(100 MG) BY MOUTH EVERY DAY 90 tablet 3    pen needle, diabetic (BD ULTRA-FINE MINI PEN NEEDLE) 31 gauge x 3/16" Ndle USE 1 FOUR TIMES DAILY 150 each 11    FLUoxetine 20 MG capsule TAKE ONE CAPSULE BY MOUTH EVERY DAY 90 capsule 2     Current Facility-Administered Medications   Medication Dose Route Frequency Provider Last Rate Last Admin    glucagon (human recombinant) injection 1 mg  1 mg Intramuscular Once Tania Mckeon NP           Review of patient's allergies indicates:   Allergen Reactions    Altace [ramipril]      Cough         Review of " Systems   Constitution: Negative for chills and fever.   Cardiovascular: Negative for claudication and leg swelling.   Skin: Negative for color change, dry skin and poor wound healing.   Musculoskeletal: Negative for joint pain, joint swelling, muscle cramps and muscle weakness.   Neurological: Positive for numbness.   Psychiatric/Behavioral: Negative for altered mental status.           Objective:      Physical Exam   Constitutional: He appears well-developed and well-nourished. No distress.   Cardiovascular:   Pulses:       Dorsalis pedis pulses are 2+ on the right side, and 2+ on the left side.        Posterior tibial pulses are 1+ on the right side, and 1+ on the left side.   CFT is < 3 seconds bilateral.  Pedal hair growth is present bilateral.  Varicosities noted bilateral.  No lower extremity edema noted.  Toes are warm to touch bilateral.     Musculoskeletal: He exhibits no edema or tenderness.   Muscle strength 5/5 in all muscle groups bilateral.  No tenderness nor crepitation with ROM of foot/ankle joints bilateral.  No tenderness with palpation of bilateral foot and ankle.  Semi-reducible contracture of the lesser digits bilateral.   Amputation of Rt. 3rd toe.  Neurological: He has normal strength. A sensory deficit is present.   Protective sensation per [place] monofilament is decreased bilateral.  Light touch is intact bilateral.   Skin: Incision to the dorsum of the Lt. Hallux IP joint is well approximated with all suture intact.  No evidence of dehiscence, necrosis, ischemia, or infection noted the length of the incision.  Prior plantar wound of the hallux is fully epithelialized.            Assessment:       Encounter Diagnoses   Name Primary?    Postoperative state Yes    Type 1 diabetes mellitus with polyneuropathy          Plan:       Hammad was seen today for post-op evaluation.    Diagnoses and all orders for this visit:    Postoperative state    Type 1 diabetes mellitus with  polyneuropathy      I counseled the patient on his conditions, their implications and medical management.    Overall, satisfactory postoperative results noted.  Incision remains well maintained with suture, with no evidence of postoperative infection.     The incision site was painted with betadine, and a football dressing was applied     Advised to keep the dressing CDI x 1 week.     Advised to rest and elevate the affected extremity.     Instructed to minimize weight bearing to facilitate continued healing.     May ambulate around his home in the postop shoe.     RTC in 1 week for suture removal.       Caleb Duffy DPM

## 2022-07-31 ENCOUNTER — PATIENT MESSAGE (OUTPATIENT)
Dept: ENDOCRINOLOGY | Facility: CLINIC | Age: 67
End: 2022-07-31
Payer: COMMERCIAL

## 2022-08-03 ENCOUNTER — PATIENT MESSAGE (OUTPATIENT)
Dept: PODIATRY | Facility: CLINIC | Age: 67
End: 2022-08-03
Payer: COMMERCIAL

## 2022-08-04 ENCOUNTER — OFFICE VISIT (OUTPATIENT)
Dept: PODIATRY | Facility: CLINIC | Age: 67
End: 2022-08-04
Payer: COMMERCIAL

## 2022-08-04 VITALS — BODY MASS INDEX: 28.37 KG/M2 | WEIGHT: 214.06 LBS | HEIGHT: 73 IN

## 2022-08-04 DIAGNOSIS — E10.42 TYPE 1 DIABETES MELLITUS WITH POLYNEUROPATHY: ICD-10-CM

## 2022-08-04 DIAGNOSIS — Z98.890 POSTOPERATIVE STATE: Primary | ICD-10-CM

## 2022-08-04 PROCEDURE — 99999 PR PBB SHADOW E&M-EST. PATIENT-LVL II: ICD-10-PCS | Mod: PBBFAC,,, | Performed by: PODIATRIST

## 2022-08-04 PROCEDURE — 3066F NEPHROPATHY DOC TX: CPT | Mod: CPTII,S$GLB,, | Performed by: PODIATRIST

## 2022-08-04 PROCEDURE — 4010F ACE/ARB THERAPY RXD/TAKEN: CPT | Mod: CPTII,S$GLB,, | Performed by: PODIATRIST

## 2022-08-04 PROCEDURE — 3008F PR BODY MASS INDEX (BMI) DOCUMENTED: ICD-10-PCS | Mod: CPTII,S$GLB,, | Performed by: PODIATRIST

## 2022-08-04 PROCEDURE — 3288F PR FALLS RISK ASSESSMENT DOCUMENTED: ICD-10-PCS | Mod: CPTII,S$GLB,, | Performed by: PODIATRIST

## 2022-08-04 PROCEDURE — 1159F MED LIST DOCD IN RCRD: CPT | Mod: CPTII,S$GLB,, | Performed by: PODIATRIST

## 2022-08-04 PROCEDURE — 3008F BODY MASS INDEX DOCD: CPT | Mod: CPTII,S$GLB,, | Performed by: PODIATRIST

## 2022-08-04 PROCEDURE — 99999 PR PBB SHADOW E&M-EST. PATIENT-LVL II: CPT | Mod: PBBFAC,,, | Performed by: PODIATRIST

## 2022-08-04 PROCEDURE — 1159F PR MEDICATION LIST DOCUMENTED IN MEDICAL RECORD: ICD-10-PCS | Mod: CPTII,S$GLB,, | Performed by: PODIATRIST

## 2022-08-04 PROCEDURE — 3062F POS MACROALBUMINURIA REV: CPT | Mod: CPTII,S$GLB,, | Performed by: PODIATRIST

## 2022-08-04 PROCEDURE — 99024 POSTOP FOLLOW-UP VISIT: CPT | Mod: S$GLB,,, | Performed by: PODIATRIST

## 2022-08-04 PROCEDURE — 4010F PR ACE/ARB THEARPY RXD/TAKEN: ICD-10-PCS | Mod: CPTII,S$GLB,, | Performed by: PODIATRIST

## 2022-08-04 PROCEDURE — 3051F HG A1C>EQUAL 7.0%<8.0%: CPT | Mod: CPTII,S$GLB,, | Performed by: PODIATRIST

## 2022-08-04 PROCEDURE — 3062F PR POS MACROALBUMINURIA RESULT DOCUMENTED/REVIEW: ICD-10-PCS | Mod: CPTII,S$GLB,, | Performed by: PODIATRIST

## 2022-08-04 PROCEDURE — 1101F PT FALLS ASSESS-DOCD LE1/YR: CPT | Mod: CPTII,S$GLB,, | Performed by: PODIATRIST

## 2022-08-04 PROCEDURE — 3051F PR MOST RECENT HEMOGLOBIN A1C LEVEL 7.0 - < 8.0%: ICD-10-PCS | Mod: CPTII,S$GLB,, | Performed by: PODIATRIST

## 2022-08-04 PROCEDURE — 1101F PR PT FALLS ASSESS DOC 0-1 FALLS W/OUT INJ PAST YR: ICD-10-PCS | Mod: CPTII,S$GLB,, | Performed by: PODIATRIST

## 2022-08-04 PROCEDURE — 99024 PR POST-OP FOLLOW-UP VISIT: ICD-10-PCS | Mod: S$GLB,,, | Performed by: PODIATRIST

## 2022-08-04 PROCEDURE — 3288F FALL RISK ASSESSMENT DOCD: CPT | Mod: CPTII,S$GLB,, | Performed by: PODIATRIST

## 2022-08-04 PROCEDURE — 3066F PR DOCUMENTATION OF TREATMENT FOR NEPHROPATHY: ICD-10-PCS | Mod: CPTII,S$GLB,, | Performed by: PODIATRIST

## 2022-08-04 NOTE — PROGRESS NOTES
Subjective:      Patient ID: Hammad Douglas III is a 67 y.o. male.    Chief Complaint: Post-op Evaluation    Patient presents to clinic 3 weeks S/P arthroplasty of the Lt. Hallux IP joint.   Denies experiencing pain from the surgical extremity with today's exam.  Has kept the football dressing clean, dry, and intact for the past week.  Continues to minimize weight bearing activity along with elevation of the limb while at rest.  Denies experiencing N/V/F/C/D.  Denies any additional pedal complaints.      Past Medical History:   Diagnosis Date    Cataract     Done OU    Chronic kidney disease     Diabetes mellitus type I     Diagnosed at age 19    Diabetic retinopathy     See's Dr. Ledsema    Hyperlipidemia     Hypertension        Past Surgical History:   Procedure Laterality Date    ARTHROPLASTY OF TOE Left 7/14/2022    Procedure: ARTHROPLASTY, TOE;  Surgeon: Caleb Duffy DPM;  Location: [facility] OR;  Service: Podiatry;  Laterality: Left;  Left Hallux    CATARACT EXTRACTION W/  INTRAOCULAR LENS IMPLANT Right 09/28/2021    Dr Solis    CATARACT EXTRACTION W/  INTRAOCULAR LENS IMPLANT Left 02/22/2022    Dr Solis    COLONOSCOPY      2011 wnl    PHACOEMULSIFICATION OF CATARACT Right 9/28/2021    Procedure: PHACOEMULSIFICATION, CATARACT;  Surgeon: Vicente Solis Jr., MD;  Location: [facility] OR;  Service: Ophthalmology;  Laterality: Right;  DM/right    PHACOEMULSIFICATION OF CATARACT Left 2/22/2022    Procedure: PHACOEMULSIFICATION, CATARACT;  Surgeon: Vicente Solis Jr., MD;  Location: [facility] OR;  Service: Ophthalmology;  Laterality: Left;  left    SURGICAL REMOVAL OF BONE SPUR Left 10/14/2021    Procedure: EXCISION, BONE SPUR;  Surgeon: Caleb Duffy DPM;  Location: [facility] OR;  Service: Podiatry;  Laterality: Left;    TOE AMPUTATION Right 7/2/2020    Procedure: AMPUTATION, TOE; 3rd;  Surgeon: Caleb Duffy DPM;  Location: [facility] OR;  Service: Podiatry;  Laterality: Right;    TOE SURGERY Right     right big  "toe       Family History   Problem Relation Age of Onset    Cataracts Mother     Cataracts Father     Breast cancer Sister     Glaucoma Neg Hx     Macular degeneration Neg Hx     Retinal detachment Neg Hx        Social History     Socioeconomic History    Marital status:    Tobacco Use    Smoking status: Former Smoker    Smokeless tobacco: Never Used    Tobacco comment: Former cigar use   Substance and Sexual Activity    Alcohol use: Not Currently    Drug use: No    Sexual activity: Yes     Partners: Female   Social History Narrative    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 270 capsule 1    FLUoxetine 20 MG capsule TAKE ONE CAPSULE BY MOUTH EVERY DAY 90 capsule 2    HYDROcodone-acetaminophen (NORCO) 5-325 mg per tablet Take 1 tablet by mouth every 6 (six) hours as needed for Pain. 28 tablet 0    insulin aspart U-100 (NOVOLOG) 100 unit/mL (3 mL) InPn pen INJECT USING INSULIN:CARB RATIO OF 1:5 BEFORE A MEAL. MAX TOTAL DAILY DOSE 40 UNITS 45 mL 3    insulin glargine U-300 conc (TOUJEO MAX U-300 SOLOSTAR) 300 unit/mL (3 mL) insulin pen ADMINISTER 24 UNITS UNDER THE SKIN EVERY EVENING 2 pen 6    losartan (COZAAR) 100 MG tablet TAKE 1 TABLET(100 MG) BY MOUTH EVERY DAY 90 tablet 3    pen needle, diabetic (BD ULTRA-FINE MINI PEN NEEDLE) 31 gauge x 3/16" Ndle USE 1 FOUR TIMES DAILY 150 each 11     Current Facility-Administered Medications   Medication Dose Route Frequency Provider Last Rate Last Admin    glucagon (human recombinant) injection 1 mg  1 mg Intramuscular Once Tania Mckeon NP           Review of patient's allergies indicates:   Allergen Reactions    Altace " [ramipril]      Cough         Review of Systems   Constitution: Negative for chills and fever.   Cardiovascular: Negative for claudication and leg swelling.   Skin: Negative for color change, dry skin and poor wound healing.   Musculoskeletal: Negative for joint pain, joint swelling, muscle cramps and muscle weakness.   Neurological: Positive for numbness.   Psychiatric/Behavioral: Negative for altered mental status.           Objective:      Physical Exam   Constitutional: He appears well-developed and well-nourished. No distress.   Cardiovascular:   Pulses:       Dorsalis pedis pulses are 2+ on the right side, and 2+ on the left side.        Posterior tibial pulses are 1+ on the right side, and 1+ on the left side.   CFT is < 3 seconds bilateral.  Pedal hair growth is present bilateral.  Varicosities noted bilateral.  No lower extremity edema noted.  Toes are warm to touch bilateral.     Musculoskeletal: He exhibits no edema or tenderness.   Muscle strength 5/5 in all muscle groups bilateral.  No tenderness nor crepitation with ROM of foot/ankle joints bilateral.  No tenderness with palpation of bilateral foot and ankle.  Semi-reducible contracture of the lesser digits bilateral.   Amputation of Rt. 3rd toe.  Neurological: He has normal strength. A sensory deficit is present.   Protective sensation per [place] monofilament is decreased bilateral.  Light touch is intact bilateral.   Skin: Incision to the dorsum of the Lt. Hallux IP joint is well approximated with all suture intact.  No evidence of dehiscence, necrosis, ischemia, or infection noted the length of the incision.  Prior plantar wound of the hallux remains fully epithelialized.            Assessment:       Encounter Diagnoses   Name Primary?    Postoperative state Yes    Type 1 diabetes mellitus with polyneuropathy          Plan:       Hammad was seen today for post-op evaluation.    Diagnoses and all orders for this visit:    Postoperative  state    Type 1 diabetes mellitus with polyneuropathy      I counseled the patient on his conditions, their implications and medical management.    Overall, satisfactory postoperative results noted.  Incision remains well maintained with suture, with no evidence of postoperative infection.     With the patient's verbal consent, a sterile suture removal kit was used to remove all sutures without issue.  Patient tolerated this quite well.    The incision line was painted with betadine, and a football dressing was applied     Advised to keep the dressing CDI x 1 week.     Advised to rest and elevate the affected extremity.     Instructed to minimize weight bearing to facilitate continued healing.     May ambulate around his home in the postop shoe.     RTC in 1 week for likely a final follow up.     Caleb Duffy DPM

## 2022-08-11 ENCOUNTER — OFFICE VISIT (OUTPATIENT)
Dept: PODIATRY | Facility: CLINIC | Age: 67
End: 2022-08-11
Payer: COMMERCIAL

## 2022-08-11 DIAGNOSIS — E10.42 TYPE 1 DIABETES MELLITUS WITH POLYNEUROPATHY: ICD-10-CM

## 2022-08-11 DIAGNOSIS — Z98.890 POSTOPERATIVE STATE: Primary | ICD-10-CM

## 2022-08-11 PROCEDURE — 3288F PR FALLS RISK ASSESSMENT DOCUMENTED: ICD-10-PCS | Mod: CPTII,S$GLB,, | Performed by: PODIATRIST

## 2022-08-11 PROCEDURE — 3051F PR MOST RECENT HEMOGLOBIN A1C LEVEL 7.0 - < 8.0%: ICD-10-PCS | Mod: CPTII,S$GLB,, | Performed by: PODIATRIST

## 2022-08-11 PROCEDURE — 99999 PR PBB SHADOW E&M-EST. PATIENT-LVL II: CPT | Mod: PBBFAC,,, | Performed by: PODIATRIST

## 2022-08-11 PROCEDURE — 99024 POSTOP FOLLOW-UP VISIT: CPT | Mod: S$GLB,,, | Performed by: PODIATRIST

## 2022-08-11 PROCEDURE — 1101F PT FALLS ASSESS-DOCD LE1/YR: CPT | Mod: CPTII,S$GLB,, | Performed by: PODIATRIST

## 2022-08-11 PROCEDURE — 1101F PR PT FALLS ASSESS DOC 0-1 FALLS W/OUT INJ PAST YR: ICD-10-PCS | Mod: CPTII,S$GLB,, | Performed by: PODIATRIST

## 2022-08-11 PROCEDURE — 4010F PR ACE/ARB THEARPY RXD/TAKEN: ICD-10-PCS | Mod: CPTII,S$GLB,, | Performed by: PODIATRIST

## 2022-08-11 PROCEDURE — 99999 PR PBB SHADOW E&M-EST. PATIENT-LVL II: ICD-10-PCS | Mod: PBBFAC,,, | Performed by: PODIATRIST

## 2022-08-11 PROCEDURE — 3066F NEPHROPATHY DOC TX: CPT | Mod: CPTII,S$GLB,, | Performed by: PODIATRIST

## 2022-08-11 PROCEDURE — 3062F POS MACROALBUMINURIA REV: CPT | Mod: CPTII,S$GLB,, | Performed by: PODIATRIST

## 2022-08-11 PROCEDURE — 3288F FALL RISK ASSESSMENT DOCD: CPT | Mod: CPTII,S$GLB,, | Performed by: PODIATRIST

## 2022-08-11 PROCEDURE — 3062F PR POS MACROALBUMINURIA RESULT DOCUMENTED/REVIEW: ICD-10-PCS | Mod: CPTII,S$GLB,, | Performed by: PODIATRIST

## 2022-08-11 PROCEDURE — 3066F PR DOCUMENTATION OF TREATMENT FOR NEPHROPATHY: ICD-10-PCS | Mod: CPTII,S$GLB,, | Performed by: PODIATRIST

## 2022-08-11 PROCEDURE — 3051F HG A1C>EQUAL 7.0%<8.0%: CPT | Mod: CPTII,S$GLB,, | Performed by: PODIATRIST

## 2022-08-11 PROCEDURE — 99024 PR POST-OP FOLLOW-UP VISIT: ICD-10-PCS | Mod: S$GLB,,, | Performed by: PODIATRIST

## 2022-08-11 PROCEDURE — 4010F ACE/ARB THERAPY RXD/TAKEN: CPT | Mod: CPTII,S$GLB,, | Performed by: PODIATRIST

## 2022-08-11 NOTE — PROGRESS NOTES
Subjective:      Patient ID: Hammad Douglas III is a 67 y.o. male.    Chief Complaint: Follow-up    Patient presents to clinic 4 weeks S/P arthroplasty of the Lt. Hallux IP joint.   Denies experiencing pain from the surgical extremity with today's exam.  Has kept the football dressing clean, dry, and intact for the past week.  Continues to minimize weight bearing activity along with elevation of the limb while at rest.  Denies experiencing N/V/F/C/D.  Denies any additional pedal complaints.      Past Medical History:   Diagnosis Date    Cataract     Done OU    Chronic kidney disease     Diabetes mellitus type I     Diagnosed at age 19    Diabetic retinopathy     See's Dr. Ledesma    Hyperlipidemia     Hypertension        Past Surgical History:   Procedure Laterality Date    ARTHROPLASTY OF TOE Left 7/14/2022    Procedure: ARTHROPLASTY, TOE;  Surgeon: Caleb Duffy DPM;  Location: [facility] OR;  Service: Podiatry;  Laterality: Left;  Left Hallux    CATARACT EXTRACTION W/  INTRAOCULAR LENS IMPLANT Right 09/28/2021    Dr Solis    CATARACT EXTRACTION W/  INTRAOCULAR LENS IMPLANT Left 02/22/2022    Dr Solis    COLONOSCOPY      2011 wnl    PHACOEMULSIFICATION OF CATARACT Right 9/28/2021    Procedure: PHACOEMULSIFICATION, CATARACT;  Surgeon: Vicente Solis Jr., MD;  Location: [facility] OR;  Service: Ophthalmology;  Laterality: Right;  DM/right    PHACOEMULSIFICATION OF CATARACT Left 2/22/2022    Procedure: PHACOEMULSIFICATION, CATARACT;  Surgeon: Vicente Solis Jr., MD;  Location: [facility] OR;  Service: Ophthalmology;  Laterality: Left;  left    SURGICAL REMOVAL OF BONE SPUR Left 10/14/2021    Procedure: EXCISION, BONE SPUR;  Surgeon: Caleb Duffy DPM;  Location: [facility] OR;  Service: Podiatry;  Laterality: Left;    TOE AMPUTATION Right 7/2/2020    Procedure: AMPUTATION, TOE; 3rd;  Surgeon: Caleb Duffy DPM;  Location: [facility] OR;  Service: Podiatry;  Laterality: Right;    TOE SURGERY Right     right big toe  "      Family History   Problem Relation Age of Onset    Cataracts Mother     Cataracts Father     Breast cancer Sister     Glaucoma Neg Hx     Macular degeneration Neg Hx     Retinal detachment Neg Hx        Social History     Socioeconomic History    Marital status:    Tobacco Use    Smoking status: Former Smoker    Smokeless tobacco: Never Used    Tobacco comment: Former cigar use   Substance and Sexual Activity    Alcohol use: Not Currently    Drug use: No    Sexual activity: Yes     Partners: Female   Social History Narrative    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 270 capsule 1    FLUoxetine 20 MG capsule TAKE ONE CAPSULE BY MOUTH EVERY DAY 90 capsule 2    HYDROcodone-acetaminophen (NORCO) 5-325 mg per tablet Take 1 tablet by mouth every 6 (six) hours as needed for Pain. 28 tablet 0    insulin aspart U-100 (NOVOLOG) 100 unit/mL (3 mL) InPn pen INJECT USING INSULIN:CARB RATIO OF 1:5 BEFORE A MEAL. MAX TOTAL DAILY DOSE 40 UNITS 45 mL 3    insulin glargine U-300 conc (TOUJEO MAX U-300 SOLOSTAR) 300 unit/mL (3 mL) insulin pen ADMINISTER 24 UNITS UNDER THE SKIN EVERY EVENING 2 pen 6    losartan (COZAAR) 100 MG tablet TAKE 1 TABLET(100 MG) BY MOUTH EVERY DAY 90 tablet 3    pen needle, diabetic (BD ULTRA-FINE MINI PEN NEEDLE) 31 gauge x 3/16" Ndle USE 1 FOUR TIMES DAILY 150 each 11     Current Facility-Administered Medications   Medication Dose Route Frequency Provider Last Rate Last Admin    glucagon (human recombinant) injection 1 mg  1 mg Intramuscular Once Tania Mckeon NP           Review of patient's allergies indicates:   Allergen Reactions    Altace [ramipril] "      Cough         Review of Systems   Constitution: Negative for chills and fever.   Cardiovascular: Negative for claudication and leg swelling.   Skin: Negative for color change, dry skin and poor wound healing.   Musculoskeletal: Negative for joint pain, joint swelling, muscle cramps and muscle weakness.   Neurological: Positive for numbness.   Psychiatric/Behavioral: Negative for altered mental status.           Objective:      Physical Exam   Constitutional: He appears well-developed and well-nourished. No distress.   Cardiovascular:   Pulses:       Dorsalis pedis pulses are 2+ on the right side, and 2+ on the left side.        Posterior tibial pulses are 1+ on the right side, and 1+ on the left side.   CFT is < 3 seconds bilateral.  Pedal hair growth is present bilateral.  Varicosities noted bilateral.  No lower extremity edema noted.  Toes are warm to touch bilateral.     Musculoskeletal: He exhibits no edema or tenderness.   Muscle strength 5/5 in all muscle groups bilateral.  No tenderness nor crepitation with ROM of foot/ankle joints bilateral.  No tenderness with palpation of bilateral foot and ankle.  Semi-reducible contracture of the lesser digits bilateral.   Amputation of Rt. 3rd toe.  Neurological: He has normal strength. A sensory deficit is present.   Protective sensation per [place] monofilament is decreased bilateral.  Light touch is intact bilateral.   Skin: Incision to the dorsum of the Lt. Hallux IP joint remains well healed.  No evidence of dehiscence, necrosis, ischemia, or infection noted the length of the incision.  Prior plantar wound of the hallux remains fully epithelialized.            Assessment:       Encounter Diagnoses   Name Primary?    Postoperative state Yes    Type 1 diabetes mellitus with polyneuropathy          Plan:       Hammad was seen today for follow-up.    Diagnoses and all orders for this visit:    Postoperative state    Type 1 diabetes mellitus with  polyneuropathy      I counseled the patient on his conditions, their implications and medical management.    Overall, satisfactory postoperative results noted.  Incision remains well healed with today's exam.    May resume his normal showering routine but discouraged from soaking/scrubbing x 2 weeks.     May ambulate to tolerance in casual shoe gear.     RTC in 1 month for a follow up.     Caleb Duffy DPM

## 2022-09-06 ENCOUNTER — OFFICE VISIT (OUTPATIENT)
Dept: PODIATRY | Facility: CLINIC | Age: 67
End: 2022-09-06
Payer: COMMERCIAL

## 2022-09-06 DIAGNOSIS — E10.42 TYPE 1 DIABETES MELLITUS WITH POLYNEUROPATHY: ICD-10-CM

## 2022-09-06 DIAGNOSIS — Z98.890 POSTOPERATIVE STATE: Primary | ICD-10-CM

## 2022-09-06 PROCEDURE — 1101F PR PT FALLS ASSESS DOC 0-1 FALLS W/OUT INJ PAST YR: ICD-10-PCS | Mod: CPTII,S$GLB,, | Performed by: PODIATRIST

## 2022-09-06 PROCEDURE — 1159F MED LIST DOCD IN RCRD: CPT | Mod: CPTII,S$GLB,, | Performed by: PODIATRIST

## 2022-09-06 PROCEDURE — 4010F PR ACE/ARB THEARPY RXD/TAKEN: ICD-10-PCS | Mod: CPTII,S$GLB,, | Performed by: PODIATRIST

## 2022-09-06 PROCEDURE — 3051F HG A1C>EQUAL 7.0%<8.0%: CPT | Mod: CPTII,S$GLB,, | Performed by: PODIATRIST

## 2022-09-06 PROCEDURE — 3288F PR FALLS RISK ASSESSMENT DOCUMENTED: ICD-10-PCS | Mod: CPTII,S$GLB,, | Performed by: PODIATRIST

## 2022-09-06 PROCEDURE — 3051F PR MOST RECENT HEMOGLOBIN A1C LEVEL 7.0 - < 8.0%: ICD-10-PCS | Mod: CPTII,S$GLB,, | Performed by: PODIATRIST

## 2022-09-06 PROCEDURE — 3062F PR POS MACROALBUMINURIA RESULT DOCUMENTED/REVIEW: ICD-10-PCS | Mod: CPTII,S$GLB,, | Performed by: PODIATRIST

## 2022-09-06 PROCEDURE — 3066F NEPHROPATHY DOC TX: CPT | Mod: CPTII,S$GLB,, | Performed by: PODIATRIST

## 2022-09-06 PROCEDURE — 99999 PR PBB SHADOW E&M-EST. PATIENT-LVL III: ICD-10-PCS | Mod: PBBFAC,,, | Performed by: PODIATRIST

## 2022-09-06 PROCEDURE — 99024 POSTOP FOLLOW-UP VISIT: CPT | Mod: S$GLB,,, | Performed by: PODIATRIST

## 2022-09-06 PROCEDURE — 3062F POS MACROALBUMINURIA REV: CPT | Mod: CPTII,S$GLB,, | Performed by: PODIATRIST

## 2022-09-06 PROCEDURE — 1159F PR MEDICATION LIST DOCUMENTED IN MEDICAL RECORD: ICD-10-PCS | Mod: CPTII,S$GLB,, | Performed by: PODIATRIST

## 2022-09-06 PROCEDURE — 4010F ACE/ARB THERAPY RXD/TAKEN: CPT | Mod: CPTII,S$GLB,, | Performed by: PODIATRIST

## 2022-09-06 PROCEDURE — 1101F PT FALLS ASSESS-DOCD LE1/YR: CPT | Mod: CPTII,S$GLB,, | Performed by: PODIATRIST

## 2022-09-06 PROCEDURE — 3288F FALL RISK ASSESSMENT DOCD: CPT | Mod: CPTII,S$GLB,, | Performed by: PODIATRIST

## 2022-09-06 PROCEDURE — 99024 PR POST-OP FOLLOW-UP VISIT: ICD-10-PCS | Mod: S$GLB,,, | Performed by: PODIATRIST

## 2022-09-06 PROCEDURE — 3066F PR DOCUMENTATION OF TREATMENT FOR NEPHROPATHY: ICD-10-PCS | Mod: CPTII,S$GLB,, | Performed by: PODIATRIST

## 2022-09-06 PROCEDURE — 99999 PR PBB SHADOW E&M-EST. PATIENT-LVL III: CPT | Mod: PBBFAC,,, | Performed by: PODIATRIST

## 2022-09-06 NOTE — PROGRESS NOTES
Subjective:      Patient ID: Hammad Douglas III is a 67 y.o. male.    Chief Complaint: Post-op Evaluation    Patient presents to clinic 8 weeks S/P arthroplasty of the Lt. Hallux IP joint.   Denies experiencing pain from the surgical extremity with today's exam.  Notes being able to ambulate comfortably in casual shoe gear.  Relates a scant amount of dry skin to the Lt. Plantar hallux that is easily maintained with regularly applying moisturizer.   States the surgical incision remains well healed without issue.  Denies any additional pedal complaints.      Past Medical History:   Diagnosis Date    Cataract     Done OU    Chronic kidney disease     Diabetes mellitus type I     Diagnosed at age 19    Diabetic retinopathy     See's Dr. Ledesma    Hyperlipidemia     Hypertension        Past Surgical History:   Procedure Laterality Date    ARTHROPLASTY OF TOE Left 7/14/2022    Procedure: ARTHROPLASTY, TOE;  Surgeon: Caleb Duffy DPM;  Location: [facility] OR;  Service: Podiatry;  Laterality: Left;  Left Hallux    CATARACT EXTRACTION W/  INTRAOCULAR LENS IMPLANT Right 09/28/2021    Dr Solis    CATARACT EXTRACTION W/  INTRAOCULAR LENS IMPLANT Left 02/22/2022    Dr Solis    COLONOSCOPY      2011 wnl    PHACOEMULSIFICATION OF CATARACT Right 9/28/2021    Procedure: PHACOEMULSIFICATION, CATARACT;  Surgeon: Vicente Solis Jr., MD;  Location: [facility] OR;  Service: Ophthalmology;  Laterality: Right;  DM/right    PHACOEMULSIFICATION OF CATARACT Left 2/22/2022    Procedure: PHACOEMULSIFICATION, CATARACT;  Surgeon: Vicente Solis Jr., MD;  Location: [facility] OR;  Service: Ophthalmology;  Laterality: Left;  left    SURGICAL REMOVAL OF BONE SPUR Left 10/14/2021    Procedure: EXCISION, BONE SPUR;  Surgeon: Caleb Duffy DPM;  Location: [facility] OR;  Service: Podiatry;  Laterality: Left;    TOE AMPUTATION Right 7/2/2020    Procedure: AMPUTATION, TOE; 3rd;  Surgeon: Caleb Duffy DPM;  Location: [facility] OR;  Service: Podiatry;  Laterality: Right;  "   TOE SURGERY Right     right big toe       Family History   Problem Relation Age of Onset    Cataracts Mother     Cataracts Father     Breast cancer Sister     Glaucoma Neg Hx     Macular degeneration Neg Hx     Retinal detachment Neg Hx        Social History     Socioeconomic History    Marital status:    Tobacco Use    Smoking status: Former     Types: Cigars    Smokeless tobacco: Never    Tobacco comments:     Former cigar use   Substance and Sexual Activity    Alcohol use: Not Currently    Drug use: No    Sexual activity: Yes     Partners: Female   Social History Narrative    Retired former radiation  at [employer]       Current Outpatient Medications   Medication Sig Dispense Refill    atorvastatin (LIPITOR) 40 MG tablet TAKE 1 TABLET(40 MG) BY MOUTH EVERY DAY 90 tablet 3    FLUoxetine 10 MG capsule Take 3 capsules (30 mg total) by mouth once daily. 270 capsule 1    FLUoxetine 20 MG capsule TAKE ONE CAPSULE BY MOUTH EVERY DAY 90 capsule 2    insulin aspart U-100 (NOVOLOG) 100 unit/mL (3 mL) InPn pen INJECT USING INSULIN:CARB RATIO OF 1:5 BEFORE A MEAL. MAX TOTAL DAILY DOSE 40 UNITS 45 mL 3    insulin glargine U-300 conc (TOUJEO MAX U-300 SOLOSTAR) 300 unit/mL (3 mL) insulin pen ADMINISTER 24 UNITS UNDER THE SKIN EVERY EVENING 2 pen 6    losartan (COZAAR) 100 MG tablet TAKE 1 TABLET(100 MG) BY MOUTH EVERY DAY 90 tablet 3    pen needle, diabetic (BD ULTRA-FINE MINI PEN NEEDLE) 31 gauge x 3/16" Ndle USE 1 FOUR TIMES DAILY 150 each 11    BD BETSY 2ND GEN PEN NEEDLE 32 gauge x 5/32" Ndle       HYDROcodone-acetaminophen (NORCO) 5-325 mg per tablet Take 1 tablet by mouth every 6 (six) hours as needed for Pain. (Patient not taking: Reported on 9/6/2022) 28 tablet 0     Current Facility-Administered Medications   Medication Dose Route Frequency Provider Last Rate Last Admin    glucagon (human recombinant) injection 1 mg  1 mg Intramuscular Once Tania Mckeon NP           Review of " patient's allergies indicates:   Allergen Reactions    Altace [ramipril]      Cough         Review of Systems   Constitution: Negative for chills and fever.   Cardiovascular: Negative for claudication and leg swelling.   Skin: Negative for color change, dry skin and poor wound healing.   Musculoskeletal: Negative for joint pain, joint swelling, muscle cramps and muscle weakness.   Neurological: Positive for numbness.   Psychiatric/Behavioral: Negative for altered mental status.           Objective:      Physical Exam   Constitutional: He appears well-developed and well-nourished. No distress.   Cardiovascular:   Pulses:       Dorsalis pedis pulses are 2+ on the right side, and 2+ on the left side.        Posterior tibial pulses are 1+ on the right side, and 1+ on the left side.   CFT is < 3 seconds bilateral.  Pedal hair growth is present bilateral.  Varicosities noted bilateral.  No lower extremity edema noted.  Toes are warm to touch bilateral.     Musculoskeletal: He exhibits no edema or tenderness.   Muscle strength 5/5 in all muscle groups bilateral.  No tenderness nor crepitation with ROM of foot/ankle joints bilateral.  No tenderness with palpation of bilateral foot and ankle.  Semi-reducible contracture of the lesser digits bilateral.   Amputation of Rt. 3rd toe.  Neurological: He has normal strength. A sensory deficit is present.   Protective sensation per [place] monofilament is decreased bilateral.  Light touch is intact bilateral.   Skin: Incision to the dorsum of the Lt. Hallux IP joint remains well healed.  No evidence of dehiscence, necrosis, ischemia, or infection noted the length of the incision.  Prior plantar wound of the hallux remains fully epithelialized.            Assessment:       Encounter Diagnoses   Name Primary?    Postoperative state Yes    Type 1 diabetes mellitus with polyneuropathy          Plan:       Hammad was seen today for post-op evaluation.    Diagnoses and all orders  for this visit:    Postoperative state    Type 1 diabetes mellitus with polyneuropathy    I counseled the patient on his conditions, their implications and medical management.    Overall, satisfactory postoperative results noted.  Incision remains well healed with today's exam.    To continue with ambulation to tolerance in casual shoe gear.    To continue applying moisturizer to the plantar hallux on a daily basis.     RTC in 3 months for a routine diabetic foot exam.     Caleb Duffy DPM

## 2022-09-19 ENCOUNTER — PATIENT MESSAGE (OUTPATIENT)
Dept: ENDOCRINOLOGY | Facility: CLINIC | Age: 67
End: 2022-09-19
Payer: COMMERCIAL

## 2022-09-21 ENCOUNTER — LAB VISIT (OUTPATIENT)
Dept: LAB | Facility: HOSPITAL | Age: 67
End: 2022-09-21
Attending: NURSE PRACTITIONER
Payer: COMMERCIAL

## 2022-09-21 DIAGNOSIS — E10.43 TYPE 1 DIABETES MELLITUS WITH DIABETIC AUTONOMIC NEUROPATHY: ICD-10-CM

## 2022-09-21 LAB
CHOLEST SERPL-MCNC: 156 MG/DL (ref 120–199)
CHOLEST/HDLC SERPL: 3.5 {RATIO} (ref 2–5)
ESTIMATED AVG GLUCOSE: 160 MG/DL (ref 68–131)
HBA1C MFR BLD: 7.2 % (ref 4–5.6)
HDLC SERPL-MCNC: 45 MG/DL (ref 40–75)
HDLC SERPL: 28.8 % (ref 20–50)
LDLC SERPL CALC-MCNC: 98.4 MG/DL (ref 63–159)
NONHDLC SERPL-MCNC: 111 MG/DL
TRIGL SERPL-MCNC: 63 MG/DL (ref 30–150)

## 2022-09-21 PROCEDURE — 80061 LIPID PANEL: CPT | Performed by: NURSE PRACTITIONER

## 2022-09-21 PROCEDURE — 36415 COLL VENOUS BLD VENIPUNCTURE: CPT | Mod: PN | Performed by: NURSE PRACTITIONER

## 2022-09-21 PROCEDURE — 83036 HEMOGLOBIN GLYCOSYLATED A1C: CPT | Performed by: NURSE PRACTITIONER

## 2022-09-22 ENCOUNTER — OFFICE VISIT (OUTPATIENT)
Dept: ENDOCRINOLOGY | Facility: CLINIC | Age: 67
End: 2022-09-22
Payer: COMMERCIAL

## 2022-09-22 VITALS
DIASTOLIC BLOOD PRESSURE: 60 MMHG | WEIGHT: 224.63 LBS | HEIGHT: 73 IN | HEART RATE: 79 BPM | SYSTOLIC BLOOD PRESSURE: 112 MMHG | BODY MASS INDEX: 29.77 KG/M2

## 2022-09-22 DIAGNOSIS — E10.649 HYPOGLYCEMIA UNAWARENESS IN TYPE 1 DIABETES MELLITUS: ICD-10-CM

## 2022-09-22 DIAGNOSIS — E10.21 TYPE 1 DIABETES MELLITUS WITH DIABETIC NEPHROPATHY: ICD-10-CM

## 2022-09-22 DIAGNOSIS — E78.5 HYPERLIPIDEMIA, UNSPECIFIED HYPERLIPIDEMIA TYPE: ICD-10-CM

## 2022-09-22 DIAGNOSIS — E10.43 TYPE 1 DIABETES MELLITUS WITH DIABETIC AUTONOMIC NEUROPATHY: Primary | ICD-10-CM

## 2022-09-22 DIAGNOSIS — E10.621 DIABETIC ULCER OF TOE OF LEFT FOOT ASSOCIATED WITH TYPE 1 DIABETES MELLITUS, UNSPECIFIED ULCER STAGE: ICD-10-CM

## 2022-09-22 DIAGNOSIS — L97.529 DIABETIC ULCER OF TOE OF LEFT FOOT ASSOCIATED WITH TYPE 1 DIABETES MELLITUS, UNSPECIFIED ULCER STAGE: ICD-10-CM

## 2022-09-22 DIAGNOSIS — I10 ESSENTIAL HYPERTENSION: ICD-10-CM

## 2022-09-22 PROCEDURE — 3066F NEPHROPATHY DOC TX: CPT | Mod: CPTII,S$GLB,, | Performed by: NURSE PRACTITIONER

## 2022-09-22 PROCEDURE — 3062F PR POS MACROALBUMINURIA RESULT DOCUMENTED/REVIEW: ICD-10-PCS | Mod: CPTII,S$GLB,, | Performed by: NURSE PRACTITIONER

## 2022-09-22 PROCEDURE — 3074F PR MOST RECENT SYSTOLIC BLOOD PRESSURE < 130 MM HG: ICD-10-PCS | Mod: CPTII,S$GLB,, | Performed by: NURSE PRACTITIONER

## 2022-09-22 PROCEDURE — 1101F PT FALLS ASSESS-DOCD LE1/YR: CPT | Mod: CPTII,S$GLB,, | Performed by: NURSE PRACTITIONER

## 2022-09-22 PROCEDURE — 3078F PR MOST RECENT DIASTOLIC BLOOD PRESSURE < 80 MM HG: ICD-10-PCS | Mod: CPTII,S$GLB,, | Performed by: NURSE PRACTITIONER

## 2022-09-22 PROCEDURE — 3066F PR DOCUMENTATION OF TREATMENT FOR NEPHROPATHY: ICD-10-PCS | Mod: CPTII,S$GLB,, | Performed by: NURSE PRACTITIONER

## 2022-09-22 PROCEDURE — 1159F MED LIST DOCD IN RCRD: CPT | Mod: CPTII,S$GLB,, | Performed by: NURSE PRACTITIONER

## 2022-09-22 PROCEDURE — 4010F PR ACE/ARB THEARPY RXD/TAKEN: ICD-10-PCS | Mod: CPTII,S$GLB,, | Performed by: NURSE PRACTITIONER

## 2022-09-22 PROCEDURE — 4010F ACE/ARB THERAPY RXD/TAKEN: CPT | Mod: CPTII,S$GLB,, | Performed by: NURSE PRACTITIONER

## 2022-09-22 PROCEDURE — 99214 OFFICE O/P EST MOD 30 MIN: CPT | Mod: S$GLB,,, | Performed by: NURSE PRACTITIONER

## 2022-09-22 PROCEDURE — 3062F POS MACROALBUMINURIA REV: CPT | Mod: CPTII,S$GLB,, | Performed by: NURSE PRACTITIONER

## 2022-09-22 PROCEDURE — 1159F PR MEDICATION LIST DOCUMENTED IN MEDICAL RECORD: ICD-10-PCS | Mod: CPTII,S$GLB,, | Performed by: NURSE PRACTITIONER

## 2022-09-22 PROCEDURE — 3288F FALL RISK ASSESSMENT DOCD: CPT | Mod: CPTII,S$GLB,, | Performed by: NURSE PRACTITIONER

## 2022-09-22 PROCEDURE — 3078F DIAST BP <80 MM HG: CPT | Mod: CPTII,S$GLB,, | Performed by: NURSE PRACTITIONER

## 2022-09-22 PROCEDURE — 99999 PR PBB SHADOW E&M-EST. PATIENT-LVL IV: ICD-10-PCS | Mod: PBBFAC,,, | Performed by: NURSE PRACTITIONER

## 2022-09-22 PROCEDURE — 99214 PR OFFICE/OUTPT VISIT, EST, LEVL IV, 30-39 MIN: ICD-10-PCS | Mod: S$GLB,,, | Performed by: NURSE PRACTITIONER

## 2022-09-22 PROCEDURE — 95251 PR GLUCOSE MONITOR, 72 HOUR, PHYS INTERP: ICD-10-PCS | Mod: S$GLB,,, | Performed by: NURSE PRACTITIONER

## 2022-09-22 PROCEDURE — 99999 PR PBB SHADOW E&M-EST. PATIENT-LVL IV: CPT | Mod: PBBFAC,,, | Performed by: NURSE PRACTITIONER

## 2022-09-22 PROCEDURE — 3008F BODY MASS INDEX DOCD: CPT | Mod: CPTII,S$GLB,, | Performed by: NURSE PRACTITIONER

## 2022-09-22 PROCEDURE — 3008F PR BODY MASS INDEX (BMI) DOCUMENTED: ICD-10-PCS | Mod: CPTII,S$GLB,, | Performed by: NURSE PRACTITIONER

## 2022-09-22 PROCEDURE — 1101F PR PT FALLS ASSESS DOC 0-1 FALLS W/OUT INJ PAST YR: ICD-10-PCS | Mod: CPTII,S$GLB,, | Performed by: NURSE PRACTITIONER

## 2022-09-22 PROCEDURE — 3074F SYST BP LT 130 MM HG: CPT | Mod: CPTII,S$GLB,, | Performed by: NURSE PRACTITIONER

## 2022-09-22 PROCEDURE — 1160F PR REVIEW ALL MEDS BY PRESCRIBER/CLIN PHARMACIST DOCUMENTED: ICD-10-PCS | Mod: CPTII,S$GLB,, | Performed by: NURSE PRACTITIONER

## 2022-09-22 PROCEDURE — 3288F PR FALLS RISK ASSESSMENT DOCUMENTED: ICD-10-PCS | Mod: CPTII,S$GLB,, | Performed by: NURSE PRACTITIONER

## 2022-09-22 PROCEDURE — 1160F RVW MEDS BY RX/DR IN RCRD: CPT | Mod: CPTII,S$GLB,, | Performed by: NURSE PRACTITIONER

## 2022-09-22 PROCEDURE — 95251 CONT GLUC MNTR ANALYSIS I&R: CPT | Mod: S$GLB,,, | Performed by: NURSE PRACTITIONER

## 2022-09-22 PROCEDURE — 3051F PR MOST RECENT HEMOGLOBIN A1C LEVEL 7.0 - < 8.0%: ICD-10-PCS | Mod: CPTII,S$GLB,, | Performed by: NURSE PRACTITIONER

## 2022-09-22 PROCEDURE — 3051F HG A1C>EQUAL 7.0%<8.0%: CPT | Mod: CPTII,S$GLB,, | Performed by: NURSE PRACTITIONER

## 2022-09-22 NOTE — PROGRESS NOTES
CC: Mr. Hammad Douglas III arrives today for management of Type 1  DM and review of chronic medical conditions, as listed in the visit diagnosis section of this encounter.     HPI: Mr. Hammad Douglas III was diagnosed with Type 1  DM at age 19 after a viral illness - had polyuria, polydipsia at this time.  Initial treatment consisted of insulin. Denies FH of DM. Reports past hospitalizations due to DKA > 30 years ago.   Has diagnosis of hypoglycemia unawareness with past severe hypoglycemic episodes requiring 3rd party assistance. Began using Dexcom G5 in 2017 and later up graded to G6.    Patient was last seen by me in June. At this time, Toujeo Max dose was decreased, due to early AM hypoglycemia.     He is following closely with podiatry. Now S/P arthroplasty of the Lt. Hallux IP joint in July.    BG monitoring per Dexcom G6. He uses .     Hypoglycemia: sporadic - sometimes inbetween meals. Does admit to accidentally double dosing prandial insulin (this occurred yesterday). He is aware of the dangers.   Hypoglycemic Symptoms: often doesn't have symptoms.   Hypoglycemia Treatment: Juice or glucose tabs.     Missing Insulin/PO medication doses: occasional - missed Toujeo dose last night.   Timing prandial insulin 5-15 minutes before meals: yes    Exercise: no formal    Dietary Habits: Eats 2-3 meals/day. Rare snacking. Avoids sugary beverages.     Last DM education appointment: October 2017    He is caring for his 91 y/o father with probable dementia.        CURRENT DIABETIC MEDS:  Toujeo Max 24 units QHS, Novolog I:C ratio 1:5, correction target 150, ISF 50 (average dose 10-14 units)  Vial or pen: pen    Previous insulins:  Lantus    Last Eye Exam: 6/2022, + proliferative DR. + R cataract.   Last Podiatry Exam: 9/2022, Past amputation of R third toe.     REVIEW OF SYSTEMS  Constitutional: no c/o weakness, fatigue, weight loss.   Cardiac: no palpitations or chest pain.  Respiratory: no cough or dyspnea.   GI:  " no c/o abdominal pain or nausea.   Skin: no rashes, lesions   Neuro: no numbness, tingling, or parasthesias.  Endocrine: denies polyphagia, polydipsia, polyuria    Personally reviewed Past Medical, Surgical, Social History.    Vital Signs  /60   Pulse 79   Ht 6' 1" (1.854 m)   Wt 101.9 kg (224 lb 10.4 oz)   BMI 29.64 kg/m²      Personally reviewed the below labs:    Hemoglobin A1C   Date Value Ref Range Status   09/21/2022 7.2 (H) 4.0 - 5.6 % Final     Comment:     ADA Screening Guidelines:  5.7-6.4%  Consistent with prediabetes  >or=6.5%  Consistent with diabetes    High levels of fetal hemoglobin interfere with the HbA1C  assay. Heterozygous hemoglobin variants (HbS, HgC, etc)do  not significantly interfere with this assay.   However, presence of multiple variants may affect accuracy.     06/22/2022 7.4 (H) 4.0 - 5.6 % Final     Comment:     ADA Screening Guidelines:  5.7-6.4%  Consistent with prediabetes  >or=6.5%  Consistent with diabetes    High levels of fetal hemoglobin interfere with the HbA1C  assay. Heterozygous hemoglobin variants (HbS, HgC, etc)do  not significantly interfere with this assay.   However, presence of multiple variants may affect accuracy.     02/09/2022 7.5 (H) 4.0 - 5.6 % Final     Comment:     ADA Screening Guidelines:  5.7-6.4%  Consistent with prediabetes  >or=6.5%  Consistent with diabetes    High levels of fetal hemoglobin interfere with the HbA1C  assay. Heterozygous hemoglobin variants (HbS, HgC, etc)do  not significantly interfere with this assay.   However, presence of multiple variants may affect accuracy.         Chemistry        Component Value Date/Time     06/22/2022 0848    K 5.2 (H) 06/22/2022 0848     06/22/2022 0848    CO2 25 06/22/2022 0848    BUN 27 (H) 06/22/2022 0848    CREATININE 1.3 06/22/2022 0848     (H) 06/22/2022 0848        Component Value Date/Time    CALCIUM 9.4 06/22/2022 0848    ALKPHOS 108 11/16/2021 1027    AST 26 11/16/2021 " 1027    ALT 25 11/16/2021 1027    BILITOT 0.5 11/16/2021 1027    ESTGFRAFRICA >60.0 06/22/2022 0848    EGFRNONAA 57.3 (A) 06/22/2022 0848          Lab Results   Component Value Date    CHOL 156 09/21/2022    CHOL 150 08/12/2021    CHOL 154 07/27/2020     Lab Results   Component Value Date    HDL 45 09/21/2022    HDL 46 08/12/2021    HDL 46 07/27/2020     Lab Results   Component Value Date    LDLCALC 98.4 09/21/2022    LDLCALC 91.4 08/12/2021    LDLCALC 95.8 07/27/2020     Lab Results   Component Value Date    TRIG 63 09/21/2022    TRIG 63 08/12/2021    TRIG 61 07/27/2020     Lab Results   Component Value Date    CHOLHDL 28.8 09/21/2022    CHOLHDL 30.7 08/12/2021    CHOLHDL 29.9 07/27/2020       Lab Results   Component Value Date    MICALBCREAT 412.0 (H) 04/22/2022     Lab Results   Component Value Date    TSH 1.789 02/09/2022       CrCl cannot be calculated (Patient's most recent lab result is older than the maximum 7 days allowed.).    Vit D, 25-Hydroxy   Date Value Ref Range Status   08/06/2018 35 30 - 96 ng/mL Final     Comment:     Vitamin D deficiency.........<10 ng/mL                              Vitamin D insufficiency......10-29 ng/mL       Vitamin D sufficiency........> or equal to 30 ng/mL  Vitamin D toxicity............>100 ng/mL         PHYSICAL EXAMINATION  Constitutional: Appears well, no distress  Neck: Supple, trachea midline.  Respiratory: CTA, even and unlabored.   Cardiovascular: RRR, no murmurs.  GI: active bowel sounds, no hernia  Skin: warm and dry  Neuro: oriented to person, place, time.  Feet: appropriate footwear.       DEXCOM DOWNLOAD: See media file for details. Fasting glucoses vary - some reasonable, some elevated. Prandial excursions are noted and are mixed in with sporadic hypoglycemia in-between meals.   Average glucose: 188 mg/dL  Above 250 mg/dL: 20 %  181-250 mg/dL: 38 %   mg/dL: 39 %  54-69 mg/dL: 2 %  Below 54 mg/dL: <1 %         Goals        HEMOGLOBIN A1C < 7.5            due to hypoglycemia unawareness      Assessment/Plan  1. Type 1 diabetes mellitus with diabetic autonomic neuropathy  -- A1c acceptable but glucoses are highly variable and inconsistent. Not enough of an established pattern to warrant dose change. Caution with double dosing insulin!  -- I recommended CSII with Dexcom integrated closed loop therapy. He will consider and do some of his own research. Would then schedule with education for pump eval.   -- Continue Novolog insulin:carb ratio 1:5. Continue correction scale. DO NOT administer additional Novolog dose until 4 hours has passed since last food/Novolog dose.   -- continue Toujeo Max to 24 units. Give dose mid-day today and resume PM dosing tomorrow  -- BG monitoring per Dexcom    -- Discussed diagnosis of DM, A1c goals, progression of disease, long term complications and tx options.    -- Reviewed hypoglycemia management: treat with 1/2 glass of juice, 1/2 can regular coke, or 4 glucose tablets. Monitor and repeat treatment every 15 minutes until BG is >70 Then have a snack, which includes a complex carbohydrate and protein.  Advised patient to check BG before activities, such as driving or exercise.   2. Type 1 diabetes mellitus with diabetic nephropathy  -- uncontrolled.   -- continue losartan   3. Proliferative diabetic retinopathy without macular edema associated with type 1 diabetes mellitus, unspecified laterality  -- stable  -- keep follow ups   4. Essential hypertension  -- controlled  -- continue current meds   5. Hyperlipidemia, unspecified hyperlipidemia type  -- controlled  -- continue atorvastatin   6. Hypoglycemia unawareness in Type 1 DM -- continue Dexcom.   -- has glucagon         FOLLOW UP   Follow up in about 3 months (around 12/22/2022).   Patient instructed to bring BG logs to each follow up   Patient encouraged to call for any BG/medication issues, concerns, or questions.     Orders Placed This Encounter   Procedures    Hemoglobin A1C     Comprehensive Metabolic Panel

## 2022-09-27 ENCOUNTER — PATIENT MESSAGE (OUTPATIENT)
Dept: PODIATRY | Facility: CLINIC | Age: 67
End: 2022-09-27
Payer: COMMERCIAL

## 2022-09-28 ENCOUNTER — OFFICE VISIT (OUTPATIENT)
Dept: PODIATRY | Facility: CLINIC | Age: 67
End: 2022-09-28
Payer: COMMERCIAL

## 2022-09-28 ENCOUNTER — PATIENT MESSAGE (OUTPATIENT)
Dept: PODIATRY | Facility: CLINIC | Age: 67
End: 2022-09-28
Payer: COMMERCIAL

## 2022-09-28 ENCOUNTER — PATIENT MESSAGE (OUTPATIENT)
Dept: PODIATRY | Facility: CLINIC | Age: 67
End: 2022-09-28

## 2022-09-28 VITALS — WEIGHT: 224.63 LBS | HEIGHT: 73 IN | BODY MASS INDEX: 29.77 KG/M2

## 2022-09-28 DIAGNOSIS — T14.8XXA BLOOD BLISTER: Primary | ICD-10-CM

## 2022-09-28 DIAGNOSIS — E10.42 TYPE 1 DIABETES MELLITUS WITH POLYNEUROPATHY: ICD-10-CM

## 2022-09-28 PROCEDURE — 1101F PT FALLS ASSESS-DOCD LE1/YR: CPT | Mod: CPTII,S$GLB,, | Performed by: PODIATRIST

## 2022-09-28 PROCEDURE — 99999 PR PBB SHADOW E&M-EST. PATIENT-LVL II: ICD-10-PCS | Mod: PBBFAC,,, | Performed by: PODIATRIST

## 2022-09-28 PROCEDURE — 1159F MED LIST DOCD IN RCRD: CPT | Mod: CPTII,S$GLB,, | Performed by: PODIATRIST

## 2022-09-28 PROCEDURE — 3008F BODY MASS INDEX DOCD: CPT | Mod: CPTII,S$GLB,, | Performed by: PODIATRIST

## 2022-09-28 PROCEDURE — 4010F ACE/ARB THERAPY RXD/TAKEN: CPT | Mod: CPTII,S$GLB,, | Performed by: PODIATRIST

## 2022-09-28 PROCEDURE — 3066F PR DOCUMENTATION OF TREATMENT FOR NEPHROPATHY: ICD-10-PCS | Mod: CPTII,S$GLB,, | Performed by: PODIATRIST

## 2022-09-28 PROCEDURE — 1101F PR PT FALLS ASSESS DOC 0-1 FALLS W/OUT INJ PAST YR: ICD-10-PCS | Mod: CPTII,S$GLB,, | Performed by: PODIATRIST

## 2022-09-28 PROCEDURE — 3066F NEPHROPATHY DOC TX: CPT | Mod: CPTII,S$GLB,, | Performed by: PODIATRIST

## 2022-09-28 PROCEDURE — 99024 PR POST-OP FOLLOW-UP VISIT: ICD-10-PCS | Mod: S$GLB,,, | Performed by: PODIATRIST

## 2022-09-28 PROCEDURE — 99999 PR PBB SHADOW E&M-EST. PATIENT-LVL II: CPT | Mod: PBBFAC,,, | Performed by: PODIATRIST

## 2022-09-28 PROCEDURE — 99024 POSTOP FOLLOW-UP VISIT: CPT | Mod: S$GLB,,, | Performed by: PODIATRIST

## 2022-09-28 PROCEDURE — 3051F PR MOST RECENT HEMOGLOBIN A1C LEVEL 7.0 - < 8.0%: ICD-10-PCS | Mod: CPTII,S$GLB,, | Performed by: PODIATRIST

## 2022-09-28 PROCEDURE — 4010F PR ACE/ARB THEARPY RXD/TAKEN: ICD-10-PCS | Mod: CPTII,S$GLB,, | Performed by: PODIATRIST

## 2022-09-28 PROCEDURE — 3288F PR FALLS RISK ASSESSMENT DOCUMENTED: ICD-10-PCS | Mod: CPTII,S$GLB,, | Performed by: PODIATRIST

## 2022-09-28 PROCEDURE — 3008F PR BODY MASS INDEX (BMI) DOCUMENTED: ICD-10-PCS | Mod: CPTII,S$GLB,, | Performed by: PODIATRIST

## 2022-09-28 PROCEDURE — 3062F PR POS MACROALBUMINURIA RESULT DOCUMENTED/REVIEW: ICD-10-PCS | Mod: CPTII,S$GLB,, | Performed by: PODIATRIST

## 2022-09-28 PROCEDURE — 3062F POS MACROALBUMINURIA REV: CPT | Mod: CPTII,S$GLB,, | Performed by: PODIATRIST

## 2022-09-28 PROCEDURE — 3288F FALL RISK ASSESSMENT DOCD: CPT | Mod: CPTII,S$GLB,, | Performed by: PODIATRIST

## 2022-09-28 PROCEDURE — 3051F HG A1C>EQUAL 7.0%<8.0%: CPT | Mod: CPTII,S$GLB,, | Performed by: PODIATRIST

## 2022-09-28 PROCEDURE — 1159F PR MEDICATION LIST DOCUMENTED IN MEDICAL RECORD: ICD-10-PCS | Mod: CPTII,S$GLB,, | Performed by: PODIATRIST

## 2022-09-28 NOTE — PROGRESS NOTES
Subjective:      Patient ID: Hammad Douglas III is a 67 y.o. male.    Chief Complaint: Wound Care ( Check Great toe  spot on foot)    Patient presents to clinic with the chief complaint of a possible wound to the plantar aspect of the LT. Great toe.  Notes seeing an area of dark skin discoloration to the plantar Lt. Hallux.  He is unable to appreciate trauma to the affected area, however, he attributes this to his neuropathy.  Denies noticing drainage nor localized signs of infection to the area.  Has been keeping the site covered with a padded bandage with all weight bearing.  Denies any additional pedal complaints.      Past Medical History:   Diagnosis Date    Cataract     Done OU    Chronic kidney disease     Diabetes mellitus type I     Diagnosed at age 19    Diabetic retinopathy     See's Dr. Ledesma    Hyperlipidemia     Hypertension        Past Surgical History:   Procedure Laterality Date    ARTHROPLASTY OF TOE Left 7/14/2022    Procedure: ARTHROPLASTY, TOE;  Surgeon: Caleb Duffy DPM;  Location: [facility] OR;  Service: Podiatry;  Laterality: Left;  Left Hallux    CATARACT EXTRACTION W/  INTRAOCULAR LENS IMPLANT Right 09/28/2021    Dr Solis    CATARACT EXTRACTION W/  INTRAOCULAR LENS IMPLANT Left 02/22/2022    Dr Solis    COLONOSCOPY      2011 wnl    PHACOEMULSIFICATION OF CATARACT Right 9/28/2021    Procedure: PHACOEMULSIFICATION, CATARACT;  Surgeon: Vicente Solis Jr., MD;  Location: [facility] OR;  Service: Ophthalmology;  Laterality: Right;  DM/right    PHACOEMULSIFICATION OF CATARACT Left 2/22/2022    Procedure: PHACOEMULSIFICATION, CATARACT;  Surgeon: Vicente Solis Jr., MD;  Location: [facility] OR;  Service: Ophthalmology;  Laterality: Left;  left    SURGICAL REMOVAL OF BONE SPUR Left 10/14/2021    Procedure: EXCISION, BONE SPUR;  Surgeon: Caleb Duffy DPM;  Location: [facility] OR;  Service: Podiatry;  Laterality: Left;    TOE AMPUTATION Right 7/2/2020    Procedure: AMPUTATION, TOE; 3rd;  Surgeon: Caleb  "JUDE Duffy;  Location: [facility] OR;  Service: Podiatry;  Laterality: Right;    TOE SURGERY Right     right big toe       Family History   Problem Relation Age of Onset    Cataracts Mother     Cataracts Father     Breast cancer Sister     Glaucoma Neg Hx     Macular degeneration Neg Hx     Retinal detachment Neg Hx        Social History     Socioeconomic History    Marital status:    Tobacco Use    Smoking status: Former     Types: Cigars    Smokeless tobacco: Never    Tobacco comments:     Former cigar use   Substance and Sexual Activity    Alcohol use: Not Currently    Drug use: No    Sexual activity: Yes     Partners: Female   Social History Narrative    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 270 capsule 1    FLUoxetine 20 MG capsule TAKE ONE CAPSULE BY MOUTH EVERY DAY 90 capsule 2    insulin aspart U-100 (NOVOLOG) 100 unit/mL (3 mL) InPn pen INJECT USING INSULIN:CARB RATIO OF 1:5 BEFORE A MEAL. MAX TOTAL DAILY DOSE 40 UNITS 45 mL 3    insulin glargine U-300 conc (TOUJEO MAX U-300 SOLOSTAR) 300 unit/mL (3 mL) insulin pen ADMINISTER 24 UNITS UNDER THE SKIN EVERY EVENING 2 pen 6    losartan (COZAAR) 100 MG tablet TAKE 1 TABLET(100 MG) BY MOUTH EVERY DAY 90 tablet 3    pen needle, diabetic (BD ULTRA-FINE MINI PEN NEEDLE) 31 gauge x 3/16" Ndle USE 1 FOUR TIMES DAILY 150 each 11     Current Facility-Administered Medications   Medication Dose Route Frequency Provider Last Rate Last Admin    glucagon (human recombinant) injection 1 mg  1 mg Intramuscular Once Tania Mckeon NP           Review of patient's allergies indicates:   Allergen Reactions    Altace [ramipril]      Cough         Review of Systems   Constitution: Negative for chills and fever. "   Cardiovascular: Negative for claudication and leg swelling.   Skin: Negative for color change, dry skin and poor wound healing.   Musculoskeletal: Negative for joint pain, joint swelling, muscle cramps and muscle weakness.   Neurological: Positive for numbness.   Psychiatric/Behavioral: Negative for altered mental status.           Objective:      Physical Exam   Constitutional: He appears well-developed and well-nourished. No distress.   Cardiovascular:   Pulses:       Dorsalis pedis pulses are 2+ on the right side, and 2+ on the left side.        Posterior tibial pulses are 1+ on the right side, and 1+ on the left side.   CFT is < 3 seconds bilateral.  Pedal hair growth is present bilateral.  Varicosities noted bilateral.  No lower extremity edema noted.  Toes are warm to touch bilateral.     Musculoskeletal: He exhibits no edema or tenderness.   Muscle strength 5/5 in all muscle groups bilateral.  No tenderness nor crepitation with ROM of foot/ankle joints bilateral.  No tenderness with palpation of bilateral foot and ankle.  Semi-reducible contracture of the lesser digits bilateral.   Amputation of Rt. 3rd toe.  Neurological: He has normal strength. A sensory deficit is present.   Protective sensation per [place]ayson monofilament is decreased bilateral.  Light touch is intact bilateral.   Skin: Light callus with underlying dry, stable blood blister noted slightly proximal to the Lt. Hallux IP joint.  No underlying wound present.            Assessment:       Encounter Diagnoses   Name Primary?    Blood blister Yes    Type 1 diabetes mellitus with polyneuropathy          Plan:       Hammad was seen today for wound care.    Diagnoses and all orders for this visit:    Blood blister    Type 1 diabetes mellitus with polyneuropathy    I counseled the patient on his conditions, their implications and medical management.    No wound debridement performed, as the site was simply a dried blood blister on exam.    May  resume normal weight bearing activity in casual shoe gear.    May resume his normal showering routine but discouraged from soaking/scrubbing the extremity x 2 weeks.    To inspect the site for signs of skin breakdown/infection daily.    RTC in 1 month for follow up.     Caleb Duffy DPM

## 2022-10-12 ENCOUNTER — PATIENT MESSAGE (OUTPATIENT)
Dept: ENDOCRINOLOGY | Facility: CLINIC | Age: 67
End: 2022-10-12
Payer: COMMERCIAL

## 2022-10-12 ENCOUNTER — PATIENT MESSAGE (OUTPATIENT)
Dept: OPHTHALMOLOGY | Facility: CLINIC | Age: 67
End: 2022-10-12
Payer: COMMERCIAL

## 2022-10-12 DIAGNOSIS — E10.43 TYPE 1 DIABETES MELLITUS WITH DIABETIC AUTONOMIC NEUROPATHY: Primary | ICD-10-CM

## 2022-10-13 ENCOUNTER — PATIENT MESSAGE (OUTPATIENT)
Dept: OPHTHALMOLOGY | Facility: CLINIC | Age: 67
End: 2022-10-13
Payer: COMMERCIAL

## 2022-10-13 ENCOUNTER — PATIENT MESSAGE (OUTPATIENT)
Dept: ENDOCRINOLOGY | Facility: CLINIC | Age: 67
End: 2022-10-13
Payer: COMMERCIAL

## 2022-10-13 RX ORDER — INSULIN PMP CART,AUT,G6/7,CNTR
1 EACH SUBCUTANEOUS
Qty: 30 EACH | Refills: 3 | Status: SHIPPED | OUTPATIENT
Start: 2022-10-13 | End: 2022-12-27 | Stop reason: SDUPTHER

## 2022-10-13 RX ORDER — INSULIN PMP CART,AUT,G6/7,CNTR
1 EACH SUBCUTANEOUS CONTINUOUS
Qty: 1 EACH | Refills: 0 | Status: SHIPPED | OUTPATIENT
Start: 2022-10-13

## 2022-10-25 ENCOUNTER — PATIENT MESSAGE (OUTPATIENT)
Dept: FAMILY MEDICINE | Facility: CLINIC | Age: 67
End: 2022-10-25
Payer: COMMERCIAL

## 2022-10-25 DIAGNOSIS — N50.89 SCROTAL MASS: Primary | ICD-10-CM

## 2022-11-09 ENCOUNTER — PATIENT MESSAGE (OUTPATIENT)
Dept: ENDOCRINOLOGY | Facility: CLINIC | Age: 67
End: 2022-11-09
Payer: COMMERCIAL

## 2022-11-11 DIAGNOSIS — E10.43 TYPE 1 DIABETES MELLITUS WITH DIABETIC AUTONOMIC NEUROPATHY: Primary | ICD-10-CM

## 2022-11-14 ENCOUNTER — TELEPHONE (OUTPATIENT)
Dept: ENDOCRINOLOGY | Facility: CLINIC | Age: 67
End: 2022-11-14
Payer: COMMERCIAL

## 2022-11-14 ENCOUNTER — PATIENT MESSAGE (OUTPATIENT)
Dept: ENDOCRINOLOGY | Facility: CLINIC | Age: 67
End: 2022-11-14
Payer: COMMERCIAL

## 2022-11-14 ENCOUNTER — PATIENT MESSAGE (OUTPATIENT)
Dept: DIABETES | Facility: CLINIC | Age: 67
End: 2022-11-14
Payer: COMMERCIAL

## 2022-11-14 DIAGNOSIS — E10.21 TYPE 1 DIABETES MELLITUS WITH DIABETIC NEPHROPATHY: Primary | ICD-10-CM

## 2022-11-14 RX ORDER — INSULIN ASPART 100 [IU]/ML
INJECTION, SOLUTION INTRAVENOUS; SUBCUTANEOUS
Qty: 20 ML | Refills: 6 | Status: SHIPPED | OUTPATIENT
Start: 2022-11-14 | End: 2023-05-08 | Stop reason: CLARIF

## 2022-11-14 NOTE — TELEPHONE ENCOUNTER
Contacted patient about scheduling Omnipod 5 insulin pump start.  Already using Dexcom G6.      Mr. Douglas is scheduled for pump start on Thursday 11/17/22 at 1pm.    Please write pump start orders and send Novolog vials to [facility]).    Last seen by Endocrine 9/20/22 and DM medications as follows:  Hgb A1c 7.2% (9/21/22)  Toujeo Max 24 units QHS  Novolog using I:C ratio of 1:5 at meals + correction (Target 150, ISF 50)    Please advise of any changes to DM medications warranted on day before pump start. Next Endocrine follow up not until 12/27/22--will schedule 2 week follow up after pump start with diabetes care specialist to assess progress with new pump therapy.     Thanks

## 2022-11-14 NOTE — TELEPHONE ENCOUNTER
Prescription sent for Novolog vials for pump start  Auto mode: On  Has seen diabetes education for pump evaluation and will be scheduled for pump training and start    Pump Settings  Basal Rate  0000 - 0.9 u/hr    Carb Ratio  0000 - 1:5    ISF  0000 - 50    Target: 130  Active insulin time: 4 hours      Please advise pt to reduce Toujeo dose the night before pump start by 40% to 14 units.

## 2022-11-17 ENCOUNTER — NUTRITION (OUTPATIENT)
Dept: DIABETES | Facility: CLINIC | Age: 67
End: 2022-11-17
Payer: COMMERCIAL

## 2022-11-17 VITALS — HEIGHT: 73 IN | BODY MASS INDEX: 30.52 KG/M2 | WEIGHT: 230.25 LBS

## 2022-11-17 DIAGNOSIS — Z46.81 INSULIN PUMP TRAINING: Primary | ICD-10-CM

## 2022-11-17 DIAGNOSIS — E10.43 TYPE 1 DIABETES MELLITUS WITH DIABETIC AUTONOMIC NEUROPATHY: ICD-10-CM

## 2022-11-17 PROCEDURE — 99999 PR PBB SHADOW E&M-EST. PATIENT-LVL II: ICD-10-PCS | Mod: PBBFAC,,, | Performed by: DIETITIAN, REGISTERED

## 2022-11-17 PROCEDURE — G0108 PR DIAB MANAGE TRN  PER INDIV: ICD-10-PCS | Mod: S$GLB,,, | Performed by: DIETITIAN, REGISTERED

## 2022-11-17 PROCEDURE — 99999 PR PBB SHADOW E&M-EST. PATIENT-LVL II: CPT | Mod: PBBFAC,,, | Performed by: DIETITIAN, REGISTERED

## 2022-11-17 PROCEDURE — G0108 DIAB MANAGE TRN  PER INDIV: HCPCS | Mod: S$GLB,,, | Performed by: DIETITIAN, REGISTERED

## 2022-11-17 NOTE — PROGRESS NOTES
Diabetes Care Specialist Progress Note  Author: Janie Spencer RD, CDE  Date: 11/17/2022    Program Intake  Reason for Diabetes Program Visit:: Intervention--insulin pump start today at visit  Current diabetes risk level:: moderate    Lab Results   Component Value Date    HGBA1C 7.2 (H) 09/21/2022     Clinical    Problem Review  Reviewed Problem List with Patient: yes  Active comorbidities affecting diabetes self-care.: yes  Comorbidities: Hypertension  Reviewed health maintenance: yes    Clinical Assessment  Current Diabetes Treatment: Insulin (Toujeo 24 units QHS, Novolog using carb ratio of 1:5 at meals + correction (Target 150, ISF 50))  Have you ever experienced hypoglycemia (low blood sugar)?: yes  In the last month, how often have you experienced low blood sugar?: once every other week  Are you able to tell when your blood sugar is low?: Yes  What symptoms do you experience?: Other (now has Dexcom G6--low alert set at 70 mg/dL)  How do you treat hypoglycemia (low blood sugar)?: 1/2 can soda/fruit juice, 4 glucose tablets    Medication Information  How do you obtain your medications?: Patient drives  How many days a week do you miss your medications?: Never  Do you sometimes have difficulty refilling your medications?: No  Medication adherence impacting ability to self-manage diabetes?: No    Labs  Do you have regular lab work to monitor your medications?: Yes  Type of Regular Lab Work: A1c, Cholesterol, BMP  Where do you get your labs drawn?: [facility]  Lab Compliance Barriers: No    Additional Social History    Support  Does anyone support you with your diabetes care?: yes  Who supports you?: self  Who takes you to your medical appointments?: self  Does the current support meet the patient's needs?: Yes  Is Support an area impacting ability to self-manage diabetes?: No    Access to Mass Media & Technology  Does the patient have access to any of the following devices or technologies?: Smart phone, Internet  Access  Media or technology needs impacting ability to self-manage diabetes?: No    Cognitive/Behavioral Health  Alert and Oriented: Yes  Difficulty Thinking: No  Requires Prompting: No  Requires assistance for routine expression?: No  Cognitive or behavioral barriers impacting ability to self-manage diabetes?: No    Culture/Confucianism  Culture or Protestant beliefs that may impact ability to access healthcare: No    Communication  Language preference: English  Hearing Problems: No  Vision Problems: Yes  Vision problem type:: Decreased Vision  Vision Assistance: Glasses (Forgot to bring glasses to visit today)  Communication needs impacting ability to self-manage diabetes?: No    Health Literacy  Preferred Learning Method: Face to Face, Web Based, Reading Materials, Hands On  How often do you need to have someone help you read instructions, pamphlets, or written material from your doctor or pharmacy?: Never  Health literacy needs impacting ability to self-manage diabetes?: No    Diabetes Self-Management Skills Assessment    Medications  Patient is able to describe current diabetes management routine.: yes  Diabetes management routine:: insulin  Patient is able to identify current diabetes medications, dosages, and appropriate timing of medications.: yes  Patient understands the purpose of the medications taken for diabetes.: yes  Patient reports problems or concerns with current medication regimen.: yes  Medication regimen problems/concerns:: does not feel like regimen is working (Will be transitioning to Omnipod 5 insulin pump integrated with Dexcom G6 today)  Medication Skills Assessment Completed:: Yes  Assessment indicates:: Instruction Needed  Area of need?: Yes    Home Blood Glucose Monitoring  Patient states that blood sugar is checked at home daily.: yes  Monitoring Method:: personal continuous glucose monitor  Personal CGM type:: Dexcom G6--using  and switched to Dexcom G6 mobile sj  Patient is able  to use personal CGM appropriately.: yes  CGM Report reviewed?: yes    Dexcom G6 download (11/4/22 to 11/17/22): See media file for details. Approximately half of glucose values are in target range. Patient will be transitioning from basal/bolus regimen to closed loop insulin pump therapy today.  Rare episodes of hypoglycemia, however patient with hypoglycemia unawareness.      Sensor usage: 100%  Average glucose: 176 mg/dL    14% CGM readings greater than 250 mg/dL  36% CGM readings between 181-250 mg/dL  48% CGM readings in target glucose range of  mg/dL   1% CGM readings between 54-79%  <1% CGM readings less than 54 mg/dL     Home Blood Glucose Monitoring Skills Assessment Completed: : Yes  Assessment indicates:: Adequate understanding  Area of need?: No     Assessment Summary and Plan    Based on today's diabetes care assessment, the following areas of need were identified:      Social 11/17/2022   Support No   Access to Mass Media/Tech No   Cognitive/Behavioral Health No   Culture/Anabaptism No   Communication No   Health Literacy No      Clinical 11/17/2022   Medication Adherence No   Lab Compliance No      Diabetes Self-Management Skills 11/17/2022   Medication Yes--see care plan, Omnipod 5 initiated in clinic today   Home Blood Glucose Monitoring No      Today's interventions were provided through individual discussion, instruction, and written materials were provided.      Patient verbalized understanding of instruction and written materials.  Pt was able to return back demonstration of instructions today. Patient understood key points, needs reinforcement and further instruction.     Diabetes Self-Management Care Plan:    Today's Diabetes Self-Management Care Plan was developed with Hammad's input. Hammad has agreed to work toward the following goal(s) to improve his/her overall diabetes control.      Care Plan: Diabetes Management   Updates made since 10/18/2022 12:00 AM        Problem: Medications          Goal: Patient initiating Novolog in Omnipod 5 insulin pump integrated with Dexcom G6.    Start Date: 11/17/2022   Expected End Date: 12/8/2022   Priority: High   Barriers: No Barriers Identified        Task: Instructed patient on how to use Omnipod 5 insulin pump. Completed 11/17/2022   Note:    OMNI POD 5 INSULIN PUMP START  Pump training was provided per Omni Pod protocol. Provider orders with insulin rates documented in Epic.  Patient will be transitioning to insulin pump therapy from basal/bolus regimen.      Patient confirms he decreased Toujeo from 24 units to 14 units last night due to initiating pump at visit this afternoon.  Novolog last taken using carb ratio based on meal eaten.      Patient's glucose at start of visit: 208 mg/dL  Patient's glucose at end of visit (90 minutes later): 180 mg/dL    Details of pump therapy were covered included following: controller features and programming, pod activation, pod site selection and rotation, automatic pod priming and insertion, setting & editing basal rates in manual mode, giving bolus and other features in the set up menu.  Patient demonstrated ability to program controller, activate and insert pod using aseptic technique.  Patient demonstrated ability to program Dexcom transmitter into controller and start automated limited mode.    Instructed patient on use of basic pump features ie...give a bolus, pause insulin, switch from manual to automated mode.  Reviewed features available in manual mode verses automated mode.   Reviewed when and how to use activity function in automated mode.  Reviewed site selection of pods, rotation of sites and hard stop to change pod every 72 hrs.   Instructed that insulin vial is good out of refrigeration for up to 28-30 days .   Reviewed treatment of hypoglycemia, hyperglycemia; sick day care, DKA, and troubleshooting of pump.  Omni Pod 24 hour support line provided.     INITIAL SETTINGS :    Basal rate: 0.9 u/hr (21.6 TDD  basal insulin)  Maximum basal: 2 u/hr     Bolus Menu:  ISF: 1:50    Carb Ratio : 1:6 (Endocrine provider set carb ratio at 1:5 but patient hesitant stating he feels this too aggressive and fearful of post prandial hypoglycemia--will assess at follow up in 3 weeks)    Blood glucose target: 130 mg/dL; correct above: 130 mg/dL    Active insulin: 4 hrs    Maximum bolus = 10 units    Omnipod Podder ID:  Username: [email or web address]     Glooko:  Username: [email or web address]    Patient did not want to disclose his passwords for Omnipod or Glooko and he was asked to remember these passwords if needed in the future.         Task: Discussed guidelines for preventing, detecting and treating hypoglycemia and hyperglycemia and reviewed the importance of meal and medication timing with diabetes mediations for prevention of hypoglycemia and maximum drug benefit. Completed 11/17/2022          Follow Up Plan     Follow up in 3 weeks (on 12/8/2022) for pump/CGM download and review as patient is new to pump therapy.  Has appt with podiatry this day and coordinating clinic visits.  Endocrine follow on 12/27/22.  Patient did well with initiating Omnipod 5 pump in clinic--reminded numerous times to discontinue insulin injections (Toujeo and Novolog pens) as all insulin will be delivered through pump.  Discussed back up/emergency/travel plan in event of pump failure--patient confirms he has 1 unopened Toujeo pen at home and he can use Novolog vial/syringe for meal time insulin.  [employer] messge will be sent to patient in 2-3 days to check on progress with new insulin pump therapy.      Today's care plan and follow up schedule was discussed with patient.  Hammad verbalized understanding of the care plan, goals, and agrees to follow up plan.        The patient was encouraged to communicate with his/her health care provider/physician and care team regarding his/her condition(s) and treatment.  I provided the patient with my contact information today and  encouraged to contact me via phone or [facility] Patient Portal as needed.     Length of Visit   Total Time: 90 Minutes

## 2022-11-18 ENCOUNTER — PATIENT MESSAGE (OUTPATIENT)
Dept: DIABETES | Facility: CLINIC | Age: 67
End: 2022-11-18
Payer: COMMERCIAL

## 2022-11-21 ENCOUNTER — PATIENT MESSAGE (OUTPATIENT)
Dept: PODIATRY | Facility: CLINIC | Age: 67
End: 2022-11-21
Payer: COMMERCIAL

## 2022-11-28 ENCOUNTER — OFFICE VISIT (OUTPATIENT)
Dept: PODIATRY | Facility: CLINIC | Age: 67
End: 2022-11-28
Payer: COMMERCIAL

## 2022-11-28 ENCOUNTER — HOSPITAL ENCOUNTER (OUTPATIENT)
Dept: RADIOLOGY | Facility: HOSPITAL | Age: 67
Discharge: HOME | End: 2022-11-28
Attending: PODIATRIST
Payer: COMMERCIAL

## 2022-11-28 VITALS — WEIGHT: 230.38 LBS | BODY MASS INDEX: 30.53 KG/M2 | HEIGHT: 73 IN

## 2022-11-28 DIAGNOSIS — L84 PRE-ULCERATIVE CALLUSES: Primary | ICD-10-CM

## 2022-11-28 DIAGNOSIS — L84 PRE-ULCERATIVE CALLUSES: ICD-10-CM

## 2022-11-28 DIAGNOSIS — E10.42 TYPE 1 DIABETES MELLITUS WITH POLYNEUROPATHY: ICD-10-CM

## 2022-11-28 PROCEDURE — 3051F PR MOST RECENT HEMOGLOBIN A1C LEVEL 7.0 - < 8.0%: ICD-10-PCS | Mod: CPTII,S$GLB,, | Performed by: PODIATRIST

## 2022-11-28 PROCEDURE — 99213 OFFICE O/P EST LOW 20 MIN: CPT | Mod: S$GLB,,, | Performed by: PODIATRIST

## 2022-11-28 PROCEDURE — 99999 PR PBB SHADOW E&M-EST. PATIENT-LVL III: CPT | Mod: PBBFAC,,, | Performed by: PODIATRIST

## 2022-11-28 PROCEDURE — 3062F PR POS MACROALBUMINURIA RESULT DOCUMENTED/REVIEW: ICD-10-PCS | Mod: CPTII,S$GLB,, | Performed by: PODIATRIST

## 2022-11-28 PROCEDURE — 3062F POS MACROALBUMINURIA REV: CPT | Mod: CPTII,S$GLB,, | Performed by: PODIATRIST

## 2022-11-28 PROCEDURE — 3051F HG A1C>EQUAL 7.0%<8.0%: CPT | Mod: CPTII,S$GLB,, | Performed by: PODIATRIST

## 2022-11-28 PROCEDURE — 99999 PR PBB SHADOW E&M-EST. PATIENT-LVL III: ICD-10-PCS | Mod: PBBFAC,,, | Performed by: PODIATRIST

## 2022-11-28 PROCEDURE — 73630 XR FOOT COMPLETE 3 VIEW LEFT: ICD-10-PCS | Mod: 26,LT,, | Performed by: RADIOLOGY

## 2022-11-28 PROCEDURE — 3008F BODY MASS INDEX DOCD: CPT | Mod: CPTII,S$GLB,, | Performed by: PODIATRIST

## 2022-11-28 PROCEDURE — 73630 X-RAY EXAM OF FOOT: CPT | Mod: 26,LT,, | Performed by: RADIOLOGY

## 2022-11-28 PROCEDURE — 99213 PR OFFICE/OUTPT VISIT, EST, LEVL III, 20-29 MIN: ICD-10-PCS | Mod: S$GLB,,, | Performed by: PODIATRIST

## 2022-11-28 PROCEDURE — 3288F PR FALLS RISK ASSESSMENT DOCUMENTED: ICD-10-PCS | Mod: CPTII,S$GLB,, | Performed by: PODIATRIST

## 2022-11-28 PROCEDURE — 1101F PT FALLS ASSESS-DOCD LE1/YR: CPT | Mod: CPTII,S$GLB,, | Performed by: PODIATRIST

## 2022-11-28 PROCEDURE — 3288F FALL RISK ASSESSMENT DOCD: CPT | Mod: CPTII,S$GLB,, | Performed by: PODIATRIST

## 2022-11-28 PROCEDURE — 1159F PR MEDICATION LIST DOCUMENTED IN MEDICAL RECORD: ICD-10-PCS | Mod: CPTII,S$GLB,, | Performed by: PODIATRIST

## 2022-11-28 PROCEDURE — 4010F PR ACE/ARB THEARPY RXD/TAKEN: ICD-10-PCS | Mod: CPTII,S$GLB,, | Performed by: PODIATRIST

## 2022-11-28 PROCEDURE — 73630 X-RAY EXAM OF FOOT: CPT | Mod: TC,PO,LT

## 2022-11-28 PROCEDURE — 1101F PR PT FALLS ASSESS DOC 0-1 FALLS W/OUT INJ PAST YR: ICD-10-PCS | Mod: CPTII,S$GLB,, | Performed by: PODIATRIST

## 2022-11-28 PROCEDURE — 3066F PR DOCUMENTATION OF TREATMENT FOR NEPHROPATHY: ICD-10-PCS | Mod: CPTII,S$GLB,, | Performed by: PODIATRIST

## 2022-11-28 PROCEDURE — 3066F NEPHROPATHY DOC TX: CPT | Mod: CPTII,S$GLB,, | Performed by: PODIATRIST

## 2022-11-28 PROCEDURE — 1159F MED LIST DOCD IN RCRD: CPT | Mod: CPTII,S$GLB,, | Performed by: PODIATRIST

## 2022-11-28 PROCEDURE — 4010F ACE/ARB THERAPY RXD/TAKEN: CPT | Mod: CPTII,S$GLB,, | Performed by: PODIATRIST

## 2022-11-28 PROCEDURE — 3008F PR BODY MASS INDEX (BMI) DOCUMENTED: ICD-10-PCS | Mod: CPTII,S$GLB,, | Performed by: PODIATRIST

## 2022-11-28 NOTE — PROGRESS NOTES
Subjective:      Patient ID: Hammad Douglas III is a 67 y.o. male.    Chief Complaint: Follow-up    Patient presents to clinic with the chief complaint of a possible wound to the plantar aspect of the Lt. Great toe.  Notes a recurring blood blister to the site.  Denies noticing drainage nor localized signs of infection to the area.  He continues keeping the site covered with a padded bandage with all weight bearing.  Also, continues to utilize hoka shoe gear, as this offloads pressure to the forefoot.  Inquires as to the cause of said build up in light of the recent arthroplasty of the IP joint.  Denies any additional pedal complaints.      Past Medical History:   Diagnosis Date    Cataract     Done OU    Chronic kidney disease     Diabetes mellitus type I     Diagnosed at age 19    Diabetic retinopathy     See's Dr. Ledesma    Hyperlipidemia     Hypertension        Past Surgical History:   Procedure Laterality Date    ARTHROPLASTY OF TOE Left 7/14/2022    Procedure: ARTHROPLASTY, TOE;  Surgeon: Caleb Duffy DPM;  Location: [facility] OR;  Service: Podiatry;  Laterality: Left;  Left Hallux    CATARACT EXTRACTION W/  INTRAOCULAR LENS IMPLANT Right 09/28/2021    Dr Solis    CATARACT EXTRACTION W/  INTRAOCULAR LENS IMPLANT Left 02/22/2022    Dr Solis    COLONOSCOPY      2011 wnl    PHACOEMULSIFICATION OF CATARACT Right 9/28/2021    Procedure: PHACOEMULSIFICATION, CATARACT;  Surgeon: Vicente Solis Jr., MD;  Location: [facility] OR;  Service: Ophthalmology;  Laterality: Right;  DM/right    PHACOEMULSIFICATION OF CATARACT Left 2/22/2022    Procedure: PHACOEMULSIFICATION, CATARACT;  Surgeon: Vicente Solis Jr., MD;  Location: [facility] OR;  Service: Ophthalmology;  Laterality: Left;  left    SURGICAL REMOVAL OF BONE SPUR Left 10/14/2021    Procedure: EXCISION, BONE SPUR;  Surgeon: Caleb Duffy DPM;  Location: [facility] OR;  Service: Podiatry;  Laterality: Left;    TOE AMPUTATION Right 7/2/2020    Procedure: AMPUTATION, TOE; 3rd;   "Surgeon: Caleb Duffy DPM;  Location: [facility] OR;  Service: Podiatry;  Laterality: Right;    TOE SURGERY Right     right big toe       Family History   Problem Relation Age of Onset    Cataracts Mother     Cataracts Father     Breast cancer Sister     Glaucoma Neg Hx     Macular degeneration Neg Hx     Retinal detachment Neg Hx        Social History     Socioeconomic History    Marital status:    Tobacco Use    Smoking status: Former     Types: Cigars    Smokeless tobacco: Never    Tobacco comments:     Former cigar use   Substance and Sexual Activity    Alcohol use: Not Currently    Drug use: No    Sexual activity: Yes     Partners: Female   Social History Narrative    Retired former radiation  at [employer]       Current Outpatient Medications   Medication Sig Dispense Refill    atorvastatin (LIPITOR) 40 MG tablet TAKE 1 TABLET(40 MG) BY MOUTH EVERY DAY 90 tablet 3    FLUoxetine 10 MG capsule Take 3 capsules (30 mg total) by mouth once daily. 270 capsule 1    FLUoxetine 20 MG capsule TAKE ONE CAPSULE BY MOUTH EVERY DAY 90 capsule 2    insulin aspart U-100 (NOVOLOG FLEXPEN U-100 INSULIN) 100 unit/mL (3 mL) InPn pen INJECT USING INSULIN:CARB RATIO OF 1:6 BEFORE A MEAL. MAX TOTAL DAILY DOSE 40 UNITS 45 mL 3    insulin aspart U-100 (NOVOLOG U-100 INSULIN ASPART) 100 unit/mL injection Use continuously in insulin pump , max TDD 60u 20 mL 6    insulin glargine U-300 conc (TOUJEO MAX U-300 SOLOSTAR) 300 unit/mL (3 mL) insulin pen ADMINISTER 24 UNITS UNDER THE SKIN EVERY EVENING 2 pen 6    losartan (COZAAR) 100 MG tablet TAKE 1 TABLET(100 MG) BY MOUTH EVERY DAY 90 tablet 3    OMNIPOD 5 G6 INTRO KIT, GEN 5, Crtg Inject 1 Device into the skin continuous. 1 each 0    OMNIPOD 5 G6 PODS, GEN 5, Crtg Inject 1 Device into the skin Every 3 (three) days. 30 each 3    pen needle, diabetic (BD ULTRA-FINE MINI PEN NEEDLE) 31 gauge x 3/16" Ndle USE 1 FOUR TIMES DAILY 150 each 11     Current " Facility-Administered Medications   Medication Dose Route Frequency Provider Last Rate Last Admin    glucagon (human recombinant) injection 1 mg  1 mg Intramuscular Once Tania Mckeon NP           Review of patient's allergies indicates:   Allergen Reactions    Altace [ramipril]      Cough         Review of Systems   Constitution: Negative for chills and fever.   Cardiovascular: Negative for claudication and leg swelling.   Skin: Negative for color change, dry skin and poor wound healing.   Musculoskeletal: Negative for joint pain, joint swelling, muscle cramps and muscle weakness.   Neurological: Positive for numbness.   Psychiatric/Behavioral: Negative for altered mental status.           Objective:      Physical Exam   Constitutional: He appears well-developed and well-nourished. No distress.   Cardiovascular:   Pulses:       Dorsalis pedis pulses are 2+ on the right side, and 2+ on the left side.        Posterior tibial pulses are 1+ on the right side, and 1+ on the left side.   CFT is < 3 seconds bilateral.  Pedal hair growth is present bilateral.  Varicosities noted bilateral.  No lower extremity edema noted.  Toes are warm to touch bilateral.     Musculoskeletal: He exhibits no edema or tenderness.   Muscle strength 5/5 in all muscle groups bilateral.  No tenderness nor crepitation with ROM of foot/ankle joints bilateral.  No tenderness with palpation of bilateral foot and ankle.  Semi-reducible contracture of the lesser digits bilateral.   Amputation of Rt. 3rd toe.  Neurological: He has normal strength. A sensory deficit is present.   Protective sensation per [place] monofilament is decreased bilateral.  Light touch is intact bilateral.   Skin: Pre-ulcerative callus and dried blister noted slightly proximal to the Lt. Hallux IP joint. Underlying wound noted.  Wound bed is down to dermis and comprised of granulation.  Skylar wound is devoid of erythema, edema, fluctuance, purulence, and  malodor.  Measures 0.1 x 0.1 x 0.1cm.            Assessment:       Encounter Diagnoses   Name Primary?    Pre-ulcerative calluses Yes    Type 1 diabetes mellitus with polyneuropathy          Plan:       Hammad was seen today for follow-up.    Diagnoses and all orders for this visit:    Pre-ulcerative calluses  -     X-Ray Foot Complete Left; Future    Type 1 diabetes mellitus with polyneuropathy    I counseled the patient on his conditions, their implications and medical management.    With the patient's verbal consent, a sterile #15 scalpel was used to trim the pre-ulcerative callus exposing a 0.1 x 0.1cm wound.      The wound was covered with antibiotic ointment and a band aid.  To continue said wound care instructions daily.    To continue wearing current shoe gear, as this should offload the site.    Orders written for x-rays to rule out hypertrophic bone growth at the site of the arthroplasty as the source of current callus build up.    RTC in roughly one week to discuss x-ray results.       Caleb Duffy DPM

## 2022-11-29 ENCOUNTER — PATIENT MESSAGE (OUTPATIENT)
Dept: PODIATRY | Facility: CLINIC | Age: 67
End: 2022-11-29
Payer: COMMERCIAL

## 2022-12-01 ENCOUNTER — PATIENT OUTREACH (OUTPATIENT)
Dept: ADMINISTRATIVE | Facility: HOSPITAL | Age: 67
End: 2022-12-01
Payer: COMMERCIAL

## 2022-12-01 NOTE — PROGRESS NOTES
Non-compliant report chart audits. Chart review completed for HM test overdue (Mammogram, Colon Cancer Screening, Pap smear, DM labs, BP Check and/or Eye Exam)      Care Everywhere and media, updates requested and reviewed.        Pt has appt w/ Dr. Solis 1/4.

## 2022-12-02 ENCOUNTER — TELEPHONE (OUTPATIENT)
Dept: PODIATRY | Facility: CLINIC | Age: 67
End: 2022-12-02
Payer: COMMERCIAL

## 2022-12-02 NOTE — TELEPHONE ENCOUNTER
pt called and VM left informing pt that appt has been changed from 12/08/22 at 0700 to 12/09/2022 at 0700.

## 2022-12-08 ENCOUNTER — NUTRITION (OUTPATIENT)
Dept: DIABETES | Facility: CLINIC | Age: 67
End: 2022-12-08
Payer: COMMERCIAL

## 2022-12-08 VITALS — HEIGHT: 73 IN | WEIGHT: 225.5 LBS | BODY MASS INDEX: 29.88 KG/M2

## 2022-12-08 DIAGNOSIS — E10.43 TYPE 1 DIABETES MELLITUS WITH DIABETIC AUTONOMIC NEUROPATHY: Primary | ICD-10-CM

## 2022-12-08 PROCEDURE — G0108 DIAB MANAGE TRN  PER INDIV: HCPCS | Mod: S$GLB,,, | Performed by: DIETITIAN, REGISTERED

## 2022-12-08 PROCEDURE — G0108 PR DIAB MANAGE TRN  PER INDIV: ICD-10-PCS | Mod: S$GLB,,, | Performed by: DIETITIAN, REGISTERED

## 2022-12-08 PROCEDURE — 99999 PR PBB SHADOW E&M-EST. PATIENT-LVL II: ICD-10-PCS | Mod: PBBFAC,,, | Performed by: DIETITIAN, REGISTERED

## 2022-12-08 PROCEDURE — 99999 PR PBB SHADOW E&M-EST. PATIENT-LVL II: CPT | Mod: PBBFAC,,, | Performed by: DIETITIAN, REGISTERED

## 2022-12-08 NOTE — PROGRESS NOTES
Diabetes Care Specialist Progress Note  Author: Janie Spencer RD, CDE  Date: 12/8/2022    Program Intake  Reason for Diabetes Program Visit:: Intervention  Type of Intervention:: Individual  Individual: Education  Education: Pattern Management  Current diabetes risk level:: moderate    Lab Results   Component Value Date    HGBA1C 7.2 (H) 09/21/2022     Clinical    Clinical Assessment  Current Diabetes Treatment: Insulin, Insulin pump--Novolog in Omnipod 5 insulin pump (initiated 11/17/22)    Omnipod 5 pump settings:    Basal rate: 0.9 u/hr (21.6 TDD basal insulin)  Maximum basal: 2 u/hr     Bolus Menu:  ISF: 1:50    Carb Ratio : 1:6 (Endocrine provider set carb ratio at 1:5 but patient hesitant stating he feels this too aggressive and fearful of post prandial hypoglycemia--will assess at follow up in 3 weeks).  Carb ratio during breakfast hours 7AM to 10AM changed to 1:5 after review of Dexcom/Omnipod data at visit today.    Blood glucose target: 130 mg/dL; correct above: 130 mg/dL    Active insulin: 4 hrs    Maximum bolus = 10 units (changed to 15 units)    Additional Social History    Support  Does anyone support you with your diabetes care?: yes  Who supports you?: self, spouse  Who takes you to your medical appointments?: self  Does the current support meet the patient's needs?: Yes  Is Support an area impacting ability to self-manage diabetes?: No    Access to Mass Media & Technology  Does the patient have access to any of the following devices or technologies?: Smart phone, Internet Access  Media or technology needs impacting ability to self-manage diabetes?: No    Cognitive/Behavioral Health  Alert and Oriented: Yes  Difficulty Thinking: No  Requires Prompting: No  Requires assistance for routine expression?: No  Cognitive or behavioral barriers impacting ability to self-manage diabetes?: No    Culture/Jewish  Culture or Baptist beliefs that may impact ability to access healthcare:  No    Communication  Language preference: English  Hearing Problems: No  Vision problem type:: Decreased Vision  Vision Assistance: Glasses  Communication needs impacting ability to self-manage diabetes?: No    Health Literacy  Preferred Learning Method: Face to Face, Web Based, Reading Materials  How often do you need to have someone help you read instructions, pamphlets, or written material from your doctor or pharmacy?: Never  Health literacy needs impacting ability to self-manage diabetes?: No    Diabetes Self-Management Skills Assessment    Home Blood Glucose Monitoring  Patient states that blood sugar is checked at home daily.: yes  Monitoring Method:: personal continuous glucose monitor  Personal CGM type:: Dexcom G6  Patient is able to use personal CGM appropriately.: yes  CGM Report reviewed?: yes    Dexcom G6 download (11/25/22 to 12/8/22): See media file for details. Glucose in target range increased from 36% to now 66% over past 2 weeks since initiating Omnipod 5 integrated with Dexcom G6.  However, elevations in glucose noted after breakfast--does seem to be accurate with carb counting.  Will change carb ratio to 1:5 at breakfast hours.      Sensor usage: 93%  Average glucose: 159 mg/dL    4% CGM readings greater than 250 mg/dL  28% CGM readings between 181-250 mg/dL  66% CGM readings in target glucose range of  mg/dL   1% CGM readings between 54-79%  <1% CGM readings less than 54 mg/dL     Home Blood Glucose Monitoring Skills Assessment Completed: : Yes  Assessment indicates:: Adequate understanding  Area of need?: No    Assessment Summary and Plan    Based on today's diabetes care assessment, the following areas of need were identified:      Social 12/8/2022   Support No   Access to Mass Media/Tech No   Cognitive/Behavioral Health No   Culture/Adventism No   Communication No   Health Literacy No      Diabetes Self-Management Skills 12/8/2022   Home Blood Glucose Monitoring No        Today's  interventions were provided through individual discussion, instruction, and written materials were provided.      Patient verbalized understanding of instruction and written materials.  Pt was able to return back demonstration of instructions today. Patient understood key points, needs reinforcement and further instruction.     Diabetes Self-Management Care Plan:    Today's Diabetes Self-Management Care Plan was developed with Hammad's input. Hammad has agreed to work toward the following goal(s) to improve his/her overall diabetes control.      Care Plan: Diabetes Management   Updates made since 11/8/2022 12:00 AM     Problem: Medications         Goal: Patient initiating Novolog in Omnipod 5 insulin pump integrated with Dexcom G6.    Start Date: 11/17/2022   Expected End Date: 12/8/2022   This Visit's Progress: Met   Priority: High   Barriers: No Barriers Identified   Note:    12/8/22:  Patient doing very well on Omnipod 5. He is changing pod and does not have any issues with managing device.  Was hesitant to initiate pump therapy but is surprised at how much he is enjoying use of it.  Reviewed Omnipod/Dexcom report.  Some post prandial elevations after breakfast noted.  Reviewed carb counting and patient doing well with calculating carbs at meals.  Initially when starting Omnipod 5, Endocrine provider set carb ratio at 1:5 but patient was hesitant and felt this was too aggressive, so carb ratio set at 1:6.  Patient in agreement to change carb ratio at breakfast hours (7AM to 10AM) from 1:6 to 1:5 to see if this will better manage post prandial glucose.  Has Endocrine follow up 12/27/22 and she will review at that time.     Patient filling pod to 150 units and changing pod every 3 days without issue.  No issues with remaining in Automated Mode--download indicates in Automated mode 100% of time.         Follow Up Plan     Follow up if symptoms worsen or fail to improve, for continued DSMES or assitance with Omnipod  5/Dexcom G6. Next Hgb A1c set for 12/20/22--expect improvement from last Hgb A1c but will not be reflective of how improved glucose readings have been since initiating Omnipod 5 2 weeks ago. Carb ratio changed during breakfast hours today from 1:6 to 1:5--Endocrine with instructions for carb ratio to be 1:5 all day but patient hesitant on pump initiation so started with 1:6 carb ratio all day.  Next Endocrine visit 12/27/22.      Today's care plan and follow up schedule was discussed with patient.  Hammad verbalized understanding of the care plan, goals, and agrees to follow up plan.        The patient was encouraged to communicate with his/her health care provider/physician and care team regarding his/her condition(s) and treatment.  I provided the patient with my contact information today and encouraged to contact me via phone or [facility] Patient Portal as needed.     Length of Visit   Total Time: 30 Minutes

## 2022-12-09 ENCOUNTER — PATIENT MESSAGE (OUTPATIENT)
Dept: FAMILY MEDICINE | Facility: CLINIC | Age: 67
End: 2022-12-09
Payer: COMMERCIAL

## 2022-12-09 ENCOUNTER — OFFICE VISIT (OUTPATIENT)
Dept: PODIATRY | Facility: CLINIC | Age: 67
End: 2022-12-09
Payer: COMMERCIAL

## 2022-12-09 VITALS — WEIGHT: 225.5 LBS | BODY MASS INDEX: 29.88 KG/M2 | HEIGHT: 73 IN

## 2022-12-09 DIAGNOSIS — M77.50 BONE SPUR OF FOOT: Primary | ICD-10-CM

## 2022-12-09 DIAGNOSIS — E10.621 DIABETIC ULCER OF TOE OF LEFT FOOT ASSOCIATED WITH TYPE 1 DIABETES MELLITUS, LIMITED TO BREAKDOWN OF SKIN: ICD-10-CM

## 2022-12-09 DIAGNOSIS — L97.521 DIABETIC ULCER OF TOE OF LEFT FOOT ASSOCIATED WITH TYPE 1 DIABETES MELLITUS, LIMITED TO BREAKDOWN OF SKIN: ICD-10-CM

## 2022-12-09 DIAGNOSIS — E10.42 TYPE 1 DIABETES MELLITUS WITH POLYNEUROPATHY: ICD-10-CM

## 2022-12-09 PROCEDURE — 3066F PR DOCUMENTATION OF TREATMENT FOR NEPHROPATHY: ICD-10-PCS | Mod: CPTII,S$GLB,, | Performed by: PODIATRIST

## 2022-12-09 PROCEDURE — 4010F ACE/ARB THERAPY RXD/TAKEN: CPT | Mod: CPTII,S$GLB,, | Performed by: PODIATRIST

## 2022-12-09 PROCEDURE — 99212 OFFICE O/P EST SF 10 MIN: CPT | Mod: 25,S$GLB,, | Performed by: PODIATRIST

## 2022-12-09 PROCEDURE — 3051F HG A1C>EQUAL 7.0%<8.0%: CPT | Mod: CPTII,S$GLB,, | Performed by: PODIATRIST

## 2022-12-09 PROCEDURE — 3062F POS MACROALBUMINURIA REV: CPT | Mod: CPTII,S$GLB,, | Performed by: PODIATRIST

## 2022-12-09 PROCEDURE — 4010F PR ACE/ARB THEARPY RXD/TAKEN: ICD-10-PCS | Mod: CPTII,S$GLB,, | Performed by: PODIATRIST

## 2022-12-09 PROCEDURE — 3062F PR POS MACROALBUMINURIA RESULT DOCUMENTED/REVIEW: ICD-10-PCS | Mod: CPTII,S$GLB,, | Performed by: PODIATRIST

## 2022-12-09 PROCEDURE — 3008F PR BODY MASS INDEX (BMI) DOCUMENTED: ICD-10-PCS | Mod: CPTII,S$GLB,, | Performed by: PODIATRIST

## 2022-12-09 PROCEDURE — 3288F PR FALLS RISK ASSESSMENT DOCUMENTED: ICD-10-PCS | Mod: CPTII,S$GLB,, | Performed by: PODIATRIST

## 2022-12-09 PROCEDURE — 1101F PT FALLS ASSESS-DOCD LE1/YR: CPT | Mod: CPTII,S$GLB,, | Performed by: PODIATRIST

## 2022-12-09 PROCEDURE — 99999 PR PBB SHADOW E&M-EST. PATIENT-LVL III: ICD-10-PCS | Mod: PBBFAC,,, | Performed by: PODIATRIST

## 2022-12-09 PROCEDURE — 3066F NEPHROPATHY DOC TX: CPT | Mod: CPTII,S$GLB,, | Performed by: PODIATRIST

## 2022-12-09 PROCEDURE — 1101F PR PT FALLS ASSESS DOC 0-1 FALLS W/OUT INJ PAST YR: ICD-10-PCS | Mod: CPTII,S$GLB,, | Performed by: PODIATRIST

## 2022-12-09 PROCEDURE — 99212 PR OFFICE/OUTPT VISIT, EST, LEVL II, 10-19 MIN: ICD-10-PCS | Mod: 25,S$GLB,, | Performed by: PODIATRIST

## 2022-12-09 PROCEDURE — 3288F FALL RISK ASSESSMENT DOCD: CPT | Mod: CPTII,S$GLB,, | Performed by: PODIATRIST

## 2022-12-09 PROCEDURE — 1159F MED LIST DOCD IN RCRD: CPT | Mod: CPTII,S$GLB,, | Performed by: PODIATRIST

## 2022-12-09 PROCEDURE — 99999 PR PBB SHADOW E&M-EST. PATIENT-LVL III: CPT | Mod: PBBFAC,,, | Performed by: PODIATRIST

## 2022-12-09 PROCEDURE — 97597 WOUND DEBRIDEMENT: ICD-10-PCS | Mod: S$GLB,,, | Performed by: PODIATRIST

## 2022-12-09 PROCEDURE — 3008F BODY MASS INDEX DOCD: CPT | Mod: CPTII,S$GLB,, | Performed by: PODIATRIST

## 2022-12-09 PROCEDURE — 3051F PR MOST RECENT HEMOGLOBIN A1C LEVEL 7.0 - < 8.0%: ICD-10-PCS | Mod: CPTII,S$GLB,, | Performed by: PODIATRIST

## 2022-12-09 PROCEDURE — 1159F PR MEDICATION LIST DOCUMENTED IN MEDICAL RECORD: ICD-10-PCS | Mod: CPTII,S$GLB,, | Performed by: PODIATRIST

## 2022-12-09 PROCEDURE — 97597 DBRDMT OPN WND 1ST 20 CM/<: CPT | Mod: S$GLB,,, | Performed by: PODIATRIST

## 2022-12-09 NOTE — PROGRESS NOTES
Subjective:      Patient ID: Hammad Douglas III is a 67 y.o. male.    Chief Complaint: Follow-up      Patient presents to clinic with the chief complaint of a recurring wound to the plantar aspect of the Lt. Great toe.  Notes continued application of antibiotic ointment and a band aid to the area.  He also has been padding the site with a toe sleeve and wearing shoes that minimize pressure to the forefoot.  Inquires as to additional options to resolve this issue.   Denies any additional pedal complaints.      Past Medical History:   Diagnosis Date    Cataract     Done OU    Chronic kidney disease     Diabetes mellitus type I     Diagnosed at age 19    Diabetic retinopathy     See's Dr. Ledesma    Hyperlipidemia     Hypertension        Past Surgical History:   Procedure Laterality Date    ARTHROPLASTY OF TOE Left 7/14/2022    Procedure: ARTHROPLASTY, TOE;  Surgeon: Caleb Duffy DPM;  Location: [facility] OR;  Service: Podiatry;  Laterality: Left;  Left Hallux    CATARACT EXTRACTION W/  INTRAOCULAR LENS IMPLANT Right 09/28/2021    Dr Solis    CATARACT EXTRACTION W/  INTRAOCULAR LENS IMPLANT Left 02/22/2022    Dr Solis    COLONOSCOPY      2011 wnl    PHACOEMULSIFICATION OF CATARACT Right 9/28/2021    Procedure: PHACOEMULSIFICATION, CATARACT;  Surgeon: Vicente Solis Jr., MD;  Location: [facility] OR;  Service: Ophthalmology;  Laterality: Right;  DM/right    PHACOEMULSIFICATION OF CATARACT Left 2/22/2022    Procedure: PHACOEMULSIFICATION, CATARACT;  Surgeon: Vicente Solis Jr., MD;  Location: [facility] OR;  Service: Ophthalmology;  Laterality: Left;  left    SURGICAL REMOVAL OF BONE SPUR Left 10/14/2021    Procedure: EXCISION, BONE SPUR;  Surgeon: Caleb Duffy DPM;  Location: [facility] OR;  Service: Podiatry;  Laterality: Left;    TOE AMPUTATION Right 7/2/2020    Procedure: AMPUTATION, TOE; 3rd;  Surgeon: Caleb Duffy DPM;  Location: [facility] OR;  Service: Podiatry;  Laterality: Right;    TOE SURGERY Right     right big toe  "      Family History   Problem Relation Age of Onset    Cataracts Mother     Cataracts Father     Breast cancer Sister     Glaucoma Neg Hx     Macular degeneration Neg Hx     Retinal detachment Neg Hx        Social History     Socioeconomic History    Marital status:    Tobacco Use    Smoking status: Former     Types: Cigars    Smokeless tobacco: Never    Tobacco comments:     Former cigar use   Substance and Sexual Activity    Alcohol use: Not Currently    Drug use: No    Sexual activity: Yes     Partners: Female   Social History Narrative    Retired former radiation  at [employer]       Current Outpatient Medications   Medication Sig Dispense Refill    atorvastatin (LIPITOR) 40 MG tablet TAKE 1 TABLET(40 MG) BY MOUTH EVERY DAY 90 tablet 3    FLUoxetine 10 MG capsule Take 3 capsules (30 mg total) by mouth once daily. 270 capsule 1    FLUoxetine 20 MG capsule TAKE ONE CAPSULE BY MOUTH EVERY DAY 90 capsule 2    insulin aspart U-100 (NOVOLOG FLEXPEN U-100 INSULIN) 100 unit/mL (3 mL) InPn pen INJECT USING INSULIN:CARB RATIO OF 1:6 BEFORE A MEAL. MAX TOTAL DAILY DOSE 40 UNITS 45 mL 3    insulin aspart U-100 (NOVOLOG U-100 INSULIN ASPART) 100 unit/mL injection Use continuously in insulin pump , max TDD 60u 20 mL 6    insulin glargine U-300 conc (TOUJEO MAX U-300 SOLOSTAR) 300 unit/mL (3 mL) insulin pen ADMINISTER 24 UNITS UNDER THE SKIN EVERY EVENING 2 pen 6    losartan (COZAAR) 100 MG tablet TAKE 1 TABLET(100 MG) BY MOUTH EVERY DAY 90 tablet 3    OMNIPOD 5 G6 INTRO KIT, GEN 5, Crtg Inject 1 Device into the skin continuous. 1 each 0    OMNIPOD 5 G6 PODS, GEN 5, Crtg Inject 1 Device into the skin Every 3 (three) days. 30 each 3    pen needle, diabetic (BD ULTRA-FINE MINI PEN NEEDLE) 31 gauge x 3/16" Ndle USE 1 FOUR TIMES DAILY 150 each 11     Current Facility-Administered Medications   Medication Dose Route Frequency Provider Last Rate Last Admin    glucagon (human recombinant) injection 1 mg  1 " mg Intramuscular Once Tania Mckeon NP           Review of patient's allergies indicates:   Allergen Reactions    Altace [ramipril]      Cough         Review of Systems   Constitution: Negative for chills and fever.   Cardiovascular: Negative for claudication and leg swelling.   Skin: Negative for color change, dry skin and poor wound healing.   Musculoskeletal: Negative for joint pain, joint swelling, muscle cramps and muscle weakness.   Neurological: Positive for numbness.   Psychiatric/Behavioral: Negative for altered mental status.           Objective:      Physical Exam   Constitutional: He appears well-developed and well-nourished. No distress.   Cardiovascular:   Pulses:       Dorsalis pedis pulses are 2+ on the right side, and 2+ on the left side.        Posterior tibial pulses are 1+ on the right side, and 1+ on the left side.   CFT is < 3 seconds bilateral.  Pedal hair growth is present bilateral.  Varicosities noted bilateral.  No lower extremity edema noted.  Toes are warm to touch bilateral.     Musculoskeletal: He exhibits no edema or tenderness.   Muscle strength 5/5 in all muscle groups bilateral.  No tenderness nor crepitation with ROM of foot/ankle joints bilateral.  No tenderness with palpation of bilateral foot and ankle.  Semi-reducible contracture of the lesser digits bilateral.   Amputation of Rt. 3rd toe.  Neurological: He has normal strength. A sensory deficit is present.   Protective sensation per [place] monofilament is decreased bilateral.  Light touch is intact bilateral.   Skin: Pre-ulcerative callus and dried blister noted slightly proximal to the Lt. Hallux IP joint. Underlying wound noted.  Wound bed is down to dermis and comprised of eschar.  Skylar wound is devoid of erythema, edema, fluctuance, purulence, and malodor.  Pre-debridement measurement:  0.1 x 0.1 x 0.1cm.  Post-debridement measurement: 0.3 x 0.3 x 0.1cm.            Assessment:       Encounter Diagnoses    Name Primary?    Bone spur of foot Yes    Diabetic ulcer of toe of left foot associated with type 1 diabetes mellitus, limited to breakdown of skin     Type 1 diabetes mellitus with polyneuropathy            Plan:       Hammad was seen today for follow-up.    Diagnoses and all orders for this visit:    Bone spur of foot    Diabetic ulcer of toe of left foot associated with type 1 diabetes mellitus, limited to breakdown of skin    Type 1 diabetes mellitus with polyneuropathy        I counseled the patient on his conditions, their implications and medical management.    Performed a selective excisional debridement of the Lt. foot.  See attached procedure note.       The wound base was covered with antibiotic ointment and a band aid.     Advised to continue wearing a sleeve to protect the digit with ambulation.       To continue wearing motion control tennis shoe to minimize pressure to the site.     Instructed to continue changing his bandage each day.     Instructed to call if the site begins to appear infected.     Based on current films, the site of the Lt. Hallux IP joint continues to ulcerate on account of new hypertrophic bone growth at the site of resection.  Recommend resection of the new bone growth and capping with bone wax to prevent a recurrence.  Explained this would be an outpatient procedure under local MAC.  Patient is amenable to said plan.     Advised to begin the process of obtaining surgical clearance.     Will obtain informed written consent the day of the procedure.       Will place a case request after clearance has been obtained.  Likely this will take place on January 3rd, 2023.      RTC 1 week postop.     Caleb Duffy DPM

## 2022-12-12 NOTE — PROCEDURES
"Wound Debridement    Date/Time: 12/9/2022 7:00 AM  Performed by: Caleb Duffy DPM  Authorized by: Caleb Duffy DPM     Time out: Immediately prior to procedure a "time out" was called to verify the correct patient, procedure, equipment, support staff and site/side marked as required.    Consent Done?:  Yes (Verbal)    Preparation: Patient was prepped and draped in usual sterile fashion    Local anesthesia used?: No      Wound Details:    Location:  Left foot    Location:  Left 1st Toe    Type of Debridement:  Excisional       Length (cm):  0.1       Area (sq cm):  0.01       Width (cm):  0.1       Percent Debrided (%):  100       Depth (cm):  0.1       Total Area Debrided (sq cm):  0.01    Depth of debridement:  Epidermis/Dermis    Tissue debrided:  Dermis    Devitalized tissue debrided:  Necrotic/Eschar    Instruments:  Blade    Bleeding:  Minimal  Hemostasis Achieved: Yes    Method Used:  Pressure  Patient tolerance:  Patient tolerated the procedure well with no immediate complications    "

## 2022-12-15 ENCOUNTER — OFFICE VISIT (OUTPATIENT)
Dept: FAMILY MEDICINE | Facility: CLINIC | Age: 67
End: 2022-12-15
Payer: COMMERCIAL

## 2022-12-15 ENCOUNTER — PATIENT MESSAGE (OUTPATIENT)
Dept: AUDIOLOGY | Facility: CLINIC | Age: 67
End: 2022-12-15
Payer: COMMERCIAL

## 2022-12-15 VITALS
SYSTOLIC BLOOD PRESSURE: 128 MMHG | BODY MASS INDEX: 29.74 KG/M2 | HEIGHT: 73 IN | DIASTOLIC BLOOD PRESSURE: 68 MMHG | HEART RATE: 68 BPM | WEIGHT: 224.44 LBS

## 2022-12-15 DIAGNOSIS — Z01.818 PRE-OPERATIVE CLEARANCE: Primary | ICD-10-CM

## 2022-12-15 DIAGNOSIS — E78.2 MIXED HYPERLIPIDEMIA: ICD-10-CM

## 2022-12-15 DIAGNOSIS — E10.43 TYPE 1 DIABETES MELLITUS WITH DIABETIC AUTONOMIC NEUROPATHY: ICD-10-CM

## 2022-12-15 DIAGNOSIS — I10 ESSENTIAL HYPERTENSION: ICD-10-CM

## 2022-12-15 PROCEDURE — 4010F PR ACE/ARB THEARPY RXD/TAKEN: ICD-10-PCS | Mod: CPTII,S$GLB,, | Performed by: PHYSICIAN ASSISTANT

## 2022-12-15 PROCEDURE — 3078F PR MOST RECENT DIASTOLIC BLOOD PRESSURE < 80 MM HG: ICD-10-PCS | Mod: CPTII,S$GLB,, | Performed by: PHYSICIAN ASSISTANT

## 2022-12-15 PROCEDURE — 99214 PR OFFICE/OUTPT VISIT, EST, LEVL IV, 30-39 MIN: ICD-10-PCS | Mod: S$GLB,,, | Performed by: PHYSICIAN ASSISTANT

## 2022-12-15 PROCEDURE — 1159F MED LIST DOCD IN RCRD: CPT | Mod: CPTII,S$GLB,, | Performed by: PHYSICIAN ASSISTANT

## 2022-12-15 PROCEDURE — 3008F BODY MASS INDEX DOCD: CPT | Mod: CPTII,S$GLB,, | Performed by: PHYSICIAN ASSISTANT

## 2022-12-15 PROCEDURE — 99999 PR PBB SHADOW E&M-EST. PATIENT-LVL IV: ICD-10-PCS | Mod: PBBFAC,,, | Performed by: PHYSICIAN ASSISTANT

## 2022-12-15 PROCEDURE — 93005 ELECTROCARDIOGRAM TRACING: CPT | Mod: S$GLB,,, | Performed by: PHYSICIAN ASSISTANT

## 2022-12-15 PROCEDURE — 93010 EKG 12-LEAD: ICD-10-PCS | Mod: S$GLB,,, | Performed by: INTERNAL MEDICINE

## 2022-12-15 PROCEDURE — 99999 PR PBB SHADOW E&M-EST. PATIENT-LVL IV: CPT | Mod: PBBFAC,,, | Performed by: PHYSICIAN ASSISTANT

## 2022-12-15 PROCEDURE — 1101F PR PT FALLS ASSESS DOC 0-1 FALLS W/OUT INJ PAST YR: ICD-10-PCS | Mod: CPTII,S$GLB,, | Performed by: PHYSICIAN ASSISTANT

## 2022-12-15 PROCEDURE — 3078F DIAST BP <80 MM HG: CPT | Mod: CPTII,S$GLB,, | Performed by: PHYSICIAN ASSISTANT

## 2022-12-15 PROCEDURE — 1159F PR MEDICATION LIST DOCUMENTED IN MEDICAL RECORD: ICD-10-PCS | Mod: CPTII,S$GLB,, | Performed by: PHYSICIAN ASSISTANT

## 2022-12-15 PROCEDURE — 99214 OFFICE O/P EST MOD 30 MIN: CPT | Mod: S$GLB,,, | Performed by: PHYSICIAN ASSISTANT

## 2022-12-15 PROCEDURE — 4010F ACE/ARB THERAPY RXD/TAKEN: CPT | Mod: CPTII,S$GLB,, | Performed by: PHYSICIAN ASSISTANT

## 2022-12-15 PROCEDURE — 3051F PR MOST RECENT HEMOGLOBIN A1C LEVEL 7.0 - < 8.0%: ICD-10-PCS | Mod: CPTII,S$GLB,, | Performed by: PHYSICIAN ASSISTANT

## 2022-12-15 PROCEDURE — 3066F NEPHROPATHY DOC TX: CPT | Mod: CPTII,S$GLB,, | Performed by: PHYSICIAN ASSISTANT

## 2022-12-15 PROCEDURE — 3288F PR FALLS RISK ASSESSMENT DOCUMENTED: ICD-10-PCS | Mod: CPTII,S$GLB,, | Performed by: PHYSICIAN ASSISTANT

## 2022-12-15 PROCEDURE — 3062F PR POS MACROALBUMINURIA RESULT DOCUMENTED/REVIEW: ICD-10-PCS | Mod: CPTII,S$GLB,, | Performed by: PHYSICIAN ASSISTANT

## 2022-12-15 PROCEDURE — 3074F PR MOST RECENT SYSTOLIC BLOOD PRESSURE < 130 MM HG: ICD-10-PCS | Mod: CPTII,S$GLB,, | Performed by: PHYSICIAN ASSISTANT

## 2022-12-15 PROCEDURE — 93005 EKG 12-LEAD: ICD-10-PCS | Mod: S$GLB,,, | Performed by: PHYSICIAN ASSISTANT

## 2022-12-15 PROCEDURE — 1160F RVW MEDS BY RX/DR IN RCRD: CPT | Mod: CPTII,S$GLB,, | Performed by: PHYSICIAN ASSISTANT

## 2022-12-15 PROCEDURE — 3051F HG A1C>EQUAL 7.0%<8.0%: CPT | Mod: CPTII,S$GLB,, | Performed by: PHYSICIAN ASSISTANT

## 2022-12-15 PROCEDURE — 3062F POS MACROALBUMINURIA REV: CPT | Mod: CPTII,S$GLB,, | Performed by: PHYSICIAN ASSISTANT

## 2022-12-15 PROCEDURE — 1160F PR REVIEW ALL MEDS BY PRESCRIBER/CLIN PHARMACIST DOCUMENTED: ICD-10-PCS | Mod: CPTII,S$GLB,, | Performed by: PHYSICIAN ASSISTANT

## 2022-12-15 PROCEDURE — 3288F FALL RISK ASSESSMENT DOCD: CPT | Mod: CPTII,S$GLB,, | Performed by: PHYSICIAN ASSISTANT

## 2022-12-15 PROCEDURE — 3066F PR DOCUMENTATION OF TREATMENT FOR NEPHROPATHY: ICD-10-PCS | Mod: CPTII,S$GLB,, | Performed by: PHYSICIAN ASSISTANT

## 2022-12-15 PROCEDURE — 93010 ELECTROCARDIOGRAM REPORT: CPT | Mod: S$GLB,,, | Performed by: INTERNAL MEDICINE

## 2022-12-15 PROCEDURE — 3074F SYST BP LT 130 MM HG: CPT | Mod: CPTII,S$GLB,, | Performed by: PHYSICIAN ASSISTANT

## 2022-12-15 PROCEDURE — 3008F PR BODY MASS INDEX (BMI) DOCUMENTED: ICD-10-PCS | Mod: CPTII,S$GLB,, | Performed by: PHYSICIAN ASSISTANT

## 2022-12-15 PROCEDURE — 1101F PT FALLS ASSESS-DOCD LE1/YR: CPT | Mod: CPTII,S$GLB,, | Performed by: PHYSICIAN ASSISTANT

## 2022-12-15 NOTE — PROGRESS NOTES
Subjective:       Patient ID: Hammad Douglas III is a 67 y.o. male.    Chief Complaint: Pre-op Exam (Preop clearance, toe surg, Dr Duffy)    HPI  Pt. Scheduled for L foot surgery 1-3-23  Dr. Duffy will perform the surgery under general anesthesia   Pt. Feels well  Review of Systems   Constitutional:  Positive for activity change. Negative for appetite change, chills, diaphoresis, fatigue, fever and unexpected weight change.   HENT: Negative.     Eyes: Negative.    Respiratory: Negative.  Negative for cough and shortness of breath.    Cardiovascular: Negative.  Negative for chest pain and leg swelling.   Gastrointestinal: Negative.    Endocrine: Negative.    Genitourinary: Negative.    Musculoskeletal: Negative.    Integumentary:  Negative for rash. Negative.   Neurological: Negative.        Objective:      Physical Exam  Vitals reviewed.   Constitutional:       General: He is not in acute distress.     Appearance: Normal appearance. He is obese. He is not ill-appearing, toxic-appearing or diaphoretic.   HENT:      Head: Normocephalic and atraumatic.      Right Ear: Tympanic membrane, ear canal and external ear normal. There is no impacted cerumen.      Left Ear: Tympanic membrane, ear canal and external ear normal. There is no impacted cerumen.      Nose: Nose normal.      Mouth/Throat:      Mouth: Mucous membranes are moist.      Pharynx: Oropharynx is clear. No oropharyngeal exudate or posterior oropharyngeal erythema.   Eyes:      General: No scleral icterus.     Conjunctiva/sclera: Conjunctivae normal.   Neck:      Vascular: No carotid bruit.   Cardiovascular:      Rate and Rhythm: Normal rate and regular rhythm.      Pulses: Normal pulses.      Heart sounds: Normal heart sounds. No murmur heard.    No friction rub. No gallop.   Pulmonary:      Effort: Pulmonary effort is normal. No respiratory distress.      Breath sounds: Normal breath sounds. No stridor. No wheezing, rhonchi or rales.   Abdominal:       General: Abdomen is flat. Bowel sounds are normal. There is no distension.      Palpations: Abdomen is soft. There is no mass.      Tenderness: There is no abdominal tenderness. There is no guarding or rebound.      Hernia: No hernia is present.   Musculoskeletal:         General: No swelling.      Cervical back: Normal range of motion and neck supple. No rigidity or tenderness.      Right lower leg: No edema.      Left lower leg: No edema.   Lymphadenopathy:      Cervical: No cervical adenopathy.   Skin:     General: Skin is warm and dry.      Findings: No rash.   Neurological:      General: No focal deficit present.      Mental Status: He is alert and oriented to person, place, and time.       Assessment:       Problem List Items Addressed This Visit       Hyperlipidemia    Type 1 diabetes mellitus with diabetic autonomic neuropathy    Essential hypertension     Other Visit Diagnoses       Pre-operative clearance    -  Primary              Plan:       Hammad was seen today for pre-op exam.    Diagnoses and all orders for this visit:    Pre-operative clearance  -     EKG 12-lead; Future  -     Comprehensive Metabolic Panel; Future  -     CBC Auto Differential; Future  -     X-Ray Chest PA And Lateral; Future    Essential hypertension    Mixed hyperlipidemia    Type 1 diabetes mellitus with diabetic autonomic neuropathy      Lab tests stable  Chest xray stable  EKG stable    Pt. Cleared for surgery and general anesthesia   Dr. Duffy notified

## 2022-12-16 ENCOUNTER — HOSPITAL ENCOUNTER (OUTPATIENT)
Dept: RADIOLOGY | Facility: HOSPITAL | Age: 67
Discharge: HOME | End: 2022-12-16
Attending: PHYSICIAN ASSISTANT
Payer: COMMERCIAL

## 2022-12-16 DIAGNOSIS — Z01.818 PRE-OPERATIVE CLEARANCE: ICD-10-CM

## 2022-12-16 PROCEDURE — 71046 X-RAY EXAM CHEST 2 VIEWS: CPT | Mod: TC,PN

## 2022-12-16 PROCEDURE — 71046 XR CHEST PA AND LATERAL: ICD-10-PCS | Mod: 26,,, | Performed by: RADIOLOGY

## 2022-12-16 PROCEDURE — 71046 X-RAY EXAM CHEST 2 VIEWS: CPT | Mod: 26,,, | Performed by: RADIOLOGY

## 2022-12-18 ENCOUNTER — PATIENT MESSAGE (OUTPATIENT)
Dept: PODIATRY | Facility: CLINIC | Age: 67
End: 2022-12-18
Payer: COMMERCIAL

## 2022-12-19 ENCOUNTER — PATIENT MESSAGE (OUTPATIENT)
Dept: PODIATRY | Facility: CLINIC | Age: 67
End: 2022-12-19
Payer: COMMERCIAL

## 2022-12-20 ENCOUNTER — PATIENT MESSAGE (OUTPATIENT)
Dept: ENDOCRINOLOGY | Facility: CLINIC | Age: 67
End: 2022-12-20
Payer: COMMERCIAL

## 2022-12-20 ENCOUNTER — PATIENT MESSAGE (OUTPATIENT)
Dept: PODIATRY | Facility: CLINIC | Age: 67
End: 2022-12-20
Payer: COMMERCIAL

## 2022-12-20 ENCOUNTER — LAB VISIT (OUTPATIENT)
Dept: LAB | Facility: HOSPITAL | Age: 67
End: 2022-12-20
Attending: NURSE PRACTITIONER
Payer: COMMERCIAL

## 2022-12-20 DIAGNOSIS — E10.43 TYPE 1 DIABETES MELLITUS WITH DIABETIC AUTONOMIC NEUROPATHY: ICD-10-CM

## 2022-12-20 LAB
ALBUMIN SERPL BCP-MCNC: 3.5 G/DL (ref 3.5–5.2)
ALP SERPL-CCNC: 86 U/L (ref 55–135)
ALT SERPL W/O P-5'-P-CCNC: 17 U/L (ref 10–44)
ANION GAP SERPL CALC-SCNC: 8 MMOL/L (ref 8–16)
AST SERPL-CCNC: 19 U/L (ref 10–40)
BILIRUB SERPL-MCNC: 0.6 MG/DL (ref 0.1–1)
BUN SERPL-MCNC: 21 MG/DL (ref 8–23)
CALCIUM SERPL-MCNC: 9.4 MG/DL (ref 8.7–10.5)
CHLORIDE SERPL-SCNC: 105 MMOL/L (ref 95–110)
CO2 SERPL-SCNC: 26 MMOL/L (ref 23–29)
CREAT SERPL-MCNC: 1.3 MG/DL (ref 0.5–1.4)
EST. GFR  (NO RACE VARIABLE): >60 ML/MIN/1.73 M^2
ESTIMATED AVG GLUCOSE: 157 MG/DL (ref 68–131)
GLUCOSE SERPL-MCNC: 143 MG/DL (ref 70–110)
HBA1C MFR BLD: 7.1 % (ref 4–5.6)
POTASSIUM SERPL-SCNC: 4.7 MMOL/L (ref 3.5–5.1)
PROT SERPL-MCNC: 6.8 G/DL (ref 6–8.4)
SODIUM SERPL-SCNC: 139 MMOL/L (ref 136–145)

## 2022-12-20 PROCEDURE — 36415 COLL VENOUS BLD VENIPUNCTURE: CPT | Mod: PN | Performed by: NURSE PRACTITIONER

## 2022-12-20 PROCEDURE — 83036 HEMOGLOBIN GLYCOSYLATED A1C: CPT | Performed by: NURSE PRACTITIONER

## 2022-12-20 PROCEDURE — 80053 COMPREHEN METABOLIC PANEL: CPT | Performed by: NURSE PRACTITIONER

## 2022-12-22 DIAGNOSIS — M77.50 BONE SPUR OF FOOT: Primary | ICD-10-CM

## 2022-12-27 ENCOUNTER — OFFICE VISIT (OUTPATIENT)
Dept: ENDOCRINOLOGY | Facility: CLINIC | Age: 67
End: 2022-12-27
Payer: COMMERCIAL

## 2022-12-27 VITALS
DIASTOLIC BLOOD PRESSURE: 60 MMHG | HEART RATE: 79 BPM | BODY MASS INDEX: 30.36 KG/M2 | WEIGHT: 229.06 LBS | HEIGHT: 73 IN | SYSTOLIC BLOOD PRESSURE: 116 MMHG

## 2022-12-27 DIAGNOSIS — E78.5 HYPERLIPIDEMIA, UNSPECIFIED HYPERLIPIDEMIA TYPE: ICD-10-CM

## 2022-12-27 DIAGNOSIS — E10.21 TYPE 1 DIABETES MELLITUS WITH DIABETIC NEPHROPATHY: ICD-10-CM

## 2022-12-27 DIAGNOSIS — E10.649 HYPOGLYCEMIA UNAWARENESS IN TYPE 1 DIABETES MELLITUS: ICD-10-CM

## 2022-12-27 DIAGNOSIS — Z96.41 INSULIN PUMP STATUS: ICD-10-CM

## 2022-12-27 DIAGNOSIS — I10 ESSENTIAL HYPERTENSION: ICD-10-CM

## 2022-12-27 DIAGNOSIS — E10.43 TYPE 1 DIABETES MELLITUS WITH DIABETIC AUTONOMIC NEUROPATHY: Primary | ICD-10-CM

## 2022-12-27 PROCEDURE — 3074F SYST BP LT 130 MM HG: CPT | Mod: CPTII,S$GLB,, | Performed by: NURSE PRACTITIONER

## 2022-12-27 PROCEDURE — 95251 PR GLUCOSE MONITOR, 72 HOUR, PHYS INTERP: ICD-10-PCS | Mod: S$GLB,,, | Performed by: NURSE PRACTITIONER

## 2022-12-27 PROCEDURE — 3008F BODY MASS INDEX DOCD: CPT | Mod: CPTII,S$GLB,, | Performed by: NURSE PRACTITIONER

## 2022-12-27 PROCEDURE — 3008F PR BODY MASS INDEX (BMI) DOCUMENTED: ICD-10-PCS | Mod: CPTII,S$GLB,, | Performed by: NURSE PRACTITIONER

## 2022-12-27 PROCEDURE — 3051F PR MOST RECENT HEMOGLOBIN A1C LEVEL 7.0 - < 8.0%: ICD-10-PCS | Mod: CPTII,S$GLB,, | Performed by: NURSE PRACTITIONER

## 2022-12-27 PROCEDURE — 4010F PR ACE/ARB THEARPY RXD/TAKEN: ICD-10-PCS | Mod: CPTII,S$GLB,, | Performed by: NURSE PRACTITIONER

## 2022-12-27 PROCEDURE — 3066F NEPHROPATHY DOC TX: CPT | Mod: CPTII,S$GLB,, | Performed by: NURSE PRACTITIONER

## 2022-12-27 PROCEDURE — 3288F PR FALLS RISK ASSESSMENT DOCUMENTED: ICD-10-PCS | Mod: CPTII,S$GLB,, | Performed by: NURSE PRACTITIONER

## 2022-12-27 PROCEDURE — 1101F PT FALLS ASSESS-DOCD LE1/YR: CPT | Mod: CPTII,S$GLB,, | Performed by: NURSE PRACTITIONER

## 2022-12-27 PROCEDURE — 95251 CONT GLUC MNTR ANALYSIS I&R: CPT | Mod: S$GLB,,, | Performed by: NURSE PRACTITIONER

## 2022-12-27 PROCEDURE — 1101F PR PT FALLS ASSESS DOC 0-1 FALLS W/OUT INJ PAST YR: ICD-10-PCS | Mod: CPTII,S$GLB,, | Performed by: NURSE PRACTITIONER

## 2022-12-27 PROCEDURE — 1159F PR MEDICATION LIST DOCUMENTED IN MEDICAL RECORD: ICD-10-PCS | Mod: CPTII,S$GLB,, | Performed by: NURSE PRACTITIONER

## 2022-12-27 PROCEDURE — 4010F ACE/ARB THERAPY RXD/TAKEN: CPT | Mod: CPTII,S$GLB,, | Performed by: NURSE PRACTITIONER

## 2022-12-27 PROCEDURE — 3051F HG A1C>EQUAL 7.0%<8.0%: CPT | Mod: CPTII,S$GLB,, | Performed by: NURSE PRACTITIONER

## 2022-12-27 PROCEDURE — 3078F DIAST BP <80 MM HG: CPT | Mod: CPTII,S$GLB,, | Performed by: NURSE PRACTITIONER

## 2022-12-27 PROCEDURE — 3078F PR MOST RECENT DIASTOLIC BLOOD PRESSURE < 80 MM HG: ICD-10-PCS | Mod: CPTII,S$GLB,, | Performed by: NURSE PRACTITIONER

## 2022-12-27 PROCEDURE — 1159F MED LIST DOCD IN RCRD: CPT | Mod: CPTII,S$GLB,, | Performed by: NURSE PRACTITIONER

## 2022-12-27 PROCEDURE — 3066F PR DOCUMENTATION OF TREATMENT FOR NEPHROPATHY: ICD-10-PCS | Mod: CPTII,S$GLB,, | Performed by: NURSE PRACTITIONER

## 2022-12-27 PROCEDURE — 3062F POS MACROALBUMINURIA REV: CPT | Mod: CPTII,S$GLB,, | Performed by: NURSE PRACTITIONER

## 2022-12-27 PROCEDURE — 99214 PR OFFICE/OUTPT VISIT, EST, LEVL IV, 30-39 MIN: ICD-10-PCS | Mod: S$GLB,,, | Performed by: NURSE PRACTITIONER

## 2022-12-27 PROCEDURE — 1160F PR REVIEW ALL MEDS BY PRESCRIBER/CLIN PHARMACIST DOCUMENTED: ICD-10-PCS | Mod: CPTII,S$GLB,, | Performed by: NURSE PRACTITIONER

## 2022-12-27 PROCEDURE — 3062F PR POS MACROALBUMINURIA RESULT DOCUMENTED/REVIEW: ICD-10-PCS | Mod: CPTII,S$GLB,, | Performed by: NURSE PRACTITIONER

## 2022-12-27 PROCEDURE — 3074F PR MOST RECENT SYSTOLIC BLOOD PRESSURE < 130 MM HG: ICD-10-PCS | Mod: CPTII,S$GLB,, | Performed by: NURSE PRACTITIONER

## 2022-12-27 PROCEDURE — 99214 OFFICE O/P EST MOD 30 MIN: CPT | Mod: S$GLB,,, | Performed by: NURSE PRACTITIONER

## 2022-12-27 PROCEDURE — 1160F RVW MEDS BY RX/DR IN RCRD: CPT | Mod: CPTII,S$GLB,, | Performed by: NURSE PRACTITIONER

## 2022-12-27 PROCEDURE — 99999 PR PBB SHADOW E&M-EST. PATIENT-LVL III: CPT | Mod: PBBFAC,,, | Performed by: NURSE PRACTITIONER

## 2022-12-27 PROCEDURE — 3288F FALL RISK ASSESSMENT DOCD: CPT | Mod: CPTII,S$GLB,, | Performed by: NURSE PRACTITIONER

## 2022-12-27 PROCEDURE — 99999 PR PBB SHADOW E&M-EST. PATIENT-LVL III: ICD-10-PCS | Mod: PBBFAC,,, | Performed by: NURSE PRACTITIONER

## 2022-12-27 RX ORDER — INSULIN PMP CART,AUT,G6/7,CNTR
1 EACH SUBCUTANEOUS
Qty: 30 EACH | Refills: 3 | Status: SHIPPED | OUTPATIENT
Start: 2022-12-27 | End: 2023-06-20 | Stop reason: SDUPTHER

## 2022-12-27 NOTE — PROGRESS NOTES
CC: Mr. Hammad Douglas III arrives today for management of Type 1  DM and review of chronic medical conditions, as listed in the visit diagnosis section of this encounter.     HPI: Mr. Hammad Douglas III was diagnosed with Type 1  DM at age 19 after a viral illness - had polyuria, polydipsia at this time.  Initial treatment consisted of insulin. Denies FH of DM. Reports past hospitalizations due to DKA > 30 years ago.   Has diagnosis of hypoglycemia unawareness with past severe hypoglycemic episodes requiring 3rd party assistance. Began using Dexcom G5 in 2017 and later up graded to G6.    Patient was last seen by me in September.     He began OmniPod 5 pump on 11/17/2022.    He does report that he sometimes delivers additional meal bolus if BG increases because he ate more than he thought.     Had been getting pods at local [facility] but they aren't providing ordered supply and he is being charged copay each time.     He is following closely with podiatry. Will be having upcoming surgery in January to manage some bone regrowth.     BG monitoring per Dexcom G6.     Hypoglycemia: Rare  Hypoglycemic Symptoms: often doesn't have symptoms.   Hypoglycemia Treatment: Juice or glucose tabs.     Exercise: no formal    Dietary Habits: Eats 3 meals/day. Rare snacking. Avoids sugary beverages.     Last DM education appointment: 12/8/2022        CURRENT DIABETIC MEDS:  Novolog in OmniPod 5  Insulin back up plan: Toujeo Max 24 units QHS, Novolog I:C ratio 1:5    Previous insulins:  Lantus    When did you start the current therapy you are on: 11/2022  Frequency of changing site/infusion set/tubing/cartridges: 3 days  Frequency of changing CGM: 10 days  Using bolus wizard: yes  Taking bolus with each meal: yes      INSULIN PUMP SETTINGS:  Basal:  0000 - 0.9 u/hr    I:C Ratio:  0000 - 1:6  0700 - 1:5  1000 - 1:6    ISF:  0000 - 50    Target glucose: 130    Active insulin time: 4 hours    Last Eye Exam: 6/2022, + proliferative  + RAJAN  "cataract.   Last Podiatry Exam: 12/2022, Past amputation of R third toe. S/P arthroplasty of the Lt. Hallux IP joint in July 2022.    REVIEW OF SYSTEMS  Constitutional: no c/o weakness, fatigue, weight loss.   Cardiac: no palpitations or chest pain.  Respiratory: no cough or dyspnea.   GI:  no c/o abdominal pain or nausea.   Skin: no rashes, lesions   Neuro: no numbness, tingling, or parasthesias.  Endocrine: denies polyphagia, polydipsia, polyuria    Personally reviewed Past Medical, Surgical, Social History.    Vital Signs  /60   Pulse 79   Ht 6' 1" (1.854 m)   Wt 103.9 kg (229 lb 0.9 oz)   BMI 30.22 kg/m²      Personally reviewed the below labs:    Hemoglobin A1C   Date Value Ref Range Status   12/20/2022 7.1 (H) 4.0 - 5.6 % Final     Comment:     ADA Screening Guidelines:  5.7-6.4%  Consistent with prediabetes  >or=6.5%  Consistent with diabetes    High levels of fetal hemoglobin interfere with the HbA1C  assay. Heterozygous hemoglobin variants (HbS, HgC, etc)do  not significantly interfere with this assay.   However, presence of multiple variants may affect accuracy.     09/21/2022 7.2 (H) 4.0 - 5.6 % Final     Comment:     ADA Screening Guidelines:  5.7-6.4%  Consistent with prediabetes  >or=6.5%  Consistent with diabetes    High levels of fetal hemoglobin interfere with the HbA1C  assay. Heterozygous hemoglobin variants (HbS, HgC, etc)do  not significantly interfere with this assay.   However, presence of multiple variants may affect accuracy.     06/22/2022 7.4 (H) 4.0 - 5.6 % Final     Comment:     ADA Screening Guidelines:  5.7-6.4%  Consistent with prediabetes  >or=6.5%  Consistent with diabetes    High levels of fetal hemoglobin interfere with the HbA1C  assay. Heterozygous hemoglobin variants (HbS, HgC, etc)do  not significantly interfere with this assay.   However, presence of multiple variants may affect accuracy.         Chemistry        Component Value Date/Time     12/20/2022 0831 "    K 4.7 12/20/2022 0831     12/20/2022 0831    CO2 26 12/20/2022 0831    BUN 21 12/20/2022 0831    CREATININE 1.3 12/20/2022 0831     (H) 12/20/2022 0831        Component Value Date/Time    CALCIUM 9.4 12/20/2022 0831    ALKPHOS 86 12/20/2022 0831    AST 19 12/20/2022 0831    ALT 17 12/20/2022 0831    BILITOT 0.6 12/20/2022 0831    ESTGFRAFRICA >60.0 06/22/2022 0848    EGFRNONAA 57.3 (A) 06/22/2022 0848          Lab Results   Component Value Date    CHOL 156 09/21/2022    CHOL 150 08/12/2021    CHOL 154 07/27/2020     Lab Results   Component Value Date    HDL 45 09/21/2022    HDL 46 08/12/2021    HDL 46 07/27/2020     Lab Results   Component Value Date    LDLCALC 98.4 09/21/2022    LDLCALC 91.4 08/12/2021    LDLCALC 95.8 07/27/2020     Lab Results   Component Value Date    TRIG 63 09/21/2022    TRIG 63 08/12/2021    TRIG 61 07/27/2020     Lab Results   Component Value Date    CHOLHDL 28.8 09/21/2022    CHOLHDL 30.7 08/12/2021    CHOLHDL 29.9 07/27/2020       Lab Results   Component Value Date    MICALBCREAT 412.0 (H) 04/22/2022     Lab Results   Component Value Date    TSH 1.789 02/09/2022       CrCl cannot be calculated (Patient's most recent lab result is older than the maximum 7 days allowed.).    Vit D, 25-Hydroxy   Date Value Ref Range Status   08/06/2018 35 30 - 96 ng/mL Final     Comment:     Vitamin D deficiency.........<10 ng/mL                              Vitamin D insufficiency......10-29 ng/mL       Vitamin D sufficiency........> or equal to 30 ng/mL  Vitamin D toxicity............>100 ng/mL         PHYSICAL EXAMINATION  Constitutional: Appears well, no distress  Neck: Supple, trachea midline.  Respiratory: CTA, even and unlabored.   Cardiovascular: RRR, no murmurs.  GI: active bowel sounds, no hernia  Skin: warm and dry  Neuro: oriented to person, place, time.  Feet: appropriate footwear.       DEXCOM DOWNLOAD: See media file for details. Fasting glucoses are acceptable. Occasional  prandial excursions. Sometimes bolusing consecutively, which may result in hypoglycemia.     Time in automated mode: 100%    Average TDD: 50 units  Basal: 45% (22.7u)  Bolus: 55%    Average glucose: 155 mg/dL  Above 250 mg/dL: 2 %  181-250 mg/dL: 18 %   mg/dL: 77 %  54-69 mg/dL: 2 %  Below 54 mg/dL: 0 %         Goals        HEMOGLOBIN A1C < 7.5           due to hypoglycemia unawareness      Assessment/Plan  1. Type 1 diabetes mellitus with diabetic autonomic neuropathy  -- A1c acceptable. Appears to have less variability with OmniPod 5. Explained that giving additional meal bolus after the meal may be causing his occasional hypoglycemia. Urged caution with this.   -- Not enough of an established pattern to warrant pump setting change today.   -- will send rx for pods to [facility] in   -- has insulin pens for back up  -- BG monitoring per Dexcom    -- Discussed diagnosis of DM, A1c goals, progression of disease, long term complications and tx options.    -- Reviewed hypoglycemia management: treat with 1/2 glass of juice, 1/2 can regular coke, or 4 glucose tablets. Monitor and repeat treatment every 15 minutes until BG is >70 Then have a snack, which includes a complex carbohydrate and protein.  Advised patient to check BG before activities, such as driving or exercise.   2. Type 1 diabetes mellitus with diabetic nephropathy  -- uncontrolled.   -- continue losartan   3. Proliferative diabetic retinopathy without macular edema associated with type 1 diabetes mellitus, unspecified laterality  -- stable  -- keep follow ups   4. Essential hypertension  -- controlled  -- continue current meds   5. Hyperlipidemia, unspecified hyperlipidemia type  -- controlled  -- continue atorvastatin   6. Hypoglycemia unawareness in Type 1 DM -- continue Dexcom.   -- has glucagon   7. Insulin pump status  -- report viewed.          FOLLOW UP   Follow up in about 3 months (around 3/27/2023).   Patient instructed to bring  BG logs to each follow up   Patient encouraged to call for any BG/medication issues, concerns, or questions.     Orders Placed This Encounter   Procedures    Basic Metabolic Panel    Hemoglobin A1C    Microalbumin/Creatinine Ratio, Urine

## 2022-12-27 NOTE — H&P (VIEW-ONLY)
CC: Mr. Hammad Douglas III arrives today for management of Type 1  DM and review of chronic medical conditions, as listed in the visit diagnosis section of this encounter.     HPI: Mr. Hammad Douglas III was diagnosed with Type 1  DM at age 19 after a viral illness - had polyuria, polydipsia at this time.  Initial treatment consisted of insulin. Denies FH of DM. Reports past hospitalizations due to DKA > 30 years ago.   Has diagnosis of hypoglycemia unawareness with past severe hypoglycemic episodes requiring 3rd party assistance. Began using Dexcom G5 in 2017 and later up graded to G6.    Patient was last seen by me in September.     He began OmniPod 5 pump on 11/17/2022.    He does report that he sometimes delivers additional meal bolus if BG increases because he ate more than he thought.     Had been getting pods at local [facility] but they aren't providing ordered supply and he is being charged copay each time.     He is following closely with podiatry. Will be having upcoming surgery in January to manage some bone regrowth.     BG monitoring per Dexcom G6.     Hypoglycemia: Rare  Hypoglycemic Symptoms: often doesn't have symptoms.   Hypoglycemia Treatment: Juice or glucose tabs.     Exercise: no formal    Dietary Habits: Eats 3 meals/day. Rare snacking. Avoids sugary beverages.     Last DM education appointment: 12/8/2022        CURRENT DIABETIC MEDS:  Novolog in OmniPod 5  Insulin back up plan: Toujeo Max 24 units QHS, Novolog I:C ratio 1:5    Previous insulins:  Lantus    When did you start the current therapy you are on: 11/2022  Frequency of changing site/infusion set/tubing/cartridges: 3 days  Frequency of changing CGM: 10 days  Using bolus wizard: yes  Taking bolus with each meal: yes      INSULIN PUMP SETTINGS:  Basal:  0000 - 0.9 u/hr    I:C Ratio:  0000 - 1:6  0700 - 1:5  1000 - 1:6    ISF:  0000 - 50    Target glucose: 130    Active insulin time: 4 hours    Last Eye Exam: 6/2022, + proliferative  + RAJAN  "cataract.   Last Podiatry Exam: 12/2022, Past amputation of R third toe. S/P arthroplasty of the Lt. Hallux IP joint in July 2022.    REVIEW OF SYSTEMS  Constitutional: no c/o weakness, fatigue, weight loss.   Cardiac: no palpitations or chest pain.  Respiratory: no cough or dyspnea.   GI:  no c/o abdominal pain or nausea.   Skin: no rashes, lesions   Neuro: no numbness, tingling, or parasthesias.  Endocrine: denies polyphagia, polydipsia, polyuria    Personally reviewed Past Medical, Surgical, Social History.    Vital Signs  /60   Pulse 79   Ht 6' 1" (1.854 m)   Wt 103.9 kg (229 lb 0.9 oz)   BMI 30.22 kg/m²      Personally reviewed the below labs:    Hemoglobin A1C   Date Value Ref Range Status   12/20/2022 7.1 (H) 4.0 - 5.6 % Final     Comment:     ADA Screening Guidelines:  5.7-6.4%  Consistent with prediabetes  >or=6.5%  Consistent with diabetes    High levels of fetal hemoglobin interfere with the HbA1C  assay. Heterozygous hemoglobin variants (HbS, HgC, etc)do  not significantly interfere with this assay.   However, presence of multiple variants may affect accuracy.     09/21/2022 7.2 (H) 4.0 - 5.6 % Final     Comment:     ADA Screening Guidelines:  5.7-6.4%  Consistent with prediabetes  >or=6.5%  Consistent with diabetes    High levels of fetal hemoglobin interfere with the HbA1C  assay. Heterozygous hemoglobin variants (HbS, HgC, etc)do  not significantly interfere with this assay.   However, presence of multiple variants may affect accuracy.     06/22/2022 7.4 (H) 4.0 - 5.6 % Final     Comment:     ADA Screening Guidelines:  5.7-6.4%  Consistent with prediabetes  >or=6.5%  Consistent with diabetes    High levels of fetal hemoglobin interfere with the HbA1C  assay. Heterozygous hemoglobin variants (HbS, HgC, etc)do  not significantly interfere with this assay.   However, presence of multiple variants may affect accuracy.         Chemistry        Component Value Date/Time     12/20/2022 0831 "    K 4.7 12/20/2022 0831     12/20/2022 0831    CO2 26 12/20/2022 0831    BUN 21 12/20/2022 0831    CREATININE 1.3 12/20/2022 0831     (H) 12/20/2022 0831        Component Value Date/Time    CALCIUM 9.4 12/20/2022 0831    ALKPHOS 86 12/20/2022 0831    AST 19 12/20/2022 0831    ALT 17 12/20/2022 0831    BILITOT 0.6 12/20/2022 0831    ESTGFRAFRICA >60.0 06/22/2022 0848    EGFRNONAA 57.3 (A) 06/22/2022 0848          Lab Results   Component Value Date    CHOL 156 09/21/2022    CHOL 150 08/12/2021    CHOL 154 07/27/2020     Lab Results   Component Value Date    HDL 45 09/21/2022    HDL 46 08/12/2021    HDL 46 07/27/2020     Lab Results   Component Value Date    LDLCALC 98.4 09/21/2022    LDLCALC 91.4 08/12/2021    LDLCALC 95.8 07/27/2020     Lab Results   Component Value Date    TRIG 63 09/21/2022    TRIG 63 08/12/2021    TRIG 61 07/27/2020     Lab Results   Component Value Date    CHOLHDL 28.8 09/21/2022    CHOLHDL 30.7 08/12/2021    CHOLHDL 29.9 07/27/2020       Lab Results   Component Value Date    MICALBCREAT 412.0 (H) 04/22/2022     Lab Results   Component Value Date    TSH 1.789 02/09/2022       CrCl cannot be calculated (Patient's most recent lab result is older than the maximum 7 days allowed.).    Vit D, 25-Hydroxy   Date Value Ref Range Status   08/06/2018 35 30 - 96 ng/mL Final     Comment:     Vitamin D deficiency.........<10 ng/mL                              Vitamin D insufficiency......10-29 ng/mL       Vitamin D sufficiency........> or equal to 30 ng/mL  Vitamin D toxicity............>100 ng/mL         PHYSICAL EXAMINATION  Constitutional: Appears well, no distress  Neck: Supple, trachea midline.  Respiratory: CTA, even and unlabored.   Cardiovascular: RRR, no murmurs.  GI: active bowel sounds, no hernia  Skin: warm and dry  Neuro: oriented to person, place, time.  Feet: appropriate footwear.       DEXCOM DOWNLOAD: See media file for details. Fasting glucoses are acceptable. Occasional  prandial excursions. Sometimes bolusing consecutively, which may result in hypoglycemia.     Time in automated mode: 100%    Average TDD: 50 units  Basal: 45% (22.7u)  Bolus: 55%    Average glucose: 155 mg/dL  Above 250 mg/dL: 2 %  181-250 mg/dL: 18 %   mg/dL: 77 %  54-69 mg/dL: 2 %  Below 54 mg/dL: 0 %         Goals        HEMOGLOBIN A1C < 7.5           due to hypoglycemia unawareness      Assessment/Plan  1. Type 1 diabetes mellitus with diabetic autonomic neuropathy  -- A1c acceptable. Appears to have less variability with OmniPod 5. Explained that giving additional meal bolus after the meal may be causing his occasional hypoglycemia. Urged caution with this.   -- Not enough of an established pattern to warrant pump setting change today.   -- will send rx for pods to [facility] in   -- has insulin pens for back up  -- BG monitoring per Dexcom    -- Discussed diagnosis of DM, A1c goals, progression of disease, long term complications and tx options.    -- Reviewed hypoglycemia management: treat with 1/2 glass of juice, 1/2 can regular coke, or 4 glucose tablets. Monitor and repeat treatment every 15 minutes until BG is >70 Then have a snack, which includes a complex carbohydrate and protein.  Advised patient to check BG before activities, such as driving or exercise.   2. Type 1 diabetes mellitus with diabetic nephropathy  -- uncontrolled.   -- continue losartan   3. Proliferative diabetic retinopathy without macular edema associated with type 1 diabetes mellitus, unspecified laterality  -- stable  -- keep follow ups   4. Essential hypertension  -- controlled  -- continue current meds   5. Hyperlipidemia, unspecified hyperlipidemia type  -- controlled  -- continue atorvastatin   6. Hypoglycemia unawareness in Type 1 DM -- continue Dexcom.   -- has glucagon   7. Insulin pump status  -- report viewed.          FOLLOW UP   Follow up in about 3 months (around 3/27/2023).   Patient instructed to bring  BG logs to each follow up   Patient encouraged to call for any BG/medication issues, concerns, or questions.     Orders Placed This Encounter   Procedures    Basic Metabolic Panel    Hemoglobin A1C    Microalbumin/Creatinine Ratio, Urine

## 2022-12-30 ENCOUNTER — PATIENT MESSAGE (OUTPATIENT)
Dept: AUDIOLOGY | Facility: CLINIC | Age: 67
End: 2022-12-30
Payer: COMMERCIAL

## 2023-01-03 ENCOUNTER — PATIENT MESSAGE (OUTPATIENT)
Dept: ENDOCRINOLOGY | Facility: CLINIC | Age: 68
End: 2023-01-03
Payer: COMMERCIAL

## 2023-01-03 DIAGNOSIS — E10.43 TYPE 1 DIABETES MELLITUS WITH DIABETIC AUTONOMIC NEUROPATHY: ICD-10-CM

## 2023-01-03 NOTE — TELEPHONE ENCOUNTER
Patient's insurance is telling him that his Omnipod 5 pods will be free at [facility]. Please call then at [phone] to inquire. If covered, how do we send them the prescription? Don't see option to e-scribe

## 2023-01-04 ENCOUNTER — PATIENT MESSAGE (OUTPATIENT)
Dept: ENDOCRINOLOGY | Facility: CLINIC | Age: 68
End: 2023-01-04
Payer: COMMERCIAL

## 2023-01-05 ENCOUNTER — PATIENT MESSAGE (OUTPATIENT)
Dept: PODIATRY | Facility: CLINIC | Age: 68
End: 2023-01-05
Payer: COMMERCIAL

## 2023-01-12 ENCOUNTER — PATIENT MESSAGE (OUTPATIENT)
Dept: PODIATRY | Facility: CLINIC | Age: 68
End: 2023-01-12
Payer: COMMERCIAL

## 2023-01-14 ENCOUNTER — PATIENT MESSAGE (OUTPATIENT)
Dept: SURGERY | Facility: HOSPITAL | Age: 68
End: 2023-01-14
Payer: COMMERCIAL

## 2023-01-16 ENCOUNTER — ANESTHESIA EVENT (OUTPATIENT)
Dept: SURGERY | Facility: HOSPITAL | Age: 68
End: 2023-01-16
Payer: MEDICARE

## 2023-01-17 ENCOUNTER — HOSPITAL ENCOUNTER (OUTPATIENT)
Facility: HOSPITAL | Age: 68
Discharge: HOME | End: 2023-01-17
Attending: PODIATRIST | Admitting: PODIATRIST
Payer: COMMERCIAL

## 2023-01-17 ENCOUNTER — HOSPITAL ENCOUNTER (OUTPATIENT)
Dept: RADIOLOGY | Facility: HOSPITAL | Age: 68
Discharge: HOME | End: 2023-01-17
Attending: PODIATRIST | Admitting: PODIATRIST
Payer: MEDICARE

## 2023-01-17 ENCOUNTER — ANESTHESIA (OUTPATIENT)
Dept: SURGERY | Facility: HOSPITAL | Age: 68
End: 2023-01-17
Payer: COMMERCIAL

## 2023-01-17 VITALS
HEIGHT: 73 IN | BODY MASS INDEX: 29.16 KG/M2 | WEIGHT: 220 LBS | RESPIRATION RATE: 16 BRPM | OXYGEN SATURATION: 97 % | HEART RATE: 68 BPM | TEMPERATURE: 98 F | SYSTOLIC BLOOD PRESSURE: 125 MMHG | DIASTOLIC BLOOD PRESSURE: 67 MMHG

## 2023-01-17 DIAGNOSIS — M77.52 BONE SPUR OF LEFT FOOT: Primary | ICD-10-CM

## 2023-01-17 LAB — GLUCOSE SERPL-MCNC: 121 MG/DL (ref 70–110)

## 2023-01-17 PROCEDURE — 82962 GLUCOSE BLOOD TEST: CPT | Mod: PO | Performed by: PODIATRIST

## 2023-01-17 PROCEDURE — 28124 PR PART REMV PHALANX OF TOE: ICD-10-PCS | Mod: TA,,, | Performed by: PODIATRIST

## 2023-01-17 PROCEDURE — 25000003 PHARM REV CODE 250: Mod: PO | Performed by: NURSE ANESTHETIST, CERTIFIED REGISTERED

## 2023-01-17 PROCEDURE — D9220A PRA ANESTHESIA: Mod: ANES,,, | Performed by: ANESTHESIOLOGY

## 2023-01-17 PROCEDURE — 73630 X-RAY EXAM OF FOOT: CPT | Mod: 26,LT,, | Performed by: RADIOLOGY

## 2023-01-17 PROCEDURE — 71000033 HC RECOVERY, INTIAL HOUR: Mod: PO | Performed by: PODIATRIST

## 2023-01-17 PROCEDURE — 63600175 PHARM REV CODE 636 W HCPCS: Mod: PO | Performed by: NURSE ANESTHETIST, CERTIFIED REGISTERED

## 2023-01-17 PROCEDURE — 73630 X-RAY EXAM OF FOOT: CPT | Mod: TC,FY,PO,LT

## 2023-01-17 PROCEDURE — 63600175 PHARM REV CODE 636 W HCPCS: Mod: PO | Performed by: ANESTHESIOLOGY

## 2023-01-17 PROCEDURE — 27201423 OPTIME MED/SURG SUP & DEVICES STERILE SUPPLY: Mod: PO | Performed by: PODIATRIST

## 2023-01-17 PROCEDURE — D9220A PRA ANESTHESIA: Mod: CRNA,,, | Performed by: NURSE ANESTHETIST, CERTIFIED REGISTERED

## 2023-01-17 PROCEDURE — 71000015 HC POSTOP RECOV 1ST HR: Mod: PO | Performed by: PODIATRIST

## 2023-01-17 PROCEDURE — 63600175 PHARM REV CODE 636 W HCPCS: Mod: PO | Performed by: PODIATRIST

## 2023-01-17 PROCEDURE — 36000707: Mod: PO | Performed by: PODIATRIST

## 2023-01-17 PROCEDURE — D9220A PRA ANESTHESIA: ICD-10-PCS | Mod: ANES,,, | Performed by: ANESTHESIOLOGY

## 2023-01-17 PROCEDURE — 73630 XR FOOT COMPLETE 3 VIEW LEFT: ICD-10-PCS | Mod: 26,LT,, | Performed by: RADIOLOGY

## 2023-01-17 PROCEDURE — 25000003 PHARM REV CODE 250: Mod: PO | Performed by: PODIATRIST

## 2023-01-17 PROCEDURE — 37000008 HC ANESTHESIA 1ST 15 MINUTES: Mod: PO | Performed by: PODIATRIST

## 2023-01-17 PROCEDURE — 28124 PARTIAL REMOVAL OF TOE: CPT | Mod: TA,,, | Performed by: PODIATRIST

## 2023-01-17 PROCEDURE — D9220A PRA ANESTHESIA: ICD-10-PCS | Mod: CRNA,,, | Performed by: NURSE ANESTHETIST, CERTIFIED REGISTERED

## 2023-01-17 PROCEDURE — 36000706: Mod: PO | Performed by: PODIATRIST

## 2023-01-17 PROCEDURE — 37000009 HC ANESTHESIA EA ADD 15 MINS: Mod: PO | Performed by: PODIATRIST

## 2023-01-17 RX ORDER — LIDOCAINE HYDROCHLORIDE 20 MG/ML
INJECTION INTRAVENOUS
Status: DISCONTINUED | OUTPATIENT
Start: 2023-01-17 | End: 2023-01-17

## 2023-01-17 RX ORDER — HYDROCODONE BITARTRATE AND ACETAMINOPHEN 5; 325 MG/1; MG/1
1 TABLET ORAL EVERY 6 HOURS PRN
Qty: 28 TABLET | Refills: 0 | Status: SHIPPED | OUTPATIENT
Start: 2023-01-17 | End: 2023-03-28

## 2023-01-17 RX ORDER — SODIUM CHLORIDE, SODIUM LACTATE, POTASSIUM CHLORIDE, CALCIUM CHLORIDE 600; 310; 30; 20 MG/100ML; MG/100ML; MG/100ML; MG/100ML
INJECTION, SOLUTION INTRAVENOUS CONTINUOUS
Status: DISCONTINUED | OUTPATIENT
Start: 2023-01-17 | End: 2023-01-17 | Stop reason: HOSPADM

## 2023-01-17 RX ORDER — LIDOCAINE HYDROCHLORIDE 10 MG/ML
INJECTION, SOLUTION EPIDURAL; INFILTRATION; INTRACAUDAL; PERINEURAL
Status: DISCONTINUED | OUTPATIENT
Start: 2023-01-17 | End: 2023-01-17 | Stop reason: HOSPADM

## 2023-01-17 RX ORDER — PHENYLEPHRINE HYDROCHLORIDE 10 MG/ML
INJECTION INTRAVENOUS
Status: DISCONTINUED | OUTPATIENT
Start: 2023-01-17 | End: 2023-01-17

## 2023-01-17 RX ORDER — BUPIVACAINE HYDROCHLORIDE 5 MG/ML
INJECTION, SOLUTION EPIDURAL; INTRACAUDAL
Status: DISCONTINUED | OUTPATIENT
Start: 2023-01-17 | End: 2023-01-17 | Stop reason: HOSPADM

## 2023-01-17 RX ORDER — PROPOFOL 10 MG/ML
VIAL (ML) INTRAVENOUS CONTINUOUS PRN
Status: DISCONTINUED | OUTPATIENT
Start: 2023-01-17 | End: 2023-01-17

## 2023-01-17 RX ORDER — MIDAZOLAM HYDROCHLORIDE 1 MG/ML
INJECTION INTRAMUSCULAR; INTRAVENOUS
Status: DISCONTINUED | OUTPATIENT
Start: 2023-01-17 | End: 2023-01-17

## 2023-01-17 RX ORDER — FENTANYL CITRATE 50 UG/ML
25 INJECTION, SOLUTION INTRAMUSCULAR; INTRAVENOUS EVERY 5 MIN PRN
Status: DISCONTINUED | OUTPATIENT
Start: 2023-01-17 | End: 2023-01-17 | Stop reason: HOSPADM

## 2023-01-17 RX ORDER — DEXMEDETOMIDINE HYDROCHLORIDE 100 UG/ML
INJECTION, SOLUTION INTRAVENOUS
Status: DISCONTINUED | OUTPATIENT
Start: 2023-01-17 | End: 2023-01-17

## 2023-01-17 RX ORDER — HYDROCODONE BITARTRATE AND ACETAMINOPHEN 5; 325 MG/1; MG/1
1 TABLET ORAL EVERY 4 HOURS PRN
Status: DISCONTINUED | OUTPATIENT
Start: 2023-01-17 | End: 2023-01-17 | Stop reason: HOSPADM

## 2023-01-17 RX ORDER — LIDOCAINE HYDROCHLORIDE 10 MG/ML
1 INJECTION, SOLUTION EPIDURAL; INFILTRATION; INTRACAUDAL; PERINEURAL ONCE
Status: DISCONTINUED | OUTPATIENT
Start: 2023-01-17 | End: 2023-01-17 | Stop reason: HOSPADM

## 2023-01-17 RX ORDER — ONDANSETRON 2 MG/ML
INJECTION INTRAMUSCULAR; INTRAVENOUS
Status: DISCONTINUED | OUTPATIENT
Start: 2023-01-17 | End: 2023-01-17

## 2023-01-17 RX ORDER — HYDROMORPHONE HYDROCHLORIDE 2 MG/ML
0.2 INJECTION, SOLUTION INTRAMUSCULAR; INTRAVENOUS; SUBCUTANEOUS EVERY 5 MIN PRN
Status: DISCONTINUED | OUTPATIENT
Start: 2023-01-17 | End: 2023-01-17 | Stop reason: HOSPADM

## 2023-01-17 RX ORDER — PROPOFOL 10 MG/ML
VIAL (ML) INTRAVENOUS
Status: DISCONTINUED | OUTPATIENT
Start: 2023-01-17 | End: 2023-01-17

## 2023-01-17 RX ORDER — OXYCODONE HYDROCHLORIDE 5 MG/1
5 TABLET ORAL
Status: DISCONTINUED | OUTPATIENT
Start: 2023-01-17 | End: 2023-01-17 | Stop reason: HOSPADM

## 2023-01-17 RX ADMIN — PHENYLEPHRINE HYDROCHLORIDE 100 MCG: 10 INJECTION INTRAVENOUS at 10:01

## 2023-01-17 RX ADMIN — LIDOCAINE HYDROCHLORIDE 100 MG: 20 INJECTION INTRAVENOUS at 10:01

## 2023-01-17 RX ADMIN — PROPOFOL 100 MG: 10 INJECTION, EMULSION INTRAVENOUS at 10:01

## 2023-01-17 RX ADMIN — DEXMEDETOMIDINE HYDROCHLORIDE 8 MCG: 100 INJECTION, SOLUTION, CONCENTRATE INTRAVENOUS at 10:01

## 2023-01-17 RX ADMIN — PROPOFOL 150 MCG/KG/MIN: 10 INJECTION, EMULSION INTRAVENOUS at 10:01

## 2023-01-17 RX ADMIN — PHENYLEPHRINE HYDROCHLORIDE 100 MCG: 10 INJECTION INTRAVENOUS at 11:01

## 2023-01-17 RX ADMIN — DEXTROSE 2 G: 50 INJECTION, SOLUTION INTRAVENOUS at 10:01

## 2023-01-17 RX ADMIN — SODIUM CHLORIDE, POTASSIUM CHLORIDE, SODIUM LACTATE AND CALCIUM CHLORIDE: 600; 310; 30; 20 INJECTION, SOLUTION INTRAVENOUS at 09:01

## 2023-01-17 RX ADMIN — MIDAZOLAM HYDROCHLORIDE 2 MG: 1 INJECTION, SOLUTION INTRAMUSCULAR; INTRAVENOUS at 10:01

## 2023-01-17 RX ADMIN — GLYCOPYRROLATE 0.2 MG: 0.2 INJECTION, SOLUTION INTRAMUSCULAR; INTRAVITREAL at 10:01

## 2023-01-17 RX ADMIN — DEXMEDETOMIDINE HYDROCHLORIDE 4 MCG: 100 INJECTION, SOLUTION, CONCENTRATE INTRAVENOUS at 11:01

## 2023-01-17 RX ADMIN — PHENYLEPHRINE HYDROCHLORIDE 200 MCG: 10 INJECTION INTRAVENOUS at 10:01

## 2023-01-17 RX ADMIN — ONDANSETRON 4 MG: 2 INJECTION, SOLUTION INTRAMUSCULAR; INTRAVENOUS at 11:01

## 2023-01-17 NOTE — ANESTHESIA POSTPROCEDURE EVALUATION
Anesthesia Post Evaluation    Patient: Hammad Douglas III    Procedure(s) Performed: Procedure(s) (LRB):  EXCISION, BONE SPUR (Left)    Final Anesthesia Type: MAC      Patient location during evaluation: PACU  Patient participation: Yes- Able to Participate  Level of consciousness: sedated and awake  Post-procedure vital signs: reviewed and stable  Pain management: adequate  Airway patency: patent    PONV status at discharge: No PONV  Anesthetic complications: no      Cardiovascular status: blood pressure returned to baseline  Respiratory status: spontaneous ventilation  Hydration status: euvolemic  Follow-up not needed.          Vitals Value Taken Time   /67 01/17/23 1216   Temp 36.5 °C (97.7 °F) 01/17/23 1124   Pulse 68 01/17/23 1210   Resp 16 01/17/23 1210   SpO2 97 % 01/17/23 1210         Event Time   Out of Recovery 12:16:34         Pain/Alex Score: Alex Score: 10 (1/17/2023 12:16 PM)

## 2023-01-17 NOTE — INTERVAL H&P NOTE
The patient has been examined and the H&P has been reviewed:    I concur with the findings and no changes have occurred since H&P was written.    Anesthesia risks, benefits and alternative options discussed and understood by patient/family.  Agree with H&P.  Jostin Resendiz MD        There are no hospital problems to display for this patient.

## 2023-01-17 NOTE — TRANSFER OF CARE
"Anesthesia Transfer of Care Note    Patient: Hammad Douglas III    Procedure(s) Performed: Procedure(s) (LRB):  EXCISION, BONE SPUR (Left)    Patient location: PACU    Anesthesia Type: general    Transport from OR: Transported from OR on 6-10 L/min O2 by face mask with adequate spontaneous ventilation    Post pain: adequate analgesia    Post assessment: no apparent anesthetic complications and tolerated procedure well    Post vital signs: stable    Level of consciousness: awake and responds to stimulation    Nausea/Vomiting: no nausea/vomiting    Complications: none    Transfer of care protocol was followed      Last vitals:   Visit Vitals  BP (!) 170/74 (BP Location: Right arm, Patient Position: Lying)   Pulse 70   Temp 36.4 °C (97.5 °F)   Resp 16   Ht 6' 1" (1.854 m)   Wt 99.8 kg (220 lb)   SpO2 97%   BMI 29.03 kg/m²     "

## 2023-01-17 NOTE — DISCHARGE INSTRUCTIONS
FOOT SURGERY  After surgery:    DO:  Keep leg elevated until first post operative visit.  Keep ice pack on foot for 20 minutes each hour while awake for next 24-48 hours.  Keep dressing clean and dry. Do not change the bandages.  Advance diet as tolerated.   Check circulation frequently in toes by pressing down on toenail. Nail should turn white and then pink WITHIN 3 SECONDS when released.  Wear surgical shoe/boot. May remove to shower.  Do not walk without shoe/boot.  Resume home medications.    DO NOT:  Do not remove your dressing.  Do not get dressing wet.  Do not drive until released by your doctor.  Do not take additional tylenol/acetaminophen while taking narcotic pain medication that contains tylenol/acetaminophen.     CALL PHYSICIAN FOR:  Burning, or numbness of the toes not relieved by elevation of the leg.  Pale or cold toes.  Blue colored nail beds.  Redness, swelling, or bleeding.  Fever greater than 101,  Drainage (pus) from the operative site.  Pain unrelieved by pain medication.    FOR EMERGENCIES CONTACT YOUR PHYSICIAN'S OFFICE  [phone]

## 2023-01-17 NOTE — H&P
Subjective:       Patient ID: Hammad Douglas III is a 67 y.o. male.     Chief Complaint: Follow-up        Patient presents to clinic with the chief complaint of a recurring wound to the plantar aspect of the Lt. Great toe.  Notes continued application of antibiotic ointment and a band aid to the area.  He also has been padding the site with a toe sleeve and wearing shoes that minimize pressure to the forefoot.  Inquires as to additional options to resolve this issue.   Denies any additional pedal complaints.            Past Medical History:   Diagnosis Date    Cataract       Done OU    Chronic kidney disease      Diabetes mellitus type I       Diagnosed at age 19    Diabetic retinopathy       See's Dr. Ledesma    Hyperlipidemia      Hypertension                 Past Surgical History:   Procedure Laterality Date    ARTHROPLASTY OF TOE Left 7/14/2022     Procedure: ARTHROPLASTY, TOE;  Surgeon: Caleb Duffy DPM;  Location: [facility] OR;  Service: Podiatry;  Laterality: Left;  Left Hallux    CATARACT EXTRACTION W/  INTRAOCULAR LENS IMPLANT Right 09/28/2021     Dr Solis    CATARACT EXTRACTION W/  INTRAOCULAR LENS IMPLANT Left 02/22/2022     Dr Solis    COLONOSCOPY         2011 wnl    PHACOEMULSIFICATION OF CATARACT Right 9/28/2021     Procedure: PHACOEMULSIFICATION, CATARACT;  Surgeon: Vicente Solis Jr., MD;  Location: [facility] OR;  Service: Ophthalmology;  Laterality: Right;  DM/right    PHACOEMULSIFICATION OF CATARACT Left 2/22/2022     Procedure: PHACOEMULSIFICATION, CATARACT;  Surgeon: Vicente Solis Jr., MD;  Location: [facility] OR;  Service: Ophthalmology;  Laterality: Left;  left    SURGICAL REMOVAL OF BONE SPUR Left 10/14/2021     Procedure: EXCISION, BONE SPUR;  Surgeon: Caleb Duffy DPM;  Location: [facility] OR;  Service: Podiatry;  Laterality: Left;    TOE AMPUTATION Right 7/2/2020     Procedure: AMPUTATION, TOE; 3rd;  Surgeon: Caleb Duffy DPM;  Location: [facility] OR;  Service: Podiatry;  Laterality: Right;    TOE  "SURGERY Right       right big toe               Family History   Problem Relation Age of Onset    Cataracts Mother      Cataracts Father      Breast cancer Sister      Glaucoma Neg Hx      Macular degeneration Neg Hx      Retinal detachment Neg Hx           Social History            Socioeconomic History    Marital status:    Tobacco Use    Smoking status: Former       Types: Cigars    Smokeless tobacco: Never    Tobacco comments:       Former cigar use   Substance and Sexual Activity    Alcohol use: Not Currently    Drug use: No    Sexual activity: Yes       Partners: Female   Social History Narrative     Retired former radiation  at [employer]         Current Medications          Current Outpatient Medications   Medication Sig Dispense Refill    atorvastatin (LIPITOR) 40 MG tablet TAKE 1 TABLET(40 MG) BY MOUTH EVERY DAY 90 tablet 3    FLUoxetine 10 MG capsule Take 3 capsules (30 mg total) by mouth once daily. 270 capsule 1    FLUoxetine 20 MG capsule TAKE ONE CAPSULE BY MOUTH EVERY DAY 90 capsule 2    insulin aspart U-100 (NOVOLOG FLEXPEN U-100 INSULIN) 100 unit/mL (3 mL) InPn pen INJECT USING INSULIN:CARB RATIO OF 1:6 BEFORE A MEAL. MAX TOTAL DAILY DOSE 40 UNITS 45 mL 3    insulin aspart U-100 (NOVOLOG U-100 INSULIN ASPART) 100 unit/mL injection Use continuously in insulin pump , max TDD 60u 20 mL 6    insulin glargine U-300 conc (TOUJEO MAX U-300 SOLOSTAR) 300 unit/mL (3 mL) insulin pen ADMINISTER 24 UNITS UNDER THE SKIN EVERY EVENING 2 pen 6    losartan (COZAAR) 100 MG tablet TAKE 1 TABLET(100 MG) BY MOUTH EVERY DAY 90 tablet 3    OMNIPOD 5 G6 INTRO KIT, GEN 5, Crtg Inject 1 Device into the skin continuous. 1 each 0    OMNIPOD 5 G6 PODS, GEN 5, Crtg Inject 1 Device into the skin Every 3 (three) days. 30 each 3    pen needle, diabetic (BD ULTRA-FINE MINI PEN NEEDLE) 31 gauge x 3/16" Ndle USE 1 FOUR TIMES DAILY 150 each 11                Current Facility-Administered Medications "   Medication Dose Route Frequency Provider Last Rate Last Admin    glucagon (human recombinant) injection 1 mg  1 mg Intramuscular Once Tania Mckeon NP                      Review of patient's allergies indicates:   Allergen Reactions    Altace [ramipril]         Cough            Review of Systems   Constitution: Negative for chills and fever.   Cardiovascular: Negative for claudication and leg swelling.   Skin: Negative for color change, dry skin and poor wound healing.   Musculoskeletal: Negative for joint pain, joint swelling, muscle cramps and muscle weakness.   Neurological: Positive for numbness.   Psychiatric/Behavioral: Negative for altered mental status.             Objective:   Physical Exam   Constitutional: He appears well-developed and well-nourished. No distress.   Cardiovascular:   Pulses:       Dorsalis pedis pulses are 2+ on the right side, and 2+ on the left side.        Posterior tibial pulses are 1+ on the right side, and 1+ on the left side.   CFT is < 3 seconds bilateral.  Pedal hair growth is present bilateral.  Varicosities noted bilateral.  No lower extremity edema noted.  Toes are warm to touch bilateral.     Musculoskeletal: He exhibits no edema or tenderness.   Muscle strength 5/5 in all muscle groups bilateral.  No tenderness nor crepitation with ROM of foot/ankle joints bilateral.  No tenderness with palpation of bilateral foot and ankle.  Semi-reducible contracture of the lesser digits bilateral.   Amputation of Rt. 3rd toe.  Neurological: He has normal strength. A sensory deficit is present.   Protective sensation per [place]ayson monofilament is decreased bilateral.  Light touch is intact bilateral.   Skin: Pre-ulcerative callus and dried blister noted slightly proximal to the Lt. Hallux IP joint. Underlying wound noted.  Wound bed is down to dermis and comprised of eschar.  Skylar wound is devoid of erythema, edema, fluctuance, purulence, and malodor.  Pre-debridement  measurement:  0.1 x 0.1 x 0.1cm.  Post-debridement measurement: 0.3 x 0.3 x 0.1cm.               Assessment:            Encounter Diagnoses   Name Primary?    Bone spur of foot Yes    Diabetic ulcer of toe of left foot associated with type 1 diabetes mellitus, limited to breakdown of skin      Type 1 diabetes mellitus with polyneuropathy               Plan:       Hammad was seen today for follow-up.     Diagnoses and all orders for this visit:     Bone spur of foot     Diabetic ulcer of toe of left foot associated with type 1 diabetes mellitus, limited to breakdown of skin     Type 1 diabetes mellitus with polyneuropathy           I counseled the patient on his conditions, their implications and medical management.     Performed a selective excisional debridement of the Lt. foot.  See attached procedure note.       The wound base was covered with antibiotic ointment and a band aid.     Advised to continue wearing a sleeve to protect the digit with ambulation.       To continue wearing motion control tennis shoe to minimize pressure to the site.     Instructed to continue changing his bandage each day.     Instructed to call if the site begins to appear infected.     Based on current films, the site of the Lt. Hallux IP joint continues to ulcerate on account of new hypertrophic bone growth at the site of resection.  Recommend resection of the new bone growth and capping with bone wax to prevent a recurrence.  Explained this would be an outpatient procedure under local MAC.  Patient is amenable to said plan.     Advised to begin the process of obtaining surgical clearance.     Will obtain informed written consent the day of the procedure.       Will place a case request after clearance has been obtained.  Likely this will take place on January 3rd, 2023.      RTC 1 week postop.     Caleb Duffy DPM

## 2023-01-17 NOTE — PLAN OF CARE
Received report from Jl KOLB RN. Patient sleeping, oral airway in place, 5L oxygen per face mask in use.

## 2023-01-17 NOTE — ANESTHESIA PREPROCEDURE EVALUATION
01/17/2023  Hammad Douglas III is a 67 y.o., male.    Pre-op Assessment    I have reviewed the Patient Summary Reports.    I have reviewed the Nursing Notes. I have reviewed the NPO Status.   I have reviewed the Medications.     Review of Systems  Cardiovascular:   Hypertension PVD ECG has been reviewed.    Pulmonary:  Pulmonary Normal    Renal/:   Chronic Renal Disease, CRI, CKD    Hepatic/GI:  Hepatic/GI Normal    Neurological:   Peripheral Neuropathy    Endocrine:   Diabetes        Physical Exam  General:  Well nourished      Airway/Jaw/Neck:  Airway Findings: Mouth Opening: Normal   Tongue: Normal   General Airway Assessment: Adult Mallampati: III  Improves to II with phonation.      Dental:  Dental Findings: In tact     Chest/Lungs:  Chest/Lungs Findings: Clear to auscultation, Normal Respiratory Rate      Heart/Vascular:  Heart Findings: Rate: Normal  Rhythm: Regular Rhythm  Sounds: Normal        Mental Status:  Mental Status Findings:  Cooperative, Alert and Oriented         Anesthesia Plan  Type of Anesthesia, risks & benefits discussed:  Anesthesia Type:  MAC    Patient's Preference:   Plan Factors:          Intra-op Monitoring Plan: standard ASA monitors  Intra-op Monitoring Plan Comments:   Post Op Pain Control Plan: multimodal analgesia and IV/PO Opioids PRN  Post Op Pain Control Plan Comments:     Induction:   IV  Beta Blocker:  Patient is not currently on a Beta-Blocker (No further documentation required).       Informed Consent: Informed consent signed with the Patient and all parties understand the risks and agree with anesthesia plan.  All questions answered.  Anesthesia consent signed with patient.  ASA Score: 3     Day of Surgery Review of History & Physical: I have interviewed and examined the patient. I have reviewed the patient's H&P dated:  There are no significant changes.             Ready For Surgery From Anesthesia Perspective.           Physical Exam  General: Well nourished    Airway:  Mallampati: III / II  Mouth Opening: Normal  Tongue: Normal    Dental:  In tact    Chest/Lungs:  Clear to auscultation, Normal Respiratory Rate    Heart:  Rate: Normal  Rhythm: Regular Rhythm  Sounds: Normal          Anesthesia Plan  Type of Anesthesia, risks & benefits discussed:    Anesthesia Type: MAC  Intra-op Monitoring Plan: standard ASA monitors  Post Op Pain Control Plan: multimodal analgesia and IV/PO Opioids PRN  Induction:  IV  Informed Consent: Informed consent signed with the Patient and all parties understand the risks and agree with anesthesia plan.  All questions answered.   ASA Score: 3  Day of Surgery Review of History & Physical: I have interviewed and examined the patient. I have reviewed the patient's H&P dated:     Ready For Surgery From Anesthesia Perspective.       .

## 2023-01-18 ENCOUNTER — PATIENT MESSAGE (OUTPATIENT)
Dept: PODIATRY | Facility: CLINIC | Age: 68
End: 2023-01-18
Payer: COMMERCIAL

## 2023-01-18 NOTE — OP NOTE
Op Note:     Patient name: Hammad Douglas III  MRN: [id number]  Date of surgery: 1/17/23     Surgeon: Dr. Tati DPM     Preoperative diagnosis: Bone spur of proximal phalanx of hallux, Lt.  Postoperative diagnosis: Same  Procedure: Partial resection of proximal phalanx of hallux, Lt.  Anesthesia: Local MAC  Hemostasis: Pneumatic tourniquet  Estimated blood loss: < 1 mL  Specimen: None  Culture: None  Complications: None  Condition upon discharge: Stable     Procedure in detail: Under mild sedation, patient was brought into the OR and placed supine on the OR table.  A pneumatic tourniquet was applied to the Lt. Ankle.  Following IV sedation, 10 mL of a 1:1 mixture of 1% lidocaine plain and 0.5% sensorcaine plain was infiltrated in a Moya block.  A timeout was then performed taking care to identify the correct patient, procedure, and extremity to which all present were in agreement.  The limb was then scrubbed, draped, and prepped in the usual aseptic manner.  An esmarch bandage was used to exsanguinate the extremity, with the tourniquet then inflated to 250 mmHg.       Attention was directed slightly proximal to the dorsum of the Lt. Hallux IP joint.  A #15 scalpel was used to make a lazy S-shaped incision overlying said joint.  Dissection was carried through subcutaneous tissues taking care to identify and retract all major neurovascular structures.  The extensor hallucis longus tendon was identified and transected to allow for better exposure of the IP joint capsule.  This too was transversely incised taking care to reflect the joint capsule and underlying periosteum both proximal and distal to allow better exposure of the remaining distal end of proximal phalanx and its corresponding bone spur.  A sagittal bone saw was then used to resect the spur.  This was then removed from the operative field.  All rough edges at the site of resection were smoothed with a bone rasp. The site was then irrigated with normal sterile  saline. The new distal end of the proximal phalanx was capped with bone wax. The EHL tendon, joint capsule, and subQ tissues were re-approximated with 3-0 and 4-0 vicryl.  The skin was re-approximated with 3-0 prolene utilizing both a simple interrupted  and horizontal mattress suture technique.  The tourniquet was then deflated with a prompt hyperemic response noted to all digits of the surgical extremity.       Following the procedure, the incision was covered with xerform, and a football dressing was then applied.  The patient was noted to have tolerated the procedure and anesthesia quite well.  He was then transported to recovery with all vitals and vascular status noted to be intact.  After a brief period of postoperative monitoring, he was discharged with the following verbal and written instructions:      1. Keep dressings, clean, dry, and intact to surgical extremity.  2. Rest, ice, and elevate the surgical extremity.  3. Weight bearing to heel of surgical extremity in postoperative shoe.  4. Take all medication as discussed at discharge.  5. Contact the clinic with any postoperative concerns or complications.  6. Follow up in one week for 1st post op.     Caleb Duffy DPM

## 2023-01-20 ENCOUNTER — PATIENT MESSAGE (OUTPATIENT)
Dept: PODIATRY | Facility: CLINIC | Age: 68
End: 2023-01-20
Payer: COMMERCIAL

## 2023-01-20 DIAGNOSIS — G89.18 POSTOPERATIVE PAIN: Primary | ICD-10-CM

## 2023-01-20 RX ORDER — GABAPENTIN 300 MG/1
300 CAPSULE ORAL 2 TIMES DAILY
Qty: 60 CAPSULE | Refills: 0 | Status: SHIPPED | OUTPATIENT
Start: 2023-01-20 | End: 2023-03-28

## 2023-01-26 ENCOUNTER — OFFICE VISIT (OUTPATIENT)
Dept: PODIATRY | Facility: CLINIC | Age: 68
End: 2023-01-26
Payer: COMMERCIAL

## 2023-01-26 VITALS — WEIGHT: 220 LBS | BODY MASS INDEX: 33.34 KG/M2 | HEIGHT: 68 IN

## 2023-01-26 DIAGNOSIS — E10.42 TYPE 1 DIABETES MELLITUS WITH POLYNEUROPATHY: ICD-10-CM

## 2023-01-26 DIAGNOSIS — G89.18 POSTOPERATIVE PAIN: Primary | ICD-10-CM

## 2023-01-26 DIAGNOSIS — R20.2 PARESTHESIA OF LEFT FOOT: ICD-10-CM

## 2023-01-26 PROCEDURE — 99024 PR POST-OP FOLLOW-UP VISIT: ICD-10-PCS | Mod: S$GLB,,, | Performed by: PODIATRIST

## 2023-01-26 PROCEDURE — 1159F PR MEDICATION LIST DOCUMENTED IN MEDICAL RECORD: ICD-10-PCS | Mod: CPTII,S$GLB,, | Performed by: PODIATRIST

## 2023-01-26 PROCEDURE — 1126F PR PAIN SEVERITY QUANTIFIED, NO PAIN PRESENT: ICD-10-PCS | Mod: CPTII,S$GLB,, | Performed by: PODIATRIST

## 2023-01-26 PROCEDURE — 3288F FALL RISK ASSESSMENT DOCD: CPT | Mod: CPTII,S$GLB,, | Performed by: PODIATRIST

## 2023-01-26 PROCEDURE — 3008F BODY MASS INDEX DOCD: CPT | Mod: CPTII,S$GLB,, | Performed by: PODIATRIST

## 2023-01-26 PROCEDURE — 99999 PR PBB SHADOW E&M-EST. PATIENT-LVL II: CPT | Mod: PBBFAC,,, | Performed by: PODIATRIST

## 2023-01-26 PROCEDURE — 1101F PT FALLS ASSESS-DOCD LE1/YR: CPT | Mod: CPTII,S$GLB,, | Performed by: PODIATRIST

## 2023-01-26 PROCEDURE — 3288F PR FALLS RISK ASSESSMENT DOCUMENTED: ICD-10-PCS | Mod: CPTII,S$GLB,, | Performed by: PODIATRIST

## 2023-01-26 PROCEDURE — 1159F MED LIST DOCD IN RCRD: CPT | Mod: CPTII,S$GLB,, | Performed by: PODIATRIST

## 2023-01-26 PROCEDURE — 99999 PR PBB SHADOW E&M-EST. PATIENT-LVL II: ICD-10-PCS | Mod: PBBFAC,,, | Performed by: PODIATRIST

## 2023-01-26 PROCEDURE — 99024 POSTOP FOLLOW-UP VISIT: CPT | Mod: S$GLB,,, | Performed by: PODIATRIST

## 2023-01-26 PROCEDURE — 1126F AMNT PAIN NOTED NONE PRSNT: CPT | Mod: CPTII,S$GLB,, | Performed by: PODIATRIST

## 2023-01-26 PROCEDURE — 3008F PR BODY MASS INDEX (BMI) DOCUMENTED: ICD-10-PCS | Mod: CPTII,S$GLB,, | Performed by: PODIATRIST

## 2023-01-26 PROCEDURE — 1101F PR PT FALLS ASSESS DOC 0-1 FALLS W/OUT INJ PAST YR: ICD-10-PCS | Mod: CPTII,S$GLB,, | Performed by: PODIATRIST

## 2023-01-26 NOTE — PROGRESS NOTES
Subjective:      Patient ID: Hammad Douglas III is a 67 y.o. male.    Chief Complaint: Post-op Evaluation    Patient presents to clinic 1 week S/P bone spur resection of the Lt. Hallux proximal phalanx.   Denies experiencing pain from the surgical extremity with today's exam.  Relates struggling for the past week with nerve pain near the operative site and around the heel.  States the only lingering pain is localized to the posterior heel.  Currently rates pain as a 0/10.  Symptoms are present while at rest.  Notes some improvement in symptoms since taking oral gabapentin.  Has kept the prior surgical dressing clean, dry, and intact for the past week.  He continues to limit weight bearing with use of crutches.  Denies experiencing chest pain, SOB, and calf/thigh pain.  Denies experiencing N/V/F/C/D.  Denies any additional pedal complaints.      Past Medical History:   Diagnosis Date    Cataract     Done OU    Chronic kidney disease     Diabetes mellitus type I     Diagnosed at age 19    Diabetic retinopathy     See's Dr. Ledesma    Hyperlipidemia     Hypertension        Past Surgical History:   Procedure Laterality Date    ARTHROPLASTY OF TOE Left 7/14/2022    Procedure: ARTHROPLASTY, TOE;  Surgeon: Caleb Duffy DPM;  Location: [facility] OR;  Service: Podiatry;  Laterality: Left;  Left Hallux    CATARACT EXTRACTION W/  INTRAOCULAR LENS IMPLANT Right 09/28/2021    Dr Solis    CATARACT EXTRACTION W/  INTRAOCULAR LENS IMPLANT Left 02/22/2022    Dr Solis    COLONOSCOPY      2011 wnl    PHACOEMULSIFICATION OF CATARACT Right 9/28/2021    Procedure: PHACOEMULSIFICATION, CATARACT;  Surgeon: Vicente Solis Jr., MD;  Location: [facility] OR;  Service: Ophthalmology;  Laterality: Right;  DM/right    PHACOEMULSIFICATION OF CATARACT Left 2/22/2022    Procedure: PHACOEMULSIFICATION, CATARACT;  Surgeon: Vicente Solis Jr., MD;  Location: [facility] OR;  Service: Ophthalmology;  Laterality: Left;  left    SURGICAL REMOVAL OF BONE SPUR Left  10/14/2021    Procedure: EXCISION, BONE SPUR;  Surgeon: Caleb Duffy DPM;  Location: [facility] OR;  Service: Podiatry;  Laterality: Left;    SURGICAL REMOVAL OF BONE SPUR Left 1/17/2023    Procedure: EXCISION, BONE SPUR;  Surgeon: Caleb Duffy DPM;  Location: [facility] OR;  Service: Podiatry;  Laterality: Left;    TOE AMPUTATION Right 7/2/2020    Procedure: AMPUTATION, TOE; 3rd;  Surgeon: Caleb Duffy DPM;  Location: [facility] OR;  Service: Podiatry;  Laterality: Right;    TOE SURGERY Right     right big toe       Family History   Problem Relation Age of Onset    Cataracts Mother     Cataracts Father     Breast cancer Sister     Glaucoma Neg Hx     Macular degeneration Neg Hx     Retinal detachment Neg Hx        Social History     Socioeconomic History    Marital status:    Tobacco Use    Smoking status: Former     Types: Cigars    Smokeless tobacco: Never    Tobacco comments:     Former cigar use   Substance and Sexual Activity    Alcohol use: Not Currently    Drug use: No    Sexual activity: Yes     Partners: Female   Social History Narrative    Retired former radiation  at [employer]       Current Outpatient Medications   Medication Sig Dispense Refill    atorvastatin (LIPITOR) 40 MG tablet TAKE 1 TABLET(40 MG) BY MOUTH EVERY DAY 90 tablet 3    FLUoxetine 10 MG capsule Take 3 capsules (30 mg total) by mouth once daily. 270 capsule 1    FLUoxetine 20 MG capsule TAKE ONE CAPSULE BY MOUTH EVERY DAY 90 capsule 2    gabapentin (NEURONTIN) 300 MG capsule Take 1 capsule (300 mg total) by mouth 2 (two) times daily. 60 capsule 0    HYDROcodone-acetaminophen (NORCO) 5-325 mg per tablet Take 1 tablet by mouth every 6 (six) hours as needed for Pain. 28 tablet 0    insulin aspart U-100 (NOVOLOG U-100 INSULIN ASPART) 100 unit/mL injection Use continuously in insulin pump , max TDD 60u 20 mL 6    losartan (COZAAR) 100 MG tablet TAKE 1 TABLET(100 MG) BY MOUTH EVERY DAY 90 tablet 3    multivitamin capsule Take 1  capsule by mouth once daily.      OMNIPOD 5 G6 INTRO KIT, GEN 5, Crtg Inject 1 Device into the skin continuous. 1 each 0    OMNIPOD 5 G6 PODS, GEN 5, Crtg Inject 1 Device into the skin Every 3 (three) days. 30 each 3     Current Facility-Administered Medications   Medication Dose Route Frequency Provider Last Rate Last Admin    glucagon (human recombinant) injection 1 mg  1 mg Intramuscular Once Tania Mckeon NP           Review of patient's allergies indicates:   Allergen Reactions    Altace [ramipril]      Cough         Review of Systems   Constitution: Negative for chills and fever.   Cardiovascular: Negative for claudication and leg swelling.   Skin: Negative for color change, dry skin and poor wound healing.   Musculoskeletal: Negative for joint pain, joint swelling, muscle cramps and muscle weakness.   Neurological: Positive for numbness.   Psychiatric/Behavioral: Negative for altered mental status.           Objective:      Physical Exam   Constitutional: He appears well-developed and well-nourished. No distress.   Cardiovascular:   Pulses:       Dorsalis pedis pulses are 2+ on the right side, and 2+ on the left side.        Posterior tibial pulses are 1+ on the right side, and 1+ on the left side.   CFT is < 3 seconds bilateral.  Pedal hair growth is present bilateral.  Varicosities noted bilateral.  Mild edema noted to the Lt. Hallux. Toes are warm to touch bilateral.     Musculoskeletal: He exhibits no edema or tenderness.   Muscle strength 5/5 in all muscle groups bilateral.  No tenderness nor crepitation with ROM of foot/ankle joints bilateral.  No tenderness with palpation of bilateral foot and ankle.  Semi-reducible contracture of the lesser digits bilateral.   Amputation of Rt. 3rd toe.  Neurological: He has normal strength. A sensory deficit is present.   Protective sensation per [place]-Jayson monofilament is decreased bilateral.  Light touch is intact bilateral.   Skin: Incision to the  dorsum of the Lt. Hallux IP joint is well approximated with all suture intact.  No evidence of dehiscence, necrosis, ischemia, or infection noted the length of the incision.  Prior plantar wound of the hallux is fully epithelialized.            Assessment:       Encounter Diagnoses   Name Primary?    Postoperative pain Yes    Paresthesia of left foot     Type 1 diabetes mellitus with polyneuropathy          Plan:       Hammad was seen today for post-op evaluation.    Diagnoses and all orders for this visit:    Postoperative pain    Paresthesia of left foot    Type 1 diabetes mellitus with polyneuropathy      I counseled the patient on his conditions, their implications and medical management.    Overall, satisfactory postoperative results noted.  Incision remains well maintained with suture, with no evidence of postoperative infection.     The incision site was painted with betadine, and a football dressing was applied     Advised to keep the dressing CDI x 1 week.     Advised to rest and elevate the affected extremity.     Instructed to minimize weight bearing to facilitate continued healing.     May ambulate around his home in the postop shoe.     To continue taking gabapentin to address paresthesias.  Explained it can take up to 6 weeks to reach therapeutic levels.      RTC in 1 week for follow up.     Caleb Duffy DPM

## 2023-02-02 ENCOUNTER — OFFICE VISIT (OUTPATIENT)
Dept: PODIATRY | Facility: CLINIC | Age: 68
End: 2023-02-02
Payer: COMMERCIAL

## 2023-02-02 DIAGNOSIS — Z98.890 POSTOPERATIVE STATE: Primary | ICD-10-CM

## 2023-02-02 DIAGNOSIS — E10.42 TYPE 1 DIABETES MELLITUS WITH POLYNEUROPATHY: ICD-10-CM

## 2023-02-02 PROCEDURE — 99024 PR POST-OP FOLLOW-UP VISIT: ICD-10-PCS | Mod: S$GLB,,, | Performed by: PODIATRIST

## 2023-02-02 PROCEDURE — 99024 POSTOP FOLLOW-UP VISIT: CPT | Mod: S$GLB,,, | Performed by: PODIATRIST

## 2023-02-02 NOTE — PROGRESS NOTES
Subjective:      Patient ID: Hammad Douglas III is a 67 y.o. male.    Chief Complaint: No chief complaint on file.    Patient presents to clinic 2 weeks S/P bone spur resection of the Lt. Hallux proximal phalanx.   Denies experiencing pain from the surgical extremity with today's exam.  Has kept the prior football dressing clean, dry, and intact for the past week.  He continues to weight bear in the DARCO shoe.   Denies experiencing chest pain, SOB, and calf/thigh pain.  Denies experiencing N/V/F/C/D.  Denies any additional pedal complaints.      Past Medical History:   Diagnosis Date    Cataract     Done OU    Chronic kidney disease     Diabetes mellitus type I     Diagnosed at age 19    Diabetic retinopathy     See's Dr. Ledesma    Hyperlipidemia     Hypertension        Past Surgical History:   Procedure Laterality Date    ARTHROPLASTY OF TOE Left 7/14/2022    Procedure: ARTHROPLASTY, TOE;  Surgeon: Caleb Duffy DPM;  Location: [facility] OR;  Service: Podiatry;  Laterality: Left;  Left Hallux    CATARACT EXTRACTION W/  INTRAOCULAR LENS IMPLANT Right 09/28/2021    Dr Solis    CATARACT EXTRACTION W/  INTRAOCULAR LENS IMPLANT Left 02/22/2022    Dr Solis    COLONOSCOPY      2011 wnl    PHACOEMULSIFICATION OF CATARACT Right 9/28/2021    Procedure: PHACOEMULSIFICATION, CATARACT;  Surgeon: Vicente Solis Jr., MD;  Location: [facility] OR;  Service: Ophthalmology;  Laterality: Right;  DM/right    PHACOEMULSIFICATION OF CATARACT Left 2/22/2022    Procedure: PHACOEMULSIFICATION, CATARACT;  Surgeon: Vicente Solis Jr., MD;  Location: [facility] OR;  Service: Ophthalmology;  Laterality: Left;  left    SURGICAL REMOVAL OF BONE SPUR Left 10/14/2021    Procedure: EXCISION, BONE SPUR;  Surgeon: Caleb Duffy DPM;  Location: [facility] OR;  Service: Podiatry;  Laterality: Left;    SURGICAL REMOVAL OF BONE SPUR Left 1/17/2023    Procedure: EXCISION, BONE SPUR;  Surgeon: Caleb Duffy DPM;  Location: [facility] OR;  Service: Podiatry;  Laterality:  Left;    TOE AMPUTATION Right 7/2/2020    Procedure: AMPUTATION, TOE; 3rd;  Surgeon: Caleb Duffy DPM;  Location: [facility] OR;  Service: Podiatry;  Laterality: Right;    TOE SURGERY Right     right big toe       Family History   Problem Relation Age of Onset    Cataracts Mother     Cataracts Father     Breast cancer Sister     Glaucoma Neg Hx     Macular degeneration Neg Hx     Retinal detachment Neg Hx        Social History     Socioeconomic History    Marital status:    Tobacco Use    Smoking status: Former     Types: Cigars    Smokeless tobacco: Never    Tobacco comments:     Former cigar use   Substance and Sexual Activity    Alcohol use: Not Currently    Drug use: No    Sexual activity: Yes     Partners: Female   Social History Narrative    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 270 capsule 1    FLUoxetine 20 MG capsule TAKE ONE CAPSULE BY MOUTH EVERY DAY 90 capsule 2    gabapentin (NEURONTIN) 300 MG capsule Take 1 capsule (300 mg total) by mouth 2 (two) times daily. 60 capsule 0    HYDROcodone-acetaminophen (NORCO) 5-325 mg per tablet Take 1 tablet by mouth every 6 (six) hours as needed for Pain. 28 tablet 0    insulin aspart U-100 (NOVOLOG U-100 INSULIN ASPART) 100 unit/mL injection Use continuously in insulin pump , max TDD 60u 20 mL 6    losartan (COZAAR) 100 MG tablet TAKE 1 TABLET(100 MG) BY MOUTH EVERY DAY 90 tablet 3    multivitamin capsule Take 1 capsule by mouth once daily.      OMNIPOD 5 G6 INTRO KIT, GEN 5, Crtg Inject 1 Device into the skin continuous. 1 each 0    OMNIPOD 5 G6 PODS, GEN 5, Crtg Inject 1 Device into the skin Every 3 (three) days. 30 each 3     Current Facility-Administered Medications   Medication Dose Route Frequency  Provider Last Rate Last Admin    glucagon (human recombinant) injection 1 mg  1 mg Intramuscular Once Tania Mckeon NP           Review of patient's allergies indicates:   Allergen Reactions    Altace [ramipril]      Cough         Review of Systems   Constitution: Negative for chills and fever.   Cardiovascular: Negative for claudication and leg swelling.   Skin: Negative for color change, dry skin and poor wound healing.   Musculoskeletal: Negative for joint pain, joint swelling, muscle cramps and muscle weakness.   Neurological: Positive for numbness.   Psychiatric/Behavioral: Negative for altered mental status.           Objective:      Physical Exam   Constitutional: He appears well-developed and well-nourished. No distress.   Cardiovascular:   Pulses:       Dorsalis pedis pulses are 2+ on the right side, and 2+ on the left side.        Posterior tibial pulses are 1+ on the right side, and 1+ on the left side.   CFT is < 3 seconds bilateral.  Pedal hair growth is present bilateral.  Varicosities noted bilateral.  Mild edema noted to the Lt. Hallux. Toes are warm to touch bilateral.     Musculoskeletal: He exhibits no edema or tenderness.   Muscle strength 5/5 in all muscle groups bilateral.  No tenderness nor crepitation with ROM of foot/ankle joints bilateral.  No tenderness with palpation of bilateral foot and ankle.  Semi-reducible contracture of the lesser digits bilateral.   Amputation of Rt. 3rd toe.  Neurological: He has normal strength. A sensory deficit is present.   Protective sensation per [place] monofilament is decreased bilateral.  Light touch is intact bilateral.   Skin: Incision to the dorsum of the Lt. Hallux IP joint is well approximated with all suture intact.  No evidence of dehiscence, necrosis, ischemia, or infection noted the length of the incision.  Prior plantar wound of the hallux remains fully epithelialized.            Assessment:       Encounter Diagnoses   Name  Primary?    Postoperative state Yes    Type 1 diabetes mellitus with polyneuropathy          Plan:       Diagnoses and all orders for this visit:    Postoperative state    Type 1 diabetes mellitus with polyneuropathy      I counseled the patient on his conditions, their implications and medical management.    Overall, satisfactory postoperative results noted.  Incision remains well maintained with suture, with no evidence of postoperative infection.     The incision site was painted with betadine, and a football dressing was applied     Advised to keep the dressing CDI x 1 week.     Advised to rest and elevate the affected extremity.     Instructed to minimize weight bearing to facilitate continued healing.     May ambulate around his home in the postop shoe.    RTC in 1 week for follow up.     Caleb Duffy DPM

## 2023-02-04 ENCOUNTER — PATIENT MESSAGE (OUTPATIENT)
Dept: AUDIOLOGY | Facility: CLINIC | Age: 68
End: 2023-02-04
Payer: COMMERCIAL

## 2023-02-06 ENCOUNTER — CLINICAL SUPPORT (OUTPATIENT)
Dept: AUDIOLOGY | Facility: CLINIC | Age: 68
End: 2023-02-06
Payer: COMMERCIAL

## 2023-02-06 DIAGNOSIS — Z97.4 WEARS HEARING AID IN BOTH EARS: Primary | ICD-10-CM

## 2023-02-06 PROCEDURE — 99499 NO LOS: ICD-10-PCS | Mod: S$GLB,,, | Performed by: AUDIOLOGIST-HEARING AID FITTER

## 2023-02-06 PROCEDURE — 99499 UNLISTED E&M SERVICE: CPT | Mod: S$GLB,,, | Performed by: AUDIOLOGIST-HEARING AID FITTER

## 2023-02-06 NOTE — PROGRESS NOTES
Hammad NIMO Douglas III came in on 02/06/2023 for a hearing aid follow up. Pt stated he can't get the hearing aids to pair with his phone. Upon further investigation, the right aid light turned solid green immediately as it was removed from the  and it would not pair with the phone. Both aids will be sent in for repair. Pt will be called upon their return to the clinic for him to .

## 2023-02-09 ENCOUNTER — OFFICE VISIT (OUTPATIENT)
Dept: PODIATRY | Facility: CLINIC | Age: 68
End: 2023-02-09
Payer: COMMERCIAL

## 2023-02-09 VITALS — BODY MASS INDEX: 33.34 KG/M2 | HEIGHT: 68 IN | WEIGHT: 220 LBS

## 2023-02-09 DIAGNOSIS — E10.42 TYPE 1 DIABETES MELLITUS WITH POLYNEUROPATHY: ICD-10-CM

## 2023-02-09 DIAGNOSIS — Z98.890 POSTOPERATIVE STATE: Primary | ICD-10-CM

## 2023-02-09 PROCEDURE — 1159F PR MEDICATION LIST DOCUMENTED IN MEDICAL RECORD: ICD-10-PCS | Mod: CPTII,S$GLB,, | Performed by: PODIATRIST

## 2023-02-09 PROCEDURE — 1126F PR PAIN SEVERITY QUANTIFIED, NO PAIN PRESENT: ICD-10-PCS | Mod: CPTII,S$GLB,, | Performed by: PODIATRIST

## 2023-02-09 PROCEDURE — 99999 PR PBB SHADOW E&M-EST. PATIENT-LVL II: ICD-10-PCS | Mod: PBBFAC,,, | Performed by: PODIATRIST

## 2023-02-09 PROCEDURE — 99024 POSTOP FOLLOW-UP VISIT: CPT | Mod: S$GLB,,, | Performed by: PODIATRIST

## 2023-02-09 PROCEDURE — 1126F AMNT PAIN NOTED NONE PRSNT: CPT | Mod: CPTII,S$GLB,, | Performed by: PODIATRIST

## 2023-02-09 PROCEDURE — 1159F MED LIST DOCD IN RCRD: CPT | Mod: CPTII,S$GLB,, | Performed by: PODIATRIST

## 2023-02-09 PROCEDURE — 3008F BODY MASS INDEX DOCD: CPT | Mod: CPTII,S$GLB,, | Performed by: PODIATRIST

## 2023-02-09 PROCEDURE — 99024 PR POST-OP FOLLOW-UP VISIT: ICD-10-PCS | Mod: S$GLB,,, | Performed by: PODIATRIST

## 2023-02-09 PROCEDURE — 99999 PR PBB SHADOW E&M-EST. PATIENT-LVL II: CPT | Mod: PBBFAC,,, | Performed by: PODIATRIST

## 2023-02-09 PROCEDURE — 3008F PR BODY MASS INDEX (BMI) DOCUMENTED: ICD-10-PCS | Mod: CPTII,S$GLB,, | Performed by: PODIATRIST

## 2023-02-09 NOTE — PROGRESS NOTES
Subjective:      Patient ID: Hammad Douglas III is a 67 y.o. male.    Chief Complaint: No chief complaint on file.    Patient presents to clinic 3 weeks S/P bone spur resection of the Lt. Hallux proximal phalanx.   Denies experiencing pain from the surgical extremity with today's exam.  Has kept the prior football dressing clean, dry, and intact for the past week.  He continues to weight bear in the DARCO shoe.  Denies experiencing N/V/F/C/D.  Denies any additional pedal complaints.      Past Medical History:   Diagnosis Date    Cataract     Done OU    Chronic kidney disease     Diabetes mellitus type I     Diagnosed at age 19    Diabetic retinopathy     See's Dr. Ledesma    Hyperlipidemia     Hypertension        Past Surgical History:   Procedure Laterality Date    ARTHROPLASTY OF TOE Left 7/14/2022    Procedure: ARTHROPLASTY, TOE;  Surgeon: Caleb Duffy DPM;  Location: [facility] OR;  Service: Podiatry;  Laterality: Left;  Left Hallux    CATARACT EXTRACTION W/  INTRAOCULAR LENS IMPLANT Right 09/28/2021    Dr Solis    CATARACT EXTRACTION W/  INTRAOCULAR LENS IMPLANT Left 02/22/2022    Dr Solis    COLONOSCOPY      2011 wnl    PHACOEMULSIFICATION OF CATARACT Right 9/28/2021    Procedure: PHACOEMULSIFICATION, CATARACT;  Surgeon: Vicente Solis Jr., MD;  Location: [facility] OR;  Service: Ophthalmology;  Laterality: Right;  DM/right    PHACOEMULSIFICATION OF CATARACT Left 2/22/2022    Procedure: PHACOEMULSIFICATION, CATARACT;  Surgeon: Vicente Solis Jr., MD;  Location: [facility] OR;  Service: Ophthalmology;  Laterality: Left;  left    SURGICAL REMOVAL OF BONE SPUR Left 10/14/2021    Procedure: EXCISION, BONE SPUR;  Surgeon: Caleb Duffy DPM;  Location: [facility] OR;  Service: Podiatry;  Laterality: Left;    SURGICAL REMOVAL OF BONE SPUR Left 1/17/2023    Procedure: EXCISION, BONE SPUR;  Surgeon: Caleb Duffy DPM;  Location: [facility] OR;  Service: Podiatry;  Laterality: Left;    TOE AMPUTATION Right 7/2/2020    Procedure:  AMPUTATION, TOE; 3rd;  Surgeon: Caleb Duffy DPM;  Location: [facility] OR;  Service: Podiatry;  Laterality: Right;    TOE SURGERY Right     right big toe       Family History   Problem Relation Age of Onset    Cataracts Mother     Cataracts Father     Breast cancer Sister     Glaucoma Neg Hx     Macular degeneration Neg Hx     Retinal detachment Neg Hx        Social History     Socioeconomic History    Marital status:    Tobacco Use    Smoking status: Former     Types: Cigars    Smokeless tobacco: Never    Tobacco comments:     Former cigar use   Substance and Sexual Activity    Alcohol use: Not Currently    Drug use: No    Sexual activity: Yes     Partners: Female   Social History Narrative    Retired former radiation  at [employer]       Current Outpatient Medications   Medication Sig Dispense Refill    atorvastatin (LIPITOR) 40 MG tablet TAKE 1 TABLET(40 MG) BY MOUTH EVERY DAY 90 tablet 3    FLUoxetine 10 MG capsule Take 3 capsules (30 mg total) by mouth once daily. 270 capsule 1    FLUoxetine 20 MG capsule TAKE ONE CAPSULE BY MOUTH EVERY DAY 90 capsule 2    gabapentin (NEURONTIN) 300 MG capsule Take 1 capsule (300 mg total) by mouth 2 (two) times daily. 60 capsule 0    HYDROcodone-acetaminophen (NORCO) 5-325 mg per tablet Take 1 tablet by mouth every 6 (six) hours as needed for Pain. 28 tablet 0    insulin aspart U-100 (NOVOLOG U-100 INSULIN ASPART) 100 unit/mL injection Use continuously in insulin pump , max TDD 60u 20 mL 6    losartan (COZAAR) 100 MG tablet TAKE 1 TABLET(100 MG) BY MOUTH EVERY DAY 90 tablet 3    multivitamin capsule Take 1 capsule by mouth once daily.      OMNIPOD 5 G6 INTRO KIT, GEN 5, Crtg Inject 1 Device into the skin continuous. 1 each 0    OMNIPOD 5 G6 PODS, GEN 5, Crtg Inject 1 Device into the skin Every 3 (three) days. 30 each 3     Current Facility-Administered Medications   Medication Dose Route Frequency Provider Last Rate Last Admin    glucagon (human  recombinant) injection 1 mg  1 mg Intramuscular Once Tania Mckeon NP           Review of patient's allergies indicates:   Allergen Reactions    Altace [ramipril]      Cough         Review of Systems   Constitution: Negative for chills and fever.   Cardiovascular: Negative for claudication and leg swelling.   Skin: Negative for color change, dry skin and poor wound healing.   Musculoskeletal: Negative for joint pain, joint swelling, muscle cramps and muscle weakness.   Neurological: Positive for numbness.   Psychiatric/Behavioral: Negative for altered mental status.           Objective:      Physical Exam   Constitutional: He appears well-developed and well-nourished. No distress.   Cardiovascular:   Pulses:       Dorsalis pedis pulses are 2+ on the right side, and 2+ on the left side.        Posterior tibial pulses are 1+ on the right side, and 1+ on the left side.   CFT is < 3 seconds bilateral.  Pedal hair growth is present bilateral.  Varicosities noted bilateral.  Mild edema noted to the Lt. Hallux. Toes are warm to touch bilateral.     Musculoskeletal: He exhibits no edema or tenderness.   Muscle strength 5/5 in all muscle groups bilateral.  No tenderness nor crepitation with ROM of foot/ankle joints bilateral.  No tenderness with palpation of bilateral foot and ankle.  Semi-reducible contracture of the lesser digits bilateral.   Amputation of Rt. 3rd toe.  Neurological: He has normal strength. A sensory deficit is present.   Protective sensation per [place]-Jayson monofilament is decreased bilateral.  Light touch is intact bilateral.   Skin: Incision to the dorsum of the Lt. Hallux IP joint is well approximated with all suture intact.  No evidence of dehiscence, necrosis, ischemia, or infection noted the length of the incision.  Prior plantar wound of the hallux remains fully epithelialized.            Assessment:       Encounter Diagnoses   Name Primary?    Postoperative state Yes    Type 1  diabetes mellitus with polyneuropathy          Plan:       Diagnoses and all orders for this visit:    Postoperative state    Type 1 diabetes mellitus with polyneuropathy      I counseled the patient on his conditions, their implications and medical management.    Overall, satisfactory postoperative results noted.  Incision remains well maintained with suture, with no evidence of postoperative infection.     With the patient's verbal consent, a sterile suture removal kit was used to remove sutures without incident.  Underlying fragile epidermis was covered with antibiotic ointment and a band aid.     Advised to dress the digit QD as mentioned.     May transition back into casual shoe gear that minimizes pressure to the affected digit.    May resume his normal showering routine, but encouraged the patient to wash the site a second time after bathing the entirety of his body.      RTC in 1 week for follow up.     Caleb Duffy DPM

## 2023-02-10 ENCOUNTER — TELEPHONE (OUTPATIENT)
Dept: AUDIOLOGY | Facility: CLINIC | Age: 68
End: 2023-02-10
Payer: COMMERCIAL

## 2023-02-10 NOTE — TELEPHONE ENCOUNTER
Contacted patient to let him know his hearing aids are back from repair. The patient will be picking up his aids from the second floor check in desk today.

## 2023-02-16 ENCOUNTER — OFFICE VISIT (OUTPATIENT)
Dept: PODIATRY | Facility: CLINIC | Age: 68
End: 2023-02-16
Payer: MEDICARE

## 2023-02-16 VITALS — WEIGHT: 220 LBS | HEIGHT: 68 IN | BODY MASS INDEX: 33.34 KG/M2

## 2023-02-16 DIAGNOSIS — E10.42 TYPE 1 DIABETES MELLITUS WITH POLYNEUROPATHY: ICD-10-CM

## 2023-02-16 DIAGNOSIS — Z98.890 POSTOPERATIVE STATE: Primary | ICD-10-CM

## 2023-02-16 PROCEDURE — 99999 PR PBB SHADOW E&M-EST. PATIENT-LVL II: CPT | Mod: PBBFAC,,, | Performed by: PODIATRIST

## 2023-02-16 PROCEDURE — 99999 PR PBB SHADOW E&M-EST. PATIENT-LVL II: ICD-10-PCS | Mod: PBBFAC,,, | Performed by: PODIATRIST

## 2023-02-16 PROCEDURE — 1126F AMNT PAIN NOTED NONE PRSNT: CPT | Mod: CPTII,S$GLB,, | Performed by: PODIATRIST

## 2023-02-16 PROCEDURE — 3288F FALL RISK ASSESSMENT DOCD: CPT | Mod: CPTII,S$GLB,, | Performed by: PODIATRIST

## 2023-02-16 PROCEDURE — 1101F PT FALLS ASSESS-DOCD LE1/YR: CPT | Mod: CPTII,S$GLB,, | Performed by: PODIATRIST

## 2023-02-16 PROCEDURE — 3288F PR FALLS RISK ASSESSMENT DOCUMENTED: ICD-10-PCS | Mod: CPTII,S$GLB,, | Performed by: PODIATRIST

## 2023-02-16 PROCEDURE — 1101F PR PT FALLS ASSESS DOC 0-1 FALLS W/OUT INJ PAST YR: ICD-10-PCS | Mod: CPTII,S$GLB,, | Performed by: PODIATRIST

## 2023-02-16 PROCEDURE — 3008F BODY MASS INDEX DOCD: CPT | Mod: CPTII,S$GLB,, | Performed by: PODIATRIST

## 2023-02-16 PROCEDURE — 3008F PR BODY MASS INDEX (BMI) DOCUMENTED: ICD-10-PCS | Mod: CPTII,S$GLB,, | Performed by: PODIATRIST

## 2023-02-16 PROCEDURE — 1159F PR MEDICATION LIST DOCUMENTED IN MEDICAL RECORD: ICD-10-PCS | Mod: CPTII,S$GLB,, | Performed by: PODIATRIST

## 2023-02-16 PROCEDURE — 99024 PR POST-OP FOLLOW-UP VISIT: ICD-10-PCS | Mod: S$GLB,,, | Performed by: PODIATRIST

## 2023-02-16 PROCEDURE — 99024 POSTOP FOLLOW-UP VISIT: CPT | Mod: S$GLB,,, | Performed by: PODIATRIST

## 2023-02-16 PROCEDURE — 1126F PR PAIN SEVERITY QUANTIFIED, NO PAIN PRESENT: ICD-10-PCS | Mod: CPTII,S$GLB,, | Performed by: PODIATRIST

## 2023-02-16 PROCEDURE — 1159F MED LIST DOCD IN RCRD: CPT | Mod: CPTII,S$GLB,, | Performed by: PODIATRIST

## 2023-02-16 NOTE — PROGRESS NOTES
Subjective:      Patient ID: Hammad Douglas III is a 67 y.o. male.    Chief Complaint: Post-op Evaluation    Patient presents to clinic 4 weeks S/P bone spur resection of the Lt. Hallux proximal phalanx.   Denies experiencing pain from the surgical extremity with today's exam.  Has been keeping the surgical site covered with antibiotic ointment and a band aid once daily for the past week.  Notes pain free ambulation to tolerance in casual shoe gear.  Denies experiencing N/V/F/C/D.  Denies any additional pedal complaints.      Past Medical History:   Diagnosis Date    Cataract     Done OU    Chronic kidney disease     Diabetes mellitus type I     Diagnosed at age 19    Diabetic retinopathy     See's Dr. Ledesma    Hyperlipidemia     Hypertension        Past Surgical History:   Procedure Laterality Date    ARTHROPLASTY OF TOE Left 7/14/2022    Procedure: ARTHROPLASTY, TOE;  Surgeon: Caleb Duffy DPM;  Location: [facility] OR;  Service: Podiatry;  Laterality: Left;  Left Hallux    CATARACT EXTRACTION W/  INTRAOCULAR LENS IMPLANT Right 09/28/2021    Dr Solis    CATARACT EXTRACTION W/  INTRAOCULAR LENS IMPLANT Left 02/22/2022    Dr Solis    COLONOSCOPY      2011 wnl    PHACOEMULSIFICATION OF CATARACT Right 9/28/2021    Procedure: PHACOEMULSIFICATION, CATARACT;  Surgeon: Vicente Solis Jr., MD;  Location: [facility] OR;  Service: Ophthalmology;  Laterality: Right;  DM/right    PHACOEMULSIFICATION OF CATARACT Left 2/22/2022    Procedure: PHACOEMULSIFICATION, CATARACT;  Surgeon: Vicente Solis Jr., MD;  Location: [facility] OR;  Service: Ophthalmology;  Laterality: Left;  left    SURGICAL REMOVAL OF BONE SPUR Left 10/14/2021    Procedure: EXCISION, BONE SPUR;  Surgeon: Caleb Duffy DPM;  Location: [facility] OR;  Service: Podiatry;  Laterality: Left;    SURGICAL REMOVAL OF BONE SPUR Left 1/17/2023    Procedure: EXCISION, BONE SPUR;  Surgeon: Caleb Duffy DPM;  Location: [facility] OR;  Service: Podiatry;  Laterality: Left;    TOE AMPUTATION  Right 7/2/2020    Procedure: AMPUTATION, TOE; 3rd;  Surgeon: Caleb Duffy DPM;  Location: [facility] OR;  Service: Podiatry;  Laterality: Right;    TOE SURGERY Right     right big toe       Family History   Problem Relation Age of Onset    Cataracts Mother     Cataracts Father     Breast cancer Sister     Glaucoma Neg Hx     Macular degeneration Neg Hx     Retinal detachment Neg Hx        Social History     Socioeconomic History    Marital status:    Tobacco Use    Smoking status: Former     Types: Cigars    Smokeless tobacco: Never    Tobacco comments:     Former cigar use   Substance and Sexual Activity    Alcohol use: Not Currently    Drug use: No    Sexual activity: Yes     Partners: Female   Social History Narrative    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 270 capsule 1    FLUoxetine 20 MG capsule TAKE ONE CAPSULE BY MOUTH EVERY DAY 90 capsule 2    gabapentin (NEURONTIN) 300 MG capsule Take 1 capsule (300 mg total) by mouth 2 (two) times daily. 60 capsule 0    HYDROcodone-acetaminophen (NORCO) 5-325 mg per tablet Take 1 tablet by mouth every 6 (six) hours as needed for Pain. 28 tablet 0    insulin aspart U-100 (NOVOLOG U-100 INSULIN ASPART) 100 unit/mL injection Use continuously in insulin pump , max TDD 60u 20 mL 6    losartan (COZAAR) 100 MG tablet TAKE 1 TABLET(100 MG) BY MOUTH EVERY DAY 90 tablet 3    multivitamin capsule Take 1 capsule by mouth once daily.      OMNIPOD 5 G6 INTRO KIT, GEN 5, Crtg Inject 1 Device into the skin continuous. 1 each 0    OMNIPOD 5 G6 PODS, GEN 5, Crtg Inject 1 Device into the skin Every 3 (three) days. 30 each 3     Current Facility-Administered Medications   Medication Dose Route Frequency Provider Last Rate Last  Admin    glucagon (human recombinant) injection 1 mg  1 mg Intramuscular Once Tania Mckeon NP           Review of patient's allergies indicates:   Allergen Reactions    Altace [ramipril]      Cough         Review of Systems   Constitution: Negative for chills and fever.   Cardiovascular: Negative for claudication and leg swelling.   Skin: Negative for color change, dry skin and poor wound healing.   Musculoskeletal: Negative for joint pain, joint swelling, muscle cramps and muscle weakness.   Neurological: Positive for numbness.   Psychiatric/Behavioral: Negative for altered mental status.           Objective:      Physical Exam   Constitutional: He appears well-developed and well-nourished. No distress.   Cardiovascular:   Pulses:       Dorsalis pedis pulses are 2+ on the right side, and 2+ on the left side.        Posterior tibial pulses are 1+ on the right side, and 1+ on the left side.   CFT is < 3 seconds bilateral.  Pedal hair growth is present bilateral.  Varicosities noted bilateral.  Mild edema noted to the Lt. Hallux. Toes are warm to touch bilateral.     Musculoskeletal: He exhibits no edema or tenderness.   Muscle strength 5/5 in all muscle groups bilateral.  No tenderness nor crepitation with ROM of foot/ankle joints bilateral.  No tenderness with palpation of bilateral foot and ankle.  Semi-reducible contracture of the lesser digits bilateral.   Amputation of Rt. 3rd toe.  Neurological: He has normal strength. A sensory deficit is present.   Protective sensation per [place]-Jayson monofilament is decreased bilateral.  Light touch is intact bilateral.   Skin: Incision to the dorsum of the Lt. Hallux IP joint remains well healed.            Assessment:       Encounter Diagnoses   Name Primary?    Postoperative state Yes    Type 1 diabetes mellitus with polyneuropathy          Plan:       Hammad was seen today for post-op evaluation.    Diagnoses and all orders for this visit:    Postoperative  state    Type 1 diabetes mellitus with polyneuropathy      I counseled the patient on his conditions, their implications and medical management.    Overall, satisfactory postoperative results noted.  Incision remains healed with today's exam.     To continue use of casual shoe gear with activity to tolerance.     May resume his normal showering routine.    RTC in 4 weeks for a follow up.     Caleb Duffy DPM

## 2023-03-21 ENCOUNTER — LAB VISIT (OUTPATIENT)
Dept: LAB | Facility: HOSPITAL | Age: 68
End: 2023-03-21
Attending: NURSE PRACTITIONER
Payer: COMMERCIAL

## 2023-03-21 DIAGNOSIS — E10.43 TYPE 1 DIABETES MELLITUS WITH DIABETIC AUTONOMIC NEUROPATHY: ICD-10-CM

## 2023-03-21 LAB
ALBUMIN/CREAT UR: 320.2 UG/MG (ref 0–30)
CREAT UR-MCNC: 84 MG/DL (ref 23–375)
MICROALBUMIN UR DL<=1MG/L-MCNC: 269 UG/ML

## 2023-03-21 PROCEDURE — 82570 ASSAY OF URINE CREATININE: CPT | Performed by: NURSE PRACTITIONER

## 2023-03-28 ENCOUNTER — OFFICE VISIT (OUTPATIENT)
Dept: ENDOCRINOLOGY | Facility: CLINIC | Age: 68
End: 2023-03-28
Payer: COMMERCIAL

## 2023-03-28 VITALS
BODY MASS INDEX: 34.08 KG/M2 | DIASTOLIC BLOOD PRESSURE: 50 MMHG | HEIGHT: 68 IN | HEART RATE: 74 BPM | SYSTOLIC BLOOD PRESSURE: 90 MMHG | WEIGHT: 224.88 LBS

## 2023-03-28 DIAGNOSIS — E10.649 HYPOGLYCEMIA UNAWARENESS IN TYPE 1 DIABETES MELLITUS: ICD-10-CM

## 2023-03-28 DIAGNOSIS — Z46.81 INSULIN PUMP TITRATION: ICD-10-CM

## 2023-03-28 DIAGNOSIS — E10.21 TYPE 1 DIABETES MELLITUS WITH DIABETIC NEPHROPATHY: ICD-10-CM

## 2023-03-28 DIAGNOSIS — E78.5 HYPERLIPIDEMIA, UNSPECIFIED HYPERLIPIDEMIA TYPE: ICD-10-CM

## 2023-03-28 DIAGNOSIS — E10.43 TYPE 1 DIABETES MELLITUS WITH DIABETIC AUTONOMIC NEUROPATHY: Primary | ICD-10-CM

## 2023-03-28 DIAGNOSIS — E10.3593 TYPE 1 DIABETES MELLITUS WITH PROLIFERATIVE RETINOPATHY OF BOTH EYES WITHOUT MACULAR EDEMA: ICD-10-CM

## 2023-03-28 DIAGNOSIS — I10 ESSENTIAL HYPERTENSION: ICD-10-CM

## 2023-03-28 PROCEDURE — 3078F PR MOST RECENT DIASTOLIC BLOOD PRESSURE < 80 MM HG: ICD-10-PCS | Mod: CPTII,S$GLB,, | Performed by: NURSE PRACTITIONER

## 2023-03-28 PROCEDURE — 3044F HG A1C LEVEL LT 7.0%: CPT | Mod: CPTII,S$GLB,, | Performed by: NURSE PRACTITIONER

## 2023-03-28 PROCEDURE — 3074F SYST BP LT 130 MM HG: CPT | Mod: CPTII,S$GLB,, | Performed by: NURSE PRACTITIONER

## 2023-03-28 PROCEDURE — 99214 OFFICE O/P EST MOD 30 MIN: CPT | Mod: S$GLB,,, | Performed by: NURSE PRACTITIONER

## 2023-03-28 PROCEDURE — 1159F PR MEDICATION LIST DOCUMENTED IN MEDICAL RECORD: ICD-10-PCS | Mod: CPTII,S$GLB,, | Performed by: NURSE PRACTITIONER

## 2023-03-28 PROCEDURE — 3062F POS MACROALBUMINURIA REV: CPT | Mod: CPTII,S$GLB,, | Performed by: NURSE PRACTITIONER

## 2023-03-28 PROCEDURE — 1126F AMNT PAIN NOTED NONE PRSNT: CPT | Mod: CPTII,S$GLB,, | Performed by: NURSE PRACTITIONER

## 2023-03-28 PROCEDURE — 99999 PR PBB SHADOW E&M-EST. PATIENT-LVL IV: CPT | Mod: PBBFAC,,, | Performed by: NURSE PRACTITIONER

## 2023-03-28 PROCEDURE — 1160F RVW MEDS BY RX/DR IN RCRD: CPT | Mod: CPTII,S$GLB,, | Performed by: NURSE PRACTITIONER

## 2023-03-28 PROCEDURE — 3066F PR DOCUMENTATION OF TREATMENT FOR NEPHROPATHY: ICD-10-PCS | Mod: CPTII,S$GLB,, | Performed by: NURSE PRACTITIONER

## 2023-03-28 PROCEDURE — 95251 PR GLUCOSE MONITOR, 72 HOUR, PHYS INTERP: ICD-10-PCS | Mod: S$GLB,,, | Performed by: NURSE PRACTITIONER

## 2023-03-28 PROCEDURE — 3078F DIAST BP <80 MM HG: CPT | Mod: CPTII,S$GLB,, | Performed by: NURSE PRACTITIONER

## 2023-03-28 PROCEDURE — 99999 PR PBB SHADOW E&M-EST. PATIENT-LVL IV: ICD-10-PCS | Mod: PBBFAC,,, | Performed by: NURSE PRACTITIONER

## 2023-03-28 PROCEDURE — 3008F BODY MASS INDEX DOCD: CPT | Mod: CPTII,S$GLB,, | Performed by: NURSE PRACTITIONER

## 2023-03-28 PROCEDURE — 1159F MED LIST DOCD IN RCRD: CPT | Mod: CPTII,S$GLB,, | Performed by: NURSE PRACTITIONER

## 2023-03-28 PROCEDURE — 3062F PR POS MACROALBUMINURIA RESULT DOCUMENTED/REVIEW: ICD-10-PCS | Mod: CPTII,S$GLB,, | Performed by: NURSE PRACTITIONER

## 2023-03-28 PROCEDURE — 3074F PR MOST RECENT SYSTOLIC BLOOD PRESSURE < 130 MM HG: ICD-10-PCS | Mod: CPTII,S$GLB,, | Performed by: NURSE PRACTITIONER

## 2023-03-28 PROCEDURE — 99214 PR OFFICE/OUTPT VISIT, EST, LEVL IV, 30-39 MIN: ICD-10-PCS | Mod: S$GLB,,, | Performed by: NURSE PRACTITIONER

## 2023-03-28 PROCEDURE — 3066F NEPHROPATHY DOC TX: CPT | Mod: CPTII,S$GLB,, | Performed by: NURSE PRACTITIONER

## 2023-03-28 PROCEDURE — 95251 CONT GLUC MNTR ANALYSIS I&R: CPT | Mod: S$GLB,,, | Performed by: NURSE PRACTITIONER

## 2023-03-28 PROCEDURE — 1160F PR REVIEW ALL MEDS BY PRESCRIBER/CLIN PHARMACIST DOCUMENTED: ICD-10-PCS | Mod: CPTII,S$GLB,, | Performed by: NURSE PRACTITIONER

## 2023-03-28 PROCEDURE — 3044F PR MOST RECENT HEMOGLOBIN A1C LEVEL <7.0%: ICD-10-PCS | Mod: CPTII,S$GLB,, | Performed by: NURSE PRACTITIONER

## 2023-03-28 PROCEDURE — 3008F PR BODY MASS INDEX (BMI) DOCUMENTED: ICD-10-PCS | Mod: CPTII,S$GLB,, | Performed by: NURSE PRACTITIONER

## 2023-03-28 PROCEDURE — 1126F PR PAIN SEVERITY QUANTIFIED, NO PAIN PRESENT: ICD-10-PCS | Mod: CPTII,S$GLB,, | Performed by: NURSE PRACTITIONER

## 2023-03-28 RX ORDER — LOSARTAN POTASSIUM 100 MG/1
50 TABLET ORAL DAILY
Qty: 90 TABLET | Refills: 3
Start: 2023-03-28 | End: 2023-04-12 | Stop reason: SDUPTHER

## 2023-03-28 RX ORDER — INFLUENZA A VIRUS A/VICTORIA/2570/2019 IVR-215 (H1N1) ANTIGEN (FORMALDEHYDE INACTIVATED), INFLUENZA A VIRUS A/DARWIN/6/2021 IVR-227 (H3N2) ANTIGEN (FORMALDEHYDE INACTIVATED), INFLUENZA B VIRUS B/AUSTRIA/1359417/2021 BVR-26 ANTIGEN (FORMALDEHYDE INACTIVATED), INFLUENZA B VIRUS B/PHUKET/3073/2013 BVR-1B ANTIGEN (FORMALDEHYDE INACTIVATED) 15; 15; 15; 15 UG/.5ML; UG/.5ML; UG/.5ML; UG/.5ML
INJECTION, SUSPENSION INTRAMUSCULAR
COMMUNITY
Start: 2022-10-12 | End: 2023-04-12

## 2023-03-28 NOTE — PROGRESS NOTES
CC: Mr. Hammad Douglas III arrives today for management of Type 1  DM and review of chronic medical conditions, as listed in the visit diagnosis section of this encounter.     HPI: Mr. Hammad Douglas III was diagnosed with Type 1  DM at age 19 after a viral illness - had polyuria, polydipsia at this time.  Initial treatment consisted of insulin. Denies FH of DM. Reports past hospitalizations due to DKA > 30 years ago.   Has diagnosis of hypoglycemia unawareness with past severe hypoglycemic episodes requiring 3rd party assistance. Began using Dexcom G5 in 2017 and later up graded to G6.    Patient was last seen by me in December.     He began OmniPod 5 in November. He feels his variability is less.     BG monitoring per Dexcom G6.     Hypoglycemia: Rare  Hypoglycemic Symptoms: often doesn't have symptoms.   Hypoglycemia Treatment: Juice or glucose tabs.     Exercise: no formal    Dietary Habits: Eats 3 meals/day. Rare snacking. Avoids sugary beverages.     Last DM education appointment: 12/8/2022    He took losartan this AM. BP is 70/40. Patient denies dizziness but states that he hasn't had anything to drink yet today. It's 9:00.       CURRENT DIABETIC MEDS:  Novolog in OmniPod 5   ** receives pods through [facility] in   Insulin back up plan: Toujeo Max 24 units QHS, Novolog I:C ratio 1:5    Previous insulins:  Lantus    When did you start the current therapy you are on: 11/2022  Frequency of changing site/infusion set/tubing/cartridges: 3 days  Frequency of changing CGM: 10 days  Using bolus wizard: yes  Taking bolus with each meal: yes      INSULIN PUMP SETTINGS:  Basal:  0000 - 0.9 u/hr    I:C Ratio:  0000 - 1:6  0700 - 1:5  1000 - 1:6    ISF:  0000 - 50    Target glucose: 130    Active insulin time: 4 hours    Last Eye Exam: 6/2022, + proliferative DRAndrew   Last Podiatry Exam: 2/2023, Past amputation of R third toe. S/P arthroplasty of the Lt. Hallux IP joint in July 2022.    REVIEW OF  "SYSTEMS  Constitutional: no c/o weakness, fatigue. + 5# weight loss.   Cardiac: no palpitations or chest pain.  Respiratory: no cough or dyspnea.   GI:  no c/o abdominal pain or nausea.   Skin: no rashes, lesions   Neuro: no numbness, tingling, or parasthesias.  Endocrine: denies polyphagia, polydipsia, polyuria    Personally reviewed Past Medical, Surgical, Social History.    Vital Signs  BP (!) 90/50   Pulse 74   Ht 5' 8" (1.727 m)   Wt 102 kg (224 lb 13.9 oz)   BMI 34.19 kg/m²      Personally reviewed the below labs:    Hemoglobin A1C   Date Value Ref Range Status   03/21/2023 6.4 (H) 4.0 - 5.6 % Final     Comment:     ADA Screening Guidelines:  5.7-6.4%  Consistent with prediabetes  >or=6.5%  Consistent with diabetes    High levels of fetal hemoglobin interfere with the HbA1C  assay. Heterozygous hemoglobin variants (HbS, HgC, etc)do  not significantly interfere with this assay.   However, presence of multiple variants may affect accuracy.     12/20/2022 7.1 (H) 4.0 - 5.6 % Final     Comment:     ADA Screening Guidelines:  5.7-6.4%  Consistent with prediabetes  >or=6.5%  Consistent with diabetes    High levels of fetal hemoglobin interfere with the HbA1C  assay. Heterozygous hemoglobin variants (HbS, HgC, etc)do  not significantly interfere with this assay.   However, presence of multiple variants may affect accuracy.     09/21/2022 7.2 (H) 4.0 - 5.6 % Final     Comment:     ADA Screening Guidelines:  5.7-6.4%  Consistent with prediabetes  >or=6.5%  Consistent with diabetes    High levels of fetal hemoglobin interfere with the HbA1C  assay. Heterozygous hemoglobin variants (HbS, HgC, etc)do  not significantly interfere with this assay.   However, presence of multiple variants may affect accuracy.         Chemistry        Component Value Date/Time     03/21/2023 0900    K 4.5 03/21/2023 0900     03/21/2023 0900    CO2 25 03/21/2023 0900    BUN 23 03/21/2023 0900    CREATININE 1.3 03/21/2023 0900 "     (H) 03/21/2023 0900        Component Value Date/Time    CALCIUM 9.5 03/21/2023 0900    ALKPHOS 86 12/20/2022 0831    AST 19 12/20/2022 0831    ALT 17 12/20/2022 0831    BILITOT 0.6 12/20/2022 0831    ESTGFRAFRICA >60.0 06/22/2022 0848    EGFRNONAA 57.3 (A) 06/22/2022 0848          Lab Results   Component Value Date    CHOL 156 09/21/2022    CHOL 150 08/12/2021    CHOL 154 07/27/2020     Lab Results   Component Value Date    HDL 45 09/21/2022    HDL 46 08/12/2021    HDL 46 07/27/2020     Lab Results   Component Value Date    LDLCALC 98.4 09/21/2022    LDLCALC 91.4 08/12/2021    LDLCALC 95.8 07/27/2020     Lab Results   Component Value Date    TRIG 63 09/21/2022    TRIG 63 08/12/2021    TRIG 61 07/27/2020     Lab Results   Component Value Date    CHOLHDL 28.8 09/21/2022    CHOLHDL 30.7 08/12/2021    CHOLHDL 29.9 07/27/2020       Lab Results   Component Value Date    MICALBCREAT 320.2 (H) 03/21/2023     Lab Results   Component Value Date    TSH 1.789 02/09/2022       CrCl cannot be calculated (Patient's most recent lab result is older than the maximum 7 days allowed.).    Vit D, 25-Hydroxy   Date Value Ref Range Status   08/06/2018 35 30 - 96 ng/mL Final     Comment:     Vitamin D deficiency.........<10 ng/mL                              Vitamin D insufficiency......10-29 ng/mL       Vitamin D sufficiency........> or equal to 30 ng/mL  Vitamin D toxicity............>100 ng/mL         PHYSICAL EXAMINATION  Constitutional: Appears well, no distress  Respiratory: CTA, even and unlabored.   Cardiovascular: RRR, no murmurs.  GI: active bowel sounds, no hernia  Skin: warm and dry  Neuro: oriented to person, place, time.  Feet: appropriate footwear.       DEXCOM DOWNLOAD: See media file for details. Fasting glucoses are within goal. Sporadic prandial excursions. These sometimes occur after breakfast with a resulting mild hypoglycemic event a few hours later. No nocturnal or evening hypoglycemia.     Time in  automated mode: 100%    Average TDD: 46.4 units  Basal: 40% (18.4 u)  Bolus: 60%    Average glucose: 143 mg/dL  Above 250 mg/dL: 1 %  181-250 mg/dL: 10 %   mg/dL: 88 %  54-69 mg/dL: 0 %  Below 54 mg/dL: 0 %         Goals        HEMOGLOBIN A1C < 7.5           due to hypoglycemia unawareness      Assessment/Plan  1. Type 1 diabetes mellitus with diabetic autonomic neuropathy  -- A1c at goal. Much less variability since starting OmniPod 5. Will make slight change to breakfast I:C ratio to try to eliminate hypoglycemia before lunch. However, he is having some occasional prandial excursions after breakfast.   -- change I:C ratios:  0000 - 1:6  0700 - 1:5.5  1000 - 1:6  -- has insulin pens for back up  -- BG monitoring per Dexcom    -- Discussed diagnosis of DM, A1c goals, progression of disease, long term complications and tx options.    -- Reviewed hypoglycemia management: treat with 1/2 glass of juice, 1/2 can regular coke, or 4 glucose tablets. Monitor and repeat treatment every 15 minutes until BG is >70 Then have a snack, which includes a complex carbohydrate and protein.  Advised patient to check BG before activities, such as driving or exercise.   2. Type 1 diabetes mellitus with diabetic nephropathy  -- uncontrolled but improving.   -- continue losartan  -- maintain DM control   3. Proliferative diabetic retinopathy without macular edema associated with type 1 diabetes mellitus, unspecified laterality  -- stable  -- keep follow ups   4. Essential hypertension  -- hypotensive today. 70/40 then 90/50 after 1 bottle water.   -- reduce losartan to 50 mg (1/2 tab). Record BP 2-3x/day and stop losartan if SBG < 100.   -- schedule appt with Dr. Bran for review in 1-2 weeks   5. Hyperlipidemia, unspecified hyperlipidemia type  -- controlled  -- continue atorvastatin   6. Hypoglycemia unawareness in Type 1 DM -- continue Dexcom.   -- has glucagon   7. Insulin pump titration -- download reviewed and settings  changed.         FOLLOW UP   Follow up in about 3 months (around 6/28/2023).   Patient instructed to bring BG logs to each follow up   Patient encouraged to call for any BG/medication issues, concerns, or questions.     Orders Placed This Encounter   Procedures    Hemoglobin A1C    Comprehensive Metabolic Panel

## 2023-04-10 ENCOUNTER — PATIENT MESSAGE (OUTPATIENT)
Dept: ENDOCRINOLOGY | Facility: CLINIC | Age: 68
End: 2023-04-10
Payer: COMMERCIAL

## 2023-04-12 ENCOUNTER — OFFICE VISIT (OUTPATIENT)
Dept: FAMILY MEDICINE | Facility: CLINIC | Age: 68
End: 2023-04-12
Payer: COMMERCIAL

## 2023-04-12 VITALS
BODY MASS INDEX: 34.48 KG/M2 | SYSTOLIC BLOOD PRESSURE: 118 MMHG | DIASTOLIC BLOOD PRESSURE: 62 MMHG | HEART RATE: 73 BPM | RESPIRATION RATE: 14 BRPM | WEIGHT: 227.5 LBS | HEIGHT: 68 IN

## 2023-04-12 DIAGNOSIS — E10.21 TYPE 1 DIABETES MELLITUS WITH DIABETIC NEPHROPATHY: ICD-10-CM

## 2023-04-12 DIAGNOSIS — I10 ESSENTIAL HYPERTENSION: Primary | ICD-10-CM

## 2023-04-12 DIAGNOSIS — Z23 NEED FOR VACCINATION: ICD-10-CM

## 2023-04-12 DIAGNOSIS — F32.A ANXIETY AND DEPRESSION: ICD-10-CM

## 2023-04-12 DIAGNOSIS — F41.9 ANXIETY AND DEPRESSION: ICD-10-CM

## 2023-04-12 DIAGNOSIS — Z00.00 PREVENTATIVE HEALTH CARE: ICD-10-CM

## 2023-04-12 DIAGNOSIS — Z13.6 SCREENING FOR AAA (ABDOMINAL AORTIC ANEURYSM): ICD-10-CM

## 2023-04-12 PROCEDURE — 3062F PR POS MACROALBUMINURIA RESULT DOCUMENTED/REVIEW: ICD-10-PCS | Mod: CPTII,S$GLB,, | Performed by: INTERNAL MEDICINE

## 2023-04-12 PROCEDURE — 3044F HG A1C LEVEL LT 7.0%: CPT | Mod: CPTII,S$GLB,, | Performed by: INTERNAL MEDICINE

## 2023-04-12 PROCEDURE — 1159F PR MEDICATION LIST DOCUMENTED IN MEDICAL RECORD: ICD-10-PCS | Mod: CPTII,S$GLB,, | Performed by: INTERNAL MEDICINE

## 2023-04-12 PROCEDURE — 90677 PCV20 VACCINE IM: CPT | Mod: S$GLB,,, | Performed by: INTERNAL MEDICINE

## 2023-04-12 PROCEDURE — 3008F BODY MASS INDEX DOCD: CPT | Mod: CPTII,S$GLB,, | Performed by: INTERNAL MEDICINE

## 2023-04-12 PROCEDURE — 1126F PR PAIN SEVERITY QUANTIFIED, NO PAIN PRESENT: ICD-10-PCS | Mod: CPTII,S$GLB,, | Performed by: INTERNAL MEDICINE

## 2023-04-12 PROCEDURE — 99214 OFFICE O/P EST MOD 30 MIN: CPT | Mod: 25,S$GLB,, | Performed by: INTERNAL MEDICINE

## 2023-04-12 PROCEDURE — 1101F PR PT FALLS ASSESS DOC 0-1 FALLS W/OUT INJ PAST YR: ICD-10-PCS | Mod: CPTII,S$GLB,, | Performed by: INTERNAL MEDICINE

## 2023-04-12 PROCEDURE — 99214 PR OFFICE/OUTPT VISIT, EST, LEVL IV, 30-39 MIN: ICD-10-PCS | Mod: 25,S$GLB,, | Performed by: INTERNAL MEDICINE

## 2023-04-12 PROCEDURE — 90471 PNEUMOCOCCAL CONJUGATE VACCINE 20-VALENT: ICD-10-PCS | Mod: S$GLB,,, | Performed by: INTERNAL MEDICINE

## 2023-04-12 PROCEDURE — 3078F PR MOST RECENT DIASTOLIC BLOOD PRESSURE < 80 MM HG: ICD-10-PCS | Mod: CPTII,S$GLB,, | Performed by: INTERNAL MEDICINE

## 2023-04-12 PROCEDURE — 1160F PR REVIEW ALL MEDS BY PRESCRIBER/CLIN PHARMACIST DOCUMENTED: ICD-10-PCS | Mod: CPTII,S$GLB,, | Performed by: INTERNAL MEDICINE

## 2023-04-12 PROCEDURE — 3078F DIAST BP <80 MM HG: CPT | Mod: CPTII,S$GLB,, | Performed by: INTERNAL MEDICINE

## 2023-04-12 PROCEDURE — 3074F SYST BP LT 130 MM HG: CPT | Mod: CPTII,S$GLB,, | Performed by: INTERNAL MEDICINE

## 2023-04-12 PROCEDURE — 3288F FALL RISK ASSESSMENT DOCD: CPT | Mod: CPTII,S$GLB,, | Performed by: INTERNAL MEDICINE

## 2023-04-12 PROCEDURE — 3288F PR FALLS RISK ASSESSMENT DOCUMENTED: ICD-10-PCS | Mod: CPTII,S$GLB,, | Performed by: INTERNAL MEDICINE

## 2023-04-12 PROCEDURE — 90677 PNEUMOCOCCAL CONJUGATE VACCINE 20-VALENT: ICD-10-PCS | Mod: S$GLB,,, | Performed by: INTERNAL MEDICINE

## 2023-04-12 PROCEDURE — 3074F PR MOST RECENT SYSTOLIC BLOOD PRESSURE < 130 MM HG: ICD-10-PCS | Mod: CPTII,S$GLB,, | Performed by: INTERNAL MEDICINE

## 2023-04-12 PROCEDURE — 3066F PR DOCUMENTATION OF TREATMENT FOR NEPHROPATHY: ICD-10-PCS | Mod: CPTII,S$GLB,, | Performed by: INTERNAL MEDICINE

## 2023-04-12 PROCEDURE — 99999 PR PBB SHADOW E&M-EST. PATIENT-LVL III: ICD-10-PCS | Mod: PBBFAC,,, | Performed by: INTERNAL MEDICINE

## 2023-04-12 PROCEDURE — 90471 IMMUNIZATION ADMIN: CPT | Mod: S$GLB,,, | Performed by: INTERNAL MEDICINE

## 2023-04-12 PROCEDURE — 4010F ACE/ARB THERAPY RXD/TAKEN: CPT | Mod: CPTII,S$GLB,, | Performed by: INTERNAL MEDICINE

## 2023-04-12 PROCEDURE — 3008F PR BODY MASS INDEX (BMI) DOCUMENTED: ICD-10-PCS | Mod: CPTII,S$GLB,, | Performed by: INTERNAL MEDICINE

## 2023-04-12 PROCEDURE — 1101F PT FALLS ASSESS-DOCD LE1/YR: CPT | Mod: CPTII,S$GLB,, | Performed by: INTERNAL MEDICINE

## 2023-04-12 PROCEDURE — 1160F RVW MEDS BY RX/DR IN RCRD: CPT | Mod: CPTII,S$GLB,, | Performed by: INTERNAL MEDICINE

## 2023-04-12 PROCEDURE — 3066F NEPHROPATHY DOC TX: CPT | Mod: CPTII,S$GLB,, | Performed by: INTERNAL MEDICINE

## 2023-04-12 PROCEDURE — 1126F AMNT PAIN NOTED NONE PRSNT: CPT | Mod: CPTII,S$GLB,, | Performed by: INTERNAL MEDICINE

## 2023-04-12 PROCEDURE — 3062F POS MACROALBUMINURIA REV: CPT | Mod: CPTII,S$GLB,, | Performed by: INTERNAL MEDICINE

## 2023-04-12 PROCEDURE — 4010F PR ACE/ARB THEARPY RXD/TAKEN: ICD-10-PCS | Mod: CPTII,S$GLB,, | Performed by: INTERNAL MEDICINE

## 2023-04-12 PROCEDURE — 1159F MED LIST DOCD IN RCRD: CPT | Mod: CPTII,S$GLB,, | Performed by: INTERNAL MEDICINE

## 2023-04-12 PROCEDURE — 3044F PR MOST RECENT HEMOGLOBIN A1C LEVEL <7.0%: ICD-10-PCS | Mod: CPTII,S$GLB,, | Performed by: INTERNAL MEDICINE

## 2023-04-12 PROCEDURE — 99999 PR PBB SHADOW E&M-EST. PATIENT-LVL III: CPT | Mod: PBBFAC,,, | Performed by: INTERNAL MEDICINE

## 2023-04-12 RX ORDER — LOSARTAN POTASSIUM 50 MG/1
50 TABLET ORAL DAILY
Qty: 90 TABLET | Refills: 1 | Status: SHIPPED | OUTPATIENT
Start: 2023-04-12 | End: 2024-02-22

## 2023-04-12 RX ORDER — FLUOXETINE HYDROCHLORIDE 20 MG/1
20 CAPSULE ORAL DAILY
Qty: 90 CAPSULE | Refills: 1 | Status: SHIPPED | OUTPATIENT
Start: 2023-04-12 | End: 2023-09-19

## 2023-04-12 NOTE — PROGRESS NOTES
Assessment and Plan:    1. Essential hypertension  BP controlled, on the low side. Continue 50 mg daily for now, with plan to further decrease to 25 mg daily if needed for low BP. Discussed importance of hydration and continue home BP monitoring.    2. Type 1 diabetes mellitus with diabetic nephropathy  - losartan (COZAAR) 50 MG tablet; Take 1 tablet (50 mg total) by mouth once daily.  Dispense: 90 tablet; Refill: 1    3. Anxiety and depression  Doing well, continue current dose.  - FLUoxetine 20 MG capsule; Take 1 capsule (20 mg total) by mouth once daily.  Dispense: 90 capsule; Refill: 1    4. Screening for AAA (abdominal aortic aneurysm)  - US AAA Screening; Future    5. Need for vaccination  - (In Office Administered) Pneumococcal Conjugate Vaccine (20 Valent) (IM)    6. Preventative health care  - PSA, Screening; Future      ______________________________________________________________________  Subjective:    Chief Complaint:  Follow up chronic medical conditions.    HPI:  Hammad is a 67 y.o. year old male here to follow up chronic medical conditions.     He is taking losartan 50 mg daily for HTN and CKD3. Dose was recently reduced from 100 when he had a hypotensive episode at Endocrinology clinic. He has been monitoring his BP on this dose and it has been in the 100s-130s/40s-50s.     He does not smoke. Drinks 1-2 beers nightly.     DM1- Diagnosed age 19. Sees Endocrinology ([facility]) regularly. Last A1c down to 6.4 which is the best he has had in several years.     Has known complications including CKD3 and retinopathy. Sees ophthalmology for retinopathy. Sees Dr. Duffy for podiatry.     He is taking fluoxetine 20 mg daily for anxiety. Doing well on this dose.        Medications:  Current Outpatient Medications on File Prior to Visit   Medication Sig Dispense Refill    atorvastatin (LIPITOR) 40 MG tablet TAKE 1 TABLET(40 MG) BY MOUTH EVERY DAY 90 tablet 3    FLUoxetine 10 MG capsule Take 3 capsules (30 mg  "total) by mouth once daily. (Patient taking differently: Take 30 mg by mouth once daily. Taking 2 caps) 270 capsule 1    insulin aspart U-100 (NOVOLOG U-100 INSULIN ASPART) 100 unit/mL injection Use continuously in insulin pump , max TDD 60u 20 mL 6    losartan (COZAAR) 100 MG tablet Take 0.5 tablets (50 mg total) by mouth once daily. 90 tablet 3    multivitamin capsule Take 1 capsule by mouth once daily.      OMNIPOD 5 G6 INTRO KIT, GEN 5, Crtg Inject 1 Device into the skin continuous. 1 each 0    OMNIPOD 5 G6 PODS, GEN 5, Crtg Inject 1 Device into the skin Every 3 (three) days. 30 each 3    FLUAD QUAD 2022-23,65Y UP,,PF, 60 mcg (15 mcg x 4)/0.5 mL Syrg       [DISCONTINUED] blood-glucose sensor (DEXCOM G6 SENSOR) Jenni Change sensor every 10 days. 9 Device 3    [DISCONTINUED] blood-glucose transmitter (DEXCOM G6 TRANSMITTER) Jenni For continuous blood glucose monitoring. Change device every 90 days. 1 Device 3     Current Facility-Administered Medications on File Prior to Visit   Medication Dose Route Frequency Provider Last Rate Last Admin    glucagon (human recombinant) injection 1 mg  1 mg Intramuscular Once Tania Mckeon NP           Review of Systems:  Review of Systems   Constitutional:  Negative for chills and fever.   Respiratory:  Negative for shortness of breath and wheezing.    Cardiovascular:  Negative for chest pain and leg swelling.   Gastrointestinal:  Negative for diarrhea, nausea and vomiting.   Neurological:  Negative for dizziness, syncope and light-headedness.     Past Medical History:  Past Medical History:   Diagnosis Date    Cataract     Done OU    Chronic kidney disease     Diabetes mellitus type I     Diagnosed at age 19    Diabetic retinopathy     See's Dr. Ledesma    Hyperlipidemia     Hypertension        Objective:    Vitals:  Vitals:    04/12/23 1459   BP: 118/62   Pulse: 73   Resp: 14   Weight: 103.2 kg (227 lb 8.2 oz)   Height: 5' 8" (1.727 m)   PainSc: 0-No pain       Physical " Exam  Vitals reviewed.   Constitutional:       General: He is not in acute distress.     Appearance: He is well-developed.   Eyes:      General: No scleral icterus.  Cardiovascular:      Rate and Rhythm: Normal rate and regular rhythm.      Heart sounds: No murmur heard.  Pulmonary:      Effort: Pulmonary effort is normal. No respiratory distress.      Breath sounds: Normal breath sounds. No wheezing, rhonchi or rales.   Musculoskeletal:      Right lower leg: No edema.      Left lower leg: No edema.   Skin:     General: Skin is warm and dry.   Neurological:      Mental Status: He is alert and oriented to person, place, and time.   Psychiatric:         Behavior: Behavior normal.       Data:  A1c 6.4      Adelaide Bran MD  Internal Medicine

## 2023-04-19 ENCOUNTER — IMMUNIZATION (OUTPATIENT)
Dept: FAMILY MEDICINE | Facility: CLINIC | Age: 68
End: 2023-04-19
Payer: COMMERCIAL

## 2023-04-19 DIAGNOSIS — Z23 NEED FOR VACCINATION: Primary | ICD-10-CM

## 2023-04-19 PROCEDURE — 0124A COVID-19, MRNA, LNP-S, BIVALENT BOOSTER, PF, 30 MCG/0.3 ML DOSE: CPT | Mod: PBBFAC | Performed by: FAMILY MEDICINE

## 2023-04-19 PROCEDURE — 91312 COVID-19, MRNA, LNP-S, BIVALENT BOOSTER, PF, 30 MCG/0.3 ML DOSE: ICD-10-PCS | Mod: S$GLB,,, | Performed by: FAMILY MEDICINE

## 2023-04-19 PROCEDURE — 91312 COVID-19, MRNA, LNP-S, BIVALENT BOOSTER, PF, 30 MCG/0.3 ML DOSE: CPT | Mod: S$GLB,,, | Performed by: FAMILY MEDICINE

## 2023-04-26 ENCOUNTER — TELEPHONE (OUTPATIENT)
Dept: AUDIOLOGY | Facility: CLINIC | Age: 68
End: 2023-04-26
Payer: COMMERCIAL

## 2023-04-26 ENCOUNTER — PATIENT MESSAGE (OUTPATIENT)
Dept: AUDIOLOGY | Facility: CLINIC | Age: 68
End: 2023-04-26
Payer: COMMERCIAL

## 2023-04-26 NOTE — TELEPHONE ENCOUNTER
Spoke with pt and told him her would need to drop the aids off for inspection. He said he would drop them off tomorrow. Explained that we are short staffed this week but would get to them as son as possible. He voiced understanding.

## 2023-05-04 ENCOUNTER — TELEPHONE (OUTPATIENT)
Dept: ENDOCRINOLOGY | Facility: CLINIC | Age: 68
End: 2023-05-04
Payer: COMMERCIAL

## 2023-05-08 ENCOUNTER — PATIENT MESSAGE (OUTPATIENT)
Dept: ENDOCRINOLOGY | Facility: CLINIC | Age: 68
End: 2023-05-08
Payer: COMMERCIAL

## 2023-05-08 RX ORDER — INSULIN LISPRO 100 [IU]/ML
INJECTION, SOLUTION INTRAVENOUS; SUBCUTANEOUS
Qty: 20 ML | Refills: 11 | Status: SHIPPED | OUTPATIENT
Start: 2023-05-08 | End: 2023-12-01 | Stop reason: SDUPTHER

## 2023-05-15 ENCOUNTER — PATIENT MESSAGE (OUTPATIENT)
Dept: FAMILY MEDICINE | Facility: CLINIC | Age: 68
End: 2023-05-15
Payer: COMMERCIAL

## 2023-05-16 ENCOUNTER — OFFICE VISIT (OUTPATIENT)
Dept: GASTROENTEROLOGY | Facility: CLINIC | Age: 68
End: 2023-05-16
Payer: COMMERCIAL

## 2023-05-16 VITALS — WEIGHT: 219.13 LBS | BODY MASS INDEX: 33.21 KG/M2 | HEIGHT: 68 IN

## 2023-05-16 DIAGNOSIS — R10.32 LLQ ABDOMINAL PAIN: Primary | ICD-10-CM

## 2023-05-16 DIAGNOSIS — K57.92 DIVERTICULITIS: ICD-10-CM

## 2023-05-16 DIAGNOSIS — K59.00 CONSTIPATION, UNSPECIFIED CONSTIPATION TYPE: ICD-10-CM

## 2023-05-16 PROCEDURE — 3066F PR DOCUMENTATION OF TREATMENT FOR NEPHROPATHY: ICD-10-PCS | Mod: CPTII,S$GLB,, | Performed by: NURSE PRACTITIONER

## 2023-05-16 PROCEDURE — 99999 PR PBB SHADOW E&M-EST. PATIENT-LVL IV: ICD-10-PCS | Mod: PBBFAC,,, | Performed by: NURSE PRACTITIONER

## 2023-05-16 PROCEDURE — 4010F ACE/ARB THERAPY RXD/TAKEN: CPT | Mod: CPTII,S$GLB,, | Performed by: NURSE PRACTITIONER

## 2023-05-16 PROCEDURE — 99204 PR OFFICE/OUTPT VISIT, NEW, LEVL IV, 45-59 MIN: ICD-10-PCS | Mod: S$GLB,,, | Performed by: NURSE PRACTITIONER

## 2023-05-16 PROCEDURE — 99204 OFFICE O/P NEW MOD 45 MIN: CPT | Mod: S$GLB,,, | Performed by: NURSE PRACTITIONER

## 2023-05-16 PROCEDURE — 99999 PR PBB SHADOW E&M-EST. PATIENT-LVL IV: CPT | Mod: PBBFAC,,, | Performed by: NURSE PRACTITIONER

## 2023-05-16 PROCEDURE — 3062F PR POS MACROALBUMINURIA RESULT DOCUMENTED/REVIEW: ICD-10-PCS | Mod: CPTII,S$GLB,, | Performed by: NURSE PRACTITIONER

## 2023-05-16 PROCEDURE — 3066F NEPHROPATHY DOC TX: CPT | Mod: CPTII,S$GLB,, | Performed by: NURSE PRACTITIONER

## 2023-05-16 PROCEDURE — 4010F PR ACE/ARB THEARPY RXD/TAKEN: ICD-10-PCS | Mod: CPTII,S$GLB,, | Performed by: NURSE PRACTITIONER

## 2023-05-16 PROCEDURE — 3062F POS MACROALBUMINURIA REV: CPT | Mod: CPTII,S$GLB,, | Performed by: NURSE PRACTITIONER

## 2023-05-16 RX ORDER — DICYCLOMINE HYDROCHLORIDE 20 MG/1
20 TABLET ORAL 4 TIMES DAILY PRN
COMMUNITY
Start: 2023-05-13

## 2023-05-16 RX ORDER — HYDROCODONE BITARTRATE AND ACETAMINOPHEN 5; 325 MG/1; MG/1
1 TABLET ORAL EVERY 6 HOURS PRN
COMMUNITY
Start: 2023-05-13 | End: 2024-03-04

## 2023-05-16 RX ORDER — AMOXICILLIN AND CLAVULANATE POTASSIUM 875; 125 MG/1; MG/1
1 TABLET, FILM COATED ORAL 2 TIMES DAILY
COMMUNITY
Start: 2023-05-13 | End: 2024-02-22

## 2023-05-16 NOTE — PROGRESS NOTES
Subjective:       Patient ID: Hammad Douglas III is a 67 y.o. male, Body mass index is 33.32 kg/m².    Chief Complaint: Diverticulitis      Patient is new to me. Established patient of Dr. Regalado.    Here with wife, whom assisted with interview.    Abdominal Pain  This is a new problem. The current episode started in the past 7 days (Started 5/10/23). The onset quality is sudden. The problem occurs constantly. The problem has been waxing and waning (improved yesterday but worsened today). The pain is located in the LLQ. The quality of the pain is aching and sharp. The abdominal pain radiates to the left flank. Associated symptoms include anorexia, constipation and headaches. Pertinent negatives include no belching, diarrhea, dysuria, fever, flatus, frequency, hematochezia, melena, nausea, vomiting or weight loss. The pain is aggravated by eating, palpation and certain positions (lying down). The pain is relieved by Certain positions (sitting or standing). He has tried oral narcotic analgesics and antibiotics (Currently: Augmentin BID, Norco QID PRN and Bentyl 20 mg QID PRN- improved yesterday but no relief today) for the symptoms. Prior diagnostic workup includes GI consult and CT scan (ED visit at [facility]; CT scan showed a moderate amount of stool in the colon; treated empirically for diverticulitis). There is no history of abdominal surgery, colon cancer, Crohn's disease, gallstones, GERD, irritable bowel syndrome, pancreatitis, PUD or ulcerative colitis.   Review of Systems   Constitutional:  Positive for appetite change (decreased appetite) and fatigue. Negative for fever, unexpected weight change and weight loss.   HENT:  Negative for trouble swallowing.    Respiratory:  Negative for cough and shortness of breath.    Cardiovascular:  Negative for chest pain.   Gastrointestinal:  Positive for abdominal pain, anorexia and constipation. Negative for abdominal distention, anal bleeding, blood in stool,  diarrhea, flatus, hematochezia, melena, nausea, rectal pain and vomiting.   Genitourinary:  Negative for difficulty urinating, dysuria and frequency.   Musculoskeletal:  Negative for gait problem.   Skin:  Negative for rash.   Neurological:  Positive for headaches. Negative for speech difficulty.   Psychiatric/Behavioral:  Negative for confusion.      Past Medical History:   Diagnosis Date    Cataract     Done OU    Chronic kidney disease     Diabetes mellitus type I     Diagnosed at age 19    Diabetic retinopathy     See's Dr. Ledesma    Hyperlipidemia     Hypertension       Past Surgical History:   Procedure Laterality Date    ARTHROPLASTY OF TOE Left 7/14/2022    Procedure: ARTHROPLASTY, TOE;  Surgeon: Caleb Duffy DPM;  Location: [facility] OR;  Service: Podiatry;  Laterality: Left;  Left Hallux    CATARACT EXTRACTION W/  INTRAOCULAR LENS IMPLANT Right 09/28/2021    Dr Solis    CATARACT EXTRACTION W/  INTRAOCULAR LENS IMPLANT Left 02/22/2022    Dr Solis    COLONOSCOPY      2011 wnl    PHACOEMULSIFICATION OF CATARACT Right 9/28/2021    Procedure: PHACOEMULSIFICATION, CATARACT;  Surgeon: Vicente Solis Jr., MD;  Location: [facility] OR;  Service: Ophthalmology;  Laterality: Right;  DM/right    PHACOEMULSIFICATION OF CATARACT Left 2/22/2022    Procedure: PHACOEMULSIFICATION, CATARACT;  Surgeon: Vicente Solis Jr., MD;  Location: [facility] OR;  Service: Ophthalmology;  Laterality: Left;  left    SURGICAL REMOVAL OF BONE SPUR Left 10/14/2021    Procedure: EXCISION, BONE SPUR;  Surgeon: Caleb Duffy DPM;  Location: [facility] OR;  Service: Podiatry;  Laterality: Left;    SURGICAL REMOVAL OF BONE SPUR Left 1/17/2023    Procedure: EXCISION, BONE SPUR;  Surgeon: Caleb Duffy DPM;  Location: [facility] OR;  Service: Podiatry;  Laterality: Left;    TOE AMPUTATION Right 7/2/2020    Procedure: AMPUTATION, TOE; 3rd;  Surgeon: Caleb Duffy DPM;  Location: [facility] OR;  Service: Podiatry;  Laterality: Right;    TOE SURGERY Right     right big  toe      Family History   Problem Relation Age of Onset    Cataracts Mother     Cataracts Father     Breast cancer Sister     Glaucoma Neg Hx     Macular degeneration Neg Hx     Retinal detachment Neg Hx       Wt Readings from Last 10 Encounters:   05/16/23 99.4 kg (219 lb 2.2 oz)   04/12/23 103.2 kg (227 lb 8.2 oz)   03/28/23 102 kg (224 lb 13.9 oz)   02/16/23 99.8 kg (220 lb 0.3 oz)   02/09/23 99.8 kg (220 lb 0.3 oz)   01/26/23 99.8 kg (220 lb 0.3 oz)   01/12/23 99.8 kg (220 lb)   12/27/22 103.9 kg (229 lb 0.9 oz)   12/15/22 101.8 kg (224 lb 6.9 oz)   12/09/22 102.3 kg (225 lb 8.5 oz)     Lab Results   Component Value Date    WBC 4.60 12/16/2022    HGB 13.8 (L) 12/16/2022    HCT 43.3 12/16/2022    MCV 92 12/16/2022     12/16/2022     CMP  Sodium   Date Value Ref Range Status   03/21/2023 138 136 - 145 mmol/L Final     Potassium   Date Value Ref Range Status   03/21/2023 4.5 3.5 - 5.1 mmol/L Final     Chloride   Date Value Ref Range Status   03/21/2023 104 95 - 110 mmol/L Final     CO2   Date Value Ref Range Status   03/21/2023 25 23 - 29 mmol/L Final     Glucose   Date Value Ref Range Status   03/21/2023 117 (H) 70 - 110 mg/dL Final     BUN   Date Value Ref Range Status   03/21/2023 23 8 - 23 mg/dL Final     Creatinine   Date Value Ref Range Status   03/21/2023 1.3 0.5 - 1.4 mg/dL Final     Calcium   Date Value Ref Range Status   03/21/2023 9.5 8.7 - 10.5 mg/dL Final     Total Protein   Date Value Ref Range Status   12/20/2022 6.8 6.0 - 8.4 g/dL Final     Albumin   Date Value Ref Range Status   12/20/2022 3.5 3.5 - 5.2 g/dL Final     Total Bilirubin   Date Value Ref Range Status   12/20/2022 0.6 0.1 - 1.0 mg/dL Final     Comment:     For infants and newborns, interpretation of results should be based  on gestational age, weight and in agreement with clinical  observations.    Premature Infant recommended reference ranges:  Up to 24 hours.............<8.0 mg/dL  Up to 48 hours............<12.0 mg/dL  3-5  days..................<15.0 mg/dL  6-29 days.................<15.0 mg/dL       Alkaline Phosphatase   Date Value Ref Range Status   12/20/2022 86 55 - 135 U/L Final     AST   Date Value Ref Range Status   12/20/2022 19 10 - 40 U/L Final     ALT   Date Value Ref Range Status   12/20/2022 17 10 - 44 U/L Final     Anion Gap   Date Value Ref Range Status   03/21/2023 9 8 - 16 mmol/L Final     eGFR if    Date Value Ref Range Status   06/22/2022 >60.0 >60 mL/min/1.73 m^2 Final     eGFR if non    Date Value Ref Range Status   06/22/2022 57.3 (A) >60 mL/min/1.73 m^2 Final     Comment:     Calculation used to obtain the estimated glomerular filtration  rate (eGFR) is the CKD-EPI equation.               Reviewed prior medical records including radiology report of CT of abdomen and pelvis 5/13/23 (done at , patient brought copy of report, will send for scanning) & endoscopy history (see surgical history).     Objective:      Physical Exam  Constitutional:       General: He is not in acute distress.     Appearance: He is well-developed.   HENT:      Head: Normocephalic.      Right Ear: Hearing normal.      Left Ear: Hearing normal.      Nose: Nose normal.      Mouth/Throat:      Mouth: No oral lesions.      Pharynx: Uvula midline.   Eyes:      General: Lids are normal.      Conjunctiva/sclera: Conjunctivae normal.      Pupils: Pupils are equal, round, and reactive to light.   Neck:      Trachea: Trachea normal.   Cardiovascular:      Rate and Rhythm: Normal rate and regular rhythm.      Heart sounds: Normal heart sounds. No murmur heard.  Pulmonary:      Effort: Pulmonary effort is normal. No respiratory distress.      Breath sounds: Normal breath sounds. No stridor. No wheezing.   Abdominal:      General: Bowel sounds are normal. There is no distension.      Palpations: Abdomen is soft. There is no mass.      Tenderness: There is abdominal tenderness in the left upper quadrant  and left lower quadrant. There is no guarding or rebound.   Musculoskeletal:         General: Normal range of motion.      Cervical back: Normal range of motion.   Skin:     General: Skin is warm and dry.      Findings: No rash.      Comments: Non jaundiced   Neurological:      Mental Status: He is alert and oriented to person, place, and time.   Psychiatric:         Speech: Speech normal.         Behavior: Behavior normal. Behavior is cooperative.         Assessment:       1. LLQ abdominal pain    2. Diverticulitis    3. Constipation, unspecified constipation type           Plan:   All diagnoses and orders for this visit:    LLQ abdominal pain & Diverticulitis   - Schedule Colonoscopy to be done in 4-6 weeks, discussed procedure with the patient, verbalized understanding.  - Discussed the diagnosis of diverticulosis and diverticulitis, advised to continue a low fiber diet for the next 4 weeks and then slowly increase fiber in diet. Advised a low fiber diet is recommended for diverticulitis (for about 4 weeks), but to prevent diverticulitis, high fiber diet is recommended.  - Continue Augmentin BID until completion  - Continue Bentyl 20 mg QID PRN  - Continue Norco QID PRN  - Consider repeat CT scan/change antibiotics to Cipro and Flagyl if symptoms do not improve     Constipation, unspecified constipation type   - Recommend daily exercise as tolerated, adequate water intake, and high fiber diet.   - Recommend OTC stool softener once daily  - Recommend OTC MiraLax once daily (17g PO) as directed     If no improvement in symptoms or symptoms worsen, call/follow-up at clinic or go to ER

## 2023-05-18 ENCOUNTER — PATIENT MESSAGE (OUTPATIENT)
Dept: GASTROENTEROLOGY | Facility: CLINIC | Age: 68
End: 2023-05-18
Payer: COMMERCIAL

## 2023-05-23 ENCOUNTER — PATIENT MESSAGE (OUTPATIENT)
Dept: GASTROENTEROLOGY | Facility: CLINIC | Age: 68
End: 2023-05-23
Payer: COMMERCIAL

## 2023-05-26 ENCOUNTER — PATIENT MESSAGE (OUTPATIENT)
Dept: GASTROENTEROLOGY | Facility: CLINIC | Age: 68
End: 2023-05-26
Payer: COMMERCIAL

## 2023-06-19 ENCOUNTER — PATIENT MESSAGE (OUTPATIENT)
Dept: OPTOMETRY | Facility: CLINIC | Age: 68
End: 2023-06-19

## 2023-06-19 ENCOUNTER — PATIENT MESSAGE (OUTPATIENT)
Dept: ENDOCRINOLOGY | Facility: CLINIC | Age: 68
End: 2023-06-19
Payer: COMMERCIAL

## 2023-06-19 ENCOUNTER — OFFICE VISIT (OUTPATIENT)
Dept: OPTOMETRY | Facility: CLINIC | Age: 68
End: 2023-06-19
Payer: COMMERCIAL

## 2023-06-19 DIAGNOSIS — E10.43 TYPE 1 DIABETES MELLITUS WITH DIABETIC AUTONOMIC NEUROPATHY: ICD-10-CM

## 2023-06-19 DIAGNOSIS — Z13.5 GLAUCOMA SCREENING: ICD-10-CM

## 2023-06-19 DIAGNOSIS — Z96.1 BILATERAL PSEUDOPHAKIA: ICD-10-CM

## 2023-06-19 DIAGNOSIS — E10.3553 TYPE 1 DIABETES MELLITUS WITH STABLE PROLIFERATIVE DIABETIC RETINOPATHY, BILATERAL: Primary | ICD-10-CM

## 2023-06-19 DIAGNOSIS — H43.813 POSTERIOR VITREOUS DETACHMENT, BILATERAL: ICD-10-CM

## 2023-06-19 PROCEDURE — 92015 PR REFRACTION: ICD-10-PCS | Mod: S$GLB,,, | Performed by: OPTOMETRIST

## 2023-06-19 PROCEDURE — 4010F PR ACE/ARB THEARPY RXD/TAKEN: ICD-10-PCS | Mod: CPTII,S$GLB,, | Performed by: OPTOMETRIST

## 2023-06-19 PROCEDURE — 92014 COMPRE OPH EXAM EST PT 1/>: CPT | Mod: S$GLB,,, | Performed by: OPTOMETRIST

## 2023-06-19 PROCEDURE — 3066F NEPHROPATHY DOC TX: CPT | Mod: CPTII,S$GLB,, | Performed by: OPTOMETRIST

## 2023-06-19 PROCEDURE — 99999 PR PBB SHADOW E&M-EST. PATIENT-LVL III: ICD-10-PCS | Mod: PBBFAC,,, | Performed by: OPTOMETRIST

## 2023-06-19 PROCEDURE — 92015 DETERMINE REFRACTIVE STATE: CPT | Mod: S$GLB,,, | Performed by: OPTOMETRIST

## 2023-06-19 PROCEDURE — 3066F PR DOCUMENTATION OF TREATMENT FOR NEPHROPATHY: ICD-10-PCS | Mod: CPTII,S$GLB,, | Performed by: OPTOMETRIST

## 2023-06-19 PROCEDURE — 92014 PR EYE EXAM, EST PATIENT,COMPREHESV: ICD-10-PCS | Mod: S$GLB,,, | Performed by: OPTOMETRIST

## 2023-06-19 PROCEDURE — 3062F POS MACROALBUMINURIA REV: CPT | Mod: CPTII,S$GLB,, | Performed by: OPTOMETRIST

## 2023-06-19 PROCEDURE — 3062F PR POS MACROALBUMINURIA RESULT DOCUMENTED/REVIEW: ICD-10-PCS | Mod: CPTII,S$GLB,, | Performed by: OPTOMETRIST

## 2023-06-19 PROCEDURE — 4010F ACE/ARB THERAPY RXD/TAKEN: CPT | Mod: CPTII,S$GLB,, | Performed by: OPTOMETRIST

## 2023-06-19 PROCEDURE — 99999 PR PBB SHADOW E&M-EST. PATIENT-LVL III: CPT | Mod: PBBFAC,,, | Performed by: OPTOMETRIST

## 2023-06-19 NOTE — PROGRESS NOTES
HPI    Routine dm-dle-6/22 Will.     Pt complains of blurred va OD>OS. States he got glasses from last appt   about 6 months ago but never liked them. No flashes or floaters. BSL   controlled with pump.     Hemoglobin A1C       Date                     Value               Ref Range             Status                03/21/2023               6.4 (H)             4.0 - 5.6 %           Final              Comment:    ADA Screening Guidelines:  5.7-6.4%  Consistent with   prediabetes  >or=6.5%  Consistent with diabetes    High levels of fetal   hemoglobin interfere with the HbA1C  assay. Heterozygous hemoglobin   variants (HbS, HgC, etc)do  not significantly interfere with this assay.     However, presence of multiple variants may affect accuracy.         12/20/2022               7.1 (H)             4.0 - 5.6 %           Final              Comment:    ADA Screening Guidelines:  5.7-6.4%  Consistent with   prediabetes  >or=6.5%  Consistent with diabetes    High levels of fetal   hemoglobin interfere with the HbA1C  assay. Heterozygous hemoglobin   variants (HbS, HgC, etc)do  not significantly interfere with this assay.     However, presence of multiple variants may affect accuracy.         09/21/2022               7.2 (H)             4.0 - 5.6 %           Final              Comment:    ADA Screening Guidelines:  5.7-6.4%  Consistent with   prediabetes  >or=6.5%  Consistent with diabetes    High levels of fetal   hemoglobin interfere with the HbA1C  assay. Heterozygous hemoglobin   variants (HbS, HgC, etc)do  not significantly interfere with this assay.     However, presence of multiple variants may affect accuracy.    ----------    Last edited by Rose Mary Landers on 6/19/2023  1:41 PM.            Assessment /Plan     For exam results, see Encounter Report.    Type 1 diabetes mellitus with stable proliferative diabetic retinopathy, bilateral    Posterior vitreous detachment, bilateral    Glaucoma  screening    Bilateral pseudophakia      No gross brennon/ DME noted in posterior pole --did not obtain OCT today   H/o extensive PRP OU   Need to re establish with Dr Lugo     2.   RD precautions given and reviewed. Patient knows to call/ message if any further changes in symptoms occur.  3.   Not suspect   4.   Stable MF IOL w/ some residual cylinder OU ---gave copy of new specs, fill prn     Discussed and educated patient on current findings /plan.  RTC retina consult, then prn if any changes / issues

## 2023-06-20 ENCOUNTER — PATIENT MESSAGE (OUTPATIENT)
Dept: OPTOMETRY | Facility: CLINIC | Age: 68
End: 2023-06-20
Payer: COMMERCIAL

## 2023-06-20 ENCOUNTER — TELEPHONE (OUTPATIENT)
Dept: OPTOMETRY | Facility: CLINIC | Age: 68
End: 2023-06-20
Payer: COMMERCIAL

## 2023-06-20 ENCOUNTER — PATIENT MESSAGE (OUTPATIENT)
Dept: ENDOCRINOLOGY | Facility: CLINIC | Age: 68
End: 2023-06-20
Payer: COMMERCIAL

## 2023-06-20 DIAGNOSIS — E10.43 TYPE 1 DIABETES MELLITUS WITH DIABETIC AUTONOMIC NEUROPATHY: ICD-10-CM

## 2023-06-20 RX ORDER — INSULIN PMP CART,AUT,G6/7,CNTR
1 EACH SUBCUTANEOUS
Qty: 30 EACH | Refills: 3 | Status: SHIPPED | OUTPATIENT
Start: 2023-06-20 | End: 2023-06-20 | Stop reason: SDUPTHER

## 2023-06-20 RX ORDER — BLOOD-GLUCOSE TRANSMITTER
EACH MISCELLANEOUS
Qty: 1 EACH | Refills: 3 | Status: SHIPPED | OUTPATIENT
Start: 2023-06-20 | End: 2023-12-01 | Stop reason: SDUPTHER

## 2023-06-20 RX ORDER — INSULIN PMP CART,AUT,G6/7,CNTR
1 EACH SUBCUTANEOUS
Qty: 10 EACH | Refills: 6 | Status: SHIPPED | OUTPATIENT
Start: 2023-06-20 | End: 2023-12-01 | Stop reason: SDUPTHER

## 2023-06-20 RX ORDER — BLOOD-GLUCOSE SENSOR
EACH MISCELLANEOUS
Qty: 9 EACH | Refills: 3 | Status: SHIPPED | OUTPATIENT
Start: 2023-06-20 | End: 2023-12-01 | Stop reason: SDUPTHER

## 2023-06-20 NOTE — TELEPHONE ENCOUNTER
----- Message from MIRNA Fuentes, OD sent at 6/19/2023  3:37 PM CDT -----  Schedule with [facility] to re establish care   1st available is fine

## 2023-06-22 ENCOUNTER — LAB VISIT (OUTPATIENT)
Dept: LAB | Facility: HOSPITAL | Age: 68
End: 2023-06-22
Attending: NURSE PRACTITIONER
Payer: COMMERCIAL

## 2023-06-22 DIAGNOSIS — E10.43 TYPE 1 DIABETES MELLITUS WITH DIABETIC AUTONOMIC NEUROPATHY: ICD-10-CM

## 2023-06-22 DIAGNOSIS — Z00.00 PREVENTATIVE HEALTH CARE: ICD-10-CM

## 2023-06-22 LAB
ALBUMIN SERPL BCP-MCNC: 3.7 G/DL (ref 3.5–5.2)
ALP SERPL-CCNC: 79 U/L (ref 55–135)
ALT SERPL W/O P-5'-P-CCNC: 20 U/L (ref 10–44)
ANION GAP SERPL CALC-SCNC: 8 MMOL/L (ref 8–16)
AST SERPL-CCNC: 25 U/L (ref 10–40)
BILIRUB SERPL-MCNC: 0.4 MG/DL (ref 0.1–1)
BUN SERPL-MCNC: 24 MG/DL (ref 8–23)
CALCIUM SERPL-MCNC: 9.5 MG/DL (ref 8.7–10.5)
CHLORIDE SERPL-SCNC: 105 MMOL/L (ref 95–110)
CO2 SERPL-SCNC: 26 MMOL/L (ref 23–29)
COMPLEXED PSA SERPL-MCNC: 0.4 NG/ML (ref 0–4)
CREAT SERPL-MCNC: 1.3 MG/DL (ref 0.5–1.4)
EST. GFR  (NO RACE VARIABLE): >60 ML/MIN/1.73 M^2
ESTIMATED AVG GLUCOSE: 134 MG/DL (ref 68–131)
GLUCOSE SERPL-MCNC: 154 MG/DL (ref 70–110)
HBA1C MFR BLD: 6.3 % (ref 4–5.6)
POTASSIUM SERPL-SCNC: 4.9 MMOL/L (ref 3.5–5.1)
PROT SERPL-MCNC: 6.8 G/DL (ref 6–8.4)
SODIUM SERPL-SCNC: 139 MMOL/L (ref 136–145)

## 2023-06-22 PROCEDURE — 80053 COMPREHEN METABOLIC PANEL: CPT | Performed by: NURSE PRACTITIONER

## 2023-06-22 PROCEDURE — 36415 COLL VENOUS BLD VENIPUNCTURE: CPT | Mod: PN | Performed by: INTERNAL MEDICINE

## 2023-06-22 PROCEDURE — 83036 HEMOGLOBIN GLYCOSYLATED A1C: CPT | Performed by: NURSE PRACTITIONER

## 2023-06-22 PROCEDURE — 84153 ASSAY OF PSA TOTAL: CPT | Performed by: INTERNAL MEDICINE

## 2023-06-25 ENCOUNTER — PATIENT MESSAGE (OUTPATIENT)
Dept: ENDOCRINOLOGY | Facility: CLINIC | Age: 68
End: 2023-06-25
Payer: COMMERCIAL

## 2023-06-29 ENCOUNTER — TELEPHONE (OUTPATIENT)
Dept: ENDOCRINOLOGY | Facility: CLINIC | Age: 68
End: 2023-06-29
Payer: COMMERCIAL

## 2023-06-29 ENCOUNTER — PATIENT MESSAGE (OUTPATIENT)
Dept: ENDOCRINOLOGY | Facility: CLINIC | Age: 68
End: 2023-06-29
Payer: COMMERCIAL

## 2023-07-13 ENCOUNTER — PATIENT MESSAGE (OUTPATIENT)
Dept: AUDIOLOGY | Facility: CLINIC | Age: 68
End: 2023-07-13
Payer: COMMERCIAL

## 2023-09-03 ENCOUNTER — PATIENT MESSAGE (OUTPATIENT)
Dept: OTOLARYNGOLOGY | Facility: CLINIC | Age: 68
End: 2023-09-03
Payer: COMMERCIAL

## 2023-09-18 ENCOUNTER — OFFICE VISIT (OUTPATIENT)
Dept: OPHTHALMOLOGY | Facility: CLINIC | Age: 68
End: 2023-09-18
Payer: MEDICARE

## 2023-09-18 DIAGNOSIS — H35.372 EPIRETINAL MEMBRANE, LEFT: ICD-10-CM

## 2023-09-18 DIAGNOSIS — F32.A ANXIETY AND DEPRESSION: ICD-10-CM

## 2023-09-18 DIAGNOSIS — E10.3553 TYPE 1 DIABETES MELLITUS WITH STABLE PROLIFERATIVE DIABETIC RETINOPATHY, BILATERAL: ICD-10-CM

## 2023-09-18 DIAGNOSIS — F41.9 ANXIETY AND DEPRESSION: ICD-10-CM

## 2023-09-18 DIAGNOSIS — E10.3513 TYPE 1 DIABETES MELLITUS WITH PROLIFERATIVE RETINOPATHY OF BOTH EYES AND MACULAR EDEMA: Primary | ICD-10-CM

## 2023-09-18 PROCEDURE — 1101F PT FALLS ASSESS-DOCD LE1/YR: CPT | Mod: CPTII,S$GLB,, | Performed by: OPHTHALMOLOGY

## 2023-09-18 PROCEDURE — 92134 CPTRZ OPH DX IMG PST SGM RTA: CPT | Mod: S$GLB,,, | Performed by: OPHTHALMOLOGY

## 2023-09-18 PROCEDURE — 4010F ACE/ARB THERAPY RXD/TAKEN: CPT | Mod: CPTII,S$GLB,, | Performed by: OPHTHALMOLOGY

## 2023-09-18 PROCEDURE — 1160F PR REVIEW ALL MEDS BY PRESCRIBER/CLIN PHARMACIST DOCUMENTED: ICD-10-PCS | Mod: CPTII,S$GLB,, | Performed by: OPHTHALMOLOGY

## 2023-09-18 PROCEDURE — 92014 COMPRE OPH EXAM EST PT 1/>: CPT | Mod: S$GLB,,, | Performed by: OPHTHALMOLOGY

## 2023-09-18 PROCEDURE — 3288F FALL RISK ASSESSMENT DOCD: CPT | Mod: CPTII,S$GLB,, | Performed by: OPHTHALMOLOGY

## 2023-09-18 PROCEDURE — 92201 PR OPHTHALMOSCOPY, EXT, W/RET DRAW/SCLERAL DEPR, I&R, UNI/BI: ICD-10-PCS | Mod: S$GLB,,, | Performed by: OPHTHALMOLOGY

## 2023-09-18 PROCEDURE — 3288F PR FALLS RISK ASSESSMENT DOCUMENTED: ICD-10-PCS | Mod: CPTII,S$GLB,, | Performed by: OPHTHALMOLOGY

## 2023-09-18 PROCEDURE — 1160F RVW MEDS BY RX/DR IN RCRD: CPT | Mod: CPTII,S$GLB,, | Performed by: OPHTHALMOLOGY

## 2023-09-18 PROCEDURE — 3044F HG A1C LEVEL LT 7.0%: CPT | Mod: CPTII,S$GLB,, | Performed by: OPHTHALMOLOGY

## 2023-09-18 PROCEDURE — 3044F PR MOST RECENT HEMOGLOBIN A1C LEVEL <7.0%: ICD-10-PCS | Mod: CPTII,S$GLB,, | Performed by: OPHTHALMOLOGY

## 2023-09-18 PROCEDURE — 3062F POS MACROALBUMINURIA REV: CPT | Mod: CPTII,S$GLB,, | Performed by: OPHTHALMOLOGY

## 2023-09-18 PROCEDURE — 99999 PR PBB SHADOW E&M-EST. PATIENT-LVL III: CPT | Mod: PBBFAC,,, | Performed by: OPHTHALMOLOGY

## 2023-09-18 PROCEDURE — 1159F PR MEDICATION LIST DOCUMENTED IN MEDICAL RECORD: ICD-10-PCS | Mod: CPTII,S$GLB,, | Performed by: OPHTHALMOLOGY

## 2023-09-18 PROCEDURE — 1101F PR PT FALLS ASSESS DOC 0-1 FALLS W/OUT INJ PAST YR: ICD-10-PCS | Mod: CPTII,S$GLB,, | Performed by: OPHTHALMOLOGY

## 2023-09-18 PROCEDURE — 92014 PR EYE EXAM, EST PATIENT,COMPREHESV: ICD-10-PCS | Mod: S$GLB,,, | Performed by: OPHTHALMOLOGY

## 2023-09-18 PROCEDURE — 92134 POSTERIOR SEGMENT OCT RETINA (OCULAR COHERENCE TOMOGRAPHY)-BOTH EYES: ICD-10-PCS | Mod: S$GLB,,, | Performed by: OPHTHALMOLOGY

## 2023-09-18 PROCEDURE — 99999 PR PBB SHADOW E&M-EST. PATIENT-LVL III: ICD-10-PCS | Mod: PBBFAC,,, | Performed by: OPHTHALMOLOGY

## 2023-09-18 PROCEDURE — 4010F PR ACE/ARB THEARPY RXD/TAKEN: ICD-10-PCS | Mod: CPTII,S$GLB,, | Performed by: OPHTHALMOLOGY

## 2023-09-18 PROCEDURE — 3062F PR POS MACROALBUMINURIA RESULT DOCUMENTED/REVIEW: ICD-10-PCS | Mod: CPTII,S$GLB,, | Performed by: OPHTHALMOLOGY

## 2023-09-18 PROCEDURE — 1159F MED LIST DOCD IN RCRD: CPT | Mod: CPTII,S$GLB,, | Performed by: OPHTHALMOLOGY

## 2023-09-18 PROCEDURE — 3066F PR DOCUMENTATION OF TREATMENT FOR NEPHROPATHY: ICD-10-PCS | Mod: CPTII,S$GLB,, | Performed by: OPHTHALMOLOGY

## 2023-09-18 PROCEDURE — 92201 OPSCPY EXTND RTA DRAW UNI/BI: CPT | Mod: S$GLB,,, | Performed by: OPHTHALMOLOGY

## 2023-09-18 PROCEDURE — 3066F NEPHROPATHY DOC TX: CPT | Mod: CPTII,S$GLB,, | Performed by: OPHTHALMOLOGY

## 2023-09-18 NOTE — PROGRESS NOTES
HPI    DFE OU  LAST BS  130  LAST A1C  7      EYE MEDS   ART  TEAR S    OU PRN     PT C/O GLARE IS VERY BOTHERSOME  BLURRED VA OU   Last edited by Fartun Hernandez on 9/18/2023  8:38 AM.              OCT - some thinning - No ME OU  OD trace parafoveal ME  OS - ERM minimal traction      A/P    1. PDR OU S/P PRP (12 yrs. Ago in [place]; laser OD 2 yrs ago at    ; h/x laser OD (1982 study at [facility])  T1 uncontrolled    2. H/X Vit. Hem. OD      3. MFIOL OU  S/p YAG OU    4. ERM OS  Minimal traction    BS/BP/chol control      1 year OCT

## 2023-09-19 RX ORDER — FLUOXETINE HYDROCHLORIDE 20 MG/1
20 CAPSULE ORAL
Qty: 90 CAPSULE | Refills: 1 | Status: SHIPPED | OUTPATIENT
Start: 2023-09-19 | End: 2023-12-26

## 2023-09-19 NOTE — TELEPHONE ENCOUNTER
No care due was identified.  [facility] Embedded Care Due Messages. Reference number: [id number].   9/18/2023 9:14:11 PM CDT

## 2023-09-19 NOTE — TELEPHONE ENCOUNTER
Refill Decision Note   Hammad Douglas  is requesting a refill authorization.  Brief Assessment and Rationale for Refill:  Approve     Medication Therapy Plan:         Comments:     Note composed:4:29 AM 09/19/2023             Appointments     Last Visit   4/12/2023 Adelaide Bran MD   Next Visit   Visit date not found Adelaide Bran MD

## 2023-10-02 ENCOUNTER — PATIENT MESSAGE (OUTPATIENT)
Dept: ENDOCRINOLOGY | Facility: CLINIC | Age: 68
End: 2023-10-02
Payer: COMMERCIAL

## 2023-10-15 DIAGNOSIS — E78.5 HYPERLIPIDEMIA, UNSPECIFIED HYPERLIPIDEMIA TYPE: ICD-10-CM

## 2023-10-16 RX ORDER — ATORVASTATIN CALCIUM 40 MG/1
TABLET, FILM COATED ORAL
Qty: 90 TABLET | Refills: 3 | Status: SHIPPED | OUTPATIENT
Start: 2023-10-16

## 2023-11-30 ENCOUNTER — HOSPITAL ENCOUNTER (OUTPATIENT)
Dept: RADIOLOGY | Facility: HOSPITAL | Age: 68
Discharge: HOME | End: 2023-11-30
Attending: NEUROLOGICAL SURGERY
Payer: COMMERCIAL

## 2023-11-30 DIAGNOSIS — M54.16 RADICULOPATHY, LUMBAR REGION: Primary | ICD-10-CM

## 2023-11-30 DIAGNOSIS — M54.16 RADICULOPATHY, LUMBAR REGION: ICD-10-CM

## 2023-11-30 PROCEDURE — 72148 MRI LUMBAR SPINE W/O DYE: CPT | Mod: TC

## 2023-11-30 PROCEDURE — 72148 MRI LUMBAR SPINE W/O DYE: CPT | Mod: 26,,, | Performed by: RADIOLOGY

## 2023-11-30 PROCEDURE — 72148 MRI LUMBAR SPINE WITHOUT CONTRAST: ICD-10-PCS | Mod: 26,,, | Performed by: RADIOLOGY

## 2023-12-01 DIAGNOSIS — S12.9XXA FRACTURE OF NECK, UNSPECIFIED, INITIAL ENCOUNTER: Primary | ICD-10-CM

## 2023-12-01 DIAGNOSIS — E10.43 TYPE 1 DIABETES MELLITUS WITH DIABETIC AUTONOMIC NEUROPATHY: ICD-10-CM

## 2023-12-01 RX ORDER — INSULIN PMP CART,AUT,G6/7,CNTR
1 EACH SUBCUTANEOUS
Qty: 10 EACH | Refills: 6 | Status: SHIPPED | OUTPATIENT
Start: 2023-12-01

## 2023-12-01 RX ORDER — BLOOD-GLUCOSE SENSOR
EACH MISCELLANEOUS
Qty: 9 EACH | Refills: 3 | Status: SHIPPED | OUTPATIENT
Start: 2023-12-01

## 2023-12-01 RX ORDER — BLOOD-GLUCOSE TRANSMITTER
EACH MISCELLANEOUS
Qty: 1 EACH | Refills: 3 | Status: SHIPPED | OUTPATIENT
Start: 2023-12-01

## 2023-12-01 RX ORDER — INSULIN LISPRO 100 [IU]/ML
INJECTION, SOLUTION INTRAVENOUS; SUBCUTANEOUS
Qty: 20 ML | Refills: 11 | Status: SHIPPED | OUTPATIENT
Start: 2023-12-01

## 2023-12-03 ENCOUNTER — PATIENT MESSAGE (OUTPATIENT)
Dept: AUDIOLOGY | Facility: CLINIC | Age: 68
End: 2023-12-03
Payer: COMMERCIAL

## 2023-12-04 ENCOUNTER — HOSPITAL ENCOUNTER (OUTPATIENT)
Dept: RADIOLOGY | Facility: HOSPITAL | Age: 68
Discharge: HOME | End: 2023-12-04
Attending: NEUROLOGICAL SURGERY
Payer: COMMERCIAL

## 2023-12-04 DIAGNOSIS — S12.9XXA FRACTURE OF NECK, UNSPECIFIED, INITIAL ENCOUNTER: ICD-10-CM

## 2023-12-04 PROCEDURE — 72040 XR CERVICAL SPINE AP LATERAL: ICD-10-PCS | Mod: 26,,, | Performed by: RADIOLOGY

## 2023-12-04 PROCEDURE — 72040 X-RAY EXAM NECK SPINE 2-3 VW: CPT | Mod: TC

## 2023-12-04 PROCEDURE — 72040 X-RAY EXAM NECK SPINE 2-3 VW: CPT | Mod: 26,,, | Performed by: RADIOLOGY

## 2023-12-11 ENCOUNTER — TELEPHONE (OUTPATIENT)
Dept: AUDIOLOGY | Facility: CLINIC | Age: 68
End: 2023-12-11
Payer: COMMERCIAL

## 2023-12-11 NOTE — TELEPHONE ENCOUNTER
LMOR to inform pt that I updated the firmware in the aids and they will be at the desk for him to . Spoke with wife on the home line who said she will  the aids for him.    ----- Message from Malou Regan sent at 12/8/2023 12:27 PM CST -----  Regarding: Firmware  The patient dropped off his hearing aids and . When you return on Monday, 12/11/2023, please check to see if the firmware needs to updated.

## 2023-12-18 ENCOUNTER — PATIENT MESSAGE (OUTPATIENT)
Dept: ENDOCRINOLOGY | Facility: CLINIC | Age: 68
End: 2023-12-18
Payer: COMMERCIAL

## 2023-12-25 DIAGNOSIS — F32.A ANXIETY AND DEPRESSION: ICD-10-CM

## 2023-12-25 DIAGNOSIS — F41.9 ANXIETY AND DEPRESSION: ICD-10-CM

## 2023-12-25 NOTE — TELEPHONE ENCOUNTER
No care due was identified.  [facility] Embedded Care Due Messages. Reference number: [id number].   12/25/2023 1:46:46 AM CST

## 2023-12-26 RX ORDER — FLUOXETINE HYDROCHLORIDE 20 MG/1
20 CAPSULE ORAL
Qty: 90 CAPSULE | Refills: 1 | Status: SHIPPED | OUTPATIENT
Start: 2023-12-26

## 2023-12-27 DIAGNOSIS — M54.12 RADICULOPATHY, CERVICAL REGION: Primary | ICD-10-CM

## 2023-12-27 NOTE — TELEPHONE ENCOUNTER
Refill Decision Note   Hammad Douglas  is requesting a refill authorization.  Brief Assessment and Rationale for Refill:  Approve     Medication Therapy Plan:         Comments:     Note composed:7:47 PM 12/26/2023

## 2023-12-28 ENCOUNTER — HOSPITAL ENCOUNTER (OUTPATIENT)
Dept: RADIOLOGY | Facility: HOSPITAL | Age: 68
Discharge: HOME | End: 2023-12-28
Attending: NEUROLOGICAL SURGERY
Payer: COMMERCIAL

## 2023-12-28 DIAGNOSIS — M54.12 RADICULOPATHY, CERVICAL REGION: ICD-10-CM

## 2023-12-28 PROCEDURE — 72141 MRI CERVICAL SPINE WITHOUT CONTRAST: ICD-10-PCS | Mod: 26,,, | Performed by: RADIOLOGY

## 2023-12-28 PROCEDURE — 72141 MRI NECK SPINE W/O DYE: CPT | Mod: TC

## 2023-12-28 PROCEDURE — 72040 XR CERVICAL SPINE AP LATERAL: ICD-10-PCS | Mod: 26,,, | Performed by: RADIOLOGY

## 2023-12-28 PROCEDURE — 72141 MRI NECK SPINE W/O DYE: CPT | Mod: 26,,, | Performed by: RADIOLOGY

## 2023-12-28 PROCEDURE — 72040 X-RAY EXAM NECK SPINE 2-3 VW: CPT | Mod: TC

## 2023-12-28 PROCEDURE — 72040 X-RAY EXAM NECK SPINE 2-3 VW: CPT | Mod: 26,,, | Performed by: RADIOLOGY

## 2024-01-02 ENCOUNTER — CLINICAL SUPPORT (OUTPATIENT)
Dept: AUDIOLOGY | Facility: CLINIC | Age: 69
End: 2024-01-02
Payer: COMMERCIAL

## 2024-01-02 DIAGNOSIS — Z97.4 WEARS HEARING AID IN BOTH EARS: Primary | ICD-10-CM

## 2024-01-02 PROCEDURE — 99499 UNLISTED E&M SERVICE: CPT | Mod: S$GLB,,, | Performed by: AUDIOLOGIST-HEARING AID FITTER

## 2024-01-02 NOTE — PROGRESS NOTES
Hammad SUERO Misael ALAMO came in on 01/02/2024 for a hearing aid follow up. Pt was accompanied by spouse during today's visit. Pt stated the right aid is no longer working. Visual check revealed the  to be full of wax. Changed the wax filters and aids sounded appropriate for his hearing loss. Checked the phone connections to which all was well.  All complaints were addressed during this visit to the patient's satisfaction. Plan of care was discussed in detail with the patient, who agreed with the plan as above.

## 2024-02-05 ENCOUNTER — PATIENT MESSAGE (OUTPATIENT)
Dept: ENDOCRINOLOGY | Facility: CLINIC | Age: 69
End: 2024-02-05
Payer: COMMERCIAL

## 2024-02-08 NOTE — TELEPHONE ENCOUNTER
Patient recovering from heart surgery with hyperglycemia. Hospital allowing him to resume pump but he is in need of G6 transmitter asap.  Please obtain G6 transitter from Christy's office. She will put on her desk. Then please contact pt or wife and let them know where/when to . Thank you.

## 2024-02-21 ENCOUNTER — TELEPHONE (OUTPATIENT)
Dept: ENDOCRINOLOGY | Facility: CLINIC | Age: 69
End: 2024-02-21
Payer: COMMERCIAL

## 2024-02-21 ENCOUNTER — PATIENT MESSAGE (OUTPATIENT)
Dept: FAMILY MEDICINE | Facility: CLINIC | Age: 69
End: 2024-02-21
Payer: COMMERCIAL

## 2024-02-21 NOTE — TELEPHONE ENCOUNTER
S/w wife notified her we have 2 dexcom G6 sensors and transmitter for her to come p/u tomorrow for pt. Wife will come tomorrow to pick it up

## 2024-02-22 ENCOUNTER — OFFICE VISIT (OUTPATIENT)
Dept: FAMILY MEDICINE | Facility: CLINIC | Age: 69
End: 2024-02-22
Payer: MEDICARE

## 2024-02-22 ENCOUNTER — LAB VISIT (OUTPATIENT)
Dept: LAB | Facility: HOSPITAL | Age: 69
End: 2024-02-22
Attending: INTERNAL MEDICINE
Payer: MEDICARE

## 2024-02-22 VITALS
WEIGHT: 193.25 LBS | RESPIRATION RATE: 16 BRPM | BODY MASS INDEX: 29.29 KG/M2 | DIASTOLIC BLOOD PRESSURE: 76 MMHG | HEIGHT: 68 IN | HEART RATE: 96 BPM | OXYGEN SATURATION: 98 % | SYSTOLIC BLOOD PRESSURE: 122 MMHG

## 2024-02-22 DIAGNOSIS — E10.3513 TYPE 1 DIABETES MELLITUS WITH PROLIFERATIVE RETINOPATHY OF BOTH EYES AND MACULAR EDEMA: ICD-10-CM

## 2024-02-22 DIAGNOSIS — G95.9 CERVICAL MYELOPATHY: ICD-10-CM

## 2024-02-22 DIAGNOSIS — R26.9 ABNORMAL GAIT: ICD-10-CM

## 2024-02-22 DIAGNOSIS — D64.9 POSTOPERATIVE ANEMIA: ICD-10-CM

## 2024-02-22 DIAGNOSIS — N17.9 AKI (ACUTE KIDNEY INJURY): Primary | ICD-10-CM

## 2024-02-22 DIAGNOSIS — N17.9 AKI (ACUTE KIDNEY INJURY): ICD-10-CM

## 2024-02-22 DIAGNOSIS — R29.898 WEAKNESS OF BOTH HANDS: ICD-10-CM

## 2024-02-22 LAB
ANION GAP SERPL CALC-SCNC: 9 MMOL/L (ref 8–16)
BASOPHILS # BLD AUTO: 0.04 K/UL (ref 0–0.2)
BASOPHILS NFR BLD: 0.7 % (ref 0–1.9)
BUN SERPL-MCNC: 25 MG/DL (ref 8–23)
CALCIUM SERPL-MCNC: 9.8 MG/DL (ref 8.7–10.5)
CHLORIDE SERPL-SCNC: 103 MMOL/L (ref 95–110)
CO2 SERPL-SCNC: 25 MMOL/L (ref 23–29)
CREAT SERPL-MCNC: 1.5 MG/DL (ref 0.5–1.4)
DIFFERENTIAL METHOD BLD: ABNORMAL
EOSINOPHIL # BLD AUTO: 0.3 K/UL (ref 0–0.5)
EOSINOPHIL NFR BLD: 4.5 % (ref 0–8)
ERYTHROCYTE [DISTWIDTH] IN BLOOD BY AUTOMATED COUNT: 14.6 % (ref 11.5–14.5)
EST. GFR  (NO RACE VARIABLE): 51 ML/MIN/1.73 M^2
GLUCOSE SERPL-MCNC: 86 MG/DL (ref 70–110)
HCT VFR BLD AUTO: 36.1 % (ref 40–54)
HGB BLD-MCNC: 11.6 G/DL (ref 14–18)
IMM GRANULOCYTES # BLD AUTO: 0.01 K/UL (ref 0–0.04)
IMM GRANULOCYTES NFR BLD AUTO: 0.2 % (ref 0–0.5)
LYMPHOCYTES # BLD AUTO: 1.7 K/UL (ref 1–4.8)
LYMPHOCYTES NFR BLD: 29 % (ref 18–48)
MCH RBC QN AUTO: 29.8 PG (ref 27–31)
MCHC RBC AUTO-ENTMCNC: 32.1 G/DL (ref 32–36)
MCV RBC AUTO: 93 FL (ref 82–98)
MONOCYTES # BLD AUTO: 0.8 K/UL (ref 0.3–1)
MONOCYTES NFR BLD: 14.1 % (ref 4–15)
NEUTROPHILS # BLD AUTO: 3 K/UL (ref 1.8–7.7)
NEUTROPHILS NFR BLD: 51.5 % (ref 38–73)
NRBC BLD-RTO: 0 /100 WBC
PLATELET # BLD AUTO: 492 K/UL (ref 150–450)
PMV BLD AUTO: 9.4 FL (ref 9.2–12.9)
POTASSIUM SERPL-SCNC: 4.1 MMOL/L (ref 3.5–5.1)
RBC # BLD AUTO: 3.89 M/UL (ref 4.6–6.2)
SODIUM SERPL-SCNC: 137 MMOL/L (ref 136–145)
WBC # BLD AUTO: 5.83 K/UL (ref 3.9–12.7)

## 2024-02-22 PROCEDURE — 80048 BASIC METABOLIC PNL TOTAL CA: CPT | Performed by: INTERNAL MEDICINE

## 2024-02-22 PROCEDURE — 99999 PR PBB SHADOW E&M-EST. PATIENT-LVL V: CPT | Mod: PBBFAC,,, | Performed by: INTERNAL MEDICINE

## 2024-02-22 PROCEDURE — 36415 COLL VENOUS BLD VENIPUNCTURE: CPT | Mod: PN | Performed by: INTERNAL MEDICINE

## 2024-02-22 PROCEDURE — 85025 COMPLETE CBC W/AUTO DIFF WBC: CPT | Performed by: INTERNAL MEDICINE

## 2024-02-22 PROCEDURE — 99214 OFFICE O/P EST MOD 30 MIN: CPT | Mod: S$PBB,,, | Performed by: INTERNAL MEDICINE

## 2024-02-22 PROCEDURE — 99215 OFFICE O/P EST HI 40 MIN: CPT | Mod: PBBFAC,PN | Performed by: INTERNAL MEDICINE

## 2024-02-22 RX ORDER — CLOPIDOGREL BISULFATE 75 MG/1
75 TABLET ORAL DAILY
COMMUNITY

## 2024-02-22 RX ORDER — ASPIRIN 81 MG/1
1 TABLET ORAL DAILY
COMMUNITY
Start: 2023-12-19

## 2024-02-22 RX ORDER — UBIDECARENONE 30 MG
1 CAPSULE ORAL DAILY
COMMUNITY

## 2024-02-22 RX ORDER — MIDODRINE HYDROCHLORIDE 5 MG/1
5 TABLET ORAL DAILY PRN
COMMUNITY
Start: 2024-02-16

## 2024-02-22 NOTE — PROGRESS NOTES
Assessment and Plan:    1. CARMINA (acute kidney injury)  Cr peaked at 3.6, 2.0 at discharge, baseline Cr 1.3. Will recheck today. Not on NSAIDs. Hydrating.  - Basic Metabolic Panel; Future    2. Postoperative anemia  Last Hgb 9.3 at discharge  - CBC Auto Differential; Future    3. 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 No hypoglycemia on current regimen even with worsened renal function. Discussed with patient and his wife that A1c would not be reliable in current circumstances due to having had 5 units PRBCs transfused and even with this Hgb 9.3.     4. Weakness of both hands  - Ambulatory referral/consult to Physical/Occupational Therapy; Future    5. Cervical myelopathy  - Ambulatory referral/consult to Physical/Occupational Therapy; Future    6. Abnormal gait  - Ambulatory referral/consult to Physical/Occupational Therapy; Future      ______________________________________________________________________  Subjective:    Chief Complaint:  Hospital follow up    HPI:  Hammad is a 68 y.o. year old male reporting that he is here for hospital follow up. Notably, he booked this appointment this morning, and he was hospitalized outside of our system, so no hospital records are available at the time of this visit. All information obtained from patient and his wife.     First issue was fall from a ladder Nov 18th (14 ft fall for his head). Went to [place] ER. Identified to have C6, L1, and L3 vertebrae fractured, 2 broken ribs, and sternal fracture. Had LOC with fall, but no abnormalities on head CT. Nov 20th had kyphoplasty on L1 and L3 with Dr. Carlson ([facility]) with no complications. Discharged on Nov 24th, no other complications during that hospitalization.     Dec 14th had follow up with trauma surgery and noted to be very hypotensive during that visit. CT head at that time still fine. Thought to be dehydrated. Sent to ER for fluids. Found to  have orthostatic hypotension. Admitted to hospital. Had echo -> stress test -> angiogram. Found to have too many blockages to stent, recommended CABG.     Jan 23rd admitted for planned CABG, complicated by significant bleeding. Had to go back to the cath lab and coiled bleeding vein in chest. Hospitalization complicated by CARMINA (peak Cr 3.58, had been at least down to 2.59 prior to discharge but not certain of final Cr prior to discharge. He was followed by Nephrology while inpatient, but did not need any follow up. He also had a pleural effusion 1.5 L drained. Follow up CXR yesterday showed minimal fluid.    He is following up with neurosurgery next week, still in C-collar but hoping to DC this.     He was seeing Dr. Zendejas for CT surgery. Now will be seeing Dr. Stern.    Medications:  Current Outpatient Medications on File Prior to Visit   Medication Sig Dispense Refill    aspirin (ECOTRIN) 81 MG EC tablet Take 1 tablet by mouth once daily.      atorvastatin (LIPITOR) 40 MG tablet TAKE 1 TABLET(40 MG) BY MOUTH EVERY DAY 90 tablet 3    clopidogreL (PLAVIX) 75 mg tablet Take 75 mg by mouth once daily.      co-enzyme Q-10 30 mg capsule Take 1 capsule by mouth once daily.      FLUoxetine 20 MG capsule TAKE 1 CAPSULE(20 MG) BY MOUTH EVERY DAY 90 capsule 1    insulin lispro (HUMALOG U-100 INSULIN) 100 unit/mL injection Use continuously in insulin pump , max TDD 60u 20 mL 11    midodrine (PROAMATINE) 5 MG Tab Take 5 mg by mouth daily as needed.      multivitamin capsule Take 1 capsule by mouth once daily.      OMNIPOD 5 G6 PODS, GEN 5, Crtg Inject 1 Device into the skin Every 3 (three) days. 10 each 6    blood-glucose sensor (DEXCOM G6 SENSOR) Jenni Change every 10 days. 9 each 3    blood-glucose transmitter (DEXCOM G6 TRANSMITTER) Jenni Change every 90 days 1 each 3    dicyclomine (BENTYL) 20 mg tablet Take 20 mg by mouth 4 (four) times daily as needed.      HYDROcodone-acetaminophen (NORCO) 5-325 mg per tablet Take 1  "tablet by mouth every 6 (six) hours as needed.      OMNIPOD 5 G6 INTRO KIT, GEN 5, Crtg Inject 1 Device into the skin continuous. (Patient not taking: Reported on 2/22/2024) 1 each 0    [DISCONTINUED] amoxicillin-clavulanate 875-125mg (AUGMENTIN) 875-125 mg per tablet Take 1 tablet by mouth 2 (two) times daily.      [DISCONTINUED] losartan (COZAAR) 50 MG tablet Take 1 tablet (50 mg total) by mouth once daily. (Patient not taking: Reported on 2/22/2024) 90 tablet 1     Current Facility-Administered Medications on File Prior to Visit   Medication Dose Route Frequency Provider Last Rate Last Admin    glucagon (human recombinant) injection 1 mg  1 mg Intramuscular Once Tania Mckeon NP           Review of Systems:  Review of Systems   Constitutional:  Negative for chills and fever.   Respiratory:  Positive for cough. Negative for shortness of breath and wheezing.    Cardiovascular:  Negative for chest pain, palpitations and leg swelling.   Neurological:  Negative for dizziness, syncope and light-headedness.       Past Medical History:  Past Medical History:   Diagnosis Date    Cataract     Done OU    Chronic kidney disease     Diabetes mellitus type I     Diagnosed at age 19    Diabetic retinopathy     See's Dr. Ledesma    Hyperlipidemia     Hypertension        Objective:    Vitals:  Vitals:    02/22/24 1515   BP: 122/76   Pulse: 96   Resp: 16   SpO2: 98%   Weight: 87.6 kg (193 lb 3.7 oz)   Height: 5' 8" (1.727 m)       Physical Exam  Vitals reviewed.   Constitutional:       General: He is not in acute distress.     Appearance: He is well-developed.   Eyes:      General: No scleral icterus.  Cardiovascular:      Rate and Rhythm: Normal rate and regular rhythm.      Heart sounds: No murmur heard.  Pulmonary:      Effort: Pulmonary effort is normal. No respiratory distress.      Breath sounds: No wheezing.      Comments: faint crackles right lung base  Musculoskeletal:      Right lower leg: No edema.      Left " lower leg: No edema.      Comments: in C-collar   Skin:     General: Skin is warm and dry.   Neurological:      Mental Status: He is alert and oriented to person, place, and time.   Psychiatric:         Behavior: Behavior normal.         Data:  Last Cr 2.01  Last Hgb 9.3    Adelaide Bran MD  Internal Medicine

## 2024-02-27 DIAGNOSIS — Z00.00 ENCOUNTER FOR MEDICARE ANNUAL WELLNESS EXAM: ICD-10-CM

## 2024-03-04 ENCOUNTER — OFFICE VISIT (OUTPATIENT)
Dept: ENDOCRINOLOGY | Facility: CLINIC | Age: 69
End: 2024-03-04
Payer: MEDICARE

## 2024-03-04 VITALS
SYSTOLIC BLOOD PRESSURE: 90 MMHG | DIASTOLIC BLOOD PRESSURE: 60 MMHG | WEIGHT: 194 LBS | BODY MASS INDEX: 29.4 KG/M2 | HEIGHT: 68 IN | HEART RATE: 102 BPM

## 2024-03-04 DIAGNOSIS — E10.21 TYPE 1 DIABETES MELLITUS WITH DIABETIC NEPHROPATHY: ICD-10-CM

## 2024-03-04 DIAGNOSIS — E10.43 TYPE 1 DIABETES MELLITUS WITH DIABETIC AUTONOMIC NEUROPATHY: Primary | ICD-10-CM

## 2024-03-04 DIAGNOSIS — E78.5 HYPERLIPIDEMIA, UNSPECIFIED HYPERLIPIDEMIA TYPE: ICD-10-CM

## 2024-03-04 DIAGNOSIS — E10.3593 TYPE 1 DIABETES MELLITUS WITH PROLIFERATIVE RETINOPATHY OF BOTH EYES WITHOUT MACULAR EDEMA: ICD-10-CM

## 2024-03-04 DIAGNOSIS — I10 ESSENTIAL HYPERTENSION: ICD-10-CM

## 2024-03-04 DIAGNOSIS — E10.649 HYPOGLYCEMIA UNAWARENESS IN TYPE 1 DIABETES MELLITUS: ICD-10-CM

## 2024-03-04 DIAGNOSIS — Z96.41 INSULIN PUMP STATUS: ICD-10-CM

## 2024-03-04 PROCEDURE — 99214 OFFICE O/P EST MOD 30 MIN: CPT | Mod: PBBFAC,PO | Performed by: NURSE PRACTITIONER

## 2024-03-04 PROCEDURE — 99999 PR PBB SHADOW E&M-EST. PATIENT-LVL IV: CPT | Mod: PBBFAC,,, | Performed by: NURSE PRACTITIONER

## 2024-03-04 NOTE — PROGRESS NOTES
CC: Mr. Hammad Douglas III arrives today for management of Type 1  DM and review of chronic medical conditions, as listed in the visit diagnosis section of this encounter.     HPI: Mr. Hammad Douglas III was diagnosed with Type 1  DM at age 19 after a viral illness - had polyuria, polydipsia at this time.  Initial treatment consisted of insulin. Denies FH of DM. Reports past hospitalizations due to DKA > 30 years ago.   Has diagnosis of hypoglycemia unawareness with past severe hypoglycemic episodes requiring 3rd party assistance. Began using Dexcom G5 in 2017 and later up graded to G6. He began OmniPod 5 in November 2022.   He follows with Dr. Stern for CAD.     Patient was last seen by me in March 2023.     He is recently s/p CABG x 4 in January. Was at [place].  He also had CARMINA and required blood transfusion of 5 units PRBCs. Also had pleural effusion.     Had fall from a ladder Nov 18th (14 ft fall for his head). Nov 20th had kyphoplasty on L1 and L3 with Dr. Carlson. Was told he'll also need cervical repair.    BG monitoring per Dexcom G6.     Hypoglycemia: sporadic   Hypoglycemic Symptoms: often doesn't have symptoms.   Hypoglycemia Treatment: Juice or glucose tabs.     Exercise: PT 2 days/week    Dietary Habits: Eats 3 meals/day. Reports portion sizes have decreased. Rare snacking. Avoids sugary beverages.     Last DM education appointment: 12/8/2022        CURRENT DIABETIC MEDS:  Humalog in OmniPod 5   ** receives pods through [facility] in   Insulin back up plan: Toujeo Max 24 units QHS, Novolog I:C ratio 1:5    Previous insulins:  Lantus    When did you start the current therapy you are on: 11/2022  Frequency of changing site/infusion set/tubing/cartridges: 3 days  Frequency of changing CGM: 10 days  Using bolus wizard: yes  Taking bolus with each meal: yes      INSULIN PUMP SETTINGS:  Basal:  0000 - 0.9 u/hr    I:C Ratio:  0000 - 1:6  0700 - 1:5.5  1000 - 1:6    ISF:  0000 - 50    Target glucose:  "130    Active insulin time: 4 hours    Last Eye Exam: 9/2023 + proliferative DR. Dr. Lugo  Last Podiatry Exam: 2/2023, Past amputation of R third toe. S/P arthroplasty of the Lt. Hallux IP joint in July 2022.    REVIEW OF SYSTEMS  Constitutional: no c/o weakness. + fatigue, + 30# weight loss over the past year, mostly in the last few months.   Cardiac: no palpitations. + chest pain that he attributes to sternal fracture (x2).   Respiratory: no cough or dyspnea.   GI:  no c/o abdominal pain or nausea.   Skin: no rashes, lesions   Neuro: no numbness, tingling, or parasthesias.  Endocrine: denies polyphagia, polydipsia, polyuria    Personally reviewed Past Medical, Surgical, Social History.    Vital Signs  BP 90/60   Pulse 102   Ht 5' 8" (1.727 m)   Wt 88 kg (194 lb 0.1 oz)   BMI 29.50 kg/m²      Personally reviewed the below labs:    Hemoglobin A1C   Date Value Ref Range Status   06/22/2023 6.3 (H) 4.0 - 5.6 % Final     Comment:     ADA Screening Guidelines:  5.7-6.4%  Consistent with prediabetes  >or=6.5%  Consistent with diabetes    High levels of fetal hemoglobin interfere with the HbA1C  assay. Heterozygous hemoglobin variants (HbS, HgC, etc)do  not significantly interfere with this assay.   However, presence of multiple variants may affect accuracy.     03/21/2023 6.4 (H) 4.0 - 5.6 % Final     Comment:     ADA Screening Guidelines:  5.7-6.4%  Consistent with prediabetes  >or=6.5%  Consistent with diabetes    High levels of fetal hemoglobin interfere with the HbA1C  assay. Heterozygous hemoglobin variants (HbS, HgC, etc)do  not significantly interfere with this assay.   However, presence of multiple variants may affect accuracy.     12/20/2022 7.1 (H) 4.0 - 5.6 % Final     Comment:     ADA Screening Guidelines:  5.7-6.4%  Consistent with prediabetes  >or=6.5%  Consistent with diabetes    High levels of fetal hemoglobin interfere with the HbA1C  assay. Heterozygous hemoglobin variants (HbS, HgC, etc)do  not " significantly interfere with this assay.   However, presence of multiple variants may affect accuracy.         Chemistry        Component Value Date/Time     02/22/2024 1559    K 4.1 02/22/2024 1559     02/22/2024 1559    CO2 25 02/22/2024 1559    BUN 25 (H) 02/22/2024 1559    CREATININE 1.5 (H) 02/22/2024 1559    GLU 86 02/22/2024 1559        Component Value Date/Time    CALCIUM 9.8 02/22/2024 1559    ALKPHOS 79 06/22/2023 0946    AST 25 06/22/2023 0946    ALT 20 06/22/2023 0946    BILITOT 0.4 06/22/2023 0946    ESTGFRAFRICA >60.0 06/22/2022 0848    EGFRNONAA 57.3 (A) 06/22/2022 0848          Lab Results   Component Value Date    CHOL 156 09/21/2022    CHOL 150 08/12/2021    CHOL 154 07/27/2020     Lab Results   Component Value Date    HDL 45 09/21/2022    HDL 46 08/12/2021    HDL 46 07/27/2020     Lab Results   Component Value Date    LDLCALC 98.4 09/21/2022    LDLCALC 91.4 08/12/2021    LDLCALC 95.8 07/27/2020     Lab Results   Component Value Date    TRIG 63 09/21/2022    TRIG 63 08/12/2021    TRIG 61 07/27/2020     Lab Results   Component Value Date    CHOLHDL 28.8 09/21/2022    CHOLHDL 30.7 08/12/2021    CHOLHDL 29.9 07/27/2020       Lab Results   Component Value Date    MICALBCREAT 320.2 (H) 03/21/2023     Lab Results   Component Value Date    TSH 1.789 02/09/2022       CrCl cannot be calculated (Patient's most recent lab result is older than the maximum 7 days allowed.).    Vit D, 25-Hydroxy   Date Value Ref Range Status   08/06/2018 35 30 - 96 ng/mL Final     Comment:     Vitamin D deficiency.........<10 ng/mL                              Vitamin D insufficiency......10-29 ng/mL       Vitamin D sufficiency........> or equal to 30 ng/mL  Vitamin D toxicity............>100 ng/mL         PHYSICAL EXAMINATION  Constitutional: Appears well, no distress  Respiratory: CTA, even and unlabored.   Cardiovascular: RRR, no murmurs.  GI: active bowel sounds, no hernia  Skin: warm and dry  Neuro: oriented to  person, place, time.  Feet: appropriate footwear.       DEXCOM DOWNLOAD: Fasting glucoses are stable. Occasional prandial excursions note but not with a pattern. Rare hypoglycemia -- reports incident on Saturday night was a false low.                    Goals        HEMOGLOBIN A1C < 7.5           due to hypoglycemia unawareness      Assessment/Plan  1. Type 1 diabetes mellitus with diabetic autonomic neuropathy  -- A1c would not be reliable in current circumstances due to having had 5 units PRBCs transfused and even with this Hgb 9.3.    -- continue current pump settings.   -- has insulin pens for back up  -- BG monitoring per Dexcom G6    -- Discussed diagnosis of DM, A1c goals, progression of disease, long term complications and tx options.    -- Reviewed hypoglycemia management: treat with 1/2 glass of juice, 1/2 can regular coke, or 4 glucose tablets. Monitor and repeat treatment every 15 minutes until BG is >70 Then have a snack, which includes a complex carbohydrate and protein.  Advised patient to check BG before activities, such as driving or exercise.   2. Type 1 diabetes mellitus with diabetic nephropathy  -- uncontrolled, off of losartan, due to hypotension.  -- maintain DM control   3. Proliferative diabetic retinopathy without macular edema associated with type 1 diabetes mellitus, unspecified laterality  -- stable  -- keep follow ups   4. Essential hypertension  -- currently not requiring medication   5. Hyperlipidemia, unspecified hyperlipidemia type  -- controlled  -- continue atorvastatin   6. Hypoglycemia unawareness in Type 1 DM -- continue Dexcom.   -- has glucagon   7. Insulin pump status -- download reviewed and settings changed.         FOLLOW UP   Follow up in about 3 months (around 6/4/2024).   Patient instructed to bring BG logs to each follow up   Patient encouraged to call for any BG/medication issues, concerns, or questions.     Orders Placed This Encounter   Procedures    Hemoglobin A1C     Lipid Panel    Comprehensive Metabolic Panel    Microalbumin/Creatinine Ratio, Urine    TSH

## 2024-03-07 ENCOUNTER — PATIENT MESSAGE (OUTPATIENT)
Dept: AUDIOLOGY | Facility: CLINIC | Age: 69
End: 2024-03-07
Payer: MEDICARE

## 2024-03-07 ENCOUNTER — DOCUMENTATION ONLY (OUTPATIENT)
Dept: AUDIOLOGY | Facility: CLINIC | Age: 69
End: 2024-03-07
Payer: MEDICARE

## 2024-03-07 NOTE — PROGRESS NOTES
The patient dropped both of his hearing aids and  off to be checked. He put on the repair form that his right hearing aid is not charging. I did a re-set to the aid and it is now working and charging. I also cleaned the hearing aids. The patient was notified that he can  the aids and the  from the second floor check in desk.

## 2024-05-15 ENCOUNTER — PATIENT MESSAGE (OUTPATIENT)
Dept: AUDIOLOGY | Facility: CLINIC | Age: 69
End: 2024-05-15
Payer: MEDICARE

## 2024-05-15 ENCOUNTER — DOCUMENTATION ONLY (OUTPATIENT)
Dept: AUDIOLOGY | Facility: CLINIC | Age: 69
End: 2024-05-15
Payer: MEDICARE

## 2024-05-15 NOTE — PROGRESS NOTES
The patient requested wax filters for his hearing aids. I let the patient know I put some at the second floor check in desk for him to .

## 2024-05-27 ENCOUNTER — HOSPITAL ENCOUNTER (OUTPATIENT)
Dept: RADIOLOGY | Facility: HOSPITAL | Age: 69
Discharge: HOME | End: 2024-05-27
Attending: NEUROLOGICAL SURGERY
Payer: MEDICARE

## 2024-05-27 DIAGNOSIS — M54.12 RADICULOPATHY, CERVICAL: ICD-10-CM

## 2024-05-27 DIAGNOSIS — M54.12 RADICULOPATHY, CERVICAL: Primary | ICD-10-CM

## 2024-05-27 PROCEDURE — 72040 X-RAY EXAM NECK SPINE 2-3 VW: CPT | Mod: TC

## 2024-05-27 PROCEDURE — 72040 X-RAY EXAM NECK SPINE 2-3 VW: CPT | Mod: 26,,, | Performed by: RADIOLOGY

## 2024-06-04 RX ORDER — INSULIN LISPRO 100 [IU]/ML
INJECTION, SOLUTION INTRAVENOUS; SUBCUTANEOUS
Qty: 20 ML | Refills: 11 | Status: SHIPPED | OUTPATIENT
Start: 2024-06-04

## 2024-06-07 ENCOUNTER — PATIENT MESSAGE (OUTPATIENT)
Dept: AUDIOLOGY | Facility: CLINIC | Age: 69
End: 2024-06-07
Payer: MEDICARE

## 2024-06-10 ENCOUNTER — PATIENT MESSAGE (OUTPATIENT)
Dept: AUDIOLOGY | Facility: CLINIC | Age: 69
End: 2024-06-10

## 2024-06-10 ENCOUNTER — CLINICAL SUPPORT (OUTPATIENT)
Dept: AUDIOLOGY | Facility: CLINIC | Age: 69
End: 2024-06-10
Payer: MEDICARE

## 2024-06-10 DIAGNOSIS — Z97.4 WEARS HEARING AID IN BOTH EARS: Primary | ICD-10-CM

## 2024-06-10 PROCEDURE — 99499 UNLISTED E&M SERVICE: CPT | Mod: S$GLB,,, | Performed by: AUDIOLOGIST-HEARING AID FITTER

## 2024-06-10 NOTE — PROGRESS NOTES
Hammad NIMO Douglas III came in on 06/10/2024 for a hearing aid follow up. Pt was alone during today's visit. Pt stated his left hearing aid volume does not change despite his increasing it on his phone. Both wax filters proved to be full of wax. Cleaned both aids and changed the wax filters which seemed to resolve the issue. All complaints were addressed during this visit to the patient's satisfaction. Plan of care was discussed in detail with the patient, who agreed with the plan as above.

## 2024-06-19 ENCOUNTER — LAB VISIT (OUTPATIENT)
Dept: LAB | Facility: HOSPITAL | Age: 69
End: 2024-06-19
Attending: NURSE PRACTITIONER
Payer: MEDICARE

## 2024-06-19 DIAGNOSIS — E10.43 TYPE 1 DIABETES MELLITUS WITH DIABETIC AUTONOMIC NEUROPATHY: ICD-10-CM

## 2024-06-19 LAB
ALBUMIN SERPL BCP-MCNC: 3.5 G/DL (ref 3.5–5.2)
ALBUMIN/CREAT UR: 254.7 UG/MG (ref 0–30)
ALP SERPL-CCNC: 96 U/L (ref 55–135)
ALT SERPL W/O P-5'-P-CCNC: 19 U/L (ref 10–44)
ANION GAP SERPL CALC-SCNC: 4 MMOL/L (ref 8–16)
AST SERPL-CCNC: 24 U/L (ref 10–40)
BILIRUB SERPL-MCNC: 0.3 MG/DL (ref 0.1–1)
BUN SERPL-MCNC: 26 MG/DL (ref 8–23)
CALCIUM SERPL-MCNC: 9.2 MG/DL (ref 8.7–10.5)
CHLORIDE SERPL-SCNC: 107 MMOL/L (ref 95–110)
CHOLEST SERPL-MCNC: 157 MG/DL (ref 120–199)
CHOLEST/HDLC SERPL: 3.7 {RATIO} (ref 2–5)
CO2 SERPL-SCNC: 25 MMOL/L (ref 23–29)
CREAT SERPL-MCNC: 1.3 MG/DL (ref 0.5–1.4)
CREAT UR-MCNC: 53 MG/DL (ref 23–375)
EST. GFR  (NO RACE VARIABLE): >60 ML/MIN/1.73 M^2
ESTIMATED AVG GLUCOSE: 148 MG/DL (ref 68–131)
GLUCOSE SERPL-MCNC: 123 MG/DL (ref 70–110)
HBA1C MFR BLD: 6.8 % (ref 4–5.6)
HDLC SERPL-MCNC: 43 MG/DL (ref 40–75)
HDLC SERPL: 27.4 % (ref 20–50)
LDLC SERPL CALC-MCNC: 95.8 MG/DL (ref 63–159)
MICROALBUMIN UR DL<=1MG/L-MCNC: 135 UG/ML
NONHDLC SERPL-MCNC: 114 MG/DL
POTASSIUM SERPL-SCNC: 5 MMOL/L (ref 3.5–5.1)
PROT SERPL-MCNC: 6.6 G/DL (ref 6–8.4)
SODIUM SERPL-SCNC: 136 MMOL/L (ref 136–145)
TRIGL SERPL-MCNC: 91 MG/DL (ref 30–150)
TSH SERPL DL<=0.005 MIU/L-ACNC: 2.23 UIU/ML (ref 0.4–4)

## 2024-06-19 PROCEDURE — 80053 COMPREHEN METABOLIC PANEL: CPT | Performed by: NURSE PRACTITIONER

## 2024-06-19 PROCEDURE — 80061 LIPID PANEL: CPT | Performed by: NURSE PRACTITIONER

## 2024-06-19 PROCEDURE — 83036 HEMOGLOBIN GLYCOSYLATED A1C: CPT | Performed by: NURSE PRACTITIONER

## 2024-06-19 PROCEDURE — 36415 COLL VENOUS BLD VENIPUNCTURE: CPT | Mod: PN | Performed by: NURSE PRACTITIONER

## 2024-06-19 PROCEDURE — 82570 ASSAY OF URINE CREATININE: CPT | Performed by: NURSE PRACTITIONER

## 2024-06-19 PROCEDURE — 84443 ASSAY THYROID STIM HORMONE: CPT | Performed by: NURSE PRACTITIONER

## 2024-06-26 ENCOUNTER — OFFICE VISIT (OUTPATIENT)
Dept: ENDOCRINOLOGY | Facility: CLINIC | Age: 69
End: 2024-06-26
Payer: MEDICARE

## 2024-06-26 VITALS
BODY MASS INDEX: 29.09 KG/M2 | WEIGHT: 191.94 LBS | SYSTOLIC BLOOD PRESSURE: 110 MMHG | HEIGHT: 68 IN | HEART RATE: 85 BPM | DIASTOLIC BLOOD PRESSURE: 60 MMHG

## 2024-06-26 DIAGNOSIS — E10.43 TYPE 1 DIABETES MELLITUS WITH DIABETIC AUTONOMIC NEUROPATHY: Primary | ICD-10-CM

## 2024-06-26 DIAGNOSIS — E10.649 HYPOGLYCEMIA UNAWARENESS IN TYPE 1 DIABETES MELLITUS: ICD-10-CM

## 2024-06-26 DIAGNOSIS — E78.5 HYPERLIPIDEMIA, UNSPECIFIED HYPERLIPIDEMIA TYPE: ICD-10-CM

## 2024-06-26 DIAGNOSIS — Z96.41 INSULIN PUMP STATUS: ICD-10-CM

## 2024-06-26 DIAGNOSIS — R80.9 TYPE 1 DIABETES MELLITUS WITH DIABETIC MICROALBUMINURIA: ICD-10-CM

## 2024-06-26 DIAGNOSIS — E10.29 TYPE 1 DIABETES MELLITUS WITH DIABETIC MICROALBUMINURIA: ICD-10-CM

## 2024-06-26 DIAGNOSIS — E10.3593 TYPE 1 DIABETES MELLITUS WITH PROLIFERATIVE RETINOPATHY OF BOTH EYES WITHOUT MACULAR EDEMA: ICD-10-CM

## 2024-06-26 DIAGNOSIS — I25.10 CORONARY ARTERY DISEASE INVOLVING NATIVE CORONARY ARTERY OF NATIVE HEART WITHOUT ANGINA PECTORIS: ICD-10-CM

## 2024-06-26 LAB
GLUCOSE SERPL-MCNC: 47 MG/DL (ref 70–110)
GLUCOSE SERPL-MCNC: 51 MG/DL (ref 70–110)
GLUCOSE SERPL-MCNC: 78 MG/DL (ref 70–110)

## 2024-06-26 PROCEDURE — 99999 PR PBB SHADOW E&M-EST. PATIENT-LVL III: CPT | Mod: PBBFAC,,, | Performed by: NURSE PRACTITIONER

## 2024-06-26 RX ORDER — IBUPROFEN 200 MG
200 TABLET ORAL EVERY 6 HOURS PRN
COMMUNITY

## 2024-06-26 NOTE — PROGRESS NOTES
CC: Mr. Hammad Douglas III arrives today for management of Type 1  DM and review of chronic medical conditions, as listed in the visit diagnosis section of this encounter.     HPI: Mr. Hammad Douglas III was diagnosed with Type 1  DM at age 19 after a viral illness - had polyuria, polydipsia at this time.  Initial treatment consisted of insulin. Denies FH of DM. Reports past hospitalizations due to DKA > 30 years ago.   Has diagnosis of hypoglycemia unawareness with past severe hypoglycemic episodes requiring 3rd party assistance. Began using Dexcom G5 in 2017 and later up graded to G6. He began OmniPod 5 in November 2022.   He follows with Dr. Stern for CAD and is s/p CABG x 4 in January 2024.     Patient was last seen by me in March.    BG monitoring per Dexcom G6.     Hypoglycemia: sporadic   Hypoglycemic Symptoms: none  Hypoglycemia Treatment: Juice or glucose tabs.     Exercise: cardiac rehab 3 days/week.     Dietary Habits: Eats 3 meals/day. Rare snacking. Avoids sugary beverages.     Last DM education appointment: 12/8/2022    He welcomed baby granddaughter last night.       CURRENT DIABETIC MEDS:  Humalog in OmniPod 5   ** receives pods through [employer] in   Insulin back up plan: Toujeo Max 24 units QHS, Novolog I:C ratio 1:5    Previous insulins:  Lantus    When did you start the current therapy you are on: 11/2022  Frequency of changing site/infusion set/tubing/cartridges: 3 days  Frequency of changing CGM: 10 days  Using bolus wizard: yes  Taking bolus with each meal: yes      INSULIN PUMP SETTINGS:        Last Eye Exam: 9/2023 + proliferative DR. Dr. Lugo  Last Podiatry Exam: 2/2023, Past amputation of R third toe. S/P arthroplasty of the Lt. Hallux IP joint in July 2022.    REVIEW OF SYSTEMS  Constitutional: no c/o weakness. + fatigue, + weight loss over the past year  Cardiac: no palpitations, chest pain   Respiratory: no cough or dyspnea.   GI:  no c/o abdominal pain or nausea.   Skin:  "no rashes, lesions. + bruising to forearms that he attributes to Plavix.   Neuro: no numbness, tingling, or parasthesias.  Endocrine: denies polyphagia, polydipsia, polyuria.    Personally reviewed Past Medical, Surgical, Social History.    Vital Signs  /60   Pulse 85   Ht 5' 8" (1.727 m)   Wt 87.1 kg (191 lb 14.6 oz)   BMI 29.18 kg/m²      Personally reviewed the below labs:    Hemoglobin A1C   Date Value Ref Range Status   06/19/2024 6.8 (H) 4.0 - 5.6 % Final     Comment:     ADA Screening Guidelines:  5.7-6.4%  Consistent with prediabetes  >or=6.5%  Consistent with diabetes    High levels of fetal hemoglobin interfere with the HbA1C  assay. Heterozygous hemoglobin variants (HbS, HgC, etc)do  not significantly interfere with this assay.   However, presence of multiple variants may affect accuracy.     06/22/2023 6.3 (H) 4.0 - 5.6 % Final     Comment:     ADA Screening Guidelines:  5.7-6.4%  Consistent with prediabetes  >or=6.5%  Consistent with diabetes    High levels of fetal hemoglobin interfere with the HbA1C  assay. Heterozygous hemoglobin variants (HbS, HgC, etc)do  not significantly interfere with this assay.   However, presence of multiple variants may affect accuracy.     03/21/2023 6.4 (H) 4.0 - 5.6 % Final     Comment:     ADA Screening Guidelines:  5.7-6.4%  Consistent with prediabetes  >or=6.5%  Consistent with diabetes    High levels of fetal hemoglobin interfere with the HbA1C  assay. Heterozygous hemoglobin variants (HbS, HgC, etc)do  not significantly interfere with this assay.   However, presence of multiple variants may affect accuracy.         Chemistry        Component Value Date/Time     06/19/2024 0926    K 5.0 06/19/2024 0926     06/19/2024 0926    CO2 25 06/19/2024 0926    BUN 26 (H) 06/19/2024 0926    CREATININE 1.3 06/19/2024 0926     (H) 06/19/2024 0926        Component Value Date/Time    CALCIUM 9.2 06/19/2024 0926    ALKPHOS 96 06/19/2024 0926    AST 24 " 06/19/2024 0926    ALT 19 06/19/2024 0926    BILITOT 0.3 06/19/2024 0926    ESTGFRAFRICA >60.0 06/22/2022 0848    EGFRNONAA 57.3 (A) 06/22/2022 0848          Lab Results   Component Value Date    CHOL 157 06/19/2024    CHOL 156 09/21/2022    CHOL 150 08/12/2021     Lab Results   Component Value Date    HDL 43 06/19/2024    HDL 45 09/21/2022    HDL 46 08/12/2021     Lab Results   Component Value Date    LDLCALC 95.8 06/19/2024    LDLCALC 98.4 09/21/2022    LDLCALC 91.4 08/12/2021     Lab Results   Component Value Date    TRIG 91 06/19/2024    TRIG 63 09/21/2022    TRIG 63 08/12/2021     Lab Results   Component Value Date    CHOLHDL 27.4 06/19/2024    CHOLHDL 28.8 09/21/2022    CHOLHDL 30.7 08/12/2021       Lab Results   Component Value Date    MICALBCREAT 254.7 (H) 06/19/2024     Lab Results   Component Value Date    TSH 2.227 06/19/2024       CrCl cannot be calculated (Unknown ideal weight.).    Vit D, 25-Hydroxy   Date Value Ref Range Status   08/06/2018 35 30 - 96 ng/mL Final     Comment:     Vitamin D deficiency.........<10 ng/mL                              Vitamin D insufficiency......10-29 ng/mL       Vitamin D sufficiency........> or equal to 30 ng/mL  Vitamin D toxicity............>100 ng/mL         PHYSICAL EXAMINATION  Constitutional: Appears well, no distress  Respiratory: CTA, even and unlabored.   Cardiovascular: RRR, no murmurs.  GI: active bowel sounds, no hernia  Skin: warm and dry  Neuro: oriented to person, place, time.  Feet: appropriate footwear.   Protective Sensation (w/ 10 gram monofilament):  Right: Decreased  Left: Intact    Visual Inspection:  Normal -  Bilateral and R 3rd toe amputation noted    Pedal Pulses:   Right: Present  Left: Present    Posterior Tibialis Pulses:   Right:Present  Left: Present        DEXCOM DOWNLOAD: Fasting glucoses are acceptable. Occasional prandial excursions. Sometimes meal bolus is administered after excursion has begun. Occasional hypoglycemia mid-day/after  lunch.                Goals        HEMOGLOBIN A1C < 7.5           due to hypoglycemia unawareness      Assessment/Plan  1. Type 1 diabetes mellitus with diabetic autonomic neuropathy  -- POCT glucose 51 mg/dL. Treated with 4 oz juice and upon repeat, glucose was 47 mg/dL. Then treated with 8 oz juice and 3 PB crackers. Upon repeat, it increased to 78. He finished remaining 3 PB crackers.   -- A1c stable. Needs less coverage with lunch. Will make I:C ratio more conservative. Reminded pt to take meal bolus before eating, rather than after.  -- change I:C ratios:  0000 - 1:6  0700 - 1:5.5  1000 - 1:7  1800 - 1:6  -- has insulin pens for back up  -- BG monitoring per Dexcom G6    -- Discussed diagnosis of DM, A1c goals, progression of disease, long term complications and tx options.    -- Reviewed hypoglycemia management: treat with 1/2 glass of juice, 1/2 can regular coke, or 4 glucose tablets. Monitor and repeat treatment every 15 minutes until BG is >70 Then have a snack, which includes a complex carbohydrate and protein.  Advised patient to check BG before activities, such as driving or exercise.   2. Type 1 diabetes mellitus with diabetic microalbuminuria  -- uncontrolled, off of losartan, due to hypotension.  -- maintain DM control   3. Proliferative diabetic retinopathy without macular edema associated with type 1 diabetes mellitus, unspecified laterality  -- stable  -- keep follow ups   4. Coronary artery disease involving native coronary artery of native heart without angina pectoris  -- optimize DM control   5. Hyperlipidemia, unspecified hyperlipidemia type  -- controlled  -- continue atorvastatin   6. Hypoglycemia unawareness in Type 1 DM -- continue Dexcom.   -- has glucagon   7. Insulin pump titration -- download reviewed and settings changed.         FOLLOW UP   Follow up in about 3 months (around 9/26/2024).   Patient instructed to bring BG logs to each follow up   Patient encouraged to call for any  BG/medication issues, concerns, or questions.     Orders Placed This Encounter   Procedures    Hemoglobin A1C    Comprehensive Metabolic Panel    POCT Glucose, Hand-Held Device    POCT Glucose, Hand-Held Device

## 2024-06-27 DIAGNOSIS — E78.5 HYPERLIPIDEMIA, UNSPECIFIED HYPERLIPIDEMIA TYPE: ICD-10-CM

## 2024-06-27 RX ORDER — ATORVASTATIN CALCIUM 40 MG/1
TABLET, FILM COATED ORAL
Qty: 90 TABLET | Refills: 3 | Status: SHIPPED | OUTPATIENT
Start: 2024-06-27

## 2024-07-05 DIAGNOSIS — M54.12 BRACHIAL NEURITIS: Primary | ICD-10-CM

## 2024-07-08 ENCOUNTER — HOSPITAL ENCOUNTER (OUTPATIENT)
Dept: RADIOLOGY | Facility: CLINIC | Age: 69
Discharge: HOME | End: 2024-07-08
Attending: NEUROLOGICAL SURGERY
Payer: MEDICARE

## 2024-07-08 ENCOUNTER — PATIENT MESSAGE (OUTPATIENT)
Dept: FAMILY MEDICINE | Facility: CLINIC | Age: 69
End: 2024-07-08
Payer: MEDICARE

## 2024-07-08 DIAGNOSIS — M54.12 BRACHIAL NEURITIS: ICD-10-CM

## 2024-07-08 PROCEDURE — 72040 X-RAY EXAM NECK SPINE 2-3 VW: CPT | Mod: TC,FY,PO

## 2024-07-08 PROCEDURE — 72040 X-RAY EXAM NECK SPINE 2-3 VW: CPT | Mod: 26,,, | Performed by: RADIOLOGY

## 2024-07-09 ENCOUNTER — TELEPHONE (OUTPATIENT)
Dept: FAMILY MEDICINE | Facility: CLINIC | Age: 69
End: 2024-07-09
Payer: MEDICARE

## 2024-07-09 NOTE — TELEPHONE ENCOUNTER
Patient returned phone call to speak with someone about a sooner date for an appointment. I informed the patient that his pcp is booked out until Aug and he's been placed on the waiting list so if we have any cancellations he'll receive a notification.

## 2024-07-09 NOTE — TELEPHONE ENCOUNTER
----- Message from Heather Almanza sent at 7/9/2024  2:24 PM CDT -----  Contact: misbah [phone]  Type:  Patient Returning Call    Who Called:  misbah  Who Left Message for Patient:  renetta  Does the patient know what this is regarding?:  yes  Best Call Back Number:  [phone]  Additional Information:

## 2024-07-10 DIAGNOSIS — E10.43 TYPE 1 DIABETES MELLITUS WITH DIABETIC AUTONOMIC NEUROPATHY: ICD-10-CM

## 2024-07-10 RX ORDER — INSULIN PMP CART,AUT,G6/7,CNTR
EACH SUBCUTANEOUS
Qty: 10 EACH | Refills: 11 | Status: SHIPPED | OUTPATIENT
Start: 2024-07-10

## 2024-07-16 ENCOUNTER — PATIENT MESSAGE (OUTPATIENT)
Dept: FAMILY MEDICINE | Facility: CLINIC | Age: 69
End: 2024-07-16
Payer: MEDICARE

## 2024-07-23 ENCOUNTER — OFFICE VISIT (OUTPATIENT)
Dept: FAMILY MEDICINE | Facility: CLINIC | Age: 69
End: 2024-07-23
Payer: MEDICARE

## 2024-07-23 VITALS
WEIGHT: 192.81 LBS | OXYGEN SATURATION: 99 % | RESPIRATION RATE: 16 BRPM | SYSTOLIC BLOOD PRESSURE: 120 MMHG | HEIGHT: 68 IN | BODY MASS INDEX: 29.22 KG/M2 | HEART RATE: 74 BPM | DIASTOLIC BLOOD PRESSURE: 64 MMHG

## 2024-07-23 DIAGNOSIS — S32.010S COMPRESSION FRACTURE OF L1 VERTEBRA, SEQUELA: ICD-10-CM

## 2024-07-23 DIAGNOSIS — M54.41 CHRONIC MIDLINE LOW BACK PAIN WITH BILATERAL SCIATICA: Primary | ICD-10-CM

## 2024-07-23 DIAGNOSIS — M54.42 CHRONIC MIDLINE LOW BACK PAIN WITH BILATERAL SCIATICA: Primary | ICD-10-CM

## 2024-07-23 DIAGNOSIS — G89.29 CHRONIC MIDLINE LOW BACK PAIN WITH BILATERAL SCIATICA: Primary | ICD-10-CM

## 2024-07-23 DIAGNOSIS — S32.030S COMPRESSION FRACTURE OF L3 VERTEBRA, SEQUELA: ICD-10-CM

## 2024-07-23 PROCEDURE — 3078F DIAST BP <80 MM HG: CPT | Mod: CPTII,S$GLB,, | Performed by: INTERNAL MEDICINE

## 2024-07-23 PROCEDURE — 3008F BODY MASS INDEX DOCD: CPT | Mod: CPTII,S$GLB,, | Performed by: INTERNAL MEDICINE

## 2024-07-23 PROCEDURE — 1101F PT FALLS ASSESS-DOCD LE1/YR: CPT | Mod: CPTII,S$GLB,, | Performed by: INTERNAL MEDICINE

## 2024-07-23 PROCEDURE — 99214 OFFICE O/P EST MOD 30 MIN: CPT | Mod: S$GLB,,, | Performed by: INTERNAL MEDICINE

## 2024-07-23 PROCEDURE — 3044F HG A1C LEVEL LT 7.0%: CPT | Mod: CPTII,S$GLB,, | Performed by: INTERNAL MEDICINE

## 2024-07-23 PROCEDURE — 3066F NEPHROPATHY DOC TX: CPT | Mod: CPTII,S$GLB,, | Performed by: INTERNAL MEDICINE

## 2024-07-23 PROCEDURE — 1159F MED LIST DOCD IN RCRD: CPT | Mod: CPTII,S$GLB,, | Performed by: INTERNAL MEDICINE

## 2024-07-23 PROCEDURE — 3060F POS MICROALBUMINURIA REV: CPT | Mod: CPTII,S$GLB,, | Performed by: INTERNAL MEDICINE

## 2024-07-23 PROCEDURE — 1160F RVW MEDS BY RX/DR IN RCRD: CPT | Mod: CPTII,S$GLB,, | Performed by: INTERNAL MEDICINE

## 2024-07-23 PROCEDURE — 99999 PR PBB SHADOW E&M-EST. PATIENT-LVL IV: CPT | Mod: PBBFAC,,, | Performed by: INTERNAL MEDICINE

## 2024-07-23 PROCEDURE — 3288F FALL RISK ASSESSMENT DOCD: CPT | Mod: CPTII,S$GLB,, | Performed by: INTERNAL MEDICINE

## 2024-07-23 PROCEDURE — 3074F SYST BP LT 130 MM HG: CPT | Mod: CPTII,S$GLB,, | Performed by: INTERNAL MEDICINE

## 2024-07-23 NOTE — PROGRESS NOTES
Assessment and Plan:    1. Chronic midline low back pain with bilateral sciatica  - Ambulatory referral/consult to Pain Clinic; Future  - MRI Lumbar Spine Without Contrast; Future    2. Compression fracture of L3 vertebra, sequela  - Ambulatory referral/consult to Pain Clinic; Future  - MRI Lumbar Spine Without Contrast; Future    3. Compression fracture of L1 vertebra, sequela  - Ambulatory referral/consult to Pain Clinic; Future  - MRI Lumbar Spine Without Contrast; Future    Exam largely unremarkable today.  Discussed options with patient.  Would like to proceed with MRI to see if there is any major abnormality prior to consideration of physical therapy.  He would like to meet with interventional pain after getting the MRI to see what their opinion would be.  He notes that he would be interested in procedures if they were indicated.  ______________________________________________________________________  Subjective:    Chief Complaint:  Discuss back pain    HPI:  Hammad is a 69 y.o. year old male here to discuss back pain.    He has continued to have pain in his lower back and some pain that shoots down bilateral legs. Pain only started in November of last year with the fall, no pain prior to this. The only procedures he has had for this was kyphoplasty of L1 and L3. He reports that he does sometimes have a numb sensation and subjective weakness in his legs, more on the right than the left. More in his thighs than down all the way to the He has been seeing Dr. Caban (Neurosurgery) related to the cervical surgery that he had in February, notes that he has not been having pain in his neck or any more of the cervical myelopathy.     Medications:  Current Outpatient Medications on File Prior to Visit   Medication Sig Dispense Refill    aspirin (ECOTRIN) 81 MG EC tablet Take 1 tablet by mouth once daily.      atorvastatin (LIPITOR) 40 MG tablet TAKE 1 TABLET(40 MG) BY MOUTH EVERY DAY 90 tablet 3    clopidogreL (PLAVIX)  75 mg tablet Take 75 mg by mouth once daily.      co-enzyme Q-10 30 mg capsule Take 1 capsule by mouth once daily.      FLUoxetine 20 MG capsule TAKE 1 CAPSULE(20 MG) BY MOUTH EVERY DAY 90 capsule 1    ibuprofen (ADVIL,MOTRIN) 200 MG tablet Take 200 mg by mouth every 6 (six) hours as needed for Pain. Takes 2 pills in the AM      insulin lispro (HUMALOG U-100 INSULIN) 100 unit/mL injection USE CONTINUOUSLY IN INSULIN PUMP. MAX TOTAL DAILY DOSE 60 UNITS 20 mL 11    multivitamin capsule Take 1 capsule by mouth once daily.      blood-glucose sensor (DEXCOM G6 SENSOR) Jenni Change every 10 days. 9 each 3    blood-glucose transmitter (DEXCOM G6 TRANSMITTER) Jenni Change every 90 days 1 each 3    dicyclomine (BENTYL) 20 mg tablet Take 20 mg by mouth 4 (four) times daily as needed. (Patient not taking: Reported on 6/26/2024)      midodrine (PROAMATINE) 5 MG Tab Take 5 mg by mouth daily as needed. (Patient not taking: Reported on 7/23/2024)      OMNIPOD 5 G6 INTRO KIT, GEN 5, Crtg Inject 1 Device into the skin continuous. 1 each 0    OMNIPOD 5 G6 PODS, GEN 5, Crtg CHANGE POD EVERY 3 DAYS 10 each 11     Current Facility-Administered Medications on File Prior to Visit   Medication Dose Route Frequency Provider Last Rate Last Admin    glucagon (human recombinant) injection 1 mg  1 mg Intramuscular Once Tania Mckeon NP           Review of Systems:  Review of Systems   Constitutional:  Negative for fever.   Cardiovascular:  Negative for chest pain.   Gastrointestinal:  Negative for abdominal pain.   Genitourinary:  Negative for dysuria and hematuria.   Musculoskeletal:  Positive for back pain.   Neurological:  Positive for weakness and numbness. Negative for headaches.       Past Medical History:  Past Medical History:   Diagnosis Date    Cataract     Done OU    Chronic kidney disease     Diabetes mellitus type I     Diagnosed at age 19    Diabetic retinopathy     See's Dr. Ledesma    Hyperlipidemia     Hypertension   "      Objective:    Vitals:  Vitals:    07/23/24 0820   BP: 120/64   Pulse: 74   Resp: 16   SpO2: 99%   Weight: 87.5 kg (192 lb 12.7 oz)   Height: 5' 8" (1.727 m)       Physical Exam  Vitals reviewed.   Constitutional:       General: He is not in acute distress.     Appearance: He is well-developed.   Eyes:      Conjunctiva/sclera: Conjunctivae normal.   Cardiovascular:      Rate and Rhythm: Normal rate and regular rhythm.   Pulmonary:      Effort: Pulmonary effort is normal. No respiratory distress.   Musculoskeletal:      Comments: normal ROM of lumbar spine in all areas, straight leg raise negative bilaterally   Skin:     General: Skin is warm and dry.   Neurological:      Mental Status: He is alert and oriented to person, place, and time.      Comments: normal strength and sensation to light touch in bilateral lower extremities   Psychiatric:         Mood and Affect: Mood normal.         Behavior: Behavior normal.         Data:  Lumbar MRI Nov 2023  Impression:     Mild L1 and moderate L3 compression deformity with presumed post kyphoplasty changes.  Associated increased STIR signal suggesting acute/subacute fractures versus recent intervention.     Retropulsion of the L3 fracture fragments superimposed on degenerative changes this level results in severe spinal canal and moderate bilateral neural stenosis.     Severe spinal canal stenosis at L3-L4.  Multilevel neural foraminal stenosis, as above.      Adelaide Bran MD  Internal Medicine    "

## 2024-08-03 ENCOUNTER — HOSPITAL ENCOUNTER (OUTPATIENT)
Dept: RADIOLOGY | Facility: HOSPITAL | Age: 69
Discharge: HOME | End: 2024-08-03
Attending: INTERNAL MEDICINE
Payer: MEDICARE

## 2024-08-03 DIAGNOSIS — M54.42 CHRONIC MIDLINE LOW BACK PAIN WITH BILATERAL SCIATICA: ICD-10-CM

## 2024-08-03 DIAGNOSIS — G89.29 CHRONIC MIDLINE LOW BACK PAIN WITH BILATERAL SCIATICA: ICD-10-CM

## 2024-08-03 DIAGNOSIS — S32.010S COMPRESSION FRACTURE OF L1 VERTEBRA, SEQUELA: ICD-10-CM

## 2024-08-03 DIAGNOSIS — M54.41 CHRONIC MIDLINE LOW BACK PAIN WITH BILATERAL SCIATICA: ICD-10-CM

## 2024-08-03 DIAGNOSIS — S32.030S COMPRESSION FRACTURE OF L3 VERTEBRA, SEQUELA: ICD-10-CM

## 2024-08-03 PROCEDURE — 72148 MRI LUMBAR SPINE W/O DYE: CPT | Mod: 26,,, | Performed by: RADIOLOGY

## 2024-08-03 PROCEDURE — 72148 MRI LUMBAR SPINE W/O DYE: CPT | Mod: TC,PO

## 2024-08-05 ENCOUNTER — PATIENT MESSAGE (OUTPATIENT)
Dept: FAMILY MEDICINE | Facility: CLINIC | Age: 69
End: 2024-08-05
Payer: MEDICARE

## 2024-08-06 ENCOUNTER — TELEPHONE (OUTPATIENT)
Dept: FAMILY MEDICINE | Facility: CLINIC | Age: 69
End: 2024-08-06
Payer: MEDICARE

## 2024-08-07 ENCOUNTER — OFFICE VISIT (OUTPATIENT)
Dept: OPTOMETRY | Facility: CLINIC | Age: 69
End: 2024-08-07
Payer: MEDICARE

## 2024-08-07 DIAGNOSIS — H52.03 HYPEROPIA WITH ASTIGMATISM AND PRESBYOPIA, BILATERAL: Primary | ICD-10-CM

## 2024-08-07 DIAGNOSIS — H52.4 HYPEROPIA WITH ASTIGMATISM AND PRESBYOPIA, BILATERAL: Primary | ICD-10-CM

## 2024-08-07 DIAGNOSIS — H52.203 HYPEROPIA WITH ASTIGMATISM AND PRESBYOPIA, BILATERAL: Primary | ICD-10-CM

## 2024-08-07 PROCEDURE — 92015 DETERMINE REFRACTIVE STATE: CPT | Mod: S$GLB,,, | Performed by: OPTOMETRIST

## 2024-08-07 PROCEDURE — 1126F AMNT PAIN NOTED NONE PRSNT: CPT | Mod: CPTII,S$GLB,, | Performed by: OPTOMETRIST

## 2024-08-07 PROCEDURE — 3066F NEPHROPATHY DOC TX: CPT | Mod: CPTII,S$GLB,, | Performed by: OPTOMETRIST

## 2024-08-07 PROCEDURE — 99999 PR PBB SHADOW E&M-EST. PATIENT-LVL III: CPT | Mod: PBBFAC,,, | Performed by: OPTOMETRIST

## 2024-08-07 PROCEDURE — 3060F POS MICROALBUMINURIA REV: CPT | Mod: CPTII,S$GLB,, | Performed by: OPTOMETRIST

## 2024-08-07 PROCEDURE — 3044F HG A1C LEVEL LT 7.0%: CPT | Mod: CPTII,S$GLB,, | Performed by: OPTOMETRIST

## 2024-08-07 PROCEDURE — 1159F MED LIST DOCD IN RCRD: CPT | Mod: CPTII,S$GLB,, | Performed by: OPTOMETRIST

## 2024-08-07 PROCEDURE — 1100F PTFALLS ASSESS-DOCD GE2>/YR: CPT | Mod: CPTII,S$GLB,, | Performed by: OPTOMETRIST

## 2024-08-07 PROCEDURE — 3288F FALL RISK ASSESSMENT DOCD: CPT | Mod: CPTII,S$GLB,, | Performed by: OPTOMETRIST

## 2024-08-21 ENCOUNTER — OFFICE VISIT (OUTPATIENT)
Dept: PAIN MEDICINE | Facility: CLINIC | Age: 69
End: 2024-08-21
Payer: MEDICARE

## 2024-08-21 ENCOUNTER — TELEPHONE (OUTPATIENT)
Dept: PAIN MEDICINE | Facility: CLINIC | Age: 69
End: 2024-08-21
Payer: MEDICARE

## 2024-08-21 VITALS — WEIGHT: 192 LBS | HEIGHT: 68 IN | BODY MASS INDEX: 29.1 KG/M2

## 2024-08-21 DIAGNOSIS — G89.29 CHRONIC MIDLINE LOW BACK PAIN WITH BILATERAL SCIATICA: ICD-10-CM

## 2024-08-21 DIAGNOSIS — M54.42 CHRONIC MIDLINE LOW BACK PAIN WITH BILATERAL SCIATICA: ICD-10-CM

## 2024-08-21 DIAGNOSIS — M54.41 CHRONIC MIDLINE LOW BACK PAIN WITH BILATERAL SCIATICA: ICD-10-CM

## 2024-08-21 DIAGNOSIS — M54.16 LUMBAR RADICULOPATHY: Primary | ICD-10-CM

## 2024-08-21 DIAGNOSIS — S32.030S COMPRESSION FRACTURE OF L3 VERTEBRA, SEQUELA: ICD-10-CM

## 2024-08-21 DIAGNOSIS — S32.010S COMPRESSION FRACTURE OF L1 VERTEBRA, SEQUELA: ICD-10-CM

## 2024-08-21 PROCEDURE — 1160F RVW MEDS BY RX/DR IN RCRD: CPT | Mod: CPTII,S$GLB,, | Performed by: STUDENT IN AN ORGANIZED HEALTH CARE EDUCATION/TRAINING PROGRAM

## 2024-08-21 PROCEDURE — 1125F AMNT PAIN NOTED PAIN PRSNT: CPT | Mod: CPTII,S$GLB,, | Performed by: STUDENT IN AN ORGANIZED HEALTH CARE EDUCATION/TRAINING PROGRAM

## 2024-08-21 PROCEDURE — 3044F HG A1C LEVEL LT 7.0%: CPT | Mod: CPTII,S$GLB,, | Performed by: STUDENT IN AN ORGANIZED HEALTH CARE EDUCATION/TRAINING PROGRAM

## 2024-08-21 PROCEDURE — 3008F BODY MASS INDEX DOCD: CPT | Mod: CPTII,S$GLB,, | Performed by: STUDENT IN AN ORGANIZED HEALTH CARE EDUCATION/TRAINING PROGRAM

## 2024-08-21 PROCEDURE — 99204 OFFICE O/P NEW MOD 45 MIN: CPT | Mod: S$GLB,,, | Performed by: STUDENT IN AN ORGANIZED HEALTH CARE EDUCATION/TRAINING PROGRAM

## 2024-08-21 PROCEDURE — 1159F MED LIST DOCD IN RCRD: CPT | Mod: CPTII,S$GLB,, | Performed by: STUDENT IN AN ORGANIZED HEALTH CARE EDUCATION/TRAINING PROGRAM

## 2024-08-21 PROCEDURE — 3066F NEPHROPATHY DOC TX: CPT | Mod: CPTII,S$GLB,, | Performed by: STUDENT IN AN ORGANIZED HEALTH CARE EDUCATION/TRAINING PROGRAM

## 2024-08-21 PROCEDURE — 3060F POS MICROALBUMINURIA REV: CPT | Mod: CPTII,S$GLB,, | Performed by: STUDENT IN AN ORGANIZED HEALTH CARE EDUCATION/TRAINING PROGRAM

## 2024-08-21 PROCEDURE — 99999 PR PBB SHADOW E&M-EST. PATIENT-LVL III: CPT | Mod: PBBFAC,,, | Performed by: STUDENT IN AN ORGANIZED HEALTH CARE EDUCATION/TRAINING PROGRAM

## 2024-08-21 PROCEDURE — 1100F PTFALLS ASSESS-DOCD GE2>/YR: CPT | Mod: CPTII,S$GLB,, | Performed by: STUDENT IN AN ORGANIZED HEALTH CARE EDUCATION/TRAINING PROGRAM

## 2024-08-21 PROCEDURE — 3288F FALL RISK ASSESSMENT DOCD: CPT | Mod: CPTII,S$GLB,, | Performed by: STUDENT IN AN ORGANIZED HEALTH CARE EDUCATION/TRAINING PROGRAM

## 2024-08-21 RX ORDER — ALPRAZOLAM 1 MG/1
1 TABLET, ORALLY DISINTEGRATING ORAL ONCE AS NEEDED
OUTPATIENT
Start: 2024-08-21 | End: 2036-01-18

## 2024-08-21 NOTE — H&P (VIEW-ONLY)
[facility] - Department    Adelaide Bran MD      First Office Visit: 8/21/2024   Today' Date: 8/21/2024  Last Office Visit: None    Chief complaint: back pain    HPI: Pt is a pleasant 69 y.o., who presents for evaluation. Referred by Dr. Bran. Pt complains of back pain and L leg pain since he fell off a ladder in Nov. He has a hx of L1 and L3 compression fx s/p kyphoplasty. States he has also had ACDF of his C-spine (Dr. Caban). Endorses pain that starts in the back and goes down the front of the R leg to the knees. Pt does have retropulsion on MR L-spine at L3-4 as well as severe narrowing at L2-3. Of note, pt has had recent heart surgery and has been recovering. Denies having any leg weakness. No BB changes. Is doing HEP.         Pain disability index score: 42  Pain score: 6    Relevant Imaging/ Testing:   MR L-spine 7/24  XR C-spine 5/24, 7/24    Procedures: None    Date of board of pharmacy review:8/21/2024  Date of opioid risk screening/ pain psych: None  Date of opioid agreement and consent: None  Date of urine drug screen: None  Date of random pill count: None     was reviewed today: reviewed,     Prescribed medications: None    See EHR for  PMH, PSH, FH, SH, Medications and Allergy    ROS:  Positive for pain  ROS     PE:  There were no vitals filed for this visit.  General: Pleasant, no distress  HEENT: NC/ AT. PERRLA  CV: Radial pulses intact  Pulm: No distress  Ext: No edema    Physical Exam     Neuromusculoskeletal:  Head: NC, AT. PERRLA. No occipital tenderness  Neck: Intact range of motions, extension, flexion, rotation. Neg Facet loading. Neg Spurling. Min Tenderness.  5/5 Strength, normal tone. Neg Dalton's  Shoulder: Intact range of motion. Min Bicep groove tenderness. 5/5 Strength  Thoracic: Min tenderness, no step off  Lumbar: Intact range of motion. David Facet loading. Min Tenderness. Neg SL. No pain with flexion. No pain with extension.   Hip: Intact range of motion  SI: Level, Min  tenderness  Knee: Intact range of motion  Reflexes: normal Knee  Strength: 5/5 globally   Sensory: Grossly intact   Skin: No bruising, erythema  Gait: Normal      Impression:  Back pain  R leg pain   H/o L1 and L3 compression fx s/p kyphoplasty   Lumbar radiculopathy   Relevant History  BMI 29.19  DMI (HgbA1c 6.3)        Plan:  Discussed options  Imaging/ relevant records viewed/ reviewed/ discussed  Imaging results viewed and reviewed (noted above)/ reviewed with patient   reviewed  Cont HEP  ILESI L3-4 to R   Re-eval after  Back Dr. Caban if needed      Prescribed medications:  1. None    The impression and plan were discussed and explained in detail. All the questions were answered. Education was provided accordingly.     The procedure was explained in detail, along with risks and potential side effects.    Follow-up:  For procedure     Yusra Montano MD

## 2024-08-21 NOTE — PROGRESS NOTES
[facility] - Department    Adelaide Bran MD      First Office Visit: 8/21/2024   Today' Date: 8/21/2024  Last Office Visit: None    Chief complaint: back pain    HPI: Pt is a pleasant 69 y.o., who presents for evaluation. Referred by Dr. Bran. Pt complains of back pain and L leg pain since he fell off a ladder in Nov. He has a hx of L1 and L3 compression fx s/p kyphoplasty. States he has also had ACDF of his C-spine (Dr. Caban). Endorses pain that starts in the back and goes down the front of the R leg to the knees. Pt does have retropulsion on MR L-spine at L3-4 as well as severe narrowing at L2-3. Of note, pt has had recent heart surgery and has been recovering. Denies having any leg weakness. No BB changes. Is doing HEP.         Pain disability index score: 42  Pain score: 6    Relevant Imaging/ Testing:   MR L-spine 7/24  XR C-spine 5/24, 7/24    Procedures: None    Date of board of pharmacy review:8/21/2024  Date of opioid risk screening/ pain psych: None  Date of opioid agreement and consent: None  Date of urine drug screen: None  Date of random pill count: None     was reviewed today: reviewed,     Prescribed medications: None    See EHR for  PMH, PSH, FH, SH, Medications and Allergy    ROS:  Positive for pain  ROS     PE:  There were no vitals filed for this visit.  General: Pleasant, no distress  HEENT: NC/ AT. PERRLA  CV: Radial pulses intact  Pulm: No distress  Ext: No edema    Physical Exam     Neuromusculoskeletal:  Head: NC, AT. PERRLA. No occipital tenderness  Neck: Intact range of motions, extension, flexion, rotation. Neg Facet loading. Neg Spurling. Min Tenderness.  5/5 Strength, normal tone. Neg Dalton's  Shoulder: Intact range of motion. Min Bicep groove tenderness. 5/5 Strength  Thoracic: Min tenderness, no step off  Lumbar: Intact range of motion. David Facet loading. Min Tenderness. Neg SL. No pain with flexion. No pain with extension.   Hip: Intact range of motion  SI: Level, Min  tenderness  Knee: Intact range of motion  Reflexes: normal Knee  Strength: 5/5 globally   Sensory: Grossly intact   Skin: No bruising, erythema  Gait: Normal      Impression:  Back pain  R leg pain   H/o L1 and L3 compression fx s/p kyphoplasty   Lumbar radiculopathy   Relevant History  BMI 29.19  DMI (HgbA1c 6.3)        Plan:  Discussed options  Imaging/ relevant records viewed/ reviewed/ discussed  Imaging results viewed and reviewed (noted above)/ reviewed with patient   reviewed  Cont HEP  ILESI L3-4 to R   Re-eval after  Back Dr. Caban if needed      Prescribed medications:  1. None    The impression and plan were discussed and explained in detail. All the questions were answered. Education was provided accordingly.     The procedure was explained in detail, along with risks and potential side effects.    Follow-up:  For procedure     Yusra Montano MD

## 2024-08-21 NOTE — TELEPHONE ENCOUNTER
Types of orders made on 08/21/2024: Outpatient Referral, Procedure Request      Order Date:8/21/2024   Ordering User:JESUS YOU [[id number]   Encounter Provider:Jesus You MD [[id number]]   Auth   orizing Provider: Jesus You MD [[id number]]   Department:[facility] PAIN MANAGEMENT[[id number]]      Common Order Information   Procedure -> Epidural Injection (specify level) Cmt: ILESI L3-4 to R (Cov)      Order Specific Information   Order: Procedure Order to Pain Management [Custom: [id number]]  Order #:          [id number]: 1 FUTURE     Priority: Routine  Class: Clinic Performed     Future Order Information          Expires on:08/21/2025            Expected by:08/21/2024                   Associated Diagnoses       M54.16 Lumbar radiculopathy       Facility Name: -> [facility]          Follow-up: -> 2 weeks              Priority: Routine  Class: Clinic Performed     Future Order Information       Expires on:08/21/2025            Expected by:08/21/2024                   Associated Diagnoses       M54.16 Lumbar radiculopathy          Procedure -> Epidural Injection (specify level) Cmt: ILESI L3-4 to R (Cov)

## 2024-08-21 NOTE — TELEPHONE ENCOUNTER
Spoke with pt and scheduled for 09/12/2024 went over instructions including holding Plavix and asa send a fax clearance to Dr Stern

## 2024-08-27 DIAGNOSIS — E10.43 TYPE 1 DIABETES MELLITUS WITH DIABETIC AUTONOMIC NEUROPATHY: ICD-10-CM

## 2024-08-27 RX ORDER — BLOOD-GLUCOSE TRANSMITTER
EACH MISCELLANEOUS
Qty: 1 EACH | Refills: 3 | Status: SHIPPED | OUTPATIENT
Start: 2024-08-27

## 2024-08-27 RX ORDER — BLOOD-GLUCOSE SENSOR
EACH MISCELLANEOUS
Qty: 9 EACH | Refills: 3 | Status: SHIPPED | OUTPATIENT
Start: 2024-08-27

## 2024-08-29 ENCOUNTER — PATIENT MESSAGE (OUTPATIENT)
Dept: PAIN MEDICINE | Facility: CLINIC | Age: 69
End: 2024-08-29
Payer: MEDICARE

## 2024-09-05 ENCOUNTER — PATIENT MESSAGE (OUTPATIENT)
Dept: PAIN MEDICINE | Facility: CLINIC | Age: 69
End: 2024-09-05
Payer: MEDICARE

## 2024-09-10 RX ORDER — ACETAMINOPHEN 500 MG
500 TABLET ORAL EVERY 6 HOURS PRN
COMMUNITY

## 2024-09-12 ENCOUNTER — HOSPITAL ENCOUNTER (OUTPATIENT)
Dept: RADIOLOGY | Facility: HOSPITAL | Age: 69
Discharge: HOME | End: 2024-09-12
Attending: STUDENT IN AN ORGANIZED HEALTH CARE EDUCATION/TRAINING PROGRAM
Payer: MEDICARE

## 2024-09-12 ENCOUNTER — HOSPITAL ENCOUNTER (OUTPATIENT)
Facility: HOSPITAL | Age: 69
Discharge: HOME | End: 2024-09-12
Attending: STUDENT IN AN ORGANIZED HEALTH CARE EDUCATION/TRAINING PROGRAM | Admitting: STUDENT IN AN ORGANIZED HEALTH CARE EDUCATION/TRAINING PROGRAM
Payer: MEDICARE

## 2024-09-12 VITALS
SYSTOLIC BLOOD PRESSURE: 138 MMHG | WEIGHT: 192 LBS | TEMPERATURE: 97 F | BODY MASS INDEX: 26.01 KG/M2 | DIASTOLIC BLOOD PRESSURE: 67 MMHG | HEART RATE: 75 BPM | RESPIRATION RATE: 16 BRPM | HEIGHT: 72 IN | OXYGEN SATURATION: 100 %

## 2024-09-12 DIAGNOSIS — M54.16 LUMBAR RADICULOPATHY: ICD-10-CM

## 2024-09-12 DIAGNOSIS — M54.50 LOWER BACK PAIN: ICD-10-CM

## 2024-09-12 LAB — GLUCOSE SERPL-MCNC: 131 MG/DL (ref 70–110)

## 2024-09-12 PROCEDURE — 25000003 PHARM REV CODE 250: Mod: PO | Performed by: STUDENT IN AN ORGANIZED HEALTH CARE EDUCATION/TRAINING PROGRAM

## 2024-09-12 PROCEDURE — 62323 NJX INTERLAMINAR LMBR/SAC: CPT | Mod: ,,, | Performed by: STUDENT IN AN ORGANIZED HEALTH CARE EDUCATION/TRAINING PROGRAM

## 2024-09-12 PROCEDURE — 62323 NJX INTERLAMINAR LMBR/SAC: CPT | Mod: PO | Performed by: STUDENT IN AN ORGANIZED HEALTH CARE EDUCATION/TRAINING PROGRAM

## 2024-09-12 PROCEDURE — 63600175 PHARM REV CODE 636 W HCPCS: Mod: PO | Performed by: STUDENT IN AN ORGANIZED HEALTH CARE EDUCATION/TRAINING PROGRAM

## 2024-09-12 PROCEDURE — A4216 STERILE WATER/SALINE, 10 ML: HCPCS | Mod: PO | Performed by: STUDENT IN AN ORGANIZED HEALTH CARE EDUCATION/TRAINING PROGRAM

## 2024-09-12 PROCEDURE — 25500020 PHARM REV CODE 255: Mod: PO | Performed by: STUDENT IN AN ORGANIZED HEALTH CARE EDUCATION/TRAINING PROGRAM

## 2024-09-12 RX ORDER — ALPRAZOLAM 0.5 MG/1
1 TABLET, ORALLY DISINTEGRATING ORAL ONCE AS NEEDED
Status: DISCONTINUED | OUTPATIENT
Start: 2024-09-12 | End: 2024-09-12 | Stop reason: HOSPADM

## 2024-09-12 RX ORDER — LIDOCAINE HYDROCHLORIDE 10 MG/ML
INJECTION, SOLUTION EPIDURAL; INFILTRATION; INTRACAUDAL; PERINEURAL
Status: DISCONTINUED | OUTPATIENT
Start: 2024-09-12 | End: 2024-09-12 | Stop reason: HOSPADM

## 2024-09-12 RX ORDER — DEXAMETHASONE SODIUM PHOSPHATE 10 MG/ML
INJECTION INTRAMUSCULAR; INTRAVENOUS
Status: DISCONTINUED | OUTPATIENT
Start: 2024-09-12 | End: 2024-09-12 | Stop reason: HOSPADM

## 2024-09-12 RX ORDER — SODIUM CHLORIDE 9 MG/ML
INJECTION, SOLUTION INTRAMUSCULAR; INTRAVENOUS; SUBCUTANEOUS
Status: DISCONTINUED | OUTPATIENT
Start: 2024-09-12 | End: 2024-09-12 | Stop reason: HOSPADM

## 2024-09-12 NOTE — OP NOTE
Patient: Hammad Douglas III                                                    MRN: [id number]  : 1955                                              Date of procedure: 2024    Pre Procedure Diagnosis: Lumbar, lumbosacral radiculopathy    Post Procedure Diagnosis: Same    Procedure: Lumbar Epidural Steroid Injection Under Fluoroscopy at L3-4    Attending: Yusra Montano MD    Local Anesthetic Injected: Lidocaine 1% 3 ml    Sedation Medications: None    Estimated Blood Loss: None    Complication: None        Procedure:  After informed consent was obtained, patient was taken to the fluoroscopy suite and placed in a prone position.  The skin was prepped and draped in the usual sterile fashion using chlorhexidine. The skin and subcutaneous tissue overlying the Lumbar vertebral level was infiltrated with 3mLs of 1% Lidocaine using a 25 gauge 1.5 inch needle.  The 20-gauge Tuoehy needle was advanced with the aid of fluoroscopy using the water drop loss of resistance technique at the L3-4 interspinous space using an intralaminer approach.  Proper placement into the epidural space was confirmed by the loss of resistance.  There was no CSF, heme or paresthesias.  After negative aspiration, 0.5mL of contrast was injected.  The contrast confirmed the needle placement by spread delineating the epidural space in multiple views.  Then after negative aspiration, an injectate consisting of 6mLs of 0.5mL 10mg/mL of Dexamethasone, 0.5mL of preservative free lidocaine and 5mL of preservative free normal saline was injected.  Patient tolerated the procedure well and all needles were removed intact.  There were no complications.    Patient was observed in recovery and discharged home under supervision with discharge instructions in stable condition.

## 2024-09-12 NOTE — DISCHARGE SUMMARY
[facility] - Surgery  Discharge Note  Short Stay    Procedure(s) (LRB):  Injection-steroid-epidural-lumbar l3-4 ( to the right ) (N/A)      OUTCOME: Patient tolerated treatment/procedure well without complication and is now ready for discharge.    DISPOSITION: Home or Self Care    FINAL DIAGNOSIS:  <principal problem not specified>    FOLLOWUP: In clinic    DISCHARGE INSTRUCTIONS:    Discharge Procedure Orders   Notify your health care provider if you experience any of the following:  temperature >100.4     Notify your health care provider if you experience any of the following:  severe uncontrolled pain     Notify your health care provider if you experience any of the following:  redness, tenderness, or signs of infection (pain, swelling, redness, odor or green/yellow discharge around incision site)     Activity as tolerated        TIME SPENT ON DISCHARGE: 20 minutes

## 2024-09-17 ENCOUNTER — PATIENT MESSAGE (OUTPATIENT)
Dept: FAMILY MEDICINE | Facility: CLINIC | Age: 69
End: 2024-09-17
Payer: MEDICARE

## 2024-09-17 DIAGNOSIS — R42 DIZZINESS: Primary | ICD-10-CM

## 2024-09-19 ENCOUNTER — OFFICE VISIT (OUTPATIENT)
Dept: OPHTHALMOLOGY | Facility: CLINIC | Age: 69
End: 2024-09-19
Payer: MEDICARE

## 2024-09-19 DIAGNOSIS — H35.372 EPIRETINAL MEMBRANE, LEFT: ICD-10-CM

## 2024-09-19 DIAGNOSIS — H25.13 NS (NUCLEAR SCLEROSIS), BILATERAL: ICD-10-CM

## 2024-09-19 DIAGNOSIS — E10.3513 TYPE 1 DIABETES MELLITUS WITH PROLIFERATIVE RETINOPATHY OF BOTH EYES AND MACULAR EDEMA: ICD-10-CM

## 2024-09-19 DIAGNOSIS — E10.3553 TYPE 1 DIABETES MELLITUS WITH STABLE PROLIFERATIVE DIABETIC RETINOPATHY, BILATERAL: Primary | ICD-10-CM

## 2024-09-19 PROCEDURE — 3288F FALL RISK ASSESSMENT DOCD: CPT | Mod: CPTII,S$GLB,, | Performed by: OPHTHALMOLOGY

## 2024-09-19 PROCEDURE — 99999 PR PBB SHADOW E&M-EST. PATIENT-LVL III: CPT | Mod: PBBFAC,,, | Performed by: OPHTHALMOLOGY

## 2024-09-19 PROCEDURE — 3060F POS MICROALBUMINURIA REV: CPT | Mod: CPTII,S$GLB,, | Performed by: OPHTHALMOLOGY

## 2024-09-19 PROCEDURE — 92134 CPTRZ OPH DX IMG PST SGM RTA: CPT | Mod: S$GLB,,, | Performed by: OPHTHALMOLOGY

## 2024-09-19 PROCEDURE — 92201 OPSCPY EXTND RTA DRAW UNI/BI: CPT | Mod: S$GLB,,, | Performed by: OPHTHALMOLOGY

## 2024-09-19 PROCEDURE — 3066F NEPHROPATHY DOC TX: CPT | Mod: CPTII,S$GLB,, | Performed by: OPHTHALMOLOGY

## 2024-09-19 PROCEDURE — 1160F RVW MEDS BY RX/DR IN RCRD: CPT | Mod: CPTII,S$GLB,, | Performed by: OPHTHALMOLOGY

## 2024-09-19 PROCEDURE — 3044F HG A1C LEVEL LT 7.0%: CPT | Mod: CPTII,S$GLB,, | Performed by: OPHTHALMOLOGY

## 2024-09-19 PROCEDURE — 1159F MED LIST DOCD IN RCRD: CPT | Mod: CPTII,S$GLB,, | Performed by: OPHTHALMOLOGY

## 2024-09-19 PROCEDURE — 1101F PT FALLS ASSESS-DOCD LE1/YR: CPT | Mod: CPTII,S$GLB,, | Performed by: OPHTHALMOLOGY

## 2024-09-19 PROCEDURE — 92014 COMPRE OPH EXAM EST PT 1/>: CPT | Mod: S$GLB,,, | Performed by: OPHTHALMOLOGY

## 2024-09-19 NOTE — PROGRESS NOTES
HPI     dfe           dm               erm  os      Additional comments: Dm   Last bs 118  TO  130  Last A1c   7          Blurred va     ERM   Dryness   DLS  09/18/23      9 month s ago  fell off ladder  at home   Fusion in spine   By pass    heart surgery  in jan 2024 ( enlarged heart )             Last edited by Fartun Hernandez on 9/19/2024  2:51 PM.                 OCT - some thinning - No ME OU  OD trace parafoveal ME  OS - ERM minimal traction      A/P    1. PDR OU S/P PRP (12 yrs. Ago in [place]; laser OD 2 yrs ago at    ; h/x laser OD (1982 study at [facility])  T1 uncontrolled    2. H/X Vit. Hem. OD      3. MFIOL OU  S/p YAG OU    4. ERM OS  Minimal traction    BS/BP/chol control      1 year OCT

## 2024-09-24 ENCOUNTER — OFFICE VISIT (OUTPATIENT)
Dept: NEUROLOGY | Facility: CLINIC | Age: 69
End: 2024-09-24
Payer: MEDICARE

## 2024-09-24 VITALS
HEART RATE: 79 BPM | BODY MASS INDEX: 25.74 KG/M2 | HEIGHT: 72 IN | WEIGHT: 190.06 LBS | SYSTOLIC BLOOD PRESSURE: 105 MMHG | RESPIRATION RATE: 14 BRPM | DIASTOLIC BLOOD PRESSURE: 58 MMHG

## 2024-09-24 DIAGNOSIS — R42 DIZZINESS: Primary | ICD-10-CM

## 2024-09-24 DIAGNOSIS — E10.43 TYPE 1 DIABETES MELLITUS WITH DIABETIC AUTONOMIC NEUROPATHY: ICD-10-CM

## 2024-09-24 DIAGNOSIS — E08.43 DIABETES MELLITUS DUE TO UNDERLYING CONDITION WITH DIABETIC AUTONOMIC NEUROPATHY, WITH LONG-TERM CURRENT USE OF INSULIN: ICD-10-CM

## 2024-09-24 DIAGNOSIS — G47.19 OTHER HYPERSOMNIA: ICD-10-CM

## 2024-09-24 DIAGNOSIS — E10.42 DIABETIC POLYNEUROPATHY ASSOCIATED WITH TYPE 1 DIABETES MELLITUS: ICD-10-CM

## 2024-09-24 DIAGNOSIS — R41.3 MEMORY LOSS: ICD-10-CM

## 2024-09-24 DIAGNOSIS — Z79.4 DIABETES MELLITUS DUE TO UNDERLYING CONDITION WITH DIABETIC AUTONOMIC NEUROPATHY, WITH LONG-TERM CURRENT USE OF INSULIN: ICD-10-CM

## 2024-09-24 DIAGNOSIS — R53.83 FATIGUE, UNSPECIFIED TYPE: ICD-10-CM

## 2024-09-24 DIAGNOSIS — G47.00 INSOMNIA, UNSPECIFIED TYPE: ICD-10-CM

## 2024-09-24 PROCEDURE — 99999 PR PBB SHADOW E&M-EST. PATIENT-LVL V: CPT | Mod: PBBFAC,,, | Performed by: NURSE PRACTITIONER

## 2024-09-24 NOTE — PROGRESS NOTES
NEUROLOGY  Outpatient Consultation Visit     [facility]  [address] [facility], [place] [address]  [phone] (office) / [phone] (fax)    Patient Name:  Hammad Douglas III  :  1955  MR #:  [id number]  Acct #:  [id number]    Date of  Visit: 2024    Other Physicians:  Adelaide Bran MD (Primary Care Physician)      CHIEF COMPLAINT: Dizziness (New patient /  from a head injury )        HISTORY OF PRESENT ILLNESS:  Hammad Douglas III is a 69 y.o. R-handed male seen in consultation for dizziness per Chet Bojorquez MD    Medical history is significant for CAD s/p CABG, DM, HLD, HTN    Here with his wife    He had a fall from a ladder resulting in head and low back trauma in 2023. Fall was unwitnessed. The months prior, was feeling tired. Doesn't recall the fall, questions if he had LOC that led to fall. Wife found him down on the ground, unresponsive for about 3 minutes. Was able to state his name, answer questions appropriately for EMS. Taken to  for evaluation. Recalls being told normal CT brain.     In subsequent months, found to have hypotension and multivessel CAD. He had CABG in January.     Underwent kyphoplasty for proximal L spine compression fx and now seeing Dr. Montano in pain mgmt for ongoing RLE radiculopathy.     Long standing C spine DDD, worse with the fall, suspected baseline cervical myelopathy s/p decompression April this year with Dr. Caban. He has chronic atrophy in his hands and contracted digits on the R. He denies any numbness in his hands.     When he gets OOB in the AM, takes him a few minutes to get his bearings. Brief sensation of spinning, as well as feeling woozy or lightheaded with position change.   When he lays down in bed flat and closes his eyes, does feel that the room is spinning for a matter of seconds. Then able to normally fall asleep. Not worse with turning on either side.   Also has imbalance when he closes his eyes in the shower.  "  This began sometime after his fall. Episodic, but occurring daily.     Bilateral hearing aids. Chronic L tinnitus. Unchanged from baseline with the above.     Still feeling very fatigued daily. Has had recent labs, normal results. Up to urinate 2-3 times per night, then back to sleep. Tired when he gets up every AM. Some snoring, mild. Never sleep tested. Taking Tylenol PM to help him get to sleep.     Denies HA. No vision changes. No speech disturbance.     Checks BP daily, not with symptoms above, though. Typically 130-140s/70s. Has PRN midodrine but has not needed to take it for quite some time. Averages about 1 bottle of water per day. Drinks tea, Diet Coke, protein shake in the AM.     Diabetic. Some tingling sensations in his toes at times. Toe amputation in the past.    No gait change or falls since. [place]"    Denies ETOH use.     Allergies:  Review of patient's allergies indicates:   Allergen Reactions    Altace [ramipril]      Cough       Current Medications:  Current Outpatient Medications   Medication Sig Dispense Refill    acetaminophen (TYLENOL) 500 MG tablet Take 500 mg by mouth every 6 (six) hours as needed for Pain.      atorvastatin (LIPITOR) 40 MG tablet TAKE 1 TABLET(40 MG) BY MOUTH EVERY DAY 90 tablet 3    clopidogreL (PLAVIX) 75 mg tablet Take 75 mg by mouth once daily.      co-enzyme Q-10 30 mg capsule Take 1 capsule by mouth once daily.      DEXCOM G6 SENSOR Jenni CHANGE EVERY 10 DAYS 9 each 3    DEXCOM G6 TRANSMITTER Jenni CHANGE EVERY 90 DAYS 1 each 3    diphenhydrAMINE-acetaminophen (TYLENOL PM)  mg Tab Take 1 tablet by mouth nightly as needed.      FLUoxetine 20 MG capsule TAKE 1 CAPSULE(20 MG) BY MOUTH EVERY DAY 90 capsule 1    ibuprofen (ADVIL,MOTRIN) 200 MG tablet Take 200 mg by mouth every 6 (six) hours as needed for Pain. Takes 2 pills in the AM      insulin lispro (HUMALOG U-100 INSULIN) 100 unit/mL injection USE CONTINUOUSLY IN " INSULIN PUMP. MAX TOTAL DAILY DOSE 60 UNITS 20 mL 11    midodrine (PROAMATINE) 5 MG Tab Take 5 mg by mouth daily as needed.      multivitamin capsule Take 1 capsule by mouth once daily.      OMNIPOD 5 G6 INTRO KIT, GEN 5, Crtg Inject 1 Device into the skin continuous. 1 each 0    OMNIPOD 5 G6 PODS, GEN 5, Crtg CHANGE POD EVERY 3 DAYS 10 each 11     Current Facility-Administered Medications   Medication Dose Route Frequency Provider Last Rate Last Admin    glucagon (human recombinant) injection 1 mg  1 mg Intramuscular Once Tania Mckeon NP           Past Medical History:  Past Medical History:   Diagnosis Date    Cataract     Done OU    Chronic kidney disease     Diabetes mellitus type I     Diagnosed at age 19    Diabetic retinopathy     See's Dr. Ledesma    Hyperlipidemia     Hypertension        Past Surgical History:  Past Surgical History:   Procedure Laterality Date    ARTHROPLASTY OF TOE Left 07/14/2022    Procedure: ARTHROPLASTY, TOE;  Surgeon: Caleb Duffy DPM;  Location: [facility] OR;  Service: Podiatry;  Laterality: Left;  Left Hallux    CABG Xs 4 vessels      CATARACT EXTRACTION W/  INTRAOCULAR LENS IMPLANT Right 09/28/2021    Dr Solis    CATARACT EXTRACTION W/  INTRAOCULAR LENS IMPLANT Left 02/22/2022    Dr Solis    COLONOSCOPY      2011 wnl    COLONOSCOPY N/A 06/29/2023    Procedure: COLONOSCOPY;  Surgeon: Garrett Regalado MD;  Location: [facility];  Service: Endoscopy;  Laterality: N/A;    EPIDURAL STEROID INJECTION INTO LUMBAR SPINE N/A 9/12/2024    Procedure: Injection-steroid-epidural-lumbar l3-4 ( to the right );  Surgeon: Yusra Montano MD;  Location: [facility] OR;  Service: Anesthesiology;  Laterality: N/A;    PHACOEMULSIFICATION OF CATARACT Right 09/28/2021    Procedure: PHACOEMULSIFICATION, CATARACT;  Surgeon: Vicente Solis Jr., MD;  Location: [facility] OR;  Service: Ophthalmology;  Laterality: Right;  DM/right    PHACOEMULSIFICATION OF CATARACT Left 02/22/2022    Procedure:  PHACOEMULSIFICATION, CATARACT;  Surgeon: Vicente Solis Jr., MD;  Location: [facility] OR;  Service: Ophthalmology;  Laterality: Left;  left    SURGICAL REMOVAL OF BONE SPUR Left 10/14/2021    Procedure: EXCISION, BONE SPUR;  Surgeon: Caleb Duffy DPM;  Location: [facility] OR;  Service: Podiatry;  Laterality: Left;    SURGICAL REMOVAL OF BONE SPUR Left 01/17/2023    Procedure: EXCISION, BONE SPUR;  Surgeon: Caleb Duffy DPM;  Location: [facility] OR;  Service: Podiatry;  Laterality: Left;    TOE AMPUTATION Right 07/02/2020    Procedure: AMPUTATION, TOE; 3rd;  Surgeon: Caleb Duffy DPM;  Location: [facility] OR;  Service: Podiatry;  Laterality: Right;    TOE SURGERY Right     right big toe       Family History:  family history includes Breast cancer in his sister; Cataracts in his father and mother.    Social History:   reports that he has quit smoking. His smoking use included cigars. He has never used smokeless tobacco. He reports current alcohol use. He reports that he does not use drugs.      REVIEW OF SYSTEMS:  As per HPI    PHYSICAL EXAM:  BP (!) 105/58 (BP Location: Left arm, Patient Position: Standing, BP Method: Medium (Automatic))   Pulse 79   Resp 14   Ht 6' (1.829 m)   Wt 86.2 kg (190 lb 0.6 oz)   BMI 25.77 kg/m²     General: Well groomed. No acute distress.  Cardiovascular: Regular rate and rhythm.   Pulmonary: Normal effort and rate.   Musculoskeletal: Moves all extremities well. R 3rd toe amputee. Bilateral R>L 4-5th digit contractures.   Extremities: No clubbing, cyanosis or edema.   ENT: hearing aids in place, Mallampati 3-4 airway     Neurological exam:  Mental status: Awake and alert.  Oriented to person, place, time and situation. Recent and remote memory appear to be largely intact (recalls 2/3 on DR).  Fund of knowledge normal. Normal attention and concentration.   Speech/Language: Fluent and appropriate. No dysarthria or aphasia on conversation. Able to follow complex commands.   Cranial nerves (II-XII):  Visual fields full to confrontation. Pupils equal round (2mm, not tested for LR), extraocular movements intact, no ptosis, no nystagmus. Facial sensation and symmetry intact bilaterally. Hearing grossly intact. Palate mobile and midline. Shoulder shrug normal bilaterally. Normal tongue protrusion.   Motor: 5 out of 5 strength throughout the upper and lower extremities bilaterally except 4-/5 in bilateral IO. Normal tone. Atrophy in bilateral first web spaces and IO. No abnormal movements noted. No drift appreciated.   Sensation: decreased to pin in stocking pattern to mid foot bilaterally. Decreased to vibration to the knees. Proprioception intact.   DTR:1+ at the biceps, mute at the knees.   Coordination: Finger-nose-finger testing intact bilaterally. FANY normal bilaterally. No tremor. Romberg absent.   Gait: Semi wide base, varies. Walks with R foot everted at baseline. No shuffling. Normal posture and arm swing. Unable to tandem.         DIAGNOSTIC DATA:  I have personally reviewed provider notes, labs and imaging made available to me today.     Imaging:  MRI L spine wo 8/3/24:  Impression:  1. Redemonstrated L1 and L3 compression fracture status post cement augmentation.  Slightly progressed comminution and height loss involving the L3 fracture compared to prior exam 11/30/2023.  Correlate for acuity of symptoms.  2. Similar osseous retropulsion and degenerative changes at the L3 level contributing to severe spinal canal narrowing at L2-L3 as well as moderate left-sided foraminal narrowing.  3. Moderate multifactorial spinal canal narrowing at L3-L4 as well as severe right and mild-to-moderate left foraminal narrowing.       MRI C spine wo 12/28/23:  Impression:  1. There is multilevel degenerative change discussed in detail by level above.  These findings are present in the setting of a spinal canal which appears small on a developmental basis.  At the C6-7 level there is a shallow broad disc  protrusion/osteophyte complex which subtly flattens the ventral cord surface.  There is no cord edema or myelomalacia at this or any other cervical level.  There is some degree of uncovertebral spurring and facet joint arthropathy at multiple levels contributing to foraminal stenosis.  There is also borderline to mild spinal stenosis at several levels but mild-to-moderate spinal stenosis at the C6-7 level.    Cardiac:  TTE 8/2024:  Left ventricle cavity is normal in size.   Normal global wall motion.   Visual EF is 60-65%.   Doppler evidence of grade I (impaired) diastolic dysfunction.   Calculated EF 62%.   Left atrial cavity is mildly dilated.   Mild aortic valve leaflet calcification.   Mild calcification of the aortic valve annulus.   Trileaflet aortic valve with trace regurgitation.   Mild mitral valve leaflet calcification.   Mild calcification of the mitral valve annulus.   Trace mitral regurgitation.   Mild tricuspid valve leaflet calcification.   Mild tricuspid regurgitation.   As assessed from the tricuspid regurgitation jet, the pulmonary artery   systolic pressure is 30 mmHg.   Mild pulmonic regurgitation.     EKG 4/2024:  Normal sinus rhythm incomplete right bundle-branch block first-degree AV block mild repolarization abnormalities borderline EKG     Labs:  Lab Results   Component Value Date    WBC 5.83 02/22/2024    HGB 11.6 (L) 02/22/2024    HCT 36.1 (L) 02/22/2024     (H) 02/22/2024    MCV 93 02/22/2024    RDW 14.6 (H) 02/22/2024     Lab Results   Component Value Date     06/19/2024    K 5.0 06/19/2024     06/19/2024    CO2 25 06/19/2024    BUN 26 (H) 06/19/2024    CREATININE 1.3 06/19/2024     (H) 06/19/2024    CALCIUM 9.2 06/19/2024    PHOS 3.9 03/13/2015     Lab Results   Component Value Date    PROT 6.6 06/19/2024    ALBUMIN 3.5 06/19/2024    BILITOT 0.3 06/19/2024    AST 24 06/19/2024    ALKPHOS 96 06/19/2024    ALT 19 06/19/2024     Lab Results   Component Value Date  "   INR 1.0 06/24/2020     Lab Results   Component Value Date    CHOL 157 06/19/2024    HDL 43 06/19/2024    LDLCALC 95.8 06/19/2024    TRIG 91 06/19/2024    CHOLHDL 27.4 06/19/2024     Lab Results   Component Value Date    HGBA1C 6.8 (H) 06/19/2024      Lab Results   Component Value Date    DSYWQNFT69 1382 (H) 07/27/2020     No results found for: "FOLATE"  Lab Results   Component Value Date    TSH 2.227 06/19/2024           ASSESSMENT & PLAN:  Hammad Douglas III is a 69 y.o. R-handed male seen in consultation for dizziness.     Problem List Items Addressed This Visit          Neuro    Diabetic polyneuropathy associated with type 1 diabetes mellitus    Current Assessment & Plan     Reviewed diagnosis with pt and wife  Recent A1c 6.8, established with endo  Regular foot care / safety precautions   Serologies to check for other metabolic factors to worsen his neuropathy             Endocrine    Type 1 diabetes mellitus with diabetic autonomic neuropathy       Other    Other hypersomnia    Current Assessment & Plan     HST to eval for suspected YAS         Dizziness - Primary    Current Assessment & Plan     Multifactorial   - hx of concussion 11/2023 -- request imaging from LV / pt to bring disc to next appt   - chronic tinnitus, hearing loss   - cervical myelopathy, L spine stenosis / radiculopathy    - diabetic polyneuropathy   - orthostatic hypotension noted in clinic -- likely diabetic autonomic dysfunction    - poorly hydrated    - anemic     No indication for additional brain imaging at this time  Discussed measures to combat OH -- see AVS   Labs as ordered             Other Visit Diagnoses       Fatigue, unspecified type        Memory loss        Insomnia, unspecified type        Diabetes mellitus due to underlying condition with diabetic autonomic neuropathy, with long-term current use of insulin                Follow up: 6-8 weeks, ok for VV    I spent a total of 64 minutes on the day of the visit.    This " includes face to face time with the patient, as well as non-face to face time preparing for and completing the visit (review of prior diagnostic testing and clinical notes, obtaining or reviewing history, documenting clinical information in the EMR, independently interpreting and communicating results to the patient/family and coordinating ongoing care).       I appreciate the opportunity to participate in the care of this patient. Please feel free to contact me with any concerns or questions.       Peyton Saenz, ACNPC-AG  [facility]  [address] [place] [address]

## 2024-09-24 NOTE — ASSESSMENT & PLAN NOTE
Multifactorial   - hx of concussion 11/2023 -- request imaging from LV / pt to bring disc to next appt   - chronic tinnitus, hearing loss   - cervical myelopathy, L spine stenosis / radiculopathy    - diabetic polyneuropathy   - orthostatic hypotension noted in clinic -- likely diabetic autonomic dysfunction    - poorly hydrated    - anemic     No indication for additional brain imaging at this time  Discussed measures to combat OH -- see AVS   Labs as ordered

## 2024-09-24 NOTE — ASSESSMENT & PLAN NOTE
Reviewed diagnosis with pt and wife  Recent A1c 6.8, established with endo  Regular foot care / safety precautions   Serologies to check for other metabolic factors to worsen his neuropathy

## 2024-09-24 NOTE — PATIENT INSTRUCTIONS
Dizziness or imbalance is an extremely complex problem that may arise from many sources. It requires coordination between several body systems (the visual system, vestibular systems, central nervous system, peripheral nervous system, etc).   Additionally, balance can be further compromised in the setting of musculoskeletal, cardiac, and numerous other physiologic disorders.      - you have some vision issues at baseline from the diabetes  - you also have history of cervical spine issues / myelopathy  - the lumbar radiculomyelopathy (pinched nerves)  - diabetic neuropathy in the feet  - other metabolic issues, potentially dehydration and anemia  - drops in the BP when you change position (orthostatic hypotension)    INTERVENTIONS FOR ORTHOSTATIC HYPOTENSION                                  ---GENERAL RECOMMENDATIONS  1. Get up from bed slowly. Urinate in the sitting position to avoid syncopal events in the bathroom. Avoid exposure to heat. Eat small, frequent meals to avoid worsening of orthostatic hypotension after a meal.   2. Keep a blood pressure log by using an automated sphygmomanometer and take the blood pressure both supine and after standing for one minute in the following circumstances: (i) on awakening; (ii) after a meal; (iii) during the time of maximal orthostatic tolerance when the patient is feeling the best; (iv) during the time of poor orthostatic tolerance, when the symptoms are most severe; and (v) before and one hour after taking the medication. This will help determine whether the symptoms are due to orthostatic hypotension.     ---DIETARY CHANGES  1. Increase sodium and water intake. The patient should take at least 10 to 20 g of salt daily and drink at least 2 to 2.5 liters of water daily.   2. The patient should have at least one glass of water per meal and at least twice at other times of the day.   3. High-potassium diet including fruit (especially bananas) and vegetables, which are rich in  potassium. V8 juice may also provide a good source.   4. Take a snack at night, which may reduce supine hypertension.   5. Drink rapidly two glasses of water before any circumstance when the patient is most likely to have symptoms (for example, before a walk, exercise, or taking a shower).     ---POSTURAL CHANGES  1. Elevate the head of the bed four inches. This can help prevent supine hypertension and reduce the frequency of urination at night.  2. Postural maneuvers such as toe-raise, leg crossing, bending at the waist, tight contraction, or propping a leg up on a chair or stool. This may transiently improve the time that the patient can tolerate the standing position and allow the patient to get to a place to sit down to avoid syncope.   3. Use of well-fitted waist-high compression stockings may be helpful. They have to be put on before getting out of bed. Some patients may find it difficult to put them on or uncomfortable in hot weather. Some examples are Jobst ([employer]) and Juzo (juzoEmSense). Some patients may prefer the combination of an abdominal binder and leg stockings. Spandex-type elastic exercise shorts sized to fit tightly may be preferred.         Labs today   Home sleep study to look for sleep apnea  Please get imaging on a disc from  and bring to next visit     Please call our clinic at [phone] or send a message on the [facility] portal if there are any changes to the plan discussed today. For example, if you are not contacted for the requested tests, referral(s) within one week, if you are unable to receive the medications prescribed, or if you feel you need to change the treatment course for any reason.     Follow up with me in about 6-8 weeks

## 2024-09-25 ENCOUNTER — LAB VISIT (OUTPATIENT)
Dept: LAB | Facility: HOSPITAL | Age: 69
End: 2024-09-25
Attending: NURSE PRACTITIONER
Payer: MEDICARE

## 2024-09-25 DIAGNOSIS — Z79.4 DIABETES MELLITUS DUE TO UNDERLYING CONDITION WITH DIABETIC AUTONOMIC NEUROPATHY, WITH LONG-TERM CURRENT USE OF INSULIN: ICD-10-CM

## 2024-09-25 DIAGNOSIS — R53.83 FATIGUE, UNSPECIFIED TYPE: ICD-10-CM

## 2024-09-25 DIAGNOSIS — R42 DIZZINESS: ICD-10-CM

## 2024-09-25 DIAGNOSIS — E10.42 DIABETIC POLYNEUROPATHY ASSOCIATED WITH TYPE 1 DIABETES MELLITUS: ICD-10-CM

## 2024-09-25 DIAGNOSIS — E08.43 DIABETES MELLITUS DUE TO UNDERLYING CONDITION WITH DIABETIC AUTONOMIC NEUROPATHY, WITH LONG-TERM CURRENT USE OF INSULIN: ICD-10-CM

## 2024-09-25 DIAGNOSIS — R41.3 MEMORY LOSS: ICD-10-CM

## 2024-09-25 LAB
BASOPHILS # BLD AUTO: 0.03 K/UL (ref 0–0.2)
BASOPHILS NFR BLD: 0.5 % (ref 0–1.9)
DIFFERENTIAL METHOD BLD: ABNORMAL
EOSINOPHIL # BLD AUTO: 0.2 K/UL (ref 0–0.5)
EOSINOPHIL NFR BLD: 2.3 % (ref 0–8)
ERYTHROCYTE [DISTWIDTH] IN BLOOD BY AUTOMATED COUNT: 14.3 % (ref 11.5–14.5)
FERRITIN SERPL-MCNC: 97 NG/ML (ref 20–300)
FOLATE SERPL-MCNC: 15.3 NG/ML (ref 4–24)
HCT VFR BLD AUTO: 40.3 % (ref 40–54)
HGB BLD-MCNC: 13.2 G/DL (ref 14–18)
IMM GRANULOCYTES # BLD AUTO: 0.01 K/UL (ref 0–0.04)
IMM GRANULOCYTES NFR BLD AUTO: 0.2 % (ref 0–0.5)
LYMPHOCYTES # BLD AUTO: 1.2 K/UL (ref 1–4.8)
LYMPHOCYTES NFR BLD: 19.2 % (ref 18–48)
MCH RBC QN AUTO: 30.7 PG (ref 27–31)
MCHC RBC AUTO-ENTMCNC: 32.8 G/DL (ref 32–36)
MCV RBC AUTO: 94 FL (ref 82–98)
MONOCYTES # BLD AUTO: 0.6 K/UL (ref 0.3–1)
MONOCYTES NFR BLD: 8.7 % (ref 4–15)
NEUTROPHILS # BLD AUTO: 4.4 K/UL (ref 1.8–7.7)
NEUTROPHILS NFR BLD: 69.1 % (ref 38–73)
NRBC BLD-RTO: 0 /100 WBC
PLATELET # BLD AUTO: 302 K/UL (ref 150–450)
PMV BLD AUTO: 10.7 FL (ref 9.2–12.9)
RBC # BLD AUTO: 4.3 M/UL (ref 4.6–6.2)
VIT B12 SERPL-MCNC: 851 PG/ML (ref 210–950)
WBC # BLD AUTO: 6.41 K/UL (ref 3.9–12.7)

## 2024-09-25 PROCEDURE — 82728 ASSAY OF FERRITIN: CPT | Performed by: NURSE PRACTITIONER

## 2024-09-25 PROCEDURE — 86592 SYPHILIS TEST NON-TREP QUAL: CPT | Performed by: NURSE PRACTITIONER

## 2024-09-25 PROCEDURE — 82746 ASSAY OF FOLIC ACID SERUM: CPT | Performed by: NURSE PRACTITIONER

## 2024-09-25 PROCEDURE — 84425 ASSAY OF VITAMIN B-1: CPT | Performed by: NURSE PRACTITIONER

## 2024-09-25 PROCEDURE — 83921 ORGANIC ACID SINGLE QUANT: CPT | Performed by: NURSE PRACTITIONER

## 2024-09-25 PROCEDURE — 82607 VITAMIN B-12: CPT | Performed by: NURSE PRACTITIONER

## 2024-09-25 PROCEDURE — 85025 COMPLETE CBC W/AUTO DIFF WBC: CPT | Performed by: NURSE PRACTITIONER

## 2024-09-25 PROCEDURE — 36415 COLL VENOUS BLD VENIPUNCTURE: CPT | Mod: PO | Performed by: NURSE PRACTITIONER

## 2024-09-26 LAB — RPR SER QL: NORMAL

## 2024-10-01 ENCOUNTER — PATIENT MESSAGE (OUTPATIENT)
Dept: PAIN MEDICINE | Facility: CLINIC | Age: 69
End: 2024-10-01
Payer: MEDICARE

## 2024-10-01 LAB — METHYLMALONATE SERPL-SCNC: 0.27 UMOL/L

## 2024-10-02 ENCOUNTER — PATIENT MESSAGE (OUTPATIENT)
Dept: NEUROLOGY | Facility: CLINIC | Age: 69
End: 2024-10-02
Payer: MEDICARE

## 2024-10-02 LAB — VIT B1 BLD-MCNC: 92 UG/L (ref 38–122)

## 2024-10-03 ENCOUNTER — LAB VISIT (OUTPATIENT)
Dept: LAB | Facility: HOSPITAL | Age: 69
End: 2024-10-03
Attending: NURSE PRACTITIONER
Payer: MEDICARE

## 2024-10-03 DIAGNOSIS — E10.43 TYPE 1 DIABETES MELLITUS WITH DIABETIC AUTONOMIC NEUROPATHY: ICD-10-CM

## 2024-10-03 LAB
ALBUMIN SERPL BCP-MCNC: 3.8 G/DL (ref 3.5–5.2)
ALP SERPL-CCNC: 93 U/L (ref 55–135)
ALT SERPL W/O P-5'-P-CCNC: 21 U/L (ref 10–44)
ANION GAP SERPL CALC-SCNC: 7 MMOL/L (ref 8–16)
AST SERPL-CCNC: 24 U/L (ref 10–40)
BILIRUB SERPL-MCNC: 0.4 MG/DL (ref 0.1–1)
BUN SERPL-MCNC: 32 MG/DL (ref 8–23)
CALCIUM SERPL-MCNC: 9.6 MG/DL (ref 8.7–10.5)
CHLORIDE SERPL-SCNC: 105 MMOL/L (ref 95–110)
CO2 SERPL-SCNC: 25 MMOL/L (ref 23–29)
CREAT SERPL-MCNC: 1.7 MG/DL (ref 0.5–1.4)
EST. GFR  (NO RACE VARIABLE): 43.6 ML/MIN/1.73 M^2
ESTIMATED AVG GLUCOSE: 148 MG/DL (ref 68–131)
GLUCOSE SERPL-MCNC: 153 MG/DL (ref 70–110)
HBA1C MFR BLD: 6.8 % (ref 4–5.6)
POTASSIUM SERPL-SCNC: 4.7 MMOL/L (ref 3.5–5.1)
PROT SERPL-MCNC: 6.8 G/DL (ref 6–8.4)
SODIUM SERPL-SCNC: 137 MMOL/L (ref 136–145)

## 2024-10-03 PROCEDURE — 36415 COLL VENOUS BLD VENIPUNCTURE: CPT | Mod: PN | Performed by: NURSE PRACTITIONER

## 2024-10-03 PROCEDURE — 80053 COMPREHEN METABOLIC PANEL: CPT | Performed by: NURSE PRACTITIONER

## 2024-10-03 PROCEDURE — 83036 HEMOGLOBIN GLYCOSYLATED A1C: CPT | Performed by: NURSE PRACTITIONER

## 2024-10-10 ENCOUNTER — OFFICE VISIT (OUTPATIENT)
Dept: ENDOCRINOLOGY | Facility: CLINIC | Age: 69
End: 2024-10-10
Payer: MEDICARE

## 2024-10-10 VITALS
HEART RATE: 88 BPM | BODY MASS INDEX: 26.67 KG/M2 | SYSTOLIC BLOOD PRESSURE: 100 MMHG | WEIGHT: 196.88 LBS | DIASTOLIC BLOOD PRESSURE: 60 MMHG | HEIGHT: 72 IN

## 2024-10-10 DIAGNOSIS — E10.43 TYPE 1 DIABETES MELLITUS WITH DIABETIC AUTONOMIC NEUROPATHY: Primary | ICD-10-CM

## 2024-10-10 DIAGNOSIS — I25.10 CORONARY ARTERY DISEASE INVOLVING NATIVE CORONARY ARTERY OF NATIVE HEART WITHOUT ANGINA PECTORIS: ICD-10-CM

## 2024-10-10 DIAGNOSIS — E10.649 HYPOGLYCEMIA UNAWARENESS IN TYPE 1 DIABETES MELLITUS: ICD-10-CM

## 2024-10-10 DIAGNOSIS — R80.9 TYPE 1 DIABETES MELLITUS WITH DIABETIC MICROALBUMINURIA: ICD-10-CM

## 2024-10-10 DIAGNOSIS — E78.5 HYPERLIPIDEMIA, UNSPECIFIED HYPERLIPIDEMIA TYPE: ICD-10-CM

## 2024-10-10 DIAGNOSIS — E10.3593 TYPE 1 DIABETES MELLITUS WITH PROLIFERATIVE RETINOPATHY OF BOTH EYES WITHOUT MACULAR EDEMA: ICD-10-CM

## 2024-10-10 DIAGNOSIS — F41.9 ANXIETY AND DEPRESSION: ICD-10-CM

## 2024-10-10 DIAGNOSIS — Z46.81 INSULIN PUMP TITRATION: ICD-10-CM

## 2024-10-10 DIAGNOSIS — F32.A ANXIETY AND DEPRESSION: ICD-10-CM

## 2024-10-10 DIAGNOSIS — E10.29 TYPE 1 DIABETES MELLITUS WITH DIABETIC MICROALBUMINURIA: ICD-10-CM

## 2024-10-10 PROCEDURE — 99999 PR PBB SHADOW E&M-EST. PATIENT-LVL III: CPT | Mod: PBBFAC,,, | Performed by: NURSE PRACTITIONER

## 2024-10-10 RX ORDER — FLUOXETINE HYDROCHLORIDE 20 MG/1
20 CAPSULE ORAL
Qty: 90 CAPSULE | Refills: 3 | Status: SHIPPED | OUTPATIENT
Start: 2024-10-10

## 2024-10-10 RX ORDER — ATORVASTATIN CALCIUM 40 MG/1
40 TABLET, FILM COATED ORAL DAILY
Qty: 90 TABLET | Refills: 3 | Status: SHIPPED | OUTPATIENT
Start: 2024-10-10

## 2024-10-10 NOTE — TELEPHONE ENCOUNTER
Refill Decision Note   Hammad Douglas  is requesting a refill authorization.  Brief Assessment and Rationale for Refill:  Approve     Medication Therapy Plan:         Comments:     Note composed:2:21 PM 10/10/2024             Appointments     Last Visit   7/23/2024 Adelaide Bran MD   Next Visit   Visit date not found Adelaide Bran MD

## 2024-10-10 NOTE — PROGRESS NOTES
CC: Mr. Hammad Douglas III arrives today for management of Type 1  DM and review of chronic medical conditions, as listed in the visit diagnosis section of this encounter.     HPI: Mr. Hammad Douglas III was diagnosed with Type 1  DM at age 19 after a viral illness - had polyuria, polydipsia at this time.  Initial treatment consisted of insulin. Denies FH of DM. Reports past hospitalizations due to DKA > 30 years ago.   Has diagnosis of hypoglycemia unawareness with past severe hypoglycemic episodes requiring 3rd party assistance. Began using Dexcom G5 in 2017 and later up graded to G6. He began OmniPod 5 in November 2022.   He follows with Dr. Stern for CAD and is s/p CABG x 4 in January 2024.     Patient was last seen by me in June. I:C ratios were adjusted then.    He does admit to sometimes forgetting to take meal bolus before eating so may admin late.    BG monitoring per Dexcom G6.     Hypoglycemia: rare  Hypoglycemic Symptoms: none  Hypoglycemia Treatment: Juice or glucose tabs.     Exercise: cardiac rehab 3 days/week.     Dietary Habits: Eats 3 meals/day. Eating more lean proteins.  Rare snacking. Avoids sugary beverages. Drinking up to 3 protein shakes per day.     Last DM education appointment: 12/8/2022      CURRENT DIABETIC MEDS:  Humalog in OmniPod 5    Insulin back up plan: Toujeo Max 24 units QHS, Novolog I:C ratio 1:5    Previous insulins:  Lantus    When did you start the current therapy you are on: 11/2022  Frequency of changing site/infusion set/tubing/cartridges: 3 days  Frequency of changing CGM: 10 days  Using bolus wizard: yes  Taking bolus with each meal: yes      INSULIN PUMP SETTINGS:        Last Eye Exam: 9/2024 + proliferative DR. Dr. Lugo  Last Podiatry Exam: 2/2023, Past amputation of R third toe. S/P arthroplasty of the Lt. Hallux IP joint in July 2022.    REVIEW OF SYSTEMS  Constitutional: no c/o fatigue, weakness, weight loss. + mild weight gain  Cardiac: no palpitations, chest pain    Respiratory: no cough or dyspnea.   GI:  no c/o abdominal pain or nausea.   Skin: no rashes, lesions.    Musculoskeletal: + lower pain. Following with Pain Management. Had recent epidural but back pain persisting.   Neuro: no numbness, tingling, or parasthesias.  Endocrine: denies polyphagia, polydipsia, polyuria.    Personally reviewed Past Medical, Surgical, Social History.    Vital Signs  /60   Pulse 88   Ht 6' (1.829 m)   Wt 89.3 kg (196 lb 13.9 oz)   BMI 26.70 kg/m²      Personally reviewed the below labs:    Hemoglobin A1C   Date Value Ref Range Status   10/03/2024 6.8 (H) 4.0 - 5.6 % Final     Comment:     ADA Screening Guidelines:  5.7-6.4%  Consistent with prediabetes  >or=6.5%  Consistent with diabetes    High levels of fetal hemoglobin interfere with the HbA1C  assay. Heterozygous hemoglobin variants (HbS, HgC, etc)do  not significantly interfere with this assay.   However, presence of multiple variants may affect accuracy.     06/19/2024 6.8 (H) 4.0 - 5.6 % Final     Comment:     ADA Screening Guidelines:  5.7-6.4%  Consistent with prediabetes  >or=6.5%  Consistent with diabetes    High levels of fetal hemoglobin interfere with the HbA1C  assay. Heterozygous hemoglobin variants (HbS, HgC, etc)do  not significantly interfere with this assay.   However, presence of multiple variants may affect accuracy.     06/22/2023 6.3 (H) 4.0 - 5.6 % Final     Comment:     ADA Screening Guidelines:  5.7-6.4%  Consistent with prediabetes  >or=6.5%  Consistent with diabetes    High levels of fetal hemoglobin interfere with the HbA1C  assay. Heterozygous hemoglobin variants (HbS, HgC, etc)do  not significantly interfere with this assay.   However, presence of multiple variants may affect accuracy.         Chemistry        Component Value Date/Time     10/03/2024 0959    K 4.7 10/03/2024 0959     10/03/2024 0959    CO2 25 10/03/2024 0959    BUN 32 (H) 10/03/2024 0959    CREATININE 1.7 (H) 10/03/2024  0959     (H) 10/03/2024 0959        Component Value Date/Time    CALCIUM 9.6 10/03/2024 0959    ALKPHOS 93 10/03/2024 0959    AST 24 10/03/2024 0959    ALT 21 10/03/2024 0959    BILITOT 0.4 10/03/2024 0959    ESTGFRAFRICA >60.0 06/22/2022 0848    EGFRNONAA 57.3 (A) 06/22/2022 0848          Lab Results   Component Value Date    CHOL 157 06/19/2024    CHOL 156 09/21/2022    CHOL 150 08/12/2021     Lab Results   Component Value Date    HDL 43 06/19/2024    HDL 45 09/21/2022    HDL 46 08/12/2021     Lab Results   Component Value Date    LDLCALC 95.8 06/19/2024    LDLCALC 98.4 09/21/2022    LDLCALC 91.4 08/12/2021     Lab Results   Component Value Date    TRIG 91 06/19/2024    TRIG 63 09/21/2022    TRIG 63 08/12/2021     Lab Results   Component Value Date    CHOLHDL 27.4 06/19/2024    CHOLHDL 28.8 09/21/2022    CHOLHDL 30.7 08/12/2021       Lab Results   Component Value Date    MICALBCREAT 254.7 (H) 06/19/2024     Lab Results   Component Value Date    TSH 2.227 06/19/2024       CrCl cannot be calculated (Unknown ideal weight.).    Vit D, 25-Hydroxy   Date Value Ref Range Status   08/06/2018 35 30 - 96 ng/mL Final     Comment:     Vitamin D deficiency.........<10 ng/mL                              Vitamin D insufficiency......10-29 ng/mL       Vitamin D sufficiency........> or equal to 30 ng/mL  Vitamin D toxicity............>100 ng/mL         PHYSICAL EXAMINATION  Constitutional: Appears well, no distress  Respiratory: CTA, even and unlabored.   Cardiovascular: RRR, no murmurs.  GI: active bowel sounds, no hernia  Skin: warm and dry  Neuro: oriented to person, place, time.  Feet: appropriate footwear.         DEXCOM DOWNLOAD: Fasting glucoses are acceptable. Occasional prandial excursions. Sometimes meal bolus is administered after excursion has begun. Occasional hypoglycemia mid-day/after lunch.                Goals        HEMOGLOBIN A1C < 7.5           due to hypoglycemia unawareness      Assessment/Plan  1.  Type 1 diabetes mellitus with diabetic autonomic neuropathy  -- A1c stable. Will adjust I:C ratio w/breakfast.   -- Reminded pt to take meal bolus before eating, rather than after.  -- change I:C ratios:  0000 - 1:6  0700 - 1:5  1000 - 1:7  1800 - 1:6  -- BG monitoring per Dexcom G6  -- advised against drinking large volume of protein drinks. Three is too many. One per day is enough. Needs to drink more water.     -- Discussed diagnosis of DM, A1c goals, progression of disease, long term complications and tx options.    -- Reviewed hypoglycemia management: treat with 1/2 glass of juice, 1/2 can regular coke, or 4 glucose tablets. Monitor and repeat treatment every 15 minutes until BG is >70 Then have a snack, which includes a complex carbohydrate and protein.  Advised patient to check BG before activities, such as driving or exercise.   2. Type 1 diabetes mellitus with diabetic microalbuminuria  -- uncontrolled, off of losartan, due to hypotension.  -- maintain DM control   3. Proliferative diabetic retinopathy without macular edema associated with type 1 diabetes mellitus, unspecified laterality  -- stable  -- keep follow ups   4. Coronary artery disease involving native coronary artery of native heart without angina pectoris  -- optimize DM control   5. Hyperlipidemia, unspecified hyperlipidemia type  -- controlled  -- continue atorvastatin   6. Hypoglycemia unawareness in Type 1 DM -- continue Dexcom.   -- has glucagon   7. Insulin pump titration  -- download reviewed and settings changed.         FOLLOW UP   Follow up in about 4 months (around 2/10/2025).   Patient instructed to bring BG logs to each follow up   Patient encouraged to call for any BG/medication issues, concerns, or questions.     Orders Placed This Encounter   Procedures    Hemoglobin A1C    Basic Metabolic Panel

## 2024-10-10 NOTE — TELEPHONE ENCOUNTER
No care due was identified.  [facility] Embedded Care Due Messages. Reference number: [id number].   10/10/2024 6:26:31 AM CDT

## 2024-10-15 ENCOUNTER — HOSPITAL ENCOUNTER (OUTPATIENT)
Dept: RADIOLOGY | Facility: HOSPITAL | Age: 69
Discharge: HOME | End: 2024-10-15
Attending: NEUROLOGICAL SURGERY
Payer: MEDICARE

## 2024-10-15 DIAGNOSIS — M54.12 RADICULOPATHY, CERVICAL REGION: ICD-10-CM

## 2024-10-15 PROCEDURE — 72040 X-RAY EXAM NECK SPINE 2-3 VW: CPT | Mod: TC,FY,PO

## 2024-10-15 PROCEDURE — 72040 X-RAY EXAM NECK SPINE 2-3 VW: CPT | Mod: 26,,, | Performed by: RADIOLOGY

## 2024-10-16 ENCOUNTER — OFFICE VISIT (OUTPATIENT)
Dept: PAIN MEDICINE | Facility: CLINIC | Age: 69
End: 2024-10-16
Payer: MEDICARE

## 2024-10-16 DIAGNOSIS — M48.00 SPINAL STENOSIS, UNSPECIFIED SPINAL REGION: Primary | ICD-10-CM

## 2024-10-16 DIAGNOSIS — M47.817 SPONDYLOSIS OF LUMBOSACRAL REGION WITHOUT MYELOPATHY OR RADICULOPATHY: ICD-10-CM

## 2024-10-16 PROCEDURE — 1160F RVW MEDS BY RX/DR IN RCRD: CPT | Mod: CPTII,S$GLB,, | Performed by: STUDENT IN AN ORGANIZED HEALTH CARE EDUCATION/TRAINING PROGRAM

## 2024-10-16 PROCEDURE — 3044F HG A1C LEVEL LT 7.0%: CPT | Mod: CPTII,S$GLB,, | Performed by: STUDENT IN AN ORGANIZED HEALTH CARE EDUCATION/TRAINING PROGRAM

## 2024-10-16 PROCEDURE — 99999 PR PBB SHADOW E&M-EST. PATIENT-LVL III: CPT | Mod: PBBFAC,,, | Performed by: STUDENT IN AN ORGANIZED HEALTH CARE EDUCATION/TRAINING PROGRAM

## 2024-10-16 PROCEDURE — 1159F MED LIST DOCD IN RCRD: CPT | Mod: CPTII,S$GLB,, | Performed by: STUDENT IN AN ORGANIZED HEALTH CARE EDUCATION/TRAINING PROGRAM

## 2024-10-16 PROCEDURE — 3060F POS MICROALBUMINURIA REV: CPT | Mod: CPTII,S$GLB,, | Performed by: STUDENT IN AN ORGANIZED HEALTH CARE EDUCATION/TRAINING PROGRAM

## 2024-10-16 PROCEDURE — 99213 OFFICE O/P EST LOW 20 MIN: CPT | Mod: S$GLB,,, | Performed by: STUDENT IN AN ORGANIZED HEALTH CARE EDUCATION/TRAINING PROGRAM

## 2024-10-16 PROCEDURE — 3066F NEPHROPATHY DOC TX: CPT | Mod: CPTII,S$GLB,, | Performed by: STUDENT IN AN ORGANIZED HEALTH CARE EDUCATION/TRAINING PROGRAM

## 2024-10-16 NOTE — H&P (VIEW-ONLY)
[place] - Department    Adelaide Bran MD      First Office Visit: 10/16/2024   Today' Date: 10/16/2024  Last Office Visit: 10/16/24    Chief complaint: back pain    HPI: Pt is a pleasant 69 y.o., who presents for evaluation. Referred by Dr. Bran. Pt seen previously for back pain and L leg pain since he fell off a ladder in Nov. He has a hx of L1 and L3 compression fx s/p kyphoplasty. States he has also had ACDF of his C-spine (Dr. Caban).   Pt seen today for f/u from R TFESI L3-4. States his R leg pain has resolved. Does continue to have low back pain that is worse with standing. Denies having any leg weakness. No BB changes. Is doing HEP.         Pain disability index score: 42  Pain score: 6    Relevant Imaging/ Testing:   MR L-spine 7/24 -etropulsion on MR L-spine at L3-4 as well as severe narrowing at L2-3  XR C-spine 5/24, 7/24    Procedures:   R TFESI L3-4 9/12/24 - >90% relief of R leg pain     Date of board of pharmacy review:10/16/2024  Date of opioid risk screening/ pain psych: None  Date of opioid agreement and consent: None  Date of urine drug screen: None  Date of random pill count: None     was reviewed today: reviewed,     Prescribed medications: None    See EHR for  PMH, PSH, FH, SH, Medications and Allergy    ROS:  Positive for pain  ROS     PE:  There were no vitals filed for this visit.  General: Pleasant, no distress  HEENT: NC/ AT. PERRLA  CV: Radial pulses intact  Pulm: No distress  Ext: No edema    Physical Exam     Neuromusculoskeletal:  Head: NC, AT. PERRLA. No occipital tenderness  Neck: Intact range of motions, extension, flexion, rotation. Neg Facet loading. Neg Spurling. Min Tenderness.  5/5 Strength, normal tone. Neg Dalton's  Shoulder: Intact range of motion. Min Bicep groove tenderness. 5/5 Strength  Thoracic: Min tenderness, no step off  Lumbar: Intact range of motion. David Facet loading. Min Tenderness. Neg SL. No pain with flexion. No pain with extension.   Hip: Intact  range of motion  SI: Level, Min tenderness  Knee: Intact range of motion  Reflexes: normal Knee  Strength: 5/5 globally   Sensory: Grossly intact   Skin: No bruising, erythema  Gait: Normal      Impression:  Back pain  R leg pain   H/o L1 and L3 compression fx s/p kyphoplasty   Relevant History  BMI 29.19  DMI (HgbA1c 6.3)    Assessment:  Axial back pain  Severe spinal stenosis   Lumbar radiculopathy      Plan:  Discussed options  Imaging/ relevant records viewed/ reviewed/ discussed  Imaging results viewed and reviewed (noted above)/ reviewed with patient   reviewed  Cont HEP  B MBB L4-5 and L5-S1 - states he will talk to his back surgeon first prior to doing another injection, pt to reach out if he wants to schedule  Re-eval after  Consider repeat CARLOS (R TFESI L3-4 vs ILESI 2-3)  Back Dr. Caban if needed      Prescribed medications:  1. None    The impression and plan were discussed and explained in detail. All the questions were answered. Education was provided accordingly.       Follow-up:  Pt to schedule    Yusra Montano MD

## 2024-10-16 NOTE — PROGRESS NOTES
[place] - Department    Adelaide Bran MD      First Office Visit: 10/16/2024   Today' Date: 10/16/2024  Last Office Visit: 10/16/24    Chief complaint: back pain    HPI: Pt is a pleasant 69 y.o., who presents for evaluation. Referred by Dr. Bran. Pt seen previously for back pain and L leg pain since he fell off a ladder in Nov. He has a hx of L1 and L3 compression fx s/p kyphoplasty. States he has also had ACDF of his C-spine (Dr. Caban).   Pt seen today for f/u from R TFESI L3-4. States his R leg pain has resolved. Does continue to have low back pain that is worse with standing. Denies having any leg weakness. No BB changes. Is doing HEP.         Pain disability index score: 42  Pain score: 6    Relevant Imaging/ Testing:   MR L-spine 7/24 -etropulsion on MR L-spine at L3-4 as well as severe narrowing at L2-3  XR C-spine 5/24, 7/24    Procedures:   R TFESI L3-4 9/12/24 - >90% relief of R leg pain     Date of board of pharmacy review:10/16/2024  Date of opioid risk screening/ pain psych: None  Date of opioid agreement and consent: None  Date of urine drug screen: None  Date of random pill count: None     was reviewed today: reviewed,     Prescribed medications: None    See EHR for  PMH, PSH, FH, SH, Medications and Allergy    ROS:  Positive for pain  ROS     PE:  There were no vitals filed for this visit.  General: Pleasant, no distress  HEENT: NC/ AT. PERRLA  CV: Radial pulses intact  Pulm: No distress  Ext: No edema    Physical Exam     Neuromusculoskeletal:  Head: NC, AT. PERRLA. No occipital tenderness  Neck: Intact range of motions, extension, flexion, rotation. Neg Facet loading. Neg Spurling. Min Tenderness.  5/5 Strength, normal tone. Neg Dalton's  Shoulder: Intact range of motion. Min Bicep groove tenderness. 5/5 Strength  Thoracic: Min tenderness, no step off  Lumbar: Intact range of motion. David Facet loading. Min Tenderness. Neg SL. No pain with flexion. No pain with extension.   Hip: Intact  range of motion  SI: Level, Min tenderness  Knee: Intact range of motion  Reflexes: normal Knee  Strength: 5/5 globally   Sensory: Grossly intact   Skin: No bruising, erythema  Gait: Normal      Impression:  Back pain  R leg pain   H/o L1 and L3 compression fx s/p kyphoplasty   Relevant History  BMI 29.19  DMI (HgbA1c 6.3)    Assessment:  Axial back pain  Severe spinal stenosis   Lumbar radiculopathy      Plan:  Discussed options  Imaging/ relevant records viewed/ reviewed/ discussed  Imaging results viewed and reviewed (noted above)/ reviewed with patient   reviewed  Cont HEP  B MBB L4-5 and L5-S1 - states he will talk to his back surgeon first prior to doing another injection, pt to reach out if he wants to schedule  Re-eval after  Consider repeat CARLOS (R TFESI L3-4 vs ILESI 2-3)  Back Dr. Caban if needed      Prescribed medications:  1. None    The impression and plan were discussed and explained in detail. All the questions were answered. Education was provided accordingly.       Follow-up:  Pt to schedule    Yusra Montano MD

## 2024-10-22 ENCOUNTER — PATIENT MESSAGE (OUTPATIENT)
Dept: PAIN MEDICINE | Facility: CLINIC | Age: 69
End: 2024-10-22
Payer: MEDICARE

## 2024-10-22 DIAGNOSIS — M47.817 SPONDYLOSIS OF LUMBOSACRAL REGION WITHOUT MYELOPATHY OR RADICULOPATHY: Primary | ICD-10-CM

## 2024-10-22 DIAGNOSIS — M47.816 LUMBAR SPONDYLOSIS: ICD-10-CM

## 2024-10-22 NOTE — TELEPHONE ENCOUNTER
B MBB L4-5 and L5-S1 - states he will talk to his back surgeon first prior to doing another injection, pt to reach out if he wants to schedule          Ok to schedule?

## 2024-10-29 ENCOUNTER — PATIENT MESSAGE (OUTPATIENT)
Dept: PAIN MEDICINE | Facility: CLINIC | Age: 69
End: 2024-10-29
Payer: MEDICARE

## 2024-10-29 ENCOUNTER — PATIENT MESSAGE (OUTPATIENT)
Dept: ENDOCRINOLOGY | Facility: CLINIC | Age: 69
End: 2024-10-29
Payer: MEDICARE

## 2024-10-30 ENCOUNTER — PATIENT MESSAGE (OUTPATIENT)
Dept: PAIN MEDICINE | Facility: CLINIC | Age: 69
End: 2024-10-30
Payer: MEDICARE

## 2024-11-06 ENCOUNTER — TELEPHONE (OUTPATIENT)
Dept: SURGERY | Facility: HOSPITAL | Age: 69
End: 2024-11-06
Payer: MEDICARE

## 2024-11-06 NOTE — TELEPHONE ENCOUNTER
Good morning!  Pt is scheduled with Dr. Montano tomorrow for a block and last took Plavix on 11/5.  Just confirming this is not an issue.  Please let pt know if so.  Thanks!

## 2024-11-07 ENCOUNTER — HOSPITAL ENCOUNTER (OUTPATIENT)
Dept: RADIOLOGY | Facility: HOSPITAL | Age: 69
Discharge: HOME | End: 2024-11-07
Attending: STUDENT IN AN ORGANIZED HEALTH CARE EDUCATION/TRAINING PROGRAM | Admitting: STUDENT IN AN ORGANIZED HEALTH CARE EDUCATION/TRAINING PROGRAM
Payer: MEDICARE

## 2024-11-07 ENCOUNTER — HOSPITAL ENCOUNTER (OUTPATIENT)
Facility: HOSPITAL | Age: 69
Discharge: HOME | End: 2024-11-07
Attending: STUDENT IN AN ORGANIZED HEALTH CARE EDUCATION/TRAINING PROGRAM | Admitting: STUDENT IN AN ORGANIZED HEALTH CARE EDUCATION/TRAINING PROGRAM
Payer: MEDICARE

## 2024-11-07 VITALS
BODY MASS INDEX: 25.73 KG/M2 | RESPIRATION RATE: 15 BRPM | WEIGHT: 190 LBS | HEART RATE: 75 BPM | SYSTOLIC BLOOD PRESSURE: 168 MMHG | OXYGEN SATURATION: 99 % | HEIGHT: 72 IN | TEMPERATURE: 98 F | DIASTOLIC BLOOD PRESSURE: 74 MMHG

## 2024-11-07 DIAGNOSIS — M54.50 LOWER BACK PAIN: ICD-10-CM

## 2024-11-07 DIAGNOSIS — M47.816 LUMBAR SPONDYLOSIS: ICD-10-CM

## 2024-11-07 DIAGNOSIS — M47.817 SPONDYLOSIS OF LUMBOSACRAL REGION WITHOUT MYELOPATHY OR RADICULOPATHY: ICD-10-CM

## 2024-11-07 LAB — GLUCOSE SERPL-MCNC: 137 MG/DL (ref 70–110)

## 2024-11-07 PROCEDURE — 64493 INJ PARAVERT F JNT L/S 1 LEV: CPT | Mod: 50,PO | Performed by: STUDENT IN AN ORGANIZED HEALTH CARE EDUCATION/TRAINING PROGRAM

## 2024-11-07 PROCEDURE — 64494 INJ PARAVERT F JNT L/S 2 LEV: CPT | Mod: 50,PO | Performed by: STUDENT IN AN ORGANIZED HEALTH CARE EDUCATION/TRAINING PROGRAM

## 2024-11-07 PROCEDURE — 64494 INJ PARAVERT F JNT L/S 2 LEV: CPT | Mod: 50,,, | Performed by: STUDENT IN AN ORGANIZED HEALTH CARE EDUCATION/TRAINING PROGRAM

## 2024-11-07 PROCEDURE — 82962 GLUCOSE BLOOD TEST: CPT | Mod: PO | Performed by: STUDENT IN AN ORGANIZED HEALTH CARE EDUCATION/TRAINING PROGRAM

## 2024-11-07 PROCEDURE — 64493 INJ PARAVERT F JNT L/S 1 LEV: CPT | Mod: 50,,, | Performed by: STUDENT IN AN ORGANIZED HEALTH CARE EDUCATION/TRAINING PROGRAM

## 2024-11-07 NOTE — OP NOTE
Patient: Hammad Douglas III                                                    MRN: [id number]  : 1955                                              Date of procedure: 2024        Pre Procedure Diagnosis: Back pain, Lumbar spondylosis without myelopathy/ lumbosacral region    Post Procedure Diagnosis: Same    Procedure: Bilateral Lumbar Medial Branch Nerve Block for L4-5 and L5-S1 joints under Fluoroscopy     Attending: Yusra Montano MD    Local Anesthetic Injected: 1% Lidocaine 10 ml    Sedation Medications: Versed    Estimated Blood Loss: None    Complication: None        Procedure:  After informed consent was obtained, patient was taken to the fluoroscopy suite and placed in a prone position.  The skin was prepped and draped in the usual sterile fashion using chlorhexidine.  Anatomical landmarks were identified with the aid of fluoroscopy in PA and Oblique views, and the skin and subcutaneous tissue were infiltrated with a total of 10mL of 1% Lidocaine via a 25-gauge 1.5inch needle at each of the intended entry sites.  Subsequently, three 22-gauge 3.5inch needles were advanced with the aid of fluoroscopy such that their tips were located at the respective positions of the superior medial border of the transverse processes with the posterior elements at each level.  Needle placement was confirmed with the aid of fluoroscopy.  After negative aspiration, 0.5mL of 0.25% Bupivicaine was injected at each level.  This was repeated on the other side. There were no complications or paresthesias.   Patient tolerated the procedure well and all needles were removed intact.    Patient was observed in recovery and discharged home with written instruction under supervision in stable condition.

## 2024-11-08 ENCOUNTER — TELEPHONE (OUTPATIENT)
Dept: PAIN MEDICINE | Facility: CLINIC | Age: 69
End: 2024-11-08
Payer: MEDICARE

## 2024-11-11 ENCOUNTER — OFFICE VISIT (OUTPATIENT)
Dept: NEUROLOGY | Facility: CLINIC | Age: 69
End: 2024-11-11
Payer: MEDICARE

## 2024-11-11 DIAGNOSIS — R42 DIZZINESS: ICD-10-CM

## 2024-11-11 DIAGNOSIS — E10.42 DIABETIC POLYNEUROPATHY ASSOCIATED WITH TYPE 1 DIABETES MELLITUS: Primary | ICD-10-CM

## 2024-11-11 PROCEDURE — 3066F NEPHROPATHY DOC TX: CPT | Mod: CPTII,95,, | Performed by: NURSE PRACTITIONER

## 2024-11-11 PROCEDURE — 3044F HG A1C LEVEL LT 7.0%: CPT | Mod: CPTII,95,, | Performed by: NURSE PRACTITIONER

## 2024-11-11 PROCEDURE — 99213 OFFICE O/P EST LOW 20 MIN: CPT | Mod: 95,,, | Performed by: NURSE PRACTITIONER

## 2024-11-11 PROCEDURE — 3060F POS MICROALBUMINURIA REV: CPT | Mod: CPTII,95,, | Performed by: NURSE PRACTITIONER

## 2024-11-11 NOTE — PROGRESS NOTES
NEUROLOGY  Outpatient Visit     [facility]  [address] [facility], [place] [address]  [phone] (office) / [phone] (fax)    Patient Name:  Hammad Douglas III  :  1955  MR #:  [id number]  Acct #:  [id number]    Date of  Visit: 2024    Other Physicians:  Adelaide Bran MD (Primary Care Physician)      CHIEF COMPLAINT: No chief complaint on file.    The patient location is: [place], [place]  The chief complaint leading to consultation is: dizziness    Visit type: audiovisual    Each patient to whom he or she provides medical services by telemedicine is:  (1) informed of the relationship between the physician and patient and the respective role of any other health care provider with respect to management of the patient; and (2) notified that he or she may decline to receive medical services by telemedicine and may withdraw from such care at any time.    Interval history:  24:  Hammad Douglas III is a 69 y.o. R-handed male seen in f/u for dizziness    Medical history is significant for CAD s/p CABG, DM, HLD, HTN    Serologies were unremarkable. He is anemic, but improved compared to priors.     He notes improvement in the dizziness sensation. His BP has not been low at home, 130/60s average. He is making significant efforts to stay better hydrated.     He hasn't had any falls since last visit.     He is sleeping better. Feeling more rested. Chose not to have the sleep study. Did stop using Tylenol PM.     Had nerve block recently, no benefit. Main ongoing issue continues to be his back pain.     HISTORY OF PRESENT ILLNESS 24:  Hammad Douglas III is a 69 y.o. R-handed male seen in consultation for dizziness per No ref. provider found    Medical history is significant for CAD s/p CABG, DM, HLD, HTN    Here with his wife    He had a fall from a ladder resulting in head and low back trauma in 2023. Fall was unwitnessed. The months prior, was feeling tired. Doesn't recall  the fall, questions if he had LOC that led to fall. Wife found him down on the ground, unresponsive for about 3 minutes. Was able to state his name, answer questions appropriately for EMS. Taken to  for evaluation. Recalls being told normal CT brain.     In subsequent months, found to have hypotension and multivessel CAD. He had CABG in January.     Underwent kyphoplasty for proximal L spine compression fx and now seeing Dr. Montano in pain mgmt for ongoing RLE radiculopathy.     Long standing C spine DDD, worse with the fall, suspected baseline cervical myelopathy s/p decompression April this year with Dr. Caban. He has chronic atrophy in his hands and contracted digits on the R. He denies any numbness in his hands.     When he gets OOB in the AM, takes him a few minutes to get his bearings. Brief sensation of spinning, as well as feeling woozy or lightheaded with position change.   When he lays down in bed flat and closes his eyes, does feel that the room is spinning for a matter of seconds. Then able to normally fall asleep. Not worse with turning on either side.   Also has imbalance when he closes his eyes in the shower.   This began sometime after his fall. Episodic, but occurring daily.     Bilateral hearing aids. Chronic L tinnitus. Unchanged from baseline with the above.     Still feeling very fatigued daily. Has had recent labs, normal results. Up to urinate 2-3 times per night, then back to sleep. Tired when he gets up every AM. Some snoring, mild. Never sleep tested. Taking Tylenol PM to help him get to sleep.     Denies HA. No vision changes. No speech disturbance.     Checks BP daily, not with symptoms above, though. Typically 130-140s/70s. Has PRN midodrine but has not needed to take it for quite some time. Averages about 1 bottle of water per day. Drinks tea, Diet Coke, protein shake in the AM.     Diabetic. Some tingling sensations in his toes at times. Toe amputation in the past.    No gait change or  "falls since. [place]"    Denies ETOH use.     Allergies:  Review of patient's allergies indicates:   Allergen Reactions    Altace [ramipril]      Cough       Current Medications:  Current Outpatient Medications   Medication Sig Dispense Refill    acetaminophen (TYLENOL) 500 MG tablet Take 500 mg by mouth every 6 (six) hours as needed for Pain.      atorvastatin (LIPITOR) 40 MG tablet Take 1 tablet (40 mg total) by mouth once daily. 90 tablet 3    clopidogreL (PLAVIX) 75 mg tablet Take 75 mg by mouth once daily.      co-enzyme Q-10 30 mg capsule Take 1 capsule by mouth once daily.      DEXCOM G6 SENSOR Jenni CHANGE EVERY 10 DAYS 9 each 3    DEXCOM G6 TRANSMITTER Jenni CHANGE EVERY 90 DAYS 1 each 3    FLUoxetine 20 MG capsule TAKE 1 CAPSULE(20 MG) BY MOUTH EVERY DAY 90 capsule 3    insulin lispro (HUMALOG U-100 INSULIN) 100 unit/mL injection USE CONTINUOUSLY IN INSULIN PUMP. MAX TOTAL DAILY DOSE 60 UNITS 20 mL 11    midodrine (PROAMATINE) 5 MG Tab Take 5 mg by mouth daily as needed.      multivitamin capsule Take 1 capsule by mouth once daily.      OMNIPOD 5 G6 INTRO KIT, GEN 5, Crtg Inject 1 Device into the skin continuous. 1 each 0    OMNIPOD 5 G6 PODS, GEN 5, Crtg CHANGE POD EVERY 3 DAYS 10 each 11     Current Facility-Administered Medications   Medication Dose Route Frequency Provider Last Rate Last Admin    glucagon (human recombinant) injection 1 mg  1 mg Intramuscular Once Tania Mckeon NP           Past Medical History:  Past Medical History:   Diagnosis Date    Cataract     Done OU    Chronic kidney disease     Diabetes mellitus type I     Diagnosed at age 19    Diabetic retinopathy     See's Dr. Ledesma    Hyperlipidemia     Hypertension        Past Surgical History:  Past Surgical History:   Procedure Laterality Date    ARTHROPLASTY OF TOE Left 07/14/2022    Procedure: ARTHROPLASTY, TOE;  Surgeon: Caleb Duffy DPM;  Location: [facility] OR;  Service: " Podiatry;  Laterality: Left;  Left Hallux    CABG Xs 4 vessels      CATARACT EXTRACTION W/  INTRAOCULAR LENS IMPLANT Right 09/28/2021    Dr Solis    CATARACT EXTRACTION W/  INTRAOCULAR LENS IMPLANT Left 02/22/2022    Dr Solis    COLONOSCOPY      2011 wnl    COLONOSCOPY N/A 06/29/2023    Procedure: COLONOSCOPY;  Surgeon: Garrett Regalado MD;  Location: [facility];  Service: Endoscopy;  Laterality: N/A;    EPIDURAL STEROID INJECTION INTO LUMBAR SPINE N/A 9/12/2024    Procedure: Injection-steroid-epidural-lumbar l3-4 ( to the right );  Surgeon: Yusra Montano MD;  Location: [facility] OR;  Service: Anesthesiology;  Laterality: N/A;    INJECTION OF ANESTHETIC AGENT AROUND MEDIAL BRANCH NERVES INNERVATING LUMBAR FACET JOINT Bilateral 11/7/2024    Procedure: Block-nerve-medial branch-lumbar l4/5 and l5/s1 MBB;  Surgeon: Yusra Montano MD;  Location: [facility] OR;  Service: Anesthesiology;  Laterality: Bilateral;    PHACOEMULSIFICATION OF CATARACT Right 09/28/2021    Procedure: PHACOEMULSIFICATION, CATARACT;  Surgeon: Vicente Solis Jr., MD;  Location: [facility];  Service: Ophthalmology;  Laterality: Right;  DM/right    PHACOEMULSIFICATION OF CATARACT Left 02/22/2022    Procedure: PHACOEMULSIFICATION, CATARACT;  Surgeon: Vicente Solis Jr., MD;  Location: [facility] OR;  Service: Ophthalmology;  Laterality: Left;  left    SURGICAL REMOVAL OF BONE SPUR Left 10/14/2021    Procedure: EXCISION, BONE SPUR;  Surgeon: Caleb Duffy DPM;  Location: [facility] OR;  Service: Podiatry;  Laterality: Left;    SURGICAL REMOVAL OF BONE SPUR Left 01/17/2023    Procedure: EXCISION, BONE SPUR;  Surgeon: Caleb Duffy DPM;  Location: [facility] OR;  Service: Podiatry;  Laterality: Left;    TOE AMPUTATION Right 07/02/2020    Procedure: AMPUTATION, TOE; 3rd;  Surgeon: Caleb Duffy DPM;  Location: [facility] OR;  Service: Podiatry;  Laterality: Right;    TOE SURGERY Right     right big toe       Family History:  family history includes Breast cancer in his sister;  Cataracts in his father and mother.    Social History:   reports that he has quit smoking. His smoking use included cigars. He has never used smokeless tobacco. He reports current alcohol use. He reports that he does not use drugs.      REVIEW OF SYSTEMS:  As per HPI    PHYSICAL EXAM:  There were no vitals taken for this visit.    General: Well groomed. No acute distress.   Pulmonary: Normal effort and rate.     Neurological exam:  Mental status: Awake and alert.  Oriented to person, place, time and situation. Recent and remote memory appear to be largely intact.  Speech/Language: Fluent and appropriate. No dysarthria or aphasia on conversation.   Cranial nerves (II-XII): Face symmetric.   Motor: No abnormal movements noted. No drift appreciated.   Sensation: ANA  DTR: ANA  Coordination: No obvious tremor  Gait: deferred    DIAGNOSTIC DATA:  I have personally reviewed provider notes, labs and imaging made available to me today.     Imaging:  MRI L spine wo 8/3/24:  Impression:  1. Redemonstrated L1 and L3 compression fracture status post cement augmentation.  Slightly progressed comminution and height loss involving the L3 fracture compared to prior exam 11/30/2023.  Correlate for acuity of symptoms.  2. Similar osseous retropulsion and degenerative changes at the L3 level contributing to severe spinal canal narrowing at L2-L3 as well as moderate left-sided foraminal narrowing.  3. Moderate multifactorial spinal canal narrowing at L3-L4 as well as severe right and mild-to-moderate left foraminal narrowing.       MRI C spine wo 12/28/23:  Impression:  1. There is multilevel degenerative change discussed in detail by level above.  These findings are present in the setting of a spinal canal which appears small on a developmental basis.  At the C6-7 level there is a shallow broad disc protrusion/osteophyte complex which subtly flattens the ventral cord surface.  There is no cord edema or myelomalacia at this or any  other cervical level.  There is some degree of uncovertebral spurring and facet joint arthropathy at multiple levels contributing to foraminal stenosis.  There is also borderline to mild spinal stenosis at several levels but mild-to-moderate spinal stenosis at the C6-7 level.    Cardiac:  TTE 8/2024:  Left ventricle cavity is normal in size.   Normal global wall motion.   Visual EF is 60-65%.   Doppler evidence of grade I (impaired) diastolic dysfunction.   Calculated EF 62%.   Left atrial cavity is mildly dilated.   Mild aortic valve leaflet calcification.   Mild calcification of the aortic valve annulus.   Trileaflet aortic valve with trace regurgitation.   Mild mitral valve leaflet calcification.   Mild calcification of the mitral valve annulus.   Trace mitral regurgitation.   Mild tricuspid valve leaflet calcification.   Mild tricuspid regurgitation.   As assessed from the tricuspid regurgitation jet, the pulmonary artery   systolic pressure is 30 mmHg.   Mild pulmonic regurgitation.     EKG 4/2024:  Normal sinus rhythm incomplete right bundle-branch block first-degree AV block mild repolarization abnormalities borderline EKG     Labs:  Lab Results   Component Value Date    WBC 6.41 09/25/2024    HGB 13.2 (L) 09/25/2024    HCT 40.3 09/25/2024     09/25/2024    MCV 94 09/25/2024    RDW 14.3 09/25/2024     Lab Results   Component Value Date     10/03/2024    K 4.7 10/03/2024     10/03/2024    CO2 25 10/03/2024    BUN 32 (H) 10/03/2024    CREATININE 1.7 (H) 10/03/2024     (H) 10/03/2024    CALCIUM 9.6 10/03/2024    PHOS 3.9 03/13/2015     Lab Results   Component Value Date    PROT 6.8 10/03/2024    ALBUMIN 3.8 10/03/2024    BILITOT 0.4 10/03/2024    AST 24 10/03/2024    ALKPHOS 93 10/03/2024    ALT 21 10/03/2024     Lab Results   Component Value Date    INR 1.0 06/24/2020     Lab Results   Component Value Date    CHOL 157 06/19/2024    HDL 43 06/19/2024    LDLCALC 95.8 06/19/2024    TRIG  91 06/19/2024    CHOLHDL 27.4 06/19/2024     Lab Results   Component Value Date    HGBA1C 6.8 (H) 10/03/2024      Lab Results   Component Value Date    MMUPMDUP45 851 09/25/2024     Lab Results   Component Value Date    FOLATE 15.3 09/25/2024     Lab Results   Component Value Date    TSH 2.227 06/19/2024       Component      Latest Ref Rng 9/25/2024   Thiamine      38 - 122 ug/L 92    RPR      Non-reactive  Non-reactive    Methlymalonic Acid      <0.40 umol/L 0.27    Ferritin      20.0 - 300.0 ng/mL 97          ASSESSMENT & PLAN:  Hammad Douglas III is a 69 y.o. R-handed male seen in fu for dizziness.     Problem List Items Addressed This Visit          Neuro    Diabetic polyneuropathy associated with type 1 diabetes mellitus - Primary       Other    Dizziness    Current Assessment & Plan     Improved with better hydration   Labs unremarkable               Follow up: PRN    I spent a total of 20 minutes on the day of the visit.    This includes face to face time with the patient, as well as non-face to face time preparing for and completing the visit (review of prior diagnostic testing and clinical notes, obtaining or reviewing history, documenting clinical information in the EMR, independently interpreting and communicating results to the patient/family and coordinating ongoing care).       I appreciate the opportunity to participate in the care of this patient. Please feel free to contact me with any concerns or questions.       Peyton Saenz, ACN-AG  [facility]  [address] [place] [address]

## 2024-11-13 ENCOUNTER — OFFICE VISIT (OUTPATIENT)
Dept: PAIN MEDICINE | Facility: CLINIC | Age: 69
End: 2024-11-13
Payer: MEDICARE

## 2024-11-13 VITALS — BODY MASS INDEX: 25.74 KG/M2 | WEIGHT: 190.06 LBS | HEIGHT: 72 IN

## 2024-11-13 DIAGNOSIS — M48.00 SPINAL STENOSIS, UNSPECIFIED SPINAL REGION: Primary | ICD-10-CM

## 2024-11-13 PROCEDURE — 99999 PR PBB SHADOW E&M-EST. PATIENT-LVL III: CPT | Mod: PBBFAC,,, | Performed by: STUDENT IN AN ORGANIZED HEALTH CARE EDUCATION/TRAINING PROGRAM

## 2024-11-13 PROCEDURE — 3060F POS MICROALBUMINURIA REV: CPT | Mod: CPTII,S$GLB,, | Performed by: STUDENT IN AN ORGANIZED HEALTH CARE EDUCATION/TRAINING PROGRAM

## 2024-11-13 PROCEDURE — 1160F RVW MEDS BY RX/DR IN RCRD: CPT | Mod: CPTII,S$GLB,, | Performed by: STUDENT IN AN ORGANIZED HEALTH CARE EDUCATION/TRAINING PROGRAM

## 2024-11-13 PROCEDURE — 3044F HG A1C LEVEL LT 7.0%: CPT | Mod: CPTII,S$GLB,, | Performed by: STUDENT IN AN ORGANIZED HEALTH CARE EDUCATION/TRAINING PROGRAM

## 2024-11-13 PROCEDURE — 99213 OFFICE O/P EST LOW 20 MIN: CPT | Mod: S$GLB,,, | Performed by: STUDENT IN AN ORGANIZED HEALTH CARE EDUCATION/TRAINING PROGRAM

## 2024-11-13 PROCEDURE — 1125F AMNT PAIN NOTED PAIN PRSNT: CPT | Mod: CPTII,S$GLB,, | Performed by: STUDENT IN AN ORGANIZED HEALTH CARE EDUCATION/TRAINING PROGRAM

## 2024-11-13 PROCEDURE — 1101F PT FALLS ASSESS-DOCD LE1/YR: CPT | Mod: CPTII,S$GLB,, | Performed by: STUDENT IN AN ORGANIZED HEALTH CARE EDUCATION/TRAINING PROGRAM

## 2024-11-13 PROCEDURE — 3288F FALL RISK ASSESSMENT DOCD: CPT | Mod: CPTII,S$GLB,, | Performed by: STUDENT IN AN ORGANIZED HEALTH CARE EDUCATION/TRAINING PROGRAM

## 2024-11-13 PROCEDURE — 3008F BODY MASS INDEX DOCD: CPT | Mod: CPTII,S$GLB,, | Performed by: STUDENT IN AN ORGANIZED HEALTH CARE EDUCATION/TRAINING PROGRAM

## 2024-11-13 PROCEDURE — 1159F MED LIST DOCD IN RCRD: CPT | Mod: CPTII,S$GLB,, | Performed by: STUDENT IN AN ORGANIZED HEALTH CARE EDUCATION/TRAINING PROGRAM

## 2024-11-13 PROCEDURE — 3066F NEPHROPATHY DOC TX: CPT | Mod: CPTII,S$GLB,, | Performed by: STUDENT IN AN ORGANIZED HEALTH CARE EDUCATION/TRAINING PROGRAM

## 2024-11-13 NOTE — PROGRESS NOTES
[place] - Department    Adelaide Bran MD      First Office Visit: 8/21/24  Today' Date: 11/13/2024  Last Office Visit: 10/16/24    Chief complaint: back pain    HPI: Pt is a pleasant 69 y.o., who presents for evaluation. Referred by Dr. Bran. Pt seen previously for back pain and L leg pain since he fell off a ladder in Nov. He has a hx of L1 and L3 compression fx s/p kyphoplasty. States he has also had ACDF of his C-spine (Dr. Caban).   Pt seen today for f/u from B MBB L4-5 and L5-S1. States the MBB did not help with the back pain at all. States previously CARLOS did help significantly with the shooting pain. Primary pain complaint today is back stiffness which has been improving with PT/rehab. Pt states he would like to hold off on additional procedures and would like to continue PT/rehab for now. No BB changes.        Pain disability index score: 42  Pain score: 6    Relevant Imaging/ Testing:   CT L-spine 11/24  MR L-spine 7/24 -retropulsion on MR L-spine at L3-4 as well as severe narrowing at L2-3  XR C-spine 5/24, 7/24, 10/24    Procedures:   B MBB L4-5 and L5-S1 11/7/24  R TFESI L3-4 9/12/24 - >90% relief of R leg pain     Date of board of pharmacy review:11/13/2024  Date of opioid risk screening/ pain psych: None  Date of opioid agreement and consent: None  Date of urine drug screen: None  Date of random pill count: None     was reviewed today: reviewed,     Prescribed medications: None    See EHR for  PMH, PSH, FH, SH, Medications and Allergy    ROS:  Positive for pain  ROS     PE:  There were no vitals filed for this visit.  General: Pleasant, no distress  HEENT: NC/ AT. PERRLA  CV: Radial pulses intact  Pulm: No distress  Ext: No edema    Physical Exam     Neuromusculoskeletal:  Head: NC, AT. PERRLA. No occipital tenderness  Neck: Intact range of motions, extension, flexion, rotation. Neg Facet loading. Neg Spurling. Min Tenderness.  5/5 Strength, normal tone. Neg Dalton's  Shoulder: Intact  range of motion. Min Bicep groove tenderness. 5/5 Strength  Thoracic: Min tenderness, no step off  Lumbar: Intact range of motion. David Facet loading. Min Tenderness. Neg SL. No pain with flexion. No pain with extension.   Hip: Intact range of motion  SI: Level, Min tenderness  Knee: Intact range of motion  Reflexes: normal Knee  Strength: 5/5 globally   Sensory: Grossly intact   Skin: No bruising, erythema  Gait: Normal      Impression:  Back pain  R leg pain   H/o L1 and L3 compression fx s/p kyphoplasty   Relevant History  BMI 29.19  DMI (HgbA1c 6.3)    Assessment:  Severe spinal stenosis   Lumbar radiculopathy      Plan:  Discussed options  Imaging/ relevant records viewed/ reviewed/ discussed  Imaging results viewed and reviewed (noted above)/ reviewed with patient   reviewed  Cont HEP  Cont PT/rehab   Consider repeat CARLOS (R TFESI L3-4 vs ILESI 2-3) if back and leg pain returns   Consider TPI to lower back for muscle stiffness  Back Dr. Caban if needed      Prescribed medications:  1. None    The impression and plan were discussed and explained in detail. All the questions were answered. Education was provided accordingly.       Follow-up:  Pt to schedule    Yusra Montano MD

## 2024-12-16 ENCOUNTER — PATIENT MESSAGE (OUTPATIENT)
Dept: AUDIOLOGY | Facility: CLINIC | Age: 69
End: 2024-12-16
Payer: MEDICARE

## 2024-12-17 ENCOUNTER — DOCUMENTATION ONLY (OUTPATIENT)
Dept: AUDIOLOGY | Facility: CLINIC | Age: 69
End: 2024-12-17
Payer: MEDICARE

## 2024-12-17 NOTE — PROGRESS NOTES
The patient requested more Phonak Cerushield wax filters for his hearing aids. I put some at the check in desk for him.

## 2024-12-24 DIAGNOSIS — E10.43 TYPE 1 DIABETES MELLITUS WITH DIABETIC AUTONOMIC NEUROPATHY: ICD-10-CM

## 2024-12-27 RX ORDER — BLOOD-GLUCOSE SENSOR
EACH MISCELLANEOUS
Qty: 9 EACH | Refills: 3 | Status: SHIPPED | OUTPATIENT
Start: 2024-12-27

## 2024-12-30 RX ORDER — INSULIN LISPRO 100 [IU]/ML
INJECTION, SOLUTION INTRAVENOUS; SUBCUTANEOUS
Qty: 20 ML | Refills: 11 | Status: SHIPPED | OUTPATIENT
Start: 2024-12-30

## 2025-01-10 ENCOUNTER — PATIENT OUTREACH (OUTPATIENT)
Dept: ADMINISTRATIVE | Facility: HOSPITAL | Age: 70
End: 2025-01-10
Payer: MEDICARE

## 2025-01-10 NOTE — PROGRESS NOTES
2024 ATTESTATION PAYOR REPORT REVIEW    HYPERTENSION BLOOD PRESSURE CONTROL    Uncontrolled BP REPORT  BP Readings from Last 3 Encounters:   11/07/24 (!) 168/74   10/10/24 100/60   09/24/24 (!) 105/58

## 2025-01-13 ENCOUNTER — TELEPHONE (OUTPATIENT)
Dept: PAIN MEDICINE | Facility: CLINIC | Age: 70
End: 2025-01-13
Payer: MEDICARE

## 2025-01-13 NOTE — TELEPHONE ENCOUNTER
From: Mariam Melgar MA   Sent: 1/13/2025  10:46 AM CST   To: Ara Ace MA   Subject: FW: Appointment Request                               ----- Message -----   From: Hammad Douglas III   Sent: 1/13/2025  10:07 AM CST   To: [facility] Clinical Staff   Subject: Appointment Request                                Appointment Request From: Hammad Douglas III      With Provider: Yusra Montano MD [[place] - Pain Management]      Preferred Date Range: Any      Preferred Times: Any Time      Reason for visit: In need of another Epidural shots in lower Back      Comments:   In need of epidural shots in back again.  Dr Caban from [facility] approved          Dr Montano pt  last saw you on 11/2024 can we have an order for CARLOS or TFESI thank you.

## 2025-01-15 ENCOUNTER — TELEPHONE (OUTPATIENT)
Dept: PAIN MEDICINE | Facility: CLINIC | Age: 70
End: 2025-01-15
Payer: MEDICARE

## 2025-01-15 ENCOUNTER — OFFICE VISIT (OUTPATIENT)
Dept: PAIN MEDICINE | Facility: CLINIC | Age: 70
End: 2025-01-15
Payer: MEDICARE

## 2025-01-15 VITALS — BODY MASS INDEX: 25.73 KG/M2 | HEIGHT: 72 IN | WEIGHT: 190 LBS

## 2025-01-15 DIAGNOSIS — M54.16 LUMBAR RADICULITIS: ICD-10-CM

## 2025-01-15 DIAGNOSIS — M54.16 LUMBAR RADICULOPATHY: Primary | ICD-10-CM

## 2025-01-15 PROCEDURE — 1125F AMNT PAIN NOTED PAIN PRSNT: CPT | Mod: CPTII,S$GLB,, | Performed by: STUDENT IN AN ORGANIZED HEALTH CARE EDUCATION/TRAINING PROGRAM

## 2025-01-15 PROCEDURE — 3288F FALL RISK ASSESSMENT DOCD: CPT | Mod: CPTII,S$GLB,, | Performed by: STUDENT IN AN ORGANIZED HEALTH CARE EDUCATION/TRAINING PROGRAM

## 2025-01-15 PROCEDURE — 99999 PR PBB SHADOW E&M-EST. PATIENT-LVL III: CPT | Mod: PBBFAC,,, | Performed by: STUDENT IN AN ORGANIZED HEALTH CARE EDUCATION/TRAINING PROGRAM

## 2025-01-15 PROCEDURE — 1160F RVW MEDS BY RX/DR IN RCRD: CPT | Mod: CPTII,S$GLB,, | Performed by: STUDENT IN AN ORGANIZED HEALTH CARE EDUCATION/TRAINING PROGRAM

## 2025-01-15 PROCEDURE — 1159F MED LIST DOCD IN RCRD: CPT | Mod: CPTII,S$GLB,, | Performed by: STUDENT IN AN ORGANIZED HEALTH CARE EDUCATION/TRAINING PROGRAM

## 2025-01-15 PROCEDURE — 1101F PT FALLS ASSESS-DOCD LE1/YR: CPT | Mod: CPTII,S$GLB,, | Performed by: STUDENT IN AN ORGANIZED HEALTH CARE EDUCATION/TRAINING PROGRAM

## 2025-01-15 PROCEDURE — 99214 OFFICE O/P EST MOD 30 MIN: CPT | Mod: S$GLB,,, | Performed by: STUDENT IN AN ORGANIZED HEALTH CARE EDUCATION/TRAINING PROGRAM

## 2025-01-15 PROCEDURE — 3008F BODY MASS INDEX DOCD: CPT | Mod: CPTII,S$GLB,, | Performed by: STUDENT IN AN ORGANIZED HEALTH CARE EDUCATION/TRAINING PROGRAM

## 2025-01-15 RX ORDER — SODIUM CHLORIDE, SODIUM LACTATE, POTASSIUM CHLORIDE, CALCIUM CHLORIDE 600; 310; 30; 20 MG/100ML; MG/100ML; MG/100ML; MG/100ML
INJECTION, SOLUTION INTRAVENOUS CONTINUOUS
Status: CANCELLED | OUTPATIENT
Start: 2025-01-15

## 2025-01-15 NOTE — PROGRESS NOTES
[place] - Department    Adelaide Bran MD      First Office Visit: 8/21/24  Today' Date: 1/15/2025  Last Office Visit: 10/16/24    Chief complaint: back pain    HPI: Pt is a pleasant 69 y.o., who presents for evaluation. Referred by Dr. Bran. Pt seen previously for back pain and L leg pain since he fell off a ladder in Nov. He has a hx of L1 and L3 compression fx s/p kyphoplasty. States he has also had ACDF of his C-spine (Dr. Caban).   Pt seen today for f/u after seeing Dr. Caban. No surgeries indicated currently. Pt states he had the most relief from previous ILESI L3-4. Pt had >90% relief for over 3 mos. Pt would like to try a repeat CARLOS for leg pain. Continues to endorse having sharp, shooting pain that goes down the front of the R leg to the knees. Of note, pt also endorses having bilateral lower buttock pain. Has been exercising more recently. No BB changes. Is doing PT and HEP.         Pain disability index score: 42  Pain score: 6    Relevant Imaging/ Testing:   CT L-spine 11/24  MR L-spine 7/24 -retropulsion on MR L-spine at L3-4 as well as severe narrowing at L2-3  XR C-spine 5/24, 7/24, 10/24    Procedures:   B MBB L4-5 and L5-S1 11/7/24  ILESI L3-4 9/12/24 - >90% relief of R leg pain     Date of board of pharmacy review:1/15/2025  Date of opioid risk screening/ pain psych: None  Date of opioid agreement and consent: None  Date of urine drug screen: None  Date of random pill count: None     was reviewed today: reviewed,     Prescribed medications: None    See EHR for  PMH, PSH, FH, SH, Medications and Allergy    ROS:  Positive for pain  ROS     PE:  There were no vitals filed for this visit.  General: Pleasant, no distress  HEENT: NC/ AT. PERRLA  CV: Radial pulses intact  Pulm: No distress  Ext: No edema    Physical Exam     Neuromusculoskeletal:  Head: NC, AT. PERRLA. No occipital tenderness  Neck: Intact range of motions, extension, flexion, rotation. Neg Facet loading. Neg Spurling. Min  Tenderness.  5/5 Strength, normal tone. Neg Dalton's  Shoulder: Intact range of motion. Min Bicep groove tenderness. 5/5 Strength  Thoracic: Min tenderness, no step off  Lumbar: Intact range of motion. David Facet loading. Min Tenderness. Neg SL. No pain with flexion. No pain with extension.   Hip: Intact range of motion  SI: Level, Min tenderness  Knee: Intact range of motion  Reflexes: normal Knee  Strength: 5/5 globally   Sensory: Grossly intact   Skin: No bruising, erythema  Gait: Normal      Impression:  Back pain  R leg pain   Bilateral buttock pain  H/o L1 and L3 compression fx s/p kyphoplasty   Relevant History  BMI 29.19  DMI (HgbA1c 6.3)    Assessment:  Severe spinal stenosis   Lumbar radiculopathy  Bilateral ischial bursitis       Plan:  Discussed options  Imaging/ relevant records viewed/ reviewed/ discussed  Imaging results viewed and reviewed (noted above)/ reviewed with patient   reviewed  Cont HEP  Cont PT/rehab   Repeat ILESI L3-4 to R - pt understands risks/benefits including bleeding, infection, damage to surrounding tissue   Re-eval after  Consider B ischial bursa injection   Consider TPI to lower back for muscle stiffness  Consider SCS trial   Back Dr. Caban if needed      Prescribed medications:  1. None    The impression and plan were discussed and explained in detail. All the questions were answered. Education was provided accordingly.     The procedure was explained in detail, along with risks and potential side effects.      Follow-up:  For procedure     Yusra Montano MD

## 2025-01-15 NOTE — TELEPHONE ENCOUNTER
Types of orders made on 01/15/2025: Procedure Request      Order Date:1/15/2025   Ordering User:JESUS YOU [[id number]]   Encounter Provider:Jesus You MD [[id number]]   Authorizing Provider: Jesus You MD [[id number]]   Department:[facility] PAIN [facility]NAGEMENT[[id number]]      Common Order Information   Procedure -> Epidural Injection (specify level) Cmt: ILESI L3-4 (Cov)      Order Specific Information   Order: Procedure Order to Pain Management [Custom: [id number]]  Order #:          [id number]: 1 FUTURE     Priority: Routine  Class: Clinic Performed     Future Order Information       Expires on:01/15/2026            Expected by:01/15/2025                   Associated Diagnoses       M54.16 Lumbar radiculopathy       Facility Name: -> [facility]          Follow-up: -> 2 weeks              Priority: Routine  Class: Clinic Performed     Future Order Information       Expires on:01/15/2026            Expected by:01/15/2025                   Associated Diagnoses       M54.16 Lumbar radiculopathy       Procedure -> Epidural Injection (specify level) Cmt: ILESI L3-4 (Cov)          Facility Name: -> [facility]

## 2025-01-15 NOTE — H&P (VIEW-ONLY)
[facility] - Department    Adelaide Bran MD      First Office Visit: 8/21/24  Today' Date: 1/15/2025  Last Office Visit: 10/16/24    Chief complaint: back pain    HPI: Pt is a pleasant 69 y.o., who presents for evaluation. Referred by Dr. Bran. Pt seen previously for back pain and L leg pain since he fell off a ladder in Nov. He has a hx of L1 and L3 compression fx s/p kyphoplasty. States he has also had ACDF of his C-spine (Dr. Caban).   Pt seen today for f/u after seeing Dr. Caban. No surgeries indicated currently. Pt states he had the most relief from previous ILESI L3-4. Pt had >90% relief for over 3 mos. Pt would like to try a repeat CARLOS for leg pain. Continues to endorse having sharp, shooting pain that goes down the front of the R leg to the knees. Of note, pt also endorses having bilateral lower buttock pain. Has been exercising more recently. No BB changes. Is doing PT and HEP.         Pain disability index score: 42  Pain score: 6    Relevant Imaging/ Testing:   CT L-spine 11/24  MR L-spine 7/24 -retropulsion on MR L-spine at L3-4 as well as severe narrowing at L2-3  XR C-spine 5/24, 7/24, 10/24    Procedures:   B MBB L4-5 and L5-S1 11/7/24  ILESI L3-4 9/12/24 - >90% relief of R leg pain     Date of board of pharmacy review:1/15/2025  Date of opioid risk screening/ pain psych: None  Date of opioid agreement and consent: None  Date of urine drug screen: None  Date of random pill count: None     was reviewed today: reviewed,     Prescribed medications: None    See EHR for  PMH, PSH, FH, SH, Medications and Allergy    ROS:  Positive for pain  ROS     PE:  There were no vitals filed for this visit.  General: Pleasant, no distress  HEENT: NC/ AT. PERRLA  CV: Radial pulses intact  Pulm: No distress  Ext: No edema    Physical Exam     Neuromusculoskeletal:  Head: NC, AT. PERRLA. No occipital tenderness  Neck: Intact range of motions, extension, flexion, rotation. Neg Facet loading. Neg Spurling. Min  Tenderness.  5/5 Strength, normal tone. Neg Dalton's  Shoulder: Intact range of motion. Min Bicep groove tenderness. 5/5 Strength  Thoracic: Min tenderness, no step off  Lumbar: Intact range of motion. David Facet loading. Min Tenderness. Neg SL. No pain with flexion. No pain with extension.   Hip: Intact range of motion  SI: Level, Min tenderness  Knee: Intact range of motion  Reflexes: normal Knee  Strength: 5/5 globally   Sensory: Grossly intact   Skin: No bruising, erythema  Gait: Normal      Impression:  Back pain  R leg pain   Bilateral buttock pain  H/o L1 and L3 compression fx s/p kyphoplasty   Relevant History  BMI 29.19  DMI (HgbA1c 6.3)    Assessment:  Severe spinal stenosis   Lumbar radiculopathy  Bilateral ischial bursitis       Plan:  Discussed options  Imaging/ relevant records viewed/ reviewed/ discussed  Imaging results viewed and reviewed (noted above)/ reviewed with patient   reviewed  Cont HEP  Cont PT/rehab   Repeat ILESI L3-4 to R - pt understands risks/benefits including bleeding, infection, damage to surrounding tissue   Re-eval after  Consider B ischial bursa injection   Consider TPI to lower back for muscle stiffness  Consider SCS trial   Back Dr. Caban if needed      Prescribed medications:  1. None    The impression and plan were discussed and explained in detail. All the questions were answered. Education was provided accordingly.     The procedure was explained in detail, along with risks and potential side effects.      Follow-up:  For procedure     Yusra Montano MD

## 2025-01-26 DIAGNOSIS — E10.43 TYPE 1 DIABETES MELLITUS WITH DIABETIC AUTONOMIC NEUROPATHY: ICD-10-CM

## 2025-01-27 RX ORDER — BLOOD-GLUCOSE TRANSMITTER
EACH MISCELLANEOUS
Qty: 1 EACH | Refills: 3 | Status: SHIPPED | OUTPATIENT
Start: 2025-01-27

## 2025-01-30 ENCOUNTER — HOSPITAL ENCOUNTER (OUTPATIENT)
Dept: RADIOLOGY | Facility: HOSPITAL | Age: 70
Discharge: HOME | End: 2025-01-30
Attending: STUDENT IN AN ORGANIZED HEALTH CARE EDUCATION/TRAINING PROGRAM | Admitting: STUDENT IN AN ORGANIZED HEALTH CARE EDUCATION/TRAINING PROGRAM
Payer: MEDICARE

## 2025-01-30 ENCOUNTER — HOSPITAL ENCOUNTER (OUTPATIENT)
Facility: HOSPITAL | Age: 70
Discharge: HOME | End: 2025-01-30
Attending: STUDENT IN AN ORGANIZED HEALTH CARE EDUCATION/TRAINING PROGRAM | Admitting: STUDENT IN AN ORGANIZED HEALTH CARE EDUCATION/TRAINING PROGRAM
Payer: MEDICARE

## 2025-01-30 DIAGNOSIS — M54.16 LUMBAR RADICULITIS: ICD-10-CM

## 2025-01-30 DIAGNOSIS — M54.50 LOWER BACK PAIN: ICD-10-CM

## 2025-01-30 DIAGNOSIS — M54.16 LUMBAR RADICULOPATHY: ICD-10-CM

## 2025-01-30 LAB — GLUCOSE SERPL-MCNC: 160 MG/DL (ref 70–110)

## 2025-01-30 PROCEDURE — 25000003 PHARM REV CODE 250: Mod: PO | Performed by: STUDENT IN AN ORGANIZED HEALTH CARE EDUCATION/TRAINING PROGRAM

## 2025-01-30 PROCEDURE — 63600175 PHARM REV CODE 636 W HCPCS: Mod: PO | Performed by: STUDENT IN AN ORGANIZED HEALTH CARE EDUCATION/TRAINING PROGRAM

## 2025-01-30 PROCEDURE — 82962 GLUCOSE BLOOD TEST: CPT | Mod: PO | Performed by: STUDENT IN AN ORGANIZED HEALTH CARE EDUCATION/TRAINING PROGRAM

## 2025-01-30 PROCEDURE — A4216 STERILE WATER/SALINE, 10 ML: HCPCS | Mod: PO | Performed by: STUDENT IN AN ORGANIZED HEALTH CARE EDUCATION/TRAINING PROGRAM

## 2025-01-30 PROCEDURE — 62323 NJX INTERLAMINAR LMBR/SAC: CPT | Mod: PO | Performed by: STUDENT IN AN ORGANIZED HEALTH CARE EDUCATION/TRAINING PROGRAM

## 2025-01-30 PROCEDURE — 25500020 PHARM REV CODE 255: Mod: PO | Performed by: STUDENT IN AN ORGANIZED HEALTH CARE EDUCATION/TRAINING PROGRAM

## 2025-01-30 PROCEDURE — 62323 NJX INTERLAMINAR LMBR/SAC: CPT | Mod: ,,, | Performed by: STUDENT IN AN ORGANIZED HEALTH CARE EDUCATION/TRAINING PROGRAM

## 2025-01-30 RX ORDER — LIDOCAINE HYDROCHLORIDE 10 MG/ML
INJECTION, SOLUTION EPIDURAL; INFILTRATION; INTRACAUDAL; PERINEURAL
Status: DISCONTINUED | OUTPATIENT
Start: 2025-01-30 | End: 2025-01-30 | Stop reason: HOSPADM

## 2025-01-30 RX ORDER — SODIUM CHLORIDE, SODIUM LACTATE, POTASSIUM CHLORIDE, CALCIUM CHLORIDE 600; 310; 30; 20 MG/100ML; MG/100ML; MG/100ML; MG/100ML
INJECTION, SOLUTION INTRAVENOUS CONTINUOUS
Status: DISCONTINUED | OUTPATIENT
Start: 2025-01-30 | End: 2025-01-30 | Stop reason: HOSPADM

## 2025-01-30 RX ORDER — DEXAMETHASONE SODIUM PHOSPHATE 10 MG/ML
INJECTION, SOLUTION INTRA-ARTICULAR; INTRALESIONAL; INTRAMUSCULAR; INTRAVENOUS; SOFT TISSUE
Status: DISCONTINUED | OUTPATIENT
Start: 2025-01-30 | End: 2025-01-30 | Stop reason: HOSPADM

## 2025-01-30 RX ORDER — SODIUM CHLORIDE 9 MG/ML
INJECTION, SOLUTION INTRAMUSCULAR; INTRAVENOUS; SUBCUTANEOUS
Status: DISCONTINUED | OUTPATIENT
Start: 2025-01-30 | End: 2025-01-30 | Stop reason: HOSPADM

## 2025-01-30 NOTE — PLAN OF CARE
VSS. NAD. Plan of care discussed with patient. Procedure education provided. All questions and concerns answered. Patient verbalizes understanding.

## 2025-01-30 NOTE — OP NOTE
Patient: Hammad Douglas III                                                    MRN: [id number]  : 1955                                              Date of procedure: 2025    Pre Procedure Diagnosis: Lumbar, lumbosacral radiculopathy    Post Procedure Diagnosis: Same    Procedure: Lumbar Epidural Steroid Injection Under Fluoroscopy at L3-4    Attending: Yusra Montano MD    Local Anesthetic Injected: Lidocaine 1% 3 ml    Sedation Medications: None     Estimated Blood Loss: None    Complication: None        Procedure:  After informed consent was obtained, patient was taken to the fluoroscopy suite and placed in a prone position.  The skin was prepped and draped in the usual sterile fashion using chlorhexidine. The skin and subcutaneous tissue overlying the Lumbar vertebral level was infiltrated with 3mLs of 1% Lidocaine using a 25 gauge 1.5 inch needle.  The 20-gauge Tuoehy needle was advanced with the aid of fluoroscopy using the water drop loss of resistance technique at the L3-4 interspinous space using an intralaminer approach.  Proper placement into the epidural space was confirmed by the loss of resistance.  There was no CSF, heme or paresthesias.  After negative aspiration, 0.5mL of contrast was injected.  The contrast confirmed the needle placement by spread delineating the epidural space in multiple views.  Then after negative aspiration, an injectate consisting of 6mLs of 0.5mL 10mg/mL of Dexamethasone, 0.5mL of preservative free lidocaine and 5mL of preservative free normal saline was injected.  Patient tolerated the procedure well and all needles were removed intact.  There were no complications.    Patient was observed in recovery and discharged home under supervision with discharge instructions in stable condition.

## 2025-01-30 NOTE — DISCHARGE SUMMARY
[facility] - Surgery  Discharge Note  Short Stay    Procedure(s) (LRB):  Injection-steroid-epidural-lumbar l3-4 (N/A)      OUTCOME: Patient tolerated treatment/procedure well without complication and is now ready for discharge.    DISPOSITION: Home or Self Care    FINAL DIAGNOSIS:  <principal problem not specified>    FOLLOWUP: In clinic    DISCHARGE INSTRUCTIONS:    Discharge Procedure Orders   Notify your health care provider if you experience any of the following:  temperature >100.4     Notify your health care provider if you experience any of the following:  severe uncontrolled pain     Notify your health care provider if you experience any of the following:  redness, tenderness, or signs of infection (pain, swelling, redness, odor or green/yellow discharge around incision site)     Activity as tolerated        TIME SPENT ON DISCHARGE: 20 minutes

## 2025-01-31 VITALS
DIASTOLIC BLOOD PRESSURE: 75 MMHG | TEMPERATURE: 98 F | HEART RATE: 75 BPM | OXYGEN SATURATION: 99 % | RESPIRATION RATE: 16 BRPM | SYSTOLIC BLOOD PRESSURE: 168 MMHG | BODY MASS INDEX: 25.73 KG/M2 | HEIGHT: 72 IN | WEIGHT: 190 LBS

## 2025-02-10 ENCOUNTER — PATIENT MESSAGE (OUTPATIENT)
Dept: PAIN MEDICINE | Facility: CLINIC | Age: 70
End: 2025-02-10
Payer: MEDICARE

## 2025-02-12 ENCOUNTER — LAB VISIT (OUTPATIENT)
Dept: LAB | Facility: HOSPITAL | Age: 70
End: 2025-02-12
Attending: NURSE PRACTITIONER
Payer: MEDICARE

## 2025-02-12 DIAGNOSIS — E10.43 TYPE 1 DIABETES MELLITUS WITH DIABETIC AUTONOMIC NEUROPATHY: ICD-10-CM

## 2025-02-12 LAB
ANION GAP SERPL CALC-SCNC: 8 MMOL/L (ref 8–16)
BUN SERPL-MCNC: 38 MG/DL (ref 8–23)
CALCIUM SERPL-MCNC: 9.2 MG/DL (ref 8.7–10.5)
CHLORIDE SERPL-SCNC: 104 MMOL/L (ref 95–110)
CO2 SERPL-SCNC: 25 MMOL/L (ref 23–29)
CREAT SERPL-MCNC: 1.5 MG/DL (ref 0.5–1.4)
EST. GFR  (NO RACE VARIABLE): 50.7 ML/MIN/1.73 M^2
ESTIMATED AVG GLUCOSE: 146 MG/DL (ref 68–131)
GLUCOSE SERPL-MCNC: 136 MG/DL (ref 70–110)
HBA1C MFR BLD: 6.7 % (ref 4–5.6)
POTASSIUM SERPL-SCNC: 4.3 MMOL/L (ref 3.5–5.1)
SODIUM SERPL-SCNC: 137 MMOL/L (ref 136–145)

## 2025-02-12 PROCEDURE — 83036 HEMOGLOBIN GLYCOSYLATED A1C: CPT | Performed by: NURSE PRACTITIONER

## 2025-02-12 PROCEDURE — 80048 BASIC METABOLIC PNL TOTAL CA: CPT | Performed by: NURSE PRACTITIONER

## 2025-02-13 ENCOUNTER — PATIENT MESSAGE (OUTPATIENT)
Dept: ENDOCRINOLOGY | Facility: CLINIC | Age: 70
End: 2025-02-13
Payer: MEDICARE

## 2025-02-13 DIAGNOSIS — E10.43 TYPE 1 DIABETES MELLITUS WITH DIABETIC AUTONOMIC NEUROPATHY: Primary | ICD-10-CM

## 2025-02-14 RX ORDER — INSULIN PMP CART,AUT,G6/7,CNTR
1 EACH SUBCUTANEOUS CONTINUOUS
Qty: 1 EACH | Refills: 0 | Status: SHIPPED | OUTPATIENT
Start: 2025-02-14

## 2025-02-19 ENCOUNTER — OFFICE VISIT (OUTPATIENT)
Dept: ENDOCRINOLOGY | Facility: CLINIC | Age: 70
End: 2025-02-19
Payer: MEDICARE

## 2025-02-19 VITALS
DIASTOLIC BLOOD PRESSURE: 60 MMHG | BODY MASS INDEX: 28.59 KG/M2 | SYSTOLIC BLOOD PRESSURE: 110 MMHG | WEIGHT: 211.06 LBS | HEIGHT: 72 IN | HEART RATE: 74 BPM

## 2025-02-19 DIAGNOSIS — E10.3593 TYPE 1 DIABETES MELLITUS WITH PROLIFERATIVE RETINOPATHY OF BOTH EYES WITHOUT MACULAR EDEMA: ICD-10-CM

## 2025-02-19 DIAGNOSIS — E10.43 TYPE 1 DIABETES MELLITUS WITH DIABETIC AUTONOMIC NEUROPATHY: Primary | ICD-10-CM

## 2025-02-19 DIAGNOSIS — E10.29 TYPE 1 DIABETES MELLITUS WITH DIABETIC MICROALBUMINURIA: ICD-10-CM

## 2025-02-19 DIAGNOSIS — E78.5 HYPERLIPIDEMIA, UNSPECIFIED HYPERLIPIDEMIA TYPE: ICD-10-CM

## 2025-02-19 DIAGNOSIS — E10.649 HYPOGLYCEMIA UNAWARENESS IN TYPE 1 DIABETES MELLITUS: ICD-10-CM

## 2025-02-19 DIAGNOSIS — I25.10 CORONARY ARTERY DISEASE INVOLVING NATIVE CORONARY ARTERY OF NATIVE HEART WITHOUT ANGINA PECTORIS: ICD-10-CM

## 2025-02-19 DIAGNOSIS — R80.9 TYPE 1 DIABETES MELLITUS WITH DIABETIC MICROALBUMINURIA: ICD-10-CM

## 2025-02-19 DIAGNOSIS — Z46.81 INSULIN PUMP TITRATION: ICD-10-CM

## 2025-02-19 PROCEDURE — 99999 PR PBB SHADOW E&M-EST. PATIENT-LVL IV: CPT | Mod: PBBFAC,,, | Performed by: NURSE PRACTITIONER

## 2025-02-19 NOTE — PROGRESS NOTES
CC: Mr. Hammad Douglas III arrives today for management of Type 1  DM and review of chronic medical conditions, as listed in the visit diagnosis section of this encounter.     HPI: Mr. Hammad Douglas III was diagnosed with Type 1  DM at age 19 after a viral illness - had polyuria, polydipsia at this time.  Initial treatment consisted of insulin. Denies FH of DM. Reports past hospitalizations due to DKA > 30 years ago.   Has diagnosis of hypoglycemia unawareness with past severe hypoglycemic episodes requiring 3rd party assistance. Began using Dexcom G5 in 2017 and later up graded to G6. He began OmniPod 5 in November 2022.   He follows with Dr. Stern for CAD and is s/p CABG x 4 in January 2024.     Patient was last seen by me in October. Breakfast I:C ratio was adjusted.     He does admit to sometimes eating close together or going back for seconds.    BG monitoring per Dexcom G6.     Hypoglycemia: occasional after meal bolus. He feels this is related to his carb count.   Hypoglycemic Symptoms: none  Hypoglycemia Treatment: Juice or glucose tabs.     Exercise: goes to [place] once/week.    Dietary Habits: Eats 2 meals/day. Has been skipping breakfast.  Rare snacking. Avoids sugary beverages.     Last DM education appointment: 12/8/2022      CURRENT DIABETIC MEDS:  Humalog in OmniPod 5    Insulin back up plan: Toujeo Max 22 units QHS, Novolog I:C ratio 1:6    Previous insulins:  Lantus    When did you start the current therapy you are on: 11/2022  Frequency of changing site/infusion set/tubing/cartridges: 3 days  Frequency of changing CGM: 10 days  Using bolus wizard: yes  Taking bolus with each meal: yes      INSULIN PUMP SETTINGS:        Last Eye Exam: 9/2024 + proliferative DR. Dr. Lugo  Last Podiatry Exam: 2/2023, Past amputation of R third toe. S/P arthroplasty of the Lt. Hallux IP joint in July 2022.    REVIEW OF SYSTEMS  Constitutional: no c/o fatigue, weakness, weight loss. + 15# weight gain  Cardiac: no  palpitations, chest pain   Respiratory: no cough or dyspnea.   GI:  no c/o abdominal pain or nausea.   Skin: no rashes, lesions.    Musculoskeletal: + lower back pain. Following with Pain Management. Had recent epidural.   Neuro: no numbness, tingling, or parasthesias.  Endocrine: denies polyphagia, polydipsia, polyuria.    Personally reviewed Past Medical, Surgical, Social History.    Vital Signs  /60   Pulse 74   Ht 6' (1.829 m)   Wt 95.8 kg (211 lb 1.5 oz)   BMI 28.63 kg/m²      Personally reviewed the below labs:    Hemoglobin A1C   Date Value Ref Range Status   02/12/2025 6.7 (H) 4.0 - 5.6 % Final     Comment:     ADA Screening Guidelines:  5.7-6.4%  Consistent with prediabetes  >or=6.5%  Consistent with diabetes    High levels of fetal hemoglobin interfere with the HbA1C  assay. Heterozygous hemoglobin variants (HbS, HgC, etc)do  not significantly interfere with this assay.   However, presence of multiple variants may affect accuracy.     10/03/2024 6.8 (H) 4.0 - 5.6 % Final     Comment:     ADA Screening Guidelines:  5.7-6.4%  Consistent with prediabetes  >or=6.5%  Consistent with diabetes    High levels of fetal hemoglobin interfere with the HbA1C  assay. Heterozygous hemoglobin variants (HbS, HgC, etc)do  not significantly interfere with this assay.   However, presence of multiple variants may affect accuracy.     06/19/2024 6.8 (H) 4.0 - 5.6 % Final     Comment:     ADA Screening Guidelines:  5.7-6.4%  Consistent with prediabetes  >or=6.5%  Consistent with diabetes    High levels of fetal hemoglobin interfere with the HbA1C  assay. Heterozygous hemoglobin variants (HbS, HgC, etc)do  not significantly interfere with this assay.   However, presence of multiple variants may affect accuracy.         Chemistry        Component Value Date/Time     02/12/2025 0923    K 4.3 02/12/2025 0923     02/12/2025 0923    CO2 25 02/12/2025 0923    BUN 38 (H) 02/12/2025 0923    CREATININE 1.5 (H)  02/12/2025 0923     (H) 02/12/2025 0923        Component Value Date/Time    CALCIUM 9.2 02/12/2025 0923    ALKPHOS 93 10/03/2024 0959    AST 21 01/02/2025 1414    AST 24 10/03/2024 0959    ALT 22 01/02/2025 1414    ALT 21 10/03/2024 0959    BILITOT 0.5 01/02/2025 1414    BILITOT 0.4 10/03/2024 0959    ESTGFRAFRICA >60.0 06/22/2022 0848    EGFRNONAA 57.3 (A) 06/22/2022 0848          Lab Results   Component Value Date    CHOL 179 01/02/2025    CHOL 153 12/27/2024    CHOL 157 06/19/2024     Lab Results   Component Value Date    HDL 54 01/02/2025    HDL 56 12/27/2024    HDL 43 06/19/2024     Lab Results   Component Value Date    LDLCALC 110 01/02/2025    LDLCALC 87 12/27/2024    LDLCALC 95.8 06/19/2024     Lab Results   Component Value Date    TRIG 74 01/02/2025    TRIG 48 12/27/2024    TRIG 91 06/19/2024     Lab Results   Component Value Date    CHOLHDL 3.31 01/02/2025    CHOLHDL 2.73 12/27/2024    CHOLHDL 27.4 06/19/2024       Lab Results   Component Value Date    MICALBCREAT 254.7 (H) 06/19/2024     Lab Results   Component Value Date    TSH 2.227 06/19/2024       CrCl cannot be calculated (Patient's most recent lab result is older than the maximum 7 days allowed.).    Vit D, 25-Hydroxy   Date Value Ref Range Status   08/06/2018 35 30 - 96 ng/mL Final     Comment:     Vitamin D deficiency.........<10 ng/mL                              Vitamin D insufficiency......10-29 ng/mL       Vitamin D sufficiency........> or equal to 30 ng/mL  Vitamin D toxicity............>100 ng/mL         PHYSICAL EXAMINATION  Constitutional: Appears well, no distress  Respiratory: CTA, even and unlabored.   Cardiovascular: RRR, no murmurs.  GI: active bowel sounds, no hernia  Skin: warm and dry  Neuro: oriented to person, place, time.  Feet: appropriate footwear.         DEXCOM DOWNLOAD: Fasting glucoses are reasonable. Some prandial excursions noted, mostly in afternoon. Additional meal boluses are often administered after excursion  has begun. Occasional hypoglycemia after meal bolus but this is inconsistent.               Goals        HEMOGLOBIN A1C < 7.5           due to hypoglycemia unawareness      Assessment/Plan  1. Type 1 diabetes mellitus with diabetic autonomic neuropathy  -- A1c stable. Will adjust I:C ratio w/lunch, which is his biggest meal.  -- change I:C ratios:  0000 - 1:6  0700 - 1:5  1000 - 1:6  1800 - 1:6  -- BG monitoring per Dexcom G6. He is not ready to advance to the G7.    -- Discussed diagnosis of DM, A1c goals, progression of disease, long term complications and tx options.    -- Reviewed hypoglycemia management: treat with 1/2 glass of juice, 1/2 can regular coke, or 4 glucose tablets. Monitor and repeat treatment every 15 minutes until BG is >70 Then have a snack, which includes a complex carbohydrate and protein.  Advised patient to check BG before activities, such as driving or exercise.   2. Type 1 diabetes mellitus with diabetic microalbuminuria  -- uncontrolled, off of losartan, due to hypotension.  -- maintain DM control   3. Proliferative diabetic retinopathy without macular edema associated with type 1 diabetes mellitus, unspecified laterality  -- stable  -- keep follow ups   4. Coronary artery disease involving native coronary artery of native heart without angina pectoris  -- optimize DM control   5. Hyperlipidemia, unspecified hyperlipidemia type  -- controlled  -- continue atorvastatin   6. Hypoglycemia unawareness in Type 1 DM -- continue Dexcom.   -- has glucagon   7. Insulin pump titration  -- download reviewed and settings changed.         FOLLOW UP   Follow up in about 4 months (around 6/19/2025).   Patient instructed to bring BG logs to each follow up   Patient encouraged to call for any BG/medication issues, concerns, or questions.     Orders Placed This Encounter   Procedures    Hemoglobin A1C    TSH    Microalbumin/Creatinine Ratio, Urine    Comprehensive Metabolic Panel

## 2025-02-19 NOTE — PATIENT INSTRUCTIONS
Basal rates:  0.9 units/hour    Insulin to carb ratios:  Midnight - 6 grams  7 AM - 5 grams  10 AM - 6 grams  6 PM - 6 grams    Correction factor: 50    Duration of insulin: 4 hours    Target Glucose: 130  Correct Glucose: 130

## 2025-02-24 ENCOUNTER — ON-DEMAND VIRTUAL (OUTPATIENT)
Dept: URGENT CARE | Facility: CLINIC | Age: 70
End: 2025-02-24
Payer: MEDICARE

## 2025-02-24 DIAGNOSIS — R05.9 COUGH, UNSPECIFIED TYPE: Primary | ICD-10-CM

## 2025-02-24 DIAGNOSIS — R09.81 NASAL CONGESTION: ICD-10-CM

## 2025-02-24 PROCEDURE — 98001 SYNCH AUDIO-VIDEO NEW LOW 30: CPT | Mod: 95,,, | Performed by: NURSE PRACTITIONER

## 2025-02-24 RX ORDER — PROMETHAZINE HYDROCHLORIDE AND DEXTROMETHORPHAN HYDROBROMIDE 6.25; 15 MG/5ML; MG/5ML
5 SYRUP ORAL NIGHTLY PRN
Qty: 35 ML | Refills: 0 | Status: SHIPPED | OUTPATIENT
Start: 2025-02-24 | End: 2025-03-03

## 2025-02-24 RX ORDER — AZELASTINE 1 MG/ML
2 SPRAY, METERED NASAL 2 TIMES DAILY
Qty: 30 ML | Refills: 0 | Status: SHIPPED | OUTPATIENT
Start: 2025-02-24 | End: 2026-02-24

## 2025-02-24 RX ORDER — BENZONATATE 100 MG/1
100 CAPSULE ORAL 3 TIMES DAILY PRN
Qty: 60 CAPSULE | Refills: 0 | Status: SHIPPED | OUTPATIENT
Start: 2025-02-24 | End: 2025-03-16

## 2025-02-24 NOTE — PROGRESS NOTES
Subjective:      Patient ID: Hammad Douglas III is a 69 y.o. male.    Vitals:  vitals were not taken for this visit.     Chief Complaint: Cough (Chest congestion)      Visit Type: TELE AUDIOVISUAL    Patient Location: Home     Present with the patient at the time of consultation: TELEMED PRESENT WITH PATIENT: None    Past Medical History:   Diagnosis Date    Cataract     Done OU    Chronic kidney disease     Diabetes mellitus type I     Diagnosed at age 19    Diabetic retinopathy     See's Dr. eLdesma    Hyperlipidemia     Hypertension      Past Surgical History:   Procedure Laterality Date    ARTHROPLASTY OF TOE Left 07/14/2022    Procedure: ARTHROPLASTY, TOE;  Surgeon: Caleb Duffy DPM;  Location: [facility] OR;  Service: Podiatry;  Laterality: Left;  Left Hallux    CABG Xs 4 vessels      CATARACT EXTRACTION W/  INTRAOCULAR LENS IMPLANT Right 09/28/2021    Dr Solis    CATARACT EXTRACTION W/  INTRAOCULAR LENS IMPLANT Left 02/22/2022    Dr Solis    COLONOSCOPY      2011 wnl    COLONOSCOPY N/A 06/29/2023    Procedure: COLONOSCOPY;  Surgeon: Garrett Regalado MD;  Location: [facility];  Service: Endoscopy;  Laterality: N/A;    EPIDURAL STEROID INJECTION INTO LUMBAR SPINE N/A 9/12/2024    Procedure: Injection-steroid-epidural-lumbar l3-4 ( to the right );  Surgeon: Yusra Montano MD;  Location: [facility] OR;  Service: Anesthesiology;  Laterality: N/A;    EPIDURAL STEROID INJECTION INTO LUMBAR SPINE N/A 1/30/2025    Procedure: Injection-steroid-epidural-lumbar l3-4;  Surgeon: Yusra Montano MD;  Location: [facility] OR;  Service: Pain Management;  Laterality: N/A;    INJECTION OF ANESTHETIC AGENT AROUND MEDIAL BRANCH NERVES INNERVATING LUMBAR FACET JOINT Bilateral 11/7/2024    Procedure: Block-nerve-medial branch-lumbar l4/5 and l5/s1 MBB;  Surgeon: Yusra Montano MD;  Location: [facility] OR;  Service: Anesthesiology;  Laterality: Bilateral;    PHACOEMULSIFICATION OF CATARACT Right 09/28/2021    Procedure: PHACOEMULSIFICATION, CATARACT;   Surgeon: Vicente Solis Jr., MD;  Location: [facility] OR;  Service: Ophthalmology;  Laterality: Right;  DM/right    PHACOEMULSIFICATION OF CATARACT Left 2022    Procedure: PHACOEMULSIFICATION, CATARACT;  Surgeon: Vicente Solis Jr., MD;  Location: [facility] OR;  Service: Ophthalmology;  Laterality: Left;  left    SURGICAL REMOVAL OF BONE SPUR Left 10/14/2021    Procedure: EXCISION, BONE SPUR;  Surgeon: Caleb Duffy DPM;  Location: [facility] OR;  Service: Podiatry;  Laterality: Left;    SURGICAL REMOVAL OF BONE SPUR Left 2023    Procedure: EXCISION, BONE SPUR;  Surgeon: Caleb Duffy DPM;  Location: [facility] OR;  Service: Podiatry;  Laterality: Left;    TOE AMPUTATION Right 2020    Procedure: AMPUTATION, TOE; 3rd;  Surgeon: Caleb Duffy DPM;  Location: [facility] OR;  Service: Podiatry;  Laterality: Right;    TOE SURGERY Right     right big toe     Review of patient's allergies indicates:   Allergen Reactions    Altace [ramipril]      Cough     Medications Ordered Prior to Encounter[1]  Family History   Problem Relation Name Age of Onset    Cataracts Mother      Cataracts Father      Breast cancer Sister      Glaucoma Neg Hx      Macular degeneration Neg Hx      Retinal detachment Neg Hx         Medications Ordered                [id number] [address] [place] [id number]    Telephone: [phone]   Fax: [phone]   Hours: Not open 24 hours                         E-Prescribed (3 of 3)              azelastine (ASTELIN) 137 mcg (0.1 %) nasal spray    Si sprays (274 mcg total) by Nasal route 2 (two) times daily.       Start: 25     Quantity: 30 mL Refills: 0                         benzonatate (TESSALON) 100 MG capsule    Sig: Take 1 capsule (100 mg total) by mouth 3 (three) times daily as needed for Cough. May take 1-2 caps 3 times daily as needed.       Start: 25     Quantity: 60 capsule Refills: 0                          promethazine-dextromethorphan (PROMETHAZINE-DM) 6.25-15 mg/5 mL Syrp    Sig: Take 5 mLs by mouth nightly as needed (cough).       Start: 2/24/25     Quantity: 35 mL Refills: 0                           Ohs Peq Odvv Intake    2/24/2025 10:49 AM CST - Filed by Patient   What is your current physical address in the event of a medical emergency? [id number] [address]   Are you able to take your vital signs? Yes   Systolic Blood Pressure: 124   Diastolic Blood Pressure: 72   Weight: 200   Height: 73   Pulse: 84   Temperature: 97.6   Respiration rate:    Pulse Oxygen:    Please attach any relevant images or files    Is your employer contracted with [facility]? No         Sinus symptoms for a couple of days. +Congestion and cough. Green mucous. Concerned for possible infection. No known sick contacts. No testing done. Taking OTC meds with little relief.     Cough  Pertinent negatives include no chills, fever, sore throat, shortness of breath or wheezing.       Constitution: Negative for chills and fever.   HENT:  Positive for congestion. Negative for sore throat.    Respiratory:  Positive for cough. Negative for shortness of breath and wheezing.    Gastrointestinal:  Negative for abdominal pain.        Objective:   The physical exam was conducted virtually.  Physical Exam   Constitutional: He is oriented to person, place, and time. He does not appear ill. No distress.   HENT:   Head: Normocephalic and atraumatic.   Nose: Nose normal.   Eyes: Extraocular movement intact   Pulmonary/Chest: Effort normal.   Abdominal: Normal appearance.   Musculoskeletal: Normal range of motion.         General: Normal range of motion.   Neurological: no focal deficit. He is alert and oriented to person, place, and time.   Psychiatric: His behavior is normal. Mood normal.   Vitals reviewed.      Assessment:     1. Cough, unspecified type    2. Nasal congestion        Plan:   Further testing recommended.    Patient  encouraged to monitor symptoms closely and instructed to follow-up for new or worsening symptoms. Further, in-person, evaluation may be necessary for continued treatment. Please follow up with your primary care doctor or specialist as needed. Verbally discussed plan. Patient confirms understanding and is in agreement with treatment and plan.     You must understand that you've received a Virtual Care evaluation only and that you may be released before all your medical problems are known or treated. You, the patient, will arrange for follow up care as instructed.    Cough, unspecified type  -     benzonatate (TESSALON) 100 MG capsule; Take 1 capsule (100 mg total) by mouth 3 (three) times daily as needed for Cough. May take 1-2 caps 3 times daily as needed.  Dispense: 60 capsule; Refill: 0  -     promethazine-dextromethorphan (PROMETHAZINE-DM) 6.25-15 mg/5 mL Syrp; Take 5 mLs by mouth nightly as needed (cough).  Dispense: 35 mL; Refill: 0    Nasal congestion  -     azelastine (ASTELIN) 137 mcg (0.1 %) nasal spray; 2 sprays (274 mcg total) by Nasal route 2 (two) times daily.  Dispense: 30 mL; Refill: 0        Patient Instructions   OVER THE COUNTER RECOMMENDATIONS/SUGGESTIONS (IF NO CONTRAINDICATIONS).     ·         Make sure to stay well hydrated.     ·         Use Nasal Saline to mechanically move any post nasal drip from your eustachian tube or from the back of your throat.     ·         Use warm saltwater gargles to ease your throat pain. Warm saltwater gargles as needed for sore throat-  1/2 tsp salt to 1 cup warm water, gargle as desired. Warm fluids tend to relieve a sore throat.     .         Throat lozenges, Chloraseptic spray or other over the counter treatments are ok to use as well. Use as directed.     ·         Use an antihistamine such as Claritin, Zyrtec or Allegra to dry you out.     ·         Use pseudoephedrine (behind the counter) to decongest. Pseudoephedrine  30 mg up to 240 mg /day. It can raise  your blood pressure and give you palpitations.     ·         Use Mucinex (guaifenesin) to break up mucous up to 2400mg/day to loosen any mucous.     ·         The Mucinex DM pill has a cough suppressant that can be sedating. It can be used at night to stop the tickle at the back of your throat.     ·         You can use Mucinex D (it has guaifenesin and a high dose of pseudoephedrine) in the mornings to help decongest.     ·         Use Afrin (oxymetazoline) in each nare for no longer than 3 days, as it is addictive. It can also dry out your mucous membranes and cause elevated blood pressure. This is especially useful if you are flying.     ·         Use Flonase 1-2 sprays/nostril per day. It is a local acting steroid nasal spray, if you develop a bloody nose, stop using the medication immediately.     ·         Sometimes Nyquil at night is beneficial to help you get some rest, however it is sedating, and it does have an antihistamine, and Tylenol.     ·         Honey is a natural cough suppressant that can be used.     ·         Tylenol up to 4,000 mg a day is safe for short periods and can be used for body aches, pain, and fever. However, in high doses and prolonged use it can cause liver irritation.     ·         Ibuprofen is a non-steroidal anti-inflammatory that can be used for body aches, pain, and fever. However, it can also cause stomach irritation if overused.                           [1]   Current Outpatient Medications on File Prior to Visit   Medication Sig Dispense Refill    acetaminophen (TYLENOL) 500 MG tablet Take 500 mg by mouth every 6 (six) hours as needed for Pain.      atorvastatin (LIPITOR) 40 MG tablet Take 1 tablet (40 mg total) by mouth once daily. 90 tablet 3    blood-glucose sensor (DEXCOM G6 SENSOR) Jenni Change every 10 days. 9 each 3    blood-glucose transmitter (DEXCOM G6 TRANSMITTER) Jenni Change every 90 days 1 each 3    clopidogreL (PLAVIX) 75 mg tablet Take 75 mg by mouth once  daily.      co-enzyme Q-10 30 mg capsule Take 1 capsule by mouth once daily.      FLUoxetine 20 MG capsule TAKE 1 CAPSULE(20 MG) BY MOUTH EVERY DAY (Patient taking differently: Take 20 mg by mouth. Taking 2 tabs) 90 capsule 3    insulin lispro (HUMALOG U-100 INSULIN) 100 unit/mL injection USE CONTINUOUSLY IN INSULIN PUMP MAX DAILY DOSE 60 UNITS 20 mL 11    midodrine (PROAMATINE) 5 MG Tab Take 5 mg by mouth daily as needed. (Patient not taking: Reported on 2/19/2025)      multivitamin capsule Take 1 capsule by mouth once daily.      OMNIPOD 5 G6 INTRO KIT, GEN 5, Crtg Inject 1 Device into the skin continuous. 1 each 0    OMNIPOD 5 G6 PODS, GEN 5, Crtg CHANGE POD EVERY 3 DAYS 10 each 11    OMNIPOD 5 G6-G7 INTRO KT,GEN5, Crtg 1 Device by Misc.(Non-Drug; Combo Route) route continuous. 1 each 0     Current Facility-Administered Medications on File Prior to Visit   Medication Dose Route Frequency Provider Last Rate Last Admin    glucagon (human recombinant) injection 1 mg  1 mg Intramuscular Once Tania Mckeon NP

## 2025-02-27 ENCOUNTER — OFFICE VISIT (OUTPATIENT)
Dept: FAMILY MEDICINE | Facility: CLINIC | Age: 70
End: 2025-02-27
Payer: MEDICARE

## 2025-02-27 VITALS — BODY MASS INDEX: 27.24 KG/M2 | HEIGHT: 73 IN | WEIGHT: 205.56 LBS

## 2025-02-27 DIAGNOSIS — F41.9 ANXIETY AND DEPRESSION: ICD-10-CM

## 2025-02-27 DIAGNOSIS — N18.31 CHRONIC KIDNEY DISEASE, STAGE 3A: ICD-10-CM

## 2025-02-27 DIAGNOSIS — R05.1 ACUTE COUGH: Primary | ICD-10-CM

## 2025-02-27 DIAGNOSIS — F32.A ANXIETY AND DEPRESSION: ICD-10-CM

## 2025-02-27 PROCEDURE — 99999 PR PBB SHADOW E&M-EST. PATIENT-LVL III: CPT | Mod: PBBFAC,,, | Performed by: INTERNAL MEDICINE

## 2025-02-27 RX ORDER — FLUOXETINE HYDROCHLORIDE 40 MG/1
40 CAPSULE ORAL DAILY
Qty: 90 CAPSULE | Refills: 1 | Status: SHIPPED | OUTPATIENT
Start: 2025-02-27 | End: 2025-03-29

## 2025-02-27 NOTE — PROGRESS NOTES
Assessment and Plan:    1. Acute cough (Primary)  Symptoms, examined testing consistent with this being a mild viral upper respiratory infection.  Continue with symptomatic management.  - POCT COVID-19 Rapid Screening  - POCT Influenza A/B Molecular    2. Anxiety and depression  - FLUoxetine 40 MG capsule; Take 1 capsule (40 mg total) by mouth once daily.  Dispense: 90 capsule; Refill: 1    3. Chronic kidney disease, stage 3a  Last Cr 1.5    ______________________________________________________________________  Subjective:    Chief Complaint:  Cough    HPI:  Hammad is a 69 y.o. year old male here for evaluation of cough.     He reports that cough started five days ago.  Has been having nasal congestion, cough, and sneezing.  He was seen three days ago with a virtual urgent care visit and prescribed Astelin, benzonatate, and promethazine DM.  He reports that these medications have been helpful.He has not had fever or chills. He has not had any shortness of breath or wheezing.     He additionally reports that he would like to discuss going back up to 40 mg a day of fluoxetine.  He has had significant increased anxiety since reducing to 20 mg daily.    Medications:  Medications Ordered Prior to Encounter[1]    Review of Systems:  Review of Systems   Constitutional:  Negative for chills and fever.   HENT:  Positive for rhinorrhea and sore throat. Negative for ear pain and postnasal drip.    Respiratory:  Positive for cough. Negative for shortness of breath and wheezing.    Cardiovascular:  Negative for chest pain.   Musculoskeletal:  Negative for myalgias.   Skin:  Negative for rash.   Allergic/Immunologic: Negative for environmental allergies.   Neurological:  Positive for headaches.     Entered by patient and reviewed and updated during visit      Past Medical History:  Past Medical History:   Diagnosis Date    Cataract     Done OU    Chronic kidney disease     Diabetes mellitus type I     Diagnosed at age 19     "Diabetic retinopathy     See's Dr. Ledesma    Hyperlipidemia     Hypertension        Objective:    Vitals:  Vitals:    02/27/25 1608   Weight: 93.2 kg (205 lb 9.3 oz)   Height: 6' 1" (1.854 m)   PainSc: 0-No pain       Physical Exam  Vitals reviewed.   Constitutional:       General: He is not in acute distress.     Appearance: He is well-developed.   Eyes:      Conjunctiva/sclera: Conjunctivae normal.   Cardiovascular:      Rate and Rhythm: Normal rate and regular rhythm.   Pulmonary:      Effort: Pulmonary effort is normal. No respiratory distress.      Breath sounds: Normal breath sounds. No wheezing or rales.   Skin:     General: Skin is warm and dry.   Neurological:      Mental Status: He is alert and oriented to person, place, and time.   Psychiatric:         Mood and Affect: Mood normal.         Behavior: Behavior normal.         Data:  COVID and flu negative    Adelaide Bran MD  Internal Medicine         [1]   Current Outpatient Medications on File Prior to Visit   Medication Sig Dispense Refill    atorvastatin (LIPITOR) 40 MG tablet Take 1 tablet (40 mg total) by mouth once daily. 90 tablet 3    azelastine (ASTELIN) 137 mcg (0.1 %) nasal spray 2 sprays (274 mcg total) by Nasal route 2 (two) times daily. 30 mL 0    benzonatate (TESSALON) 100 MG capsule Take 1 capsule (100 mg total) by mouth 3 (three) times daily as needed for Cough. May take 1-2 caps 3 times daily as needed. 60 capsule 0    insulin lispro (HUMALOG U-100 INSULIN) 100 unit/mL injection USE CONTINUOUSLY IN INSULIN PUMP MAX DAILY DOSE 60 UNITS 20 mL 11    midodrine (PROAMATINE) 5 MG Tab Take 5 mg by mouth daily as needed.      multivitamin capsule Take 1 capsule by mouth once daily.      promethazine-dextromethorphan (PROMETHAZINE-DM) 6.25-15 mg/5 mL Syrp Take 5 mLs by mouth nightly as needed (cough). 35 mL 0    acetaminophen (TYLENOL) 500 MG tablet Take 500 mg by mouth every 6 (six) hours as needed for Pain.      blood-glucose sensor (DEXCOM G6 " SENSOR) Jenni Change every 10 days. 9 each 3    blood-glucose transmitter (DEXCOM G6 TRANSMITTER) Jenni Change every 90 days 1 each 3    clopidogreL (PLAVIX) 75 mg tablet Take 75 mg by mouth once daily.      co-enzyme Q-10 30 mg capsule Take 1 capsule by mouth once daily.      FLUoxetine 20 MG capsule TAKE 1 CAPSULE(20 MG) BY MOUTH EVERY DAY (Patient taking differently: Take 20 mg by mouth. Taking 2 tabs) 90 capsule 3    OMNIPOD 5 G6 INTRO KIT, GEN 5, Crtg Inject 1 Device into the skin continuous. 1 each 0    OMNIPOD 5 G6 PODS, GEN 5, Crtg CHANGE POD EVERY 3 DAYS 10 each 11    OMNIPOD 5 G6-G7 INTRO KT,GEN5, Crtg 1 Device by Misc.(Non-Drug; Combo Route) route continuous. 1 each 0     Current Facility-Administered Medications on File Prior to Visit   Medication Dose Route Frequency Provider Last Rate Last Admin    glucagon (human recombinant) injection 1 mg  1 mg Intramuscular Once Tania Mckeon NP

## 2025-02-28 LAB
CTP QC/QA: YES
CTP QC/QA: YES
POC MOLECULAR INFLUENZA A AGN: NEGATIVE
POC MOLECULAR INFLUENZA B AGN: NEGATIVE
SARS-COV-2 RDRP RESP QL NAA+PROBE: NEGATIVE

## 2025-03-06 ENCOUNTER — OFFICE VISIT (OUTPATIENT)
Dept: PAIN MEDICINE | Facility: CLINIC | Age: 70
End: 2025-03-06
Payer: MEDICARE

## 2025-03-06 ENCOUNTER — TELEPHONE (OUTPATIENT)
Dept: PAIN MEDICINE | Facility: CLINIC | Age: 70
End: 2025-03-06
Payer: MEDICARE

## 2025-03-06 VITALS — HEIGHT: 73 IN | WEIGHT: 205 LBS | BODY MASS INDEX: 27.17 KG/M2

## 2025-03-06 DIAGNOSIS — M47.817 SPONDYLOSIS OF LUMBOSACRAL REGION WITHOUT MYELOPATHY OR RADICULOPATHY: Primary | ICD-10-CM

## 2025-03-06 DIAGNOSIS — M47.816 LUMBAR SPONDYLOSIS: ICD-10-CM

## 2025-03-06 PROCEDURE — 99999 PR PBB SHADOW E&M-EST. PATIENT-LVL III: CPT | Mod: PBBFAC,,, | Performed by: STUDENT IN AN ORGANIZED HEALTH CARE EDUCATION/TRAINING PROGRAM

## 2025-03-06 RX ORDER — SODIUM CHLORIDE, SODIUM LACTATE, POTASSIUM CHLORIDE, CALCIUM CHLORIDE 600; 310; 30; 20 MG/100ML; MG/100ML; MG/100ML; MG/100ML
INJECTION, SOLUTION INTRAVENOUS CONTINUOUS
OUTPATIENT
Start: 2025-03-06

## 2025-03-06 NOTE — TELEPHONE ENCOUNTER
[facility] Hematology/Oncology  PROGRESS NOTE      Subjective:       Patient ID:   NAME: Mo Escobar : 1958     61 y.o. male    Referring Doc:  Jose A/Eleonora Renteria  Other Physicians: Travis Best Pinsky    Chief Complaint:  Clot disorder    History of Present Illness:     Patient returns today for a regularly scheduled follow-up visit.  He still has some occasional hematuria.    No CP,SOB, HA's or N/V. He is here by himself. He is seeing Dr Mcconnell again in near future (2020).  No excessive bleeding or bruising           ROS:   GEN: normal without any fever, night sweats or weight loss  HEENT: normal with no HA's, sore throat, stiff neck, changes in vision  CV: normal with no CP, SOB, PND, HERRERA or orthopnea  PULM: normal with no SOB, cough, hemoptysis, sputum or pleuritic pain  GI: normal with no abdominal pain, nausea, vomiting, constipation, diarrhea, melanotic stools, BRBPR, or hematemesis  : normal with no hematuria, dysuria  BREAST: normal with no mass, discharge, pain  SKIN: normal with no rash, erythema, bruising, or swelling    Allergies:  Review of patient's allergies indicates:  No Known Allergies    Medications:    Current Outpatient Medications:     acetaminophen (TYLENOL) 650 MG TbSR, Take 1,950 mg by mouth every 8 (eight) hours., Disp: , Rfl:     ascorbic acid, vitamin C, (VITAMIN C) 500 MG tablet, Take 1 tablet (500 mg total) by mouth once daily., Disp: , Rfl:     dextroamphetamine-amphetamine (ADDERALL) 30 mg Tab, Take 30 mg by mouth once daily., Disp: , Rfl:     DULoxetine (CYMBALTA) 30 MG capsule, Take 30 mg by mouth once daily., Disp: , Rfl: 3    enoxaparin (LOVENOX) 150 mg/mL Syrg, Inject 130 mg into the skin every 12 (twelve) hours. , Disp: , Rfl:     ergocalciferol, vitamin D2, (VITAMIN D ORAL), Take by mouth., Disp: , Rfl:     ferrous sulfate 325 (65 FE) MG EC tablet, Take 1 tablet (325 mg total) by mouth once daily., Disp: , Rfl: 0    finasteride (PROSCAR) 5 mg  Spoke with pt and scheduled for 03/20 in [place] instructions given    tablet, Take 1 tablet (5 mg total) by mouth once daily., Disp: 30 tablet, Rfl: 2    iron bis-gly/FA/C/B12/Ca/succ (IRON 21/7 ORAL), Take by mouth., Disp: , Rfl:     levothyroxine (SYNTHROID) 112 MCG tablet, Take 112 mcg by mouth once daily., Disp: , Rfl:     lidocaine HCL 2% (XYLOCAINE) 2 % jelly, Apply topically as needed. For barker irritation, Disp: 5 mL, Rfl: 0    oxyCODONE-acetaminophen (PERCOCET) 5-325 mg per tablet, Take 1 tablet by mouth every 6 (six) hours as needed (pain not relieved by otc agents)., Disp: 10 tablet, Rfl: 0  No current facility-administered medications for this visit.     Facility-Administered Medications Ordered in Other Visits:     lidocaine HCl 2% urojet, , Mucous Membrane, Once, Mike Mcconnell MD    PMHx/PSHx Updates:  See patient's last visit with me on  10/28/2019  See H&P on 8/21/2014        Pathology:  Cancer Staging  No matching staging information was found for the patient.          Objective:     Vitals:  Blood pressure (!) 159/72, pulse 60, temperature 98.9 °F (37.2 °C), temperature source Oral, resp. rate 19, weight 130.5 kg (287 lb 11.2 oz).    Physical Examination:   GEN: no apparent distress, comfortable; AAOx3  HEAD: atraumatic and normocephalic  EYES: no pallor, no icterus, PERRLA  ENT: OMM, no pharyngeal erythema, external ears WNL; no nasal discharge; no thrush  NECK: no masses, thyroid normal, trachea midline, no LAD/LN's, supple  CV: RRR with no murmur; normal pulse; normal S1 and S2; no pedal edema  CHEST: Normal respiratory effort; CTAB; normal breath sounds; no wheeze or crackles  ABDOM: nontender and nondistended; soft; normal bowel sounds; no rebound/guarding  MUSC/Skeletal: ROM normal; no crepitus; joints normal; no deformities or arthropathy  EXTREM: no clubbing, cyanosis, inflammation or swelling  SKIN: no rashes, lesions, ulcers, petechiae or subcutaneous nodules  : no barker  NEURO: grossly intact; motor/sensory WNL; AAOx3; no tremors  PSYCH: normal  mood, affect and behavior  LYMPH: normal cervical, supraclavicular, axillary and groin LN's            Labs:       11/21/2019 on chart from [facility]      10/18/2019  Lab Results   Component Value Date    WBC 6.01 10/18/2019    HGB 8.7 (L) 10/18/2019    HCT 27.3 (L) 10/18/2019    MCV 85 10/18/2019     10/18/2019     BMP  Lab Results   Component Value Date     10/18/2019    K 4.0 10/18/2019     10/18/2019    CO2 24 10/18/2019    BUN 6 10/18/2019    CREATININE 0.8 10/18/2019    CALCIUM 8.1 (L) 10/18/2019    ANIONGAP 8 10/18/2019    ESTGFRAFRICA >60 10/18/2019    EGFRNONAA >60 10/18/2019             Radiology/Diagnostic Studies:    Ct Abdomen Pelvis  Without Contrast    Result Date: 9/7/2017  CT Abdomen and Pelvis without contrast Comparison: 06/09/2017 Technique:  Helically acquired axial images of the abdomen and pelvis were acquired without contrast.   Reformatted coronal and sagittal images provided. FINDINGS: The liver, gallbladder, pancreas, spleen, and adrenal glands are unremarkable. There is no evidence for nephrolithiasis or hydroureteronephrosis.  A Cotter catheter is present within the urinary bladder, which appears thickwalled and with hazy surrounding fat stranding.  No visible thrombus is seen within the bladder.  The prostate gland appears enlarged.  A prominent 9 mm lymph node is present to the right atrium the bladder. A 3 mm nodule is present within the right lower lobe.  Moderate degenerative changes present at L5-S1.    1.  Decompressed ureter bladder with Cotter catheter in place without evidence for intraluminal blood clot.  The bladder wall appears thickened and with hazy stranding fat stranding suggesting cystitis. 2.  3 mm right lower lobe nodule.  In the absence of risk factors, no followup indicated.  Otherwise optional chest CT at 12 months may be performed. Electronically signed by: Liang Ceron MD Date:     09/07/17 Time:    16:19       I have reviewed all available lab  results and radiology reports.    Assessment/Plan:   (1) 59 y.o. male with diagnosis of chronic clot disorder with prior bilateral pulmonary emboli  - he has underlying clot disorder with MTHFR-A and MTHFR-C heterozygous condition  - he has been on coumadin long term per direction of Dr Best with cardiology  - INR's on 11/21/2019 was 1.0 - he is on 5mg daily and recheck INR in one week; however,  he has been not taking it intermittently and missed several days     (2) Recurrent hematuria issues after getting repeat TURP with Dr Mcconnell  - previously hospitalized at [facility] with hematuria and was scoped by Dr Mcconnell      - he had been previously hospitalized at [facility] and was previously under the care of Dr Sanchez and is now under the care of Dr Mcconnell with   - prior cyctoscope and TURP with Dr Mcconnell; everything looked good per patient  - minor hematuria residual on occasion  - seen by Dr Eduardo in [place] about his prostate but no surgery was recommended    - he sees Dr Mcconnell again on Jan 3rd 2020     (3) Recurrent clot event with DVT in the right popliteal vein  - he has been on coumadin per direction of Dr Best     (4) Pulmonary HTN hx - followed by Dr Robles with Pulmonary     (5) Hx/of gout issues    (6) Chronic microcytic anemia - resolved - hgb at 12.7  And much better  - he is on oral iron; iron was WNL at 37        1. Acute deep vein thrombosis (DVT) of popliteal vein of right lower extremity     2. Chronic anticoagulation     3. Chronic saddle pulmonary embolism without acute cor pulmonale     4. Acute blood loss anemia     5. Anemia due to chronic blood loss     6. Other iron deficiency anemia     7. Microcytic anemia     8. Heterozygous MTHFR mutation [id number]     9. Heterozygous MTHFR mutation [id number]         PLAN:  1. Check labs with INR next week; basic labs monthly for now  2. Continue coumadin at the 5mg dose; encouraged compliance; check INR again next week  3. Continue ICAR-C  4.  Encouraged compliance  5. Continue coumadin management   6. F/u with     RTC in 3 months  Fax note Jayesh Renteria Hic[place], [place], [place]    Discussion:     I have explained all of the above in detail and the patient understands all of the current recommendation(s). I have answered all of their questions to the best of my ability and to their complete satisfaction.   The patient is to continue with the current management plan.            Electronically signed by Dion Garrido MD

## 2025-03-06 NOTE — TELEPHONE ENCOUNTER
Ordering User:JESUS YOU [[id number]   Encounter Provider:Jesus You MD [[id number]]   Authorizing Provider: Jesus You MD [[id number]]   Department:[facility] PAIN MANAGEMENT[[id number]]      Common Order Information   Procedure -> Medial Branch Block (Specify level and laterality) Cmt: B MBB L2-3             and L3-4 (Cov)      Order Specific Information   Order: Procedure Request Order for Pain Management [Custom: [id number]]  Order #:          [id number]: 1 FUTURE     Priority: Routine  Class: Clinic Performed     Future Order Information       Expires on:03/06/2026            Expected by:03/06/2025                   Associated Diagnoses       M47.817 Spondylosis of lumbosacral region without myelopathy or       radiculopathy       Facility Name: -> [facility]          Follow-up: -> 2 weeks              Priority: Routine  Class: Clinic Performed     Future Order Information       Expires on:03/06/2026            Expected by:03/06/2025                   Associated Diagnoses       M47.817 Spondylosis of lumbosacral region without myelopathy or       radiculopathy       Procedure -> Medial Branch Block (Specify level and laterality) Cmt: B MBB                 L2-3 and L3-4 (Cov)

## 2025-03-06 NOTE — H&P (VIEW-ONLY)
[place] - Department    Adelaide Bran MD      First Office Visit: 8/21/24  Today' Date: 3/6/2025  Last Office Visit: 10/16/24    Chief complaint: back pain    HPI: Pt is a pleasant 69 y.o., who presents for evaluation. Referred by Dr. Bran. Pt seen previously for back pain and L leg pain since he fell off a ladder in Nov. He has a hx of L1 and L3 compression fx s/p kyphoplasty. States he has also had ACDF of his C-spine (Dr. Caban).   Pt seen today for f/u after ILESI L3-4. States the pain going down the leg is much improved. Does continue to have back pain that stays in the mid back. Previously endorse having sharp, shooting pain that goes down the front of the R leg to the knees. Pt continues to have bilateral lower buttock pain. Has been exercising more recently. No BB changes. Is doing PT and HEP.         Pain disability index score: 42  Pain score: 6    Relevant Imaging/ Testing:   CT L-spine 11/24  MR L-spine 7/24 -retropulsion on MR L-spine at L3-4 as well as severe narrowing at L2-3  XR C-spine 5/24, 7/24, 10/24    Procedures:   B MBB L4-5 and L5-S1 11/7/24  ILESI L3-4 9/12/24, 1/30/24     Date of board of pharmacy review:3/6/2025  Date of opioid risk screening/ pain psych: None  Date of opioid agreement and consent: None  Date of urine drug screen: None  Date of random pill count: None     was reviewed today: reviewed,     Prescribed medications: None    See EHR for  PMH, PSH, FH, SH, Medications and Allergy    ROS:  Positive for pain  ROS     PE:  There were no vitals filed for this visit.  General: Pleasant, no distress  HEENT: NC/ AT. PERRLA  CV: Radial pulses intact  Pulm: No distress  Ext: No edema    Physical Exam     Neuromusculoskeletal:  Head: NC, AT. PERRLA. No occipital tenderness  Neck: Intact range of motions, extension, flexion, rotation. Neg Facet loading. Neg Spurling. Min Tenderness.  5/5 Strength, normal tone. Neg Dalton's  Shoulder: Intact range of motion. Min Bicep groove  tenderness. 5/5 Strength  Thoracic: Min tenderness, no step off  Lumbar: Intact range of motion. David Facet loading. Min Tenderness. Neg SL. No pain with flexion. No pain with extension.   Hip: Intact range of motion  SI: Level, Min tenderness  Knee: Intact range of motion  Reflexes: normal Knee  Strength: 5/5 globally   Sensory: Grossly intact   Skin: No bruising, erythema  Gait: Normal      Impression:  Back pain  R leg pain   Bilateral buttock pain  H/o L1 and L3 compression fx s/p kyphoplasty   Relevant History  BMI 29.19  DMI (HgbA1c 6.3)    Assessment:  Axial back pain  Severe spinal stenosis   Lumbar radiculopathy  Bilateral ischial bursitis       Plan:  Discussed options  Imaging/ relevant records viewed/ reviewed/ discussed  Imaging results viewed and reviewed (noted above)/ reviewed with patient   reviewed  Cont HEP  Cont PT/rehab   B MBB L4-5 and L5-S1 - pt understands risks including bleeding, infection, and damage to surrounding tissue   Re-eval after  Plan for repeat MBB and RFA if appropriate  Consider B ischial bursa injection   Consider TPI to lower back for muscle stiffness  Consider SCS trial   Back Dr. Caban if needed      Prescribed medications:  1. None    The impression and plan were discussed and explained in detail. All the questions were answered. Education was provided accordingly.     The procedure was explained in detail, along with risks and potential side effects.      Follow-up:  For procedure     Yusra Montano MD

## 2025-03-06 NOTE — PROGRESS NOTES
[place] - Department    Adelaide Bran MD      First Office Visit: 8/21/24  Today' Date: 3/6/2025  Last Office Visit: 10/16/24    Chief complaint: back pain    HPI: Pt is a pleasant 69 y.o., who presents for evaluation. Referred by Dr. Bran. Pt seen previously for back pain and L leg pain since he fell off a ladder in Nov. He has a hx of L1 and L3 compression fx s/p kyphoplasty. States he has also had ACDF of his C-spine (Dr. Caban).   Pt seen today for f/u after ILESI L3-4. States the pain going down the leg is much improved. Does continue to have back pain that stays in the mid back. Previously endorse having sharp, shooting pain that goes down the front of the R leg to the knees. Pt continues to have bilateral lower buttock pain. Has been exercising more recently. No BB changes. Is doing PT and HEP.         Pain disability index score: 42  Pain score: 6    Relevant Imaging/ Testing:   CT L-spine 11/24  MR L-spine 7/24 -retropulsion on MR L-spine at L3-4 as well as severe narrowing at L2-3  XR C-spine 5/24, 7/24, 10/24    Procedures:   B MBB L4-5 and L5-S1 11/7/24  ILESI L3-4 9/12/24, 1/30/24     Date of board of pharmacy review:3/6/2025  Date of opioid risk screening/ pain psych: None  Date of opioid agreement and consent: None  Date of urine drug screen: None  Date of random pill count: None     was reviewed today: reviewed,     Prescribed medications: None    See EHR for  PMH, PSH, FH, SH, Medications and Allergy    ROS:  Positive for pain  ROS     PE:  There were no vitals filed for this visit.  General: Pleasant, no distress  HEENT: NC/ AT. PERRLA  CV: Radial pulses intact  Pulm: No distress  Ext: No edema    Physical Exam     Neuromusculoskeletal:  Head: NC, AT. PERRLA. No occipital tenderness  Neck: Intact range of motions, extension, flexion, rotation. Neg Facet loading. Neg Spurling. Min Tenderness.  5/5 Strength, normal tone. Neg Dalton's  Shoulder: Intact range of motion. Min Bicep groove  tenderness. 5/5 Strength  Thoracic: Min tenderness, no step off  Lumbar: Intact range of motion. David Facet loading. Min Tenderness. Neg SL. No pain with flexion. No pain with extension.   Hip: Intact range of motion  SI: Level, Min tenderness  Knee: Intact range of motion  Reflexes: normal Knee  Strength: 5/5 globally   Sensory: Grossly intact   Skin: No bruising, erythema  Gait: Normal      Impression:  Back pain  R leg pain   Bilateral buttock pain  H/o L1 and L3 compression fx s/p kyphoplasty   Relevant History  BMI 29.19  DMI (HgbA1c 6.3)    Assessment:  Axial back pain  Severe spinal stenosis   Lumbar radiculopathy  Bilateral ischial bursitis       Plan:  Discussed options  Imaging/ relevant records viewed/ reviewed/ discussed  Imaging results viewed and reviewed (noted above)/ reviewed with patient   reviewed  Cont HEP  Cont PT/rehab   B MBB L4-5 and L5-S1 - pt understands risks including bleeding, infection, and damage to surrounding tissue   Re-eval after  Plan for repeat MBB and RFA if appropriate  Consider B ischial bursa injection   Consider TPI to lower back for muscle stiffness  Consider SCS trial   Back Dr. Caban if needed      Prescribed medications:  1. None    The impression and plan were discussed and explained in detail. All the questions were answered. Education was provided accordingly.     The procedure was explained in detail, along with risks and potential side effects.      Follow-up:  For procedure     Yusra Montano MD

## 2025-03-20 ENCOUNTER — HOSPITAL ENCOUNTER (OUTPATIENT)
Facility: HOSPITAL | Age: 70
Discharge: HOME | End: 2025-03-20
Attending: STUDENT IN AN ORGANIZED HEALTH CARE EDUCATION/TRAINING PROGRAM | Admitting: STUDENT IN AN ORGANIZED HEALTH CARE EDUCATION/TRAINING PROGRAM
Payer: MEDICARE

## 2025-03-20 ENCOUNTER — HOSPITAL ENCOUNTER (OUTPATIENT)
Dept: RADIOLOGY | Facility: HOSPITAL | Age: 70
Discharge: HOME | End: 2025-03-20
Attending: STUDENT IN AN ORGANIZED HEALTH CARE EDUCATION/TRAINING PROGRAM | Admitting: STUDENT IN AN ORGANIZED HEALTH CARE EDUCATION/TRAINING PROGRAM
Payer: MEDICARE

## 2025-03-20 VITALS
SYSTOLIC BLOOD PRESSURE: 138 MMHG | RESPIRATION RATE: 15 BRPM | HEIGHT: 73 IN | BODY MASS INDEX: 27.17 KG/M2 | OXYGEN SATURATION: 97 % | HEART RATE: 70 BPM | TEMPERATURE: 97 F | WEIGHT: 205 LBS | DIASTOLIC BLOOD PRESSURE: 79 MMHG

## 2025-03-20 DIAGNOSIS — M47.817 SPONDYLOSIS OF LUMBOSACRAL REGION WITHOUT MYELOPATHY OR RADICULOPATHY: ICD-10-CM

## 2025-03-20 DIAGNOSIS — M54.50 LOWER BACK PAIN: ICD-10-CM

## 2025-03-20 DIAGNOSIS — M47.816 LUMBAR SPONDYLOSIS: ICD-10-CM

## 2025-03-20 LAB — GLUCOSE SERPL-MCNC: 112 MG/DL (ref 70–110)

## 2025-03-20 PROCEDURE — 64494 INJ PARAVERT F JNT L/S 2 LEV: CPT | Mod: 50,,, | Performed by: STUDENT IN AN ORGANIZED HEALTH CARE EDUCATION/TRAINING PROGRAM

## 2025-03-20 PROCEDURE — 64493 INJ PARAVERT F JNT L/S 1 LEV: CPT | Mod: 50,,, | Performed by: STUDENT IN AN ORGANIZED HEALTH CARE EDUCATION/TRAINING PROGRAM

## 2025-03-20 PROCEDURE — 25000003 PHARM REV CODE 250: Mod: PO | Performed by: STUDENT IN AN ORGANIZED HEALTH CARE EDUCATION/TRAINING PROGRAM

## 2025-03-20 PROCEDURE — 64494 INJ PARAVERT F JNT L/S 2 LEV: CPT | Mod: 50,PO | Performed by: STUDENT IN AN ORGANIZED HEALTH CARE EDUCATION/TRAINING PROGRAM

## 2025-03-20 PROCEDURE — 63600175 PHARM REV CODE 636 W HCPCS: Mod: PO | Performed by: STUDENT IN AN ORGANIZED HEALTH CARE EDUCATION/TRAINING PROGRAM

## 2025-03-20 PROCEDURE — 64493 INJ PARAVERT F JNT L/S 1 LEV: CPT | Mod: 50,PO | Performed by: STUDENT IN AN ORGANIZED HEALTH CARE EDUCATION/TRAINING PROGRAM

## 2025-03-20 PROCEDURE — 82962 GLUCOSE BLOOD TEST: CPT | Mod: PO | Performed by: STUDENT IN AN ORGANIZED HEALTH CARE EDUCATION/TRAINING PROGRAM

## 2025-03-20 RX ORDER — SODIUM CHLORIDE 9 MG/ML
INJECTION, SOLUTION INTRAVENOUS CONTINUOUS
Status: DISCONTINUED | OUTPATIENT
Start: 2025-03-20 | End: 2025-03-20 | Stop reason: HOSPADM

## 2025-03-20 RX ORDER — MIDAZOLAM HYDROCHLORIDE 1 MG/ML
INJECTION INTRAMUSCULAR; INTRAVENOUS
Status: DISCONTINUED | OUTPATIENT
Start: 2025-03-20 | End: 2025-03-20 | Stop reason: HOSPADM

## 2025-03-20 RX ORDER — SODIUM CHLORIDE, SODIUM LACTATE, POTASSIUM CHLORIDE, CALCIUM CHLORIDE 600; 310; 30; 20 MG/100ML; MG/100ML; MG/100ML; MG/100ML
INJECTION, SOLUTION INTRAVENOUS CONTINUOUS
Status: DISCONTINUED | OUTPATIENT
Start: 2025-03-20 | End: 2025-03-20

## 2025-03-20 RX ORDER — LIDOCAINE HYDROCHLORIDE 10 MG/ML
INJECTION, SOLUTION EPIDURAL; INFILTRATION; INTRACAUDAL; PERINEURAL
Status: DISCONTINUED | OUTPATIENT
Start: 2025-03-20 | End: 2025-03-20 | Stop reason: HOSPADM

## 2025-03-20 RX ORDER — BUPIVACAINE HYDROCHLORIDE 2.5 MG/ML
INJECTION, SOLUTION EPIDURAL; INFILTRATION; INTRACAUDAL; PERINEURAL
Status: DISCONTINUED | OUTPATIENT
Start: 2025-03-20 | End: 2025-03-20 | Stop reason: HOSPADM

## 2025-03-20 RX ADMIN — SODIUM CHLORIDE: 9 INJECTION, SOLUTION INTRAVENOUS at 08:03

## 2025-03-20 NOTE — OP NOTE
Anesthesia Post Evaluation    Patient: Sarabjit Ontiveros    Procedure(s) Performed: Procedure(s) (LRB):  COLONOSCOPY (N/A)    Final Anesthesia Type: general      Patient location during evaluation: OPS  Patient participation: Yes- Able to Participate  Level of consciousness: awake and alert  Post-procedure vital signs: reviewed and stable  Pain management: adequate  Airway patency: patent  KATERINE mitigation strategies: Multimodal analgesia  PONV status at discharge: No PONV  Anesthetic complications: no      Cardiovascular status: blood pressure returned to baseline  Respiratory status: unassisted  Hydration status: euvolemic  Follow-up not needed.          Vitals Value Taken Time   /93 08/24/23 1019   Temp 36.8 °C (98.3 °F) 08/24/23 1019   Pulse 72 08/24/23 1019   Resp 18 08/24/23 1019   SpO2 93 % 08/24/23 1019         No case tracking events are documented in the log.      Pain/Corrie Score: No data recorded       Patient: Hammad Douglas III                                                    MRN: [id number]  : 1955                                              Date of procedure: 3/20/2025        Pre Procedure Diagnosis: Back pain, Lumbar spondylosis without myelopathy/ lumbosacral region    Post Procedure Diagnosis: Same    Procedure: Bilateral Lumbar Medial Branch Nerve Block for L2-3 and L3-4 joints under Fluoroscopy     Attending: Yusra Montano MD    Local Anesthetic Injected: 1% Lidocaine 10 ml    Anxiolysis Medications: Versed    Estimated Blood Loss: None    Complication: None        Procedure:  After informed consent was obtained, patient was taken to the fluoroscopy suite and placed in a prone position.  The skin was prepped and draped in the usual sterile fashion using chlorhexidine.  Anatomical landmarks were identified with the aid of fluoroscopy in PA and Oblique views, and the skin and subcutaneous tissue were infiltrated with a total of 10mL of 1% Lidocaine via a 25-gauge 1.5inch needle at each of the intended entry sites.  Subsequently, three 22-gauge 3.5inch needles were advanced with the aid of fluoroscopy such that their tips were located at the respective positions of the superior medial border of the transverse processes with the posterior elements at each level.  Needle placement was confirmed with the aid of fluoroscopy.  After negative aspiration, 0.5mL of 0.25% Bupivicaine was injected at each level.  This was repeated on the other side. There were no complications or paresthesias.   Patient tolerated the procedure well and all needles were removed intact.    Patient was observed in recovery and discharged home with written instruction under supervision in stable condition.

## 2025-03-20 NOTE — DISCHARGE SUMMARY
[facility] - Surgery  Discharge Note  Short Stay    Procedure(s) (LRB):  Block-nerve-medial branch-lumbar l2-3 and L3-4 MBB ( Iv sedation0 (Bilateral)      OUTCOME: Patient tolerated treatment/procedure well without complication and is now ready for discharge.    DISPOSITION: Home or Self Care    FINAL DIAGNOSIS:  <principal problem not specified>    FOLLOWUP: In clinic    DISCHARGE INSTRUCTIONS:    Discharge Procedure Orders   Notify your health care provider if you experience any of the following:  temperature >100.4     Notify your health care provider if you experience any of the following:  severe uncontrolled pain     Notify your health care provider if you experience any of the following:  redness, tenderness, or signs of infection (pain, swelling, redness, odor or green/yellow discharge around incision site)     Activity as tolerated        TIME SPENT ON DISCHARGE: 20 minutes

## 2025-03-21 ENCOUNTER — TELEPHONE (OUTPATIENT)
Dept: PAIN MEDICINE | Facility: CLINIC | Age: 70
End: 2025-03-21
Payer: MEDICARE

## 2025-03-24 DIAGNOSIS — Z00.00 ENCOUNTER FOR MEDICARE ANNUAL WELLNESS EXAM: ICD-10-CM

## 2025-03-24 DIAGNOSIS — M47.817 SPONDYLOSIS OF LUMBOSACRAL REGION WITHOUT MYELOPATHY OR RADICULOPATHY: Primary | ICD-10-CM

## 2025-03-24 DIAGNOSIS — M47.816 LUMBAR SPONDYLOSIS: ICD-10-CM

## 2025-03-24 RX ORDER — SODIUM CHLORIDE, SODIUM LACTATE, POTASSIUM CHLORIDE, CALCIUM CHLORIDE 600; 310; 30; 20 MG/100ML; MG/100ML; MG/100ML; MG/100ML
INJECTION, SOLUTION INTRAVENOUS CONTINUOUS
OUTPATIENT
Start: 2025-03-24

## 2025-03-24 NOTE — TELEPHONE ENCOUNTER
Spoke with pt and he states he had 80% relief his pain score was  a 4 and after procedure it was a 0. His current pain score is a 3. 2nd block scheduled for 04/17 in [place]. Instructions given

## 2025-03-25 ENCOUNTER — PATIENT MESSAGE (OUTPATIENT)
Dept: FAMILY MEDICINE | Facility: CLINIC | Age: 70
End: 2025-03-25
Payer: MEDICARE

## 2025-04-04 ENCOUNTER — PATIENT MESSAGE (OUTPATIENT)
Dept: AUDIOLOGY | Facility: CLINIC | Age: 70
End: 2025-04-04
Payer: MEDICARE

## 2025-04-04 ENCOUNTER — DOCUMENTATION ONLY (OUTPATIENT)
Dept: AUDIOLOGY | Facility: CLINIC | Age: 70
End: 2025-04-04
Payer: MEDICARE

## 2025-04-04 NOTE — PROGRESS NOTES
The patient requested more Phonak Cerushield wax filters for his hearing aids. I put some at the check in desk for the patient.

## 2025-04-10 ENCOUNTER — PATIENT MESSAGE (OUTPATIENT)
Dept: PAIN MEDICINE | Facility: CLINIC | Age: 70
End: 2025-04-10
Payer: MEDICARE

## 2025-04-11 ENCOUNTER — HOSPITAL ENCOUNTER (OUTPATIENT)
Dept: RADIOLOGY | Facility: HOSPITAL | Age: 70
Discharge: HOME | End: 2025-04-11
Attending: NEUROLOGICAL SURGERY
Payer: MEDICARE

## 2025-04-11 DIAGNOSIS — M54.12 CERVICAL RADICULITIS: Primary | ICD-10-CM

## 2025-04-11 DIAGNOSIS — M54.12 CERVICAL RADICULITIS: ICD-10-CM

## 2025-04-11 PROCEDURE — 72040 X-RAY EXAM NECK SPINE 2-3 VW: CPT | Mod: TC

## 2025-04-11 PROCEDURE — 72040 X-RAY EXAM NECK SPINE 2-3 VW: CPT | Mod: 26,,, | Performed by: RADIOLOGY

## 2025-04-17 ENCOUNTER — HOSPITAL ENCOUNTER (OUTPATIENT)
Facility: HOSPITAL | Age: 70
Discharge: HOME | End: 2025-04-17
Attending: STUDENT IN AN ORGANIZED HEALTH CARE EDUCATION/TRAINING PROGRAM | Admitting: STUDENT IN AN ORGANIZED HEALTH CARE EDUCATION/TRAINING PROGRAM
Payer: MEDICARE

## 2025-04-17 ENCOUNTER — HOSPITAL ENCOUNTER (OUTPATIENT)
Dept: RADIOLOGY | Facility: HOSPITAL | Age: 70
Discharge: HOME | End: 2025-04-17
Attending: STUDENT IN AN ORGANIZED HEALTH CARE EDUCATION/TRAINING PROGRAM | Admitting: STUDENT IN AN ORGANIZED HEALTH CARE EDUCATION/TRAINING PROGRAM
Payer: MEDICARE

## 2025-04-17 VITALS
HEIGHT: 73 IN | WEIGHT: 205 LBS | BODY MASS INDEX: 27.17 KG/M2 | HEART RATE: 67 BPM | SYSTOLIC BLOOD PRESSURE: 160 MMHG | RESPIRATION RATE: 18 BRPM | TEMPERATURE: 97 F | OXYGEN SATURATION: 99 % | DIASTOLIC BLOOD PRESSURE: 79 MMHG

## 2025-04-17 DIAGNOSIS — M47.816 LUMBAR SPONDYLOSIS: ICD-10-CM

## 2025-04-17 DIAGNOSIS — M54.50 LOWER BACK PAIN: ICD-10-CM

## 2025-04-17 DIAGNOSIS — M47.817 SPONDYLOSIS OF LUMBOSACRAL REGION WITHOUT MYELOPATHY OR RADICULOPATHY: ICD-10-CM

## 2025-04-17 PROCEDURE — 64493 INJ PARAVERT F JNT L/S 1 LEV: CPT | Mod: 50,,, | Performed by: STUDENT IN AN ORGANIZED HEALTH CARE EDUCATION/TRAINING PROGRAM

## 2025-04-17 PROCEDURE — 25000003 PHARM REV CODE 250: Mod: PO | Performed by: STUDENT IN AN ORGANIZED HEALTH CARE EDUCATION/TRAINING PROGRAM

## 2025-04-17 PROCEDURE — 64494 INJ PARAVERT F JNT L/S 2 LEV: CPT | Mod: 50,PO | Performed by: STUDENT IN AN ORGANIZED HEALTH CARE EDUCATION/TRAINING PROGRAM

## 2025-04-17 PROCEDURE — 64494 INJ PARAVERT F JNT L/S 2 LEV: CPT | Mod: 50,,, | Performed by: STUDENT IN AN ORGANIZED HEALTH CARE EDUCATION/TRAINING PROGRAM

## 2025-04-17 PROCEDURE — 63600175 PHARM REV CODE 636 W HCPCS: Mod: PO | Performed by: STUDENT IN AN ORGANIZED HEALTH CARE EDUCATION/TRAINING PROGRAM

## 2025-04-17 PROCEDURE — 64493 INJ PARAVERT F JNT L/S 1 LEV: CPT | Mod: 50,PO | Performed by: STUDENT IN AN ORGANIZED HEALTH CARE EDUCATION/TRAINING PROGRAM

## 2025-04-17 RX ORDER — LIDOCAINE HYDROCHLORIDE 10 MG/ML
INJECTION, SOLUTION EPIDURAL; INFILTRATION; INTRACAUDAL; PERINEURAL
Status: DISCONTINUED | OUTPATIENT
Start: 2025-04-17 | End: 2025-04-17 | Stop reason: HOSPADM

## 2025-04-17 RX ORDER — SODIUM CHLORIDE, SODIUM LACTATE, POTASSIUM CHLORIDE, CALCIUM CHLORIDE 600; 310; 30; 20 MG/100ML; MG/100ML; MG/100ML; MG/100ML
INJECTION, SOLUTION INTRAVENOUS CONTINUOUS
Status: DISCONTINUED | OUTPATIENT
Start: 2025-04-17 | End: 2025-04-17 | Stop reason: HOSPADM

## 2025-04-17 RX ORDER — MIDAZOLAM HYDROCHLORIDE 1 MG/ML
INJECTION INTRAMUSCULAR; INTRAVENOUS
Status: DISCONTINUED | OUTPATIENT
Start: 2025-04-17 | End: 2025-04-17 | Stop reason: HOSPADM

## 2025-04-17 RX ORDER — BUPIVACAINE HYDROCHLORIDE 2.5 MG/ML
INJECTION, SOLUTION EPIDURAL; INFILTRATION; INTRACAUDAL; PERINEURAL
Status: DISCONTINUED | OUTPATIENT
Start: 2025-04-17 | End: 2025-04-17 | Stop reason: HOSPADM

## 2025-04-17 RX ORDER — SODIUM CHLORIDE 9 MG/ML
INJECTION, SOLUTION INTRAVENOUS CONTINUOUS
Status: DISCONTINUED | OUTPATIENT
Start: 2025-04-17 | End: 2025-04-17 | Stop reason: HOSPADM

## 2025-04-17 RX ADMIN — SODIUM CHLORIDE: 9 INJECTION, SOLUTION INTRAVENOUS at 09:04

## 2025-04-17 NOTE — OP NOTE
Patient: Hammad Douglas III                                                    MRN: [id number]  : 1955                                              Date of procedure: 2025        Pre Procedure Diagnosis: Back pain, Lumbar spondylosis without myelopathy/ lumbosacral region    Post Procedure Diagnosis: Same    Procedure: Bilateral Lumbar Medial Branch Nerve Block for L2-3 and L3-4 joints under Fluoroscopy     Attending: Yusra Montano MD    Local Anesthetic Injected: 1% Lidocaine 10 ml    Anxiolysis Medications: Versed    Estimated Blood Loss: None    Complication: None        Procedure:  After informed consent was obtained, patient was taken to the fluoroscopy suite and placed in a prone position.  The skin was prepped and draped in the usual sterile fashion using chlorhexidine.  Anatomical landmarks were identified with the aid of fluoroscopy in PA and Oblique views, and the skin and subcutaneous tissue were infiltrated with a total of 10mL of 1% Lidocaine via a 25-gauge 1.5inch needle at each of the intended entry sites.  Subsequently, three 22-gauge 3.5inch needles were advanced with the aid of fluoroscopy such that their tips were located at the respective positions of the superior medial border of the transverse processes with the posterior elements at each level.  Needle placement was confirmed with the aid of fluoroscopy.  After negative aspiration, 0.5mL of 0.25% Bupivicaine was injected at each level.  This was repeated on the other side. There were no complications or paresthesias.   Patient tolerated the procedure well and all needles were removed intact.    Patient was observed in recovery and discharged home with written instruction under supervision in stable condition.

## 2025-04-17 NOTE — H&P
CC: back pain    HPI: The patient is a 69 y.o. male with a history of back pain here for B MBB L2-3 and L3-4. There are no major changes in history and physical from 3/6/25 by Dr. Montano.    Past Medical History:   Diagnosis Date    Cataract     Done OU    Chronic kidney disease     Diabetes mellitus type I     Diagnosed at age 19    Diabetic retinopathy     See's Dr. Ledesma    Hyperlipidemia     Hypertension        Past Surgical History:   Procedure Laterality Date    ARTHROPLASTY OF TOE Left 07/14/2022    Procedure: ARTHROPLASTY, TOE;  Surgeon: Caleb Duffy DPM;  Location: [facility] OR;  Service: Podiatry;  Laterality: Left;  Left Hallux    CABG Xs 4 vessels      CATARACT EXTRACTION W/  INTRAOCULAR LENS IMPLANT Right 09/28/2021    Dr Solis    CATARACT EXTRACTION W/  INTRAOCULAR LENS IMPLANT Left 02/22/2022    Dr Solis    COLONOSCOPY      2011 wnl    COLONOSCOPY N/A 06/29/2023    Procedure: COLONOSCOPY;  Surgeon: Garrett Regalado MD;  Location: [facility];  Service: Endoscopy;  Laterality: N/A;    EPIDURAL STEROID INJECTION INTO LUMBAR SPINE N/A 9/12/2024    Procedure: Injection-steroid-epidural-lumbar l3-4 ( to the right );  Surgeon: Yusra Montano MD;  Location: [facility] OR;  Service: Anesthesiology;  Laterality: N/A;    EPIDURAL STEROID INJECTION INTO LUMBAR SPINE N/A 1/30/2025    Procedure: Injection-steroid-epidural-lumbar l3-4;  Surgeon: Yusra Montano MD;  Location: [facility] OR;  Service: Pain Management;  Laterality: N/A;    INJECTION OF ANESTHETIC AGENT AROUND MEDIAL BRANCH NERVES INNERVATING LUMBAR FACET JOINT Bilateral 11/7/2024    Procedure: Block-nerve-medial branch-lumbar l4/5 and l5/s1 MBB;  Surgeon: Yusra Montano MD;  Location: [facility] OR;  Service: Anesthesiology;  Laterality: Bilateral;    INJECTION OF ANESTHETIC AGENT AROUND MEDIAL BRANCH NERVES INNERVATING LUMBAR FACET JOINT Bilateral 3/20/2025    Procedure: Block-nerve-medial branch-lumbar l2-3 and L3-4 MBB ( Iv sedation0;  Surgeon: Yusra Montano MD;  Location:  [facility] OR;  Service: Pain Management;  Laterality: Bilateral;    PHACOEMULSIFICATION OF CATARACT Right 09/28/2021    Procedure: PHACOEMULSIFICATION, CATARACT;  Surgeon: Vicente Solis Jr., MD;  Location: [facility] OR;  Service: Ophthalmology;  Laterality: Right;  DM/right    PHACOEMULSIFICATION OF CATARACT Left 02/22/2022    Procedure: PHACOEMULSIFICATION, CATARACT;  Surgeon: Vicente Solis Jr., MD;  Location: [facility] OR;  Service: Ophthalmology;  Laterality: Left;  left    SURGICAL REMOVAL OF BONE SPUR Left 10/14/2021    Procedure: EXCISION, BONE SPUR;  Surgeon: Caleb Duffy DPM;  Location: [facility] OR;  Service: Podiatry;  Laterality: Left;    SURGICAL REMOVAL OF BONE SPUR Left 01/17/2023    Procedure: EXCISION, BONE SPUR;  Surgeon: Caleb Duffy DPM;  Location: [facility] OR;  Service: Podiatry;  Laterality: Left;    TOE AMPUTATION Right 07/02/2020    Procedure: AMPUTATION, TOE; 3rd;  Surgeon: Caleb Duffy DPM;  Location: [facility] OR;  Service: Podiatry;  Laterality: Right;    TOE SURGERY Right     right big toe       Family History   Problem Relation Name Age of Onset    Cataracts Mother      Cataracts Father      Breast cancer Sister      Glaucoma Neg Hx      Macular degeneration Neg Hx      Retinal detachment Neg Hx         Social History     Socioeconomic History    Marital status:    Tobacco Use    Smoking status: Former     Types: Cigars    Smokeless tobacco: Never    Tobacco comments:     Former cigar use   Substance and Sexual Activity    Alcohol use: Yes     Comment: occ    Drug use: No    Sexual activity: Yes     Partners: Female   Social History Narrative    Retired former radiation  at [employer]     Social Drivers of Health     Financial Resource Strain: Low Risk  (5/27/2024)    Received from [facility]    Overall Financial Resource Strain (CARDIA)     Difficulty of Paying Living Expenses: Not very hard   Food Insecurity: No Food Insecurity (5/27/2024)    Received from [facility]    Hunger  "Vital Sign     Worried About Running Out of Food in the Last Year: Never true     Ran Out of Food in the Last Year: Never true   Transportation Needs: No Transportation Needs (5/27/2024)    Received from [facility]    PRAPARE - Transportation     Lack of Transportation (Medical): No     Lack of Transportation (Non-Medical): No   Physical Activity: Unknown (5/27/2024)    Received from [facility]    Exercise Vital Sign     Days of Exercise per Week: 0 days   Recent Concern: Physical Activity - Insufficiently Active (3/3/2024)    Exercise Vital Sign     Days of Exercise per Week: 2 days     Minutes of Exercise per Session: 10 min   Stress: Stress Concern Present (5/27/2024)    Received from [facility]    [place] [employer] - Occupational Stress Questionnaire     Feeling of Stress : To some extent   Housing Stability: Unknown (3/3/2024)    Housing Stability Vital Sign     Unable to Pay for Housing in the Last Year: No     Unstable Housing in the Last Year: No       Current Medications[1]    Review of patient's allergies indicates:   Allergen Reactions    Altace [ramipril]      Cough       Vitals:    04/14/25 1030   Weight: 93 kg (205 lb)   Height: 6' 1" (1.854 m)       REVIEW OF SYSTEMS:     GENERAL: No weight loss, malaise or fevers.  HEENT:  No recent changes in vision or hearing  NECK: Negative for lumps, no difficulty with swallowing.  RESPIRATORY: Negative for cough, wheezing or shortness of breath, patient denies any recent URI.  CARDIOVASCULAR: Negative for chest pain, leg swelling or palpitations.  GI: Negative for abdominal discomfort, blood in stools or black stools or change in bowel habits.  MUSCULOSKELETAL: See HPI.  SKIN: Negative for lesions, rash, and itching.  PSYCH: No suicidal or homicidal ideations, no current mood disturbances.  HEMATOLOGY/LYMPHOLOGY: Negative for prolonged bleeding, bruising easily or swollen nodes. Patient is not currently taking any anti-coagulants  ENDO: " No history of diabetes or thyroid dysfunction  NEURO: No history of syncope, paralysis, seizures or tremors.All other reviewed and negative other than HPI.    Physical exam:  Gen: A and O x3, pleasant, well-groomed  Skin: No rashes or obvious lesions  HEENT: PERRLA, no obvious deformities on ears or in canals. No thyroid masses, trachea midline, no palpable lymph nodes in neck, axilla.  CVS: Regular rate and rhythm, normal S1 and S2, no murmurs.  Resp: Clear to auscultation bilaterally.  Abdomen: Soft, NT/ND, normal bowel sounds present.  Musculoskeletal/Neuro: Moving all extremities    Assessment:  There are no diagnoses linked to this encounter.      PLAN: B MBB L2-3 and L3-4      This patient has been cleared for surgery in an ambulatory surgical facility    ASA 3,  Mallampatti Score 3  No history of anesthetic complications  Plan for RN IV sedation        [1]   No current facility-administered medications for this encounter.

## 2025-04-17 NOTE — DISCHARGE SUMMARY
[facility] - Surgery  Discharge Note  Short Stay    Procedure(s) (LRB):  Block-nerve-medial branch-lumbar l2/3 and l3/4 MBB #2 ( iv sedation) (Bilateral)      OUTCOME: Patient tolerated treatment/procedure well without complication and is now ready for discharge.    DISPOSITION: Home or Self Care    FINAL DIAGNOSIS:  <principal problem not specified>    FOLLOWUP: In clinic    DISCHARGE INSTRUCTIONS:    Discharge Procedure Orders   Notify your health care provider if you experience any of the following:  temperature >100.4     Notify your health care provider if you experience any of the following:  severe uncontrolled pain     Notify your health care provider if you experience any of the following:  redness, tenderness, or signs of infection (pain, swelling, redness, odor or green/yellow discharge around incision site)     Activity as tolerated        TIME SPENT ON DISCHARGE: 20 minutes

## 2025-04-17 NOTE — H&P (VIEW-ONLY)
CC: back pain    HPI: The patient is a 69 y.o. male with a history of back pain here for B MBB L2-3 and L3-4. There are no major changes in history and physical from 3/6/25 by Dr. Montano.    Past Medical History:   Diagnosis Date    Cataract     Done OU    Chronic kidney disease     Diabetes mellitus type I     Diagnosed at age 19    Diabetic retinopathy     See's Dr. Ledesma    Hyperlipidemia     Hypertension        Past Surgical History:   Procedure Laterality Date    ARTHROPLASTY OF TOE Left 07/14/2022    Procedure: ARTHROPLASTY, TOE;  Surgeon: Caleb Duffy DPM;  Location: [facility] OR;  Service: Podiatry;  Laterality: Left;  Left Hallux    CABG Xs 4 vessels      CATARACT EXTRACTION W/  INTRAOCULAR LENS IMPLANT Right 09/28/2021    Dr Solis    CATARACT EXTRACTION W/  INTRAOCULAR LENS IMPLANT Left 02/22/2022    Dr Solis    COLONOSCOPY      2011 wnl    COLONOSCOPY N/A 06/29/2023    Procedure: COLONOSCOPY;  Surgeon: Garrett Regalado MD;  Location: [facility];  Service: Endoscopy;  Laterality: N/A;    EPIDURAL STEROID INJECTION INTO LUMBAR SPINE N/A 9/12/2024    Procedure: Injection-steroid-epidural-lumbar l3-4 ( to the right );  Surgeon: Yusra Montano MD;  Location: [facility] OR;  Service: Anesthesiology;  Laterality: N/A;    EPIDURAL STEROID INJECTION INTO LUMBAR SPINE N/A 1/30/2025    Procedure: Injection-steroid-epidural-lumbar l3-4;  Surgeon: Yusra Montano MD;  Location: [facility] OR;  Service: Pain Management;  Laterality: N/A;    INJECTION OF ANESTHETIC AGENT AROUND MEDIAL BRANCH NERVES INNERVATING LUMBAR FACET JOINT Bilateral 11/7/2024    Procedure: Block-nerve-medial branch-lumbar l4/5 and l5/s1 MBB;  Surgeon: Yusra Montano MD;  Location: [facility] OR;  Service: Anesthesiology;  Laterality: Bilateral;    INJECTION OF ANESTHETIC AGENT AROUND MEDIAL BRANCH NERVES INNERVATING LUMBAR FACET JOINT Bilateral 3/20/2025    Procedure: Block-nerve-medial branch-lumbar l2-3 and L3-4 MBB ( Iv sedation0;  Surgeon: Yusra Montano MD;  Location:  [facility] OR;  Service: Pain Management;  Laterality: Bilateral;    PHACOEMULSIFICATION OF CATARACT Right 09/28/2021    Procedure: PHACOEMULSIFICATION, CATARACT;  Surgeon: Vicente Solis Jr., MD;  Location: [facility] OR;  Service: Ophthalmology;  Laterality: Right;  DM/right    PHACOEMULSIFICATION OF CATARACT Left 02/22/2022    Procedure: PHACOEMULSIFICATION, CATARACT;  Surgeon: Vicente Solis Jr., MD;  Location: [facility] OR;  Service: Ophthalmology;  Laterality: Left;  left    SURGICAL REMOVAL OF BONE SPUR Left 10/14/2021    Procedure: EXCISION, BONE SPUR;  Surgeon: Caleb Duffy DPM;  Location: [facility] OR;  Service: Podiatry;  Laterality: Left;    SURGICAL REMOVAL OF BONE SPUR Left 01/17/2023    Procedure: EXCISION, BONE SPUR;  Surgeon: Caleb Duffy DPM;  Location: [facility] OR;  Service: Podiatry;  Laterality: Left;    TOE AMPUTATION Right 07/02/2020    Procedure: AMPUTATION, TOE; 3rd;  Surgeon: Caleb Duffy DPM;  Location: [facility] OR;  Service: Podiatry;  Laterality: Right;    TOE SURGERY Right     right big toe       Family History   Problem Relation Name Age of Onset    Cataracts Mother      Cataracts Father      Breast cancer Sister      Glaucoma Neg Hx      Macular degeneration Neg Hx      Retinal detachment Neg Hx         Social History     Socioeconomic History    Marital status:    Tobacco Use    Smoking status: Former     Types: Cigars    Smokeless tobacco: Never    Tobacco comments:     Former cigar use   Substance and Sexual Activity    Alcohol use: Yes     Comment: occ    Drug use: No    Sexual activity: Yes     Partners: Female   Social History Narrative    Retired former radiation  at [employer]     Social Drivers of Health     Financial Resource Strain: Low Risk  (5/27/2024)    Received from [facility]    Overall Financial Resource Strain (CARDIA)     Difficulty of Paying Living Expenses: Not very hard   Food Insecurity: No Food Insecurity (5/27/2024)    Received from [facility]    Hunger  "Vital Sign     Worried About Running Out of Food in the Last Year: Never true     Ran Out of Food in the Last Year: Never true   Transportation Needs: No Transportation Needs (5/27/2024)    Received from [facility]    PRAPARE - Transportation     Lack of Transportation (Medical): No     Lack of Transportation (Non-Medical): No   Physical Activity: Unknown (5/27/2024)    Received from [facility]    Exercise Vital Sign     Days of Exercise per Week: 0 days   Recent Concern: Physical Activity - Insufficiently Active (3/3/2024)    Exercise Vital Sign     Days of Exercise per Week: 2 days     Minutes of Exercise per Session: 10 min   Stress: Stress Concern Present (5/27/2024)    Received from [facility]    [place] [employer] - Occupational Stress Questionnaire     Feeling of Stress : To some extent   Housing Stability: Unknown (3/3/2024)    Housing Stability Vital Sign     Unable to Pay for Housing in the Last Year: No     Unstable Housing in the Last Year: No       Current Medications[1]    Review of patient's allergies indicates:   Allergen Reactions    Altace [ramipril]      Cough       Vitals:    04/14/25 1030   Weight: 93 kg (205 lb)   Height: 6' 1" (1.854 m)       REVIEW OF SYSTEMS:     GENERAL: No weight loss, malaise or fevers.  HEENT:  No recent changes in vision or hearing  NECK: Negative for lumps, no difficulty with swallowing.  RESPIRATORY: Negative for cough, wheezing or shortness of breath, patient denies any recent URI.  CARDIOVASCULAR: Negative for chest pain, leg swelling or palpitations.  GI: Negative for abdominal discomfort, blood in stools or black stools or change in bowel habits.  MUSCULOSKELETAL: See HPI.  SKIN: Negative for lesions, rash, and itching.  PSYCH: No suicidal or homicidal ideations, no current mood disturbances.  HEMATOLOGY/LYMPHOLOGY: Negative for prolonged bleeding, bruising easily or swollen nodes. Patient is not currently taking any anti-coagulants  ENDO: " No history of diabetes or thyroid dysfunction  NEURO: No history of syncope, paralysis, seizures or tremors.All other reviewed and negative other than HPI.    Physical exam:  Gen: A and O x3, pleasant, well-groomed  Skin: No rashes or obvious lesions  HEENT: PERRLA, no obvious deformities on ears or in canals. No thyroid masses, trachea midline, no palpable lymph nodes in neck, axilla.  CVS: Regular rate and rhythm, normal S1 and S2, no murmurs.  Resp: Clear to auscultation bilaterally.  Abdomen: Soft, NT/ND, normal bowel sounds present.  Musculoskeletal/Neuro: Moving all extremities    Assessment:  There are no diagnoses linked to this encounter.      PLAN: B MBB L2-3 and L3-4      This patient has been cleared for surgery in an ambulatory surgical facility    ASA 3,  Mallampatti Score 3  No history of anesthetic complications  Plan for RN IV sedation        [1]   No current facility-administered medications for this encounter.

## 2025-04-19 ENCOUNTER — PATIENT MESSAGE (OUTPATIENT)
Dept: PAIN MEDICINE | Facility: CLINIC | Age: 70
End: 2025-04-19
Payer: MEDICARE

## 2025-04-20 ENCOUNTER — PATIENT MESSAGE (OUTPATIENT)
Dept: AUDIOLOGY | Facility: CLINIC | Age: 70
End: 2025-04-20
Payer: MEDICARE

## 2025-04-21 ENCOUNTER — CLINICAL SUPPORT (OUTPATIENT)
Dept: AUDIOLOGY | Facility: CLINIC | Age: 70
End: 2025-04-21
Payer: MEDICARE

## 2025-04-21 DIAGNOSIS — M47.817 SPONDYLOSIS OF LUMBOSACRAL REGION WITHOUT MYELOPATHY OR RADICULOPATHY: Primary | ICD-10-CM

## 2025-04-21 DIAGNOSIS — Z97.4 WEARS HEARING AID IN BOTH EARS: Primary | ICD-10-CM

## 2025-04-21 DIAGNOSIS — M47.816 LUMBAR SPONDYLOSIS: ICD-10-CM

## 2025-04-21 NOTE — TELEPHONE ENCOUNTER
Pt had 95% relief x 3 hours from MBB #2 his starting score was 2 and ending score 0 his current pain score is 4 scheduled RFA for 05/13

## 2025-04-21 NOTE — PROGRESS NOTES
The patient came to the clinic because his left hearing aid was not working. I cleaned the aid and determined it needed a new . The patient is out of warranty so he was given the option of a new  which would be $150 plus tax or a loaner  at no charge. The patient is due for his annual audiogram and hearing aid adjustment, so he choose to take the loaner  for now. When the patient schedules his annual we will change his receivers from [employer] to [employer]. The patient will contact us as needed.

## 2025-05-03 ENCOUNTER — PATIENT MESSAGE (OUTPATIENT)
Dept: PAIN MEDICINE | Facility: CLINIC | Age: 70
End: 2025-05-03
Payer: MEDICARE

## 2025-05-06 RX ORDER — ASPIRIN 81 MG/1
81 TABLET ORAL DAILY
COMMUNITY

## 2025-05-13 ENCOUNTER — HOSPITAL ENCOUNTER (OUTPATIENT)
Facility: HOSPITAL | Age: 70
Discharge: HOME | End: 2025-05-13
Attending: STUDENT IN AN ORGANIZED HEALTH CARE EDUCATION/TRAINING PROGRAM | Admitting: STUDENT IN AN ORGANIZED HEALTH CARE EDUCATION/TRAINING PROGRAM
Payer: MEDICARE

## 2025-05-13 ENCOUNTER — HOSPITAL ENCOUNTER (OUTPATIENT)
Dept: RADIOLOGY | Facility: HOSPITAL | Age: 70
Discharge: HOME | End: 2025-05-13
Attending: STUDENT IN AN ORGANIZED HEALTH CARE EDUCATION/TRAINING PROGRAM | Admitting: STUDENT IN AN ORGANIZED HEALTH CARE EDUCATION/TRAINING PROGRAM
Payer: MEDICARE

## 2025-05-13 DIAGNOSIS — M47.817 SPONDYLOSIS OF LUMBOSACRAL REGION WITHOUT MYELOPATHY OR RADICULOPATHY: ICD-10-CM

## 2025-05-13 DIAGNOSIS — M47.816 LUMBAR SPONDYLOSIS: ICD-10-CM

## 2025-05-13 DIAGNOSIS — M54.50 LOWER BACK PAIN: ICD-10-CM

## 2025-05-13 LAB — GLUCOSE SERPL-MCNC: 151 MG/DL (ref 70–110)

## 2025-05-13 PROCEDURE — 64636 DESTROY L/S FACET JNT ADDL: CPT | Mod: 50,,, | Performed by: STUDENT IN AN ORGANIZED HEALTH CARE EDUCATION/TRAINING PROGRAM

## 2025-05-13 PROCEDURE — 64636 DESTROY L/S FACET JNT ADDL: CPT | Mod: 50,PO | Performed by: STUDENT IN AN ORGANIZED HEALTH CARE EDUCATION/TRAINING PROGRAM

## 2025-05-13 PROCEDURE — 64635 DESTROY LUMB/SAC FACET JNT: CPT | Mod: 50,,, | Performed by: STUDENT IN AN ORGANIZED HEALTH CARE EDUCATION/TRAINING PROGRAM

## 2025-05-13 PROCEDURE — 63600175 PHARM REV CODE 636 W HCPCS: Mod: PO | Performed by: STUDENT IN AN ORGANIZED HEALTH CARE EDUCATION/TRAINING PROGRAM

## 2025-05-13 PROCEDURE — 64635 DESTROY LUMB/SAC FACET JNT: CPT | Mod: 50,PO | Performed by: STUDENT IN AN ORGANIZED HEALTH CARE EDUCATION/TRAINING PROGRAM

## 2025-05-13 RX ORDER — SODIUM CHLORIDE, SODIUM LACTATE, POTASSIUM CHLORIDE, CALCIUM CHLORIDE 600; 310; 30; 20 MG/100ML; MG/100ML; MG/100ML; MG/100ML
INJECTION, SOLUTION INTRAVENOUS CONTINUOUS
Status: DISCONTINUED | OUTPATIENT
Start: 2025-05-13 | End: 2025-05-13 | Stop reason: HOSPADM

## 2025-05-13 RX ORDER — LIDOCAINE HYDROCHLORIDE 10 MG/ML
INJECTION, SOLUTION EPIDURAL; INFILTRATION; INTRACAUDAL; PERINEURAL
Status: DISCONTINUED | OUTPATIENT
Start: 2025-05-13 | End: 2025-05-13 | Stop reason: HOSPADM

## 2025-05-13 RX ORDER — BUPIVACAINE HYDROCHLORIDE 2.5 MG/ML
INJECTION, SOLUTION EPIDURAL; INFILTRATION; INTRACAUDAL; PERINEURAL
Status: DISCONTINUED | OUTPATIENT
Start: 2025-05-13 | End: 2025-05-13 | Stop reason: HOSPADM

## 2025-05-13 RX ORDER — DEXAMETHASONE SODIUM PHOSPHATE 10 MG/ML
INJECTION, SOLUTION INTRA-ARTICULAR; INTRALESIONAL; INTRAMUSCULAR; INTRAVENOUS; SOFT TISSUE
Status: DISCONTINUED | OUTPATIENT
Start: 2025-05-13 | End: 2025-05-13 | Stop reason: HOSPADM

## 2025-05-13 RX ORDER — MIDAZOLAM HYDROCHLORIDE 1 MG/ML
INJECTION INTRAMUSCULAR; INTRAVENOUS
Status: DISCONTINUED | OUTPATIENT
Start: 2025-05-13 | End: 2025-05-13 | Stop reason: HOSPADM

## 2025-05-13 RX ADMIN — SODIUM CHLORIDE, POTASSIUM CHLORIDE, SODIUM LACTATE AND CALCIUM CHLORIDE: 600; 310; 30; 20 INJECTION, SOLUTION INTRAVENOUS at 07:05

## 2025-05-13 NOTE — OP NOTE
Patient: Hammad Douglas III                                                    MRN: [id number]  : 1955                                              Date of procedure: 2025      Pre Procedure Diagnosis: Low back pain, Lumbago, Lumbar spondylosis without myelopathy/ lumbrosacral region    Post Procedure Diagnosis: Same    Procedure: Bilateral Lumbar Medial Branch Nerve Rhizotomy for  L2-3 and L3-4 joints under Fluoroscopy     Attending: Yusra Montano MD    Local Anesthetic Injected: 1% Lidocaine 10 ml    Sedation Medications: See RN note    Estimated Blood Loss: None    Complication: None      Procedure:  After informed consent was obtained, patient was taken to the fluoroscopy suite and placed in a prone position.  The skin was prepped and draped in the usual sterile fashion using chlorhexidine.  Anatomical landmarks were identified with the aid of fluoroscopy in PA and Oblique views, and the skin and subcutaneous tissue were infiltrated with a total of 5mL of 1% Lidocaine via a 25-gauge 1.5inch needle at each of the intended entry sites.  Subsequently, three 22-gauge 100mm 10mm tip introducer needles were advanced with the aid of fluoroscopy such that their tips were located at the respective positions of the superior medial border of the transverse processes with the posterior elements at each level.  Needle placement was confirmed with the aid of fluoroscopy.  Motor stimulation up to 2 Volts at each level confirmed no motor nerve involvement.  Impedance was less than 800 ohms at each level.  1ml of 2% lidocaine was instilled prior to lesioning.  Ablation was performed per level utilizing radiofrequency generator 80°C for 90 seconds. Then 0.5mL of a mixture of 0.5mL of Dexamethasone 10mg/mL and 2mL of 0.25% Bupivacaine was injected at each level.  There were no complications or paresthesias.   Patient tolerated the procedure well and all needles were removed intact.    Patient was observed in recovery and  discharged home with written instruction under supervision in stable condition.

## 2025-05-13 NOTE — DISCHARGE INSTRUCTIONS

## 2025-05-13 NOTE — DISCHARGE SUMMARY
[facility] - Surgery  Discharge Note  Short Stay    Procedure(s) (LRB):  Radiofrequency Ablation, Nerve, Spinal, Lumbar, Medial Branch, 1 Level l2/3 and l3/4 RFA ( Iv sedation) (Bilateral)      OUTCOME: Patient tolerated treatment/procedure well without complication and is now ready for discharge.    DISPOSITION: Home or Self Care    FINAL DIAGNOSIS:  <principal problem not specified>    FOLLOWUP: In clinic    DISCHARGE INSTRUCTIONS:    Discharge Procedure Orders   Notify your health care provider if you experience any of the following:  temperature >100.4     Notify your health care provider if you experience any of the following:  severe uncontrolled pain     Notify your health care provider if you experience any of the following:  redness, tenderness, or signs of infection (pain, swelling, redness, odor or green/yellow discharge around incision site)     Activity as tolerated        TIME SPENT ON DISCHARGE: 20 minutes

## 2025-05-14 VITALS
BODY MASS INDEX: 27.17 KG/M2 | OXYGEN SATURATION: 97 % | DIASTOLIC BLOOD PRESSURE: 80 MMHG | HEIGHT: 73 IN | RESPIRATION RATE: 15 BRPM | TEMPERATURE: 98 F | WEIGHT: 205 LBS | HEART RATE: 67 BPM | SYSTOLIC BLOOD PRESSURE: 177 MMHG

## 2025-05-15 ENCOUNTER — PATIENT MESSAGE (OUTPATIENT)
Dept: PAIN MEDICINE | Facility: CLINIC | Age: 70
End: 2025-05-15
Payer: MEDICARE

## 2025-05-18 ENCOUNTER — PATIENT MESSAGE (OUTPATIENT)
Dept: AUDIOLOGY | Facility: CLINIC | Age: 70
End: 2025-05-18
Payer: MEDICARE

## 2025-05-19 ENCOUNTER — CLINICAL SUPPORT (OUTPATIENT)
Dept: AUDIOLOGY | Facility: CLINIC | Age: 70
End: 2025-05-19
Payer: MEDICARE

## 2025-05-19 DIAGNOSIS — Z97.4 WEARS HEARING AID IN RIGHT EAR: Primary | ICD-10-CM

## 2025-05-19 NOTE — PROGRESS NOTES
The patient came to the clinic because his right hearing aid has not been working for him. I wiped the hearing aid with an alcohol wipe, changed the wax filter and dome and brushed out the microphones. The patient confirmed the hearing aid sounds good and will contact us as needed.

## 2025-06-02 ENCOUNTER — PATIENT MESSAGE (OUTPATIENT)
Dept: PAIN MEDICINE | Facility: CLINIC | Age: 70
End: 2025-06-02
Payer: MEDICARE

## 2025-06-04 ENCOUNTER — OFFICE VISIT (OUTPATIENT)
Dept: URGENT CARE | Facility: CLINIC | Age: 70
End: 2025-06-04
Payer: MEDICARE

## 2025-06-04 VITALS
SYSTOLIC BLOOD PRESSURE: 136 MMHG | BODY MASS INDEX: 27.17 KG/M2 | TEMPERATURE: 98 F | OXYGEN SATURATION: 98 % | WEIGHT: 205 LBS | HEART RATE: 72 BPM | RESPIRATION RATE: 18 BRPM | DIASTOLIC BLOOD PRESSURE: 71 MMHG | HEIGHT: 73 IN

## 2025-06-04 DIAGNOSIS — S51.812A SKIN TEAR OF LEFT FOREARM WITHOUT COMPLICATION, INITIAL ENCOUNTER: Primary | ICD-10-CM

## 2025-06-04 PROCEDURE — 99213 OFFICE O/P EST LOW 20 MIN: CPT | Mod: S$GLB,,, | Performed by: NURSE PRACTITIONER

## 2025-06-04 RX ORDER — MUPIROCIN 20 MG/G
OINTMENT TOPICAL
Qty: 22 G | Refills: 0 | Status: SHIPPED | OUTPATIENT
Start: 2025-06-04 | End: 2025-06-11

## 2025-06-12 ENCOUNTER — TELEPHONE (OUTPATIENT)
Dept: PAIN MEDICINE | Facility: CLINIC | Age: 70
End: 2025-06-12
Payer: MEDICARE

## 2025-06-12 ENCOUNTER — PATIENT MESSAGE (OUTPATIENT)
Dept: FAMILY MEDICINE | Facility: CLINIC | Age: 70
End: 2025-06-12
Payer: MEDICARE

## 2025-06-12 ENCOUNTER — HOSPITAL ENCOUNTER (OUTPATIENT)
Dept: RADIOLOGY | Facility: HOSPITAL | Age: 70
Discharge: HOME | End: 2025-06-12
Attending: STUDENT IN AN ORGANIZED HEALTH CARE EDUCATION/TRAINING PROGRAM
Payer: MEDICARE

## 2025-06-12 ENCOUNTER — PATIENT MESSAGE (OUTPATIENT)
Dept: PAIN MEDICINE | Facility: CLINIC | Age: 70
End: 2025-06-12

## 2025-06-12 ENCOUNTER — OFFICE VISIT (OUTPATIENT)
Dept: PAIN MEDICINE | Facility: CLINIC | Age: 70
End: 2025-06-12
Payer: MEDICARE

## 2025-06-12 VITALS — WEIGHT: 205 LBS | BODY MASS INDEX: 27.17 KG/M2 | HEIGHT: 73 IN

## 2025-06-12 DIAGNOSIS — M54.9 BACK PAIN, UNSPECIFIED BACK LOCATION, UNSPECIFIED BACK PAIN LATERALITY, UNSPECIFIED CHRONICITY: Primary | ICD-10-CM

## 2025-06-12 DIAGNOSIS — M54.9 BACK PAIN, UNSPECIFIED BACK LOCATION, UNSPECIFIED BACK PAIN LATERALITY, UNSPECIFIED CHRONICITY: ICD-10-CM

## 2025-06-12 DIAGNOSIS — M54.16 LUMBAR RADICULOPATHY: ICD-10-CM

## 2025-06-12 DIAGNOSIS — M54.16 LUMBAR RADICULOPATHY: Primary | ICD-10-CM

## 2025-06-12 PROCEDURE — 72114 X-RAY EXAM L-S SPINE BENDING: CPT | Mod: 26,,, | Performed by: RADIOLOGY

## 2025-06-12 PROCEDURE — 99214 OFFICE O/P EST MOD 30 MIN: CPT | Mod: S$GLB,,, | Performed by: STUDENT IN AN ORGANIZED HEALTH CARE EDUCATION/TRAINING PROGRAM

## 2025-06-12 PROCEDURE — 3008F BODY MASS INDEX DOCD: CPT | Mod: CPTII,S$GLB,, | Performed by: STUDENT IN AN ORGANIZED HEALTH CARE EDUCATION/TRAINING PROGRAM

## 2025-06-12 PROCEDURE — 99999 PR PBB SHADOW E&M-EST. PATIENT-LVL III: CPT | Mod: PBBFAC,,, | Performed by: STUDENT IN AN ORGANIZED HEALTH CARE EDUCATION/TRAINING PROGRAM

## 2025-06-12 PROCEDURE — 1159F MED LIST DOCD IN RCRD: CPT | Mod: CPTII,S$GLB,, | Performed by: STUDENT IN AN ORGANIZED HEALTH CARE EDUCATION/TRAINING PROGRAM

## 2025-06-12 PROCEDURE — 72114 X-RAY EXAM L-S SPINE BENDING: CPT | Mod: TC,PO

## 2025-06-12 PROCEDURE — 1160F RVW MEDS BY RX/DR IN RCRD: CPT | Mod: CPTII,S$GLB,, | Performed by: STUDENT IN AN ORGANIZED HEALTH CARE EDUCATION/TRAINING PROGRAM

## 2025-06-12 PROCEDURE — 3288F FALL RISK ASSESSMENT DOCD: CPT | Mod: CPTII,S$GLB,, | Performed by: STUDENT IN AN ORGANIZED HEALTH CARE EDUCATION/TRAINING PROGRAM

## 2025-06-12 PROCEDURE — 3044F HG A1C LEVEL LT 7.0%: CPT | Mod: CPTII,S$GLB,, | Performed by: STUDENT IN AN ORGANIZED HEALTH CARE EDUCATION/TRAINING PROGRAM

## 2025-06-12 PROCEDURE — 1101F PT FALLS ASSESS-DOCD LE1/YR: CPT | Mod: CPTII,S$GLB,, | Performed by: STUDENT IN AN ORGANIZED HEALTH CARE EDUCATION/TRAINING PROGRAM

## 2025-06-12 PROCEDURE — 1125F AMNT PAIN NOTED PAIN PRSNT: CPT | Mod: CPTII,S$GLB,, | Performed by: STUDENT IN AN ORGANIZED HEALTH CARE EDUCATION/TRAINING PROGRAM

## 2025-06-12 RX ORDER — SODIUM CHLORIDE, SODIUM LACTATE, POTASSIUM CHLORIDE, CALCIUM CHLORIDE 600; 310; 30; 20 MG/100ML; MG/100ML; MG/100ML; MG/100ML
INJECTION, SOLUTION INTRAVENOUS CONTINUOUS
OUTPATIENT
Start: 2025-06-12

## 2025-06-12 NOTE — TELEPHONE ENCOUNTER
Types of orders made on 06/12/2025: Imaging, Procedure Request      Order Date:6/12/2025   Ordering User:JESUS YOU [[id number]   Encounter Provider:Jesus You MD [[id number]]   Authorizing Provider: Jesus You MD [[id number]]   Department:[facility] PAIN MANAGEMENT[[id number]]      Common Order Information   Procedure -> Transforaminal Injection (Specify level and laterality) Cmt: B             TFESI L2-3 (Cov)      Order Specific Information   Order: Procedure Request Order for Pain Management [Custom: [id number]]  Order #:          [id number]: 1 FUTURE     Priority: Routine  Class: Clinic Performed     Future Order Information       Expires on:06/12/2026            Expected by:06/12/2025                   Associated Diagnoses       M54.16 Lumbar radiculopathy       Facility Name: -> [facility]          Follow-up: -> 2 weeks              Priority: Routine  Class: Clinic Performed     Future Order Information       Expires on:06/12/2026            Expected by:06/12/2025                   Associated Diagnoses       M54.16 Lumbar radiculopathy       Procedure -> Transforaminal Injection (Specify level and laterality) Cmt: B                 TFESI L2-3 (Cov)          Facility Name: -> [facility]

## 2025-06-12 NOTE — H&P (VIEW-ONLY)
[facility] - Department    Adelaide Bran MD      First Office Visit: 8/21/24  Today' Date: 6/12/2025  Last Office Visit: 10/16/24    Chief complaint: back pain    HPI: Pt is a very pleasant 70 y.o., who presents for evaluation. Referred by Dr. Bran. Pt seen previously for back pain and L leg pain since he fell off a ladder in Nov. He has a hx of L1 and L3 compression fx s/p kyphoplasty. States he has also had ACDF of his C-spine (Dr. Caban).     Pt seen today for f/u after B RFA L2-3 and L3-4. States it has helped with some improvement however pt continues to have back pain that stays in the mid back. Recently, pt states he started having balance issues. Pt does have severe stenosis at L2-3. Has seen Dr. Caban in the past for ACDF C-spine. Pt continues to have bilateral lower buttock pain worse with standing. Denies having BB changes. Is doing PT and HEP.         Pain disability index score: 42  Pain score: 6    Relevant Imaging/ Testing:   CT L-spine 11/24  MR L-spine 7/24 -retropulsion on MR L-spine at L3-4 as well as severe narrowing at L2-3  XR C-spine 5/24, 7/24, 10/24    Procedures:   B RFA L2-3 and L3-4 5/13/25  B MBB L2-3 and L3-4 2/20/25, 4/17/25  B MBB L4-5 and L5-S1 11/7/24  ILESI L3-4 9/12/24, 1/30/24     Date of board of pharmacy review:6/12/2025  Date of opioid risk screening/ pain psych: None  Date of opioid agreement and consent: None  Date of urine drug screen: None  Date of random pill count: None     was reviewed today: reviewed, no concerns     Prescribed medications: None    See EHR for  PMH, PSH, FH, SH, Medications and Allergy    ROS:  Positive for pain  ROS     PE:  There were no vitals filed for this visit.  General: Pleasant, no distress  HEENT: NC/ AT. PERRLA  CV: Radial pulses intact  Pulm: No distress  Ext: No edema    Physical Exam     Neuromusculoskeletal:  Head: NC, AT. PERRLA. No occipital tenderness  Neck: Intact range of motions, extension, flexion, rotation. Neg Facet  loading. Neg Spurling. Min Tenderness.  5/5 Strength, normal tone. Neg Dalton's  Shoulder: Intact range of motion. Min Bicep groove tenderness. 5/5 Strength  Thoracic: Min tenderness, no step off  Lumbar: Intact range of motion. David Facet loading. Min Tenderness. Neg SL. No pain with flexion. No pain with extension.   Hip: Intact range of motion  SI: Level, Min tenderness  Knee: Intact range of motion  Reflexes: normal Knee  Strength: 5/5 globally   Sensory: Grossly intact   Skin: No bruising, erythema  Gait: Normal      Impression:  Back pain  R leg pain   Bilateral buttock pain  H/o L1 and L3 compression fx s/p kyphoplasty   Relevant History  BMI 29.19  DMI (HgbA1c 6.3)    Assessment:  Severe spinal stenosis L2-3  Lumbar radiculopathy  Bilateral ischial bursitis       Plan:  Discussed options  Imaging/ relevant records viewed/ reviewed/ discussed  Imaging results viewed and reviewed (noted above)/ reviewed with patient   reviewed  Cont HEP  Cont PT/rehab   Recommend f/u with Dr. Caban w/ regards to severe spinal stenosis and new onset balance issues  Schedule B TFESI L2-3   Re-eval after  Consider SCS trial if nonsurgical         Prescribed medications:  1. None    The impression and plan were discussed and explained in detail. All the questions were answered. Education was provided accordingly.     The procedure was explained in detail, along with risks and potential side effects.      Follow-up:  For procedure     Yusra Montano MD

## 2025-06-12 NOTE — PROGRESS NOTES
[facility] - Department    Adelaide Bran MD      First Office Visit: 8/21/24  Today' Date: 6/12/2025  Last Office Visit: 10/16/24    Chief complaint: back pain    HPI: Pt is a very pleasant 70 y.o., who presents for evaluation. Referred by Dr. Bran. Pt seen previously for back pain and L leg pain since he fell off a ladder in Nov. He has a hx of L1 and L3 compression fx s/p kyphoplasty. States he has also had ACDF of his C-spine (Dr. Caban).     Pt seen today for f/u after B RFA L2-3 and L3-4. States it has helped with some improvement however pt continues to have back pain that stays in the mid back. Recently, pt states he started having balance issues. Pt does have severe stenosis at L2-3. Has seen Dr. Caban in the past for ACDF C-spine. Pt continues to have bilateral lower buttock pain worse with standing. Denies having BB changes. Is doing PT and HEP.         Pain disability index score: 42  Pain score: 6    Relevant Imaging/ Testing:   CT L-spine 11/24  MR L-spine 7/24 -retropulsion on MR L-spine at L3-4 as well as severe narrowing at L2-3  XR C-spine 5/24, 7/24, 10/24    Procedures:   B RFA L2-3 and L3-4 5/13/25  B MBB L2-3 and L3-4 2/20/25, 4/17/25  B MBB L4-5 and L5-S1 11/7/24  ILESI L3-4 9/12/24, 1/30/24     Date of board of pharmacy review:6/12/2025  Date of opioid risk screening/ pain psych: None  Date of opioid agreement and consent: None  Date of urine drug screen: None  Date of random pill count: None     was reviewed today: reviewed, no concerns     Prescribed medications: None    See EHR for  PMH, PSH, FH, SH, Medications and Allergy    ROS:  Positive for pain  ROS     PE:  There were no vitals filed for this visit.  General: Pleasant, no distress  HEENT: NC/ AT. PERRLA  CV: Radial pulses intact  Pulm: No distress  Ext: No edema    Physical Exam     Neuromusculoskeletal:  Head: NC, AT. PERRLA. No occipital tenderness  Neck: Intact range of motions, extension, flexion, rotation. Neg Facet  loading. Neg Spurling. Min Tenderness.  5/5 Strength, normal tone. Neg Dalton's  Shoulder: Intact range of motion. Min Bicep groove tenderness. 5/5 Strength  Thoracic: Min tenderness, no step off  Lumbar: Intact range of motion. David Facet loading. Min Tenderness. Neg SL. No pain with flexion. No pain with extension.   Hip: Intact range of motion  SI: Level, Min tenderness  Knee: Intact range of motion  Reflexes: normal Knee  Strength: 5/5 globally   Sensory: Grossly intact   Skin: No bruising, erythema  Gait: Normal      Impression:  Back pain  R leg pain   Bilateral buttock pain  H/o L1 and L3 compression fx s/p kyphoplasty   Relevant History  BMI 29.19  DMI (HgbA1c 6.3)    Assessment:  Severe spinal stenosis L2-3  Lumbar radiculopathy  Bilateral ischial bursitis       Plan:  Discussed options  Imaging/ relevant records viewed/ reviewed/ discussed  Imaging results viewed and reviewed (noted above)/ reviewed with patient   reviewed  Cont HEP  Cont PT/rehab   Recommend f/u with Dr. Caban w/ regards to severe spinal stenosis and new onset balance issues  Schedule B TFESI L2-3   Re-eval after  Consider SCS trial if nonsurgical         Prescribed medications:  1. None    The impression and plan were discussed and explained in detail. All the questions were answered. Education was provided accordingly.     The procedure was explained in detail, along with risks and potential side effects.      Follow-up:  For procedure     Yusra Montano MD

## 2025-06-18 ENCOUNTER — LAB VISIT (OUTPATIENT)
Dept: LAB | Facility: HOSPITAL | Age: 70
End: 2025-06-18
Attending: NURSE PRACTITIONER
Payer: MEDICARE

## 2025-06-18 DIAGNOSIS — E10.43 TYPE 1 DIABETES MELLITUS WITH DIABETIC AUTONOMIC NEUROPATHY: ICD-10-CM

## 2025-06-18 LAB
ALBUMIN SERPL BCP-MCNC: 3.6 G/DL (ref 3.5–5.2)
ALBUMIN/CREAT UR: 986.4 UG/MG
ALP SERPL-CCNC: 84 UNIT/L (ref 40–150)
ALT SERPL W/O P-5'-P-CCNC: 24 UNIT/L (ref 10–44)
ANION GAP (OHS): 10 MMOL/L (ref 8–16)
AST SERPL-CCNC: 24 UNIT/L (ref 11–45)
BILIRUB SERPL-MCNC: 0.4 MG/DL (ref 0.1–1)
BUN SERPL-MCNC: 29 MG/DL (ref 8–23)
CALCIUM SERPL-MCNC: 9 MG/DL (ref 8.7–10.5)
CHLORIDE SERPL-SCNC: 104 MMOL/L (ref 95–110)
CO2 SERPL-SCNC: 22 MMOL/L (ref 23–29)
CREAT SERPL-MCNC: 1.7 MG/DL (ref 0.5–1.4)
CREAT UR-MCNC: 44 MG/DL (ref 23–375)
EAG (OHS): 146 MG/DL (ref 68–131)
GFR SERPLBLD CREATININE-BSD FMLA CKD-EPI: 43 ML/MIN/1.73/M2
GLUCOSE SERPL-MCNC: 201 MG/DL (ref 70–110)
HBA1C MFR BLD: 6.7 % (ref 4–5.6)
MICROALBUMIN UR-MCNC: 434 UG/ML (ref ?–5000)
POTASSIUM SERPL-SCNC: 4.8 MMOL/L (ref 3.5–5.1)
PROT SERPL-MCNC: 6.5 GM/DL (ref 6–8.4)
SODIUM SERPL-SCNC: 136 MMOL/L (ref 136–145)
TSH SERPL-ACNC: 2.05 UIU/ML (ref 0.4–4)

## 2025-06-18 PROCEDURE — 83036 HEMOGLOBIN GLYCOSYLATED A1C: CPT

## 2025-06-18 PROCEDURE — 36415 COLL VENOUS BLD VENIPUNCTURE: CPT | Mod: PN

## 2025-06-18 PROCEDURE — 84443 ASSAY THYROID STIM HORMONE: CPT

## 2025-06-18 PROCEDURE — 82043 UR ALBUMIN QUANTITATIVE: CPT

## 2025-06-18 PROCEDURE — 80053 COMPREHEN METABOLIC PANEL: CPT

## 2025-06-19 ENCOUNTER — RESULTS FOLLOW-UP (OUTPATIENT)
Dept: ENDOCRINOLOGY | Facility: CLINIC | Age: 70
End: 2025-06-19

## 2025-06-19 ENCOUNTER — PATIENT MESSAGE (OUTPATIENT)
Dept: RADIOLOGY | Facility: HOSPITAL | Age: 70
End: 2025-06-19
Payer: MEDICARE

## 2025-06-23 ENCOUNTER — HOSPITAL ENCOUNTER (OUTPATIENT)
Dept: RADIOLOGY | Facility: HOSPITAL | Age: 70
Discharge: HOME | End: 2025-06-23
Attending: STUDENT IN AN ORGANIZED HEALTH CARE EDUCATION/TRAINING PROGRAM
Payer: MEDICARE

## 2025-06-23 ENCOUNTER — HOSPITAL ENCOUNTER (OUTPATIENT)
Facility: HOSPITAL | Age: 70
Discharge: HOME | End: 2025-06-23
Attending: STUDENT IN AN ORGANIZED HEALTH CARE EDUCATION/TRAINING PROGRAM | Admitting: STUDENT IN AN ORGANIZED HEALTH CARE EDUCATION/TRAINING PROGRAM
Payer: MEDICARE

## 2025-06-23 DIAGNOSIS — M54.16 LUMBAR RADICULOPATHY: ICD-10-CM

## 2025-06-23 DIAGNOSIS — M54.50 LOWER BACK PAIN: ICD-10-CM

## 2025-06-23 LAB — GLUCOSE SERPL-MCNC: 126 MG/DL (ref 70–110)

## 2025-06-23 PROCEDURE — 64483 NJX AA&/STRD TFRM EPI L/S 1: CPT | Mod: 50,,, | Performed by: STUDENT IN AN ORGANIZED HEALTH CARE EDUCATION/TRAINING PROGRAM

## 2025-06-23 PROCEDURE — 64483 NJX AA&/STRD TFRM EPI L/S 1: CPT | Mod: 50,PO | Performed by: STUDENT IN AN ORGANIZED HEALTH CARE EDUCATION/TRAINING PROGRAM

## 2025-06-23 PROCEDURE — 25000003 PHARM REV CODE 250: Mod: PO | Performed by: STUDENT IN AN ORGANIZED HEALTH CARE EDUCATION/TRAINING PROGRAM

## 2025-06-23 PROCEDURE — 82962 GLUCOSE BLOOD TEST: CPT | Mod: PO | Performed by: STUDENT IN AN ORGANIZED HEALTH CARE EDUCATION/TRAINING PROGRAM

## 2025-06-23 PROCEDURE — 63600175 PHARM REV CODE 636 W HCPCS: Mod: PO | Performed by: STUDENT IN AN ORGANIZED HEALTH CARE EDUCATION/TRAINING PROGRAM

## 2025-06-23 PROCEDURE — 25500020 PHARM REV CODE 255: Mod: PO | Performed by: STUDENT IN AN ORGANIZED HEALTH CARE EDUCATION/TRAINING PROGRAM

## 2025-06-23 RX ORDER — SODIUM CHLORIDE, SODIUM LACTATE, POTASSIUM CHLORIDE, CALCIUM CHLORIDE 600; 310; 30; 20 MG/100ML; MG/100ML; MG/100ML; MG/100ML
INJECTION, SOLUTION INTRAVENOUS CONTINUOUS
Status: DISCONTINUED | OUTPATIENT
Start: 2025-06-23 | End: 2025-06-23

## 2025-06-23 RX ORDER — LIDOCAINE HYDROCHLORIDE 10 MG/ML
INJECTION, SOLUTION EPIDURAL; INFILTRATION; INTRACAUDAL; PERINEURAL
Status: DISCONTINUED | OUTPATIENT
Start: 2025-06-23 | End: 2025-06-23 | Stop reason: HOSPADM

## 2025-06-23 RX ORDER — SODIUM CHLORIDE 9 MG/ML
INJECTION, SOLUTION INTRAVENOUS CONTINUOUS
Status: DISCONTINUED | OUTPATIENT
Start: 2025-06-23 | End: 2025-06-23 | Stop reason: HOSPADM

## 2025-06-23 RX ORDER — DEXAMETHASONE SODIUM PHOSPHATE 10 MG/ML
INJECTION, SOLUTION INTRA-ARTICULAR; INTRALESIONAL; INTRAMUSCULAR; INTRAVENOUS; SOFT TISSUE
Status: DISCONTINUED | OUTPATIENT
Start: 2025-06-23 | End: 2025-06-23 | Stop reason: HOSPADM

## 2025-06-23 RX ORDER — MIDAZOLAM HYDROCHLORIDE 1 MG/ML
INJECTION INTRAMUSCULAR; INTRAVENOUS
Status: DISCONTINUED | OUTPATIENT
Start: 2025-06-23 | End: 2025-06-23 | Stop reason: HOSPADM

## 2025-06-23 RX ADMIN — SODIUM CHLORIDE: 9 INJECTION, SOLUTION INTRAVENOUS at 03:06

## 2025-06-23 NOTE — DISCHARGE SUMMARY
[facility] - Surgery  Discharge Note  Short Stay    Procedure(s) (LRB):  INJECTION, SPINE, LUMBOSACRAL, TRANSFORAMINAL APPROACH L2-3 (Bilateral)      OUTCOME: Patient tolerated treatment/procedure well without complication and is now ready for discharge.    DISPOSITION: Home or Self Care    FINAL DIAGNOSIS:  <principal problem not specified>    FOLLOWUP: In clinic    DISCHARGE INSTRUCTIONS:    Discharge Procedure Orders   Notify your health care provider if you experience any of the following:  temperature >100.4     Notify your health care provider if you experience any of the following:  severe uncontrolled pain     Notify your health care provider if you experience any of the following:  redness, tenderness, or signs of infection (pain, swelling, redness, odor or green/yellow discharge around incision site)     Activity as tolerated        TIME SPENT ON DISCHARGE: 20 minutes

## 2025-06-23 NOTE — OP NOTE
Patient: Hammad Douglas III                                                    MRN: [id number]  : 1955                                              Date of procedure: 2025      Pre Procedure Diagnosis: Lumbar, Lumbosacral radiculopathy    Post Procedure Diagnosis: Same    Procedure:   Transforaminal Epidural Steroid Injection Under Fluoroscopy at Right L2-3  Transforaminal Epidural Steroid Injection Under Fluoroscopy at Left L2-3    Attending: Yusra Montano MD    Local Anesthetic Injected: Lidocaine 1% 6ml    Sedation Medications: See RN note    Estimated Blood Loss: None    Complication: None        Procedure:  After informed consent was obtained, patient was taken to the fluoroscopy suite and placed in a prone position.  The skin was prepped and draped in the usual sterile fashion using chlorhexidine.  Anatomical landmarks were identified with the aid of fluoroscopy, and the skin and subcutaneous tissue overlying the neural foramen was infiltrated with 3mL of 1% Lidocaine using a 25-gauge 1.5 inch needle.  A 22-gauge 5-inch needle was advanced with the aid of fluoroscopy in an oblique view such that the needle tip entered the neural foramen.   Needle placement was confirmed with fluoroscopy in PA and Lateral views.  After a negative aspiration, 0.5mL of contrast was injected.  The contrast delineated the nerve root as well as the epidural spread.  After negative aspiration, an injectate consisting of 0.5mL of 10mg/mL of Dexamethasone, 0.5mL of 1% preservative free lidocaine and 3mL of preservative normal saline was injected.  This was repeated at the other level. Patient tolerated the procedure well and all needles were removed intact.  There were no complications.    Patient was observed in recovery and discharged home with written instructions under supervision in stable condition.

## 2025-06-23 NOTE — PLAN OF CARE
VSS, pt denies any pain or questions at this time. Medications reviewed, pt ready for procedure. Pt safety maintained, call light within reach.

## 2025-06-23 NOTE — DISCHARGE INSTRUCTIONS

## 2025-06-24 ENCOUNTER — PATIENT MESSAGE (OUTPATIENT)
Dept: PAIN MEDICINE | Facility: CLINIC | Age: 70
End: 2025-06-24
Payer: MEDICARE

## 2025-06-24 ENCOUNTER — TELEPHONE (OUTPATIENT)
Dept: ENDOCRINOLOGY | Facility: CLINIC | Age: 70
End: 2025-06-24
Payer: MEDICARE

## 2025-06-24 VITALS
DIASTOLIC BLOOD PRESSURE: 67 MMHG | RESPIRATION RATE: 18 BRPM | SYSTOLIC BLOOD PRESSURE: 147 MMHG | HEART RATE: 67 BPM | BODY MASS INDEX: 27.17 KG/M2 | OXYGEN SATURATION: 99 % | WEIGHT: 205 LBS | HEIGHT: 73 IN | TEMPERATURE: 98 F

## 2025-06-24 NOTE — PROGRESS NOTES
CC: Mr. Hammad Douglas III arrives today for management of Type 1  DM and review of chronic medical conditions, as listed in the visit diagnosis section of this encounter.     HPI: Mr. Hammad Douglas III was diagnosed with Type 1  DM at age 19 after a viral illness - had polyuria, polydipsia at this time.  Initial treatment consisted of insulin. Denies FH of DM. Reports past hospitalizations due to DKA > 30 years ago.   Has diagnosis of hypoglycemia unawareness with past severe hypoglycemic episodes requiring 3rd party assistance. Began using Dexcom G5 in 2017 and later up graded to G6. He began OmniPod 5 in November 2022.   He follows with Dr. Stern for CAD and is s/p CABG x 4 in January 2024.     Patient was last seen by me in February. Lunch I:C ratio was adjusted.     He had epidural on 6/23, 2 days ago. Has noticed higher blood sugars since.     Patient admits to entering ghost carbs when glucose is elevated after a meal, which is often.     BG monitoring per Dexcom G6. Plans to get newer iPhone so he can benefit from OmniPod sj.    Hypoglycemia: occasionally - feels this is due to taking boluses too close together.   Hypoglycemic Symptoms: none  Hypoglycemia Treatment: Juice or glucose tabs.     Exercise: PT for balance 2 days/week. Also active around the house/yard.    Dietary Habits: Eats 2 meals/day.  Rare snacking. Avoids sugary beverages.     Last DM education appointment: 12/8/2022      CURRENT DIABETIC MEDS:  Humalog in OmniPod 5    Insulin back up plan: Toujeo Max 22 units QHS, Novolog I:C ratio 1:5    Previous insulins:  Lantus    When did you start the current therapy you are on: 11/2022  Frequency of changing site/infusion set/tubing/cartridges: 3 days  Frequency of changing CGM: 10 days  Using bolus wizard: yes  Taking bolus with each meal: yes      INSULIN PUMP SETTINGS:        Last Eye Exam: 9/2024 + proliferative DR. Dr. Lugo  Last Podiatry Exam: 2/2023, Past amputation of R third toe. S/P  "arthroplasty of the Lt. Hallux IP joint in July 2022.    REVIEW OF SYSTEMS  Constitutional: no c/o fatigue, weakness. + 3# weight loss.  Cardiac: no palpitations, chest pain   Respiratory: no cough or dyspnea.   GI:  no c/o abdominal pain or nausea.   Skin: no rashes, lesions.    Musculoskeletal: + lower back pain. Following with Pain Management. Had recent epidural.   Neuro: no numbness, tingling, or parasthesias.  Endocrine: denies polyphagia, polydipsia, polyuria.    Personally reviewed Past Medical, Surgical, Social History.    Vital Signs  /62   Pulse 76   Ht 6' 1" (1.854 m)   Wt 94.6 kg (208 lb 8.9 oz)   BMI 27.52 kg/m²      Personally reviewed the below labs:    Hemoglobin A1C   Date Value Ref Range Status   02/12/2025 6.7 (H) 4.0 - 5.6 % Final     Comment:     ADA Screening Guidelines:  5.7-6.4%  Consistent with prediabetes  >or=6.5%  Consistent with diabetes    High levels of fetal hemoglobin interfere with the HbA1C  assay. Heterozygous hemoglobin variants (HbS, HgC, etc)do  not significantly interfere with this assay.   However, presence of multiple variants may affect accuracy.     10/03/2024 6.8 (H) 4.0 - 5.6 % Final     Comment:     ADA Screening Guidelines:  5.7-6.4%  Consistent with prediabetes  >or=6.5%  Consistent with diabetes    High levels of fetal hemoglobin interfere with the HbA1C  assay. Heterozygous hemoglobin variants (HbS, HgC, etc)do  not significantly interfere with this assay.   However, presence of multiple variants may affect accuracy.     06/19/2024 6.8 (H) 4.0 - 5.6 % Final     Comment:     ADA Screening Guidelines:  5.7-6.4%  Consistent with prediabetes  >or=6.5%  Consistent with diabetes    High levels of fetal hemoglobin interfere with the HbA1C  assay. Heterozygous hemoglobin variants (HbS, HgC, etc)do  not significantly interfere with this assay.   However, presence of multiple variants may affect accuracy.       Hemoglobin A1c   Date Value Ref Range Status "   06/18/2025 6.7 (H) 4.0 - 5.6 % Final     Comment:     ADA Screening Guidelines:  5.7-6.4%  Consistent with prediabetes  >=6.5%  Consistent with diabetes    High levels of fetal hemoglobin interfere with the HbA1C  assay. Heterozygous hemoglobin variants (HbS, HgC, etc)do  not significantly interfere with this assay.   However, presence of multiple variants may affect accuracy.       Chemistry        Component Value Date/Time     06/18/2025 0838     04/28/2025 1025     02/12/2025 0923    K 4.8 06/18/2025 0838    K 5.0 04/28/2025 1025    K 4.3 02/12/2025 0923     06/18/2025 0838     02/12/2025 0923    CO2 22 (L) 06/18/2025 0838    CO2 30 04/28/2025 1025    CO2 25 02/12/2025 0923    BUN 29 (H) 06/18/2025 0838    CREATININE 1.7 (H) 06/18/2025 0838     (H) 06/18/2025 0838     (H) 02/12/2025 0923        Component Value Date/Time    CALCIUM 9.0 06/18/2025 0838    CALCIUM 8.9 04/28/2025 1025    CALCIUM 9.2 02/12/2025 0923    ALKPHOS 84 06/18/2025 0838    ALKPHOS 93 10/03/2024 0959    AST 24 06/18/2025 0838    AST 16 04/28/2025 1025    AST 24 10/03/2024 0959    ALT 24 06/18/2025 0838    ALT 25 04/28/2025 1025    ALT 21 10/03/2024 0959    BILITOT 0.4 06/18/2025 0838    BILITOT 0.7 04/28/2025 1025    BILITOT 0.4 10/03/2024 0959    ESTGFRAFRICA >60.0 06/22/2022 0848    EGFRNONAA 57.3 (A) 06/22/2022 0848          Lab Results   Component Value Date    CHOL 157 04/28/2025    CHOL 179 01/02/2025    CHOL 153 12/27/2024     Lab Results   Component Value Date    HDL 46 04/28/2025    HDL 54 01/02/2025    HDL 56 12/27/2024     Lab Results   Component Value Date    LDLCALC 98 04/28/2025    LDLCALC 110 01/02/2025    LDLCALC 87 12/27/2024     Lab Results   Component Value Date    TRIG 65 04/28/2025    TRIG 74 01/02/2025    TRIG 48 12/27/2024     Lab Results   Component Value Date    CHOLHDL 3.41 04/28/2025    CHOLHDL 3.31 01/02/2025    CHOLHDL 2.73 12/27/2024       Lab Results   Component Value  Date    MICALBCREAT 986.4 (H) 06/18/2025     Lab Results   Component Value Date    TSH 2.049 06/18/2025       CrCl cannot be calculated (Unknown ideal weight.).    Vit D, 25-Hydroxy   Date Value Ref Range Status   08/06/2018 35 30 - 96 ng/mL Final     Comment:     Vitamin D deficiency.........<10 ng/mL                              Vitamin D insufficiency......10-29 ng/mL       Vitamin D sufficiency........> or equal to 30 ng/mL  Vitamin D toxicity............>100 ng/mL         PHYSICAL EXAMINATION  Constitutional: Appears well, no distress  Respiratory: CTA, even and unlabored.   Cardiovascular: RRR, no murmurs.  GI: active bowel sounds, no hernia  Skin: warm and dry  Neuro: oriented to person, place, time.  Feet: appropriate footwear.         DEXCOM DOWNLOAD: Fasting glucoses are reasonable. Prandial excursions noted. Often consecutive CHO entries follow large excursions. Infrequent hypoglycemia but may follow an excursion or consecutive meal boluses.                Goals        HEMOGLOBIN A1C < 7.5           due to hypoglycemia unawareness      Assessment/Plan  1. Type 1 diabetes mellitus with diabetic autonomic neuropathy  -- A1c stable. Will adjust I:C ratio to provide more coverage. Counseled pt on the importance of avoiding entering carbs that aren't consumed following a period of hyperglycemia. This can cause hypoglycemia.   -- change I:C ratio to 1:5 across the board.  -- change ISF to 45  -- BG monitoring per Dexcom G6. Consider advancing to G7 at next visit. He plans to get new phone before then.     -- Discussed diagnosis of DM, A1c goals, progression of disease, long term complications and tx options.    -- Reviewed hypoglycemia management: treat with 1/2 glass of juice, 1/2 can regular coke, or 4 glucose tablets. Monitor and repeat treatment every 15 minutes until BG is >70 Then have a snack, which includes a complex carbohydrate and protein.  Advised patient to check BG before activities, such as  driving or exercise.   2. Type 1 diabetes mellitus with nephropathy -- worening. off of losartan, due to past hypotension with use.  -- Referral to Nephrology    3. Proliferative diabetic retinopathy without macular edema associated with type 1 diabetes mellitus, unspecified laterality  -- stable  -- keep follow ups   4. Coronary artery disease involving native coronary artery of native heart without angina pectoris  -- optimize DM control   5. Hyperlipidemia, unspecified hyperlipidemia type  -- controlled  -- continue atorvastatin   6. Hypoglycemia unawareness in Type 1 DM -- continue Dexcom.   -- has glucagon   7. Insulin pump titration  -- download reviewed and settings changed.         FOLLOW UP   Follow up in about 4 months (around 10/25/2025).   Patient instructed to bring BG logs to each follow up   Patient encouraged to call for any BG/medication issues, concerns, or questions.     Orders Placed This Encounter   Procedures    Hemoglobin A1C    Basic Metabolic Panel    Ambulatory referral/consult to Nephrology

## 2025-06-25 ENCOUNTER — OFFICE VISIT (OUTPATIENT)
Dept: ENDOCRINOLOGY | Facility: CLINIC | Age: 70
End: 2025-06-25
Payer: MEDICARE

## 2025-06-25 VITALS
DIASTOLIC BLOOD PRESSURE: 62 MMHG | BODY MASS INDEX: 27.64 KG/M2 | WEIGHT: 208.56 LBS | SYSTOLIC BLOOD PRESSURE: 136 MMHG | HEIGHT: 73 IN | HEART RATE: 76 BPM

## 2025-06-25 DIAGNOSIS — E78.5 HYPERLIPIDEMIA, UNSPECIFIED HYPERLIPIDEMIA TYPE: ICD-10-CM

## 2025-06-25 DIAGNOSIS — E10.3593 TYPE 1 DIABETES MELLITUS WITH PROLIFERATIVE RETINOPATHY OF BOTH EYES WITHOUT MACULAR EDEMA: ICD-10-CM

## 2025-06-25 DIAGNOSIS — E10.21 TYPE 1 DIABETES MELLITUS WITH NEPHROPATHY: ICD-10-CM

## 2025-06-25 DIAGNOSIS — E10.649 HYPOGLYCEMIA UNAWARENESS IN TYPE 1 DIABETES MELLITUS: ICD-10-CM

## 2025-06-25 DIAGNOSIS — I25.10 CORONARY ARTERY DISEASE INVOLVING NATIVE CORONARY ARTERY OF NATIVE HEART WITHOUT ANGINA PECTORIS: ICD-10-CM

## 2025-06-25 DIAGNOSIS — Z46.81 INSULIN PUMP TITRATION: ICD-10-CM

## 2025-06-25 DIAGNOSIS — E10.43 TYPE 1 DIABETES MELLITUS WITH DIABETIC AUTONOMIC NEUROPATHY: Primary | ICD-10-CM

## 2025-06-25 PROCEDURE — 3288F FALL RISK ASSESSMENT DOCD: CPT | Mod: CPTII,S$GLB,, | Performed by: NURSE PRACTITIONER

## 2025-06-25 PROCEDURE — 1160F RVW MEDS BY RX/DR IN RCRD: CPT | Mod: CPTII,S$GLB,, | Performed by: NURSE PRACTITIONER

## 2025-06-25 PROCEDURE — 99214 OFFICE O/P EST MOD 30 MIN: CPT | Mod: S$GLB,,, | Performed by: NURSE PRACTITIONER

## 2025-06-25 PROCEDURE — 3078F DIAST BP <80 MM HG: CPT | Mod: CPTII,S$GLB,, | Performed by: NURSE PRACTITIONER

## 2025-06-25 PROCEDURE — 99999 PR PBB SHADOW E&M-EST. PATIENT-LVL IV: CPT | Mod: PBBFAC,,, | Performed by: NURSE PRACTITIONER

## 2025-06-25 PROCEDURE — 3008F BODY MASS INDEX DOCD: CPT | Mod: CPTII,S$GLB,, | Performed by: NURSE PRACTITIONER

## 2025-06-25 PROCEDURE — 1101F PT FALLS ASSESS-DOCD LE1/YR: CPT | Mod: CPTII,S$GLB,, | Performed by: NURSE PRACTITIONER

## 2025-06-25 PROCEDURE — 1126F AMNT PAIN NOTED NONE PRSNT: CPT | Mod: CPTII,S$GLB,, | Performed by: NURSE PRACTITIONER

## 2025-06-25 PROCEDURE — 3044F HG A1C LEVEL LT 7.0%: CPT | Mod: CPTII,S$GLB,, | Performed by: NURSE PRACTITIONER

## 2025-06-25 PROCEDURE — 3066F NEPHROPATHY DOC TX: CPT | Mod: CPTII,S$GLB,, | Performed by: NURSE PRACTITIONER

## 2025-06-25 PROCEDURE — G2211 COMPLEX E/M VISIT ADD ON: HCPCS | Mod: S$GLB,,, | Performed by: NURSE PRACTITIONER

## 2025-06-25 PROCEDURE — 3075F SYST BP GE 130 - 139MM HG: CPT | Mod: CPTII,S$GLB,, | Performed by: NURSE PRACTITIONER

## 2025-06-25 PROCEDURE — 95251 CONT GLUC MNTR ANALYSIS I&R: CPT | Mod: S$GLB,,, | Performed by: NURSE PRACTITIONER

## 2025-06-25 PROCEDURE — 1159F MED LIST DOCD IN RCRD: CPT | Mod: CPTII,S$GLB,, | Performed by: NURSE PRACTITIONER

## 2025-06-25 PROCEDURE — 3062F POS MACROALBUMINURIA REV: CPT | Mod: CPTII,S$GLB,, | Performed by: NURSE PRACTITIONER

## 2025-06-25 RX ORDER — ATORVASTATIN CALCIUM 80 MG/1
80 TABLET, FILM COATED ORAL
COMMUNITY
Start: 2025-04-24 | End: 2026-04-24

## 2025-06-25 RX ORDER — INSULIN PMP CART,AUT,G6/7,CNTR
EACH SUBCUTANEOUS
COMMUNITY

## 2025-07-03 ENCOUNTER — PATIENT MESSAGE (OUTPATIENT)
Dept: NEPHROLOGY | Facility: CLINIC | Age: 70
End: 2025-07-03
Payer: MEDICARE

## 2025-07-08 RX ORDER — INSULIN PMP CART,AUT,G6/7,CNTR
EACH SUBCUTANEOUS
Qty: 30 EACH | Refills: 3 | Status: SHIPPED | OUTPATIENT
Start: 2025-07-08

## 2025-07-10 ENCOUNTER — OFFICE VISIT (OUTPATIENT)
Dept: NEPHROLOGY | Facility: CLINIC | Age: 70
End: 2025-07-10
Payer: MEDICARE

## 2025-07-10 VITALS
HEART RATE: 71 BPM | BODY MASS INDEX: 27.52 KG/M2 | SYSTOLIC BLOOD PRESSURE: 148 MMHG | HEIGHT: 73 IN | DIASTOLIC BLOOD PRESSURE: 78 MMHG | OXYGEN SATURATION: 98 %

## 2025-07-10 DIAGNOSIS — N18.32 STAGE 3B CHRONIC KIDNEY DISEASE: Primary | ICD-10-CM

## 2025-07-10 DIAGNOSIS — R80.9 PROTEINURIA, UNSPECIFIED TYPE: ICD-10-CM

## 2025-07-10 DIAGNOSIS — I10 ESSENTIAL HYPERTENSION: ICD-10-CM

## 2025-07-10 PROCEDURE — 99204 OFFICE O/P NEW MOD 45 MIN: CPT | Mod: S$GLB,,, | Performed by: INTERNAL MEDICINE

## 2025-07-10 PROCEDURE — 3044F HG A1C LEVEL LT 7.0%: CPT | Mod: CPTII,S$GLB,, | Performed by: INTERNAL MEDICINE

## 2025-07-10 PROCEDURE — 1159F MED LIST DOCD IN RCRD: CPT | Mod: CPTII,S$GLB,, | Performed by: INTERNAL MEDICINE

## 2025-07-10 PROCEDURE — 3066F NEPHROPATHY DOC TX: CPT | Mod: CPTII,S$GLB,, | Performed by: INTERNAL MEDICINE

## 2025-07-10 PROCEDURE — 3077F SYST BP >= 140 MM HG: CPT | Mod: CPTII,S$GLB,, | Performed by: INTERNAL MEDICINE

## 2025-07-10 PROCEDURE — 4010F ACE/ARB THERAPY RXD/TAKEN: CPT | Mod: CPTII,S$GLB,, | Performed by: INTERNAL MEDICINE

## 2025-07-10 PROCEDURE — 3078F DIAST BP <80 MM HG: CPT | Mod: CPTII,S$GLB,, | Performed by: INTERNAL MEDICINE

## 2025-07-10 PROCEDURE — 3008F BODY MASS INDEX DOCD: CPT | Mod: CPTII,S$GLB,, | Performed by: INTERNAL MEDICINE

## 2025-07-10 PROCEDURE — 1160F RVW MEDS BY RX/DR IN RCRD: CPT | Mod: CPTII,S$GLB,, | Performed by: INTERNAL MEDICINE

## 2025-07-10 PROCEDURE — 99999 PR PBB SHADOW E&M-EST. PATIENT-LVL III: CPT | Mod: PBBFAC,,, | Performed by: INTERNAL MEDICINE

## 2025-07-10 PROCEDURE — 3062F POS MACROALBUMINURIA REV: CPT | Mod: CPTII,S$GLB,, | Performed by: INTERNAL MEDICINE

## 2025-07-10 RX ORDER — LOSARTAN POTASSIUM 25 MG/1
25 TABLET ORAL DAILY
Qty: 90 TABLET | Refills: 3 | Status: SHIPPED | OUTPATIENT
Start: 2025-07-10 | End: 2026-07-10

## 2025-07-10 NOTE — PROGRESS NOTES
Subjective:       Patient ID: Hammad Douglas III is a 70 y.o. White male who presents for new patient evaluation for chronic renal failure.    Hammad Douglas III is referred by Adelaide Bran MD to be evaluated for chronic renal failure.      Patient reports of DMt1 for 50+ years, currently well managed, complicated by retinopathy. He also was diagnosed with CAD last year and underwent a CABG with Dr. Zendejas in Jan 2024. He developed bleeding post-op requiring coiling. Since his CABG his Cr has been running higher. He was taken off losartan due to the rising Cr. Since that time his Cr has stabilized however his proteinuria is worse.    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      The past medical, family and social histories were reviewed for this encounter.     Past Medical History:   Diagnosis Date    Cataract     Done OU    Chronic kidney disease     Diabetes mellitus type I     Diagnosed at age 19    Diabetic retinopathy     See's Dr. Ledesma    Hyperlipidemia     Hypertension      Past Surgical History:   Procedure Laterality Date    ARTHROPLASTY OF TOE Left 07/14/2022    Procedure: ARTHROPLASTY, TOE;  Surgeon: Caleb Duffy DPM;  Location: [facility] OR;  Service: Podiatry;  Laterality: Left;  Left Hallux    CABG Xs 4 vessels      CATARACT EXTRACTION W/  INTRAOCULAR LENS IMPLANT Right 09/28/2021    Dr Solis    CATARACT EXTRACTION W/  INTRAOCULAR LENS IMPLANT Left 02/22/2022    Dr Solis    COLONOSCOPY      2011 wnl    COLONOSCOPY N/A 06/29/2023    Procedure: COLONOSCOPY;  Surgeon: Garrett Regalado MD;   [facility];  Service: Endoscopy;  Laterality: N/A;    EPIDURAL STEROID INJECTION INTO LUMBAR SPINE N/A 9/12/2024    Procedure: Injection-steroid-epidural-lumbar l3-4 ( to the right );  Surgeon: Yusra Montano MD;  Location: [facility] OR;  Service: Anesthesiology;  Laterality: N/A;    EPIDURAL STEROID INJECTION INTO LUMBAR SPINE N/A 1/30/2025    Procedure: Injection-steroid-epidural-lumbar l3-4;  Surgeon: Yusra Montano MD;  Location: [facility] OR;  Service: Pain Management;  Laterality: N/A;    INJECTION OF ANESTHETIC AGENT AROUND MEDIAL BRANCH NERVES INNERVATING LUMBAR FACET JOINT Bilateral 11/7/2024    Procedure: Block-nerve-medial branch-lumbar l4/5 and l5/s1 MBB;  Surgeon: Yusra Montano MD;  Location: [facility] OR;  Service: Anesthesiology;  Laterality: Bilateral;    INJECTION OF ANESTHETIC AGENT AROUND MEDIAL BRANCH NERVES INNERVATING LUMBAR FACET JOINT Bilateral 3/20/2025    Procedure: Block-nerve-medial branch-lumbar l2-3 and L3-4 MBB ( Iv sedation0;  Surgeon: Yusra Montano MD;  Location: [facility] OR;  Service: Pain Management;  Laterality: Bilateral;    INJECTION OF ANESTHETIC AGENT AROUND MEDIAL BRANCH NERVES INNERVATING LUMBAR FACET JOINT Bilateral 4/17/2025    Procedure: Block-nerve-medial branch-lumbar l2/3 and l3/4 MBB #2 ( iv sedation);  Surgeon: Yusra Montano MD;  Location: [facility] OR;  Service: Pain Management;  Laterality: Bilateral;    INJECTION, SPINE, LUMBOSACRAL, TRANSFORAMINAL APPROACH Bilateral 6/23/2025    Procedure: INJECTION, SPINE, LUMBOSACRAL, TRANSFORAMINAL APPROACH L2-3;  Surgeon: Yusra Montano MD;  Location: [facility] OR;  Service: Pain Management;  Laterality: Bilateral;    PHACOEMULSIFICATION OF CATARACT Right 09/28/2021    Procedure: PHACOEMULSIFICATION, CATARACT;  Surgeon: Vicente Solis Jr., MD;  Location: [facility] OR;  Service: Ophthalmology;  Laterality: Right;  DM/right    PHACOEMULSIFICATION OF CATARACT Left 02/22/2022    Procedure: PHACOEMULSIFICATION, CATARACT;  Surgeon: Vicente Solis Jr., MD;  Location:  [facility] OR;  Service: Ophthalmology;  Laterality: Left;  left    RADIOFREQUENCY ABLATION OF LUMBAR MEDIAL BRANCH NERVE AT SINGLE LEVEL Bilateral 5/13/2025    Procedure: Radiofrequency Ablation, Nerve, Spinal, Lumbar, Medial Branch, 1 Level l2/3 and l3/4 RFA ( Iv sedation);  Surgeon: Yusra Montano MD;  Location: [facility] OR;  Service: Pain Management;  Laterality: Bilateral;    SURGICAL REMOVAL OF BONE SPUR Left 10/14/2021    Procedure: EXCISION, BONE SPUR;  Surgeon: Caleb Duffy DPM;  Location: [facility] OR;  Service: Podiatry;  Laterality: Left;    SURGICAL REMOVAL OF BONE SPUR Left 01/17/2023    Procedure: EXCISION, BONE SPUR;  Surgeon: Caleb Duffy DPM;  Location: [facility] OR;  Service: Podiatry;  Laterality: Left;    TOE AMPUTATION Right 07/02/2020    Procedure: AMPUTATION, TOE; 3rd;  Surgeon: Caleb Duffy DPM;  Location: [facility] OR;  Service: Podiatry;  Laterality: Right;    TOE SURGERY Right     right big toe     Social History[1]  Current Outpatient Medications   Medication Sig    acetaminophen (TYLENOL) 500 MG tablet Take 500 mg by mouth every 6 (six) hours as needed for Pain.    aspirin (ECOTRIN) 81 MG EC tablet Take 81 mg by mouth once daily.    atorvastatin (LIPITOR) 80 MG tablet Take 80 mg by mouth.    blood-glucose sensor (DEXCOM G6 SENSOR) Jenni Change every 10 days.    blood-glucose transmitter (DEXCOM G6 TRANSMITTER) Jenni Change every 90 days    FLUoxetine 40 MG capsule Take 1 capsule (40 mg total) by mouth once daily.    insulin lispro (HUMALOG U-100 INSULIN) 100 unit/mL injection USE CONTINUOUSLY IN INSULIN PUMP MAX DAILY DOSE 60 UNITS    multivitamin capsule Take 1 capsule by mouth once daily.    OMNIPOD 5 G6-G7 INTRO KT,GEN5, Crtg 1 Device by Misc.(Non-Drug; Combo Route) route continuous.    OMNIPOD 5 G6-G7 PODS, GEN 5, Crtg CHANGE POD EVERY 3 DAYS    atorvastatin (LIPITOR) 40 MG tablet Take 1 tablet (40 mg total) by mouth once daily. (Patient not taking: Reported on 7/10/2025)    azelastine (ASTELIN) 137 mcg  "(0.1 %) nasal spray 2 sprays (274 mcg total) by Nasal route 2 (two) times daily. (Patient not taking: Reported on 7/10/2025)    co-enzyme Q-10 30 mg capsule Take 1 capsule by mouth once daily. (Patient not taking: Reported on 7/10/2025)    losartan (COZAAR) 25 MG tablet Take 1 tablet (25 mg total) by mouth once daily.    OMNIPOD 5 G6 INTRO KIT, GEN 5, Crtg Inject 1 Device into the skin continuous. (Patient not taking: Reported on 7/10/2025)    OMNIPOD 5 G6 PODS, GEN 5, Crtg CHANGE POD EVERY 3 DAYS (Patient not taking: Reported on 7/10/2025)     Current Facility-Administered Medications   Medication    glucagon (human recombinant) injection 1 mg     BP (!) 148/78 (BP Location: Left arm, Patient Position: Sitting)   Pulse 71   Ht 6' 1" (1.854 m)   SpO2 98%   BMI 27.52 kg/m²     Objective:      Physical Exam  Vitals reviewed.   Constitutional:       General: He is not in acute distress.     Appearance: He is well-developed.   HENT:      Head: Normocephalic and atraumatic.   Eyes:      General: No scleral icterus.     Conjunctiva/sclera: Conjunctivae normal.   Neck:      Vascular: No JVD.   Cardiovascular:      Rate and Rhythm: Normal rate and regular rhythm.      Heart sounds: Normal heart sounds. No murmur heard.     No friction rub. No gallop.   Pulmonary:      Effort: Pulmonary effort is normal. No respiratory distress.      Breath sounds: Normal breath sounds. No wheezing or rales.   Abdominal:      General: Bowel sounds are normal. There is no distension.      Palpations: Abdomen is soft.      Tenderness: There is no abdominal tenderness.   Musculoskeletal:      Right lower leg: No edema.      Left lower leg: No edema.   Skin:     General: Skin is warm and dry.      Findings: No rash.   Neurological:      Mental Status: He is alert and oriented to person, place, and time.         Assessment:       1. Stage 3b chronic kidney disease    2. Essential hypertension    3. Proteinuria, unspecified type        Lab " Results   Component Value Date    CREATININE 1.7 (H) 06/18/2025    BUN 29 (H) 06/18/2025     06/18/2025    K 4.8 06/18/2025     06/18/2025    CO2 22 (L) 06/18/2025     Lab Results   Component Value Date    PTH 85.0 (H) 03/13/2015    CALCIUM 9.0 06/18/2025    PHOS 3.9 03/13/2015     Lab Results   Component Value Date    HCT 40.3 09/25/2024     Prot/Creat Ratio, Urine   Date Value Ref Range Status   03/25/2015 0.4 (H) 0.0 - 0.2 Final   10/06/2014 0.3 (H) 0.0 - 0.2 Final     A1c 6.7  Plan:   Return to clinic in 2 months.  Labs for next visit include rfp, pth, upc, ua, renal US.  Baseline creatinine is 1.5-1.7 since 2024. Proteinuria noted. CKD clinically due to diabetic nephropathy. Will resume ARB since renal function has been stable over past two years, titrate up as tolerated.  BP not at goal, start losartan 25mg daily.    KFRE 2-Year: 3% at 6/18/2025  8:38 AM  Calculated from:  Serum Creatinine: 1.7 mg/dL at 6/18/2025  8:38 AM  Urine Albumin Creatinine Ratio: 986.4 ug/mg at 6/18/2025  8:38 AM  Age: 70 years  Sex: Male at 6/18/2025  8:38 AM  Has CKD-3 to CKD-5: Yes    KFRE 5-Year: 9.1% at 6/18/2025  8:38 AM  Calculated from:  Serum Creatinine: 1.7 mg/dL at 6/18/2025  8:38 AM  Urine Albumin Creatinine Ratio: 986.4 ug/mg at 6/18/2025  8:38 AM  Age: 70 years  Sex: Male at 6/18/2025  8:38 AM  Has CKD-3 to CKD-5: Yes         [1]   Social History  Socioeconomic History    Marital status:    Tobacco Use    Smoking status: Former     Types: Cigars    Smokeless tobacco: Never    Tobacco comments:     Former cigar use   Substance and Sexual Activity    Alcohol use: Yes     Comment: occ    Drug use: No    Sexual activity: Yes     Partners: Female   Social History Narrative    Retired former radiation  at [employer]     Social Drivers of Health     Financial Resource Strain: Low Risk  (5/27/2024)    Received from [facility]    Overall Financial Resource Strain (CARDIA)     Difficulty of  Paying Living Expenses: Not very hard   Food Insecurity: No Food Insecurity (5/27/2024)    Received from [facility]    Hunger Vital Sign     Worried About Running Out of Food in the Last Year: Never true     Ran Out of Food in the Last Year: Never true   Transportation Needs: No Transportation Needs (5/27/2024)    Received from [facility]    PRAPARE - Transportation     Lack of Transportation (Medical): No     Lack of Transportation (Non-Medical): No   Physical Activity: Unknown (5/27/2024)    Received from [facility]    Exercise Vital Sign     Days of Exercise per Week: 0 days   Recent Concern: Physical Activity - Insufficiently Active (3/3/2024)    Exercise Vital Sign     Days of Exercise per Week: 2 days     Minutes of Exercise per Session: 10 min   Stress: Stress Concern Present (5/27/2024)    Received from [facility]    [employer] [place] [employer] - Occupational Stress Questionnaire     Feeling of Stress : To some extent   Housing Stability: Unknown (3/3/2024)    Housing Stability Vital Sign     Unable to Pay for Housing in the Last Year: No     Unstable Housing in the Last Year: No

## 2025-07-14 ENCOUNTER — PATIENT MESSAGE (OUTPATIENT)
Dept: ENDOCRINOLOGY | Facility: CLINIC | Age: 70
End: 2025-07-14
Payer: MEDICARE

## 2025-07-14 DIAGNOSIS — E10.43 TYPE 1 DIABETES MELLITUS WITH DIABETIC AUTONOMIC NEUROPATHY: Primary | ICD-10-CM

## 2025-07-15 RX ORDER — INSULIN PMP CART,AUT,G6/7,CNTR
EACH SUBCUTANEOUS
Qty: 30 EACH | Refills: 3 | Status: SHIPPED | OUTPATIENT
Start: 2025-07-15

## 2025-07-22 ENCOUNTER — PATIENT MESSAGE (OUTPATIENT)
Dept: ADMINISTRATIVE | Facility: HOSPITAL | Age: 70
End: 2025-07-22
Payer: MEDICARE

## 2025-07-23 ENCOUNTER — PATIENT OUTREACH (OUTPATIENT)
Dept: ADMINISTRATIVE | Facility: HOSPITAL | Age: 70
End: 2025-07-23
Payer: MEDICARE

## 2025-07-23 NOTE — PROGRESS NOTES
Campaigns follow up outreach  Portal message sent  Uncontrolled BP  Eye exam due soon with Dr Lugo

## 2025-08-04 ENCOUNTER — PATIENT MESSAGE (OUTPATIENT)
Dept: ADMINISTRATIVE | Facility: HOSPITAL | Age: 70
End: 2025-08-04
Payer: MEDICARE

## 2025-08-19 DIAGNOSIS — E10.43 TYPE 1 DIABETES MELLITUS WITH DIABETIC AUTONOMIC NEUROPATHY: ICD-10-CM

## 2025-08-19 RX ORDER — BLOOD-GLUCOSE SENSOR
EACH MISCELLANEOUS
Qty: 9 EACH | Refills: 3 | Status: SHIPPED | OUTPATIENT
Start: 2025-08-19

## 2025-08-20 ENCOUNTER — TELEPHONE (OUTPATIENT)
Dept: ENDOCRINOLOGY | Facility: CLINIC | Age: 70
End: 2025-08-20
Payer: MEDICARE

## 2025-08-21 ENCOUNTER — TELEPHONE (OUTPATIENT)
Dept: ENDOCRINOLOGY | Facility: CLINIC | Age: 70
End: 2025-08-21
Payer: MEDICARE

## 2025-08-30 DIAGNOSIS — F41.9 ANXIETY AND DEPRESSION: ICD-10-CM

## 2025-08-30 DIAGNOSIS — F32.A ANXIETY AND DEPRESSION: ICD-10-CM

## 2025-08-31 RX ORDER — FLUOXETINE HYDROCHLORIDE 40 MG/1
40 CAPSULE ORAL DAILY
Qty: 90 CAPSULE | Refills: 1 | Status: SHIPPED | OUTPATIENT
Start: 2025-08-31

## (undated) DEVICE — SYR 10CC LUER LOCK

## (undated) DEVICE — TOWEL OR DISP STRL BLUE 4/PK

## (undated) DEVICE — SEE MEDLINE ITEM 157128

## (undated) DEVICE — GLOVE PROTEXIS HYDROGEL SZ8

## (undated) DEVICE — MARKER SKIN RULER STERILE

## (undated) DEVICE — PADDING CAST 4IN SPECIALIST

## (undated) DEVICE — SHIELD COLLAGEN 12HR CORNEAL

## (undated) DEVICE — PENCIL ROCKER SWITCH 10FT CORD

## (undated) DEVICE — TRAY NERVE BLOCK

## (undated) DEVICE — BLADE SURG #15 CARBON STEEL

## (undated) DEVICE — GLOVE SENSICARE PI MICRO 6

## (undated) DEVICE — DRAPE STERI-DRAPE 1000 17X11IN

## (undated) DEVICE — GAUZE SPONGE 4X4 12PLY

## (undated) DEVICE — ELECTRODE REM PLYHSV RETURN 9

## (undated) DEVICE — NDL SPINAL 25GX3.5 SPINOCAN

## (undated) DEVICE — PAD CAST SPECIALIST STRL 4

## (undated) DEVICE — SYR DISP LL 5CC

## (undated) DEVICE — CHLORAPREP 10.5 ML APPLICATOR

## (undated) DEVICE — SEE MEDLINE ITEM 146417

## (undated) DEVICE — RING MALUYGEN

## (undated) DEVICE — GLOVE SURG BIOGEL LATEX SZ 7.5

## (undated) DEVICE — BLADE SAW 16.0MM X 7.0MM

## (undated) DEVICE — STOCKINET 4INX48

## (undated) DEVICE — Device

## (undated) DEVICE — GLOVE SENSICARE PI ALOE 6

## (undated) DEVICE — SOL IRR BSS OPHTH 500ML STRL

## (undated) DEVICE — PACK OPHTHALMIC

## (undated) DEVICE — SUT MONOCYRL 4-0 PS2 UND

## (undated) DEVICE — SEE MEDLINE ITEM 157131

## (undated) DEVICE — SEE L#120831

## (undated) DEVICE — PAD ELECTROSURGICAL PAT PLATE

## (undated) DEVICE — DRAPE THREE-QTR REINF 53X77IN

## (undated) DEVICE — BLADE MEDIUM 9MM X 25MM

## (undated) DEVICE — GOWN SMARTGOWN 3XL XLONG

## (undated) DEVICE — SUT 3-0 VICRYL / SH (J416)

## (undated) DEVICE — SHOE MED-SURG LARGE BLACK 10.2

## (undated) DEVICE — SUT MONOCRYL 3-0 PS-2 UND

## (undated) DEVICE — TIP I/A CURVED SINGLE USE

## (undated) DEVICE — DRAPE EXTREMITY W/ABC NON-SLIP

## (undated) DEVICE — APPLICATOR CHLORAPREP ORN 26ML

## (undated) DEVICE — BLADE SAW NRRW MED 18.5X7

## (undated) DEVICE — SEE MEDLINE ITEM 146308

## (undated) DEVICE — DRESSING XEROFORM FOIL PK 1X8

## (undated) DEVICE — SOL BETADINE 5%

## (undated) DEVICE — NDL HYPO REG 25G X 1 1/2

## (undated) DEVICE — SEE MEDLINE ITEM 146298

## (undated) DEVICE — SPONGE WEC CEL SPEARS

## (undated) DEVICE — NDL TUOHY EPIDURAL 20G X 3.5

## (undated) DEVICE — SEE MEDLINE ITEM 146270

## (undated) DEVICE — SUT PROLENE 3-0 FS-1 MONO18

## (undated) DEVICE — TEAR CROSS LRG 14MM X 7MM

## (undated) DEVICE — SUT 4-0 VICRYL / SH

## (undated) DEVICE — SEE MEDLINE ITEM 154981

## (undated) DEVICE — TOWEL OR NONABSORB ADH 17X26

## (undated) DEVICE — SUT ETHILON 3-0 FS-1 30

## (undated) DEVICE — SKIN MARKER STER DUAL TIP

## (undated) DEVICE — SEE MEDLINE ITEM 146313

## (undated) DEVICE — SUT PROLENE 3-0 PS-2 BL 18IN

## (undated) DEVICE — NEPTUNE 4 PORT MANIFOLD

## (undated) DEVICE — DRAPE SURGICAL STERI INCISE

## (undated) DEVICE — BLADE SURG CARBON STEEL #10

## (undated) DEVICE — BANDAGE ESMARK LATEX FREE 4INX

## (undated) DEVICE — BANDAGE MATRIX HK LOOP 4IN 5YD

## (undated) DEVICE — SYR 3CC LUER LOC

## (undated) DEVICE — SYR PLASTIC 8ML PERIFIX LL

## (undated) DEVICE — SUT BONE WAX 2.5 GRMS 12/BX

## (undated) DEVICE — PACK EYE CUSTOM COVINGTON.

## (undated) DEVICE — SEE MEDLINE ITEM 152529

## (undated) DEVICE — COVER OVERHEAD SURG LT BLUE

## (undated) DEVICE — SEE MEDLINE ITEM 152622

## (undated) DEVICE — CANNULA VENOM 20G 10X100MM

## (undated) DEVICE — DRESSING N ADH OIL EMUL 3X3

## (undated) DEVICE — SPONGE DERMACEA GAUZE 4X4

## (undated) DEVICE — SHOE MED-SURG X-LRG BLACK 12

## (undated) DEVICE — GLOVE BIOGEL SZ 8 1/2